# Patient Record
Sex: MALE | Race: WHITE | Employment: UNEMPLOYED | URBAN - METROPOLITAN AREA
[De-identification: names, ages, dates, MRNs, and addresses within clinical notes are randomized per-mention and may not be internally consistent; named-entity substitution may affect disease eponyms.]

---

## 2021-01-01 ENCOUNTER — APPOINTMENT (OUTPATIENT)
Dept: GENERAL RADIOLOGY | Age: 46
DRG: 870 | End: 2021-01-01
Attending: STUDENT IN AN ORGANIZED HEALTH CARE EDUCATION/TRAINING PROGRAM
Payer: COMMERCIAL

## 2021-01-01 ENCOUNTER — ANESTHESIA EVENT (OUTPATIENT)
Dept: ICU | Age: 46
DRG: 208 | End: 2021-01-01
Payer: COMMERCIAL

## 2021-01-01 ENCOUNTER — HOSPITAL ENCOUNTER (INPATIENT)
Age: 46
LOS: 48 days | DRG: 870 | End: 2022-01-18
Attending: THORACIC SURGERY (CARDIOTHORACIC VASCULAR SURGERY) | Admitting: EMERGENCY MEDICINE
Payer: COMMERCIAL

## 2021-01-01 ENCOUNTER — APPOINTMENT (OUTPATIENT)
Dept: CT IMAGING | Age: 46
DRG: 208 | End: 2021-01-01
Attending: EMERGENCY MEDICINE
Payer: COMMERCIAL

## 2021-01-01 ENCOUNTER — APPOINTMENT (OUTPATIENT)
Dept: VASCULAR SURGERY | Age: 46
DRG: 208 | End: 2021-01-01
Attending: INTERNAL MEDICINE
Payer: COMMERCIAL

## 2021-01-01 ENCOUNTER — HOSPITAL ENCOUNTER (INPATIENT)
Dept: CT IMAGING | Age: 46
Discharge: HOME | DRG: 870 | End: 2021-12-26
Attending: NURSE PRACTITIONER | Admitting: EMERGENCY MEDICINE
Payer: COMMERCIAL

## 2021-01-01 ENCOUNTER — APPOINTMENT (OUTPATIENT)
Dept: GENERAL RADIOLOGY | Age: 46
DRG: 208 | End: 2021-01-01
Attending: EMERGENCY MEDICINE
Payer: COMMERCIAL

## 2021-01-01 ENCOUNTER — APPOINTMENT (OUTPATIENT)
Dept: GENERAL RADIOLOGY | Age: 46
DRG: 208 | End: 2021-01-01
Attending: INTERNAL MEDICINE
Payer: COMMERCIAL

## 2021-01-01 ENCOUNTER — APPOINTMENT (OUTPATIENT)
Dept: GENERAL RADIOLOGY | Age: 46
DRG: 870 | End: 2021-01-01
Attending: EMERGENCY MEDICINE
Payer: COMMERCIAL

## 2021-01-01 ENCOUNTER — ANESTHESIA (OUTPATIENT)
Dept: ICU | Age: 46
DRG: 208 | End: 2021-01-01
Payer: COMMERCIAL

## 2021-01-01 ENCOUNTER — APPOINTMENT (OUTPATIENT)
Dept: VASCULAR SURGERY | Age: 46
DRG: 870 | End: 2021-01-01
Attending: THORACIC SURGERY (CARDIOTHORACIC VASCULAR SURGERY)
Payer: COMMERCIAL

## 2021-01-01 ENCOUNTER — APPOINTMENT (OUTPATIENT)
Dept: GENERAL RADIOLOGY | Age: 46
DRG: 870 | End: 2021-01-01
Attending: INTERNAL MEDICINE
Payer: COMMERCIAL

## 2021-01-01 ENCOUNTER — APPOINTMENT (OUTPATIENT)
Dept: CT IMAGING | Age: 46
DRG: 870 | End: 2021-01-01
Attending: NURSE PRACTITIONER
Payer: COMMERCIAL

## 2021-01-01 ENCOUNTER — APPOINTMENT (OUTPATIENT)
Dept: GENERAL RADIOLOGY | Age: 46
DRG: 208 | End: 2021-01-01
Attending: FAMILY MEDICINE
Payer: COMMERCIAL

## 2021-01-01 ENCOUNTER — APPOINTMENT (OUTPATIENT)
Dept: NON INVASIVE DIAGNOSTICS | Age: 46
DRG: 208 | End: 2021-01-01
Attending: INTERNAL MEDICINE
Payer: COMMERCIAL

## 2021-01-01 ENCOUNTER — APPOINTMENT (OUTPATIENT)
Dept: GENERAL RADIOLOGY | Age: 46
DRG: 870 | End: 2021-01-01
Attending: THORACIC SURGERY (CARDIOTHORACIC VASCULAR SURGERY)
Payer: COMMERCIAL

## 2021-01-01 ENCOUNTER — APPOINTMENT (OUTPATIENT)
Dept: GENERAL RADIOLOGY | Age: 46
DRG: 870 | End: 2021-01-01
Attending: NURSE PRACTITIONER
Payer: COMMERCIAL

## 2021-01-01 ENCOUNTER — HOSPITAL ENCOUNTER (INPATIENT)
Age: 46
LOS: 8 days | Discharge: ANOTHER HOSPITAL | DRG: 208 | End: 2021-12-01
Attending: EMERGENCY MEDICINE | Admitting: FAMILY MEDICINE
Payer: COMMERCIAL

## 2021-01-01 VITALS
RESPIRATION RATE: 20 BRPM | TEMPERATURE: 98.8 F | WEIGHT: 191.58 LBS | HEIGHT: 66 IN | BODY MASS INDEX: 30.79 KG/M2 | SYSTOLIC BLOOD PRESSURE: 112 MMHG | HEART RATE: 72 BPM | OXYGEN SATURATION: 97 % | DIASTOLIC BLOOD PRESSURE: 82 MMHG

## 2021-01-01 DIAGNOSIS — Z79.4 TYPE 2 DIABETES MELLITUS WITH HYPERGLYCEMIA, WITH LONG-TERM CURRENT USE OF INSULIN (HCC): ICD-10-CM

## 2021-01-01 DIAGNOSIS — E11.65 TYPE 2 DIABETES MELLITUS WITH HYPERGLYCEMIA, WITH LONG-TERM CURRENT USE OF INSULIN (HCC): ICD-10-CM

## 2021-01-01 DIAGNOSIS — A41.89 SEPSIS DUE TO OTHER ETIOLOGY (HCC): ICD-10-CM

## 2021-01-01 DIAGNOSIS — J12.82 PNEUMONIA DUE TO COVID-19 VIRUS: ICD-10-CM

## 2021-01-01 DIAGNOSIS — R73.9 HYPERGLYCEMIA: ICD-10-CM

## 2021-01-01 DIAGNOSIS — J12.82 PNEUMONIA DUE TO COVID-19 VIRUS: Primary | ICD-10-CM

## 2021-01-01 DIAGNOSIS — U07.1 ACUTE HYPOXEMIC RESPIRATORY FAILURE DUE TO COVID-19 (HCC): ICD-10-CM

## 2021-01-01 DIAGNOSIS — J96.01 ACUTE RESPIRATORY FAILURE WITH HYPOXIA (HCC): ICD-10-CM

## 2021-01-01 DIAGNOSIS — R79.89 ELEVATED D-DIMER: ICD-10-CM

## 2021-01-01 DIAGNOSIS — J96.01 ACUTE HYPOXEMIC RESPIRATORY FAILURE DUE TO COVID-19 (HCC): ICD-10-CM

## 2021-01-01 DIAGNOSIS — E66.9 OBESITY (BMI 30-39.9): ICD-10-CM

## 2021-01-01 DIAGNOSIS — E87.20 LACTIC ACID ACIDOSIS: ICD-10-CM

## 2021-01-01 DIAGNOSIS — Z51.5 PALLIATIVE CARE BY SPECIALIST: ICD-10-CM

## 2021-01-01 DIAGNOSIS — U07.1 PNEUMONIA DUE TO COVID-19 VIRUS: Primary | ICD-10-CM

## 2021-01-01 DIAGNOSIS — U07.1 PNEUMONIA DUE TO COVID-19 VIRUS: ICD-10-CM

## 2021-01-01 LAB
25(OH)D3 SERPL-MCNC: 22.4 NG/ML (ref 30–100)
25(OH)D3 SERPL-MCNC: 23.2 NG/ML (ref 30–100)
ADMINISTERED INITIALS, ADMINIT: NORMAL
ALBUMIN SERPL-MCNC: 1.8 G/DL (ref 3.5–5)
ALBUMIN SERPL-MCNC: 1.9 G/DL (ref 3.5–5)
ALBUMIN SERPL-MCNC: 2 G/DL (ref 3.5–5)
ALBUMIN SERPL-MCNC: 2 G/DL (ref 3.5–5)
ALBUMIN SERPL-MCNC: 2.1 G/DL (ref 3.5–5)
ALBUMIN SERPL-MCNC: 2.2 G/DL (ref 3.4–5)
ALBUMIN SERPL-MCNC: 2.2 G/DL (ref 3.4–5)
ALBUMIN SERPL-MCNC: 2.2 G/DL (ref 3.5–5)
ALBUMIN SERPL-MCNC: 2.3 G/DL (ref 3.4–5)
ALBUMIN SERPL-MCNC: 2.3 G/DL (ref 3.5–5)
ALBUMIN SERPL-MCNC: 2.4 G/DL (ref 3.4–5)
ALBUMIN SERPL-MCNC: 2.4 G/DL (ref 3.5–5)
ALBUMIN SERPL-MCNC: 2.5 G/DL (ref 3.5–5)
ALBUMIN SERPL-MCNC: 2.6 G/DL (ref 3.5–5)
ALBUMIN SERPL-MCNC: 2.6 G/DL (ref 3.5–5)
ALBUMIN SERPL-MCNC: 2.7 G/DL (ref 3.5–5)
ALBUMIN SERPL-MCNC: 2.8 G/DL (ref 3.4–5)
ALBUMIN SERPL-MCNC: 2.8 G/DL (ref 3.4–5)
ALBUMIN SERPL-MCNC: 2.8 G/DL (ref 3.5–5)
ALBUMIN SERPL-MCNC: 2.9 G/DL (ref 3.5–5)
ALBUMIN SERPL-MCNC: 3 G/DL (ref 3.5–5)
ALBUMIN SERPL-MCNC: 3.1 G/DL (ref 3.5–5)
ALBUMIN SERPL-MCNC: 3.3 G/DL (ref 3.5–5)
ALBUMIN SERPL-MCNC: 3.5 G/DL (ref 3.4–5)
ALBUMIN SERPL-MCNC: 3.5 G/DL (ref 3.4–5)
ALBUMIN SERPL-MCNC: 3.6 G/DL (ref 3.5–5)
ALBUMIN SERPL-MCNC: 3.7 G/DL (ref 3.5–5)
ALBUMIN SERPL-MCNC: 3.9 G/DL (ref 3.4–5)
ALBUMIN/GLOB SERPL: 0.4 {RATIO} (ref 1.1–2.2)
ALBUMIN/GLOB SERPL: 0.5 {RATIO} (ref 0.8–1.7)
ALBUMIN/GLOB SERPL: 0.5 {RATIO} (ref 1.1–2.2)
ALBUMIN/GLOB SERPL: 0.5 {RATIO} (ref 1.1–2.2)
ALBUMIN/GLOB SERPL: 0.6 {RATIO} (ref 0.8–1.7)
ALBUMIN/GLOB SERPL: 0.6 {RATIO} (ref 1.1–2.2)
ALBUMIN/GLOB SERPL: 0.7 {RATIO} (ref 0.8–1.7)
ALBUMIN/GLOB SERPL: 0.8 {RATIO} (ref 1.1–2.2)
ALBUMIN/GLOB SERPL: 0.9 {RATIO} (ref 1.1–2.2)
ALBUMIN/GLOB SERPL: 1 {RATIO} (ref 1.1–2.2)
ALBUMIN/GLOB SERPL: 1.1 {RATIO} (ref 0.8–1.7)
ALBUMIN/GLOB SERPL: 1.1 {RATIO} (ref 1.1–2.2)
ALBUMIN/GLOB SERPL: 1.1 {RATIO} (ref 1.1–2.2)
ALBUMIN/GLOB SERPL: 1.2 {RATIO} (ref 1.1–2.2)
ALBUMIN/GLOB SERPL: 1.3 {RATIO} (ref 0.8–1.7)
ALBUMIN/GLOB SERPL: 1.3 {RATIO} (ref 1.1–2.2)
ALBUMIN/GLOB SERPL: 1.4 {RATIO} (ref 0.8–1.7)
ALP SERPL-CCNC: 142 U/L (ref 45–117)
ALP SERPL-CCNC: 44 U/L (ref 45–117)
ALP SERPL-CCNC: 45 U/L (ref 45–117)
ALP SERPL-CCNC: 46 U/L (ref 45–117)
ALP SERPL-CCNC: 47 U/L (ref 45–117)
ALP SERPL-CCNC: 51 U/L (ref 45–117)
ALP SERPL-CCNC: 54 U/L (ref 45–117)
ALP SERPL-CCNC: 55 U/L (ref 45–117)
ALP SERPL-CCNC: 56 U/L (ref 45–117)
ALP SERPL-CCNC: 59 U/L (ref 45–117)
ALP SERPL-CCNC: 60 U/L (ref 45–117)
ALP SERPL-CCNC: 60 U/L (ref 45–117)
ALP SERPL-CCNC: 61 U/L (ref 45–117)
ALP SERPL-CCNC: 62 U/L (ref 45–117)
ALP SERPL-CCNC: 63 U/L (ref 45–117)
ALP SERPL-CCNC: 64 U/L (ref 45–117)
ALP SERPL-CCNC: 65 U/L (ref 45–117)
ALP SERPL-CCNC: 66 U/L (ref 45–117)
ALP SERPL-CCNC: 67 U/L (ref 45–117)
ALP SERPL-CCNC: 68 U/L (ref 45–117)
ALP SERPL-CCNC: 68 U/L (ref 45–117)
ALP SERPL-CCNC: 69 U/L (ref 45–117)
ALP SERPL-CCNC: 70 U/L (ref 45–117)
ALP SERPL-CCNC: 72 U/L (ref 45–117)
ALP SERPL-CCNC: 73 U/L (ref 45–117)
ALP SERPL-CCNC: 79 U/L (ref 45–117)
ALP SERPL-CCNC: 85 U/L (ref 45–117)
ALP SERPL-CCNC: 88 U/L (ref 45–117)
ALP SERPL-CCNC: 93 U/L (ref 45–117)
ALP SERPL-CCNC: 93 U/L (ref 45–117)
ALP SERPL-CCNC: 95 U/L (ref 45–117)
ALT SERPL-CCNC: 19 U/L (ref 12–78)
ALT SERPL-CCNC: 20 U/L (ref 12–78)
ALT SERPL-CCNC: 21 U/L (ref 12–78)
ALT SERPL-CCNC: 22 U/L (ref 12–78)
ALT SERPL-CCNC: 23 U/L (ref 12–78)
ALT SERPL-CCNC: 24 U/L (ref 12–78)
ALT SERPL-CCNC: 26 U/L (ref 12–78)
ALT SERPL-CCNC: 27 U/L (ref 12–78)
ALT SERPL-CCNC: 31 U/L (ref 12–78)
ALT SERPL-CCNC: 32 U/L (ref 12–78)
ALT SERPL-CCNC: 37 U/L (ref 12–78)
ALT SERPL-CCNC: 38 U/L (ref 16–61)
ALT SERPL-CCNC: 39 U/L (ref 12–78)
ALT SERPL-CCNC: 40 U/L (ref 12–78)
ALT SERPL-CCNC: 40 U/L (ref 12–78)
ALT SERPL-CCNC: 41 U/L (ref 16–61)
ALT SERPL-CCNC: 43 U/L (ref 12–78)
ALT SERPL-CCNC: 43 U/L (ref 16–61)
ALT SERPL-CCNC: 45 U/L (ref 12–78)
ALT SERPL-CCNC: 46 U/L (ref 12–78)
ALT SERPL-CCNC: 48 U/L (ref 12–78)
ALT SERPL-CCNC: 49 U/L (ref 12–78)
ALT SERPL-CCNC: 53 U/L (ref 12–78)
ALT SERPL-CCNC: 54 U/L (ref 12–78)
ALT SERPL-CCNC: 57 U/L (ref 16–61)
ALT SERPL-CCNC: 58 U/L (ref 12–78)
ALT SERPL-CCNC: 58 U/L (ref 16–61)
ALT SERPL-CCNC: 59 U/L (ref 12–78)
ALT SERPL-CCNC: 60 U/L (ref 12–78)
ALT SERPL-CCNC: 62 U/L (ref 12–78)
ALT SERPL-CCNC: 62 U/L (ref 16–61)
ALT SERPL-CCNC: 63 U/L (ref 16–61)
ALT SERPL-CCNC: 65 U/L (ref 12–78)
ALT SERPL-CCNC: 72 U/L (ref 16–61)
ALT SERPL-CCNC: 85 U/L (ref 16–61)
ANION GAP SERPL CALC-SCNC: 10 MMOL/L (ref 5–15)
ANION GAP SERPL CALC-SCNC: 12 MMOL/L (ref 3–18)
ANION GAP SERPL CALC-SCNC: 13 MMOL/L (ref 3–18)
ANION GAP SERPL CALC-SCNC: 14 MMOL/L (ref 3–18)
ANION GAP SERPL CALC-SCNC: 2 MMOL/L (ref 5–15)
ANION GAP SERPL CALC-SCNC: 3 MMOL/L (ref 5–15)
ANION GAP SERPL CALC-SCNC: 4 MMOL/L (ref 3–18)
ANION GAP SERPL CALC-SCNC: 4 MMOL/L (ref 3–18)
ANION GAP SERPL CALC-SCNC: 4 MMOL/L (ref 5–15)
ANION GAP SERPL CALC-SCNC: 5 MMOL/L (ref 3–18)
ANION GAP SERPL CALC-SCNC: 5 MMOL/L (ref 5–15)
ANION GAP SERPL CALC-SCNC: 6 MMOL/L (ref 5–15)
ANION GAP SERPL CALC-SCNC: 7 MMOL/L (ref 3–18)
ANION GAP SERPL CALC-SCNC: 7 MMOL/L (ref 3–18)
ANION GAP SERPL CALC-SCNC: 7 MMOL/L (ref 5–15)
ANION GAP SERPL CALC-SCNC: 8 MMOL/L (ref 3–18)
ANION GAP SERPL CALC-SCNC: 8 MMOL/L (ref 5–15)
ANION GAP SERPL CALC-SCNC: 9 MMOL/L (ref 5–15)
APPEARANCE UR: ABNORMAL
APPEARANCE UR: CLEAR
APTT PPP: 24.4 SEC (ref 22.1–31)
APTT PPP: 27.9 SEC (ref 23–36.4)
APTT PPP: 28.1 SEC (ref 23–36.4)
APTT PPP: 28.6 SEC (ref 23–36.4)
APTT PPP: 28.8 SEC (ref 23–36.4)
APTT PPP: 29.4 SEC (ref 23–36.4)
APTT PPP: 30.5 SEC (ref 23–36.4)
APTT PPP: 30.8 SEC (ref 23–36.4)
APTT PPP: 33.8 SEC (ref 23–36.4)
APTT PPP: 35.1 SEC (ref 23–36.4)
APTT PPP: 35.3 SEC (ref 22.1–31)
APTT PPP: 45.7 SEC (ref 22.1–31)
APTT PPP: 48.7 SEC (ref 22.1–31)
APTT PPP: 51.3 SEC (ref 22.1–31)
ARTERIAL PATENCY WRIST A: ABNORMAL
ARTERIAL PATENCY WRIST A: NEGATIVE
ARTERIAL PATENCY WRIST A: NORMAL
ARTERIAL PATENCY WRIST A: POSITIVE
ARTERIAL PATENCY WRIST A: YES
ARTERIAL PATENCY WRIST A: YES
AST SERPL-CCNC: 102 U/L (ref 10–38)
AST SERPL-CCNC: 13 U/L (ref 15–37)
AST SERPL-CCNC: 13 U/L (ref 15–37)
AST SERPL-CCNC: 14 U/L (ref 15–37)
AST SERPL-CCNC: 15 U/L (ref 15–37)
AST SERPL-CCNC: 15 U/L (ref 15–37)
AST SERPL-CCNC: 16 U/L (ref 15–37)
AST SERPL-CCNC: 16 U/L (ref 15–37)
AST SERPL-CCNC: 17 U/L (ref 15–37)
AST SERPL-CCNC: 18 U/L (ref 15–37)
AST SERPL-CCNC: 18 U/L (ref 15–37)
AST SERPL-CCNC: 19 U/L (ref 15–37)
AST SERPL-CCNC: 19 U/L (ref 15–37)
AST SERPL-CCNC: 20 U/L (ref 15–37)
AST SERPL-CCNC: 20 U/L (ref 15–37)
AST SERPL-CCNC: 21 U/L (ref 15–37)
AST SERPL-CCNC: 22 U/L (ref 15–37)
AST SERPL-CCNC: 22 U/L (ref 15–37)
AST SERPL-CCNC: 23 U/L (ref 15–37)
AST SERPL-CCNC: 24 U/L (ref 15–37)
AST SERPL-CCNC: 28 U/L (ref 15–37)
AST SERPL-CCNC: 28 U/L (ref 15–37)
AST SERPL-CCNC: 29 U/L (ref 15–37)
AST SERPL-CCNC: 32 U/L (ref 15–37)
AST SERPL-CCNC: 35 U/L (ref 15–37)
AST SERPL-CCNC: 40 U/L (ref 15–37)
AST SERPL-CCNC: 44 U/L (ref 15–37)
AST SERPL-CCNC: 47 U/L (ref 10–38)
AST SERPL-CCNC: 47 U/L (ref 10–38)
AST SERPL-CCNC: 53 U/L (ref 15–37)
AST SERPL-CCNC: 56 U/L (ref 10–38)
AST SERPL-CCNC: 59 U/L (ref 10–38)
AST SERPL-CCNC: 59 U/L (ref 10–38)
AST SERPL-CCNC: 66 U/L (ref 10–38)
AST SERPL-CCNC: 78 U/L (ref 10–38)
AST SERPL-CCNC: 79 U/L (ref 10–38)
ATRIAL RATE: 117 BPM
B PERT DNA SPEC QL NAA+PROBE: NOT DETECTED
BACTERIA SPEC CULT: ABNORMAL
BACTERIA SPEC CULT: NORMAL
BACTERIA URNS QL MICRO: ABNORMAL /HPF
BACTERIA URNS QL MICRO: NEGATIVE /HPF
BACTERIA URNS QL MICRO: NEGATIVE /HPF
BASE DEFICIT BLD-SCNC: 0.3 MMOL/L
BASE DEFICIT BLD-SCNC: 0.7 MMOL/L
BASE DEFICIT BLD-SCNC: 3.4 MMOL/L
BASE DEFICIT BLDA-SCNC: 0.7 MMOL/L
BASE DEFICIT BLDA-SCNC: 1.8 MMOL/L
BASE EXCESS BLD CALC-SCNC: 10.6 MMOL/L
BASE EXCESS BLD CALC-SCNC: 2.4 MMOL/L
BASE EXCESS BLD CALC-SCNC: 2.6 MMOL/L
BASE EXCESS BLD CALC-SCNC: 2.8 MMOL/L
BASE EXCESS BLD CALC-SCNC: 3 MMOL/L
BASE EXCESS BLD CALC-SCNC: 3 MMOL/L
BASE EXCESS BLD CALC-SCNC: 3.1 MMOL/L
BASE EXCESS BLD CALC-SCNC: 3.5 MMOL/L
BASE EXCESS BLD CALC-SCNC: 3.7 MMOL/L
BASE EXCESS BLD CALC-SCNC: 3.7 MMOL/L
BASE EXCESS BLD CALC-SCNC: 3.8 MMOL/L
BASE EXCESS BLD CALC-SCNC: 4 MMOL/L
BASE EXCESS BLD CALC-SCNC: 4.1 MMOL/L
BASE EXCESS BLD CALC-SCNC: 4.2 MMOL/L
BASE EXCESS BLD CALC-SCNC: 4.3 MMOL/L
BASE EXCESS BLD CALC-SCNC: 4.3 MMOL/L
BASE EXCESS BLD CALC-SCNC: 4.4 MMOL/L
BASE EXCESS BLD CALC-SCNC: 4.8 MMOL/L
BASE EXCESS BLD CALC-SCNC: 5.1 MMOL/L
BASE EXCESS BLD CALC-SCNC: 5.2 MMOL/L
BASE EXCESS BLD CALC-SCNC: 5.2 MMOL/L
BASE EXCESS BLD CALC-SCNC: 5.4 MMOL/L
BASE EXCESS BLD CALC-SCNC: 5.8 MMOL/L
BASE EXCESS BLD CALC-SCNC: 6.2 MMOL/L
BASE EXCESS BLD CALC-SCNC: 6.2 MMOL/L
BASE EXCESS BLD CALC-SCNC: 6.3 MMOL/L
BASE EXCESS BLD CALC-SCNC: 6.7 MMOL/L
BASE EXCESS BLD CALC-SCNC: 7.3 MMOL/L
BASE EXCESS BLD CALC-SCNC: 7.3 MMOL/L
BASE EXCESS BLD CALC-SCNC: 7.4 MMOL/L
BASE EXCESS BLD CALC-SCNC: 7.9 MMOL/L
BASE EXCESS BLD CALC-SCNC: 8.2 MMOL/L
BASE EXCESS BLDA CALC-SCNC: 0.2 MMOL/L
BASE EXCESS BLDA CALC-SCNC: 0.9 MMOL/L
BASE EXCESS BLDA CALC-SCNC: 14.6 MMOL/L
BASE EXCESS BLDA CALC-SCNC: 2.2 MMOL/L
BASE EXCESS BLDA CALC-SCNC: 2.2 MMOL/L
BASE EXCESS BLDA CALC-SCNC: 2.7 MMOL/L
BASE EXCESS BLDA CALC-SCNC: 2.8 MMOL/L
BASE EXCESS BLDA CALC-SCNC: 2.9 MMOL/L
BASE EXCESS BLDA CALC-SCNC: 3 MMOL/L
BASE EXCESS BLDA CALC-SCNC: 3.2 MMOL/L
BASE EXCESS BLDA CALC-SCNC: 3.5 MMOL/L
BASE EXCESS BLDA CALC-SCNC: 3.6 MMOL/L
BASE EXCESS BLDA CALC-SCNC: 3.7 MMOL/L
BASE EXCESS BLDA CALC-SCNC: 3.8 MMOL/L
BASE EXCESS BLDA CALC-SCNC: 5.5 MMOL/L
BASE EXCESS BLDA CALC-SCNC: 6.5 MMOL/L
BASE EXCESS BLDA CALC-SCNC: 7.1 MMOL/L
BASE EXCESS BLDA CALC-SCNC: 9.2 MMOL/L
BASE EXCESS BLDA CALC-SCNC: 9.6 MMOL/L
BASOPHILS # BLD: 0 K/UL (ref 0–0.1)
BASOPHILS # BLD: 0.1 K/UL (ref 0–0.1)
BASOPHILS # BLD: 0.1 K/UL (ref 0–0.1)
BASOPHILS NFR BLD: 0 % (ref 0–1)
BASOPHILS NFR BLD: 0 % (ref 0–2)
BASOPHILS NFR BLD: 1 % (ref 0–1)
BDY SITE: ABNORMAL
BDY SITE: NORMAL
BILIRUB SERPL-MCNC: 0.3 MG/DL (ref 0.2–1)
BILIRUB SERPL-MCNC: 0.3 MG/DL (ref 0.2–1)
BILIRUB SERPL-MCNC: 0.4 MG/DL (ref 0.2–1)
BILIRUB SERPL-MCNC: 0.5 MG/DL (ref 0.2–1)
BILIRUB SERPL-MCNC: 0.6 MG/DL (ref 0.2–1)
BILIRUB SERPL-MCNC: 0.7 MG/DL (ref 0.2–1)
BILIRUB UR QL: NEGATIVE
BNP SERPL-MCNC: 35 PG/ML (ref 0–450)
BNP SERPL-MCNC: 35 PG/ML (ref 0–450)
BNP SERPL-MCNC: 45 PG/ML (ref 0–450)
BNP SERPL-MCNC: 45 PG/ML (ref 0–450)
BODY TEMPERATURE: 37
BODY TEMPERATURE: 37
BODY TEMPERATURE: 98.3
BODY TEMPERATURE: 98.9
BODY TEMPERATURE: 99
BORDETELLA PARAPERTUSSIS PCR, BORPAR: NOT DETECTED
BUN SERPL-MCNC: 11 MG/DL (ref 6–20)
BUN SERPL-MCNC: 11 MG/DL (ref 6–20)
BUN SERPL-MCNC: 15 MG/DL (ref 6–20)
BUN SERPL-MCNC: 16 MG/DL (ref 6–20)
BUN SERPL-MCNC: 17 MG/DL (ref 6–20)
BUN SERPL-MCNC: 17 MG/DL (ref 6–20)
BUN SERPL-MCNC: 18 MG/DL (ref 6–20)
BUN SERPL-MCNC: 18 MG/DL (ref 7–18)
BUN SERPL-MCNC: 18 MG/DL (ref 7–18)
BUN SERPL-MCNC: 19 MG/DL (ref 6–20)
BUN SERPL-MCNC: 20 MG/DL (ref 6–20)
BUN SERPL-MCNC: 20 MG/DL (ref 6–20)
BUN SERPL-MCNC: 20 MG/DL (ref 7–18)
BUN SERPL-MCNC: 20 MG/DL (ref 7–18)
BUN SERPL-MCNC: 21 MG/DL (ref 6–20)
BUN SERPL-MCNC: 22 MG/DL (ref 6–20)
BUN SERPL-MCNC: 22 MG/DL (ref 7–18)
BUN SERPL-MCNC: 23 MG/DL (ref 6–20)
BUN SERPL-MCNC: 23 MG/DL (ref 6–20)
BUN SERPL-MCNC: 24 MG/DL (ref 6–20)
BUN SERPL-MCNC: 24 MG/DL (ref 7–18)
BUN SERPL-MCNC: 25 MG/DL (ref 6–20)
BUN SERPL-MCNC: 25 MG/DL (ref 7–18)
BUN SERPL-MCNC: 26 MG/DL (ref 6–20)
BUN SERPL-MCNC: 26 MG/DL (ref 7–18)
BUN SERPL-MCNC: 31 MG/DL (ref 6–20)
BUN SERPL-MCNC: 31 MG/DL (ref 7–18)
BUN SERPL-MCNC: 33 MG/DL (ref 6–20)
BUN SERPL-MCNC: 34 MG/DL (ref 6–20)
BUN SERPL-MCNC: 37 MG/DL (ref 6–20)
BUN SERPL-MCNC: 37 MG/DL (ref 6–20)
BUN SERPL-MCNC: 44 MG/DL (ref 6–20)
BUN SERPL-MCNC: 44 MG/DL (ref 6–20)
BUN SERPL-MCNC: 5 MG/DL (ref 6–20)
BUN SERPL-MCNC: 6 MG/DL (ref 6–20)
BUN SERPL-MCNC: 7 MG/DL (ref 6–20)
BUN/CREAT SERPL: 11 (ref 12–20)
BUN/CREAT SERPL: 12 (ref 12–20)
BUN/CREAT SERPL: 14 (ref 12–20)
BUN/CREAT SERPL: 15 (ref 12–20)
BUN/CREAT SERPL: 18 (ref 12–20)
BUN/CREAT SERPL: 25 (ref 12–20)
BUN/CREAT SERPL: 26 (ref 12–20)
BUN/CREAT SERPL: 26 (ref 12–20)
BUN/CREAT SERPL: 27 (ref 12–20)
BUN/CREAT SERPL: 28 (ref 12–20)
BUN/CREAT SERPL: 28 (ref 12–20)
BUN/CREAT SERPL: 29 (ref 12–20)
BUN/CREAT SERPL: 30 (ref 12–20)
BUN/CREAT SERPL: 30 (ref 12–20)
BUN/CREAT SERPL: 32 (ref 12–20)
BUN/CREAT SERPL: 32 (ref 12–20)
BUN/CREAT SERPL: 37 (ref 12–20)
BUN/CREAT SERPL: 37 (ref 12–20)
BUN/CREAT SERPL: 40 (ref 12–20)
BUN/CREAT SERPL: 41 (ref 12–20)
BUN/CREAT SERPL: 42 (ref 12–20)
BUN/CREAT SERPL: 43 (ref 12–20)
BUN/CREAT SERPL: 45 (ref 12–20)
BUN/CREAT SERPL: 46 (ref 12–20)
BUN/CREAT SERPL: 46 (ref 12–20)
BUN/CREAT SERPL: 47 (ref 12–20)
BUN/CREAT SERPL: 47 (ref 12–20)
BUN/CREAT SERPL: 48 (ref 12–20)
BUN/CREAT SERPL: 49 (ref 12–20)
BUN/CREAT SERPL: 51 (ref 12–20)
BUN/CREAT SERPL: 52 (ref 12–20)
BUN/CREAT SERPL: 53 (ref 12–20)
BUN/CREAT SERPL: 57 (ref 12–20)
BUN/CREAT SERPL: 57 (ref 12–20)
BUN/CREAT SERPL: 61 (ref 12–20)
BUN/CREAT SERPL: 61 (ref 12–20)
BUN/CREAT SERPL: 71 (ref 12–20)
C PNEUM DNA SPEC QL NAA+PROBE: NOT DETECTED
CA-I BLD-SCNC: 1.06 MMOL/L (ref 1.12–1.32)
CA-I BLD-SCNC: 1.07 MMOL/L (ref 1.13–1.32)
CA-I BLD-SCNC: 1.13 MMOL/L (ref 1.13–1.32)
CA-I SERPL-SCNC: 1.05 MMOL/L (ref 1.12–1.32)
CA-I SERPL-SCNC: 1.09 MMOL/L (ref 1.12–1.32)
CA-I SERPL-SCNC: 1.12 MMOL/L (ref 1.12–1.32)
CA-I SERPL-SCNC: 1.14 MMOL/L (ref 1.12–1.32)
CA-I SERPL-SCNC: 1.15 MMOL/L (ref 1.12–1.32)
CA-I SERPL-SCNC: 1.19 MMOL/L (ref 1.12–1.32)
CALCIUM SERPL-MCNC: 8.2 MG/DL (ref 8.5–10.1)
CALCIUM SERPL-MCNC: 8.3 MG/DL (ref 8.5–10.1)
CALCIUM SERPL-MCNC: 8.4 MG/DL (ref 8.5–10.1)
CALCIUM SERPL-MCNC: 8.5 MG/DL (ref 8.5–10.1)
CALCIUM SERPL-MCNC: 8.6 MG/DL (ref 8.5–10.1)
CALCIUM SERPL-MCNC: 8.6 MG/DL (ref 8.5–10.1)
CALCIUM SERPL-MCNC: 8.7 MG/DL (ref 8.5–10.1)
CALCIUM SERPL-MCNC: 8.8 MG/DL (ref 8.5–10.1)
CALCIUM SERPL-MCNC: 8.9 MG/DL (ref 8.5–10.1)
CALCIUM SERPL-MCNC: 9 MG/DL (ref 8.5–10.1)
CALCIUM SERPL-MCNC: 9.1 MG/DL (ref 8.5–10.1)
CALCIUM SERPL-MCNC: 9.2 MG/DL (ref 8.5–10.1)
CALCIUM SERPL-MCNC: 9.2 MG/DL (ref 8.5–10.1)
CALCIUM SERPL-MCNC: 9.3 MG/DL (ref 8.5–10.1)
CALCIUM SERPL-MCNC: 9.3 MG/DL (ref 8.5–10.1)
CALCULATED P AXIS, ECG09: 34 DEGREES
CALCULATED R AXIS, ECG10: -5 DEGREES
CALCULATED T AXIS, ECG11: 41 DEGREES
CHLORIDE SERPL-SCNC: 100 MMOL/L (ref 97–108)
CHLORIDE SERPL-SCNC: 101 MMOL/L (ref 100–111)
CHLORIDE SERPL-SCNC: 101 MMOL/L (ref 97–108)
CHLORIDE SERPL-SCNC: 102 MMOL/L (ref 100–111)
CHLORIDE SERPL-SCNC: 102 MMOL/L (ref 100–111)
CHLORIDE SERPL-SCNC: 102 MMOL/L (ref 97–108)
CHLORIDE SERPL-SCNC: 103 MMOL/L (ref 100–111)
CHLORIDE SERPL-SCNC: 103 MMOL/L (ref 97–108)
CHLORIDE SERPL-SCNC: 104 MMOL/L (ref 100–111)
CHLORIDE SERPL-SCNC: 104 MMOL/L (ref 97–108)
CHLORIDE SERPL-SCNC: 104 MMOL/L (ref 97–108)
CHLORIDE SERPL-SCNC: 105 MMOL/L (ref 100–111)
CHLORIDE SERPL-SCNC: 105 MMOL/L (ref 97–108)
CHLORIDE SERPL-SCNC: 105 MMOL/L (ref 97–108)
CHLORIDE SERPL-SCNC: 106 MMOL/L (ref 97–108)
CHLORIDE SERPL-SCNC: 107 MMOL/L (ref 97–108)
CHLORIDE SERPL-SCNC: 107 MMOL/L (ref 97–108)
CHLORIDE SERPL-SCNC: 110 MMOL/L (ref 97–108)
CHLORIDE SERPL-SCNC: 90 MMOL/L (ref 100–111)
CHLORIDE SERPL-SCNC: 90 MMOL/L (ref 97–108)
CHLORIDE SERPL-SCNC: 91 MMOL/L (ref 97–108)
CHLORIDE SERPL-SCNC: 92 MMOL/L (ref 97–108)
CHLORIDE SERPL-SCNC: 94 MMOL/L (ref 97–108)
CHLORIDE SERPL-SCNC: 95 MMOL/L (ref 97–108)
CHLORIDE SERPL-SCNC: 95 MMOL/L (ref 97–108)
CHLORIDE SERPL-SCNC: 97 MMOL/L (ref 97–108)
CHLORIDE SERPL-SCNC: 97 MMOL/L (ref 97–108)
CHLORIDE SERPL-SCNC: 99 MMOL/L (ref 100–111)
CHLORIDE SERPL-SCNC: 99 MMOL/L (ref 100–111)
CHOLEST SERPL-MCNC: 123 MG/DL
CK MB CFR SERPL CALC: ABNORMAL % (ref 0–4)
CK MB CFR SERPL CALC: NORMAL % (ref 0–4)
CK MB SERPL-MCNC: <1 NG/ML (ref 5–25)
CK MB SERPL-MCNC: <1 NG/ML (ref 5–25)
CK SERPL-CCNC: 795 U/L (ref 39–308)
CK SERPL-CCNC: 99 U/L (ref 39–308)
CO2 SERPL-SCNC: 24 MMOL/L (ref 21–32)
CO2 SERPL-SCNC: 24 MMOL/L (ref 21–32)
CO2 SERPL-SCNC: 25 MMOL/L (ref 21–32)
CO2 SERPL-SCNC: 26 MMOL/L (ref 21–32)
CO2 SERPL-SCNC: 26 MMOL/L (ref 21–32)
CO2 SERPL-SCNC: 27 MMOL/L (ref 21–32)
CO2 SERPL-SCNC: 28 MMOL/L (ref 21–32)
CO2 SERPL-SCNC: 29 MMOL/L (ref 21–32)
CO2 SERPL-SCNC: 30 MMOL/L (ref 21–32)
CO2 SERPL-SCNC: 31 MMOL/L (ref 21–32)
CO2 SERPL-SCNC: 32 MMOL/L (ref 21–32)
CO2 SERPL-SCNC: 33 MMOL/L (ref 21–32)
CO2 SERPL-SCNC: 34 MMOL/L (ref 21–32)
CO2 SERPL-SCNC: 35 MMOL/L (ref 21–32)
COLOR UR: ABNORMAL
COLOR UR: YELLOW
COVID-19 RAPID TEST, COVR: DETECTED
CREAT SERPL-MCNC: 0.24 MG/DL (ref 0.7–1.3)
CREAT SERPL-MCNC: 0.33 MG/DL (ref 0.7–1.3)
CREAT SERPL-MCNC: 0.33 MG/DL (ref 0.7–1.3)
CREAT SERPL-MCNC: 0.38 MG/DL (ref 0.7–1.3)
CREAT SERPL-MCNC: 0.38 MG/DL (ref 0.7–1.3)
CREAT SERPL-MCNC: 0.39 MG/DL (ref 0.7–1.3)
CREAT SERPL-MCNC: 0.42 MG/DL (ref 0.7–1.3)
CREAT SERPL-MCNC: 0.43 MG/DL (ref 0.7–1.3)
CREAT SERPL-MCNC: 0.45 MG/DL (ref 0.7–1.3)
CREAT SERPL-MCNC: 0.47 MG/DL (ref 0.7–1.3)
CREAT SERPL-MCNC: 0.48 MG/DL (ref 0.7–1.3)
CREAT SERPL-MCNC: 0.48 MG/DL (ref 0.7–1.3)
CREAT SERPL-MCNC: 0.5 MG/DL (ref 0.7–1.3)
CREAT SERPL-MCNC: 0.51 MG/DL (ref 0.7–1.3)
CREAT SERPL-MCNC: 0.52 MG/DL (ref 0.7–1.3)
CREAT SERPL-MCNC: 0.53 MG/DL (ref 0.7–1.3)
CREAT SERPL-MCNC: 0.54 MG/DL (ref 0.7–1.3)
CREAT SERPL-MCNC: 0.57 MG/DL (ref 0.6–1.3)
CREAT SERPL-MCNC: 0.58 MG/DL (ref 0.6–1.3)
CREAT SERPL-MCNC: 0.58 MG/DL (ref 0.7–1.3)
CREAT SERPL-MCNC: 0.59 MG/DL (ref 0.7–1.3)
CREAT SERPL-MCNC: 0.61 MG/DL (ref 0.7–1.3)
CREAT SERPL-MCNC: 0.62 MG/DL (ref 0.7–1.3)
CREAT SERPL-MCNC: 0.64 MG/DL (ref 0.7–1.3)
CREAT SERPL-MCNC: 0.65 MG/DL (ref 0.7–1.3)
CREAT SERPL-MCNC: 0.68 MG/DL (ref 0.6–1.3)
CREAT SERPL-MCNC: 0.7 MG/DL (ref 0.7–1.3)
CREAT SERPL-MCNC: 0.71 MG/DL (ref 0.6–1.3)
CREAT SERPL-MCNC: 0.72 MG/DL (ref 0.6–1.3)
CREAT SERPL-MCNC: 0.73 MG/DL (ref 0.6–1.3)
CREAT SERPL-MCNC: 0.73 MG/DL (ref 0.7–1.3)
CREAT SERPL-MCNC: 0.75 MG/DL (ref 0.6–1.3)
CREAT SERPL-MCNC: 0.76 MG/DL (ref 0.7–1.3)
CREAT SERPL-MCNC: 0.82 MG/DL (ref 0.6–1.3)
CREAT SERPL-MCNC: 0.94 MG/DL (ref 0.6–1.3)
CREAT SERPL-MCNC: 1.07 MG/DL (ref 0.7–1.3)
CRP SERPL HS-MCNC: 5.3 MG/L
CRP SERPL HS-MCNC: >9.5 MG/L
CRP SERPL-MCNC: 0.6 MG/DL (ref 0–0.3)
CRP SERPL-MCNC: 0.7 MG/DL (ref 0–0.3)
CRP SERPL-MCNC: 0.8 MG/DL (ref 0–0.3)
CRP SERPL-MCNC: 1.2 MG/DL (ref 0–0.3)
CRP SERPL-MCNC: 1.9 MG/DL (ref 0–0.3)
CRP SERPL-MCNC: 12.3 MG/DL (ref 0–0.3)
CRP SERPL-MCNC: 13.2 MG/DL (ref 0–0.3)
CRP SERPL-MCNC: 6.5 MG/DL (ref 0–0.3)
D DIMER PPP FEU-MCNC: 0.61 UG/ML(FEU)
D DIMER PPP FEU-MCNC: 0.72 UG/ML(FEU)
D DIMER PPP FEU-MCNC: 0.76 UG/ML(FEU)
D DIMER PPP FEU-MCNC: 0.93 UG/ML(FEU)
D DIMER PPP FEU-MCNC: 1.3 MG/L FEU (ref 0–0.65)
D DIMER PPP FEU-MCNC: 1.63 MG/L FEU (ref 0–0.65)
D DIMER PPP FEU-MCNC: 1.66 MG/L FEU (ref 0–0.65)
D DIMER PPP FEU-MCNC: 1.66 MG/L FEU (ref 0–0.65)
D DIMER PPP FEU-MCNC: 1.67 UG/ML(FEU)
D DIMER PPP FEU-MCNC: 1.68 MG/L FEU (ref 0–0.65)
D DIMER PPP FEU-MCNC: 1.74 MG/L FEU (ref 0–0.65)
D DIMER PPP FEU-MCNC: 1.76 MG/L FEU (ref 0–0.65)
D DIMER PPP FEU-MCNC: 1.9 MG/L FEU (ref 0–0.65)
D DIMER PPP FEU-MCNC: 1.95 MG/L FEU (ref 0–0.65)
D DIMER PPP FEU-MCNC: 2.12 MG/L FEU (ref 0–0.65)
D DIMER PPP FEU-MCNC: 2.18 MG/L FEU (ref 0–0.65)
D DIMER PPP FEU-MCNC: 2.18 MG/L FEU (ref 0–0.65)
D DIMER PPP FEU-MCNC: 2.33 MG/L FEU (ref 0–0.65)
D DIMER PPP FEU-MCNC: 2.41 MG/L FEU (ref 0–0.65)
D DIMER PPP FEU-MCNC: 2.56 UG/ML(FEU)
D DIMER PPP FEU-MCNC: 2.78 MG/L FEU (ref 0–0.65)
D DIMER PPP FEU-MCNC: 3.05 UG/ML(FEU)
D DIMER PPP FEU-MCNC: 3.31 UG/ML(FEU)
D DIMER PPP FEU-MCNC: 3.6 MG/L FEU (ref 0–0.65)
D DIMER PPP FEU-MCNC: 3.61 UG/ML(FEU)
D DIMER PPP FEU-MCNC: 3.85 MG/L FEU (ref 0–0.65)
D DIMER PPP FEU-MCNC: 3.87 MG/L FEU (ref 0–0.65)
D DIMER PPP FEU-MCNC: 3.92 MG/L FEU (ref 0–0.65)
D DIMER PPP FEU-MCNC: 3.98 MG/L FEU (ref 0–0.65)
D DIMER PPP FEU-MCNC: 4.18 MG/L FEU (ref 0–0.65)
D DIMER PPP FEU-MCNC: 4.22 MG/L FEU (ref 0–0.65)
D DIMER PPP FEU-MCNC: 4.27 MG/L FEU (ref 0–0.65)
D DIMER PPP FEU-MCNC: 4.72 MG/L FEU (ref 0–0.65)
D DIMER PPP FEU-MCNC: 4.99 MG/L FEU (ref 0–0.65)
D50 ADMINISTERED, D50ADM: 0 ML
D50 ORDER, D50ORD: 0 ML
DIAGNOSIS, 93000: NORMAL
DIFFERENTIAL METHOD BLD: ABNORMAL
ECHO AO ROOT DIAM: 3.91 CM
ECHO AV AREA PEAK VELOCITY: 3.86 CM2
ECHO AV AREA VTI: 3.52 CM2
ECHO AV AREA/BSA PEAK VELOCITY: 1.9 CM2/M2
ECHO AV AREA/BSA VTI: 1.7 CM2/M2
ECHO AV MEAN GRADIENT: 4.05 MMHG
ECHO AV PEAK GRADIENT: 7 MMHG
ECHO AV PEAK VELOCITY: 132.27 CM/S
ECHO AV VTI: 28.71 CM
ECHO IVC PROX: 1.71 CM
ECHO LA AREA 4C: 18.06 CM2
ECHO LA MAJOR AXIS: 2.04 CM
ECHO LA MINOR AXIS: 0.99 CM
ECHO LA VOL 2C: 55.47 ML (ref 18–58)
ECHO LA VOL 4C: 45.79 ML (ref 18–58)
ECHO LA VOL BP: 55.88 ML (ref 18–58)
ECHO LA VOL/BSA BIPLANE: 27.13 ML/M2 (ref 16–28)
ECHO LA VOLUME INDEX A2C: 26.93 ML/M2 (ref 16–28)
ECHO LA VOLUME INDEX A4C: 22.23 ML/M2 (ref 16–28)
ECHO LV E' LATERAL VELOCITY: 12.41 CM/S
ECHO LV E' SEPTAL VELOCITY: 10.28 CM/S
ECHO LV EDV A2C: 122.05 ML
ECHO LV EDV A4C: 122.59 ML
ECHO LV EDV BP: 120.66 ML (ref 67–155)
ECHO LV EDV INDEX A4C: 59.5 ML/M2
ECHO LV EDV INDEX BP: 58.6 ML/M2
ECHO LV EDV NDEX A2C: 59.2 ML/M2
ECHO LV EJECTION FRACTION A2C: 69 PERCENT
ECHO LV EJECTION FRACTION A4C: 64 PERCENT
ECHO LV EJECTION FRACTION BIPLANE: 65.9 PERCENT (ref 55–100)
ECHO LV ESV A2C: 37.4 ML
ECHO LV ESV A4C: 44.5 ML
ECHO LV ESV BP: 41.17 ML (ref 22–58)
ECHO LV ESV INDEX A2C: 18.2 ML/M2
ECHO LV ESV INDEX A4C: 21.6 ML/M2
ECHO LV ESV INDEX BP: 20 ML/M2
ECHO LV GLOBAL LONGITUDINAL STRAIN (GLS): -15.5 PERCENT
ECHO LV INTERNAL DIMENSION DIASTOLIC: 4.77 CM (ref 4.2–5.9)
ECHO LV INTERNAL DIMENSION SYSTOLIC: 3.67 CM
ECHO LV IVSD: 1.05 CM (ref 0.6–1)
ECHO LV MASS 2D: 187.2 G (ref 88–224)
ECHO LV MASS INDEX 2D: 90.9 G/M2 (ref 49–115)
ECHO LV POSTERIOR WALL DIASTOLIC: 1.11 CM (ref 0.6–1)
ECHO LVOT CARDIAC OUTPUT: 8.89 LITER/MINUTE
ECHO LVOT DIAM: 2.47 CM
ECHO LVOT PEAK GRADIENT: 4.51 MMHG
ECHO LVOT PEAK VELOCITY: 106.22 CM/S
ECHO LVOT SV: 101.2 ML
ECHO LVOT VTI: 21.07 CM
ECHO MV A VELOCITY: 68.56 CM/S
ECHO MV E DECELERATION TIME (DT): 169.29 MS
ECHO MV E VELOCITY: 80.01 CM/S
ECHO MV E/A RATIO: 1.17
ECHO MV E/E' LATERAL: 6.45
ECHO MV E/E' RATIO (AVERAGED): 7.12
ECHO MV E/E' SEPTAL: 7.78
ECHO RA AREA 4C: 12.71 CM2
ECHO RV INTERNAL DIMENSION: 2.69 CM
ECHO RV TAPSE: 0.94 CM (ref 1.5–2)
EOSINOPHIL # BLD: 0 K/UL (ref 0–0.4)
EOSINOPHIL # BLD: 0.1 K/UL (ref 0–0.4)
EOSINOPHIL # BLD: 0.1 K/UL (ref 0–0.4)
EOSINOPHIL # BLD: 0.2 K/UL (ref 0–0.4)
EOSINOPHIL # BLD: 0.3 K/UL (ref 0–0.4)
EOSINOPHIL # BLD: 0.3 K/UL (ref 0–0.4)
EOSINOPHIL # BLD: 0.4 K/UL (ref 0–0.4)
EOSINOPHIL NFR BLD: 0 % (ref 0–5)
EOSINOPHIL NFR BLD: 0 % (ref 0–7)
EOSINOPHIL NFR BLD: 1 % (ref 0–5)
EOSINOPHIL NFR BLD: 1 % (ref 0–5)
EOSINOPHIL NFR BLD: 1 % (ref 0–7)
EOSINOPHIL NFR BLD: 2 % (ref 0–7)
EOSINOPHIL NFR BLD: 4 % (ref 0–7)
EOSINOPHIL NFR BLD: 5 % (ref 0–7)
EOSINOPHIL NFR BLD: 6 % (ref 0–7)
EOSINOPHIL NFR BLD: 6 % (ref 0–7)
EOSINOPHIL NFR BLD: 7 % (ref 0–7)
EOSINOPHIL NFR BLD: 7 % (ref 0–7)
EOSINOPHIL NFR BLD: 8 % (ref 0–7)
EPITH CASTS URNS QL MICRO: ABNORMAL /LPF
ERYTHROCYTE [DISTWIDTH] IN BLOOD BY AUTOMATED COUNT: 11.9 % (ref 11.6–14.5)
ERYTHROCYTE [DISTWIDTH] IN BLOOD BY AUTOMATED COUNT: 12 % (ref 11.6–14.5)
ERYTHROCYTE [DISTWIDTH] IN BLOOD BY AUTOMATED COUNT: 12.2 % (ref 11.5–14.5)
ERYTHROCYTE [DISTWIDTH] IN BLOOD BY AUTOMATED COUNT: 12.2 % (ref 11.5–14.5)
ERYTHROCYTE [DISTWIDTH] IN BLOOD BY AUTOMATED COUNT: 12.2 % (ref 11.6–14.5)
ERYTHROCYTE [DISTWIDTH] IN BLOOD BY AUTOMATED COUNT: 12.3 % (ref 11.5–14.5)
ERYTHROCYTE [DISTWIDTH] IN BLOOD BY AUTOMATED COUNT: 12.3 % (ref 11.5–14.5)
ERYTHROCYTE [DISTWIDTH] IN BLOOD BY AUTOMATED COUNT: 12.4 % (ref 11.5–14.5)
ERYTHROCYTE [DISTWIDTH] IN BLOOD BY AUTOMATED COUNT: 12.4 % (ref 11.5–14.5)
ERYTHROCYTE [DISTWIDTH] IN BLOOD BY AUTOMATED COUNT: 12.4 % (ref 11.6–14.5)
ERYTHROCYTE [DISTWIDTH] IN BLOOD BY AUTOMATED COUNT: 12.5 % (ref 11.5–14.5)
ERYTHROCYTE [DISTWIDTH] IN BLOOD BY AUTOMATED COUNT: 12.5 % (ref 11.6–14.5)
ERYTHROCYTE [DISTWIDTH] IN BLOOD BY AUTOMATED COUNT: 12.6 % (ref 11.5–14.5)
ERYTHROCYTE [DISTWIDTH] IN BLOOD BY AUTOMATED COUNT: 12.7 % (ref 11.5–14.5)
ERYTHROCYTE [DISTWIDTH] IN BLOOD BY AUTOMATED COUNT: 13 % (ref 11.5–14.5)
ERYTHROCYTE [DISTWIDTH] IN BLOOD BY AUTOMATED COUNT: 13.2 % (ref 11.5–14.5)
ERYTHROCYTE [DISTWIDTH] IN BLOOD BY AUTOMATED COUNT: 13.7 % (ref 11.5–14.5)
ERYTHROCYTE [DISTWIDTH] IN BLOOD BY AUTOMATED COUNT: 13.9 % (ref 11.5–14.5)
ERYTHROCYTE [DISTWIDTH] IN BLOOD BY AUTOMATED COUNT: 14.4 % (ref 11.5–14.5)
ERYTHROCYTE [DISTWIDTH] IN BLOOD BY AUTOMATED COUNT: 14.5 % (ref 11.5–14.5)
ERYTHROCYTE [DISTWIDTH] IN BLOOD BY AUTOMATED COUNT: 14.6 % (ref 11.5–14.5)
ERYTHROCYTE [DISTWIDTH] IN BLOOD BY AUTOMATED COUNT: 14.7 % (ref 11.5–14.5)
ERYTHROCYTE [DISTWIDTH] IN BLOOD BY AUTOMATED COUNT: 14.9 % (ref 11.5–14.5)
ERYTHROCYTE [DISTWIDTH] IN BLOOD BY AUTOMATED COUNT: 15 % (ref 11.5–14.5)
ERYTHROCYTE [DISTWIDTH] IN BLOOD BY AUTOMATED COUNT: 15 % (ref 11.5–14.5)
ERYTHROCYTE [DISTWIDTH] IN BLOOD BY AUTOMATED COUNT: 15.2 % (ref 11.5–14.5)
ERYTHROCYTE [DISTWIDTH] IN BLOOD BY AUTOMATED COUNT: 15.4 % (ref 11.5–14.5)
EST. AVERAGE GLUCOSE BLD GHB EST-MCNC: 192 MG/DL
EST. AVERAGE GLUCOSE BLD GHB EST-MCNC: 206 MG/DL
FERRITIN SERPL-MCNC: 1831 NG/ML (ref 8–388)
FERRITIN SERPL-MCNC: 1908 NG/ML (ref 8–388)
FERRITIN SERPL-MCNC: 2134 NG/ML (ref 8–388)
FERRITIN SERPL-MCNC: 3086 NG/ML (ref 8–388)
FERRITIN SERPL-MCNC: 3089 NG/ML (ref 8–388)
FERRITIN SERPL-MCNC: 3981 NG/ML (ref 8–388)
FERRITIN SERPL-MCNC: 4159 NG/ML (ref 8–388)
FERRITIN SERPL-MCNC: 5557 NG/ML (ref 8–388)
FIBRINOGEN PPP-MCNC: 124 MG/DL (ref 200–475)
FIBRINOGEN PPP-MCNC: 133 MG/DL (ref 200–475)
FIBRINOGEN PPP-MCNC: 137 MG/DL (ref 200–475)
FIBRINOGEN PPP-MCNC: 137 MG/DL (ref 200–475)
FIBRINOGEN PPP-MCNC: 144 MG/DL (ref 200–475)
FIBRINOGEN PPP-MCNC: 150 MG/DL (ref 200–475)
FIBRINOGEN PPP-MCNC: 152 MG/DL (ref 200–475)
FIBRINOGEN PPP-MCNC: 184 MG/DL (ref 200–475)
FIBRINOGEN PPP-MCNC: 188 MG/DL (ref 200–475)
FIBRINOGEN PPP-MCNC: 209 MG/DL (ref 200–475)
FIBRINOGEN PPP-MCNC: 226 MG/DL (ref 200–475)
FIBRINOGEN PPP-MCNC: 236 MG/DL (ref 200–475)
FIBRINOGEN PPP-MCNC: 253 MG/DL (ref 200–475)
FIBRINOGEN PPP-MCNC: 266 MG/DL (ref 200–475)
FIBRINOGEN PPP-MCNC: 297 MG/DL (ref 210–451)
FIBRINOGEN PPP-MCNC: 304 MG/DL (ref 200–475)
FIBRINOGEN PPP-MCNC: 345 MG/DL (ref 200–475)
FIBRINOGEN PPP-MCNC: 387 MG/DL (ref 200–475)
FIBRINOGEN PPP-MCNC: 396 MG/DL (ref 200–475)
FIBRINOGEN PPP-MCNC: 397 MG/DL (ref 200–475)
FIBRINOGEN PPP-MCNC: 499 MG/DL (ref 200–475)
FIBRINOGEN PPP-MCNC: 500 MG/DL (ref 210–451)
FIBRINOGEN PPP-MCNC: 607 MG/DL (ref 200–475)
FIBRINOGEN PPP-MCNC: 640 MG/DL (ref 210–451)
FIBRINOGEN PPP-MCNC: 773 MG/DL (ref 200–475)
FIBRINOGEN PPP-MCNC: 773 MG/DL (ref 200–475)
FIBRINOGEN PPP-MCNC: >800 MG/DL (ref 200–475)
FIBRINOGEN PPP-MCNC: >800 MG/DL (ref 200–475)
FIO2 ON VENT: 100 %
FIO2 ON VENT: 60 %
FIO2 ON VENT: 60 %
FIO2 ON VENT: 65 %
FIO2 ON VENT: 70 %
FIO2 ON VENT: 70 %
FIO2 ON VENT: 75 %
FIO2 ON VENT: 75 %
FIO2 ON VENT: 80 %
FIO2 ON VENT: 80 %
FIO2 ON VENT: 90 %
FLUAV H1 2009 PAND RNA SPEC QL NAA+PROBE: NOT DETECTED
FLUAV H1 RNA SPEC QL NAA+PROBE: NOT DETECTED
FLUAV H3 RNA SPEC QL NAA+PROBE: NOT DETECTED
FLUAV SUBTYP SPEC NAA+PROBE: NOT DETECTED
FLUBV RNA SPEC QL NAA+PROBE: NOT DETECTED
GAS FLOW.O2 O2 DELIVERY SYS: ABNORMAL L/MIN
GAS FLOW.O2 O2 DELIVERY SYS: NORMAL L/MIN
GAS FLOW.O2 SETTING OXYMISER: 12 BPM
GAS FLOW.O2 SETTING OXYMISER: 14 BPM
GAS FLOW.O2 SETTING OXYMISER: 14 BPM
GAS FLOW.O2 SETTING OXYMISER: 14 L/MIN
GAS FLOW.O2 SETTING OXYMISER: 16 BPM
GAS FLOW.O2 SETTING OXYMISER: 16 L/MIN
GAS FLOW.O2 SETTING OXYMISER: 18 BPM
GAS FLOW.O2 SETTING OXYMISER: 18 BPM
GAS FLOW.O2 SETTING OXYMISER: 18 L/MIN
GAS FLOW.O2 SETTING OXYMISER: 18 L/MIN
GAS FLOW.O2 SETTING OXYMISER: 20 BPM
GAS FLOW.O2 SETTING OXYMISER: 20 L/MIN
GAS FLOW.O2 SETTING OXYMISER: 35 BPM
GLOBAL LONGITUDINAL STRAIN 2 CHAMBER: -18.8 PERCENT
GLOBAL LONGITUDINAL STRAIN 4 CHAMBER: -12.6 PERCENT
GLOBAL LONGITUDINAL STRAIN LONG AXIS: -15.2 PERCENT
GLOBULIN SER CALC-MCNC: 2.7 G/DL (ref 2–4)
GLOBULIN SER CALC-MCNC: 2.8 G/DL (ref 2–4)
GLOBULIN SER CALC-MCNC: 2.9 G/DL (ref 2–4)
GLOBULIN SER CALC-MCNC: 3 G/DL (ref 2–4)
GLOBULIN SER CALC-MCNC: 3.1 G/DL (ref 2–4)
GLOBULIN SER CALC-MCNC: 3.1 G/DL (ref 2–4)
GLOBULIN SER CALC-MCNC: 3.2 G/DL (ref 2–4)
GLOBULIN SER CALC-MCNC: 3.4 G/DL (ref 2–4)
GLOBULIN SER CALC-MCNC: 3.5 G/DL (ref 2–4)
GLOBULIN SER CALC-MCNC: 3.6 G/DL (ref 2–4)
GLOBULIN SER CALC-MCNC: 3.7 G/DL (ref 2–4)
GLOBULIN SER CALC-MCNC: 3.8 G/DL (ref 2–4)
GLOBULIN SER CALC-MCNC: 3.8 G/DL (ref 2–4)
GLOBULIN SER CALC-MCNC: 3.9 G/DL (ref 2–4)
GLOBULIN SER CALC-MCNC: 4 G/DL (ref 2–4)
GLOBULIN SER CALC-MCNC: 4 G/DL (ref 2–4)
GLOBULIN SER CALC-MCNC: 4.1 G/DL (ref 2–4)
GLOBULIN SER CALC-MCNC: 4.3 G/DL (ref 2–4)
GLOBULIN SER CALC-MCNC: 4.4 G/DL (ref 2–4)
GLOBULIN SER CALC-MCNC: 4.5 G/DL (ref 2–4)
GLSCOM COMMENTS: NORMAL
GLUCOSE BLD STRIP.AUTO-MCNC: 104 MG/DL (ref 65–117)
GLUCOSE BLD STRIP.AUTO-MCNC: 106 MG/DL (ref 70–110)
GLUCOSE BLD STRIP.AUTO-MCNC: 108 MG/DL (ref 65–117)
GLUCOSE BLD STRIP.AUTO-MCNC: 112 MG/DL (ref 65–117)
GLUCOSE BLD STRIP.AUTO-MCNC: 118 MG/DL (ref 65–117)
GLUCOSE BLD STRIP.AUTO-MCNC: 124 MG/DL (ref 65–117)
GLUCOSE BLD STRIP.AUTO-MCNC: 128 MG/DL (ref 65–117)
GLUCOSE BLD STRIP.AUTO-MCNC: 129 MG/DL (ref 65–117)
GLUCOSE BLD STRIP.AUTO-MCNC: 130 MG/DL (ref 65–117)
GLUCOSE BLD STRIP.AUTO-MCNC: 131 MG/DL (ref 65–117)
GLUCOSE BLD STRIP.AUTO-MCNC: 131 MG/DL (ref 65–117)
GLUCOSE BLD STRIP.AUTO-MCNC: 133 MG/DL (ref 65–117)
GLUCOSE BLD STRIP.AUTO-MCNC: 133 MG/DL (ref 70–110)
GLUCOSE BLD STRIP.AUTO-MCNC: 134 MG/DL (ref 65–117)
GLUCOSE BLD STRIP.AUTO-MCNC: 134 MG/DL (ref 70–110)
GLUCOSE BLD STRIP.AUTO-MCNC: 135 MG/DL (ref 65–117)
GLUCOSE BLD STRIP.AUTO-MCNC: 136 MG/DL (ref 65–117)
GLUCOSE BLD STRIP.AUTO-MCNC: 139 MG/DL (ref 65–117)
GLUCOSE BLD STRIP.AUTO-MCNC: 140 MG/DL (ref 70–110)
GLUCOSE BLD STRIP.AUTO-MCNC: 141 MG/DL (ref 65–117)
GLUCOSE BLD STRIP.AUTO-MCNC: 141 MG/DL (ref 65–117)
GLUCOSE BLD STRIP.AUTO-MCNC: 142 MG/DL (ref 65–117)
GLUCOSE BLD STRIP.AUTO-MCNC: 143 MG/DL (ref 65–117)
GLUCOSE BLD STRIP.AUTO-MCNC: 148 MG/DL (ref 65–117)
GLUCOSE BLD STRIP.AUTO-MCNC: 149 MG/DL (ref 65–117)
GLUCOSE BLD STRIP.AUTO-MCNC: 149 MG/DL (ref 70–110)
GLUCOSE BLD STRIP.AUTO-MCNC: 150 MG/DL (ref 65–117)
GLUCOSE BLD STRIP.AUTO-MCNC: 150 MG/DL (ref 70–110)
GLUCOSE BLD STRIP.AUTO-MCNC: 151 MG/DL (ref 65–117)
GLUCOSE BLD STRIP.AUTO-MCNC: 151 MG/DL (ref 65–117)
GLUCOSE BLD STRIP.AUTO-MCNC: 153 MG/DL (ref 65–117)
GLUCOSE BLD STRIP.AUTO-MCNC: 154 MG/DL (ref 65–117)
GLUCOSE BLD STRIP.AUTO-MCNC: 154 MG/DL (ref 65–117)
GLUCOSE BLD STRIP.AUTO-MCNC: 155 MG/DL (ref 65–117)
GLUCOSE BLD STRIP.AUTO-MCNC: 155 MG/DL (ref 65–117)
GLUCOSE BLD STRIP.AUTO-MCNC: 157 MG/DL (ref 65–117)
GLUCOSE BLD STRIP.AUTO-MCNC: 157 MG/DL (ref 65–117)
GLUCOSE BLD STRIP.AUTO-MCNC: 158 MG/DL (ref 65–117)
GLUCOSE BLD STRIP.AUTO-MCNC: 159 MG/DL (ref 65–117)
GLUCOSE BLD STRIP.AUTO-MCNC: 160 MG/DL (ref 65–117)
GLUCOSE BLD STRIP.AUTO-MCNC: 163 MG/DL (ref 65–117)
GLUCOSE BLD STRIP.AUTO-MCNC: 165 MG/DL (ref 65–117)
GLUCOSE BLD STRIP.AUTO-MCNC: 165 MG/DL (ref 70–110)
GLUCOSE BLD STRIP.AUTO-MCNC: 166 MG/DL (ref 65–117)
GLUCOSE BLD STRIP.AUTO-MCNC: 167 MG/DL (ref 65–117)
GLUCOSE BLD STRIP.AUTO-MCNC: 168 MG/DL (ref 65–117)
GLUCOSE BLD STRIP.AUTO-MCNC: 168 MG/DL (ref 65–117)
GLUCOSE BLD STRIP.AUTO-MCNC: 169 MG/DL (ref 65–117)
GLUCOSE BLD STRIP.AUTO-MCNC: 170 MG/DL (ref 65–117)
GLUCOSE BLD STRIP.AUTO-MCNC: 171 MG/DL (ref 70–110)
GLUCOSE BLD STRIP.AUTO-MCNC: 173 MG/DL (ref 65–117)
GLUCOSE BLD STRIP.AUTO-MCNC: 174 MG/DL (ref 65–117)
GLUCOSE BLD STRIP.AUTO-MCNC: 174 MG/DL (ref 65–117)
GLUCOSE BLD STRIP.AUTO-MCNC: 176 MG/DL (ref 65–117)
GLUCOSE BLD STRIP.AUTO-MCNC: 177 MG/DL (ref 65–117)
GLUCOSE BLD STRIP.AUTO-MCNC: 177 MG/DL (ref 65–117)
GLUCOSE BLD STRIP.AUTO-MCNC: 178 MG/DL (ref 65–117)
GLUCOSE BLD STRIP.AUTO-MCNC: 179 MG/DL (ref 65–117)
GLUCOSE BLD STRIP.AUTO-MCNC: 179 MG/DL (ref 65–117)
GLUCOSE BLD STRIP.AUTO-MCNC: 180 MG/DL (ref 65–117)
GLUCOSE BLD STRIP.AUTO-MCNC: 181 MG/DL (ref 65–117)
GLUCOSE BLD STRIP.AUTO-MCNC: 184 MG/DL (ref 65–117)
GLUCOSE BLD STRIP.AUTO-MCNC: 185 MG/DL (ref 70–110)
GLUCOSE BLD STRIP.AUTO-MCNC: 185 MG/DL (ref 70–110)
GLUCOSE BLD STRIP.AUTO-MCNC: 186 MG/DL (ref 65–117)
GLUCOSE BLD STRIP.AUTO-MCNC: 186 MG/DL (ref 65–117)
GLUCOSE BLD STRIP.AUTO-MCNC: 187 MG/DL (ref 65–117)
GLUCOSE BLD STRIP.AUTO-MCNC: 187 MG/DL (ref 65–117)
GLUCOSE BLD STRIP.AUTO-MCNC: 188 MG/DL (ref 65–117)
GLUCOSE BLD STRIP.AUTO-MCNC: 189 MG/DL (ref 65–117)
GLUCOSE BLD STRIP.AUTO-MCNC: 189 MG/DL (ref 70–110)
GLUCOSE BLD STRIP.AUTO-MCNC: 190 MG/DL (ref 65–117)
GLUCOSE BLD STRIP.AUTO-MCNC: 190 MG/DL (ref 65–117)
GLUCOSE BLD STRIP.AUTO-MCNC: 191 MG/DL (ref 65–117)
GLUCOSE BLD STRIP.AUTO-MCNC: 193 MG/DL (ref 65–117)
GLUCOSE BLD STRIP.AUTO-MCNC: 194 MG/DL (ref 65–117)
GLUCOSE BLD STRIP.AUTO-MCNC: 195 MG/DL (ref 65–117)
GLUCOSE BLD STRIP.AUTO-MCNC: 195 MG/DL (ref 70–110)
GLUCOSE BLD STRIP.AUTO-MCNC: 196 MG/DL (ref 65–117)
GLUCOSE BLD STRIP.AUTO-MCNC: 198 MG/DL (ref 65–117)
GLUCOSE BLD STRIP.AUTO-MCNC: 198 MG/DL (ref 70–110)
GLUCOSE BLD STRIP.AUTO-MCNC: 199 MG/DL (ref 65–117)
GLUCOSE BLD STRIP.AUTO-MCNC: 200 MG/DL (ref 65–117)
GLUCOSE BLD STRIP.AUTO-MCNC: 201 MG/DL (ref 70–110)
GLUCOSE BLD STRIP.AUTO-MCNC: 201 MG/DL (ref 70–110)
GLUCOSE BLD STRIP.AUTO-MCNC: 206 MG/DL (ref 70–110)
GLUCOSE BLD STRIP.AUTO-MCNC: 207 MG/DL (ref 65–117)
GLUCOSE BLD STRIP.AUTO-MCNC: 209 MG/DL (ref 65–117)
GLUCOSE BLD STRIP.AUTO-MCNC: 209 MG/DL (ref 65–117)
GLUCOSE BLD STRIP.AUTO-MCNC: 210 MG/DL (ref 65–117)
GLUCOSE BLD STRIP.AUTO-MCNC: 211 MG/DL (ref 65–117)
GLUCOSE BLD STRIP.AUTO-MCNC: 211 MG/DL (ref 65–117)
GLUCOSE BLD STRIP.AUTO-MCNC: 213 MG/DL (ref 65–117)
GLUCOSE BLD STRIP.AUTO-MCNC: 215 MG/DL (ref 65–117)
GLUCOSE BLD STRIP.AUTO-MCNC: 217 MG/DL (ref 65–117)
GLUCOSE BLD STRIP.AUTO-MCNC: 217 MG/DL (ref 70–110)
GLUCOSE BLD STRIP.AUTO-MCNC: 218 MG/DL (ref 65–117)
GLUCOSE BLD STRIP.AUTO-MCNC: 220 MG/DL (ref 70–110)
GLUCOSE BLD STRIP.AUTO-MCNC: 222 MG/DL (ref 65–117)
GLUCOSE BLD STRIP.AUTO-MCNC: 223 MG/DL (ref 65–117)
GLUCOSE BLD STRIP.AUTO-MCNC: 227 MG/DL (ref 70–110)
GLUCOSE BLD STRIP.AUTO-MCNC: 228 MG/DL (ref 70–110)
GLUCOSE BLD STRIP.AUTO-MCNC: 229 MG/DL (ref 65–117)
GLUCOSE BLD STRIP.AUTO-MCNC: 229 MG/DL (ref 65–117)
GLUCOSE BLD STRIP.AUTO-MCNC: 232 MG/DL (ref 65–117)
GLUCOSE BLD STRIP.AUTO-MCNC: 235 MG/DL (ref 65–117)
GLUCOSE BLD STRIP.AUTO-MCNC: 235 MG/DL (ref 70–110)
GLUCOSE BLD STRIP.AUTO-MCNC: 238 MG/DL (ref 65–117)
GLUCOSE BLD STRIP.AUTO-MCNC: 238 MG/DL (ref 70–110)
GLUCOSE BLD STRIP.AUTO-MCNC: 239 MG/DL (ref 65–117)
GLUCOSE BLD STRIP.AUTO-MCNC: 240 MG/DL (ref 65–117)
GLUCOSE BLD STRIP.AUTO-MCNC: 241 MG/DL (ref 70–110)
GLUCOSE BLD STRIP.AUTO-MCNC: 242 MG/DL (ref 65–117)
GLUCOSE BLD STRIP.AUTO-MCNC: 242 MG/DL (ref 65–117)
GLUCOSE BLD STRIP.AUTO-MCNC: 247 MG/DL (ref 70–110)
GLUCOSE BLD STRIP.AUTO-MCNC: 248 MG/DL (ref 65–117)
GLUCOSE BLD STRIP.AUTO-MCNC: 250 MG/DL (ref 65–117)
GLUCOSE BLD STRIP.AUTO-MCNC: 256 MG/DL (ref 65–117)
GLUCOSE BLD STRIP.AUTO-MCNC: 256 MG/DL (ref 65–117)
GLUCOSE BLD STRIP.AUTO-MCNC: 266 MG/DL (ref 65–117)
GLUCOSE BLD STRIP.AUTO-MCNC: 267 MG/DL (ref 65–117)
GLUCOSE BLD STRIP.AUTO-MCNC: 268 MG/DL (ref 65–117)
GLUCOSE BLD STRIP.AUTO-MCNC: 269 MG/DL (ref 65–117)
GLUCOSE BLD STRIP.AUTO-MCNC: 269 MG/DL (ref 65–117)
GLUCOSE BLD STRIP.AUTO-MCNC: 271 MG/DL (ref 65–117)
GLUCOSE BLD STRIP.AUTO-MCNC: 279 MG/DL (ref 65–117)
GLUCOSE BLD STRIP.AUTO-MCNC: 279 MG/DL (ref 65–117)
GLUCOSE BLD STRIP.AUTO-MCNC: 280 MG/DL (ref 65–117)
GLUCOSE BLD STRIP.AUTO-MCNC: 283 MG/DL (ref 65–117)
GLUCOSE BLD STRIP.AUTO-MCNC: 286 MG/DL (ref 70–110)
GLUCOSE BLD STRIP.AUTO-MCNC: 287 MG/DL (ref 65–117)
GLUCOSE BLD STRIP.AUTO-MCNC: 290 MG/DL (ref 70–110)
GLUCOSE BLD STRIP.AUTO-MCNC: 291 MG/DL (ref 65–117)
GLUCOSE BLD STRIP.AUTO-MCNC: 292 MG/DL (ref 65–117)
GLUCOSE BLD STRIP.AUTO-MCNC: 296 MG/DL (ref 65–117)
GLUCOSE BLD STRIP.AUTO-MCNC: 296 MG/DL (ref 65–117)
GLUCOSE BLD STRIP.AUTO-MCNC: 304 MG/DL (ref 70–110)
GLUCOSE BLD STRIP.AUTO-MCNC: 317 MG/DL (ref 65–117)
GLUCOSE BLD STRIP.AUTO-MCNC: 317 MG/DL (ref 70–110)
GLUCOSE BLD STRIP.AUTO-MCNC: 318 MG/DL (ref 65–117)
GLUCOSE BLD STRIP.AUTO-MCNC: 322 MG/DL (ref 65–117)
GLUCOSE BLD STRIP.AUTO-MCNC: 341 MG/DL (ref 70–110)
GLUCOSE BLD STRIP.AUTO-MCNC: 346 MG/DL (ref 65–117)
GLUCOSE BLD STRIP.AUTO-MCNC: 354 MG/DL (ref 65–117)
GLUCOSE BLD STRIP.AUTO-MCNC: 357 MG/DL (ref 65–117)
GLUCOSE BLD STRIP.AUTO-MCNC: 368 MG/DL (ref 65–117)
GLUCOSE BLD STRIP.AUTO-MCNC: 372 MG/DL (ref 65–117)
GLUCOSE BLD STRIP.AUTO-MCNC: 372 MG/DL (ref 65–117)
GLUCOSE BLD STRIP.AUTO-MCNC: 89 MG/DL (ref 65–117)
GLUCOSE BLD STRIP.AUTO-MCNC: 90 MG/DL (ref 65–117)
GLUCOSE BLD STRIP.AUTO-MCNC: 99 MG/DL (ref 65–117)
GLUCOSE SERPL-MCNC: 111 MG/DL (ref 65–100)
GLUCOSE SERPL-MCNC: 122 MG/DL (ref 65–100)
GLUCOSE SERPL-MCNC: 128 MG/DL (ref 65–100)
GLUCOSE SERPL-MCNC: 130 MG/DL (ref 65–100)
GLUCOSE SERPL-MCNC: 131 MG/DL (ref 65–100)
GLUCOSE SERPL-MCNC: 135 MG/DL (ref 65–100)
GLUCOSE SERPL-MCNC: 141 MG/DL (ref 65–100)
GLUCOSE SERPL-MCNC: 141 MG/DL (ref 65–100)
GLUCOSE SERPL-MCNC: 151 MG/DL (ref 65–100)
GLUCOSE SERPL-MCNC: 158 MG/DL (ref 65–100)
GLUCOSE SERPL-MCNC: 161 MG/DL (ref 65–100)
GLUCOSE SERPL-MCNC: 163 MG/DL (ref 65–100)
GLUCOSE SERPL-MCNC: 166 MG/DL (ref 65–100)
GLUCOSE SERPL-MCNC: 170 MG/DL (ref 65–100)
GLUCOSE SERPL-MCNC: 175 MG/DL (ref 65–100)
GLUCOSE SERPL-MCNC: 182 MG/DL (ref 65–100)
GLUCOSE SERPL-MCNC: 185 MG/DL (ref 65–100)
GLUCOSE SERPL-MCNC: 186 MG/DL (ref 65–100)
GLUCOSE SERPL-MCNC: 189 MG/DL (ref 74–99)
GLUCOSE SERPL-MCNC: 190 MG/DL (ref 65–100)
GLUCOSE SERPL-MCNC: 196 MG/DL (ref 65–100)
GLUCOSE SERPL-MCNC: 207 MG/DL (ref 65–100)
GLUCOSE SERPL-MCNC: 209 MG/DL (ref 65–100)
GLUCOSE SERPL-MCNC: 211 MG/DL (ref 74–99)
GLUCOSE SERPL-MCNC: 213 MG/DL (ref 65–100)
GLUCOSE SERPL-MCNC: 216 MG/DL (ref 65–100)
GLUCOSE SERPL-MCNC: 217 MG/DL (ref 74–99)
GLUCOSE SERPL-MCNC: 222 MG/DL (ref 65–100)
GLUCOSE SERPL-MCNC: 229 MG/DL (ref 74–99)
GLUCOSE SERPL-MCNC: 241 MG/DL (ref 65–100)
GLUCOSE SERPL-MCNC: 246 MG/DL (ref 74–99)
GLUCOSE SERPL-MCNC: 251 MG/DL (ref 74–99)
GLUCOSE SERPL-MCNC: 256 MG/DL (ref 74–99)
GLUCOSE SERPL-MCNC: 276 MG/DL (ref 65–100)
GLUCOSE SERPL-MCNC: 290 MG/DL (ref 65–100)
GLUCOSE SERPL-MCNC: 290 MG/DL (ref 65–100)
GLUCOSE SERPL-MCNC: 299 MG/DL (ref 65–100)
GLUCOSE SERPL-MCNC: 323 MG/DL (ref 65–100)
GLUCOSE SERPL-MCNC: 324 MG/DL (ref 74–99)
GLUCOSE SERPL-MCNC: 339 MG/DL (ref 65–100)
GLUCOSE SERPL-MCNC: 383 MG/DL (ref 65–100)
GLUCOSE SERPL-MCNC: 400 MG/DL (ref 74–99)
GLUCOSE SERPL-MCNC: 427 MG/DL (ref 65–100)
GLUCOSE UR STRIP.AUTO-MCNC: >1000 MG/DL
GLUCOSE UR STRIP.AUTO-MCNC: >1000 MG/DL
GLUCOSE UR STRIP.AUTO-MCNC: NEGATIVE MG/DL
GLUCOSE UR STRIP.AUTO-MCNC: NEGATIVE MG/DL
GLUCOSE, GLC: 209 MG/DL
GLUCOSE, GLC: 242 MG/DL
GLUCOSE, GLC: 256 MG/DL
GLUCOSE, GLC: 269 MG/DL
GLUCOSE, GLC: 287 MG/DL
GLUCOSE, GLC: 354 MG/DL
GLUCOSE, GLC: 372 MG/DL
GRAM STN SPEC: ABNORMAL
GRAM STN SPEC: NORMAL
HADV DNA SPEC QL NAA+PROBE: NOT DETECTED
HBA1C MFR BLD: 8.3 % (ref 4.2–5.6)
HBA1C MFR BLD: 8.4 % (ref 4.2–5.6)
HBA1C MFR BLD: 8.8 % (ref 4–5.6)
HBV CORE AB SERPL QL IA: NEGATIVE
HBV SURFACE AB SER QL IA: POSITIVE
HBV SURFACE AB SERPL IA-ACNC: 797.27 MIU/ML
HBV SURFACE AG SER QL: <0.1 INDEX
HBV SURFACE AG SER QL: NEGATIVE
HCO3 BLD-SCNC: 19 MMOL/L (ref 22–26)
HCO3 BLD-SCNC: 23.6 MMOL/L (ref 22–26)
HCO3 BLD-SCNC: 25.7 MMOL/L (ref 22–26)
HCO3 BLD-SCNC: 25.7 MMOL/L (ref 22–26)
HCO3 BLD-SCNC: 27 MMOL/L (ref 22–26)
HCO3 BLD-SCNC: 27.1 MMOL/L (ref 22–26)
HCO3 BLD-SCNC: 27.5 MMOL/L (ref 22–26)
HCO3 BLD-SCNC: 28 MMOL/L (ref 22–26)
HCO3 BLD-SCNC: 28.3 MMOL/L (ref 22–26)
HCO3 BLD-SCNC: 28.4 MMOL/L (ref 22–26)
HCO3 BLD-SCNC: 28.7 MMOL/L (ref 22–26)
HCO3 BLD-SCNC: 28.8 MMOL/L (ref 22–26)
HCO3 BLD-SCNC: 28.9 MMOL/L (ref 22–26)
HCO3 BLD-SCNC: 29.6 MMOL/L (ref 22–26)
HCO3 BLD-SCNC: 29.7 MMOL/L (ref 22–26)
HCO3 BLD-SCNC: 30 MMOL/L (ref 22–26)
HCO3 BLD-SCNC: 30.1 MMOL/L (ref 22–26)
HCO3 BLD-SCNC: 30.2 MMOL/L (ref 22–26)
HCO3 BLD-SCNC: 30.4 MMOL/L (ref 22–26)
HCO3 BLD-SCNC: 30.5 MMOL/L (ref 22–26)
HCO3 BLD-SCNC: 30.7 MMOL/L (ref 22–26)
HCO3 BLD-SCNC: 31.1 MMOL/L (ref 22–26)
HCO3 BLD-SCNC: 31.2 MMOL/L (ref 22–26)
HCO3 BLD-SCNC: 31.3 MMOL/L (ref 22–26)
HCO3 BLD-SCNC: 31.5 MMOL/L (ref 22–26)
HCO3 BLD-SCNC: 32 MMOL/L (ref 22–26)
HCO3 BLD-SCNC: 32.1 MMOL/L (ref 22–26)
HCO3 BLD-SCNC: 32.5 MMOL/L (ref 22–26)
HCO3 BLD-SCNC: 32.6 MMOL/L (ref 22–26)
HCO3 BLD-SCNC: 32.7 MMOL/L (ref 22–26)
HCO3 BLD-SCNC: 33.4 MMOL/L (ref 22–26)
HCO3 BLD-SCNC: 34.9 MMOL/L (ref 22–26)
HCO3 BLDA-SCNC: 23 MMOL/L (ref 22–26)
HCO3 BLDA-SCNC: 24 MMOL/L (ref 22–26)
HCO3 BLDA-SCNC: 25 MMOL/L (ref 22–26)
HCO3 BLDA-SCNC: 25 MMOL/L (ref 22–26)
HCO3 BLDA-SCNC: 26 MMOL/L (ref 22–26)
HCO3 BLDA-SCNC: 27 MMOL/L (ref 22–26)
HCO3 BLDA-SCNC: 27 MMOL/L (ref 22–26)
HCO3 BLDA-SCNC: 28 MMOL/L (ref 22–26)
HCO3 BLDA-SCNC: 29 MMOL/L (ref 22–26)
HCO3 BLDA-SCNC: 29 MMOL/L (ref 22–26)
HCO3 BLDA-SCNC: 30 MMOL/L (ref 22–26)
HCO3 BLDA-SCNC: 31 MMOL/L (ref 22–26)
HCO3 BLDA-SCNC: 32 MMOL/L (ref 22–26)
HCO3 BLDA-SCNC: 33 MMOL/L (ref 22–26)
HCO3 BLDA-SCNC: 41 MMOL/L (ref 22–26)
HCOV 229E RNA SPEC QL NAA+PROBE: NOT DETECTED
HCOV HKU1 RNA SPEC QL NAA+PROBE: NOT DETECTED
HCOV NL63 RNA SPEC QL NAA+PROBE: NOT DETECTED
HCOV OC43 RNA SPEC QL NAA+PROBE: NOT DETECTED
HCT VFR BLD AUTO: 28.8 % (ref 36.6–50.3)
HCT VFR BLD AUTO: 30.1 % (ref 36.6–50.3)
HCT VFR BLD AUTO: 30.8 % (ref 36.6–50.3)
HCT VFR BLD AUTO: 30.8 % (ref 36.6–50.3)
HCT VFR BLD AUTO: 31.3 % (ref 36.6–50.3)
HCT VFR BLD AUTO: 31.8 % (ref 36.6–50.3)
HCT VFR BLD AUTO: 32.2 % (ref 36.6–50.3)
HCT VFR BLD AUTO: 32.7 % (ref 36.6–50.3)
HCT VFR BLD AUTO: 32.7 % (ref 36.6–50.3)
HCT VFR BLD AUTO: 33.2 % (ref 36.6–50.3)
HCT VFR BLD AUTO: 33.4 % (ref 36.6–50.3)
HCT VFR BLD AUTO: 33.5 % (ref 36.6–50.3)
HCT VFR BLD AUTO: 33.5 % (ref 36.6–50.3)
HCT VFR BLD AUTO: 33.6 % (ref 36.6–50.3)
HCT VFR BLD AUTO: 34.6 % (ref 36.6–50.3)
HCT VFR BLD AUTO: 34.8 % (ref 36.6–50.3)
HCT VFR BLD AUTO: 34.8 % (ref 36.6–50.3)
HCT VFR BLD AUTO: 35.4 % (ref 36.6–50.3)
HCT VFR BLD AUTO: 35.4 % (ref 36.6–50.3)
HCT VFR BLD AUTO: 35.9 % (ref 36.6–50.3)
HCT VFR BLD AUTO: 36.1 % (ref 36–48)
HCT VFR BLD AUTO: 36.2 % (ref 36.6–50.3)
HCT VFR BLD AUTO: 37 % (ref 36.6–50.3)
HCT VFR BLD AUTO: 37 % (ref 36.6–50.3)
HCT VFR BLD AUTO: 37.1 % (ref 36.6–50.3)
HCT VFR BLD AUTO: 37.3 % (ref 36.6–50.3)
HCT VFR BLD AUTO: 37.7 % (ref 36–48)
HCT VFR BLD AUTO: 37.8 % (ref 36.6–50.3)
HCT VFR BLD AUTO: 37.8 % (ref 36–48)
HCT VFR BLD AUTO: 37.8 % (ref 36–48)
HCT VFR BLD AUTO: 38.2 % (ref 36.6–50.3)
HCT VFR BLD AUTO: 38.3 % (ref 36.6–50.3)
HCT VFR BLD AUTO: 38.6 % (ref 36.6–50.3)
HCT VFR BLD AUTO: 38.6 % (ref 36.6–50.3)
HCT VFR BLD AUTO: 38.9 % (ref 36.6–50.3)
HCT VFR BLD AUTO: 39.4 % (ref 36–48)
HCT VFR BLD AUTO: 40.3 % (ref 36–48)
HCT VFR BLD AUTO: 41.8 % (ref 36.6–50.3)
HCT VFR BLD AUTO: 43.5 % (ref 36–48)
HCT VFR BLD AUTO: 43.8 % (ref 36–48)
HCT VFR BLD AUTO: 47.7 % (ref 36–48)
HCV AB SER IA-ACNC: 0.13 INDEX
HCV AB SERPL QL IA: NEGATIVE
HCV COMMENT,HCGAC: NORMAL
HDLC SERPL-MCNC: 33 MG/DL (ref 40–60)
HDLC SERPL: 3.7 {RATIO} (ref 0–5)
HEP BS AB COMMENT,HBSAC: NORMAL
HGB BLD-MCNC: 10.1 G/DL (ref 12.1–17)
HGB BLD-MCNC: 10.4 G/DL (ref 12.1–17)
HGB BLD-MCNC: 10.5 G/DL (ref 12.1–17)
HGB BLD-MCNC: 10.8 G/DL (ref 12.1–17)
HGB BLD-MCNC: 11 G/DL (ref 12.1–17)
HGB BLD-MCNC: 11.2 G/DL (ref 12.1–17)
HGB BLD-MCNC: 11.2 G/DL (ref 12.1–17)
HGB BLD-MCNC: 11.3 G/DL (ref 12.1–17)
HGB BLD-MCNC: 11.3 G/DL (ref 12.1–17)
HGB BLD-MCNC: 11.4 G/DL (ref 12.1–17)
HGB BLD-MCNC: 11.6 G/DL (ref 12.1–17)
HGB BLD-MCNC: 11.8 G/DL (ref 12.1–17)
HGB BLD-MCNC: 11.9 G/DL (ref 12.1–17)
HGB BLD-MCNC: 12 G/DL (ref 12.1–17)
HGB BLD-MCNC: 12 G/DL (ref 13–16)
HGB BLD-MCNC: 12.1 G/DL (ref 12.1–17)
HGB BLD-MCNC: 12.2 G/DL (ref 12.1–17)
HGB BLD-MCNC: 12.3 G/DL (ref 12.1–17)
HGB BLD-MCNC: 12.4 G/DL (ref 12.1–17)
HGB BLD-MCNC: 12.4 G/DL (ref 12.1–17)
HGB BLD-MCNC: 12.6 G/DL (ref 13–16)
HGB BLD-MCNC: 12.7 G/DL (ref 13–16)
HGB BLD-MCNC: 12.8 G/DL (ref 12.1–17)
HGB BLD-MCNC: 13 G/DL (ref 12.1–17)
HGB BLD-MCNC: 13 G/DL (ref 13–16)
HGB BLD-MCNC: 13.2 G/DL (ref 12.1–17)
HGB BLD-MCNC: 13.6 G/DL (ref 12.1–17)
HGB BLD-MCNC: 13.6 G/DL (ref 13–16)
HGB BLD-MCNC: 13.7 G/DL (ref 13–16)
HGB BLD-MCNC: 15.1 G/DL (ref 13–16)
HGB BLD-MCNC: 15.4 G/DL (ref 13–16)
HGB BLD-MCNC: 16.3 G/DL (ref 13–16)
HGB BLD-MCNC: 9.7 G/DL (ref 12.1–17)
HGB BLD-MCNC: 9.8 G/DL (ref 12.1–17)
HGB BLD-MCNC: 9.8 G/DL (ref 12.1–17)
HGB UR QL STRIP: ABNORMAL
HGB UR QL STRIP: NEGATIVE
HIGH TARGET, HITG: 300 MG/DL
HMPV RNA SPEC QL NAA+PROBE: NOT DETECTED
HPIV1 RNA SPEC QL NAA+PROBE: NOT DETECTED
HPIV2 RNA SPEC QL NAA+PROBE: NOT DETECTED
HPIV3 RNA SPEC QL NAA+PROBE: NOT DETECTED
HPIV4 RNA SPEC QL NAA+PROBE: NOT DETECTED
IL6 SERPL-MCNC: 127.2 PG/ML (ref 0–13)
IL6 SERPL-MCNC: 1593 PG/ML (ref 0–13)
IL6 SERPL-MCNC: 34.2 PG/ML (ref 0–13)
IMM GRANULOCYTES # BLD AUTO: 0 K/UL
IMM GRANULOCYTES # BLD AUTO: 0 K/UL (ref 0–0.04)
IMM GRANULOCYTES # BLD AUTO: 0.1 K/UL (ref 0–0.04)
IMM GRANULOCYTES NFR BLD AUTO: 0 %
IMM GRANULOCYTES NFR BLD AUTO: 0 % (ref 0–0.5)
IMM GRANULOCYTES NFR BLD AUTO: 1 % (ref 0–0.5)
INR PPP: 1 (ref 0.8–1.2)
INR PPP: 1 (ref 0.8–1.2)
INR PPP: 1 (ref 0.9–1.1)
INR PPP: 1.1 (ref 0.8–1.2)
INR PPP: 1.1 (ref 0.9–1.1)
INR PPP: 1.2 (ref 0.8–1.2)
INSPIRATION.DURATION SETTING TIME VENT: 1.25 SEC
INSPIRATION.DURATION SETTING TIME VENT: 1.25 SEC
INSULIN ADMINSTERED, INSADM: 4.5 UNITS/HOUR
INSULIN ADMINSTERED, INSADM: 5.5 UNITS/HOUR
INSULIN ADMINSTERED, INSADM: 5.9 UNITS/HOUR
INSULIN ADMINSTERED, INSADM: 6.2 UNITS/HOUR
INSULIN ADMINSTERED, INSADM: 6.3 UNITS/HOUR
INSULIN ADMINSTERED, INSADM: 6.8 UNITS/HOUR
INSULIN ADMINSTERED, INSADM: 8.8 UNITS/HOUR
INSULIN ORDER, INSORD: 4.5 UNITS/HOUR
INSULIN ORDER, INSORD: 5.5 UNITS/HOUR
INSULIN ORDER, INSORD: 5.9 UNITS/HOUR
INSULIN ORDER, INSORD: 6.2 UNITS/HOUR
INSULIN ORDER, INSORD: 6.3 UNITS/HOUR
INSULIN ORDER, INSORD: 6.8 UNITS/HOUR
INSULIN ORDER, INSORD: 8.8 UNITS/HOUR
KETONES UR QL STRIP.AUTO: 80 MG/DL
KETONES UR QL STRIP.AUTO: ABNORMAL MG/DL
KETONES UR QL STRIP.AUTO: ABNORMAL MG/DL
KETONES UR QL STRIP.AUTO: NEGATIVE MG/DL
L PNEUMO1 AG UR QL IA: NEGATIVE
LA VOL DISK BP: 52.1 ML (ref 18–58)
LACTATE BLD-SCNC: 3.04 MMOL/L (ref 0.4–2)
LACTATE BLD-SCNC: 3.43 MMOL/L (ref 0.4–2)
LACTATE SERPL-SCNC: 1.1 MMOL/L (ref 0.4–2)
LACTATE SERPL-SCNC: 1.4 MMOL/L (ref 0.4–2)
LACTATE SERPL-SCNC: 2.2 MMOL/L (ref 0.4–2)
LACTATE SERPL-SCNC: 2.3 MMOL/L (ref 0.4–2)
LACTATE SERPL-SCNC: 2.4 MMOL/L (ref 0.4–2)
LACTATE SERPL-SCNC: 2.5 MMOL/L (ref 0.4–2)
LACTATE SERPL-SCNC: 2.5 MMOL/L (ref 0.4–2)
LACTATE SERPL-SCNC: 3 MMOL/L (ref 0.4–2)
LACTATE SERPL-SCNC: 3.1 MMOL/L (ref 0.4–2)
LDH SERPL L TO P-CCNC: 1090 U/L (ref 81–234)
LDH SERPL L TO P-CCNC: 1114 U/L (ref 81–234)
LDH SERPL L TO P-CCNC: 480 U/L (ref 81–234)
LDH SERPL L TO P-CCNC: 549 U/L (ref 81–234)
LDH SERPL L TO P-CCNC: 628 U/L (ref 81–234)
LDH SERPL L TO P-CCNC: 835 U/L (ref 81–234)
LDH SERPL L TO P-CCNC: 920 U/L (ref 81–234)
LDH SERPL L TO P-CCNC: 996 U/L (ref 81–234)
LDLC SERPL CALC-MCNC: 41.8 MG/DL (ref 0–100)
LEUKOCYTE ESTERASE UR QL STRIP.AUTO: ABNORMAL
LEUKOCYTE ESTERASE UR QL STRIP.AUTO: ABNORMAL
LEUKOCYTE ESTERASE UR QL STRIP.AUTO: NEGATIVE
LEUKOCYTE ESTERASE UR QL STRIP.AUTO: NEGATIVE
LIPID PROFILE,FLP: ABNORMAL
LOW TARGET, LOT: 200 MG/DL
LVOT MG: 2.67 MMHG
LYMPHOCYTES # BLD: 0.1 K/UL (ref 0.9–3.6)
LYMPHOCYTES # BLD: 0.3 K/UL (ref 0.9–3.6)
LYMPHOCYTES # BLD: 0.4 K/UL (ref 0.8–3.5)
LYMPHOCYTES # BLD: 0.5 K/UL (ref 0.8–3.5)
LYMPHOCYTES # BLD: 0.5 K/UL (ref 0.9–3.6)
LYMPHOCYTES # BLD: 0.6 K/UL (ref 0.8–3.5)
LYMPHOCYTES # BLD: 0.6 K/UL (ref 0.9–3.6)
LYMPHOCYTES # BLD: 0.6 K/UL (ref 0.9–3.6)
LYMPHOCYTES # BLD: 0.7 K/UL (ref 0.8–3.5)
LYMPHOCYTES # BLD: 0.8 K/UL (ref 0.8–3.5)
LYMPHOCYTES # BLD: 0.8 K/UL (ref 0.9–3.6)
LYMPHOCYTES # BLD: 0.9 K/UL (ref 0.8–3.5)
LYMPHOCYTES # BLD: 0.9 K/UL (ref 0.9–3.6)
LYMPHOCYTES # BLD: 1 K/UL (ref 0.8–3.5)
LYMPHOCYTES # BLD: 1.1 K/UL (ref 0.8–3.5)
LYMPHOCYTES # BLD: 1.2 K/UL (ref 0.8–3.5)
LYMPHOCYTES # BLD: 1.3 K/UL (ref 0.8–3.5)
LYMPHOCYTES # BLD: 1.4 K/UL (ref 0.8–3.5)
LYMPHOCYTES NFR BLD: 1 % (ref 21–52)
LYMPHOCYTES NFR BLD: 10 % (ref 12–49)
LYMPHOCYTES NFR BLD: 11 % (ref 12–49)
LYMPHOCYTES NFR BLD: 12 % (ref 12–49)
LYMPHOCYTES NFR BLD: 12 % (ref 21–52)
LYMPHOCYTES NFR BLD: 12 % (ref 21–52)
LYMPHOCYTES NFR BLD: 13 % (ref 12–49)
LYMPHOCYTES NFR BLD: 14 % (ref 12–49)
LYMPHOCYTES NFR BLD: 15 % (ref 12–49)
LYMPHOCYTES NFR BLD: 15 % (ref 21–52)
LYMPHOCYTES NFR BLD: 16 % (ref 12–49)
LYMPHOCYTES NFR BLD: 16 % (ref 12–49)
LYMPHOCYTES NFR BLD: 18 % (ref 12–49)
LYMPHOCYTES NFR BLD: 20 % (ref 12–49)
LYMPHOCYTES NFR BLD: 4 % (ref 12–49)
LYMPHOCYTES NFR BLD: 4 % (ref 12–49)
LYMPHOCYTES NFR BLD: 5 % (ref 12–49)
LYMPHOCYTES NFR BLD: 5 % (ref 21–52)
LYMPHOCYTES NFR BLD: 6 % (ref 12–49)
LYMPHOCYTES NFR BLD: 7 % (ref 12–49)
LYMPHOCYTES NFR BLD: 8 % (ref 12–49)
LYMPHOCYTES NFR BLD: 8 % (ref 21–52)
LYMPHOCYTES NFR BLD: 9 % (ref 12–49)
LYMPHOCYTES NFR BLD: 9 % (ref 21–52)
M PNEUMO DNA SPEC QL NAA+PROBE: NOT DETECTED
M PNEUMO IGM SER IA-ACNC: NONREACTIVE
MAGNESIUM SERPL-MCNC: 1.8 MG/DL (ref 1.6–2.4)
MAGNESIUM SERPL-MCNC: 2 MG/DL (ref 1.6–2.4)
MAGNESIUM SERPL-MCNC: 2.1 MG/DL (ref 1.6–2.4)
MAGNESIUM SERPL-MCNC: 2.2 MG/DL (ref 1.6–2.4)
MAGNESIUM SERPL-MCNC: 2.2 MG/DL (ref 1.6–2.6)
MAGNESIUM SERPL-MCNC: 2.3 MG/DL (ref 1.6–2.4)
MAGNESIUM SERPL-MCNC: 2.3 MG/DL (ref 1.6–2.6)
MAGNESIUM SERPL-MCNC: 2.4 MG/DL (ref 1.6–2.4)
MAGNESIUM SERPL-MCNC: 2.4 MG/DL (ref 1.6–2.6)
MAGNESIUM SERPL-MCNC: 2.5 MG/DL (ref 1.6–2.4)
MAGNESIUM SERPL-MCNC: 2.5 MG/DL (ref 1.6–2.6)
MAGNESIUM SERPL-MCNC: 2.5 MG/DL (ref 1.6–2.6)
MAGNESIUM SERPL-MCNC: 2.6 MG/DL (ref 1.6–2.4)
MAGNESIUM SERPL-MCNC: 2.6 MG/DL (ref 1.6–2.4)
MAGNESIUM SERPL-MCNC: 2.7 MG/DL (ref 1.6–2.4)
MAGNESIUM SERPL-MCNC: 2.7 MG/DL (ref 1.6–2.6)
MAGNESIUM SERPL-MCNC: 2.8 MG/DL (ref 1.6–2.6)
MAGNESIUM SERPL-MCNC: 2.8 MG/DL (ref 1.6–2.6)
MAGNESIUM SERPL-MCNC: 2.9 MG/DL (ref 1.6–2.4)
MCH RBC QN AUTO: 28.6 PG (ref 24–34)
MCH RBC QN AUTO: 28.8 PG (ref 26–34)
MCH RBC QN AUTO: 28.9 PG (ref 24–34)
MCH RBC QN AUTO: 29.1 PG (ref 26–34)
MCH RBC QN AUTO: 29.2 PG (ref 26–34)
MCH RBC QN AUTO: 29.3 PG (ref 24–34)
MCH RBC QN AUTO: 29.4 PG (ref 24–34)
MCH RBC QN AUTO: 29.4 PG (ref 24–34)
MCH RBC QN AUTO: 29.4 PG (ref 26–34)
MCH RBC QN AUTO: 29.5 PG (ref 24–34)
MCH RBC QN AUTO: 29.5 PG (ref 24–34)
MCH RBC QN AUTO: 29.6 PG (ref 24–34)
MCH RBC QN AUTO: 29.6 PG (ref 26–34)
MCH RBC QN AUTO: 29.7 PG (ref 26–34)
MCH RBC QN AUTO: 29.8 PG (ref 24–34)
MCH RBC QN AUTO: 29.8 PG (ref 26–34)
MCH RBC QN AUTO: 29.9 PG (ref 26–34)
MCH RBC QN AUTO: 29.9 PG (ref 26–34)
MCH RBC QN AUTO: 30 PG (ref 26–34)
MCH RBC QN AUTO: 30.1 PG (ref 26–34)
MCH RBC QN AUTO: 30.2 PG (ref 26–34)
MCH RBC QN AUTO: 30.2 PG (ref 26–34)
MCH RBC QN AUTO: 30.3 PG (ref 26–34)
MCH RBC QN AUTO: 30.4 PG (ref 26–34)
MCH RBC QN AUTO: 30.4 PG (ref 26–34)
MCHC RBC AUTO-ENTMCNC: 31.3 G/DL (ref 30–36.5)
MCHC RBC AUTO-ENTMCNC: 31.7 G/DL (ref 30–36.5)
MCHC RBC AUTO-ENTMCNC: 32.1 G/DL (ref 30–36.5)
MCHC RBC AUTO-ENTMCNC: 32.2 G/DL (ref 30–36.5)
MCHC RBC AUTO-ENTMCNC: 32.4 G/DL (ref 30–36.5)
MCHC RBC AUTO-ENTMCNC: 32.5 G/DL (ref 30–36.5)
MCHC RBC AUTO-ENTMCNC: 32.6 G/DL (ref 30–36.5)
MCHC RBC AUTO-ENTMCNC: 32.8 G/DL (ref 30–36.5)
MCHC RBC AUTO-ENTMCNC: 32.8 G/DL (ref 30–36.5)
MCHC RBC AUTO-ENTMCNC: 32.9 G/DL (ref 30–36.5)
MCHC RBC AUTO-ENTMCNC: 32.9 G/DL (ref 30–36.5)
MCHC RBC AUTO-ENTMCNC: 33 G/DL (ref 30–36.5)
MCHC RBC AUTO-ENTMCNC: 33.2 G/DL (ref 31–37)
MCHC RBC AUTO-ENTMCNC: 33.3 G/DL (ref 30–36.5)
MCHC RBC AUTO-ENTMCNC: 33.3 G/DL (ref 30–36.5)
MCHC RBC AUTO-ENTMCNC: 33.4 G/DL (ref 30–36.5)
MCHC RBC AUTO-ENTMCNC: 33.4 G/DL (ref 31–37)
MCHC RBC AUTO-ENTMCNC: 33.5 G/DL (ref 30–36.5)
MCHC RBC AUTO-ENTMCNC: 33.5 G/DL (ref 30–36.5)
MCHC RBC AUTO-ENTMCNC: 33.6 G/DL (ref 30–36.5)
MCHC RBC AUTO-ENTMCNC: 33.6 G/DL (ref 31–37)
MCHC RBC AUTO-ENTMCNC: 33.7 G/DL (ref 30–36.5)
MCHC RBC AUTO-ENTMCNC: 33.8 G/DL (ref 30–36.5)
MCHC RBC AUTO-ENTMCNC: 33.8 G/DL (ref 30–36.5)
MCHC RBC AUTO-ENTMCNC: 33.9 G/DL (ref 30–36.5)
MCHC RBC AUTO-ENTMCNC: 34 G/DL (ref 30–36.5)
MCHC RBC AUTO-ENTMCNC: 34 G/DL (ref 31–37)
MCHC RBC AUTO-ENTMCNC: 34.2 G/DL (ref 30–36.5)
MCHC RBC AUTO-ENTMCNC: 34.2 G/DL (ref 30–36.5)
MCHC RBC AUTO-ENTMCNC: 34.2 G/DL (ref 31–37)
MCHC RBC AUTO-ENTMCNC: 34.4 G/DL (ref 31–37)
MCHC RBC AUTO-ENTMCNC: 34.5 G/DL (ref 31–37)
MCHC RBC AUTO-ENTMCNC: 34.5 G/DL (ref 31–37)
MCHC RBC AUTO-ENTMCNC: 35.4 G/DL (ref 31–37)
MCV RBC AUTO: 83.8 FL (ref 78–100)
MCV RBC AUTO: 84.1 FL (ref 78–100)
MCV RBC AUTO: 84.1 FL (ref 78–100)
MCV RBC AUTO: 85.4 FL (ref 78–100)
MCV RBC AUTO: 85.7 FL (ref 78–100)
MCV RBC AUTO: 85.8 FL (ref 78–100)
MCV RBC AUTO: 87.9 FL (ref 78–100)
MCV RBC AUTO: 88 FL (ref 80–99)
MCV RBC AUTO: 88.1 FL (ref 78–100)
MCV RBC AUTO: 88.3 FL (ref 80–99)
MCV RBC AUTO: 88.4 FL (ref 80–99)
MCV RBC AUTO: 88.7 FL (ref 80–99)
MCV RBC AUTO: 88.7 FL (ref 80–99)
MCV RBC AUTO: 88.8 FL (ref 80–99)
MCV RBC AUTO: 88.9 FL (ref 78–100)
MCV RBC AUTO: 88.9 FL (ref 80–99)
MCV RBC AUTO: 89 FL (ref 80–99)
MCV RBC AUTO: 89.1 FL (ref 80–99)
MCV RBC AUTO: 89.3 FL (ref 80–99)
MCV RBC AUTO: 89.4 FL (ref 80–99)
MCV RBC AUTO: 89.4 FL (ref 80–99)
MCV RBC AUTO: 89.5 FL (ref 80–99)
MCV RBC AUTO: 89.7 FL (ref 80–99)
MCV RBC AUTO: 89.9 FL (ref 80–99)
MCV RBC AUTO: 89.9 FL (ref 80–99)
MCV RBC AUTO: 90 FL (ref 80–99)
MCV RBC AUTO: 90 FL (ref 80–99)
MCV RBC AUTO: 90.2 FL (ref 80–99)
MCV RBC AUTO: 90.4 FL (ref 80–99)
MCV RBC AUTO: 90.5 FL (ref 80–99)
MCV RBC AUTO: 91.1 FL (ref 80–99)
MCV RBC AUTO: 91.2 FL (ref 80–99)
MCV RBC AUTO: 91.2 FL (ref 80–99)
MCV RBC AUTO: 91.3 FL (ref 80–99)
MCV RBC AUTO: 91.4 FL (ref 80–99)
MCV RBC AUTO: 91.5 FL (ref 80–99)
MCV RBC AUTO: 92.1 FL (ref 80–99)
MCV RBC AUTO: 92.6 FL (ref 80–99)
MINUTES UNTIL NEXT BG, NBG: 120 MIN
MINUTES UNTIL NEXT BG, NBG: 60 MIN
MONOCYTES # BLD: 0.1 K/UL (ref 0.05–1.2)
MONOCYTES # BLD: 0.2 K/UL (ref 0.05–1.2)
MONOCYTES # BLD: 0.3 K/UL (ref 0.05–1.2)
MONOCYTES # BLD: 0.3 K/UL (ref 0–1)
MONOCYTES # BLD: 0.3 K/UL (ref 0–1)
MONOCYTES # BLD: 0.4 K/UL (ref 0.05–1.2)
MONOCYTES # BLD: 0.4 K/UL (ref 0.05–1.2)
MONOCYTES # BLD: 0.4 K/UL (ref 0–1)
MONOCYTES # BLD: 0.5 K/UL (ref 0.05–1.2)
MONOCYTES # BLD: 0.5 K/UL (ref 0–1)
MONOCYTES # BLD: 0.6 K/UL (ref 0–1)
MONOCYTES # BLD: 0.7 K/UL (ref 0–1)
MONOCYTES # BLD: 0.8 K/UL (ref 0–1)
MONOCYTES # BLD: 0.9 K/UL (ref 0–1)
MONOCYTES NFR BLD: 1 % (ref 3–10)
MONOCYTES NFR BLD: 10 % (ref 5–13)
MONOCYTES NFR BLD: 11 % (ref 5–13)
MONOCYTES NFR BLD: 3 % (ref 3–10)
MONOCYTES NFR BLD: 4 % (ref 3–10)
MONOCYTES NFR BLD: 4 % (ref 5–13)
MONOCYTES NFR BLD: 5 % (ref 3–10)
MONOCYTES NFR BLD: 5 % (ref 5–13)
MONOCYTES NFR BLD: 6 % (ref 3–10)
MONOCYTES NFR BLD: 6 % (ref 5–13)
MONOCYTES NFR BLD: 7 % (ref 3–10)
MONOCYTES NFR BLD: 7 % (ref 5–13)
MONOCYTES NFR BLD: 8 % (ref 5–13)
MONOCYTES NFR BLD: 9 % (ref 5–13)
MULTIPLIER, MUL: 0.02
MULTIPLIER, MUL: 0.03
MYELOCYTES NFR BLD MANUAL: 1 %
NEUTS BAND NFR BLD MANUAL: 10 % (ref 0–5)
NEUTS BAND NFR BLD MANUAL: 2 % (ref 0–5)
NEUTS BAND NFR BLD MANUAL: 4 % (ref 0–6)
NEUTS BAND NFR BLD MANUAL: 5 % (ref 0–6)
NEUTS SEG # BLD: 11.7 K/UL (ref 1.8–8)
NEUTS SEG # BLD: 13.2 K/UL (ref 1.8–8)
NEUTS SEG # BLD: 3.2 K/UL (ref 1.8–8)
NEUTS SEG # BLD: 3.5 K/UL (ref 1.8–8)
NEUTS SEG # BLD: 3.7 K/UL (ref 1.8–8)
NEUTS SEG # BLD: 3.8 K/UL (ref 1.8–8)
NEUTS SEG # BLD: 4.4 K/UL (ref 1.8–8)
NEUTS SEG # BLD: 4.6 K/UL (ref 1.8–8)
NEUTS SEG # BLD: 4.7 K/UL (ref 1.8–8)
NEUTS SEG # BLD: 5.1 K/UL (ref 1.8–8)
NEUTS SEG # BLD: 5.3 K/UL (ref 1.8–8)
NEUTS SEG # BLD: 5.4 K/UL (ref 1.8–8)
NEUTS SEG # BLD: 5.6 K/UL (ref 1.8–8)
NEUTS SEG # BLD: 5.9 K/UL (ref 1.8–8)
NEUTS SEG # BLD: 6.4 K/UL (ref 1.8–8)
NEUTS SEG # BLD: 6.6 K/UL (ref 1.8–8)
NEUTS SEG # BLD: 6.7 K/UL (ref 1.8–8)
NEUTS SEG # BLD: 6.8 K/UL (ref 1.8–8)
NEUTS SEG # BLD: 6.8 K/UL (ref 1.8–8)
NEUTS SEG # BLD: 7.2 K/UL (ref 1.8–8)
NEUTS SEG # BLD: 7.3 K/UL (ref 1.8–8)
NEUTS SEG # BLD: 7.4 K/UL (ref 1.8–8)
NEUTS SEG # BLD: 7.7 K/UL (ref 1.8–8)
NEUTS SEG # BLD: 7.8 K/UL (ref 1.8–8)
NEUTS SEG # BLD: 7.9 K/UL (ref 1.8–8)
NEUTS SEG # BLD: 8 K/UL (ref 1.8–8)
NEUTS SEG # BLD: 8 K/UL (ref 1.8–8)
NEUTS SEG # BLD: 8.2 K/UL (ref 1.8–8)
NEUTS SEG # BLD: 8.5 K/UL (ref 1.8–8)
NEUTS SEG # BLD: 8.6 K/UL (ref 1.8–8)
NEUTS SEG # BLD: 8.6 K/UL (ref 1.8–8)
NEUTS SEG # BLD: 8.7 K/UL (ref 1.8–8)
NEUTS SEG # BLD: 8.9 K/UL (ref 1.8–8)
NEUTS SEG # BLD: 9 K/UL (ref 1.8–8)
NEUTS SEG # BLD: 9.4 K/UL (ref 1.8–8)
NEUTS SEG # BLD: 9.5 K/UL (ref 1.8–8)
NEUTS SEG # BLD: 9.9 K/UL (ref 1.8–8)
NEUTS SEG NFR BLD: 61 % (ref 32–75)
NEUTS SEG NFR BLD: 63 % (ref 32–75)
NEUTS SEG NFR BLD: 66 % (ref 32–75)
NEUTS SEG NFR BLD: 71 % (ref 32–75)
NEUTS SEG NFR BLD: 71 % (ref 32–75)
NEUTS SEG NFR BLD: 72 % (ref 32–75)
NEUTS SEG NFR BLD: 77 % (ref 40–73)
NEUTS SEG NFR BLD: 78 % (ref 40–73)
NEUTS SEG NFR BLD: 79 % (ref 32–75)
NEUTS SEG NFR BLD: 80 % (ref 32–75)
NEUTS SEG NFR BLD: 80 % (ref 32–75)
NEUTS SEG NFR BLD: 82 % (ref 32–75)
NEUTS SEG NFR BLD: 83 % (ref 32–75)
NEUTS SEG NFR BLD: 83 % (ref 32–75)
NEUTS SEG NFR BLD: 83 % (ref 40–73)
NEUTS SEG NFR BLD: 84 % (ref 32–75)
NEUTS SEG NFR BLD: 84 % (ref 32–75)
NEUTS SEG NFR BLD: 84 % (ref 40–73)
NEUTS SEG NFR BLD: 84 % (ref 40–73)
NEUTS SEG NFR BLD: 85 % (ref 32–75)
NEUTS SEG NFR BLD: 86 % (ref 32–75)
NEUTS SEG NFR BLD: 86 % (ref 40–73)
NEUTS SEG NFR BLD: 88 % (ref 32–75)
NEUTS SEG NFR BLD: 88 % (ref 32–75)
NEUTS SEG NFR BLD: 88 % (ref 40–73)
NEUTS SEG NFR BLD: 89 % (ref 32–75)
NEUTS SEG NFR BLD: 90 % (ref 40–73)
NEUTS SEG NFR BLD: 91 % (ref 32–75)
NEUTS SEG NFR BLD: 96 % (ref 40–73)
NITRITE UR QL STRIP.AUTO: NEGATIVE
NRBC # BLD: 0 K/UL (ref 0–0.01)
NRBC # BLD: 0.02 K/UL (ref 0–0.01)
NRBC BLD-RTO: 0 PER 100 WBC
NRBC BLD-RTO: 0.2 PER 100 WBC
O2/TOTAL GAS SETTING VFR VENT: 100 %
O2/TOTAL GAS SETTING VFR VENT: 55 %
O2/TOTAL GAS SETTING VFR VENT: 55 %
O2/TOTAL GAS SETTING VFR VENT: 60 %
O2/TOTAL GAS SETTING VFR VENT: 70 %
O2/TOTAL GAS SETTING VFR VENT: 75 %
O2/TOTAL GAS SETTING VFR VENT: 75 %
O2/TOTAL GAS SETTING VFR VENT: 80 %
O2/TOTAL GAS SETTING VFR VENT: 80 %
O2/TOTAL GAS SETTING VFR VENT: 90 %
ORDER INITIALS, ORDINIT: NORMAL
OTHER,OTHU: ABNORMAL
P-R INTERVAL, ECG05: 144 MS
PAW @ MEAN EXP FLOW ON VENT: 10 CMH2O
PAW @ MEAN EXP FLOW ON VENT: 13 CMH2O
PAW @ MEAN EXP FLOW ON VENT: 18 CMH2O
PAW @ MEAN EXP FLOW ON VENT: 18 CMH2O
PAW @ MEAN EXP FLOW ON VENT: 19 CMH2O
PAW @ MEAN EXP FLOW ON VENT: 20 CMH2O
PCO2 BLD: 27 MMHG (ref 35–45)
PCO2 BLD: 35.1 MMHG (ref 35–45)
PCO2 BLD: 35.1 MMHG (ref 35–45)
PCO2 BLD: 38.8 MMHG (ref 35–45)
PCO2 BLD: 39.4 MMHG (ref 35–45)
PCO2 BLD: 40.2 MMHG (ref 35–45)
PCO2 BLD: 40.7 MMHG (ref 35–45)
PCO2 BLD: 41.3 MMHG (ref 35–45)
PCO2 BLD: 42.1 MMHG (ref 35–45)
PCO2 BLD: 43.9 MMHG (ref 35–45)
PCO2 BLD: 44.3 MMHG (ref 35–45)
PCO2 BLD: 44.5 MMHG (ref 35–45)
PCO2 BLD: 44.8 MMHG (ref 35–45)
PCO2 BLD: 45.4 MMHG (ref 35–45)
PCO2 BLD: 45.8 MMHG (ref 35–45)
PCO2 BLD: 45.9 MMHG (ref 35–45)
PCO2 BLD: 46 MMHG (ref 35–45)
PCO2 BLD: 46.1 MMHG (ref 35–45)
PCO2 BLD: 46.4 MMHG (ref 35–45)
PCO2 BLD: 46.4 MMHG (ref 35–45)
PCO2 BLD: 46.6 MMHG (ref 35–45)
PCO2 BLD: 46.7 MMHG (ref 35–45)
PCO2 BLD: 46.8 MMHG (ref 35–45)
PCO2 BLD: 46.9 MMHG (ref 35–45)
PCO2 BLD: 47.2 MMHG (ref 35–45)
PCO2 BLD: 47.3 MMHG (ref 35–45)
PCO2 BLD: 47.5 MMHG (ref 35–45)
PCO2 BLD: 47.9 MMHG (ref 35–45)
PCO2 BLD: 48.3 MMHG (ref 35–45)
PCO2 BLD: 48.6 MMHG (ref 35–45)
PCO2 BLD: 49.1 MMHG (ref 35–45)
PCO2 BLD: 49.3 MMHG (ref 35–45)
PCO2 BLD: 49.5 MMHG (ref 35–45)
PCO2 BLD: 52.8 MMHG (ref 35–45)
PCO2 BLDA: 36 MMHG (ref 35–45)
PCO2 BLDA: 36 MMHG (ref 35–45)
PCO2 BLDA: 38 MMHG (ref 35–45)
PCO2 BLDA: 39 MMHG (ref 35–45)
PCO2 BLDA: 41 MMHG (ref 35–45)
PCO2 BLDA: 41 MMHG (ref 35–45)
PCO2 BLDA: 42 MMHG (ref 35–45)
PCO2 BLDA: 42 MMHG (ref 35–45)
PCO2 BLDA: 43 MMHG (ref 35–45)
PCO2 BLDA: 44 MMHG (ref 35–45)
PCO2 BLDA: 45 MMHG (ref 35–45)
PCO2 BLDA: 46 MMHG (ref 35–45)
PCO2 BLDA: 46 MMHG (ref 35–45)
PCO2 BLDA: 51 MMHG (ref 35–45)
PCO2 BLDA: 54 MMHG (ref 35–45)
PCO2 BLDA: 59 MMHG (ref 35–45)
PEEP RESPIRATORY: 10 CMH2O
PEEP RESPIRATORY: 10 CM[H2O]
PEEP RESPIRATORY: 12 CMH2O
PEEP RESPIRATORY: 12 CM[H2O]
PEEP RESPIRATORY: 14 CMH2O
PEEP RESPIRATORY: 14 CM[H2O]
PEEP RESPIRATORY: 15 CMH2O
PEEP RESPIRATORY: 15 CM[H2O]
PH BLD: 7.34 [PH] (ref 7.35–7.45)
PH BLD: 7.37 [PH] (ref 7.35–7.45)
PH BLD: 7.39 [PH] (ref 7.35–7.45)
PH BLD: 7.41 [PH] (ref 7.35–7.45)
PH BLD: 7.42 [PH] (ref 7.35–7.45)
PH BLD: 7.43 [PH] (ref 7.35–7.45)
PH BLD: 7.44 [PH] (ref 7.35–7.45)
PH BLD: 7.45 [PH] (ref 7.35–7.45)
PH BLD: 7.46 [PH] (ref 7.35–7.45)
PH BLD: 7.47 [PH] (ref 7.35–7.45)
PH BLD: 7.51 [PH] (ref 7.35–7.45)
PH BLDA: 7.33 [PH] (ref 7.35–7.45)
PH BLDA: 7.34 [PH] (ref 7.35–7.45)
PH BLDA: 7.38 [PH] (ref 7.35–7.45)
PH BLDA: 7.39 [PH] (ref 7.35–7.45)
PH BLDA: 7.4 [PH] (ref 7.35–7.45)
PH BLDA: 7.41 [PH] (ref 7.35–7.45)
PH BLDA: 7.42 [PH] (ref 7.35–7.45)
PH BLDA: 7.43 [PH] (ref 7.35–7.45)
PH BLDA: 7.44 [PH] (ref 7.35–7.45)
PH BLDA: 7.45 [PH] (ref 7.35–7.45)
PH BLDA: 7.47 [PH] (ref 7.35–7.45)
PH BLDA: 7.49 [PH] (ref 7.35–7.45)
PH BLDA: 7.5 [PH] (ref 7.35–7.45)
PH BLDA: 7.53 [PH] (ref 7.35–7.45)
PH BLDA: 7.55 [PH] (ref 7.35–7.45)
PH UR STRIP: 5 [PH] (ref 5–8)
PH UR STRIP: 5.5 [PH] (ref 5–8)
PH UR STRIP: 5.5 [PH] (ref 5–8)
PH UR STRIP: 8.5 [PH] (ref 5–8)
PHOSPHATE SERPL-MCNC: 2 MG/DL (ref 2.6–4.7)
PHOSPHATE SERPL-MCNC: 2.6 MG/DL (ref 2.6–4.7)
PHOSPHATE SERPL-MCNC: 2.6 MG/DL (ref 2.6–4.7)
PHOSPHATE SERPL-MCNC: 3.1 MG/DL (ref 2.6–4.7)
PHOSPHATE SERPL-MCNC: 3.3 MG/DL (ref 2.6–4.7)
PHOSPHATE SERPL-MCNC: 3.4 MG/DL (ref 2.5–4.9)
PHOSPHATE SERPL-MCNC: 3.4 MG/DL (ref 2.6–4.7)
PHOSPHATE SERPL-MCNC: 3.5 MG/DL (ref 2.6–4.7)
PHOSPHATE SERPL-MCNC: 3.6 MG/DL (ref 2.6–4.7)
PHOSPHATE SERPL-MCNC: 3.7 MG/DL (ref 2.6–4.7)
PHOSPHATE SERPL-MCNC: 3.7 MG/DL (ref 2.6–4.7)
PHOSPHATE SERPL-MCNC: 3.8 MG/DL (ref 2.5–4.9)
PHOSPHATE SERPL-MCNC: 3.8 MG/DL (ref 2.5–4.9)
PHOSPHATE SERPL-MCNC: 3.8 MG/DL (ref 2.6–4.7)
PHOSPHATE SERPL-MCNC: 3.9 MG/DL (ref 2.6–4.7)
PHOSPHATE SERPL-MCNC: 4 MG/DL (ref 2.6–4.7)
PHOSPHATE SERPL-MCNC: 4 MG/DL (ref 2.6–4.7)
PHOSPHATE SERPL-MCNC: 4.1 MG/DL (ref 2.5–4.9)
PHOSPHATE SERPL-MCNC: 4.1 MG/DL (ref 2.5–4.9)
PHOSPHATE SERPL-MCNC: 4.1 MG/DL (ref 2.6–4.7)
PHOSPHATE SERPL-MCNC: 4.3 MG/DL (ref 2.5–4.9)
PHOSPHATE SERPL-MCNC: 4.4 MG/DL (ref 2.6–4.7)
PHOSPHATE SERPL-MCNC: 4.4 MG/DL (ref 2.6–4.7)
PHOSPHATE SERPL-MCNC: 7.6 MG/DL (ref 2.5–4.9)
PIP ISTAT,IPIP: 10
PIP ISTAT,IPIP: 12
PIP ISTAT,IPIP: 14
PIP ISTAT,IPIP: 18
PIP ISTAT,IPIP: 18
PIP ISTAT,IPIP: 29
PIP ISTAT,IPIP: 29
PLATELET # BLD AUTO: 157 K/UL (ref 150–400)
PLATELET # BLD AUTO: 161 K/UL (ref 150–400)
PLATELET # BLD AUTO: 163 K/UL (ref 150–400)
PLATELET # BLD AUTO: 186 K/UL (ref 150–400)
PLATELET # BLD AUTO: 188 K/UL (ref 150–400)
PLATELET # BLD AUTO: 197 K/UL (ref 150–400)
PLATELET # BLD AUTO: 198 K/UL (ref 150–400)
PLATELET # BLD AUTO: 199 K/UL (ref 150–400)
PLATELET # BLD AUTO: 200 K/UL (ref 150–400)
PLATELET # BLD AUTO: 202 K/UL (ref 150–400)
PLATELET # BLD AUTO: 202 K/UL (ref 150–400)
PLATELET # BLD AUTO: 203 K/UL (ref 150–400)
PLATELET # BLD AUTO: 207 K/UL (ref 150–400)
PLATELET # BLD AUTO: 209 K/UL (ref 150–400)
PLATELET # BLD AUTO: 210 K/UL (ref 150–400)
PLATELET # BLD AUTO: 212 K/UL (ref 150–400)
PLATELET # BLD AUTO: 219 K/UL (ref 135–420)
PLATELET # BLD AUTO: 221 K/UL (ref 150–400)
PLATELET # BLD AUTO: 223 K/UL (ref 150–400)
PLATELET # BLD AUTO: 225 K/UL (ref 135–420)
PLATELET # BLD AUTO: 225 K/UL (ref 150–400)
PLATELET # BLD AUTO: 230 K/UL (ref 150–400)
PLATELET # BLD AUTO: 232 K/UL (ref 150–400)
PLATELET # BLD AUTO: 233 K/UL (ref 150–400)
PLATELET # BLD AUTO: 240 K/UL (ref 150–400)
PLATELET # BLD AUTO: 241 K/UL (ref 150–400)
PLATELET # BLD AUTO: 260 K/UL (ref 150–400)
PLATELET # BLD AUTO: 265 K/UL (ref 150–400)
PLATELET # BLD AUTO: 288 K/UL (ref 150–400)
PLATELET # BLD AUTO: 291 K/UL (ref 150–400)
PLATELET # BLD AUTO: 304 K/UL (ref 135–420)
PLATELET # BLD AUTO: 324 K/UL (ref 150–400)
PLATELET # BLD AUTO: 368 K/UL (ref 150–400)
PLATELET # BLD AUTO: 369 K/UL (ref 135–420)
PLATELET # BLD AUTO: 369 K/UL (ref 150–400)
PLATELET # BLD AUTO: 371 K/UL (ref 135–420)
PLATELET # BLD AUTO: 375 K/UL (ref 150–400)
PLATELET # BLD AUTO: 392 K/UL (ref 135–420)
PLATELET # BLD AUTO: 406 K/UL (ref 135–420)
PLATELET # BLD AUTO: 430 K/UL (ref 135–420)
PLATELET # BLD AUTO: 477 K/UL (ref 135–420)
PLATELET COMMENTS,PCOM: ABNORMAL
PMV BLD AUTO: 10.1 FL (ref 8.9–12.9)
PMV BLD AUTO: 10.1 FL (ref 8.9–12.9)
PMV BLD AUTO: 10.2 FL (ref 8.9–12.9)
PMV BLD AUTO: 10.2 FL (ref 8.9–12.9)
PMV BLD AUTO: 10.4 FL (ref 8.9–12.9)
PMV BLD AUTO: 10.4 FL (ref 8.9–12.9)
PMV BLD AUTO: 10.5 FL (ref 8.9–12.9)
PMV BLD AUTO: 10.5 FL (ref 8.9–12.9)
PMV BLD AUTO: 10.6 FL (ref 8.9–12.9)
PMV BLD AUTO: 10.6 FL (ref 8.9–12.9)
PMV BLD AUTO: 10.7 FL (ref 8.9–12.9)
PMV BLD AUTO: 10.8 FL (ref 8.9–12.9)
PMV BLD AUTO: 8.3 FL (ref 8.9–12.9)
PMV BLD AUTO: 8.5 FL (ref 8.9–12.9)
PMV BLD AUTO: 8.5 FL (ref 9.2–11.8)
PMV BLD AUTO: 8.7 FL (ref 8.9–12.9)
PMV BLD AUTO: 8.7 FL (ref 9.2–11.8)
PMV BLD AUTO: 8.7 FL (ref 9.2–11.8)
PMV BLD AUTO: 8.8 FL (ref 8.9–12.9)
PMV BLD AUTO: 8.8 FL (ref 9.2–11.8)
PMV BLD AUTO: 8.9 FL (ref 8.9–12.9)
PMV BLD AUTO: 9 FL (ref 9.2–11.8)
PMV BLD AUTO: 9 FL (ref 9.2–11.8)
PMV BLD AUTO: 9.1 FL (ref 8.9–12.9)
PMV BLD AUTO: 9.2 FL (ref 9.2–11.8)
PMV BLD AUTO: 9.3 FL (ref 8.9–12.9)
PMV BLD AUTO: 9.4 FL (ref 8.9–12.9)
PMV BLD AUTO: 9.5 FL (ref 9.2–11.8)
PMV BLD AUTO: 9.7 FL (ref 8.9–12.9)
PMV BLD AUTO: 9.7 FL (ref 8.9–12.9)
PMV BLD AUTO: 9.8 FL (ref 8.9–12.9)
PMV BLD AUTO: 9.8 FL (ref 9.2–11.8)
PMV BLD AUTO: 9.9 FL (ref 8.9–12.9)
PO2 BLD: 100 MMHG (ref 80–100)
PO2 BLD: 104 MMHG (ref 80–100)
PO2 BLD: 105 MMHG (ref 80–100)
PO2 BLD: 110 MMHG (ref 80–100)
PO2 BLD: 120 MMHG (ref 80–100)
PO2 BLD: 147 MMHG (ref 80–100)
PO2 BLD: 170 MMHG (ref 80–100)
PO2 BLD: 244 MMHG (ref 80–100)
PO2 BLD: 46 MMHG (ref 80–100)
PO2 BLD: 47 MMHG (ref 80–100)
PO2 BLD: 52 MMHG (ref 80–100)
PO2 BLD: 53 MMHG (ref 80–100)
PO2 BLD: 54 MMHG (ref 80–100)
PO2 BLD: 54 MMHG (ref 80–100)
PO2 BLD: 61 MMHG (ref 80–100)
PO2 BLD: 61 MMHG (ref 80–100)
PO2 BLD: 62 MMHG (ref 80–100)
PO2 BLD: 63 MMHG (ref 80–100)
PO2 BLD: 64 MMHG (ref 80–100)
PO2 BLD: 64 MMHG (ref 80–100)
PO2 BLD: 65 MMHG (ref 80–100)
PO2 BLD: 66 MMHG (ref 80–100)
PO2 BLD: 68 MMHG (ref 80–100)
PO2 BLD: 70 MMHG (ref 80–100)
PO2 BLD: 72 MMHG (ref 80–100)
PO2 BLD: 74 MMHG (ref 80–100)
PO2 BLD: 77 MMHG (ref 80–100)
PO2 BLD: 79 MMHG (ref 80–100)
PO2 BLD: 80 MMHG (ref 80–100)
PO2 BLD: 82 MMHG (ref 80–100)
PO2 BLD: 86 MMHG (ref 80–100)
PO2 BLD: 88 MMHG (ref 80–100)
PO2 BLD: 91 MMHG (ref 80–100)
PO2 BLD: 93 MMHG (ref 80–100)
PO2 BLD: 94 MMHG (ref 80–100)
PO2 BLDA: 109 MMHG (ref 80–100)
PO2 BLDA: 119 MMHG (ref 80–100)
PO2 BLDA: 140 MMHG (ref 80–100)
PO2 BLDA: 145 MMHG (ref 80–100)
PO2 BLDA: 240 MMHG (ref 80–100)
PO2 BLDA: 49 MMHG (ref 80–100)
PO2 BLDA: 51 MMHG (ref 80–100)
PO2 BLDA: 59 MMHG (ref 80–100)
PO2 BLDA: 59 MMHG (ref 80–100)
PO2 BLDA: 60 MMHG (ref 80–100)
PO2 BLDA: 61 MMHG (ref 80–100)
PO2 BLDA: 63 MMHG (ref 80–100)
PO2 BLDA: 70 MMHG (ref 80–100)
PO2 BLDA: 75 MMHG (ref 80–100)
PO2 BLDA: 76 MMHG (ref 80–100)
PO2 BLDA: 83 MMHG (ref 80–100)
PO2 BLDA: 83 MMHG (ref 80–100)
PO2 BLDA: 93 MMHG (ref 80–100)
PO2 BLDA: 93 MMHG (ref 80–100)
PO2 BLDA: 94 MMHG (ref 80–100)
PO2 BLDA: 97 MMHG (ref 80–100)
POTASSIUM SERPL-SCNC: 3 MMOL/L (ref 3.5–5.1)
POTASSIUM SERPL-SCNC: 3.3 MMOL/L (ref 3.5–5.1)
POTASSIUM SERPL-SCNC: 3.5 MMOL/L (ref 3.5–5.1)
POTASSIUM SERPL-SCNC: 3.6 MMOL/L (ref 3.5–5.1)
POTASSIUM SERPL-SCNC: 3.8 MMOL/L (ref 3.5–5.1)
POTASSIUM SERPL-SCNC: 3.8 MMOL/L (ref 3.5–5.1)
POTASSIUM SERPL-SCNC: 3.9 MMOL/L (ref 3.5–5.1)
POTASSIUM SERPL-SCNC: 4 MMOL/L (ref 3.5–5.1)
POTASSIUM SERPL-SCNC: 4 MMOL/L (ref 3.5–5.1)
POTASSIUM SERPL-SCNC: 4.1 MMOL/L (ref 3.5–5.1)
POTASSIUM SERPL-SCNC: 4.1 MMOL/L (ref 3.5–5.1)
POTASSIUM SERPL-SCNC: 4.2 MMOL/L (ref 3.5–5.1)
POTASSIUM SERPL-SCNC: 4.2 MMOL/L (ref 3.5–5.1)
POTASSIUM SERPL-SCNC: 4.3 MMOL/L (ref 3.5–5.1)
POTASSIUM SERPL-SCNC: 4.3 MMOL/L (ref 3.5–5.1)
POTASSIUM SERPL-SCNC: 4.3 MMOL/L (ref 3.5–5.5)
POTASSIUM SERPL-SCNC: 4.3 MMOL/L (ref 3.5–5.5)
POTASSIUM SERPL-SCNC: 4.4 MMOL/L (ref 3.5–5.1)
POTASSIUM SERPL-SCNC: 4.4 MMOL/L (ref 3.5–5.5)
POTASSIUM SERPL-SCNC: 4.6 MMOL/L (ref 3.5–5.5)
POTASSIUM SERPL-SCNC: 4.7 MMOL/L (ref 3.5–5.1)
POTASSIUM SERPL-SCNC: 4.7 MMOL/L (ref 3.5–5.1)
POTASSIUM SERPL-SCNC: 4.7 MMOL/L (ref 3.5–5.5)
POTASSIUM SERPL-SCNC: 4.8 MMOL/L (ref 3.5–5.1)
POTASSIUM SERPL-SCNC: 4.8 MMOL/L (ref 3.5–5.5)
POTASSIUM SERPL-SCNC: 4.9 MMOL/L (ref 3.5–5.1)
POTASSIUM SERPL-SCNC: 4.9 MMOL/L (ref 3.5–5.5)
POTASSIUM SERPL-SCNC: 5.1 MMOL/L (ref 3.5–5.1)
POTASSIUM SERPL-SCNC: 5.1 MMOL/L (ref 3.5–5.1)
PRESSURE SUPPORT SETTING VENT: 8 CMH2O
PRESSURE SUPPORT SETTING VENT: 8 CM[H2O]
PROCALCITONIN SERPL-MCNC: 0.05 NG/ML
PROCALCITONIN SERPL-MCNC: 0.06 NG/ML
PROCALCITONIN SERPL-MCNC: 0.08 NG/ML
PROCALCITONIN SERPL-MCNC: 0.23 NG/ML
PROCALCITONIN SERPL-MCNC: 0.28 NG/ML
PROCALCITONIN SERPL-MCNC: 0.48 NG/ML
PROCALCITONIN SERPL-MCNC: <0.05 NG/ML
PROT SERPL-MCNC: 5.3 G/DL (ref 6.4–8.2)
PROT SERPL-MCNC: 5.4 G/DL (ref 6.4–8.2)
PROT SERPL-MCNC: 5.5 G/DL (ref 6.4–8.2)
PROT SERPL-MCNC: 5.6 G/DL (ref 6.4–8.2)
PROT SERPL-MCNC: 5.7 G/DL (ref 6.4–8.2)
PROT SERPL-MCNC: 5.8 G/DL (ref 6.4–8.2)
PROT SERPL-MCNC: 5.9 G/DL (ref 6.4–8.2)
PROT SERPL-MCNC: 5.9 G/DL (ref 6.4–8.2)
PROT SERPL-MCNC: 6 G/DL (ref 6.4–8.2)
PROT SERPL-MCNC: 6.1 G/DL (ref 6.4–8.2)
PROT SERPL-MCNC: 6.1 G/DL (ref 6.4–8.2)
PROT SERPL-MCNC: 6.2 G/DL (ref 6.4–8.2)
PROT SERPL-MCNC: 6.3 G/DL (ref 6.4–8.2)
PROT SERPL-MCNC: 6.4 G/DL (ref 6.4–8.2)
PROT SERPL-MCNC: 6.5 G/DL (ref 6.4–8.2)
PROT SERPL-MCNC: 6.6 G/DL (ref 6.4–8.2)
PROT SERPL-MCNC: 6.7 G/DL (ref 6.4–8.2)
PROT SERPL-MCNC: 6.8 G/DL (ref 6.4–8.2)
PROT UR STRIP-MCNC: ABNORMAL MG/DL
PROT UR STRIP-MCNC: NEGATIVE MG/DL
PROTHROMBIN TIME: 10 SEC (ref 9–11.1)
PROTHROMBIN TIME: 10.3 SEC (ref 9–11.1)
PROTHROMBIN TIME: 10.4 SEC (ref 9–11.1)
PROTHROMBIN TIME: 10.4 SEC (ref 9–11.1)
PROTHROMBIN TIME: 10.5 SEC (ref 9–11.1)
PROTHROMBIN TIME: 10.6 SEC (ref 9–11.1)
PROTHROMBIN TIME: 10.8 SEC (ref 9–11.1)
PROTHROMBIN TIME: 10.8 SEC (ref 9–11.1)
PROTHROMBIN TIME: 10.9 SEC (ref 9–11.1)
PROTHROMBIN TIME: 10.9 SEC (ref 9–11.1)
PROTHROMBIN TIME: 11 SEC (ref 9–11.1)
PROTHROMBIN TIME: 11 SEC (ref 9–11.1)
PROTHROMBIN TIME: 11.1 SEC (ref 9–11.1)
PROTHROMBIN TIME: 11.3 SEC (ref 9–11.1)
PROTHROMBIN TIME: 11.4 SEC (ref 9–11.1)
PROTHROMBIN TIME: 11.4 SEC (ref 9–11.1)
PROTHROMBIN TIME: 11.7 SEC (ref 9–11.1)
PROTHROMBIN TIME: 11.8 SEC (ref 9–11.1)
PROTHROMBIN TIME: 13.1 SEC (ref 11.5–15.2)
PROTHROMBIN TIME: 13.2 SEC (ref 11.5–15.2)
PROTHROMBIN TIME: 13.3 SEC (ref 11.5–15.2)
PROTHROMBIN TIME: 13.8 SEC (ref 11.5–15.2)
PROTHROMBIN TIME: 14.3 SEC (ref 11.5–15.2)
PROTHROMBIN TIME: 14.4 SEC (ref 11.5–15.2)
PROTHROMBIN TIME: 14.6 SEC (ref 11.5–15.2)
Q-T INTERVAL, ECG07: 322 MS
QRS DURATION, ECG06: 72 MS
QTC CALCULATION (BEZET), ECG08: 449 MS
QUANTIFERON MITOGEN VALUE: NORMAL IU/ML
QUANTIFERON NIL VALUE: NORMAL IU/ML
QUANTIFERON TB1 AG: NORMAL IU/ML
QUANTIFERON TB2 AG: NORMAL IU/ML
RBC # BLD AUTO: 3.2 M/UL (ref 4.1–5.7)
RBC # BLD AUTO: 3.33 M/UL (ref 4.1–5.7)
RBC # BLD AUTO: 3.4 M/UL (ref 4.1–5.7)
RBC # BLD AUTO: 3.44 M/UL (ref 4.1–5.7)
RBC # BLD AUTO: 3.49 M/UL (ref 4.1–5.7)
RBC # BLD AUTO: 3.57 M/UL (ref 4.1–5.7)
RBC # BLD AUTO: 3.58 M/UL (ref 4.1–5.7)
RBC # BLD AUTO: 3.62 M/UL (ref 4.1–5.7)
RBC # BLD AUTO: 3.67 M/UL (ref 4.1–5.7)
RBC # BLD AUTO: 3.68 M/UL (ref 4.1–5.7)
RBC # BLD AUTO: 3.7 M/UL (ref 4.1–5.7)
RBC # BLD AUTO: 3.74 M/UL (ref 4.1–5.7)
RBC # BLD AUTO: 3.77 M/UL (ref 4.1–5.7)
RBC # BLD AUTO: 3.8 M/UL (ref 4.1–5.7)
RBC # BLD AUTO: 3.85 M/UL (ref 4.1–5.7)
RBC # BLD AUTO: 3.88 M/UL (ref 4.1–5.7)
RBC # BLD AUTO: 3.92 M/UL (ref 4.1–5.7)
RBC # BLD AUTO: 3.96 M/UL (ref 4.1–5.7)
RBC # BLD AUTO: 3.99 M/UL (ref 4.1–5.7)
RBC # BLD AUTO: 4.02 M/UL (ref 4.1–5.7)
RBC # BLD AUTO: 4.03 M/UL (ref 4.1–5.7)
RBC # BLD AUTO: 4.06 M/UL (ref 4.1–5.7)
RBC # BLD AUTO: 4.06 M/UL (ref 4.35–5.65)
RBC # BLD AUTO: 4.07 M/UL (ref 4.1–5.7)
RBC # BLD AUTO: 4.09 M/UL (ref 4.1–5.7)
RBC # BLD AUTO: 4.13 M/UL (ref 4.1–5.7)
RBC # BLD AUTO: 4.14 M/UL (ref 4.1–5.7)
RBC # BLD AUTO: 4.17 M/UL (ref 4.1–5.7)
RBC # BLD AUTO: 4.18 M/UL (ref 4.1–5.7)
RBC # BLD AUTO: 4.28 M/UL (ref 4.35–5.65)
RBC # BLD AUTO: 4.3 M/UL (ref 4.35–5.65)
RBC # BLD AUTO: 4.32 M/UL (ref 4.1–5.7)
RBC # BLD AUTO: 4.33 M/UL (ref 4.1–5.7)
RBC # BLD AUTO: 4.41 M/UL (ref 4.35–5.65)
RBC # BLD AUTO: 4.42 M/UL (ref 4.1–5.7)
RBC # BLD AUTO: 4.65 M/UL (ref 4.1–5.7)
RBC # BLD AUTO: 4.7 M/UL (ref 4.35–5.65)
RBC # BLD AUTO: 4.79 M/UL (ref 4.35–5.65)
RBC # BLD AUTO: 5.13 M/UL (ref 4.35–5.65)
RBC # BLD AUTO: 5.17 M/UL (ref 4.35–5.65)
RBC # BLD AUTO: 5.56 M/UL (ref 4.35–5.65)
RBC #/AREA URNS HPF: ABNORMAL /HPF (ref 0–5)
RBC MORPH BLD: ABNORMAL
RSV RNA SPEC QL NAA+PROBE: NOT DETECTED
RV+EV RNA SPEC QL NAA+PROBE: NOT DETECTED
SAO2 % BLD: 100 % (ref 92–97)
SAO2 % BLD: 83.3 % (ref 92–97)
SAO2 % BLD: 84.2 % (ref 92–97)
SAO2 % BLD: 84.5 % (ref 92–97)
SAO2 % BLD: 86 % (ref 92–97)
SAO2 % BLD: 86.9 % (ref 92–97)
SAO2 % BLD: 87 % (ref 92–97)
SAO2 % BLD: 87.9 % (ref 92–97)
SAO2 % BLD: 89 % (ref 92–97)
SAO2 % BLD: 89 % (ref 92–97)
SAO2 % BLD: 91.6 % (ref 92–97)
SAO2 % BLD: 91.7 % (ref 92–97)
SAO2 % BLD: 92 % (ref 92–97)
SAO2 % BLD: 92 % (ref 92–97)
SAO2 % BLD: 92.1 % (ref 92–97)
SAO2 % BLD: 92.2 % (ref 92–97)
SAO2 % BLD: 92.7 % (ref 92–97)
SAO2 % BLD: 92.8 % (ref 92–97)
SAO2 % BLD: 92.8 % (ref 92–97)
SAO2 % BLD: 92.9 % (ref 92–97)
SAO2 % BLD: 93 % (ref 92–97)
SAO2 % BLD: 93 % (ref 92–97)
SAO2 % BLD: 93.7 % (ref 92–97)
SAO2 % BLD: 94 % (ref 92–97)
SAO2 % BLD: 94.4 % (ref 92–97)
SAO2 % BLD: 94.9 % (ref 92–97)
SAO2 % BLD: 95 % (ref 92–97)
SAO2 % BLD: 95.7 % (ref 92–97)
SAO2 % BLD: 96 % (ref 92–97)
SAO2 % BLD: 96.1 % (ref 92–97)
SAO2 % BLD: 96.1 % (ref 92–97)
SAO2 % BLD: 96.6 % (ref 92–97)
SAO2 % BLD: 96.9 % (ref 92–97)
SAO2 % BLD: 97 % (ref 92–97)
SAO2 % BLD: 97.1 % (ref 92–97)
SAO2 % BLD: 97.5 % (ref 92–97)
SAO2 % BLD: 97.6 % (ref 92–97)
SAO2 % BLD: 97.7 % (ref 92–97)
SAO2 % BLD: 97.9 % (ref 92–97)
SAO2 % BLD: 98 % (ref 92–97)
SAO2 % BLD: 98.1 % (ref 92–97)
SAO2 % BLD: 98.4 % (ref 92–97)
SAO2 % BLD: 98.7 % (ref 92–97)
SAO2 % BLD: 99 % (ref 92–97)
SAO2 % BLD: 99 % (ref 92–97)
SAO2 % BLD: 99.3 % (ref 92–97)
SAO2 % BLD: 99.5 % (ref 92–97)
SAO2 % BLD: 99.8 % (ref 92–97)
SAO2% DEVICE SAO2% SENSOR NAME: ABNORMAL
SARS-COV-2 PCR, COVPCR: DETECTED
SARS-COV-2, COV2: NORMAL
SARS-COV-2, COV2: NORMAL
SARS-COV-2, XPLCVT: DETECTED
SERVICE CMNT-IMP: ABNORMAL
SERVICE CMNT-IMP: NORMAL
SODIUM SERPL-SCNC: 128 MMOL/L (ref 136–145)
SODIUM SERPL-SCNC: 131 MMOL/L (ref 136–145)
SODIUM SERPL-SCNC: 131 MMOL/L (ref 136–145)
SODIUM SERPL-SCNC: 132 MMOL/L (ref 136–145)
SODIUM SERPL-SCNC: 133 MMOL/L (ref 136–145)
SODIUM SERPL-SCNC: 134 MMOL/L (ref 136–145)
SODIUM SERPL-SCNC: 135 MMOL/L (ref 136–145)
SODIUM SERPL-SCNC: 136 MMOL/L (ref 136–145)
SODIUM SERPL-SCNC: 137 MMOL/L (ref 136–145)
SODIUM SERPL-SCNC: 138 MMOL/L (ref 136–145)
SODIUM SERPL-SCNC: 139 MMOL/L (ref 136–145)
SODIUM SERPL-SCNC: 140 MMOL/L (ref 136–145)
SODIUM SERPL-SCNC: 141 MMOL/L (ref 136–145)
SODIUM SERPL-SCNC: 141 MMOL/L (ref 136–145)
SODIUM SERPL-SCNC: 142 MMOL/L (ref 136–145)
SODIUM SERPL-SCNC: 142 MMOL/L (ref 136–145)
SODIUM SERPL-SCNC: 143 MMOL/L (ref 136–145)
SODIUM SERPL-SCNC: 143 MMOL/L (ref 136–145)
SOURCE, COVRS: ABNORMAL
SP GR UR REFRACTOMETRY: 1.02
SP GR UR REFRACTOMETRY: 1.02 (ref 1–1.03)
SP GR UR REFRACTOMETRY: 1.02 (ref 1–1.03)
SP GR UR REFRACTOMETRY: >1.03 (ref 1–1.03)
SPECIMEN SITE: ABNORMAL
SPECIMEN SOURCE: NORMAL
SPECIMEN TYPE: ABNORMAL
SPECIMEN TYPE: NORMAL
THERAPEUTIC RANGE,PTTT: ABNORMAL SECS (ref 58–77)
THERAPEUTIC RANGE,PTTT: NORMAL SECS (ref 58–77)
TOTAL RESP. RATE, ITRR: 16
TRIGL SERPL-MCNC: 1458 MG/DL (ref ?–150)
TRIGL SERPL-MCNC: 241 MG/DL (ref ?–150)
TRIGL SERPL-MCNC: 86 MG/DL (ref ?–150)
TROPONIN I SERPL-MCNC: <0.02 NG/ML (ref 0–0.04)
UA: UC IF INDICATED,UAUC: ABNORMAL
UR CULT HOLD, URHOLD: NORMAL
UR CULT HOLD, URHOLD: NORMAL
UROBILINOGEN UR QL STRIP.AUTO: 1 EU/DL (ref 0.2–1)
UROBILINOGEN UR QL STRIP.AUTO: 4 EU/DL (ref 0.2–1)
VANCOMYCIN SERPL-MCNC: 15.4 UG/ML
VANCOMYCIN SERPL-MCNC: 15.8 UG/ML
VANCOMYCIN SERPL-MCNC: 17.3 UG/ML
VANCOMYCIN SERPL-MCNC: 7.7 UG/ML
VENTILATION MODE VENT: ABNORMAL
VENTILATION MODE VENT: NORMAL
VENTRICULAR RATE, ECG03: 117 BPM
VLDLC SERPL CALC-MCNC: 48.2 MG/DL
VT SETTING VENT: 450 ML
VT SETTING VENT: 500 ML
WBC # BLD AUTO: 10 K/UL (ref 4.1–11.1)
WBC # BLD AUTO: 10 K/UL (ref 4.1–11.1)
WBC # BLD AUTO: 10.1 K/UL (ref 4.1–11.1)
WBC # BLD AUTO: 10.1 K/UL (ref 4.1–11.1)
WBC # BLD AUTO: 10.2 K/UL (ref 4.1–11.1)
WBC # BLD AUTO: 10.4 K/UL (ref 4.1–11.1)
WBC # BLD AUTO: 10.8 K/UL (ref 4.1–11.1)
WBC # BLD AUTO: 11 K/UL (ref 4.1–11.1)
WBC # BLD AUTO: 11.5 K/UL (ref 4.1–11.1)
WBC # BLD AUTO: 13.5 K/UL (ref 4.1–11.1)
WBC # BLD AUTO: 14.5 K/UL (ref 4.1–11.1)
WBC # BLD AUTO: 4.6 K/UL (ref 4.6–13.2)
WBC # BLD AUTO: 5.1 K/UL (ref 4.6–13.2)
WBC # BLD AUTO: 5.3 K/UL (ref 4.1–11.1)
WBC # BLD AUTO: 5.6 K/UL (ref 4.1–11.1)
WBC # BLD AUTO: 5.7 K/UL (ref 4.1–11.1)
WBC # BLD AUTO: 5.8 K/UL (ref 4.6–13.2)
WBC # BLD AUTO: 6.2 K/UL (ref 4.6–13.2)
WBC # BLD AUTO: 6.6 K/UL (ref 4.1–11.1)
WBC # BLD AUTO: 6.7 K/UL (ref 4.6–13.2)
WBC # BLD AUTO: 6.7 K/UL (ref 4.6–13.2)
WBC # BLD AUTO: 7.2 K/UL (ref 4.1–11.1)
WBC # BLD AUTO: 7.4 K/UL (ref 4.1–11.1)
WBC # BLD AUTO: 7.4 K/UL (ref 4.1–11.1)
WBC # BLD AUTO: 7.5 K/UL (ref 4.1–11.1)
WBC # BLD AUTO: 7.8 K/UL (ref 4.1–11.1)
WBC # BLD AUTO: 7.9 K/UL (ref 4.1–11.1)
WBC # BLD AUTO: 8 K/UL (ref 4.1–11.1)
WBC # BLD AUTO: 8 K/UL (ref 4.6–13.2)
WBC # BLD AUTO: 8.1 K/UL (ref 4.1–11.1)
WBC # BLD AUTO: 8.1 K/UL (ref 4.1–11.1)
WBC # BLD AUTO: 8.2 K/UL (ref 4.1–11.1)
WBC # BLD AUTO: 8.3 K/UL (ref 4.1–11.1)
WBC # BLD AUTO: 8.5 K/UL (ref 4.1–11.1)
WBC # BLD AUTO: 8.6 K/UL (ref 4.1–11.1)
WBC # BLD AUTO: 9.1 K/UL (ref 4.6–13.2)
WBC # BLD AUTO: 9.2 K/UL (ref 4.1–11.1)
WBC # BLD AUTO: 9.2 K/UL (ref 4.1–11.1)
WBC # BLD AUTO: 9.4 K/UL (ref 4.1–11.1)
WBC # BLD AUTO: 9.6 K/UL (ref 4.6–13.2)
WBC # BLD AUTO: 9.9 K/UL (ref 4.1–11.1)
WBC MORPH BLD: ABNORMAL
WBC MORPH BLD: ABNORMAL
WBC URNS QL MICRO: ABNORMAL /HPF (ref 0–4)

## 2021-01-01 PROCEDURE — 74011000258 HC RX REV CODE- 258: Performed by: NURSE PRACTITIONER

## 2021-01-01 PROCEDURE — 85379 FIBRIN DEGRADATION QUANT: CPT

## 2021-01-01 PROCEDURE — 94003 VENT MGMT INPAT SUBQ DAY: CPT

## 2021-01-01 PROCEDURE — 74011636637 HC RX REV CODE- 636/637: Performed by: NURSE PRACTITIONER

## 2021-01-01 PROCEDURE — 5A1945Z RESPIRATORY VENTILATION, 24-96 CONSECUTIVE HOURS: ICD-10-PCS | Performed by: FAMILY MEDICINE

## 2021-01-01 PROCEDURE — 65610000006 HC RM INTENSIVE CARE

## 2021-01-01 PROCEDURE — 74011250636 HC RX REV CODE- 250/636: Performed by: INTERNAL MEDICINE

## 2021-01-01 PROCEDURE — 74011250636 HC RX REV CODE- 250/636: Performed by: NURSE PRACTITIONER

## 2021-01-01 PROCEDURE — 2709999900 HC NON-CHARGEABLE SUPPLY

## 2021-01-01 PROCEDURE — 85610 PROTHROMBIN TIME: CPT

## 2021-01-01 PROCEDURE — 74011250637 HC RX REV CODE- 250/637: Performed by: NURSE PRACTITIONER

## 2021-01-01 PROCEDURE — 74011000250 HC RX REV CODE- 250: Performed by: NURSE PRACTITIONER

## 2021-01-01 PROCEDURE — 82962 GLUCOSE BLOOD TEST: CPT

## 2021-01-01 PROCEDURE — 85025 COMPLETE CBC W/AUTO DIFF WBC: CPT

## 2021-01-01 PROCEDURE — 74011250636 HC RX REV CODE- 250/636: Performed by: STUDENT IN AN ORGANIZED HEALTH CARE EDUCATION/TRAINING PROGRAM

## 2021-01-01 PROCEDURE — 36600 WITHDRAWAL OF ARTERIAL BLOOD: CPT

## 2021-01-01 PROCEDURE — 82330 ASSAY OF CALCIUM: CPT

## 2021-01-01 PROCEDURE — 83605 ASSAY OF LACTIC ACID: CPT

## 2021-01-01 PROCEDURE — 36415 COLL VENOUS BLD VENIPUNCTURE: CPT

## 2021-01-01 PROCEDURE — 85384 FIBRINOGEN ACTIVITY: CPT

## 2021-01-01 PROCEDURE — 74011636637 HC RX REV CODE- 636/637: Performed by: HOSPITALIST

## 2021-01-01 PROCEDURE — C9113 INJ PANTOPRAZOLE SODIUM, VIA: HCPCS | Performed by: INTERNAL MEDICINE

## 2021-01-01 PROCEDURE — 82803 BLOOD GASES ANY COMBINATION: CPT

## 2021-01-01 PROCEDURE — 94002 VENT MGMT INPAT INIT DAY: CPT

## 2021-01-01 PROCEDURE — 80053 COMPREHEN METABOLIC PANEL: CPT

## 2021-01-01 PROCEDURE — 99231 SBSQ HOSP IP/OBS SF/LOW 25: CPT | Performed by: CLINICAL NURSE SPECIALIST

## 2021-01-01 PROCEDURE — 77030020365 HC SOL INJ SOD CL 0.9% 50ML

## 2021-01-01 PROCEDURE — 84145 PROCALCITONIN (PCT): CPT

## 2021-01-01 PROCEDURE — 74011636637 HC RX REV CODE- 636/637: Performed by: EMERGENCY MEDICINE

## 2021-01-01 PROCEDURE — 83036 HEMOGLOBIN GLYCOSYLATED A1C: CPT

## 2021-01-01 PROCEDURE — 96375 TX/PRO/DX INJ NEW DRUG ADDON: CPT

## 2021-01-01 PROCEDURE — 71045 X-RAY EXAM CHEST 1 VIEW: CPT

## 2021-01-01 PROCEDURE — 74011636637 HC RX REV CODE- 636/637: Performed by: INTERNAL MEDICINE

## 2021-01-01 PROCEDURE — 86140 C-REACTIVE PROTEIN: CPT

## 2021-01-01 PROCEDURE — 81001 URINALYSIS AUTO W/SCOPE: CPT

## 2021-01-01 PROCEDURE — 87086 URINE CULTURE/COLONY COUNT: CPT

## 2021-01-01 PROCEDURE — 85730 THROMBOPLASTIN TIME PARTIAL: CPT

## 2021-01-01 PROCEDURE — 36573 INSJ PICC RS&I 5 YR+: CPT | Performed by: NURSE PRACTITIONER

## 2021-01-01 PROCEDURE — 74011250636 HC RX REV CODE- 250/636: Performed by: EMERGENCY MEDICINE

## 2021-01-01 PROCEDURE — 74011250637 HC RX REV CODE- 250/637: Performed by: EMERGENCY MEDICINE

## 2021-01-01 PROCEDURE — 93970 EXTREMITY STUDY: CPT

## 2021-01-01 PROCEDURE — 74011000636 HC RX REV CODE- 636: Performed by: RADIOLOGY

## 2021-01-01 PROCEDURE — 80202 ASSAY OF VANCOMYCIN: CPT

## 2021-01-01 PROCEDURE — 74011250636 HC RX REV CODE- 250/636

## 2021-01-01 PROCEDURE — 83735 ASSAY OF MAGNESIUM: CPT

## 2021-01-01 PROCEDURE — 74011000258 HC RX REV CODE- 258: Performed by: EMERGENCY MEDICINE

## 2021-01-01 PROCEDURE — 77010033711 HC HIGH FLOW OXYGEN

## 2021-01-01 PROCEDURE — 74011250637 HC RX REV CODE- 250/637: Performed by: STUDENT IN AN ORGANIZED HEALTH CARE EDUCATION/TRAINING PROGRAM

## 2021-01-01 PROCEDURE — 83520 IMMUNOASSAY QUANT NOS NONAB: CPT

## 2021-01-01 PROCEDURE — 82728 ASSAY OF FERRITIN: CPT

## 2021-01-01 PROCEDURE — 83880 ASSAY OF NATRIURETIC PEPTIDE: CPT

## 2021-01-01 PROCEDURE — 84100 ASSAY OF PHOSPHORUS: CPT

## 2021-01-01 PROCEDURE — 74011250636 HC RX REV CODE- 250/636: Performed by: FAMILY MEDICINE

## 2021-01-01 PROCEDURE — 74011000250 HC RX REV CODE- 250: Performed by: EMERGENCY MEDICINE

## 2021-01-01 PROCEDURE — 82306 VITAMIN D 25 HYDROXY: CPT

## 2021-01-01 PROCEDURE — 77030005402 HC CATH RAD ART LN KT TELE -B

## 2021-01-01 PROCEDURE — 74011636637 HC RX REV CODE- 636/637: Performed by: FAMILY MEDICINE

## 2021-01-01 PROCEDURE — U0005 INFEC AGEN DETEC AMPLI PROBE: HCPCS

## 2021-01-01 PROCEDURE — 83615 LACTATE (LD) (LDH) ENZYME: CPT

## 2021-01-01 PROCEDURE — 86141 C-REACTIVE PROTEIN HS: CPT

## 2021-01-01 PROCEDURE — 74011000250 HC RX REV CODE- 250: Performed by: FAMILY MEDICINE

## 2021-01-01 PROCEDURE — P9045 ALBUMIN (HUMAN), 5%, 250 ML: HCPCS | Performed by: EMERGENCY MEDICINE

## 2021-01-01 PROCEDURE — 87186 SC STD MICRODIL/AGAR DIL: CPT

## 2021-01-01 PROCEDURE — 74011250637 HC RX REV CODE- 250/637: Performed by: INTERNAL MEDICINE

## 2021-01-01 PROCEDURE — 74011250637 HC RX REV CODE- 250/637: Performed by: FAMILY MEDICINE

## 2021-01-01 PROCEDURE — 87070 CULTURE OTHR SPECIMN AEROBIC: CPT

## 2021-01-01 PROCEDURE — 74011000250 HC RX REV CODE- 250

## 2021-01-01 PROCEDURE — 0BH17EZ INSERTION OF ENDOTRACHEAL AIRWAY INTO TRACHEA, VIA NATURAL OR ARTIFICIAL OPENING: ICD-10-PCS | Performed by: FAMILY MEDICINE

## 2021-01-01 PROCEDURE — 02HV33Z INSERTION OF INFUSION DEVICE INTO SUPERIOR VENA CAVA, PERCUTANEOUS APPROACH: ICD-10-PCS | Performed by: EMERGENCY MEDICINE

## 2021-01-01 PROCEDURE — 74011250636 HC RX REV CODE- 250/636: Performed by: ANESTHESIOLOGY

## 2021-01-01 PROCEDURE — 84478 ASSAY OF TRIGLYCERIDES: CPT

## 2021-01-01 PROCEDURE — 87077 CULTURE AEROBIC IDENTIFY: CPT

## 2021-01-01 PROCEDURE — 5A1955Z RESPIRATORY VENTILATION, GREATER THAN 96 CONSECUTIVE HOURS: ICD-10-PCS | Performed by: INTERNAL MEDICINE

## 2021-01-01 PROCEDURE — 94762 N-INVAS EAR/PLS OXIMTRY CONT: CPT

## 2021-01-01 PROCEDURE — 3E0333Z INTRODUCTION OF ANTI-INFLAMMATORY INTO PERIPHERAL VEIN, PERCUTANEOUS APPROACH: ICD-10-PCS | Performed by: EMERGENCY MEDICINE

## 2021-01-01 PROCEDURE — 77030020847 HC STATLOK BARD -A

## 2021-01-01 PROCEDURE — 77010033678 HC OXYGEN DAILY

## 2021-01-01 PROCEDURE — 70450 CT HEAD/BRAIN W/O DYE: CPT

## 2021-01-01 PROCEDURE — P9047 ALBUMIN (HUMAN), 25%, 50ML: HCPCS | Performed by: FAMILY MEDICINE

## 2021-01-01 PROCEDURE — 80048 BASIC METABOLIC PNL TOTAL CA: CPT

## 2021-01-01 PROCEDURE — 03HB33Z INSERTION OF INFUSION DEVICE INTO RIGHT RADIAL ARTERY, PERCUTANEOUS APPROACH: ICD-10-PCS | Performed by: EMERGENCY MEDICINE

## 2021-01-01 PROCEDURE — A4217 STERILE WATER/SALINE, 500 ML: HCPCS

## 2021-01-01 PROCEDURE — 74178 CT ABD&PLV WO CNTR FLWD CNTR: CPT

## 2021-01-01 PROCEDURE — 77030037878 HC DRSG MEPILEX >48IN BORD MOLN -B

## 2021-01-01 PROCEDURE — 86803 HEPATITIS C AB TEST: CPT

## 2021-01-01 PROCEDURE — 84132 ASSAY OF SERUM POTASSIUM: CPT

## 2021-01-01 PROCEDURE — 77030018798 HC PMP KT ENTRL FED COVD -A

## 2021-01-01 PROCEDURE — 74011000258 HC RX REV CODE- 258: Performed by: INTERNAL MEDICINE

## 2021-01-01 PROCEDURE — 74018 RADEX ABDOMEN 1 VIEW: CPT

## 2021-01-01 PROCEDURE — 74011000258 HC RX REV CODE- 258

## 2021-01-01 PROCEDURE — 74011000258 HC RX REV CODE- 258: Performed by: FAMILY MEDICINE

## 2021-01-01 PROCEDURE — C1751 CATH, INF, PER/CENT/MIDLINE: HCPCS

## 2021-01-01 PROCEDURE — 87040 BLOOD CULTURE FOR BACTERIA: CPT

## 2021-01-01 PROCEDURE — 31500 INSERT EMERGENCY AIRWAY: CPT

## 2021-01-01 PROCEDURE — 77030020291 HC FLEXSEAL FMS BMS -C

## 2021-01-01 PROCEDURE — XW033H5 INTRODUCTION OF TOCILIZUMAB INTO PERIPHERAL VEIN, PERCUTANEOUS APPROACH, NEW TECHNOLOGY GROUP 5: ICD-10-PCS | Performed by: INTERNAL MEDICINE

## 2021-01-01 PROCEDURE — 87449 NOS EACH ORGANISM AG IA: CPT

## 2021-01-01 PROCEDURE — 86704 HEP B CORE ANTIBODY TOTAL: CPT

## 2021-01-01 PROCEDURE — 93306 TTE W/DOPPLER COMPLETE: CPT

## 2021-01-01 PROCEDURE — 80061 LIPID PANEL: CPT

## 2021-01-01 PROCEDURE — 74011000250 HC RX REV CODE- 250: Performed by: INTERNAL MEDICINE

## 2021-01-01 PROCEDURE — 86480 TB TEST CELL IMMUN MEASURE: CPT

## 2021-01-01 PROCEDURE — 74011000250 HC RX REV CODE- 250: Performed by: NURSE ANESTHETIST, CERTIFIED REGISTERED

## 2021-01-01 PROCEDURE — 87340 HEPATITIS B SURFACE AG IA: CPT

## 2021-01-01 PROCEDURE — 82550 ASSAY OF CK (CPK): CPT

## 2021-01-01 PROCEDURE — 65620000000 HC RM CCU GENERAL

## 2021-01-01 PROCEDURE — 03HC33Z INSERTION OF INFUSION DEVICE INTO LEFT RADIAL ARTERY, PERCUTANEOUS APPROACH: ICD-10-PCS | Performed by: NURSE PRACTITIONER

## 2021-01-01 PROCEDURE — 86706 HEP B SURFACE ANTIBODY: CPT

## 2021-01-01 PROCEDURE — P9045 ALBUMIN (HUMAN), 5%, 250 ML: HCPCS | Performed by: NURSE PRACTITIONER

## 2021-01-01 PROCEDURE — 84484 ASSAY OF TROPONIN QUANT: CPT

## 2021-01-01 PROCEDURE — 86738 MYCOPLASMA ANTIBODY: CPT

## 2021-01-01 PROCEDURE — 77030037877 HC DRSG MEPILEX >48IN BORD MOLN -A

## 2021-01-01 PROCEDURE — 76937 US GUIDE VASCULAR ACCESS: CPT

## 2021-01-01 PROCEDURE — 96372 THER/PROPH/DIAG INJ SC/IM: CPT

## 2021-01-01 PROCEDURE — 77030008771 HC TU NG SALEM SUMP -A

## 2021-01-01 PROCEDURE — 99233 SBSQ HOSP IP/OBS HIGH 50: CPT | Performed by: CLINICAL NURSE SPECIALIST

## 2021-01-01 PROCEDURE — 74011250636 HC RX REV CODE- 250/636: Performed by: NURSE ANESTHETIST, CERTIFIED REGISTERED

## 2021-01-01 PROCEDURE — 82553 CREATINE MB FRACTION: CPT

## 2021-01-01 PROCEDURE — 99285 EMERGENCY DEPT VISIT HI MDM: CPT

## 2021-01-01 PROCEDURE — 93005 ELECTROCARDIOGRAM TRACING: CPT

## 2021-01-01 PROCEDURE — 0BH17EZ INSERTION OF ENDOTRACHEAL AIRWAY INTO TRACHEA, VIA NATURAL OR ARTIFICIAL OPENING: ICD-10-PCS | Performed by: INTERNAL MEDICINE

## 2021-01-01 PROCEDURE — 71275 CT ANGIOGRAPHY CHEST: CPT

## 2021-01-01 PROCEDURE — 81003 URINALYSIS AUTO W/O SCOPE: CPT

## 2021-01-01 PROCEDURE — 87635 SARS-COV-2 COVID-19 AMP PRB: CPT

## 2021-01-01 PROCEDURE — 96374 THER/PROPH/DIAG INJ IV PUSH: CPT

## 2021-01-01 PROCEDURE — 77030013797 HC KT TRNSDUC PRSSR EDWD -A

## 2021-01-01 PROCEDURE — 0202U NFCT DS 22 TRGT SARS-COV-2: CPT

## 2021-01-01 RX ORDER — FUROSEMIDE 10 MG/ML
60 INJECTION INTRAMUSCULAR; INTRAVENOUS ONCE
Status: COMPLETED | OUTPATIENT
Start: 2021-01-01 | End: 2021-01-01

## 2021-01-01 RX ORDER — POTASSIUM CHLORIDE 29.8 MG/ML
20 INJECTION INTRAVENOUS
Status: COMPLETED | OUTPATIENT
Start: 2021-01-01 | End: 2021-01-01

## 2021-01-01 RX ORDER — INSULIN GLARGINE 100 [IU]/ML
5 INJECTION, SOLUTION SUBCUTANEOUS ONCE
Status: COMPLETED | OUTPATIENT
Start: 2021-01-01 | End: 2021-01-01

## 2021-01-01 RX ORDER — ATORVASTATIN CALCIUM 20 MG/1
10 TABLET, FILM COATED ORAL
Status: DISCONTINUED | OUTPATIENT
Start: 2021-01-01 | End: 2021-01-01

## 2021-01-01 RX ORDER — CHLORHEXIDINE GLUCONATE 1.2 MG/ML
15 RINSE ORAL EVERY 12 HOURS
Status: DISCONTINUED | OUTPATIENT
Start: 2021-01-01 | End: 2022-01-01 | Stop reason: HOSPADM

## 2021-01-01 RX ORDER — LIDOCAINE 4 G/100G
1 PATCH TOPICAL EVERY 24 HOURS
Status: DISCONTINUED | OUTPATIENT
Start: 2021-01-01 | End: 2021-01-01 | Stop reason: HOSPADM

## 2021-01-01 RX ORDER — CHLORHEXIDINE GLUCONATE 1.2 MG/ML
15 RINSE ORAL EVERY 12 HOURS
Status: DISCONTINUED | OUTPATIENT
Start: 2021-01-01 | End: 2021-01-01 | Stop reason: HOSPADM

## 2021-01-01 RX ORDER — FLUCONAZOLE 2 MG/ML
400 INJECTION, SOLUTION INTRAVENOUS EVERY 24 HOURS
Status: DISCONTINUED | OUTPATIENT
Start: 2021-01-01 | End: 2021-01-01

## 2021-01-01 RX ORDER — FAMOTIDINE 40 MG/5ML
20 POWDER, FOR SUSPENSION ORAL EVERY 12 HOURS
Status: DISCONTINUED | OUTPATIENT
Start: 2021-01-01 | End: 2022-01-01 | Stop reason: HOSPADM

## 2021-01-01 RX ORDER — SUCCINYLCHOLINE CHLORIDE 100 MG/5ML
SYRINGE (ML) INTRAVENOUS AS NEEDED
Status: SHIPPED | OUTPATIENT
Start: 2021-01-01

## 2021-01-01 RX ORDER — METOPROLOL TARTRATE 5 MG/5ML
2.5 INJECTION INTRAVENOUS ONCE
Status: COMPLETED | OUTPATIENT
Start: 2021-01-01 | End: 2021-01-01

## 2021-01-01 RX ORDER — FENTANYL CITRATE 50 UG/ML
50-100 INJECTION, SOLUTION INTRAMUSCULAR; INTRAVENOUS
Status: DISCONTINUED | OUTPATIENT
Start: 2021-01-01 | End: 2022-01-01 | Stop reason: ALTCHOICE

## 2021-01-01 RX ORDER — GLIPIZIDE 10 MG/1
10 TABLET, FILM COATED, EXTENDED RELEASE ORAL DAILY
COMMUNITY
End: 2021-01-01

## 2021-01-01 RX ORDER — ENOXAPARIN SODIUM 100 MG/ML
1 INJECTION SUBCUTANEOUS EVERY 12 HOURS
Status: DISCONTINUED | OUTPATIENT
Start: 2021-01-01 | End: 2021-01-01 | Stop reason: HOSPADM

## 2021-01-01 RX ORDER — INSULIN GLARGINE 100 [IU]/ML
24 INJECTION, SOLUTION SUBCUTANEOUS
Qty: 1 ML | Refills: 0 | Status: SHIPPED | OUTPATIENT
Start: 2021-01-01

## 2021-01-01 RX ORDER — MIDAZOLAM IN 0.9 % SOD.CHLORID 1 MG/ML
0-10 PLASTIC BAG, INJECTION (ML) INTRAVENOUS
Status: DISCONTINUED | OUTPATIENT
Start: 2021-01-01 | End: 2021-01-01 | Stop reason: HOSPADM

## 2021-01-01 RX ORDER — SODIUM CHLORIDE 9 MG/ML
5 INJECTION, SOLUTION INTRAVENOUS CONTINUOUS
Status: DISCONTINUED | OUTPATIENT
Start: 2021-01-01 | End: 2021-01-01

## 2021-01-01 RX ORDER — INSULIN GLARGINE 100 [IU]/ML
50 INJECTION, SOLUTION SUBCUTANEOUS DAILY
Status: DISCONTINUED | OUTPATIENT
Start: 2021-01-01 | End: 2021-01-01

## 2021-01-01 RX ORDER — POLYETHYLENE GLYCOL 3350 17 G/17G
17 POWDER, FOR SOLUTION ORAL DAILY
Status: DISCONTINUED | OUTPATIENT
Start: 2021-01-01 | End: 2021-01-01

## 2021-01-01 RX ORDER — ROCURONIUM BROMIDE 10 MG/ML
INJECTION, SOLUTION INTRAVENOUS
Status: COMPLETED
Start: 2021-01-01 | End: 2021-01-01

## 2021-01-01 RX ORDER — MIDODRINE HYDROCHLORIDE 5 MG/1
20 TABLET ORAL EVERY 8 HOURS
Status: DISCONTINUED | OUTPATIENT
Start: 2021-01-01 | End: 2022-01-01

## 2021-01-01 RX ORDER — ASCORBIC ACID 500 MG
500 TABLET ORAL DAILY
Status: DISCONTINUED | OUTPATIENT
Start: 2021-01-01 | End: 2022-01-01 | Stop reason: HOSPADM

## 2021-01-01 RX ORDER — HEPARIN SODIUM 1000 [USP'U]/ML
40 INJECTION, SOLUTION INTRAVENOUS; SUBCUTANEOUS ONCE
Status: COMPLETED | OUTPATIENT
Start: 2021-01-01 | End: 2021-01-01

## 2021-01-01 RX ORDER — NOREPINEPHRINE BIT/0.9 % NACL 8 MG/250ML
.5-16 INFUSION BOTTLE (ML) INTRAVENOUS
Qty: 1 ML | Refills: 0 | Status: SHIPPED
Start: 2021-01-01

## 2021-01-01 RX ORDER — MIDAZOLAM HYDROCHLORIDE 1 MG/ML
5 INJECTION, SOLUTION INTRAMUSCULAR; INTRAVENOUS ONCE
Status: COMPLETED | OUTPATIENT
Start: 2021-01-01 | End: 2021-01-01

## 2021-01-01 RX ORDER — ROCURONIUM BROMIDE 10 MG/ML
INJECTION, SOLUTION INTRAVENOUS
Status: DISPENSED
Start: 2021-01-01 | End: 2021-01-01

## 2021-01-01 RX ORDER — CALCIUM GLUCONATE 20 MG/ML
2 INJECTION, SOLUTION INTRAVENOUS ONCE
Status: COMPLETED | OUTPATIENT
Start: 2021-01-01 | End: 2021-01-01

## 2021-01-01 RX ORDER — INSULIN GLARGINE 100 [IU]/ML
5 INJECTION, SOLUTION SUBCUTANEOUS
Status: DISCONTINUED | OUTPATIENT
Start: 2021-01-01 | End: 2021-01-01

## 2021-01-01 RX ORDER — INSULIN LISPRO 100 [IU]/ML
INJECTION, SOLUTION INTRAVENOUS; SUBCUTANEOUS EVERY 6 HOURS
Status: DISCONTINUED | OUTPATIENT
Start: 2021-01-01 | End: 2021-01-01

## 2021-01-01 RX ORDER — FUROSEMIDE 10 MG/ML
40 INJECTION INTRAMUSCULAR; INTRAVENOUS ONCE
Status: COMPLETED | OUTPATIENT
Start: 2021-01-01 | End: 2021-01-01

## 2021-01-01 RX ORDER — SODIUM CHLORIDE 9 MG/ML
3 INJECTION, SOLUTION INTRAVENOUS CONTINUOUS
Status: DISCONTINUED | OUTPATIENT
Start: 2021-01-01 | End: 2021-01-01

## 2021-01-01 RX ORDER — ATORVASTATIN CALCIUM 20 MG/1
20 TABLET, FILM COATED ORAL DAILY
COMMUNITY

## 2021-01-01 RX ORDER — MIDAZOLAM HYDROCHLORIDE 1 MG/ML
INJECTION, SOLUTION INTRAMUSCULAR; INTRAVENOUS
Status: COMPLETED
Start: 2021-01-01 | End: 2021-01-01

## 2021-01-01 RX ORDER — DEXTROSE 50 % IN WATER (D50W) INTRAVENOUS SYRINGE
12.5-25 AS NEEDED
Status: DISCONTINUED | OUTPATIENT
Start: 2021-01-01 | End: 2021-01-01 | Stop reason: SDUPTHER

## 2021-01-01 RX ORDER — MAGNESIUM SULFATE 1 G/100ML
1 INJECTION INTRAVENOUS ONCE
Status: COMPLETED | OUTPATIENT
Start: 2021-01-01 | End: 2021-01-01

## 2021-01-01 RX ORDER — PHENYLEPHRINE HCL IN 0.9% NACL 0.4MG/10ML
200 SYRINGE (ML) INTRAVENOUS ONCE
Status: COMPLETED | OUTPATIENT
Start: 2021-01-01 | End: 2021-01-01

## 2021-01-01 RX ORDER — NOREPINEPHRINE BITARTRATE/D5W 8 MG/250ML
.5-3 PLASTIC BAG, INJECTION (ML) INTRAVENOUS
Status: DISCONTINUED | OUTPATIENT
Start: 2021-01-01 | End: 2022-01-01

## 2021-01-01 RX ORDER — BALSAM PERU/CASTOR OIL
OINTMENT (GRAM) TOPICAL 2 TIMES DAILY
Status: DISCONTINUED | OUTPATIENT
Start: 2021-01-01 | End: 2022-01-01 | Stop reason: HOSPADM

## 2021-01-01 RX ORDER — INSULIN LISPRO 100 [IU]/ML
5 INJECTION, SOLUTION INTRAVENOUS; SUBCUTANEOUS
Status: DISCONTINUED | OUTPATIENT
Start: 2021-01-01 | End: 2021-01-01

## 2021-01-01 RX ORDER — ACETAMINOPHEN 325 MG/1
650 TABLET ORAL
Status: DISCONTINUED | OUTPATIENT
Start: 2021-01-01 | End: 2021-01-01 | Stop reason: HOSPADM

## 2021-01-01 RX ORDER — DOCUSATE SODIUM 50 MG/5ML
100 LIQUID ORAL DAILY
Status: DISCONTINUED | OUTPATIENT
Start: 2021-01-01 | End: 2021-01-01

## 2021-01-01 RX ORDER — ENOXAPARIN SODIUM 100 MG/ML
1 INJECTION SUBCUTANEOUS EVERY 12 HOURS
Qty: 1 EACH | Refills: 0 | Status: SHIPPED | OUTPATIENT
Start: 2021-01-01

## 2021-01-01 RX ORDER — HEPARIN SODIUM 1000 [USP'U]/ML
80 INJECTION, SOLUTION INTRAVENOUS; SUBCUTANEOUS ONCE
Status: COMPLETED | OUTPATIENT
Start: 2021-01-01 | End: 2021-01-01

## 2021-01-01 RX ORDER — NOREPINEPHRINE BITARTRATE/D5W 8 MG/250ML
.5-3 PLASTIC BAG, INJECTION (ML) INTRAVENOUS
Status: DISCONTINUED | OUTPATIENT
Start: 2021-01-01 | End: 2021-01-01

## 2021-01-01 RX ORDER — MELATONIN
6000 DAILY
Status: DISCONTINUED | OUTPATIENT
Start: 2021-01-01 | End: 2021-01-01

## 2021-01-01 RX ORDER — DEXAMETHASONE SODIUM PHOSPHATE 100 MG/10ML
6 INJECTION INTRAMUSCULAR; INTRAVENOUS EVERY 24 HOURS
Status: DISCONTINUED | OUTPATIENT
Start: 2021-01-01 | End: 2021-01-01

## 2021-01-01 RX ORDER — GUAIFENESIN 100 MG/5ML
400 SOLUTION ORAL EVERY 8 HOURS
Status: DISCONTINUED | OUTPATIENT
Start: 2021-01-01 | End: 2022-01-01

## 2021-01-01 RX ORDER — NOREPINEPHRINE BIT/0.9 % NACL 8 MG/250ML
.5-16 INFUSION BOTTLE (ML) INTRAVENOUS
Status: DISCONTINUED | OUTPATIENT
Start: 2021-01-01 | End: 2021-01-01

## 2021-01-01 RX ORDER — DOCUSATE SODIUM 50 MG/5ML
100 LIQUID ORAL 2 TIMES DAILY
Status: DISCONTINUED | OUTPATIENT
Start: 2021-01-01 | End: 2021-01-01

## 2021-01-01 RX ORDER — GUAIFENESIN/DEXTROMETHORPHAN 100-10MG/5
5 SYRUP ORAL
Status: DISCONTINUED | OUTPATIENT
Start: 2021-01-01 | End: 2021-01-01 | Stop reason: HOSPADM

## 2021-01-01 RX ORDER — MIDAZOLAM HYDROCHLORIDE 1 MG/ML
2 INJECTION, SOLUTION INTRAMUSCULAR; INTRAVENOUS ONCE
Status: COMPLETED | OUTPATIENT
Start: 2021-01-01 | End: 2021-01-01

## 2021-01-01 RX ORDER — POTASSIUM CHLORIDE 29.8 MG/ML
20 INJECTION INTRAVENOUS ONCE
Status: COMPLETED | OUTPATIENT
Start: 2021-01-01 | End: 2021-01-01

## 2021-01-01 RX ORDER — VANCOMYCIN 2 GRAM/500 ML IN 0.9 % SODIUM CHLORIDE INTRAVENOUS
2000 ONCE
Status: COMPLETED | OUTPATIENT
Start: 2021-01-01 | End: 2021-01-01

## 2021-01-01 RX ORDER — QUETIAPINE FUMARATE 25 MG/1
25 TABLET, FILM COATED ORAL 3 TIMES DAILY
Status: DISCONTINUED | OUTPATIENT
Start: 2021-01-01 | End: 2021-01-01

## 2021-01-01 RX ORDER — DEXMEDETOMIDINE HYDROCHLORIDE 4 UG/ML
.1-1.5 INJECTION, SOLUTION INTRAVENOUS
Status: DISCONTINUED | OUTPATIENT
Start: 2021-01-01 | End: 2022-01-01

## 2021-01-01 RX ORDER — INSULIN GLARGINE 100 [IU]/ML
10 INJECTION, SOLUTION SUBCUTANEOUS EVERY 12 HOURS
Status: DISCONTINUED | OUTPATIENT
Start: 2021-01-01 | End: 2021-01-01

## 2021-01-01 RX ORDER — MIDODRINE HYDROCHLORIDE 5 MG/1
15 TABLET ORAL EVERY 8 HOURS
Status: DISCONTINUED | OUTPATIENT
Start: 2021-01-01 | End: 2021-01-01

## 2021-01-01 RX ORDER — MIDAZOLAM IN 0.9 % SOD.CHLORID 1 MG/ML
0-10 PLASTIC BAG, INJECTION (ML) INTRAVENOUS
Status: DISCONTINUED | OUTPATIENT
Start: 2021-01-01 | End: 2021-01-01

## 2021-01-01 RX ORDER — PROPOFOL 10 MG/ML
INJECTION, EMULSION INTRAVENOUS AS NEEDED
Status: SHIPPED | OUTPATIENT
Start: 2021-01-01

## 2021-01-01 RX ORDER — ENOXAPARIN SODIUM 100 MG/ML
30 INJECTION SUBCUTANEOUS EVERY 12 HOURS
Status: DISCONTINUED | OUTPATIENT
Start: 2021-01-01 | End: 2021-01-01

## 2021-01-01 RX ORDER — THERA TABS 400 MCG
1 TAB ORAL DAILY
Status: DISCONTINUED | OUTPATIENT
Start: 2021-01-01 | End: 2022-01-01 | Stop reason: HOSPADM

## 2021-01-01 RX ORDER — MIDODRINE HYDROCHLORIDE 5 MG/1
10 TABLET ORAL EVERY 8 HOURS
Status: DISCONTINUED | OUTPATIENT
Start: 2021-01-01 | End: 2021-01-01

## 2021-01-01 RX ORDER — ALBUMIN HUMAN 50 G/1000ML
25 SOLUTION INTRAVENOUS ONCE
Status: COMPLETED | OUTPATIENT
Start: 2021-01-01 | End: 2021-01-01

## 2021-01-01 RX ORDER — DEXAMETHASONE SODIUM PHOSPHATE 4 MG/ML
10 INJECTION, SOLUTION INTRA-ARTICULAR; INTRALESIONAL; INTRAMUSCULAR; INTRAVENOUS; SOFT TISSUE EVERY 24 HOURS
Status: DISCONTINUED | OUTPATIENT
Start: 2021-01-01 | End: 2021-01-01

## 2021-01-01 RX ORDER — BUMETANIDE 0.25 MG/ML
2 INJECTION INTRAMUSCULAR; INTRAVENOUS ONCE
Status: COMPLETED | OUTPATIENT
Start: 2021-01-01 | End: 2021-01-01

## 2021-01-01 RX ORDER — MELATONIN
2000 DAILY
Status: DISCONTINUED | OUTPATIENT
Start: 2021-01-01 | End: 2021-01-01 | Stop reason: HOSPADM

## 2021-01-01 RX ORDER — MIDAZOLAM HYDROCHLORIDE 1 MG/ML
8 INJECTION, SOLUTION INTRAMUSCULAR; INTRAVENOUS ONCE
Status: COMPLETED | OUTPATIENT
Start: 2021-01-01 | End: 2021-01-01

## 2021-01-01 RX ORDER — DIAZEPAM 5 MG/1
5 TABLET ORAL ONCE
Status: COMPLETED | OUTPATIENT
Start: 2021-01-01 | End: 2021-01-01

## 2021-01-01 RX ORDER — ALBUMIN HUMAN 250 G/1000ML
25 SOLUTION INTRAVENOUS EVERY 6 HOURS
Status: DISCONTINUED | OUTPATIENT
Start: 2021-01-01 | End: 2021-01-01

## 2021-01-01 RX ORDER — DEXAMETHASONE SODIUM PHOSPHATE 4 MG/ML
20 INJECTION, SOLUTION INTRA-ARTICULAR; INTRALESIONAL; INTRAMUSCULAR; INTRAVENOUS; SOFT TISSUE EVERY 24 HOURS
Status: DISCONTINUED | OUTPATIENT
Start: 2021-01-01 | End: 2021-01-01

## 2021-01-01 RX ORDER — DEXAMETHASONE SODIUM PHOSPHATE 4 MG/ML
5 INJECTION, SOLUTION INTRA-ARTICULAR; INTRALESIONAL; INTRAMUSCULAR; INTRAVENOUS; SOFT TISSUE EVERY 24 HOURS
Status: COMPLETED | OUTPATIENT
Start: 2021-01-01 | End: 2021-01-01

## 2021-01-01 RX ORDER — INSULIN LISPRO 100 [IU]/ML
INJECTION, SOLUTION INTRAVENOUS; SUBCUTANEOUS
Status: DISCONTINUED | OUTPATIENT
Start: 2021-01-01 | End: 2021-01-01

## 2021-01-01 RX ORDER — MIDAZOLAM IN 0.9 % SOD.CHLORID 1 MG/ML
0-20 PLASTIC BAG, INJECTION (ML) INTRAVENOUS
Status: DISCONTINUED | OUTPATIENT
Start: 2021-01-01 | End: 2022-01-01 | Stop reason: HOSPADM

## 2021-01-01 RX ORDER — KETAMINE HYDROCHLORIDE 10 MG/ML
INJECTION, SOLUTION INTRAMUSCULAR; INTRAVENOUS
Status: DISPENSED
Start: 2021-01-01 | End: 2021-01-01

## 2021-01-01 RX ORDER — TRIPROLIDINE/PSEUDOEPHEDRINE 2.5MG-60MG
400 TABLET ORAL
Status: DISCONTINUED | OUTPATIENT
Start: 2021-01-01 | End: 2022-01-01

## 2021-01-01 RX ORDER — MIDAZOLAM HYDROCHLORIDE 1 MG/ML
4 INJECTION, SOLUTION INTRAMUSCULAR; INTRAVENOUS ONCE
Status: ACTIVE | OUTPATIENT
Start: 2021-01-01 | End: 2021-01-01

## 2021-01-01 RX ORDER — HYDROMORPHONE HCL IN 0.9% NACL 15 MG/30ML
0-4 PATIENT CONTROLLED ANALGESIA VIAL INTRAVENOUS
Status: DISCONTINUED | OUTPATIENT
Start: 2021-01-01 | End: 2022-01-01 | Stop reason: SDUPTHER

## 2021-01-01 RX ORDER — FENTANYL CITRATE 50 UG/ML
100 INJECTION, SOLUTION INTRAMUSCULAR; INTRAVENOUS ONCE
Status: COMPLETED | OUTPATIENT
Start: 2021-01-01 | End: 2021-01-01

## 2021-01-01 RX ORDER — INSULIN GLARGINE 100 [IU]/ML
15 INJECTION, SOLUTION SUBCUTANEOUS DAILY
Status: DISCONTINUED | OUTPATIENT
Start: 2021-01-01 | End: 2021-01-01

## 2021-01-01 RX ORDER — TELMISARTAN 80 MG/1
80 TABLET ORAL DAILY
COMMUNITY

## 2021-01-01 RX ORDER — INSULIN LISPRO 100 [IU]/ML
5 INJECTION, SOLUTION INTRAVENOUS; SUBCUTANEOUS EVERY 6 HOURS
Status: DISCONTINUED | OUTPATIENT
Start: 2021-01-01 | End: 2021-01-01

## 2021-01-01 RX ORDER — DEXAMETHASONE SODIUM PHOSPHATE 4 MG/ML
6 INJECTION, SOLUTION INTRA-ARTICULAR; INTRALESIONAL; INTRAMUSCULAR; INTRAVENOUS; SOFT TISSUE EVERY 24 HOURS
Status: DISCONTINUED | OUTPATIENT
Start: 2021-01-01 | End: 2021-01-01

## 2021-01-01 RX ORDER — MIDAZOLAM IN 0.9 % SOD.CHLORID 1 MG/ML
0-10 PLASTIC BAG, INJECTION (ML) INTRAVENOUS
Qty: 1 ML | Refills: 0 | Status: SHIPPED
Start: 2021-01-01

## 2021-01-01 RX ORDER — CALCIUM CARB/MAGNESIUM CARB 311-232MG
5 TABLET ORAL
Qty: 1 TABLET | Refills: 0 | Status: SHIPPED
Start: 2021-01-01

## 2021-01-01 RX ORDER — POLYETHYLENE GLYCOL 3350 17 G/17G
17 POWDER, FOR SOLUTION ORAL DAILY PRN
Status: DISCONTINUED | OUTPATIENT
Start: 2021-01-01 | End: 2022-01-01 | Stop reason: HOSPADM

## 2021-01-01 RX ORDER — INSULIN GLARGINE 100 [IU]/ML
30 INJECTION, SOLUTION SUBCUTANEOUS
Status: DISCONTINUED | OUTPATIENT
Start: 2021-01-01 | End: 2021-01-01

## 2021-01-01 RX ORDER — KETAMINE HYDROCHLORIDE 100 MG/ML
100 INJECTION, SOLUTION INTRAMUSCULAR; INTRAVENOUS ONCE
Status: DISCONTINUED | OUTPATIENT
Start: 2021-01-01 | End: 2021-01-01

## 2021-01-01 RX ORDER — MAGNESIUM SULFATE 100 %
4 CRYSTALS MISCELLANEOUS AS NEEDED
Status: DISCONTINUED | OUTPATIENT
Start: 2021-01-01 | End: 2021-01-01 | Stop reason: SDUPTHER

## 2021-01-01 RX ORDER — MIDODRINE HYDROCHLORIDE 5 MG/1
5 TABLET ORAL EVERY 8 HOURS
Status: DISCONTINUED | OUTPATIENT
Start: 2021-01-01 | End: 2021-01-01

## 2021-01-01 RX ORDER — DIAZEPAM 5 MG/1
5 TABLET ORAL EVERY 6 HOURS
Status: DISCONTINUED | OUTPATIENT
Start: 2021-01-01 | End: 2021-01-01

## 2021-01-01 RX ORDER — PROPOFOL 10 MG/ML
INJECTION, EMULSION INTRAVENOUS
Status: COMPLETED
Start: 2021-01-01 | End: 2021-01-01

## 2021-01-01 RX ORDER — LIDOCAINE HYDROCHLORIDE 20 MG/ML
INJECTION, SOLUTION EPIDURAL; INFILTRATION; INTRACAUDAL; PERINEURAL AS NEEDED
Status: SHIPPED | OUTPATIENT
Start: 2021-01-01

## 2021-01-01 RX ORDER — NOREPINEPHRINE BITARTRATE 1 MG/ML
INJECTION, SOLUTION INTRAVENOUS
Status: COMPLETED
Start: 2021-01-01 | End: 2021-01-01

## 2021-01-01 RX ORDER — FUROSEMIDE 10 MG/ML
40 INJECTION INTRAMUSCULAR; INTRAVENOUS 2 TIMES DAILY
Status: DISPENSED | OUTPATIENT
Start: 2021-01-01 | End: 2022-01-01

## 2021-01-01 RX ORDER — METOPROLOL TARTRATE 25 MG/1
12.5 TABLET, FILM COATED ORAL EVERY 12 HOURS
Status: DISCONTINUED | OUTPATIENT
Start: 2021-01-01 | End: 2022-01-01

## 2021-01-01 RX ORDER — DEXTROSE 50 % IN WATER (D50W) INTRAVENOUS SYRINGE
12.5-25 AS NEEDED
Status: DISCONTINUED | OUTPATIENT
Start: 2021-01-01 | End: 2021-01-01

## 2021-01-01 RX ORDER — MAGNESIUM SULFATE 100 %
4 CRYSTALS MISCELLANEOUS AS NEEDED
Status: DISCONTINUED | OUTPATIENT
Start: 2021-01-01 | End: 2022-01-01 | Stop reason: HOSPADM

## 2021-01-01 RX ORDER — AMOXICILLIN 250 MG
1 CAPSULE ORAL DAILY
Status: DISCONTINUED | OUTPATIENT
Start: 2021-01-01 | End: 2022-01-01

## 2021-01-01 RX ORDER — ENOXAPARIN SODIUM 150 MG/ML
1 INJECTION SUBCUTANEOUS EVERY 12 HOURS
Status: DISCONTINUED | OUTPATIENT
Start: 2021-01-01 | End: 2021-01-01

## 2021-01-01 RX ORDER — INSULIN LISPRO 100 [IU]/ML
INJECTION, SOLUTION INTRAVENOUS; SUBCUTANEOUS EVERY 4 HOURS
Status: DISCONTINUED | OUTPATIENT
Start: 2021-01-01 | End: 2021-01-01

## 2021-01-01 RX ORDER — KETAMINE HYDROCHLORIDE 10 MG/ML
50 INJECTION, SOLUTION INTRAMUSCULAR; INTRAVENOUS ONCE
Status: COMPLETED | OUTPATIENT
Start: 2021-01-01 | End: 2021-01-01

## 2021-01-01 RX ORDER — ROCURONIUM BROMIDE 10 MG/ML
50 INJECTION, SOLUTION INTRAVENOUS
Status: COMPLETED | OUTPATIENT
Start: 2021-01-01 | End: 2021-01-01

## 2021-01-01 RX ORDER — INSULIN GLARGINE 100 [IU]/ML
20 INJECTION, SOLUTION SUBCUTANEOUS DAILY
Status: DISCONTINUED | OUTPATIENT
Start: 2021-01-01 | End: 2021-01-01

## 2021-01-01 RX ORDER — METOPROLOL TARTRATE 5 MG/5ML
5 INJECTION INTRAVENOUS ONCE
Status: COMPLETED | OUTPATIENT
Start: 2021-01-01 | End: 2021-01-01

## 2021-01-01 RX ORDER — MIDAZOLAM HYDROCHLORIDE 1 MG/ML
4 INJECTION, SOLUTION INTRAMUSCULAR; INTRAVENOUS ONCE
Status: COMPLETED | OUTPATIENT
Start: 2021-01-01 | End: 2021-01-01

## 2021-01-01 RX ORDER — FUROSEMIDE 10 MG/ML
20 INJECTION INTRAMUSCULAR; INTRAVENOUS ONCE
Status: COMPLETED | OUTPATIENT
Start: 2021-01-01 | End: 2021-01-01

## 2021-01-01 RX ORDER — KETAMINE HYDROCHLORIDE 100 MG/ML
100 INJECTION, SOLUTION INTRAMUSCULAR; INTRAVENOUS ONCE
Status: COMPLETED | OUTPATIENT
Start: 2021-01-01 | End: 2021-01-01

## 2021-01-01 RX ORDER — FENTANYL CITRATE 50 UG/ML
INJECTION, SOLUTION INTRAMUSCULAR; INTRAVENOUS
Status: COMPLETED
Start: 2021-01-01 | End: 2021-01-01

## 2021-01-01 RX ORDER — DEXAMETHASONE SODIUM PHOSPHATE 4 MG/ML
20 INJECTION, SOLUTION INTRA-ARTICULAR; INTRALESIONAL; INTRAMUSCULAR; INTRAVENOUS; SOFT TISSUE EVERY 24 HOURS
Qty: 1 ML | Refills: 0 | Status: SHIPPED
Start: 2021-01-01 | End: 2021-01-01

## 2021-01-01 RX ORDER — DEXAMETHASONE SODIUM PHOSPHATE 4 MG/ML
20 INJECTION, SOLUTION INTRA-ARTICULAR; INTRALESIONAL; INTRAMUSCULAR; INTRAVENOUS; SOFT TISSUE EVERY 24 HOURS
Status: DISCONTINUED | OUTPATIENT
Start: 2021-01-01 | End: 2021-01-01 | Stop reason: HOSPADM

## 2021-01-01 RX ORDER — NOREPINEPHRINE BIT/0.9 % NACL 8 MG/250ML
.5-16 INFUSION BOTTLE (ML) INTRAVENOUS
Status: DISCONTINUED | OUTPATIENT
Start: 2021-01-01 | End: 2021-01-01 | Stop reason: HOSPADM

## 2021-01-01 RX ORDER — METFORMIN HYDROCHLORIDE 500 MG/1
500 TABLET, EXTENDED RELEASE ORAL
COMMUNITY
End: 2021-01-01

## 2021-01-01 RX ORDER — INSULIN LISPRO 100 [IU]/ML
7 INJECTION, SOLUTION INTRAVENOUS; SUBCUTANEOUS
Status: DISCONTINUED | OUTPATIENT
Start: 2021-01-01 | End: 2021-01-01

## 2021-01-01 RX ORDER — FENTANYL CITRATE 50 UG/ML
100 INJECTION, SOLUTION INTRAMUSCULAR; INTRAVENOUS
Status: DISCONTINUED | OUTPATIENT
Start: 2021-01-01 | End: 2022-01-01 | Stop reason: SDUPTHER

## 2021-01-01 RX ORDER — NOREPINEPHRINE BITARTRATE/D5W 8 MG/250ML
.5-16 PLASTIC BAG, INJECTION (ML) INTRAVENOUS
Status: DISCONTINUED | OUTPATIENT
Start: 2021-01-01 | End: 2021-01-01 | Stop reason: ALTCHOICE

## 2021-01-01 RX ORDER — CALCIUM CARB/MAGNESIUM CARB 311-232MG
5 TABLET ORAL
Status: DISCONTINUED | OUTPATIENT
Start: 2021-01-01 | End: 2021-01-01 | Stop reason: HOSPADM

## 2021-01-01 RX ORDER — INSULIN GLARGINE 100 [IU]/ML
60 INJECTION, SOLUTION SUBCUTANEOUS DAILY
Status: DISCONTINUED | OUTPATIENT
Start: 2021-01-01 | End: 2021-01-01

## 2021-01-01 RX ORDER — ASCORBIC ACID 500 MG
500 TABLET ORAL 2 TIMES DAILY
Qty: 1 TABLET | Refills: 0 | Status: SHIPPED
Start: 2021-01-01

## 2021-01-01 RX ORDER — PROPOFOL 10 MG/ML
0-50 VIAL (ML) INTRAVENOUS
Status: DISCONTINUED | OUTPATIENT
Start: 2021-01-01 | End: 2022-01-01

## 2021-01-01 RX ORDER — MAGNESIUM CITRATE
296 SOLUTION, ORAL ORAL
Status: COMPLETED | OUTPATIENT
Start: 2021-01-01 | End: 2021-01-01

## 2021-01-01 RX ORDER — SODIUM CHLORIDE 0.9 % (FLUSH) 0.9 %
5-10 SYRINGE (ML) INJECTION AS NEEDED
Status: DISCONTINUED | OUTPATIENT
Start: 2021-01-01 | End: 2021-01-01 | Stop reason: HOSPADM

## 2021-01-01 RX ORDER — DEXTROSE 50 % IN WATER (D50W) INTRAVENOUS SYRINGE
12.5-25 AS NEEDED
Status: DISCONTINUED | OUTPATIENT
Start: 2021-01-01 | End: 2022-01-01 | Stop reason: HOSPADM

## 2021-01-01 RX ORDER — OXYCODONE HYDROCHLORIDE 5 MG/1
10 TABLET ORAL EVERY 6 HOURS
Status: DISCONTINUED | OUTPATIENT
Start: 2021-01-01 | End: 2022-01-01 | Stop reason: HOSPADM

## 2021-01-01 RX ORDER — PHENYLEPHRINE HCL IN 0.9% NACL 0.4MG/10ML
SYRINGE (ML) INTRAVENOUS
Status: COMPLETED
Start: 2021-01-01 | End: 2021-01-01

## 2021-01-01 RX ORDER — MIDAZOLAM HYDROCHLORIDE 1 MG/ML
5 INJECTION, SOLUTION INTRAMUSCULAR; INTRAVENOUS
Status: DISCONTINUED | OUTPATIENT
Start: 2021-01-01 | End: 2022-01-01 | Stop reason: HOSPADM

## 2021-01-01 RX ORDER — KETAMINE HYDROCHLORIDE 10 MG/ML
100 INJECTION, SOLUTION INTRAMUSCULAR; INTRAVENOUS ONCE
Status: COMPLETED | OUTPATIENT
Start: 2021-01-01 | End: 2021-01-01

## 2021-01-01 RX ORDER — PROPOFOL 10 MG/ML
0-50 VIAL (ML) INTRAVENOUS
Status: DISCONTINUED | OUTPATIENT
Start: 2021-01-01 | End: 2021-01-01

## 2021-01-01 RX ORDER — INSULIN GLARGINE 100 [IU]/ML
20 INJECTION, SOLUTION SUBCUTANEOUS
Status: DISCONTINUED | OUTPATIENT
Start: 2021-01-01 | End: 2021-01-01

## 2021-01-01 RX ORDER — INSULIN LISPRO 100 [IU]/ML
3 INJECTION, SOLUTION INTRAVENOUS; SUBCUTANEOUS EVERY 6 HOURS
Status: DISCONTINUED | OUTPATIENT
Start: 2021-01-01 | End: 2021-01-01

## 2021-01-01 RX ORDER — ACETAMINOPHEN 650 MG/1
650 SUPPOSITORY RECTAL
Status: DISCONTINUED | OUTPATIENT
Start: 2021-01-01 | End: 2021-01-01 | Stop reason: HOSPADM

## 2021-01-01 RX ORDER — INSULIN GLARGINE 100 [IU]/ML
24 INJECTION, SOLUTION SUBCUTANEOUS DAILY
Status: DISCONTINUED | OUTPATIENT
Start: 2021-01-01 | End: 2021-01-01 | Stop reason: HOSPADM

## 2021-01-01 RX ORDER — DIAZEPAM 5 MG/1
10 TABLET ORAL EVERY 6 HOURS
Status: DISCONTINUED | OUTPATIENT
Start: 2021-01-01 | End: 2022-01-01 | Stop reason: HOSPADM

## 2021-01-01 RX ORDER — INSULIN LISPRO 100 [IU]/ML
15 INJECTION, SOLUTION INTRAVENOUS; SUBCUTANEOUS ONCE
Status: COMPLETED | OUTPATIENT
Start: 2021-01-01 | End: 2021-01-01

## 2021-01-01 RX ORDER — SODIUM CHLORIDE 0.9 % (FLUSH) 0.9 %
5-40 SYRINGE (ML) INJECTION EVERY 8 HOURS
Status: DISCONTINUED | OUTPATIENT
Start: 2021-01-01 | End: 2022-01-01 | Stop reason: HOSPADM

## 2021-01-01 RX ORDER — MIDAZOLAM HYDROCHLORIDE 1 MG/ML
10 INJECTION, SOLUTION INTRAMUSCULAR; INTRAVENOUS ONCE
Status: ACTIVE | OUTPATIENT
Start: 2021-01-01 | End: 2021-01-01

## 2021-01-01 RX ORDER — INSULIN LISPRO 100 [IU]/ML
INJECTION, SOLUTION INTRAVENOUS; SUBCUTANEOUS EVERY 6 HOURS
Status: DISCONTINUED | OUTPATIENT
Start: 2021-01-01 | End: 2021-01-01 | Stop reason: HOSPADM

## 2021-01-01 RX ORDER — MAGNESIUM SULFATE 100 %
16 CRYSTALS MISCELLANEOUS AS NEEDED
Status: DISCONTINUED | OUTPATIENT
Start: 2021-01-01 | End: 2021-01-01 | Stop reason: HOSPADM

## 2021-01-01 RX ORDER — ZINC SULFATE 50(220)MG
1 CAPSULE ORAL EVERY 12 HOURS
Status: DISCONTINUED | OUTPATIENT
Start: 2021-01-01 | End: 2021-01-01 | Stop reason: HOSPADM

## 2021-01-01 RX ORDER — DEXAMETHASONE SODIUM PHOSPHATE 4 MG/ML
6 INJECTION, SOLUTION INTRA-ARTICULAR; INTRALESIONAL; INTRAMUSCULAR; INTRAVENOUS; SOFT TISSUE ONCE
Status: COMPLETED | OUTPATIENT
Start: 2021-01-01 | End: 2021-01-01

## 2021-01-01 RX ORDER — MIDAZOLAM HYDROCHLORIDE 1 MG/ML
INJECTION, SOLUTION INTRAMUSCULAR; INTRAVENOUS
Status: DISPENSED
Start: 2021-01-01 | End: 2021-01-01

## 2021-01-01 RX ORDER — ENOXAPARIN SODIUM 100 MG/ML
1 INJECTION SUBCUTANEOUS EVERY 12 HOURS
Status: DISCONTINUED | OUTPATIENT
Start: 2021-01-01 | End: 2022-01-01

## 2021-01-01 RX ORDER — METOCLOPRAMIDE HYDROCHLORIDE 5 MG/ML
5 INJECTION INTRAMUSCULAR; INTRAVENOUS EVERY 6 HOURS
Status: DISPENSED | OUTPATIENT
Start: 2021-01-01 | End: 2021-01-01

## 2021-01-01 RX ORDER — PROPOFOL 10 MG/ML
0-50 VIAL (ML) INTRAVENOUS
Status: DISCONTINUED | OUTPATIENT
Start: 2021-01-01 | End: 2021-01-01 | Stop reason: HOSPADM

## 2021-01-01 RX ORDER — METOCLOPRAMIDE HYDROCHLORIDE 5 MG/ML
5 INJECTION INTRAMUSCULAR; INTRAVENOUS ONCE
Status: COMPLETED | OUTPATIENT
Start: 2021-01-01 | End: 2021-01-01

## 2021-01-01 RX ORDER — DEXTROSE 50 % IN WATER (D50W) INTRAVENOUS SYRINGE
25-50 AS NEEDED
Status: DISCONTINUED | OUTPATIENT
Start: 2021-01-01 | End: 2021-01-01 | Stop reason: HOSPADM

## 2021-01-01 RX ORDER — BACITRACIN 500 UNIT/G
1 PACKET (EA) TOPICAL AS NEEDED
Status: DISCONTINUED | OUTPATIENT
Start: 2021-01-01 | End: 2022-01-01 | Stop reason: HOSPADM

## 2021-01-01 RX ORDER — ATORVASTATIN CALCIUM 20 MG/1
20 TABLET, FILM COATED ORAL DAILY
Status: DISCONTINUED | OUTPATIENT
Start: 2021-01-01 | End: 2021-01-01 | Stop reason: HOSPADM

## 2021-01-01 RX ORDER — QUETIAPINE FUMARATE 25 MG/1
25 TABLET, FILM COATED ORAL 2 TIMES DAILY
Status: DISCONTINUED | OUTPATIENT
Start: 2021-01-01 | End: 2021-01-01

## 2021-01-01 RX ORDER — CODEINE SULFATE 15 MG/1
30 TABLET ORAL
Status: DISCONTINUED | OUTPATIENT
Start: 2021-01-01 | End: 2021-01-01 | Stop reason: HOSPADM

## 2021-01-01 RX ORDER — ALBUMIN HUMAN 50 G/1000ML
12.5 SOLUTION INTRAVENOUS ONCE
Status: COMPLETED | OUTPATIENT
Start: 2021-01-01 | End: 2021-01-01

## 2021-01-01 RX ORDER — ENOXAPARIN SODIUM 100 MG/ML
1 INJECTION SUBCUTANEOUS EVERY 24 HOURS
Status: DISCONTINUED | OUTPATIENT
Start: 2021-01-01 | End: 2021-01-01

## 2021-01-01 RX ORDER — ZINC SULFATE 50(220)MG
1 CAPSULE ORAL DAILY
Status: DISCONTINUED | OUTPATIENT
Start: 2021-01-01 | End: 2021-01-01

## 2021-01-01 RX ORDER — SODIUM CHLORIDE 0.9 % (FLUSH) 0.9 %
5-40 SYRINGE (ML) INJECTION AS NEEDED
Status: DISCONTINUED | OUTPATIENT
Start: 2021-01-01 | End: 2022-01-01 | Stop reason: HOSPADM

## 2021-01-01 RX ORDER — ENOXAPARIN SODIUM 100 MG/ML
1 INJECTION SUBCUTANEOUS
Status: COMPLETED | OUTPATIENT
Start: 2021-01-01 | End: 2021-01-01

## 2021-01-01 RX ORDER — DEXTROSE 50 % IN WATER (D50W) INTRAVENOUS SYRINGE
25-50 AS NEEDED
Status: DISCONTINUED | OUTPATIENT
Start: 2021-01-01 | End: 2021-01-01 | Stop reason: SDUPTHER

## 2021-01-01 RX ORDER — ENOXAPARIN SODIUM 100 MG/ML
1 INJECTION SUBCUTANEOUS EVERY 12 HOURS
Status: DISCONTINUED | OUTPATIENT
Start: 2021-01-01 | End: 2021-01-01 | Stop reason: SDUPTHER

## 2021-01-01 RX ORDER — INSULIN GLARGINE 100 [IU]/ML
10 INJECTION, SOLUTION SUBCUTANEOUS DAILY
Status: DISCONTINUED | OUTPATIENT
Start: 2021-01-01 | End: 2021-01-01

## 2021-01-01 RX ORDER — ENOXAPARIN SODIUM 100 MG/ML
40 INJECTION SUBCUTANEOUS EVERY 12 HOURS
Status: DISCONTINUED | OUTPATIENT
Start: 2021-01-01 | End: 2021-01-01

## 2021-01-01 RX ORDER — BUMETANIDE 0.25 MG/ML
1 INJECTION INTRAMUSCULAR; INTRAVENOUS ONCE
Status: COMPLETED | OUTPATIENT
Start: 2021-01-01 | End: 2021-01-01

## 2021-01-01 RX ORDER — MIDODRINE HYDROCHLORIDE 5 MG/1
20 TABLET ORAL EVERY 8 HOURS
Status: DISCONTINUED | OUTPATIENT
Start: 2021-01-01 | End: 2021-01-01

## 2021-01-01 RX ORDER — ROCURONIUM BROMIDE 10 MG/ML
INJECTION, SOLUTION INTRAVENOUS AS NEEDED
Status: SHIPPED | OUTPATIENT
Start: 2021-01-01

## 2021-01-01 RX ORDER — FUROSEMIDE 10 MG/ML
40 INJECTION INTRAMUSCULAR; INTRAVENOUS DAILY
Status: DISCONTINUED | OUTPATIENT
Start: 2021-01-01 | End: 2021-01-01

## 2021-01-01 RX ORDER — CHOLECALCIFEROL (VITAMIN D3) 10(400)/ML
10 DROPS ORAL DAILY
Status: DISCONTINUED | OUTPATIENT
Start: 2021-01-01 | End: 2021-01-01

## 2021-01-01 RX ORDER — MIDAZOLAM HYDROCHLORIDE 5 MG/ML
INJECTION INTRAMUSCULAR; INTRAVENOUS
Status: COMPLETED
Start: 2021-01-01 | End: 2021-01-01

## 2021-01-01 RX ORDER — ASCORBIC ACID 250 MG
500 TABLET ORAL 2 TIMES DAILY
Status: DISCONTINUED | OUTPATIENT
Start: 2021-01-01 | End: 2021-01-01 | Stop reason: HOSPADM

## 2021-01-01 RX ORDER — QUETIAPINE FUMARATE 25 MG/1
50 TABLET, FILM COATED ORAL 3 TIMES DAILY
Status: DISCONTINUED | OUTPATIENT
Start: 2021-01-01 | End: 2021-01-01

## 2021-01-01 RX ORDER — FENOFIBRATE 145 MG/1
145 TABLET, COATED ORAL DAILY
COMMUNITY
End: 2021-01-01

## 2021-01-01 RX ORDER — ATORVASTATIN CALCIUM 20 MG/1
20 TABLET, FILM COATED ORAL DAILY
Status: DISCONTINUED | OUTPATIENT
Start: 2021-01-01 | End: 2022-01-01 | Stop reason: HOSPADM

## 2021-01-01 RX ORDER — ENOXAPARIN SODIUM 100 MG/ML
40 INJECTION SUBCUTANEOUS EVERY 24 HOURS
Status: DISCONTINUED | OUTPATIENT
Start: 2021-01-01 | End: 2021-01-01

## 2021-01-01 RX ORDER — INSULIN LISPRO 100 [IU]/ML
3 INJECTION, SOLUTION INTRAVENOUS; SUBCUTANEOUS EVERY 6 HOURS
Status: DISCONTINUED | OUTPATIENT
Start: 2021-01-01 | End: 2021-01-01 | Stop reason: HOSPADM

## 2021-01-01 RX ORDER — MELATONIN
2000 DAILY
Qty: 1 TABLET | Refills: 0 | Status: SHIPPED
Start: 2021-01-01

## 2021-01-01 RX ORDER — INSULIN LISPRO 100 [IU]/ML
INJECTION, SOLUTION INTRAVENOUS; SUBCUTANEOUS EVERY 6 HOURS
Status: DISCONTINUED | OUTPATIENT
Start: 2021-01-01 | End: 2022-01-01

## 2021-01-01 RX ORDER — MELATONIN
2000 DAILY
Status: DISCONTINUED | OUTPATIENT
Start: 2021-01-01 | End: 2022-01-01

## 2021-01-01 RX ADMIN — OXYCODONE HYDROCHLORIDE 10 MG: 5 TABLET ORAL at 17:11

## 2021-01-01 RX ADMIN — Medication 2000 UNITS: at 08:54

## 2021-01-01 RX ADMIN — Medication 10 MG/HR: at 21:50

## 2021-01-01 RX ADMIN — Medication 10 UNITS: at 08:30

## 2021-01-01 RX ADMIN — METOCLOPRAMIDE 5 MG: 5 INJECTION, SOLUTION INTRAMUSCULAR; INTRAVENOUS at 18:29

## 2021-01-01 RX ADMIN — Medication 1 PACKET: at 21:07

## 2021-01-01 RX ADMIN — QUETIAPINE FUMARATE 25 MG: 25 TABLET ORAL at 15:00

## 2021-01-01 RX ADMIN — Medication: at 08:38

## 2021-01-01 RX ADMIN — Medication 10 MG/HR: at 06:45

## 2021-01-01 RX ADMIN — ACETAMINOPHEN 650 MG: 160 SOLUTION ORAL at 12:01

## 2021-01-01 RX ADMIN — Medication 300 MCG/HR: at 17:32

## 2021-01-01 RX ADMIN — FENTANYL CITRATE 200 MCG/HR: 50 INJECTION INTRAVENOUS at 21:22

## 2021-01-01 RX ADMIN — ENOXAPARIN SODIUM 30 MG: 100 INJECTION SUBCUTANEOUS at 17:10

## 2021-01-01 RX ADMIN — Medication 300 MCG/HR: at 10:54

## 2021-01-01 RX ADMIN — Medication 5 MG: at 21:53

## 2021-01-01 RX ADMIN — PROPOFOL 50 MCG/KG/MIN: 10 INJECTION, EMULSION INTRAVENOUS at 06:25

## 2021-01-01 RX ADMIN — Medication 2000 UNITS: at 08:53

## 2021-01-01 RX ADMIN — CHLORHEXIDINE GLUCONATE 15 ML: 1.2 RINSE ORAL at 09:47

## 2021-01-01 RX ADMIN — ZINC SULFATE 220 MG (50 MG) CAPSULE 1 CAPSULE: CAPSULE at 09:24

## 2021-01-01 RX ADMIN — DEXMEDETOMIDINE HYDROCHLORIDE 1.5 MCG/KG/HR: 4 INJECTION, SOLUTION INTRAVENOUS at 14:46

## 2021-01-01 RX ADMIN — SODIUM CHLORIDE 2 G: 9 INJECTION INTRAMUSCULAR; INTRAVENOUS; SUBCUTANEOUS at 21:03

## 2021-01-01 RX ADMIN — DOCUSATE SODIUM 50 MG AND SENNOSIDES 8.6 MG 1 TABLET: 8.6; 5 TABLET, FILM COATED ORAL at 09:53

## 2021-01-01 RX ADMIN — Medication 3 UNITS: at 17:18

## 2021-01-01 RX ADMIN — DIAPER RASH SKIN PROTECTENT: at 20:42

## 2021-01-01 RX ADMIN — THERA TABS 1 TABLET: TAB at 08:50

## 2021-01-01 RX ADMIN — Medication 10 MCG: at 13:45

## 2021-01-01 RX ADMIN — Medication 20 MG: at 08:35

## 2021-01-01 RX ADMIN — ATORVASTATIN CALCIUM 20 MG: 20 TABLET, FILM COATED ORAL at 08:15

## 2021-01-01 RX ADMIN — ACETAMINOPHEN 650 MG: 650 SUPPOSITORY RECTAL at 11:09

## 2021-01-01 RX ADMIN — ATORVASTATIN CALCIUM 20 MG: 20 TABLET, FILM COATED ORAL at 08:36

## 2021-01-01 RX ADMIN — Medication 3 UNITS: at 12:21

## 2021-01-01 RX ADMIN — DEXMEDETOMIDINE HYDROCHLORIDE 1.5 MCG/KG/HR: 4 INJECTION, SOLUTION INTRAVENOUS at 04:19

## 2021-01-01 RX ADMIN — ROCURONIUM BROMIDE 50 MG: 10 INJECTION INTRAVENOUS at 07:00

## 2021-01-01 RX ADMIN — Medication 10 ML: at 13:22

## 2021-01-01 RX ADMIN — OXYCODONE HYDROCHLORIDE 10 MG: 5 TABLET ORAL at 23:29

## 2021-01-01 RX ADMIN — DIAZEPAM 10 MG: 5 TABLET ORAL at 12:48

## 2021-01-01 RX ADMIN — OXYCODONE HYDROCHLORIDE 10 MG: 5 TABLET ORAL at 23:58

## 2021-01-01 RX ADMIN — ENOXAPARIN SODIUM 100 MG: 100 INJECTION SUBCUTANEOUS at 13:36

## 2021-01-01 RX ADMIN — DIAZEPAM 10 MG: 5 TABLET ORAL at 12:09

## 2021-01-01 RX ADMIN — INSULIN LISPRO 3 UNITS: 100 INJECTION, SOLUTION INTRAVENOUS; SUBCUTANEOUS at 21:18

## 2021-01-01 RX ADMIN — CHLORHEXIDINE GLUCONATE 15 ML: 1.2 RINSE ORAL at 09:24

## 2021-01-01 RX ADMIN — DEXMEDETOMIDINE HYDROCHLORIDE 1.5 MCG/KG/HR: 4 INJECTION, SOLUTION INTRAVENOUS at 13:51

## 2021-01-01 RX ADMIN — Medication 10 ML: at 14:58

## 2021-01-01 RX ADMIN — DEXMEDETOMIDINE HYDROCHLORIDE 1.5 MCG/KG/HR: 4 INJECTION, SOLUTION INTRAVENOUS at 14:02

## 2021-01-01 RX ADMIN — ALBUMIN (HUMAN) 25 G: 0.25 INJECTION, SOLUTION INTRAVENOUS at 05:36

## 2021-01-01 RX ADMIN — HUMAN INSULIN 20 UNITS: 100 INJECTION, SUSPENSION SUBCUTANEOUS at 11:53

## 2021-01-01 RX ADMIN — ATORVASTATIN CALCIUM 20 MG: 20 TABLET, FILM COATED ORAL at 08:50

## 2021-01-01 RX ADMIN — Medication 20 MEQ: at 11:23

## 2021-01-01 RX ADMIN — ENOXAPARIN SODIUM 90 MG: 100 INJECTION SUBCUTANEOUS at 03:51

## 2021-01-01 RX ADMIN — ENOXAPARIN SODIUM 100 MG: 100 INJECTION SUBCUTANEOUS at 00:49

## 2021-01-01 RX ADMIN — FENTANYL CITRATE 100 MCG: 0.05 INJECTION, SOLUTION INTRAMUSCULAR; INTRAVENOUS at 12:10

## 2021-01-01 RX ADMIN — FAMOTIDINE 20 MG: 10 INJECTION, SOLUTION INTRAVENOUS at 22:31

## 2021-01-01 RX ADMIN — Medication 10 ML: at 15:03

## 2021-01-01 RX ADMIN — IBUPROFEN 400 MG: 100 SUSPENSION ORAL at 23:14

## 2021-01-01 RX ADMIN — FENTANYL CITRATE 175 MCG/HR: 50 INJECTION INTRAVENOUS at 07:22

## 2021-01-01 RX ADMIN — Medication 10 ML: at 13:06

## 2021-01-01 RX ADMIN — DEXMEDETOMIDINE HYDROCHLORIDE 1 MCG/KG/HR: 4 INJECTION, SOLUTION INTRAVENOUS at 06:17

## 2021-01-01 RX ADMIN — MIDODRINE HYDROCHLORIDE 5 MG: 5 TABLET ORAL at 05:27

## 2021-01-01 RX ADMIN — ENOXAPARIN SODIUM 80 MG: 100 INJECTION SUBCUTANEOUS at 12:10

## 2021-01-01 RX ADMIN — PIPERACILLIN SODIUM AND TAZOBACTAM SODIUM 3.38 G: 3; .375 INJECTION, POWDER, LYOPHILIZED, FOR SOLUTION INTRAVENOUS at 14:23

## 2021-01-01 RX ADMIN — THERA TABS 1 TABLET: TAB at 10:00

## 2021-01-01 RX ADMIN — ENOXAPARIN SODIUM 30 MG: 100 INJECTION SUBCUTANEOUS at 05:30

## 2021-01-01 RX ADMIN — Medication 200 MCG/HR: at 20:35

## 2021-01-01 RX ADMIN — MIDAZOLAM HYDROCHLORIDE 5 MG: 1 INJECTION, SOLUTION INTRAMUSCULAR; INTRAVENOUS at 17:38

## 2021-01-01 RX ADMIN — OXYCODONE HYDROCHLORIDE 10 MG: 5 TABLET ORAL at 17:01

## 2021-01-01 RX ADMIN — INSULIN LISPRO 3 UNITS: 100 INJECTION, SOLUTION INTRAVENOUS; SUBCUTANEOUS at 17:16

## 2021-01-01 RX ADMIN — DEXAMETHASONE SODIUM PHOSPHATE 20 MG: 4 INJECTION, SOLUTION INTRAMUSCULAR; INTRAVENOUS at 14:14

## 2021-01-01 RX ADMIN — Medication 10 ML: at 21:28

## 2021-01-01 RX ADMIN — Medication 16 MG/HR: at 17:11

## 2021-01-01 RX ADMIN — OXYCODONE HYDROCHLORIDE AND ACETAMINOPHEN 500 MG: 500 TABLET ORAL at 08:25

## 2021-01-01 RX ADMIN — KETAMINE HYDROCHLORIDE 100 MG: 10 INJECTION, SOLUTION INTRAMUSCULAR; INTRAVENOUS at 14:04

## 2021-01-01 RX ADMIN — Medication 3 UNITS: at 06:57

## 2021-01-01 RX ADMIN — CEFEPIME 2 G: 2 INJECTION, POWDER, FOR SOLUTION INTRAVENOUS at 21:51

## 2021-01-01 RX ADMIN — Medication 20 MG: at 21:00

## 2021-01-01 RX ADMIN — Medication 200 MCG/HR: at 17:30

## 2021-01-01 RX ADMIN — PROPOFOL 30 MCG/KG/MIN: 10 INJECTION, EMULSION INTRAVENOUS at 00:51

## 2021-01-01 RX ADMIN — Medication 2000 UNITS: at 09:05

## 2021-01-01 RX ADMIN — Medication 200 MCG/HR: at 02:38

## 2021-01-01 RX ADMIN — ENOXAPARIN SODIUM 30 MG: 100 INJECTION SUBCUTANEOUS at 17:06

## 2021-01-01 RX ADMIN — MIDAZOLAM HYDROCHLORIDE 5 MG: 1 INJECTION, SOLUTION INTRAMUSCULAR; INTRAVENOUS at 18:47

## 2021-01-01 RX ADMIN — KETAMINE HYDROCHLORIDE 0.05 MG/KG/HR: 100 INJECTION, SOLUTION, CONCENTRATE INTRAMUSCULAR; INTRAVENOUS at 06:15

## 2021-01-01 RX ADMIN — NOREPINEPHRINE BITARTRATE 4 MCG/MIN: 1 INJECTION, SOLUTION, CONCENTRATE INTRAVENOUS at 10:18

## 2021-01-01 RX ADMIN — OXYCODONE HYDROCHLORIDE 10 MG: 5 TABLET ORAL at 12:10

## 2021-01-01 RX ADMIN — Medication: at 17:50

## 2021-01-01 RX ADMIN — Medication 1 MG/HR: at 02:20

## 2021-01-01 RX ADMIN — Medication 14 UNITS: at 17:59

## 2021-01-01 RX ADMIN — SODIUM CHLORIDE 6.2 UNITS/HR: 9 INJECTION, SOLUTION INTRAVENOUS at 13:59

## 2021-01-01 RX ADMIN — OXYCODONE HYDROCHLORIDE AND ACETAMINOPHEN 500 MG: 500 TABLET ORAL at 08:08

## 2021-01-01 RX ADMIN — FENTANYL CITRATE 100 MCG: 0.05 INJECTION, SOLUTION INTRAMUSCULAR; INTRAVENOUS at 01:56

## 2021-01-01 RX ADMIN — ENOXAPARIN SODIUM 40 MG: 100 INJECTION SUBCUTANEOUS at 14:07

## 2021-01-01 RX ADMIN — VANCOMYCIN HYDROCHLORIDE 1250 MG: 1.25 INJECTION, POWDER, LYOPHILIZED, FOR SOLUTION INTRAVENOUS at 15:25

## 2021-01-01 RX ADMIN — FUROSEMIDE 40 MG: 10 INJECTION, SOLUTION INTRAMUSCULAR; INTRAVENOUS at 08:54

## 2021-01-01 RX ADMIN — SODIUM CHLORIDE 3 ML/HR: 900 INJECTION, SOLUTION INTRAVENOUS at 21:00

## 2021-01-01 RX ADMIN — Medication 300 MCG/HR: at 14:57

## 2021-01-01 RX ADMIN — MIDODRINE HYDROCHLORIDE 20 MG: 5 TABLET ORAL at 15:26

## 2021-01-01 RX ADMIN — FENTANYL CITRATE 200 MCG/HR: 50 INJECTION INTRAVENOUS at 16:45

## 2021-01-01 RX ADMIN — Medication 3 UNITS: at 11:53

## 2021-01-01 RX ADMIN — MIDAZOLAM HYDROCHLORIDE 4 MG: 1 INJECTION, SOLUTION INTRAMUSCULAR; INTRAVENOUS at 02:08

## 2021-01-01 RX ADMIN — DIAZEPAM 10 MG: 5 TABLET ORAL at 11:56

## 2021-01-01 RX ADMIN — FAMOTIDINE 20 MG: 10 INJECTION, SOLUTION INTRAVENOUS at 21:37

## 2021-01-01 RX ADMIN — INSULIN GLARGINE 10 UNITS: 100 INJECTION, SOLUTION SUBCUTANEOUS at 11:51

## 2021-01-01 RX ADMIN — Medication 5 MG: at 21:24

## 2021-01-01 RX ADMIN — Medication 1 CAPSULE: at 21:48

## 2021-01-01 RX ADMIN — Medication 20 MG: at 08:25

## 2021-01-01 RX ADMIN — DIAZEPAM 5 MG: 5 TABLET ORAL at 11:11

## 2021-01-01 RX ADMIN — ALBUMIN (HUMAN) 25 G: 12.5 INJECTION, SOLUTION INTRAVENOUS at 14:19

## 2021-01-01 RX ADMIN — Medication 3 UNITS: at 17:19

## 2021-01-01 RX ADMIN — DEXMEDETOMIDINE HYDROCHLORIDE 0.6 MCG/KG/HR: 100 INJECTION, SOLUTION INTRAVENOUS at 11:56

## 2021-01-01 RX ADMIN — BUMETANIDE 2 MG: 0.25 INJECTION INTRAMUSCULAR; INTRAVENOUS at 11:40

## 2021-01-01 RX ADMIN — CISATRACURIUM BESYLATE 4 MCG/KG/MIN: 20 INJECTION, SOLUTION INTRAVENOUS at 20:13

## 2021-01-01 RX ADMIN — DIAPER RASH SKIN PROTECTENT: at 08:37

## 2021-01-01 RX ADMIN — Medication 7 MG/HR: at 17:19

## 2021-01-01 RX ADMIN — Medication: at 18:07

## 2021-01-01 RX ADMIN — NOREPINEPHRINE BITARTRATE 3 MCG/MIN: 1 INJECTION, SOLUTION, CONCENTRATE INTRAVENOUS at 01:56

## 2021-01-01 RX ADMIN — Medication 20 MG/HR: at 14:49

## 2021-01-01 RX ADMIN — CHLORHEXIDINE GLUCONATE 15 ML: 1.2 RINSE ORAL at 21:10

## 2021-01-01 RX ADMIN — QUETIAPINE FUMARATE 50 MG: 25 TABLET ORAL at 22:14

## 2021-01-01 RX ADMIN — FUROSEMIDE 40 MG: 10 INJECTION, SOLUTION INTRAMUSCULAR; INTRAVENOUS at 09:11

## 2021-01-01 RX ADMIN — Medication 4 UNITS: at 06:00

## 2021-01-01 RX ADMIN — MIDODRINE HYDROCHLORIDE 10 MG: 5 TABLET ORAL at 23:14

## 2021-01-01 RX ADMIN — WATER 1 MG: 1 INJECTION INTRAMUSCULAR; INTRAVENOUS; SUBCUTANEOUS at 02:37

## 2021-01-01 RX ADMIN — QUETIAPINE FUMARATE 50 MG: 25 TABLET ORAL at 15:40

## 2021-01-01 RX ADMIN — Medication 200 MCG/HR: at 17:25

## 2021-01-01 RX ADMIN — DIAZEPAM 10 MG: 5 TABLET ORAL at 18:29

## 2021-01-01 RX ADMIN — Medication 300 MCG/HR: at 11:25

## 2021-01-01 RX ADMIN — Medication 20 MG/HR: at 15:15

## 2021-01-01 RX ADMIN — GUAIFENESIN 400 MG: 200 SOLUTION ORAL at 21:10

## 2021-01-01 RX ADMIN — MIDODRINE HYDROCHLORIDE 20 MG: 5 TABLET ORAL at 22:14

## 2021-01-01 RX ADMIN — PIPERACILLIN SODIUM AND TAZOBACTAM SODIUM 3.38 G: 3; .375 INJECTION, POWDER, LYOPHILIZED, FOR SOLUTION INTRAVENOUS at 13:30

## 2021-01-01 RX ADMIN — QUETIAPINE FUMARATE 50 MG: 25 TABLET ORAL at 16:52

## 2021-01-01 RX ADMIN — MIDODRINE HYDROCHLORIDE 5 MG: 5 TABLET ORAL at 22:05

## 2021-01-01 RX ADMIN — DEXAMETHASONE SODIUM PHOSPHATE 6 MG: 4 INJECTION, SOLUTION INTRAMUSCULAR; INTRAVENOUS at 17:46

## 2021-01-01 RX ADMIN — MIDODRINE HYDROCHLORIDE 10 MG: 5 TABLET ORAL at 13:44

## 2021-01-01 RX ADMIN — DIAZEPAM 10 MG: 5 TABLET ORAL at 17:01

## 2021-01-01 RX ADMIN — OXYCODONE HYDROCHLORIDE 10 MG: 5 TABLET ORAL at 11:33

## 2021-01-01 RX ADMIN — Medication 2000 UNITS: at 08:50

## 2021-01-01 RX ADMIN — CHLORHEXIDINE GLUCONATE 15 ML: 1.2 RINSE ORAL at 09:06

## 2021-01-01 RX ADMIN — DIAPER RASH SKIN PROTECTENT: at 09:00

## 2021-01-01 RX ADMIN — CHLORHEXIDINE GLUCONATE 15 ML: 1.2 RINSE ORAL at 08:49

## 2021-01-01 RX ADMIN — Medication 20 MG/HR: at 22:15

## 2021-01-01 RX ADMIN — OXYCODONE HYDROCHLORIDE AND ACETAMINOPHEN 500 MG: 500 TABLET ORAL at 08:50

## 2021-01-01 RX ADMIN — ENOXAPARIN SODIUM 30 MG: 100 INJECTION SUBCUTANEOUS at 17:03

## 2021-01-01 RX ADMIN — BUMETANIDE 2 MG: 0.25 INJECTION INTRAMUSCULAR; INTRAVENOUS at 08:59

## 2021-01-01 RX ADMIN — ROCURONIUM BROMIDE 50 MG: 10 INJECTION, SOLUTION INTRAVENOUS at 01:01

## 2021-01-01 RX ADMIN — ZINC SULFATE 220 MG (50 MG) CAPSULE 1 CAPSULE: CAPSULE at 09:46

## 2021-01-01 RX ADMIN — MIDODRINE HYDROCHLORIDE 20 MG: 5 TABLET ORAL at 16:02

## 2021-01-01 RX ADMIN — Medication 500 MG: at 08:04

## 2021-01-01 RX ADMIN — DEXAMETHASONE SODIUM PHOSPHATE 5 MG: 4 INJECTION, SOLUTION INTRA-ARTICULAR; INTRALESIONAL; INTRAMUSCULAR; INTRAVENOUS; SOFT TISSUE at 14:00

## 2021-01-01 RX ADMIN — Medication 20 MG/HR: at 06:22

## 2021-01-01 RX ADMIN — MIDAZOLAM 5 MG/HR: 5 INJECTION, SOLUTION INTRAMUSCULAR; INTRAVENOUS at 03:45

## 2021-01-01 RX ADMIN — DIAZEPAM 5 MG: 5 TABLET ORAL at 05:34

## 2021-01-01 RX ADMIN — ENOXAPARIN SODIUM 30 MG: 100 INJECTION SUBCUTANEOUS at 17:02

## 2021-01-01 RX ADMIN — POLYETHYLENE GLYCOL 3350 17 G: 17 POWDER, FOR SOLUTION ORAL at 08:43

## 2021-01-01 RX ADMIN — FENTANYL CITRATE 200 MCG/HR: 50 INJECTION INTRAVENOUS at 03:45

## 2021-01-01 RX ADMIN — HEPARIN SODIUM 3360 UNITS: 1000 INJECTION INTRAVENOUS; SUBCUTANEOUS at 08:49

## 2021-01-01 RX ADMIN — Medication 10 ML: at 22:00

## 2021-01-01 RX ADMIN — ENOXAPARIN SODIUM 30 MG: 100 INJECTION SUBCUTANEOUS at 17:29

## 2021-01-01 RX ADMIN — ATORVASTATIN CALCIUM 20 MG: 20 TABLET, FILM COATED ORAL at 09:46

## 2021-01-01 RX ADMIN — FENTANYL CITRATE 175 MCG/HR: 50 INJECTION INTRAVENOUS at 17:24

## 2021-01-01 RX ADMIN — MIDAZOLAM 8 MG/HR: 5 INJECTION, SOLUTION INTRAMUSCULAR; INTRAVENOUS at 22:12

## 2021-01-01 RX ADMIN — FENTANYL CITRATE 100 MCG: 0.05 INJECTION, SOLUTION INTRAMUSCULAR; INTRAVENOUS at 12:00

## 2021-01-01 RX ADMIN — Medication 14 UNITS: at 23:54

## 2021-01-01 RX ADMIN — ALBUMIN (HUMAN) 12.5 G: 12.5 INJECTION, SOLUTION INTRAVENOUS at 04:00

## 2021-01-01 RX ADMIN — MIDAZOLAM HYDROCHLORIDE 8 MG: 1 INJECTION, SOLUTION INTRAMUSCULAR; INTRAVENOUS at 17:00

## 2021-01-01 RX ADMIN — SODIUM CHLORIDE 1000 ML: 9 INJECTION, SOLUTION INTRAVENOUS at 16:32

## 2021-01-01 RX ADMIN — CHLORHEXIDINE GLUCONATE 15 ML: 1.2 RINSE ORAL at 21:12

## 2021-01-01 RX ADMIN — INSULIN GLARGINE 20 UNITS: 100 INJECTION, SOLUTION SUBCUTANEOUS at 08:56

## 2021-01-01 RX ADMIN — CISATRACURIUM BESYLATE 4 MCG/KG/MIN: 200 INJECTION, SOLUTION INTRAVENOUS at 09:45

## 2021-01-01 RX ADMIN — ATORVASTATIN CALCIUM 20 MG: 20 TABLET, FILM COATED ORAL at 08:21

## 2021-01-01 RX ADMIN — Medication 1 CAPSULE: at 20:41

## 2021-01-01 RX ADMIN — SODIUM CHLORIDE 100 MG: 9 INJECTION, SOLUTION INTRAVENOUS at 07:37

## 2021-01-01 RX ADMIN — GUAIFENESIN SYRUP AND DEXTROMETHORPHAN 5 ML: 100; 10 SYRUP ORAL at 18:03

## 2021-01-01 RX ADMIN — Medication 300 MCG/HR: at 07:18

## 2021-01-01 RX ADMIN — DEXMEDETOMIDINE HYDROCHLORIDE 1.5 MCG/KG/HR: 4 INJECTION, SOLUTION INTRAVENOUS at 06:33

## 2021-01-01 RX ADMIN — Medication 20 MG: at 20:35

## 2021-01-01 RX ADMIN — Medication 2000 UNITS: at 08:44

## 2021-01-01 RX ADMIN — CHLORHEXIDINE GLUCONATE 15 ML: 1.2 RINSE ORAL at 20:48

## 2021-01-01 RX ADMIN — Medication 4 MG/HR: at 18:40

## 2021-01-01 RX ADMIN — CODEINE SULFATE 30 MG: 15 TABLET ORAL at 18:03

## 2021-01-01 RX ADMIN — FAMOTIDINE 20 MG: 10 INJECTION, SOLUTION INTRAVENOUS at 21:08

## 2021-01-01 RX ADMIN — Medication: at 17:28

## 2021-01-01 RX ADMIN — POTASSIUM CHLORIDE 20 MEQ: 29.8 INJECTION, SOLUTION INTRAVENOUS at 10:33

## 2021-01-01 RX ADMIN — DEXMEDETOMIDINE HYDROCHLORIDE 1.4 MCG/KG/HR: 4 INJECTION, SOLUTION INTRAVENOUS at 13:42

## 2021-01-01 RX ADMIN — ALBUMIN (HUMAN) 25 G: 12.5 INJECTION, SOLUTION INTRAVENOUS at 02:20

## 2021-01-01 RX ADMIN — MIDODRINE HYDROCHLORIDE 20 MG: 5 TABLET ORAL at 15:11

## 2021-01-01 RX ADMIN — Medication 3 UNITS: at 17:56

## 2021-01-01 RX ADMIN — ENOXAPARIN SODIUM 100 MG: 100 INJECTION SUBCUTANEOUS at 13:50

## 2021-01-01 RX ADMIN — Medication 3 UNITS: at 12:14

## 2021-01-01 RX ADMIN — ALBUMIN (HUMAN) 25 G: 0.25 INJECTION, SOLUTION INTRAVENOUS at 18:00

## 2021-01-01 RX ADMIN — SODIUM CHLORIDE 5 ML/HR: 900 INJECTION, SOLUTION INTRAVENOUS at 20:06

## 2021-01-01 RX ADMIN — FLUCONAZOLE 400 MG: 400 INJECTION, SOLUTION INTRAVENOUS at 13:59

## 2021-01-01 RX ADMIN — MIDAZOLAM HYDROCHLORIDE 5 MG: 1 INJECTION, SOLUTION INTRAMUSCULAR; INTRAVENOUS at 06:12

## 2021-01-01 RX ADMIN — FENTANYL CITRATE 100 MCG: 0.05 INJECTION, SOLUTION INTRAMUSCULAR; INTRAVENOUS at 05:30

## 2021-01-01 RX ADMIN — CHLORHEXIDINE GLUCONATE 15 ML: 1.2 RINSE ORAL at 20:17

## 2021-01-01 RX ADMIN — Medication 7 UNITS: at 05:38

## 2021-01-01 RX ADMIN — Medication 20 MEQ: at 08:30

## 2021-01-01 RX ADMIN — DIAZEPAM 10 MG: 5 TABLET ORAL at 18:19

## 2021-01-01 RX ADMIN — MIDODRINE HYDROCHLORIDE 10 MG: 5 TABLET ORAL at 16:04

## 2021-01-01 RX ADMIN — THERA TABS 1 TABLET: TAB at 08:44

## 2021-01-01 RX ADMIN — Medication 15 MG/HR: at 10:06

## 2021-01-01 RX ADMIN — Medication 10 MCG: at 08:14

## 2021-01-01 RX ADMIN — THERA TABS 1 TABLET: TAB at 08:54

## 2021-01-01 RX ADMIN — OXYCODONE HYDROCHLORIDE 10 MG: 5 TABLET ORAL at 17:18

## 2021-01-01 RX ADMIN — MIDAZOLAM HYDROCHLORIDE 5 MG: 1 INJECTION, SOLUTION INTRAMUSCULAR; INTRAVENOUS at 21:47

## 2021-01-01 RX ADMIN — Medication 10 ML: at 14:20

## 2021-01-01 RX ADMIN — MIDODRINE HYDROCHLORIDE 10 MG: 5 TABLET ORAL at 06:29

## 2021-01-01 RX ADMIN — HUMAN INSULIN 20 UNITS: 100 INJECTION, SUSPENSION SUBCUTANEOUS at 17:32

## 2021-01-01 RX ADMIN — ATORVASTATIN CALCIUM 20 MG: 20 TABLET, FILM COATED ORAL at 08:19

## 2021-01-01 RX ADMIN — DIAPER RASH SKIN PROTECTENT: at 21:00

## 2021-01-01 RX ADMIN — PROPOFOL 50 MCG/KG/MIN: 10 INJECTION, EMULSION INTRAVENOUS at 15:58

## 2021-01-01 RX ADMIN — FAMOTIDINE 20 MG: 10 INJECTION, SOLUTION INTRAVENOUS at 09:24

## 2021-01-01 RX ADMIN — Medication 3 UNITS: at 05:45

## 2021-01-01 RX ADMIN — FUROSEMIDE 40 MG: 10 INJECTION, SOLUTION INTRAMUSCULAR; INTRAVENOUS at 18:19

## 2021-01-01 RX ADMIN — DIAZEPAM 10 MG: 5 TABLET ORAL at 18:09

## 2021-01-01 RX ADMIN — ACETAMINOPHEN 650 MG: 325 TABLET ORAL at 21:48

## 2021-01-01 RX ADMIN — DEXMEDETOMIDINE HYDROCHLORIDE 1.5 MCG/KG/HR: 4 INJECTION, SOLUTION INTRAVENOUS at 00:45

## 2021-01-01 RX ADMIN — Medication 7 UNITS: at 23:28

## 2021-01-01 RX ADMIN — Medication 20 MG: at 20:31

## 2021-01-01 RX ADMIN — ENOXAPARIN SODIUM 90 MG: 100 INJECTION SUBCUTANEOUS at 14:15

## 2021-01-01 RX ADMIN — MIDODRINE HYDROCHLORIDE 20 MG: 5 TABLET ORAL at 23:29

## 2021-01-01 RX ADMIN — INSULIN LISPRO 3 UNITS: 100 INJECTION, SOLUTION INTRAVENOUS; SUBCUTANEOUS at 07:01

## 2021-01-01 RX ADMIN — ENOXAPARIN SODIUM 100 MG: 120 INJECTION SUBCUTANEOUS at 16:07

## 2021-01-01 RX ADMIN — Medication 200 MCG/HR: at 19:52

## 2021-01-01 RX ADMIN — FENTANYL CITRATE 100 MCG: 50 INJECTION, SOLUTION INTRAMUSCULAR; INTRAVENOUS at 03:58

## 2021-01-01 RX ADMIN — Medication 500 MG: at 08:44

## 2021-01-01 RX ADMIN — Medication 300 MCG/HR: at 02:12

## 2021-01-01 RX ADMIN — Medication: at 18:03

## 2021-01-01 RX ADMIN — ACETAMINOPHEN 650 MG: 160 SOLUTION ORAL at 23:42

## 2021-01-01 RX ADMIN — Medication 10 MG/HR: at 12:34

## 2021-01-01 RX ADMIN — VANCOMYCIN HYDROCHLORIDE 1250 MG: 1.25 INJECTION, POWDER, LYOPHILIZED, FOR SOLUTION INTRAVENOUS at 15:45

## 2021-01-01 RX ADMIN — Medication: at 17:35

## 2021-01-01 RX ADMIN — DEXAMETHASONE SODIUM PHOSPHATE 6 MG: 4 INJECTION, SOLUTION INTRAMUSCULAR; INTRAVENOUS at 16:32

## 2021-01-01 RX ADMIN — Medication 10 UNITS: at 09:48

## 2021-01-01 RX ADMIN — Medication 10 ML: at 05:31

## 2021-01-01 RX ADMIN — Medication 18 MG/HR: at 18:51

## 2021-01-01 RX ADMIN — OXYCODONE HYDROCHLORIDE AND ACETAMINOPHEN 500 MG: 500 TABLET ORAL at 08:35

## 2021-01-01 RX ADMIN — THERA TABS 1 TABLET: TAB at 08:23

## 2021-01-01 RX ADMIN — QUETIAPINE FUMARATE 25 MG: 25 TABLET ORAL at 11:11

## 2021-01-01 RX ADMIN — POLYETHYLENE GLYCOL 3350 17 G: 17 POWDER, FOR SOLUTION ORAL at 09:47

## 2021-01-01 RX ADMIN — ALBUMIN (HUMAN) 25 G: 0.25 INJECTION, SOLUTION INTRAVENOUS at 04:37

## 2021-01-01 RX ADMIN — MIDODRINE HYDROCHLORIDE 20 MG: 5 TABLET ORAL at 00:52

## 2021-01-01 RX ADMIN — DIAZEPAM 10 MG: 5 TABLET ORAL at 05:40

## 2021-01-01 RX ADMIN — CHLORHEXIDINE GLUCONATE 15 ML: 1.2 RINSE ORAL at 20:35

## 2021-01-01 RX ADMIN — Medication 5 MG: at 21:17

## 2021-01-01 RX ADMIN — MIDAZOLAM HYDROCHLORIDE 5 MG: 1 INJECTION, SOLUTION INTRAMUSCULAR; INTRAVENOUS at 03:25

## 2021-01-01 RX ADMIN — NOREPINEPHRINE BITARTRATE 13 MCG/MIN: 1 INJECTION, SOLUTION, CONCENTRATE INTRAVENOUS at 11:53

## 2021-01-01 RX ADMIN — PROPOFOL 50 MCG/KG/MIN: 10 INJECTION, EMULSION INTRAVENOUS at 11:53

## 2021-01-01 RX ADMIN — Medication 10 MCG: at 09:14

## 2021-01-01 RX ADMIN — ACETAMINOPHEN 650 MG: 160 SOLUTION ORAL at 16:53

## 2021-01-01 RX ADMIN — IBUPROFEN 400 MG: 100 SUSPENSION ORAL at 07:05

## 2021-01-01 RX ADMIN — CHLORHEXIDINE GLUCONATE 15 ML: 1.2 RINSE ORAL at 22:21

## 2021-01-01 RX ADMIN — OXYCODONE HYDROCHLORIDE 10 MG: 5 TABLET ORAL at 18:43

## 2021-01-01 RX ADMIN — DEXAMETHASONE SODIUM PHOSPHATE 20 MG: 4 INJECTION, SOLUTION INTRAMUSCULAR; INTRAVENOUS at 14:05

## 2021-01-01 RX ADMIN — CHLORHEXIDINE GLUCONATE 15 ML: 1.2 RINSE ORAL at 20:12

## 2021-01-01 RX ADMIN — Medication 300 MCG/HR: at 16:35

## 2021-01-01 RX ADMIN — PROPOFOL 20 MCG/KG/MIN: 10 INJECTION, EMULSION INTRAVENOUS at 09:41

## 2021-01-01 RX ADMIN — ENOXAPARIN SODIUM 30 MG: 100 INJECTION SUBCUTANEOUS at 05:40

## 2021-01-01 RX ADMIN — Medication 20 MG: at 08:21

## 2021-01-01 RX ADMIN — OXYCODONE HYDROCHLORIDE 10 MG: 5 TABLET ORAL at 12:48

## 2021-01-01 RX ADMIN — ATORVASTATIN CALCIUM 20 MG: 20 TABLET, FILM COATED ORAL at 08:35

## 2021-01-01 RX ADMIN — Medication 20 MG/HR: at 12:21

## 2021-01-01 RX ADMIN — Medication 300 MCG/HR: at 13:57

## 2021-01-01 RX ADMIN — MIDAZOLAM HYDROCHLORIDE 5 MG: 1 INJECTION, SOLUTION INTRAMUSCULAR; INTRAVENOUS at 04:29

## 2021-01-01 RX ADMIN — Medication 10 UNITS: at 21:01

## 2021-01-01 RX ADMIN — Medication 20 ML: at 21:52

## 2021-01-01 RX ADMIN — Medication 200 MCG/HR: at 04:33

## 2021-01-01 RX ADMIN — DEXAMETHASONE SODIUM PHOSPHATE 20 MG: 4 INJECTION, SOLUTION INTRAMUSCULAR; INTRAVENOUS at 17:08

## 2021-01-01 RX ADMIN — Medication 6000 UNITS: at 08:53

## 2021-01-01 RX ADMIN — Medication: at 17:58

## 2021-01-01 RX ADMIN — Medication 20 MG: at 20:26

## 2021-01-01 RX ADMIN — MIDODRINE HYDROCHLORIDE 20 MG: 5 TABLET ORAL at 21:59

## 2021-01-01 RX ADMIN — Medication 20 MG: at 20:34

## 2021-01-01 RX ADMIN — ACETAMINOPHEN 650 MG: 160 SOLUTION ORAL at 12:41

## 2021-01-01 RX ADMIN — Medication 300 MCG/HR: at 07:32

## 2021-01-01 RX ADMIN — Medication 10 ML: at 22:14

## 2021-01-01 RX ADMIN — CHLORHEXIDINE GLUCONATE 15 ML: 1.2 RINSE ORAL at 08:30

## 2021-01-01 RX ADMIN — Medication 200 MCG/HR: at 07:56

## 2021-01-01 RX ADMIN — Medication 500 MG: at 20:02

## 2021-01-01 RX ADMIN — Medication 14 UNITS: at 11:11

## 2021-01-01 RX ADMIN — Medication 1 TABLET: at 09:47

## 2021-01-01 RX ADMIN — ZINC SULFATE 220 MG (50 MG) CAPSULE 1 CAPSULE: CAPSULE at 08:50

## 2021-01-01 RX ADMIN — OXYCODONE HYDROCHLORIDE AND ACETAMINOPHEN 500 MG: 500 TABLET ORAL at 08:58

## 2021-01-01 RX ADMIN — Medication: at 17:04

## 2021-01-01 RX ADMIN — Medication 300 MCG/HR: at 19:38

## 2021-01-01 RX ADMIN — Medication 300 MCG/HR: at 05:35

## 2021-01-01 RX ADMIN — Medication: at 17:18

## 2021-01-01 RX ADMIN — CEFAZOLIN SODIUM 2 G: 1 INJECTION, POWDER, FOR SOLUTION INTRAMUSCULAR; INTRAVENOUS at 02:46

## 2021-01-01 RX ADMIN — QUETIAPINE FUMARATE 50 MG: 25 TABLET ORAL at 21:36

## 2021-01-01 RX ADMIN — Medication 300 MCG/HR: at 06:51

## 2021-01-01 RX ADMIN — DIAZEPAM 10 MG: 5 TABLET ORAL at 05:33

## 2021-01-01 RX ADMIN — Medication 300 MCG/HR: at 16:02

## 2021-01-01 RX ADMIN — Medication 10 ML: at 21:04

## 2021-01-01 RX ADMIN — Medication 1 CAPSULE: at 08:15

## 2021-01-01 RX ADMIN — Medication 4 UNITS: at 00:42

## 2021-01-01 RX ADMIN — SODIUM CHLORIDE 2 G: 9 INJECTION INTRAMUSCULAR; INTRAVENOUS; SUBCUTANEOUS at 22:33

## 2021-01-01 RX ADMIN — Medication 3 UNITS: at 05:38

## 2021-01-01 RX ADMIN — Medication 3 UNITS: at 12:48

## 2021-01-01 RX ADMIN — Medication 4 UNITS: at 18:18

## 2021-01-01 RX ADMIN — INSULIN GLARGINE 50 UNITS: 100 INJECTION, SOLUTION SUBCUTANEOUS at 11:53

## 2021-01-01 RX ADMIN — Medication 300 MCG/HR: at 18:51

## 2021-01-01 RX ADMIN — POLYETHYLENE GLYCOL 3350 17 G: 17 POWDER, FOR SOLUTION ORAL at 08:35

## 2021-01-01 RX ADMIN — OXYCODONE HYDROCHLORIDE 10 MG: 5 TABLET ORAL at 12:19

## 2021-01-01 RX ADMIN — Medication 10 MG/HR: at 09:40

## 2021-01-01 RX ADMIN — METOCLOPRAMIDE 5 MG: 5 INJECTION, SOLUTION INTRAMUSCULAR; INTRAVENOUS at 21:36

## 2021-01-01 RX ADMIN — Medication 3 UNITS: at 19:36

## 2021-01-01 RX ADMIN — DOCUSATE SODIUM 100 MG: 50 LIQUID ORAL at 19:20

## 2021-01-01 RX ADMIN — Medication 3 UNITS: at 23:50

## 2021-01-01 RX ADMIN — Medication 300 MCG/HR: at 05:38

## 2021-01-01 RX ADMIN — CEFAZOLIN SODIUM 2 G: 1 INJECTION, POWDER, FOR SOLUTION INTRAMUSCULAR; INTRAVENOUS at 09:43

## 2021-01-01 RX ADMIN — CHLORHEXIDINE GLUCONATE 15 ML: 1.2 RINSE ORAL at 21:54

## 2021-01-01 RX ADMIN — Medication 10 UNITS: at 21:34

## 2021-01-01 RX ADMIN — MIDODRINE HYDROCHLORIDE 20 MG: 5 TABLET ORAL at 13:01

## 2021-01-01 RX ADMIN — ENOXAPARIN SODIUM 100 MG: 100 INJECTION SUBCUTANEOUS at 02:37

## 2021-01-01 RX ADMIN — Medication 300 MCG/HR: at 05:50

## 2021-01-01 RX ADMIN — INSULIN GLARGINE 20 UNITS: 100 INJECTION, SOLUTION SUBCUTANEOUS at 10:20

## 2021-01-01 RX ADMIN — Medication 14 UNITS: at 17:46

## 2021-01-01 RX ADMIN — Medication 4 UNITS: at 11:45

## 2021-01-01 RX ADMIN — FENTANYL CITRATE 100 MCG: 0.05 INJECTION, SOLUTION INTRAMUSCULAR; INTRAVENOUS at 16:55

## 2021-01-01 RX ADMIN — Medication 3 UNITS: at 01:26

## 2021-01-01 RX ADMIN — OXYCODONE HYDROCHLORIDE AND ACETAMINOPHEN 500 MG: 500 TABLET ORAL at 08:36

## 2021-01-01 RX ADMIN — MIDODRINE HYDROCHLORIDE 10 MG: 5 TABLET ORAL at 22:20

## 2021-01-01 RX ADMIN — FUROSEMIDE 40 MG: 10 INJECTION, SOLUTION INTRAMUSCULAR; INTRAVENOUS at 08:50

## 2021-01-01 RX ADMIN — DEXMEDETOMIDINE HYDROCHLORIDE 1 MCG/KG/HR: 4 INJECTION, SOLUTION INTRAVENOUS at 02:35

## 2021-01-01 RX ADMIN — THERA TABS 1 TABLET: TAB at 08:36

## 2021-01-01 RX ADMIN — 0.12% CHLORHEXIDINE GLUCONATE 15 ML: 1.2 RINSE ORAL at 22:09

## 2021-01-01 RX ADMIN — INSULIN GLARGINE 20 UNITS: 100 INJECTION, SOLUTION SUBCUTANEOUS at 08:04

## 2021-01-01 RX ADMIN — Medication 10 ML: at 21:06

## 2021-01-01 RX ADMIN — Medication: at 08:49

## 2021-01-01 RX ADMIN — Medication 18 MG/HR: at 15:18

## 2021-01-01 RX ADMIN — Medication 3 UNITS: at 23:31

## 2021-01-01 RX ADMIN — Medication 3 UNITS: at 05:36

## 2021-01-01 RX ADMIN — ATORVASTATIN CALCIUM 20 MG: 20 TABLET, FILM COATED ORAL at 10:00

## 2021-01-01 RX ADMIN — NOREPINEPHRINE BITARTRATE 4 MCG/MIN: 1 SOLUTION INTRAVENOUS at 17:06

## 2021-01-01 RX ADMIN — PIPERACILLIN SODIUM AND TAZOBACTAM SODIUM 3.38 G: 3; .375 INJECTION, POWDER, LYOPHILIZED, FOR SOLUTION INTRAVENOUS at 05:40

## 2021-01-01 RX ADMIN — Medication 300 MCG/HR: at 11:09

## 2021-01-01 RX ADMIN — ENOXAPARIN SODIUM 30 MG: 100 INJECTION SUBCUTANEOUS at 06:00

## 2021-01-01 RX ADMIN — Medication 175 MCG/HR: at 05:30

## 2021-01-01 RX ADMIN — MIDODRINE HYDROCHLORIDE 10 MG: 5 TABLET ORAL at 05:36

## 2021-01-01 RX ADMIN — ENOXAPARIN SODIUM 30 MG: 100 INJECTION SUBCUTANEOUS at 05:49

## 2021-01-01 RX ADMIN — DEXMEDETOMIDINE HYDROCHLORIDE 1.5 MCG/KG/HR: 4 INJECTION, SOLUTION INTRAVENOUS at 08:29

## 2021-01-01 RX ADMIN — FAMOTIDINE 20 MG: 10 INJECTION, SOLUTION INTRAVENOUS at 20:03

## 2021-01-01 RX ADMIN — INSULIN GLARGINE 24 UNITS: 100 INJECTION, SOLUTION SUBCUTANEOUS at 08:44

## 2021-01-01 RX ADMIN — Medication 1 PACKET: at 17:43

## 2021-01-01 RX ADMIN — Medication 20 MG: at 20:43

## 2021-01-01 RX ADMIN — Medication 3 UNITS: at 00:04

## 2021-01-01 RX ADMIN — Medication 500 MG: at 10:17

## 2021-01-01 RX ADMIN — Medication 10 ML: at 06:01

## 2021-01-01 RX ADMIN — Medication 3 UNITS: at 00:27

## 2021-01-01 RX ADMIN — BUMETANIDE 2 MG: 0.25 INJECTION INTRAMUSCULAR; INTRAVENOUS at 08:29

## 2021-01-01 RX ADMIN — MIDAZOLAM 8 MG: 1 INJECTION INTRAMUSCULAR; INTRAVENOUS at 17:00

## 2021-01-01 RX ADMIN — Medication 19 UNITS/KG/HR: at 07:57

## 2021-01-01 RX ADMIN — Medication 7 UNITS: at 00:22

## 2021-01-01 RX ADMIN — Medication 19 MG/HR: at 19:10

## 2021-01-01 RX ADMIN — Medication: at 08:29

## 2021-01-01 RX ADMIN — Medication 30 ML: at 22:00

## 2021-01-01 RX ADMIN — MIDODRINE HYDROCHLORIDE 10 MG: 5 TABLET ORAL at 21:07

## 2021-01-01 RX ADMIN — MIDODRINE HYDROCHLORIDE 20 MG: 5 TABLET ORAL at 13:52

## 2021-01-01 RX ADMIN — GUAIFENESIN 400 MG: 200 SOLUTION ORAL at 23:15

## 2021-01-01 RX ADMIN — INSULIN LISPRO 5 UNITS: 100 INJECTION, SOLUTION INTRAVENOUS; SUBCUTANEOUS at 23:20

## 2021-01-01 RX ADMIN — DIAPER RASH SKIN PROTECTENT: at 08:26

## 2021-01-01 RX ADMIN — FAMOTIDINE 20 MG: 10 INJECTION, SOLUTION INTRAVENOUS at 08:55

## 2021-01-01 RX ADMIN — Medication 3 UNITS: at 11:04

## 2021-01-01 RX ADMIN — OXYCODONE HYDROCHLORIDE AND ACETAMINOPHEN 500 MG: 500 TABLET ORAL at 09:06

## 2021-01-01 RX ADMIN — VANCOMYCIN HYDROCHLORIDE 1250 MG: 1.25 INJECTION, POWDER, LYOPHILIZED, FOR SOLUTION INTRAVENOUS at 02:23

## 2021-01-01 RX ADMIN — VANCOMYCIN HYDROCHLORIDE 1250 MG: 1.25 INJECTION, POWDER, LYOPHILIZED, FOR SOLUTION INTRAVENOUS at 10:06

## 2021-01-01 RX ADMIN — HUMAN INSULIN 30 UNITS: 100 INJECTION, SUSPENSION SUBCUTANEOUS at 11:00

## 2021-01-01 RX ADMIN — Medication 10 UNITS: at 20:43

## 2021-01-01 RX ADMIN — Medication 20 MG/HR: at 12:00

## 2021-01-01 RX ADMIN — Medication 10 ML: at 21:37

## 2021-01-01 RX ADMIN — VANCOMYCIN HYDROCHLORIDE 1250 MG: 1.25 INJECTION, POWDER, LYOPHILIZED, FOR SOLUTION INTRAVENOUS at 15:02

## 2021-01-01 RX ADMIN — Medication 20 MG/HR: at 11:30

## 2021-01-01 RX ADMIN — MIDODRINE HYDROCHLORIDE 10 MG: 5 TABLET ORAL at 15:22

## 2021-01-01 RX ADMIN — Medication 500 MG: at 22:09

## 2021-01-01 RX ADMIN — Medication 20 ML: at 06:39

## 2021-01-01 RX ADMIN — Medication 10 UNITS: at 19:53

## 2021-01-01 RX ADMIN — Medication 2000 UNITS: at 08:37

## 2021-01-01 RX ADMIN — Medication 14 UNITS: at 17:22

## 2021-01-01 RX ADMIN — Medication 4 UNITS: at 06:52

## 2021-01-01 RX ADMIN — Medication 20 ML: at 05:14

## 2021-01-01 RX ADMIN — ACETAMINOPHEN 650 MG: 160 SOLUTION ORAL at 18:25

## 2021-01-01 RX ADMIN — Medication 125 MCG/HR: at 16:09

## 2021-01-01 RX ADMIN — Medication 300 MCG/HR: at 00:04

## 2021-01-01 RX ADMIN — DIAPER RASH SKIN PROTECTENT: at 20:38

## 2021-01-01 RX ADMIN — INSULIN LISPRO 15 UNITS: 100 INJECTION, SOLUTION INTRAVENOUS; SUBCUTANEOUS at 11:11

## 2021-01-01 RX ADMIN — CHLORHEXIDINE GLUCONATE 15 ML: 1.2 RINSE ORAL at 21:09

## 2021-01-01 RX ADMIN — DIAZEPAM 10 MG: 5 TABLET ORAL at 12:19

## 2021-01-01 RX ADMIN — OXYCODONE HYDROCHLORIDE 10 MG: 5 TABLET ORAL at 12:49

## 2021-01-01 RX ADMIN — QUETIAPINE FUMARATE 50 MG: 25 TABLET ORAL at 08:44

## 2021-01-01 RX ADMIN — Medication 10 ML: at 05:14

## 2021-01-01 RX ADMIN — Medication 10 ML: at 21:08

## 2021-01-01 RX ADMIN — CEFAZOLIN SODIUM 2 G: 1 INJECTION, POWDER, FOR SOLUTION INTRAMUSCULAR; INTRAVENOUS at 02:03

## 2021-01-01 RX ADMIN — Medication 10 UNITS: at 20:58

## 2021-01-01 RX ADMIN — Medication 20 MG/HR: at 07:00

## 2021-01-01 RX ADMIN — ACETAMINOPHEN 650 MG: 160 SOLUTION ORAL at 06:42

## 2021-01-01 RX ADMIN — QUETIAPINE FUMARATE 50 MG: 25 TABLET ORAL at 09:06

## 2021-01-01 RX ADMIN — Medication 225 MCG/HR: at 09:06

## 2021-01-01 RX ADMIN — Medication: at 08:26

## 2021-01-01 RX ADMIN — Medication 3 UNITS: at 11:52

## 2021-01-01 RX ADMIN — Medication 10 ML: at 20:43

## 2021-01-01 RX ADMIN — Medication 7 UNITS: at 18:40

## 2021-01-01 RX ADMIN — METOPROLOL TARTRATE 5 MG: 5 INJECTION INTRAVENOUS at 08:21

## 2021-01-01 RX ADMIN — FAMOTIDINE 20 MG: 10 INJECTION, SOLUTION INTRAVENOUS at 20:12

## 2021-01-01 RX ADMIN — FENTANYL CITRATE 150 MCG/HR: 50 INJECTION INTRAVENOUS at 03:50

## 2021-01-01 RX ADMIN — FAMOTIDINE 20 MG: 10 INJECTION, SOLUTION INTRAVENOUS at 10:07

## 2021-01-01 RX ADMIN — DEXMEDETOMIDINE HYDROCHLORIDE 1.5 MCG/KG/HR: 4 INJECTION, SOLUTION INTRAVENOUS at 23:52

## 2021-01-01 RX ADMIN — Medication 2 MG/HR: at 03:38

## 2021-01-01 RX ADMIN — Medication 14 UNITS: at 05:36

## 2021-01-01 RX ADMIN — CISATRACURIUM BESYLATE 4 MCG/KG/MIN: 200 INJECTION, SOLUTION INTRAVENOUS at 05:28

## 2021-01-01 RX ADMIN — Medication 125 MCG/HR: at 05:00

## 2021-01-01 RX ADMIN — ACETAMINOPHEN 650 MG: 325 TABLET ORAL at 22:09

## 2021-01-01 RX ADMIN — Medication 18 UNITS/KG/HR: at 15:31

## 2021-01-01 RX ADMIN — CISATRACURIUM BESYLATE 4 MCG/KG/MIN: 200 INJECTION, SOLUTION INTRAVENOUS at 14:15

## 2021-01-01 RX ADMIN — MIDODRINE HYDROCHLORIDE 20 MG: 5 TABLET ORAL at 14:47

## 2021-01-01 RX ADMIN — Medication 4 MG/HR: at 12:30

## 2021-01-01 RX ADMIN — GUAIFENESIN SYRUP AND DEXTROMETHORPHAN 5 ML: 100; 10 SYRUP ORAL at 06:39

## 2021-01-01 RX ADMIN — FAMOTIDINE 20 MG: 10 INJECTION, SOLUTION INTRAVENOUS at 20:38

## 2021-01-01 RX ADMIN — Medication 3 UNITS: at 11:32

## 2021-01-01 RX ADMIN — Medication 10 ML: at 05:46

## 2021-01-01 RX ADMIN — Medication 150 MCG/HR: at 14:28

## 2021-01-01 RX ADMIN — ACETAMINOPHEN 650 MG: 160 SOLUTION ORAL at 23:15

## 2021-01-01 RX ADMIN — MIDODRINE HYDROCHLORIDE 10 MG: 5 TABLET ORAL at 06:20

## 2021-01-01 RX ADMIN — INSULIN GLARGINE 5 UNITS: 100 INJECTION, SOLUTION SUBCUTANEOUS at 22:05

## 2021-01-01 RX ADMIN — ENOXAPARIN SODIUM 30 MG: 100 INJECTION SUBCUTANEOUS at 17:01

## 2021-01-01 RX ADMIN — Medication 300 MCG/HR: at 03:00

## 2021-01-01 RX ADMIN — MIDODRINE HYDROCHLORIDE 20 MG: 5 TABLET ORAL at 23:16

## 2021-01-01 RX ADMIN — DOCUSATE SODIUM 100 MG: 50 LIQUID ORAL at 08:46

## 2021-01-01 RX ADMIN — DEXAMETHASONE SODIUM PHOSPHATE 6 MG: 4 INJECTION, SOLUTION INTRAMUSCULAR; INTRAVENOUS at 18:03

## 2021-01-01 RX ADMIN — DEXMEDETOMIDINE HYDROCHLORIDE 1.5 MCG/KG/HR: 4 INJECTION, SOLUTION INTRAVENOUS at 00:43

## 2021-01-01 RX ADMIN — ENOXAPARIN SODIUM 30 MG: 100 INJECTION SUBCUTANEOUS at 18:09

## 2021-01-01 RX ADMIN — Medication 3 UNITS: at 08:34

## 2021-01-01 RX ADMIN — Medication 300 MCG/HR: at 21:32

## 2021-01-01 RX ADMIN — DIAZEPAM 10 MG: 5 TABLET ORAL at 17:18

## 2021-01-01 RX ADMIN — Medication 20 MG/HR: at 07:10

## 2021-01-01 RX ADMIN — ATORVASTATIN CALCIUM 20 MG: 20 TABLET, FILM COATED ORAL at 08:16

## 2021-01-01 RX ADMIN — Medication 300 MCG/HR: at 16:45

## 2021-01-01 RX ADMIN — Medication: at 17:34

## 2021-01-01 RX ADMIN — THERA TABS 1 TABLET: TAB at 08:37

## 2021-01-01 RX ADMIN — QUETIAPINE FUMARATE 50 MG: 25 TABLET ORAL at 21:59

## 2021-01-01 RX ADMIN — PROPOFOL 20 MCG/KG/MIN: 10 INJECTION, EMULSION INTRAVENOUS at 17:23

## 2021-01-01 RX ADMIN — INSULIN LISPRO 12 UNITS: 100 INJECTION, SOLUTION INTRAVENOUS; SUBCUTANEOUS at 00:56

## 2021-01-01 RX ADMIN — FAMOTIDINE 20 MG: 10 INJECTION, SOLUTION INTRAVENOUS at 08:43

## 2021-01-01 RX ADMIN — DEXMEDETOMIDINE HYDROCHLORIDE 0.4 MCG/KG/HR: 4 INJECTION, SOLUTION INTRAVENOUS at 21:34

## 2021-01-01 RX ADMIN — Medication 10 ML: at 06:08

## 2021-01-01 RX ADMIN — MIDAZOLAM 10 MG/HR: 5 INJECTION, SOLUTION INTRAMUSCULAR; INTRAVENOUS at 18:03

## 2021-01-01 RX ADMIN — HUMAN INSULIN 30 UNITS: 100 INJECTION, SUSPENSION SUBCUTANEOUS at 21:08

## 2021-01-01 RX ADMIN — PROPOFOL 20 MCG/KG/MIN: 10 INJECTION, EMULSION INTRAVENOUS at 12:45

## 2021-01-01 RX ADMIN — Medication 20 MG/HR: at 04:20

## 2021-01-01 RX ADMIN — QUETIAPINE FUMARATE 50 MG: 25 TABLET ORAL at 15:21

## 2021-01-01 RX ADMIN — HUMAN INSULIN 40 UNITS: 100 INJECTION, SUSPENSION SUBCUTANEOUS at 08:44

## 2021-01-01 RX ADMIN — ENOXAPARIN SODIUM 80 MG: 100 INJECTION SUBCUTANEOUS at 23:29

## 2021-01-01 RX ADMIN — WATER 10 MG: 1 INJECTION INTRAMUSCULAR; INTRAVENOUS; SUBCUTANEOUS at 04:28

## 2021-01-01 RX ADMIN — Medication 300 MCG/HR: at 21:00

## 2021-01-01 RX ADMIN — Medication 20 MG: at 23:14

## 2021-01-01 RX ADMIN — Medication: at 19:19

## 2021-01-01 RX ADMIN — DEXMEDETOMIDINE HYDROCHLORIDE 1.5 MCG/KG/HR: 4 INJECTION, SOLUTION INTRAVENOUS at 05:22

## 2021-01-01 RX ADMIN — PROPOFOL 50 MCG/KG/MIN: 10 INJECTION, EMULSION INTRAVENOUS at 09:09

## 2021-01-01 RX ADMIN — FENTANYL CITRATE 100 MCG: 0.05 INJECTION, SOLUTION INTRAMUSCULAR; INTRAVENOUS at 06:20

## 2021-01-01 RX ADMIN — CHLORHEXIDINE GLUCONATE 15 ML: 1.2 RINSE ORAL at 23:17

## 2021-01-01 RX ADMIN — CHLORHEXIDINE GLUCONATE 15 ML: 1.2 RINSE ORAL at 21:05

## 2021-01-01 RX ADMIN — THERA TABS 1 TABLET: TAB at 08:35

## 2021-01-01 RX ADMIN — Medication: at 10:45

## 2021-01-01 RX ADMIN — HUMAN INSULIN 20 UNITS: 100 INJECTION, SUSPENSION SUBCUTANEOUS at 23:52

## 2021-01-01 RX ADMIN — HUMAN INSULIN 55 UNITS: 100 INJECTION, SUSPENSION SUBCUTANEOUS at 21:03

## 2021-01-01 RX ADMIN — Medication 500 MG: at 22:37

## 2021-01-01 RX ADMIN — PIPERACILLIN SODIUM AND TAZOBACTAM SODIUM 3.38 G: 3; .375 INJECTION, POWDER, LYOPHILIZED, FOR SOLUTION INTRAVENOUS at 20:26

## 2021-01-01 RX ADMIN — DEXAMETHASONE SODIUM PHOSPHATE 20 MG: 4 INJECTION, SOLUTION INTRAMUSCULAR; INTRAVENOUS at 14:07

## 2021-01-01 RX ADMIN — QUETIAPINE FUMARATE 50 MG: 25 TABLET ORAL at 21:07

## 2021-01-01 RX ADMIN — Medication 3 UNITS: at 11:15

## 2021-01-01 RX ADMIN — SODIUM CHLORIDE 40 MG: 9 INJECTION, SOLUTION INTRAMUSCULAR; INTRAVENOUS; SUBCUTANEOUS at 20:03

## 2021-01-01 RX ADMIN — OXYCODONE HYDROCHLORIDE AND ACETAMINOPHEN 500 MG: 500 TABLET ORAL at 08:54

## 2021-01-01 RX ADMIN — Medication 10 ML: at 05:55

## 2021-01-01 RX ADMIN — FENTANYL CITRATE 100 MCG: 50 INJECTION INTRAMUSCULAR; INTRAVENOUS at 05:08

## 2021-01-01 RX ADMIN — Medication: at 17:07

## 2021-01-01 RX ADMIN — INSULIN GLARGINE 24 UNITS: 100 INJECTION, SOLUTION SUBCUTANEOUS at 08:53

## 2021-01-01 RX ADMIN — ATORVASTATIN CALCIUM 20 MG: 20 TABLET, FILM COATED ORAL at 08:08

## 2021-01-01 RX ADMIN — QUETIAPINE FUMARATE 50 MG: 25 TABLET ORAL at 16:02

## 2021-01-01 RX ADMIN — Medication 200 MCG/HR: at 19:30

## 2021-01-01 RX ADMIN — SODIUM CHLORIDE 3 ML/HR: 900 INJECTION, SOLUTION INTRAVENOUS at 20:07

## 2021-01-01 RX ADMIN — CHLOROTHIAZIDE SODIUM 500 MG: 500 INJECTION, POWDER, LYOPHILIZED, FOR SOLUTION INTRAVENOUS at 11:50

## 2021-01-01 RX ADMIN — INSULIN LISPRO 10 UNITS: 100 INJECTION, SOLUTION INTRAVENOUS; SUBCUTANEOUS at 11:55

## 2021-01-01 RX ADMIN — OXYCODONE HYDROCHLORIDE 10 MG: 5 TABLET ORAL at 23:54

## 2021-01-01 RX ADMIN — DEXMEDETOMIDINE HYDROCHLORIDE 1 MCG/KG/HR: 4 INJECTION, SOLUTION INTRAVENOUS at 21:55

## 2021-01-01 RX ADMIN — OXYCODONE HYDROCHLORIDE AND ACETAMINOPHEN 500 MG: 500 TABLET ORAL at 08:13

## 2021-01-01 RX ADMIN — SODIUM CHLORIDE 2 G: 9 INJECTION INTRAMUSCULAR; INTRAVENOUS; SUBCUTANEOUS at 21:09

## 2021-01-01 RX ADMIN — INSULIN GLARGINE 60 UNITS: 100 INJECTION, SOLUTION SUBCUTANEOUS at 09:22

## 2021-01-01 RX ADMIN — MIDODRINE HYDROCHLORIDE 20 MG: 5 TABLET ORAL at 23:57

## 2021-01-01 RX ADMIN — Medication 20 MG/HR: at 22:16

## 2021-01-01 RX ADMIN — Medication 3 UNITS: at 17:01

## 2021-01-01 RX ADMIN — CHLORHEXIDINE GLUCONATE 15 ML: 1.2 RINSE ORAL at 20:26

## 2021-01-01 RX ADMIN — CEFEPIME 2 G: 2 INJECTION, POWDER, FOR SOLUTION INTRAVENOUS at 21:03

## 2021-01-01 RX ADMIN — VANCOMYCIN HYDROCHLORIDE 1250 MG: 1.25 INJECTION, POWDER, LYOPHILIZED, FOR SOLUTION INTRAVENOUS at 00:21

## 2021-01-01 RX ADMIN — OXYCODONE HYDROCHLORIDE 10 MG: 5 TABLET ORAL at 17:49

## 2021-01-01 RX ADMIN — QUETIAPINE FUMARATE 50 MG: 25 TABLET ORAL at 09:44

## 2021-01-01 RX ADMIN — FENTANYL CITRATE 100 MCG: 0.05 INJECTION, SOLUTION INTRAMUSCULAR; INTRAVENOUS at 18:25

## 2021-01-01 RX ADMIN — POTASSIUM CHLORIDE 20 MEQ: 400 INJECTION, SOLUTION INTRAVENOUS at 06:04

## 2021-01-01 RX ADMIN — MIDODRINE HYDROCHLORIDE 10 MG: 5 TABLET ORAL at 05:38

## 2021-01-01 RX ADMIN — Medication 10 UNITS: at 10:46

## 2021-01-01 RX ADMIN — OXYCODONE HYDROCHLORIDE 10 MG: 5 TABLET ORAL at 23:42

## 2021-01-01 RX ADMIN — Medication 5 MG/HR: at 20:48

## 2021-01-01 RX ADMIN — THERA TABS 1 TABLET: TAB at 09:24

## 2021-01-01 RX ADMIN — CHLORHEXIDINE GLUCONATE 15 ML: 1.2 RINSE ORAL at 20:28

## 2021-01-01 RX ADMIN — CHLORHEXIDINE GLUCONATE 15 ML: 1.2 RINSE ORAL at 08:23

## 2021-01-01 RX ADMIN — Medication 10 ML: at 14:12

## 2021-01-01 RX ADMIN — DEXMEDETOMIDINE HYDROCHLORIDE 1.5 MCG/KG/HR: 4 INJECTION, SOLUTION INTRAVENOUS at 20:55

## 2021-01-01 RX ADMIN — CEFEPIME 2 G: 2 INJECTION, POWDER, FOR SOLUTION INTRAVENOUS at 14:01

## 2021-01-01 RX ADMIN — HUMAN INSULIN 20 UNITS: 100 INJECTION, SUSPENSION SUBCUTANEOUS at 06:29

## 2021-01-01 RX ADMIN — MIDODRINE HYDROCHLORIDE 20 MG: 5 TABLET ORAL at 23:39

## 2021-01-01 RX ADMIN — OXYCODONE HYDROCHLORIDE 10 MG: 5 TABLET ORAL at 11:23

## 2021-01-01 RX ADMIN — ATORVASTATIN CALCIUM 20 MG: 20 TABLET, FILM COATED ORAL at 09:06

## 2021-01-01 RX ADMIN — Medication 20 ML: at 05:57

## 2021-01-01 RX ADMIN — ENOXAPARIN SODIUM 30 MG: 100 INJECTION SUBCUTANEOUS at 05:39

## 2021-01-01 RX ADMIN — ATORVASTATIN CALCIUM 20 MG: 20 TABLET, FILM COATED ORAL at 08:54

## 2021-01-01 RX ADMIN — INSULIN LISPRO 3 UNITS: 100 INJECTION, SOLUTION INTRAVENOUS; SUBCUTANEOUS at 00:57

## 2021-01-01 RX ADMIN — PIPERACILLIN SODIUM AND TAZOBACTAM SODIUM 3.38 G: 3; .375 INJECTION, POWDER, LYOPHILIZED, FOR SOLUTION INTRAVENOUS at 05:17

## 2021-01-01 RX ADMIN — Medication 19 MG/HR: at 12:00

## 2021-01-01 RX ADMIN — IPRATROPIUM BROMIDE AND ALBUTEROL 1 PUFF: 20; 100 SPRAY, METERED RESPIRATORY (INHALATION) at 08:56

## 2021-01-01 RX ADMIN — Medication: at 08:51

## 2021-01-01 RX ADMIN — Medication 10 ML: at 13:52

## 2021-01-01 RX ADMIN — Medication 20 MG: at 10:00

## 2021-01-01 RX ADMIN — Medication: at 18:36

## 2021-01-01 RX ADMIN — QUETIAPINE FUMARATE 50 MG: 25 TABLET ORAL at 22:16

## 2021-01-01 RX ADMIN — Medication 20 MG: at 08:19

## 2021-01-01 RX ADMIN — ZINC SULFATE 220 MG (50 MG) CAPSULE 1 CAPSULE: CAPSULE at 08:46

## 2021-01-01 RX ADMIN — Medication: at 04:33

## 2021-01-01 RX ADMIN — DIAZEPAM 5 MG: 5 TABLET ORAL at 23:48

## 2021-01-01 RX ADMIN — Medication 6000 UNITS: at 09:24

## 2021-01-01 RX ADMIN — Medication 17 MG/HR: at 23:02

## 2021-01-01 RX ADMIN — DIAZEPAM 10 MG: 5 TABLET ORAL at 18:03

## 2021-01-01 RX ADMIN — Medication 10 ML: at 14:01

## 2021-01-01 RX ADMIN — MIDODRINE HYDROCHLORIDE 10 MG: 5 TABLET ORAL at 21:02

## 2021-01-01 RX ADMIN — DIAZEPAM 10 MG: 5 TABLET ORAL at 17:29

## 2021-01-01 RX ADMIN — DIAPER RASH SKIN PROTECTENT: at 08:24

## 2021-01-01 RX ADMIN — Medication 3 UNITS: at 00:10

## 2021-01-01 RX ADMIN — DOCUSATE SODIUM 100 MG: 50 LIQUID ORAL at 09:46

## 2021-01-01 RX ADMIN — Medication 300 MCG/HR: at 10:39

## 2021-01-01 RX ADMIN — Medication 300 MCG/HR: at 21:30

## 2021-01-01 RX ADMIN — DEXMEDETOMIDINE HYDROCHLORIDE 1.5 MCG/KG/HR: 4 INJECTION, SOLUTION INTRAVENOUS at 04:42

## 2021-01-01 RX ADMIN — Medication 200 MCG/HR: at 09:06

## 2021-01-01 RX ADMIN — INSULIN HUMAN 8 UNITS: 100 INJECTION, SOLUTION PARENTERAL at 20:39

## 2021-01-01 RX ADMIN — SODIUM CHLORIDE 5 ML/HR: 900 INJECTION, SOLUTION INTRAVENOUS at 20:00

## 2021-01-01 RX ADMIN — CEFAZOLIN SODIUM 2 G: 1 INJECTION, POWDER, FOR SOLUTION INTRAMUSCULAR; INTRAVENOUS at 02:45

## 2021-01-01 RX ADMIN — DIAPER RASH SKIN PROTECTENT: at 09:46

## 2021-01-01 RX ADMIN — MIDODRINE HYDROCHLORIDE 10 MG: 5 TABLET ORAL at 14:01

## 2021-01-01 RX ADMIN — CISATRACURIUM BESYLATE 2 MCG/KG/MIN: 200 INJECTION, SOLUTION INTRAVENOUS at 06:14

## 2021-01-01 RX ADMIN — Medication 30 ML: at 23:21

## 2021-01-01 RX ADMIN — Medication: at 10:20

## 2021-01-01 RX ADMIN — Medication 14 UNITS: at 05:38

## 2021-01-01 RX ADMIN — FAMOTIDINE 20 MG: 10 INJECTION, SOLUTION INTRAVENOUS at 08:57

## 2021-01-01 RX ADMIN — QUETIAPINE FUMARATE 50 MG: 25 TABLET ORAL at 16:09

## 2021-01-01 RX ADMIN — OXYCODONE HYDROCHLORIDE 10 MG: 5 TABLET ORAL at 18:29

## 2021-01-01 RX ADMIN — Medication 20 MG: at 20:17

## 2021-01-01 RX ADMIN — OXYCODONE HYDROCHLORIDE 10 MG: 5 TABLET ORAL at 18:07

## 2021-01-01 RX ADMIN — Medication: at 08:19

## 2021-01-01 RX ADMIN — LIDOCAINE HYDROCHLORIDE 60 MG: 20 INJECTION, SOLUTION EPIDURAL; INFILTRATION; INTRACAUDAL; PERINEURAL at 00:55

## 2021-01-01 RX ADMIN — INSULIN LISPRO 6 UNITS: 100 INJECTION, SOLUTION INTRAVENOUS; SUBCUTANEOUS at 05:58

## 2021-01-01 RX ADMIN — Medication 2000 UNITS: at 08:21

## 2021-01-01 RX ADMIN — OXYCODONE HYDROCHLORIDE AND ACETAMINOPHEN 500 MG: 500 TABLET ORAL at 08:21

## 2021-01-01 RX ADMIN — ENOXAPARIN SODIUM 30 MG: 100 INJECTION SUBCUTANEOUS at 05:26

## 2021-01-01 RX ADMIN — ENOXAPARIN SODIUM 30 MG: 100 INJECTION SUBCUTANEOUS at 06:39

## 2021-01-01 RX ADMIN — MIDODRINE HYDROCHLORIDE 20 MG: 5 TABLET ORAL at 06:31

## 2021-01-01 RX ADMIN — Medication 2000 UNITS: at 08:23

## 2021-01-01 RX ADMIN — Medication 3 UNITS: at 23:08

## 2021-01-01 RX ADMIN — Medication 1 CAPSULE: at 21:53

## 2021-01-01 RX ADMIN — Medication 2 UNITS: at 11:28

## 2021-01-01 RX ADMIN — METOCLOPRAMIDE 5 MG: 5 INJECTION, SOLUTION INTRAMUSCULAR; INTRAVENOUS at 05:26

## 2021-01-01 RX ADMIN — MIDAZOLAM HYDROCHLORIDE 5 MG: 1 INJECTION, SOLUTION INTRAMUSCULAR; INTRAVENOUS at 15:39

## 2021-01-01 RX ADMIN — DIAPER RASH SKIN PROTECTENT: at 21:08

## 2021-01-01 RX ADMIN — QUETIAPINE FUMARATE 25 MG: 25 TABLET ORAL at 08:35

## 2021-01-01 RX ADMIN — THERA TABS 1 TABLET: TAB at 08:58

## 2021-01-01 RX ADMIN — THERA TABS 1 TABLET: TAB at 08:57

## 2021-01-01 RX ADMIN — Medication 10 ML: at 05:40

## 2021-01-01 RX ADMIN — ENOXAPARIN SODIUM 30 MG: 100 INJECTION SUBCUTANEOUS at 18:02

## 2021-01-01 RX ADMIN — ZINC SULFATE 220 MG (50 MG) CAPSULE 1 CAPSULE: CAPSULE at 13:45

## 2021-01-01 RX ADMIN — DIAZEPAM 10 MG: 5 TABLET ORAL at 06:31

## 2021-01-01 RX ADMIN — Medication 20 MG/HR: at 07:21

## 2021-01-01 RX ADMIN — Medication 20 MG/HR: at 19:27

## 2021-01-01 RX ADMIN — ENOXAPARIN SODIUM 30 MG: 100 INJECTION SUBCUTANEOUS at 05:33

## 2021-01-01 RX ADMIN — MIDODRINE HYDROCHLORIDE 10 MG: 5 TABLET ORAL at 06:36

## 2021-01-01 RX ADMIN — IPRATROPIUM BROMIDE AND ALBUTEROL 1 PUFF: 20; 100 SPRAY, METERED RESPIRATORY (INHALATION) at 18:59

## 2021-01-01 RX ADMIN — Medication 10 ML: at 21:41

## 2021-01-01 RX ADMIN — FENTANYL CITRATE 175 MCG/HR: 50 INJECTION INTRAVENOUS at 03:11

## 2021-01-01 RX ADMIN — Medication 20 MG: at 20:57

## 2021-01-01 RX ADMIN — Medication 18 MG/HR: at 21:18

## 2021-01-01 RX ADMIN — DIAPER RASH SKIN PROTECTENT: at 08:50

## 2021-01-01 RX ADMIN — MIDODRINE HYDROCHLORIDE 10 MG: 5 TABLET ORAL at 06:11

## 2021-01-01 RX ADMIN — MIDODRINE HYDROCHLORIDE 10 MG: 5 TABLET ORAL at 06:51

## 2021-01-01 RX ADMIN — Medication 10 UNITS: at 08:24

## 2021-01-01 RX ADMIN — MIDODRINE HYDROCHLORIDE 20 MG: 5 TABLET ORAL at 16:52

## 2021-01-01 RX ADMIN — CEFAZOLIN SODIUM 2 G: 1 INJECTION, POWDER, FOR SOLUTION INTRAMUSCULAR; INTRAVENOUS at 17:43

## 2021-01-01 RX ADMIN — Medication 10 MCG: at 09:46

## 2021-01-01 RX ADMIN — DEXAMETHASONE SODIUM PHOSPHATE 20 MG: 4 INJECTION, SOLUTION INTRAMUSCULAR; INTRAVENOUS at 18:00

## 2021-01-01 RX ADMIN — Medication 300 MCG/HR: at 06:30

## 2021-01-01 RX ADMIN — PROPOFOL 20 MCG/KG/MIN: 10 INJECTION, EMULSION INTRAVENOUS at 17:30

## 2021-01-01 RX ADMIN — Medication 300 MCG/HR: at 09:07

## 2021-01-01 RX ADMIN — Medication 2000 UNITS: at 08:35

## 2021-01-01 RX ADMIN — SODIUM CHLORIDE 40 MG: 9 INJECTION, SOLUTION INTRAMUSCULAR; INTRAVENOUS; SUBCUTANEOUS at 22:09

## 2021-01-01 RX ADMIN — INSULIN LISPRO 5 UNITS: 100 INJECTION, SOLUTION INTRAVENOUS; SUBCUTANEOUS at 16:30

## 2021-01-01 RX ADMIN — Medication 300 MCG/HR: at 15:00

## 2021-01-01 RX ADMIN — PROPOFOL 200 MG: 10 INJECTION, EMULSION INTRAVENOUS at 00:55

## 2021-01-01 RX ADMIN — ENOXAPARIN SODIUM 30 MG: 100 INJECTION SUBCUTANEOUS at 05:38

## 2021-01-01 RX ADMIN — Medication 18 MG/HR: at 07:04

## 2021-01-01 RX ADMIN — DIAPER RASH SKIN PROTECTENT: at 20:54

## 2021-01-01 RX ADMIN — DIAZEPAM 10 MG: 5 TABLET ORAL at 23:39

## 2021-01-01 RX ADMIN — DIAZEPAM 10 MG: 5 TABLET ORAL at 12:04

## 2021-01-01 RX ADMIN — AZITHROMYCIN MONOHYDRATE 500 MG: 500 INJECTION, POWDER, LYOPHILIZED, FOR SOLUTION INTRAVENOUS at 22:12

## 2021-01-01 RX ADMIN — QUETIAPINE FUMARATE 50 MG: 25 TABLET ORAL at 21:03

## 2021-01-01 RX ADMIN — MIDODRINE HYDROCHLORIDE 5 MG: 5 TABLET ORAL at 13:21

## 2021-01-01 RX ADMIN — MIDODRINE HYDROCHLORIDE 10 MG: 5 TABLET ORAL at 21:52

## 2021-01-01 RX ADMIN — CEFEPIME 2 G: 2 INJECTION, POWDER, FOR SOLUTION INTRAVENOUS at 22:10

## 2021-01-01 RX ADMIN — ROCURONIUM BROMIDE 50 MG: 10 INJECTION INTRAVENOUS at 06:53

## 2021-01-01 RX ADMIN — DIAZEPAM 10 MG: 5 TABLET ORAL at 23:42

## 2021-01-01 RX ADMIN — 0.12% CHLORHEXIDINE GLUCONATE 15 ML: 1.2 RINSE ORAL at 08:44

## 2021-01-01 RX ADMIN — Medication 10 ML: at 13:42

## 2021-01-01 RX ADMIN — Medication 20 MG/HR: at 03:00

## 2021-01-01 RX ADMIN — Medication 300 MCG/HR: at 12:19

## 2021-01-01 RX ADMIN — DEXMEDETOMIDINE HYDROCHLORIDE 1.5 MCG/KG/HR: 4 INJECTION, SOLUTION INTRAVENOUS at 09:45

## 2021-01-01 RX ADMIN — Medication 200 MCG/HR: at 08:43

## 2021-01-01 RX ADMIN — OXYCODONE HYDROCHLORIDE 10 MG: 5 TABLET ORAL at 18:03

## 2021-01-01 RX ADMIN — Medication 500 MG: at 08:35

## 2021-01-01 RX ADMIN — OXYCODONE HYDROCHLORIDE 10 MG: 5 TABLET ORAL at 12:17

## 2021-01-01 RX ADMIN — DIAZEPAM 10 MG: 5 TABLET ORAL at 05:15

## 2021-01-01 RX ADMIN — Medication 20 MG: at 21:33

## 2021-01-01 RX ADMIN — Medication 3 UNITS: at 06:28

## 2021-01-01 RX ADMIN — Medication 2000 UNITS: at 08:36

## 2021-01-01 RX ADMIN — QUETIAPINE FUMARATE 50 MG: 25 TABLET ORAL at 08:37

## 2021-01-01 RX ADMIN — Medication 10 ML: at 21:09

## 2021-01-01 RX ADMIN — Medication 10 MG/HR: at 01:55

## 2021-01-01 RX ADMIN — VANCOMYCIN HYDROCHLORIDE 2000 MG: 10 INJECTION, POWDER, LYOPHILIZED, FOR SOLUTION INTRAVENOUS at 15:39

## 2021-01-01 RX ADMIN — DIAZEPAM 10 MG: 5 TABLET ORAL at 11:23

## 2021-01-01 RX ADMIN — FUROSEMIDE 40 MG: 10 INJECTION, SOLUTION INTRAMUSCULAR; INTRAVENOUS at 08:36

## 2021-01-01 RX ADMIN — DOCUSATE SODIUM 50 MG AND SENNOSIDES 8.6 MG 1 TABLET: 8.6; 5 TABLET, FILM COATED ORAL at 09:06

## 2021-01-01 RX ADMIN — HUMAN INSULIN 10 UNITS: 100 INJECTION, SUSPENSION SUBCUTANEOUS at 12:34

## 2021-01-01 RX ADMIN — DEXMEDETOMIDINE HYDROCHLORIDE 1.5 MCG/KG/HR: 4 INJECTION, SOLUTION INTRAVENOUS at 15:03

## 2021-01-01 RX ADMIN — MIDAZOLAM HYDROCHLORIDE 2 MG: 1 INJECTION, SOLUTION INTRAMUSCULAR; INTRAVENOUS at 03:57

## 2021-01-01 RX ADMIN — Medication 2000 UNITS: at 08:04

## 2021-01-01 RX ADMIN — OXYCODONE HYDROCHLORIDE 10 MG: 5 TABLET ORAL at 05:40

## 2021-01-01 RX ADMIN — ATORVASTATIN CALCIUM 20 MG: 20 TABLET, FILM COATED ORAL at 09:44

## 2021-01-01 RX ADMIN — VANCOMYCIN HYDROCHLORIDE 1250 MG: 1.25 INJECTION, POWDER, LYOPHILIZED, FOR SOLUTION INTRAVENOUS at 03:12

## 2021-01-01 RX ADMIN — GUAIFENESIN 400 MG: 200 SOLUTION ORAL at 21:09

## 2021-01-01 RX ADMIN — OXYCODONE HYDROCHLORIDE 10 MG: 5 TABLET ORAL at 05:33

## 2021-01-01 RX ADMIN — CHLORHEXIDINE GLUCONATE 15 ML: 1.2 RINSE ORAL at 09:00

## 2021-01-01 RX ADMIN — Medication 20 MG: at 09:44

## 2021-01-01 RX ADMIN — Medication 300 MCG/HR: at 11:00

## 2021-01-01 RX ADMIN — DEXAMETHASONE SODIUM PHOSPHATE 5 MG: 4 INJECTION, SOLUTION INTRA-ARTICULAR; INTRALESIONAL; INTRAMUSCULAR; INTRAVENOUS; SOFT TISSUE at 13:42

## 2021-01-01 RX ADMIN — Medication 20 MG/HR: at 00:49

## 2021-01-01 RX ADMIN — CISATRACURIUM BESYLATE 4 MCG/KG/MIN: 200 INJECTION, SOLUTION INTRAVENOUS at 18:31

## 2021-01-01 RX ADMIN — QUETIAPINE FUMARATE 50 MG: 25 TABLET ORAL at 16:16

## 2021-01-01 RX ADMIN — DEXAMETHASONE SODIUM PHOSPHATE 20 MG: 4 INJECTION, SOLUTION INTRAMUSCULAR; INTRAVENOUS at 15:15

## 2021-01-01 RX ADMIN — ACETAMINOPHEN 650 MG: 160 SOLUTION ORAL at 09:23

## 2021-01-01 RX ADMIN — MIDODRINE HYDROCHLORIDE 10 MG: 5 TABLET ORAL at 14:27

## 2021-01-01 RX ADMIN — CHLORHEXIDINE GLUCONATE 15 ML: 1.2 RINSE ORAL at 20:57

## 2021-01-01 RX ADMIN — OXYCODONE HYDROCHLORIDE 10 MG: 5 TABLET ORAL at 12:40

## 2021-01-01 RX ADMIN — Medication 2000 UNITS: at 08:16

## 2021-01-01 RX ADMIN — PROPOFOL 50 MCG/KG/MIN: 10 INJECTION, EMULSION INTRAVENOUS at 15:22

## 2021-01-01 RX ADMIN — MIDAZOLAM HYDROCHLORIDE 5 MG: 1 INJECTION, SOLUTION INTRAMUSCULAR; INTRAVENOUS at 04:20

## 2021-01-01 RX ADMIN — VANCOMYCIN HYDROCHLORIDE 1250 MG: 1.25 INJECTION, POWDER, LYOPHILIZED, FOR SOLUTION INTRAVENOUS at 18:03

## 2021-01-01 RX ADMIN — Medication 1 CAPSULE: at 21:24

## 2021-01-01 RX ADMIN — Medication 500 MG: at 08:55

## 2021-01-01 RX ADMIN — OXYCODONE HYDROCHLORIDE AND ACETAMINOPHEN 500 MG: 500 TABLET ORAL at 08:23

## 2021-01-01 RX ADMIN — Medication 200 MCG/HR: at 16:01

## 2021-01-01 RX ADMIN — ACETAMINOPHEN 650 MG: 160 SOLUTION ORAL at 05:47

## 2021-01-01 RX ADMIN — Medication 4 UNITS: at 23:20

## 2021-01-01 RX ADMIN — VANCOMYCIN HYDROCHLORIDE 1250 MG: 1.25 INJECTION, POWDER, LYOPHILIZED, FOR SOLUTION INTRAVENOUS at 11:04

## 2021-01-01 RX ADMIN — HUMAN INSULIN 20 UNITS: 100 INJECTION, SUSPENSION SUBCUTANEOUS at 17:01

## 2021-01-01 RX ADMIN — VANCOMYCIN HYDROCHLORIDE 1250 MG: 1.25 INJECTION, POWDER, LYOPHILIZED, FOR SOLUTION INTRAVENOUS at 08:33

## 2021-01-01 RX ADMIN — QUETIAPINE FUMARATE 50 MG: 25 TABLET ORAL at 17:29

## 2021-01-01 RX ADMIN — NOREPINEPHRINE BITARTRATE 1 MCG/MIN: 1 SOLUTION INTRAVENOUS at 21:47

## 2021-01-01 RX ADMIN — FUROSEMIDE 40 MG: 10 INJECTION, SOLUTION INTRAVENOUS at 12:34

## 2021-01-01 RX ADMIN — Medication 3 UNITS: at 18:01

## 2021-01-01 RX ADMIN — FENTANYL CITRATE 100 MCG: 0.05 INJECTION, SOLUTION INTRAMUSCULAR; INTRAVENOUS at 13:33

## 2021-01-01 RX ADMIN — FENTANYL CITRATE 100 MCG: 0.05 INJECTION, SOLUTION INTRAMUSCULAR; INTRAVENOUS at 17:40

## 2021-01-01 RX ADMIN — Medication 300 MCG/HR: at 13:50

## 2021-01-01 RX ADMIN — ACETAMINOPHEN 650 MG: 160 SOLUTION ORAL at 22:38

## 2021-01-01 RX ADMIN — ATORVASTATIN CALCIUM 20 MG: 20 TABLET, FILM COATED ORAL at 08:30

## 2021-01-01 RX ADMIN — ENOXAPARIN SODIUM 30 MG: 100 INJECTION SUBCUTANEOUS at 17:49

## 2021-01-01 RX ADMIN — Medication 20 MG/HR: at 19:00

## 2021-01-01 RX ADMIN — FAMOTIDINE 20 MG: 10 INJECTION, SOLUTION INTRAVENOUS at 21:03

## 2021-01-01 RX ADMIN — OXYCODONE HYDROCHLORIDE 10 MG: 5 TABLET ORAL at 17:06

## 2021-01-01 RX ADMIN — DIAPER RASH SKIN PROTECTENT: at 08:59

## 2021-01-01 RX ADMIN — DIAZEPAM 10 MG: 5 TABLET ORAL at 00:06

## 2021-01-01 RX ADMIN — DIAZEPAM 10 MG: 5 TABLET ORAL at 11:40

## 2021-01-01 RX ADMIN — LACTULOSE 15 ML: 20 SOLUTION ORAL at 08:19

## 2021-01-01 RX ADMIN — DIAZEPAM 10 MG: 5 TABLET ORAL at 23:29

## 2021-01-01 RX ADMIN — PROPOFOL 50 MCG/KG/MIN: 10 INJECTION, EMULSION INTRAVENOUS at 19:47

## 2021-01-01 RX ADMIN — Medication 10 UNITS: at 08:19

## 2021-01-01 RX ADMIN — Medication 20 ML: at 21:24

## 2021-01-01 RX ADMIN — GUAIFENESIN 400 MG: 200 SOLUTION ORAL at 05:24

## 2021-01-01 RX ADMIN — QUETIAPINE FUMARATE 50 MG: 25 TABLET ORAL at 21:33

## 2021-01-01 RX ADMIN — ENOXAPARIN SODIUM 30 MG: 100 INJECTION SUBCUTANEOUS at 06:36

## 2021-01-01 RX ADMIN — Medication 20 MG/HR: at 09:48

## 2021-01-01 RX ADMIN — THERA TABS 1 TABLET: TAB at 08:25

## 2021-01-01 RX ADMIN — PROPOFOL 50 MCG/KG/MIN: 10 INJECTION, EMULSION INTRAVENOUS at 23:27

## 2021-01-01 RX ADMIN — POTASSIUM BICARBONATE 40 MEQ: 391 TABLET, EFFERVESCENT ORAL at 05:16

## 2021-01-01 RX ADMIN — OXYCODONE HYDROCHLORIDE 10 MG: 5 TABLET ORAL at 17:29

## 2021-01-01 RX ADMIN — Medication: at 08:10

## 2021-01-01 RX ADMIN — SODIUM CHLORIDE 40 MG: 9 INJECTION, SOLUTION INTRAMUSCULAR; INTRAVENOUS; SUBCUTANEOUS at 21:48

## 2021-01-01 RX ADMIN — Medication: at 08:54

## 2021-01-01 RX ADMIN — POLYETHYLENE GLYCOL 3350 17 G: 17 POWDER, FOR SOLUTION ORAL at 09:17

## 2021-01-01 RX ADMIN — ATORVASTATIN CALCIUM 20 MG: 20 TABLET, FILM COATED ORAL at 08:53

## 2021-01-01 RX ADMIN — VANCOMYCIN HYDROCHLORIDE 1250 MG: 1.25 INJECTION, POWDER, LYOPHILIZED, FOR SOLUTION INTRAVENOUS at 02:24

## 2021-01-01 RX ADMIN — ACETAMINOPHEN 650 MG: 325 TABLET ORAL at 21:25

## 2021-01-01 RX ADMIN — Medication 300 MCG/HR: at 23:20

## 2021-01-01 RX ADMIN — FENTANYL CITRATE 200 MCG/HR: 50 INJECTION INTRAVENOUS at 12:35

## 2021-01-01 RX ADMIN — INSULIN LISPRO 6 UNITS: 100 INJECTION, SOLUTION INTRAVENOUS; SUBCUTANEOUS at 08:55

## 2021-01-01 RX ADMIN — FENTANYL CITRATE 100 MCG: 0.05 INJECTION, SOLUTION INTRAMUSCULAR; INTRAVENOUS at 21:51

## 2021-01-01 RX ADMIN — Medication: at 09:06

## 2021-01-01 RX ADMIN — HUMAN INSULIN 15 UNITS: 100 INJECTION, SUSPENSION SUBCUTANEOUS at 09:13

## 2021-01-01 RX ADMIN — HUMAN INSULIN 55 UNITS: 100 INJECTION, SUSPENSION SUBCUTANEOUS at 10:05

## 2021-01-01 RX ADMIN — POLYETHYLENE GLYCOL 3350 17 G: 17 POWDER, FOR SOLUTION ORAL at 08:13

## 2021-01-01 RX ADMIN — Medication: at 17:02

## 2021-01-01 RX ADMIN — Medication 14 UNITS: at 00:29

## 2021-01-01 RX ADMIN — DIAPER RASH SKIN PROTECTENT: at 21:14

## 2021-01-01 RX ADMIN — HUMAN INSULIN 20 UNITS: 100 INJECTION, SUSPENSION SUBCUTANEOUS at 12:12

## 2021-01-01 RX ADMIN — INSULIN LISPRO 6 UNITS: 100 INJECTION, SOLUTION INTRAVENOUS; SUBCUTANEOUS at 00:20

## 2021-01-01 RX ADMIN — FENTANYL CITRATE 200 MCG/HR: 50 INJECTION INTRAVENOUS at 06:53

## 2021-01-01 RX ADMIN — Medication 10 ML: at 21:11

## 2021-01-01 RX ADMIN — INSULIN LISPRO 3 UNITS: 100 INJECTION, SOLUTION INTRAVENOUS; SUBCUTANEOUS at 06:58

## 2021-01-01 RX ADMIN — ACETAMINOPHEN 650 MG: 160 SOLUTION ORAL at 00:23

## 2021-01-01 RX ADMIN — MIDAZOLAM 5 MG: 1 INJECTION INTRAMUSCULAR; INTRAVENOUS at 03:25

## 2021-01-01 RX ADMIN — ENOXAPARIN SODIUM 100 MG: 100 INJECTION SUBCUTANEOUS at 00:06

## 2021-01-01 RX ADMIN — Medication 5 MG: at 21:48

## 2021-01-01 RX ADMIN — CEFAZOLIN SODIUM 2 G: 1 INJECTION, POWDER, FOR SOLUTION INTRAMUSCULAR; INTRAVENOUS at 09:45

## 2021-01-01 RX ADMIN — FAMOTIDINE 20 MG: 10 INJECTION, SOLUTION INTRAVENOUS at 09:11

## 2021-01-01 RX ADMIN — THERA TABS 1 TABLET: TAB at 08:33

## 2021-01-01 RX ADMIN — Medication: at 08:36

## 2021-01-01 RX ADMIN — Medication 5 MG: at 21:04

## 2021-01-01 RX ADMIN — PIPERACILLIN SODIUM AND TAZOBACTAM SODIUM 3.38 G: 3; .375 INJECTION, POWDER, LYOPHILIZED, FOR SOLUTION INTRAVENOUS at 21:00

## 2021-01-01 RX ADMIN — IBUPROFEN 400 MG: 100 SUSPENSION ORAL at 10:16

## 2021-01-01 RX ADMIN — PROPOFOL 50 MCG/KG/MIN: 10 INJECTION, EMULSION INTRAVENOUS at 19:48

## 2021-01-01 RX ADMIN — ENOXAPARIN SODIUM 80 MG: 100 INJECTION SUBCUTANEOUS at 23:39

## 2021-01-01 RX ADMIN — Medication 3 UNITS: at 00:03

## 2021-01-01 RX ADMIN — VANCOMYCIN HYDROCHLORIDE 1250 MG: 1.25 INJECTION, POWDER, LYOPHILIZED, FOR SOLUTION INTRAVENOUS at 18:23

## 2021-01-01 RX ADMIN — ENOXAPARIN SODIUM 80 MG: 100 INJECTION SUBCUTANEOUS at 00:52

## 2021-01-01 RX ADMIN — Medication 20 MG: at 08:33

## 2021-01-01 RX ADMIN — Medication 15 MG/HR: at 03:10

## 2021-01-01 RX ADMIN — Medication 4 UNITS: at 17:56

## 2021-01-01 RX ADMIN — Medication 10 ML: at 05:37

## 2021-01-01 RX ADMIN — Medication 100 MG: at 00:55

## 2021-01-01 RX ADMIN — FUROSEMIDE 40 MG: 10 INJECTION, SOLUTION INTRAMUSCULAR; INTRAVENOUS at 08:25

## 2021-01-01 RX ADMIN — ENOXAPARIN SODIUM 80 MG: 100 INJECTION SUBCUTANEOUS at 12:40

## 2021-01-01 RX ADMIN — ACETAMINOPHEN 650 MG: 160 SOLUTION ORAL at 09:07

## 2021-01-01 RX ADMIN — FENTANYL CITRATE 100 MCG: 0.05 INJECTION, SOLUTION INTRAMUSCULAR; INTRAVENOUS at 22:52

## 2021-01-01 RX ADMIN — FENTANYL CITRATE 100 MCG: 0.05 INJECTION, SOLUTION INTRAMUSCULAR; INTRAVENOUS at 03:25

## 2021-01-01 RX ADMIN — ATORVASTATIN CALCIUM 20 MG: 20 TABLET, FILM COATED ORAL at 08:46

## 2021-01-01 RX ADMIN — FENTANYL CITRATE 100 MCG: 0.05 INJECTION, SOLUTION INTRAMUSCULAR; INTRAVENOUS at 10:21

## 2021-01-01 RX ADMIN — ACETAMINOPHEN 650 MG: 650 SUPPOSITORY RECTAL at 14:58

## 2021-01-01 RX ADMIN — FENTANYL CITRATE 100 MCG: 0.05 INJECTION, SOLUTION INTRAMUSCULAR; INTRAVENOUS at 14:44

## 2021-01-01 RX ADMIN — MIDODRINE HYDROCHLORIDE 20 MG: 5 TABLET ORAL at 21:05

## 2021-01-01 RX ADMIN — DIAZEPAM 5 MG: 5 TABLET ORAL at 00:02

## 2021-01-01 RX ADMIN — Medication 14 UNITS: at 17:18

## 2021-01-01 RX ADMIN — POTASSIUM BICARBONATE 40 MEQ: 391 TABLET, EFFERVESCENT ORAL at 08:35

## 2021-01-01 RX ADMIN — ENOXAPARIN SODIUM 30 MG: 100 INJECTION SUBCUTANEOUS at 18:23

## 2021-01-01 RX ADMIN — VANCOMYCIN HYDROCHLORIDE 1250 MG: 1.25 INJECTION, POWDER, LYOPHILIZED, FOR SOLUTION INTRAVENOUS at 03:11

## 2021-01-01 RX ADMIN — OXYCODONE HYDROCHLORIDE 10 MG: 5 TABLET ORAL at 05:39

## 2021-01-01 RX ADMIN — ACETAMINOPHEN 650 MG: 325 TABLET ORAL at 18:03

## 2021-01-01 RX ADMIN — MIDODRINE HYDROCHLORIDE 10 MG: 5 TABLET ORAL at 13:06

## 2021-01-01 RX ADMIN — Medication 1 PACKET: at 20:41

## 2021-01-01 RX ADMIN — Medication 300 MCG/HR: at 02:30

## 2021-01-01 RX ADMIN — CHLORHEXIDINE GLUCONATE 15 ML: 1.2 RINSE ORAL at 08:59

## 2021-01-01 RX ADMIN — Medication 2000 UNITS: at 10:00

## 2021-01-01 RX ADMIN — MIDAZOLAM HYDROCHLORIDE 2 MG: 1 INJECTION, SOLUTION INTRAMUSCULAR; INTRAVENOUS at 00:56

## 2021-01-01 RX ADMIN — CEFEPIME 2 G: 2 INJECTION, POWDER, FOR SOLUTION INTRAVENOUS at 14:57

## 2021-01-01 RX ADMIN — Medication 4 UNITS: at 05:35

## 2021-01-01 RX ADMIN — THERA TABS 1 TABLET: TAB at 08:46

## 2021-01-01 RX ADMIN — NOREPINEPHRINE BITARTRATE 3 MCG/MIN: 1 INJECTION, SOLUTION, CONCENTRATE INTRAVENOUS at 20:21

## 2021-01-01 RX ADMIN — INSULIN LISPRO 6 UNITS: 100 INJECTION, SOLUTION INTRAVENOUS; SUBCUTANEOUS at 23:10

## 2021-01-01 RX ADMIN — Medication 300 MCG/HR: at 13:12

## 2021-01-01 RX ADMIN — MIDAZOLAM HYDROCHLORIDE 2 MG: 1 INJECTION, SOLUTION INTRAMUSCULAR; INTRAVENOUS at 18:13

## 2021-01-01 RX ADMIN — SODIUM CHLORIDE 5 ML/HR: 900 INJECTION, SOLUTION INTRAVENOUS at 21:00

## 2021-01-01 RX ADMIN — Medication 3 UNITS: at 18:32

## 2021-01-01 RX ADMIN — PIPERACILLIN SODIUM AND TAZOBACTAM SODIUM 3.38 G: 3; .375 INJECTION, POWDER, LYOPHILIZED, FOR SOLUTION INTRAVENOUS at 04:58

## 2021-01-01 RX ADMIN — FENTANYL CITRATE 100 MCG/HR: 50 INJECTION INTRAVENOUS at 03:45

## 2021-01-01 RX ADMIN — DEXMEDETOMIDINE HYDROCHLORIDE 1.5 MCG/KG/HR: 4 INJECTION, SOLUTION INTRAVENOUS at 05:37

## 2021-01-01 RX ADMIN — MIDAZOLAM 10 MG/HR: 5 INJECTION, SOLUTION INTRAMUSCULAR; INTRAVENOUS at 00:13

## 2021-01-01 RX ADMIN — METOCLOPRAMIDE 5 MG: 5 INJECTION, SOLUTION INTRAMUSCULAR; INTRAVENOUS at 12:11

## 2021-01-01 RX ADMIN — VANCOMYCIN HYDROCHLORIDE 1250 MG: 1.25 INJECTION, POWDER, LYOPHILIZED, FOR SOLUTION INTRAVENOUS at 21:06

## 2021-01-01 RX ADMIN — MIDODRINE HYDROCHLORIDE 20 MG: 5 TABLET ORAL at 08:30

## 2021-01-01 RX ADMIN — DIAZEPAM 10 MG: 5 TABLET ORAL at 06:01

## 2021-01-01 RX ADMIN — Medication 200 MCG/HR: at 11:53

## 2021-01-01 RX ADMIN — Medication 10 UNITS: at 09:44

## 2021-01-01 RX ADMIN — CHLORHEXIDINE GLUCONATE 15 ML: 1.2 RINSE ORAL at 09:18

## 2021-01-01 RX ADMIN — DIAZEPAM 10 MG: 5 TABLET ORAL at 23:20

## 2021-01-01 RX ADMIN — FAMOTIDINE 20 MG: 10 INJECTION, SOLUTION INTRAVENOUS at 09:47

## 2021-01-01 RX ADMIN — Medication 296 ML: at 16:52

## 2021-01-01 RX ADMIN — MIDODRINE HYDROCHLORIDE 20 MG: 5 TABLET ORAL at 10:00

## 2021-01-01 RX ADMIN — QUETIAPINE FUMARATE 25 MG: 25 TABLET ORAL at 17:06

## 2021-01-01 RX ADMIN — GUAIFENESIN SYRUP AND DEXTROMETHORPHAN 5 ML: 100; 10 SYRUP ORAL at 08:25

## 2021-01-01 RX ADMIN — Medication 10 MG/HR: at 15:31

## 2021-01-01 RX ADMIN — INSULIN GLARGINE 20 UNITS: 100 INJECTION, SOLUTION SUBCUTANEOUS at 08:15

## 2021-01-01 RX ADMIN — Medication 10 UNITS: at 08:36

## 2021-01-01 RX ADMIN — ENOXAPARIN SODIUM 80 MG: 100 INJECTION SUBCUTANEOUS at 23:17

## 2021-01-01 RX ADMIN — Medication: at 18:13

## 2021-01-01 RX ADMIN — LACTULOSE 15 ML: 20 SOLUTION ORAL at 21:36

## 2021-01-01 RX ADMIN — Medication 18 MG/HR: at 00:04

## 2021-01-01 RX ADMIN — Medication 300 MCG/HR: at 15:16

## 2021-01-01 RX ADMIN — OXYCODONE HYDROCHLORIDE AND ACETAMINOPHEN 500 MG: 500 TABLET ORAL at 09:44

## 2021-01-01 RX ADMIN — Medication 7 MG/HR: at 03:23

## 2021-01-01 RX ADMIN — Medication 10 ML: at 21:10

## 2021-01-01 RX ADMIN — ENOXAPARIN SODIUM 30 MG: 100 INJECTION SUBCUTANEOUS at 06:28

## 2021-01-01 RX ADMIN — Medication 10 UNITS: at 20:34

## 2021-01-01 RX ADMIN — FENTANYL CITRATE 100 MCG: 50 INJECTION INTRAMUSCULAR; INTRAVENOUS at 06:43

## 2021-01-01 RX ADMIN — INSULIN LISPRO 10 UNITS: 100 INJECTION, SOLUTION INTRAVENOUS; SUBCUTANEOUS at 17:35

## 2021-01-01 RX ADMIN — OXYCODONE HYDROCHLORIDE AND ACETAMINOPHEN 500 MG: 500 TABLET ORAL at 08:19

## 2021-01-01 RX ADMIN — QUETIAPINE FUMARATE 50 MG: 25 TABLET ORAL at 08:23

## 2021-01-01 RX ADMIN — FENTANYL CITRATE 100 MCG: 50 INJECTION INTRAMUSCULAR; INTRAVENOUS at 06:38

## 2021-01-01 RX ADMIN — DEXAMETHASONE SODIUM PHOSPHATE 10 MG: 4 INJECTION, SOLUTION INTRA-ARTICULAR; INTRALESIONAL; INTRAMUSCULAR; INTRAVENOUS; SOFT TISSUE at 14:58

## 2021-01-01 RX ADMIN — FAMOTIDINE 20 MG: 10 INJECTION, SOLUTION INTRAVENOUS at 09:25

## 2021-01-01 RX ADMIN — Medication 10 ML: at 13:39

## 2021-01-01 RX ADMIN — Medication: at 08:35

## 2021-01-01 RX ADMIN — DEXAMETHASONE SODIUM PHOSPHATE 10 MG: 4 INJECTION, SOLUTION INTRA-ARTICULAR; INTRALESIONAL; INTRAMUSCULAR; INTRAVENOUS; SOFT TISSUE at 14:11

## 2021-01-01 RX ADMIN — PIPERACILLIN SODIUM AND TAZOBACTAM SODIUM 3.38 G: 3; .375 INJECTION, POWDER, LYOPHILIZED, FOR SOLUTION INTRAVENOUS at 20:43

## 2021-01-01 RX ADMIN — IBUPROFEN 400 MG: 100 SUSPENSION ORAL at 15:03

## 2021-01-01 RX ADMIN — VANCOMYCIN HYDROCHLORIDE 1250 MG: 1.25 INJECTION, POWDER, LYOPHILIZED, FOR SOLUTION INTRAVENOUS at 09:26

## 2021-01-01 RX ADMIN — DEXMEDETOMIDINE HYDROCHLORIDE 1.3 MCG/KG/HR: 4 INJECTION, SOLUTION INTRAVENOUS at 17:17

## 2021-01-01 RX ADMIN — MIDODRINE HYDROCHLORIDE 10 MG: 5 TABLET ORAL at 08:56

## 2021-01-01 RX ADMIN — Medication 10 MG/HR: at 04:24

## 2021-01-01 RX ADMIN — MIDAZOLAM 4 MG: 1 INJECTION INTRAMUSCULAR; INTRAVENOUS at 02:08

## 2021-01-01 RX ADMIN — Medication: at 18:00

## 2021-01-01 RX ADMIN — CODEINE SULFATE 30 MG: 15 TABLET ORAL at 08:25

## 2021-01-01 RX ADMIN — INSULIN GLARGINE 20 UNITS: 100 INJECTION, SOLUTION SUBCUTANEOUS at 20:00

## 2021-01-01 RX ADMIN — ALBUMIN (HUMAN) 25 G: 0.25 INJECTION, SOLUTION INTRAVENOUS at 23:00

## 2021-01-01 RX ADMIN — MIDAZOLAM 10 MG/HR: 5 INJECTION, SOLUTION INTRAMUSCULAR; INTRAVENOUS at 03:39

## 2021-01-01 RX ADMIN — Medication 7 MG/HR: at 15:23

## 2021-01-01 RX ADMIN — FENTANYL CITRATE 100 MCG: 0.05 INJECTION, SOLUTION INTRAMUSCULAR; INTRAVENOUS at 05:50

## 2021-01-01 RX ADMIN — DEXMEDETOMIDINE HYDROCHLORIDE 1.5 MCG/KG/HR: 4 INJECTION, SOLUTION INTRAVENOUS at 21:00

## 2021-01-01 RX ADMIN — ZINC SULFATE 220 MG (50 MG) CAPSULE 1 CAPSULE: CAPSULE at 08:36

## 2021-01-01 RX ADMIN — CHLORHEXIDINE GLUCONATE 15 ML: 1.2 RINSE ORAL at 08:26

## 2021-01-01 RX ADMIN — Medication 20 MG: at 08:23

## 2021-01-01 RX ADMIN — OXYCODONE HYDROCHLORIDE 10 MG: 5 TABLET ORAL at 18:09

## 2021-01-01 RX ADMIN — DIAZEPAM 10 MG: 5 TABLET ORAL at 17:11

## 2021-01-01 RX ADMIN — DEXMEDETOMIDINE HYDROCHLORIDE 1.5 MCG/KG/HR: 4 INJECTION, SOLUTION INTRAVENOUS at 07:41

## 2021-01-01 RX ADMIN — Medication 6000 UNITS: at 08:25

## 2021-01-01 RX ADMIN — Medication 3 UNITS: at 11:38

## 2021-01-01 RX ADMIN — Medication 300 MCG/HR: at 10:12

## 2021-01-01 RX ADMIN — MIDODRINE HYDROCHLORIDE 20 MG: 5 TABLET ORAL at 13:41

## 2021-01-01 RX ADMIN — PROPOFOL 50 MCG/KG/MIN: 10 INJECTION, EMULSION INTRAVENOUS at 03:28

## 2021-01-01 RX ADMIN — INSULIN LISPRO 7 UNITS: 100 INJECTION, SOLUTION INTRAVENOUS; SUBCUTANEOUS at 05:22

## 2021-01-01 RX ADMIN — Medication 4 UNITS: at 17:07

## 2021-01-01 RX ADMIN — Medication: at 08:37

## 2021-01-01 RX ADMIN — Medication 100 MCG: at 13:47

## 2021-01-01 RX ADMIN — Medication 10 ML: at 13:50

## 2021-01-01 RX ADMIN — ACETAMINOPHEN 650 MG: 160 SOLUTION ORAL at 15:00

## 2021-01-01 RX ADMIN — DEXAMETHASONE SODIUM PHOSPHATE 5 MG: 4 INJECTION, SOLUTION INTRA-ARTICULAR; INTRALESIONAL; INTRAMUSCULAR; INTRAVENOUS; SOFT TISSUE at 13:41

## 2021-01-01 RX ADMIN — ACETAMINOPHEN 650 MG: 325 TABLET ORAL at 04:20

## 2021-01-01 RX ADMIN — DEXMEDETOMIDINE HYDROCHLORIDE 1.3 MCG/KG/HR: 4 INJECTION, SOLUTION INTRAVENOUS at 10:04

## 2021-01-01 RX ADMIN — Medication: at 08:58

## 2021-01-01 RX ADMIN — DEXAMETHASONE SODIUM PHOSPHATE 10 MG: 4 INJECTION, SOLUTION INTRA-ARTICULAR; INTRALESIONAL; INTRAMUSCULAR; INTRAVENOUS; SOFT TISSUE at 14:02

## 2021-01-01 RX ADMIN — Medication 200 MCG/HR: at 01:32

## 2021-01-01 RX ADMIN — Medication 7 UNITS: at 05:24

## 2021-01-01 RX ADMIN — OXYCODONE HYDROCHLORIDE 10 MG: 5 TABLET ORAL at 11:02

## 2021-01-01 RX ADMIN — INSULIN LISPRO 4 UNITS: 100 INJECTION, SOLUTION INTRAVENOUS; SUBCUTANEOUS at 06:41

## 2021-01-01 RX ADMIN — IPRATROPIUM BROMIDE AND ALBUTEROL 1 PUFF: 20; 100 SPRAY, METERED RESPIRATORY (INHALATION) at 22:00

## 2021-01-01 RX ADMIN — Medication 1 CAPSULE: at 09:00

## 2021-01-01 RX ADMIN — VANCOMYCIN HYDROCHLORIDE 2000 MG: 10 INJECTION, POWDER, LYOPHILIZED, FOR SOLUTION INTRAVENOUS at 09:44

## 2021-01-01 RX ADMIN — CHLORHEXIDINE GLUCONATE 15 ML: 1.2 RINSE ORAL at 09:50

## 2021-01-01 RX ADMIN — POLYETHYLENE GLYCOL 3350 17 G: 17 POWDER, FOR SOLUTION ORAL at 11:56

## 2021-01-01 RX ADMIN — ACETAMINOPHEN 650 MG: 325 TABLET ORAL at 02:00

## 2021-01-01 RX ADMIN — MIDODRINE HYDROCHLORIDE 10 MG: 5 TABLET ORAL at 21:29

## 2021-01-01 RX ADMIN — DEXMEDETOMIDINE HYDROCHLORIDE 1.5 MCG/KG/HR: 4 INJECTION, SOLUTION INTRAVENOUS at 02:28

## 2021-01-01 RX ADMIN — ENOXAPARIN SODIUM 100 MG: 120 INJECTION SUBCUTANEOUS at 14:14

## 2021-01-01 RX ADMIN — CEFEPIME 2 G: 2 INJECTION, POWDER, FOR SOLUTION INTRAVENOUS at 21:12

## 2021-01-01 RX ADMIN — DEXMEDETOMIDINE HYDROCHLORIDE 1.5 MCG/KG/HR: 4 INJECTION, SOLUTION INTRAVENOUS at 18:02

## 2021-01-01 RX ADMIN — OXYCODONE HYDROCHLORIDE 10 MG: 5 TABLET ORAL at 05:25

## 2021-01-01 RX ADMIN — MIDAZOLAM HYDROCHLORIDE 5 MG: 1 INJECTION, SOLUTION INTRAMUSCULAR; INTRAVENOUS at 14:43

## 2021-01-01 RX ADMIN — GUAIFENESIN SYRUP AND DEXTROMETHORPHAN 5 ML: 100; 10 SYRUP ORAL at 06:30

## 2021-01-01 RX ADMIN — MIDODRINE HYDROCHLORIDE 10 MG: 5 TABLET ORAL at 05:37

## 2021-01-01 RX ADMIN — FENTANYL CITRATE 50 MCG/HR: 50 INJECTION INTRAVENOUS at 01:15

## 2021-01-01 RX ADMIN — Medication 10 ML: at 21:17

## 2021-01-01 RX ADMIN — DIAZEPAM 10 MG: 5 TABLET ORAL at 05:24

## 2021-01-01 RX ADMIN — Medication 2 MG/HR: at 15:34

## 2021-01-01 RX ADMIN — Medication 200 MCG/HR: at 12:08

## 2021-01-01 RX ADMIN — MIDAZOLAM HYDROCHLORIDE 5 MG: 1 INJECTION, SOLUTION INTRAMUSCULAR; INTRAVENOUS at 18:25

## 2021-01-01 RX ADMIN — ACETAMINOPHEN 650 MG: 160 SOLUTION ORAL at 03:34

## 2021-01-01 RX ADMIN — CHLORHEXIDINE GLUCONATE 15 ML: 1.2 RINSE ORAL at 08:43

## 2021-01-01 RX ADMIN — THERA TABS 1 TABLET: TAB at 09:44

## 2021-01-01 RX ADMIN — Medication 20 MG/HR: at 17:36

## 2021-01-01 RX ADMIN — Medication 20 MG/HR: at 04:31

## 2021-01-01 RX ADMIN — PROPOFOL 20 MCG/KG/MIN: 10 INJECTION, EMULSION INTRAVENOUS at 04:01

## 2021-01-01 RX ADMIN — NOREPINEPHRINE BITARTRATE 4 MCG/MIN: 1 INJECTION, SOLUTION, CONCENTRATE INTRAVENOUS at 23:12

## 2021-01-01 RX ADMIN — Medication 10 ML: at 13:13

## 2021-01-01 RX ADMIN — DIAPER RASH SKIN PROTECTENT: at 20:35

## 2021-01-01 RX ADMIN — ALBUMIN (HUMAN) 25 G: 0.25 INJECTION, SOLUTION INTRAVENOUS at 12:02

## 2021-01-01 RX ADMIN — MIDAZOLAM 10 MG/HR: 5 INJECTION, SOLUTION INTRAMUSCULAR; INTRAVENOUS at 14:37

## 2021-01-01 RX ADMIN — Medication 3 UNITS: at 19:08

## 2021-01-01 RX ADMIN — Medication: at 18:10

## 2021-01-01 RX ADMIN — GUAIFENESIN 400 MG: 200 SOLUTION ORAL at 13:22

## 2021-01-01 RX ADMIN — PIPERACILLIN SODIUM AND TAZOBACTAM SODIUM 3.38 G: 3; .375 INJECTION, POWDER, LYOPHILIZED, FOR SOLUTION INTRAVENOUS at 12:21

## 2021-01-01 RX ADMIN — MIDODRINE HYDROCHLORIDE 10 MG: 5 TABLET ORAL at 21:10

## 2021-01-01 RX ADMIN — Medication 10 UNITS: at 20:48

## 2021-01-01 RX ADMIN — CEFAZOLIN SODIUM 2 G: 1 INJECTION, POWDER, FOR SOLUTION INTRAMUSCULAR; INTRAVENOUS at 10:05

## 2021-01-01 RX ADMIN — ALBUMIN (HUMAN) 25 G: 12.5 INJECTION, SOLUTION INTRAVENOUS at 18:10

## 2021-01-01 RX ADMIN — Medication 30 ML: at 21:11

## 2021-01-01 RX ADMIN — INSULIN LISPRO 6 UNITS: 100 INJECTION, SOLUTION INTRAVENOUS; SUBCUTANEOUS at 05:50

## 2021-01-01 RX ADMIN — Medication 2000 UNITS: at 08:15

## 2021-01-01 RX ADMIN — Medication 10 ML: at 14:23

## 2021-01-01 RX ADMIN — FUROSEMIDE 40 MG: 10 INJECTION, SOLUTION INTRAMUSCULAR; INTRAVENOUS at 09:25

## 2021-01-01 RX ADMIN — FENTANYL CITRATE 200 MCG/HR: 50 INJECTION INTRAVENOUS at 11:09

## 2021-01-01 RX ADMIN — QUETIAPINE FUMARATE 50 MG: 25 TABLET ORAL at 21:11

## 2021-01-01 RX ADMIN — OXYCODONE HYDROCHLORIDE 10 MG: 5 TABLET ORAL at 00:02

## 2021-01-01 RX ADMIN — FENTANYL CITRATE 100 MCG: 50 INJECTION, SOLUTION INTRAMUSCULAR; INTRAVENOUS at 02:15

## 2021-01-01 RX ADMIN — Medication 1 PACKET: at 15:07

## 2021-01-01 RX ADMIN — Medication: at 17:37

## 2021-01-01 RX ADMIN — CHLORHEXIDINE GLUCONATE 15 ML: 1.2 RINSE ORAL at 21:03

## 2021-01-01 RX ADMIN — QUETIAPINE FUMARATE 50 MG: 25 TABLET ORAL at 16:43

## 2021-01-01 RX ADMIN — Medication 10 ML: at 05:50

## 2021-01-01 RX ADMIN — FENTANYL CITRATE 100 MCG: 0.05 INJECTION, SOLUTION INTRAMUSCULAR; INTRAVENOUS at 06:49

## 2021-01-01 RX ADMIN — Medication 300 MCG/HR: at 00:38

## 2021-01-01 RX ADMIN — THERA TABS 1 TABLET: TAB at 08:19

## 2021-01-01 RX ADMIN — DIAPER RASH SKIN PROTECTENT: at 21:54

## 2021-01-01 RX ADMIN — FUROSEMIDE 40 MG: 10 INJECTION, SOLUTION INTRAMUSCULAR; INTRAVENOUS at 08:57

## 2021-01-01 RX ADMIN — HUMAN INSULIN 20 UNITS: 100 INJECTION, SUSPENSION SUBCUTANEOUS at 18:23

## 2021-01-01 RX ADMIN — Medication 10 ML: at 22:16

## 2021-01-01 RX ADMIN — Medication 200 MCG/HR: at 09:50

## 2021-01-01 RX ADMIN — Medication 200 MCG/HR: at 00:49

## 2021-01-01 RX ADMIN — DIAPER RASH SKIN PROTECTENT: at 08:44

## 2021-01-01 RX ADMIN — DEXMEDETOMIDINE HYDROCHLORIDE 1.5 MCG/KG/HR: 4 INJECTION, SOLUTION INTRAVENOUS at 11:46

## 2021-01-01 RX ADMIN — KETAMINE HYDROCHLORIDE 100 MG: 100 INJECTION INTRAMUSCULAR; INTRAVENOUS at 06:00

## 2021-01-01 RX ADMIN — ACETAMINOPHEN 650 MG: 160 SOLUTION ORAL at 21:05

## 2021-01-01 RX ADMIN — Medication 4 UNITS: at 13:03

## 2021-01-01 RX ADMIN — OXYCODONE HYDROCHLORIDE 10 MG: 5 TABLET ORAL at 06:00

## 2021-01-01 RX ADMIN — DIAPER RASH SKIN PROTECTENT: at 23:30

## 2021-01-01 RX ADMIN — ENOXAPARIN SODIUM 40 MG: 100 INJECTION SUBCUTANEOUS at 14:14

## 2021-01-01 RX ADMIN — OXYCODONE HYDROCHLORIDE 10 MG: 5 TABLET ORAL at 23:39

## 2021-01-01 RX ADMIN — Medication 1 CAPSULE: at 08:44

## 2021-01-01 RX ADMIN — KETAMINE HYDROCHLORIDE 50 MG: 10 INJECTION, SOLUTION INTRAMUSCULAR; INTRAVENOUS at 22:05

## 2021-01-01 RX ADMIN — Medication 5 MG: at 21:18

## 2021-01-01 RX ADMIN — IBUPROFEN 400 MG: 100 SUSPENSION ORAL at 21:01

## 2021-01-01 RX ADMIN — DEXAMETHASONE SODIUM PHOSPHATE 10 MG: 4 INJECTION, SOLUTION INTRA-ARTICULAR; INTRALESIONAL; INTRAMUSCULAR; INTRAVENOUS; SOFT TISSUE at 14:27

## 2021-01-01 RX ADMIN — Medication 2 MG/HR: at 16:16

## 2021-01-01 RX ADMIN — FAMOTIDINE 20 MG: 10 INJECTION, SOLUTION INTRAVENOUS at 14:14

## 2021-01-01 RX ADMIN — Medication 18 MG/HR: at 20:54

## 2021-01-01 RX ADMIN — ENOXAPARIN SODIUM 30 MG: 100 INJECTION SUBCUTANEOUS at 18:44

## 2021-01-01 RX ADMIN — VANCOMYCIN HYDROCHLORIDE 1250 MG: 1.25 INJECTION, POWDER, LYOPHILIZED, FOR SOLUTION INTRAVENOUS at 17:34

## 2021-01-01 RX ADMIN — FAMOTIDINE 20 MG: 10 INJECTION, SOLUTION INTRAVENOUS at 20:55

## 2021-01-01 RX ADMIN — DIAZEPAM 10 MG: 5 TABLET ORAL at 12:40

## 2021-01-01 RX ADMIN — MIDODRINE HYDROCHLORIDE 10 MG: 5 TABLET ORAL at 05:30

## 2021-01-01 RX ADMIN — Medication: at 17:29

## 2021-01-01 RX ADMIN — CHLORHEXIDINE GLUCONATE 15 ML: 1.2 RINSE ORAL at 20:36

## 2021-01-01 RX ADMIN — HUMAN INSULIN 40 UNITS: 100 INJECTION, SUSPENSION SUBCUTANEOUS at 09:10

## 2021-01-01 RX ADMIN — CISATRACURIUM BESYLATE 3.5 MCG/KG/MIN: 200 INJECTION, SOLUTION INTRAVENOUS at 03:19

## 2021-01-01 RX ADMIN — Medication 19 MG/HR: at 05:10

## 2021-01-01 RX ADMIN — Medication 150 MCG/HR: at 19:25

## 2021-01-01 RX ADMIN — NOREPINEPHRINE BITARTRATE 4 MCG/MIN: 1 INJECTION, SOLUTION, CONCENTRATE INTRAVENOUS at 04:32

## 2021-01-01 RX ADMIN — MIDODRINE HYDROCHLORIDE 15 MG: 5 TABLET ORAL at 23:06

## 2021-01-01 RX ADMIN — VANCOMYCIN HYDROCHLORIDE 1250 MG: 1.25 INJECTION, POWDER, LYOPHILIZED, FOR SOLUTION INTRAVENOUS at 03:18

## 2021-01-01 RX ADMIN — Medication 300 MCG/HR: at 10:50

## 2021-01-01 RX ADMIN — Medication 10 ML: at 05:59

## 2021-01-01 RX ADMIN — ENOXAPARIN SODIUM 100 MG: 120 INJECTION SUBCUTANEOUS at 02:44

## 2021-01-01 RX ADMIN — MIDODRINE HYDROCHLORIDE 10 MG: 5 TABLET ORAL at 14:51

## 2021-01-01 RX ADMIN — HUMAN INSULIN 20 UNITS: 100 INJECTION, SUSPENSION SUBCUTANEOUS at 11:01

## 2021-01-01 RX ADMIN — Medication: at 09:46

## 2021-01-01 RX ADMIN — Medication 30 ML: at 13:52

## 2021-01-01 RX ADMIN — DIAPER RASH SKIN PROTECTENT: at 21:11

## 2021-01-01 RX ADMIN — CEFAZOLIN SODIUM 2 G: 1 INJECTION, POWDER, FOR SOLUTION INTRAMUSCULAR; INTRAVENOUS at 17:18

## 2021-01-01 RX ADMIN — INSULIN LISPRO 12 UNITS: 100 INJECTION, SOLUTION INTRAVENOUS; SUBCUTANEOUS at 06:38

## 2021-01-01 RX ADMIN — SODIUM CHLORIDE 40 MG: 9 INJECTION, SOLUTION INTRAMUSCULAR; INTRAVENOUS; SUBCUTANEOUS at 21:52

## 2021-01-01 RX ADMIN — Medication: at 08:59

## 2021-01-01 RX ADMIN — Medication 7 UNITS: at 00:26

## 2021-01-01 RX ADMIN — ACETAMINOPHEN 650 MG: 160 SOLUTION ORAL at 18:30

## 2021-01-01 RX ADMIN — DIAPER RASH SKIN PROTECTENT: at 08:10

## 2021-01-01 RX ADMIN — Medication 4 UNITS: at 00:00

## 2021-01-01 RX ADMIN — Medication 4 UNITS: at 06:23

## 2021-01-01 RX ADMIN — MIDODRINE HYDROCHLORIDE 10 MG: 5 TABLET ORAL at 21:00

## 2021-01-01 RX ADMIN — CISATRACURIUM BESYLATE 4 MCG/KG/MIN: 200 INJECTION, SOLUTION INTRAVENOUS at 15:06

## 2021-01-01 RX ADMIN — Medication 10 ML: at 14:14

## 2021-01-01 RX ADMIN — Medication 200 MCG/HR: at 15:57

## 2021-01-01 RX ADMIN — Medication 14 UNITS: at 17:56

## 2021-01-01 RX ADMIN — OXYCODONE HYDROCHLORIDE 10 MG: 5 TABLET ORAL at 05:30

## 2021-01-01 RX ADMIN — ENOXAPARIN SODIUM 30 MG: 100 INJECTION SUBCUTANEOUS at 19:20

## 2021-01-01 RX ADMIN — OXYCODONE HYDROCHLORIDE 10 MG: 5 TABLET ORAL at 05:29

## 2021-01-01 RX ADMIN — ENOXAPARIN SODIUM 30 MG: 100 INJECTION SUBCUTANEOUS at 05:23

## 2021-01-01 RX ADMIN — Medication 300 MCG/HR: at 00:02

## 2021-01-01 RX ADMIN — MIDAZOLAM HYDROCHLORIDE 5 MG: 1 INJECTION, SOLUTION INTRAMUSCULAR; INTRAVENOUS at 08:39

## 2021-01-01 RX ADMIN — FENTANYL CITRATE 100 MCG: 0.05 INJECTION, SOLUTION INTRAMUSCULAR; INTRAVENOUS at 16:15

## 2021-01-01 RX ADMIN — GUAIFENESIN SYRUP AND DEXTROMETHORPHAN 5 ML: 100; 10 SYRUP ORAL at 21:53

## 2021-01-01 RX ADMIN — Medication 300 MCG/HR: at 18:30

## 2021-01-01 RX ADMIN — INSULIN LISPRO 6 UNITS: 100 INJECTION, SOLUTION INTRAVENOUS; SUBCUTANEOUS at 05:24

## 2021-01-01 RX ADMIN — CEFAZOLIN SODIUM 2 G: 1 INJECTION, POWDER, FOR SOLUTION INTRAMUSCULAR; INTRAVENOUS at 17:49

## 2021-01-01 RX ADMIN — FENTANYL CITRATE 100 MCG: 0.05 INJECTION, SOLUTION INTRAMUSCULAR; INTRAVENOUS at 05:32

## 2021-01-01 RX ADMIN — GUAIFENESIN SYRUP AND DEXTROMETHORPHAN 5 ML: 100; 10 SYRUP ORAL at 21:48

## 2021-01-01 RX ADMIN — OXYCODONE HYDROCHLORIDE 10 MG: 5 TABLET ORAL at 18:19

## 2021-01-01 RX ADMIN — NOREPINEPHRINE BITARTRATE 4 MCG/MIN: 1 SOLUTION INTRAVENOUS at 20:52

## 2021-01-01 RX ADMIN — Medication: at 09:00

## 2021-01-01 RX ADMIN — IBUPROFEN 400 MG: 100 SUSPENSION ORAL at 22:38

## 2021-01-01 RX ADMIN — CALCIUM GLUCONATE 2 G: 20 INJECTION, SOLUTION INTRAVENOUS at 20:03

## 2021-01-01 RX ADMIN — MIDODRINE HYDROCHLORIDE 20 MG: 5 TABLET ORAL at 23:01

## 2021-01-01 RX ADMIN — QUETIAPINE FUMARATE 25 MG: 25 TABLET ORAL at 16:23

## 2021-01-01 RX ADMIN — CISATRACURIUM BESYLATE 2 MCG/KG/MIN: 2 INJECTION INTRAVENOUS at 23:28

## 2021-01-01 RX ADMIN — CEFAZOLIN SODIUM 2 G: 1 INJECTION, POWDER, FOR SOLUTION INTRAMUSCULAR; INTRAVENOUS at 17:07

## 2021-01-01 RX ADMIN — CHLOROTHIAZIDE SODIUM 500 MG: 500 INJECTION, POWDER, LYOPHILIZED, FOR SOLUTION INTRAVENOUS at 14:47

## 2021-01-01 RX ADMIN — CHLORHEXIDINE GLUCONATE 15 ML: 1.2 RINSE ORAL at 20:38

## 2021-01-01 RX ADMIN — Medication 200 MCG/HR: at 00:18

## 2021-01-01 RX ADMIN — SODIUM CHLORIDE 2 G: 9 INJECTION INTRAMUSCULAR; INTRAVENOUS; SUBCUTANEOUS at 21:02

## 2021-01-01 RX ADMIN — POLYETHYLENE GLYCOL 3350 17 G: 17 POWDER, FOR SOLUTION ORAL at 08:44

## 2021-01-01 RX ADMIN — MIDODRINE HYDROCHLORIDE 10 MG: 5 TABLET ORAL at 21:03

## 2021-01-01 RX ADMIN — Medication 300 MCG/HR: at 22:56

## 2021-01-01 RX ADMIN — THERA TABS 1 TABLET: TAB at 08:08

## 2021-01-01 RX ADMIN — ATORVASTATIN CALCIUM 20 MG: 20 TABLET, FILM COATED ORAL at 08:04

## 2021-01-01 RX ADMIN — ACETAMINOPHEN 650 MG: 325 TABLET ORAL at 08:25

## 2021-01-01 RX ADMIN — GUAIFENESIN 400 MG: 200 SOLUTION ORAL at 05:40

## 2021-01-01 RX ADMIN — HUMAN INSULIN 20 UNITS: 100 INJECTION, SUSPENSION SUBCUTANEOUS at 11:41

## 2021-01-01 RX ADMIN — DEXMEDETOMIDINE HYDROCHLORIDE 1.5 MCG/KG/HR: 4 INJECTION, SOLUTION INTRAVENOUS at 10:52

## 2021-01-01 RX ADMIN — NOREPINEPHRINE BITARTRATE 1 MCG/MIN: 1 SOLUTION INTRAVENOUS at 14:59

## 2021-01-01 RX ADMIN — Medication 10 UNITS: at 08:08

## 2021-01-01 RX ADMIN — ENOXAPARIN SODIUM 30 MG: 100 INJECTION SUBCUTANEOUS at 06:11

## 2021-01-01 RX ADMIN — OXYCODONE HYDROCHLORIDE AND ACETAMINOPHEN 500 MG: 500 TABLET ORAL at 09:24

## 2021-01-01 RX ADMIN — Medication 30 ML: at 21:03

## 2021-01-01 RX ADMIN — NOREPINEPHRINE BITARTRATE 4 MCG/MIN: 1 SOLUTION INTRAVENOUS at 13:50

## 2021-01-01 RX ADMIN — MIDAZOLAM HYDROCHLORIDE 5 MG: 1 INJECTION, SOLUTION INTRAMUSCULAR; INTRAVENOUS at 15:51

## 2021-01-01 RX ADMIN — FENTANYL CITRATE 100 MCG: 0.05 INJECTION, SOLUTION INTRAMUSCULAR; INTRAVENOUS at 18:36

## 2021-01-01 RX ADMIN — GUAIFENESIN SYRUP AND DEXTROMETHORPHAN 5 ML: 100; 10 SYRUP ORAL at 02:00

## 2021-01-01 RX ADMIN — CHLORHEXIDINE GLUCONATE 15 ML: 1.2 RINSE ORAL at 08:19

## 2021-01-01 RX ADMIN — QUETIAPINE FUMARATE 50 MG: 25 TABLET ORAL at 21:28

## 2021-01-01 RX ADMIN — Medication 200 MCG/HR: at 10:46

## 2021-01-01 RX ADMIN — SODIUM CHLORIDE 40 MG: 9 INJECTION, SOLUTION INTRAMUSCULAR; INTRAVENOUS; SUBCUTANEOUS at 20:41

## 2021-01-01 RX ADMIN — Medication 14 UNITS: at 00:00

## 2021-01-01 RX ADMIN — Medication 14 UNITS: at 06:51

## 2021-01-01 RX ADMIN — Medication 300 MCG/HR: at 16:22

## 2021-01-01 RX ADMIN — Medication 500 MG: at 20:26

## 2021-01-01 RX ADMIN — DIAZEPAM 10 MG: 5 TABLET ORAL at 05:30

## 2021-01-01 RX ADMIN — Medication 1 CAPSULE: at 10:17

## 2021-01-01 RX ADMIN — Medication 300 MCG/HR: at 15:58

## 2021-01-01 RX ADMIN — DEXMEDETOMIDINE HYDROCHLORIDE 1.5 MCG/KG/HR: 4 INJECTION, SOLUTION INTRAVENOUS at 21:28

## 2021-01-01 RX ADMIN — CHLORHEXIDINE GLUCONATE 15 ML: 1.2 RINSE ORAL at 08:35

## 2021-01-01 RX ADMIN — GUAIFENESIN 400 MG: 200 SOLUTION ORAL at 15:02

## 2021-01-01 RX ADMIN — Medication 20 MG: at 20:50

## 2021-01-01 RX ADMIN — ENOXAPARIN SODIUM 80 MG: 100 INJECTION SUBCUTANEOUS at 12:19

## 2021-01-01 RX ADMIN — Medication 3 UNITS: at 05:41

## 2021-01-01 RX ADMIN — THERA TABS 1 TABLET: TAB at 08:21

## 2021-01-01 RX ADMIN — MIDODRINE HYDROCHLORIDE 20 MG: 5 TABLET ORAL at 21:07

## 2021-01-01 RX ADMIN — MIDODRINE HYDROCHLORIDE 20 MG: 5 TABLET ORAL at 08:24

## 2021-01-01 RX ADMIN — QUETIAPINE FUMARATE 50 MG: 25 TABLET ORAL at 08:58

## 2021-01-01 RX ADMIN — Medication 125 MCG/HR: at 03:55

## 2021-01-01 RX ADMIN — MIDAZOLAM HYDROCHLORIDE 5 MG: 1 INJECTION, SOLUTION INTRAMUSCULAR; INTRAVENOUS at 12:10

## 2021-01-01 RX ADMIN — OXYCODONE HYDROCHLORIDE 10 MG: 5 TABLET ORAL at 11:10

## 2021-01-01 RX ADMIN — Medication 14 UNITS: at 00:08

## 2021-01-01 RX ADMIN — Medication 300 MCG/HR: at 19:14

## 2021-01-01 RX ADMIN — FENTANYL CITRATE 200 MCG/HR: 50 INJECTION INTRAVENOUS at 14:18

## 2021-01-01 RX ADMIN — Medication 300 MCG/HR: at 00:35

## 2021-01-01 RX ADMIN — CHLORHEXIDINE GLUCONATE 15 ML: 1.2 RINSE ORAL at 20:49

## 2021-01-01 RX ADMIN — IBUPROFEN 400 MG: 100 SUSPENSION ORAL at 23:32

## 2021-01-01 RX ADMIN — ZINC SULFATE 220 MG (50 MG) CAPSULE 1 CAPSULE: CAPSULE at 08:13

## 2021-01-01 RX ADMIN — DIAPER RASH SKIN PROTECTENT: at 08:31

## 2021-01-01 RX ADMIN — Medication 1 PACKET: at 08:23

## 2021-01-01 RX ADMIN — ENOXAPARIN SODIUM 30 MG: 100 INJECTION SUBCUTANEOUS at 06:38

## 2021-01-01 RX ADMIN — Medication 1 CAPSULE: at 20:02

## 2021-01-01 RX ADMIN — BUMETANIDE 1 MG: 0.25 INJECTION INTRAMUSCULAR; INTRAVENOUS at 12:03

## 2021-01-01 RX ADMIN — MIDAZOLAM HYDROCHLORIDE 5 MG: 1 INJECTION, SOLUTION INTRAMUSCULAR; INTRAVENOUS at 01:34

## 2021-01-01 RX ADMIN — Medication 16 MG/HR: at 00:37

## 2021-01-01 RX ADMIN — MIDODRINE HYDROCHLORIDE 20 MG: 5 TABLET ORAL at 23:42

## 2021-01-01 RX ADMIN — OXYCODONE HYDROCHLORIDE 10 MG: 5 TABLET ORAL at 00:10

## 2021-01-01 RX ADMIN — INSULIN LISPRO 3 UNITS: 100 INJECTION, SOLUTION INTRAVENOUS; SUBCUTANEOUS at 12:00

## 2021-01-01 RX ADMIN — Medication 11 MG/HR: at 09:05

## 2021-01-01 RX ADMIN — DIAZEPAM 10 MG: 5 TABLET ORAL at 17:49

## 2021-01-01 RX ADMIN — PROPOFOL 40 MCG/KG/MIN: 10 INJECTION, EMULSION INTRAVENOUS at 10:45

## 2021-01-01 RX ADMIN — DEXAMETHASONE SODIUM PHOSPHATE 10 MG: 4 INJECTION, SOLUTION INTRA-ARTICULAR; INTRALESIONAL; INTRAMUSCULAR; INTRAVENOUS; SOFT TISSUE at 14:00

## 2021-01-01 RX ADMIN — POTASSIUM CHLORIDE 20 MEQ: 400 INJECTION, SOLUTION INTRAVENOUS at 10:08

## 2021-01-01 RX ADMIN — Medication 4 UNITS: at 23:48

## 2021-01-01 RX ADMIN — Medication 150 MCG/HR: at 00:06

## 2021-01-01 RX ADMIN — CHLORHEXIDINE GLUCONATE 15 ML: 1.2 RINSE ORAL at 08:54

## 2021-01-01 RX ADMIN — Medication 1 CAPSULE: at 20:26

## 2021-01-01 RX ADMIN — FAMOTIDINE 20 MG: 10 INJECTION, SOLUTION INTRAVENOUS at 20:07

## 2021-01-01 RX ADMIN — CISATRACURIUM BESYLATE 3 MCG/KG/MIN: 200 INJECTION, SOLUTION INTRAVENOUS at 01:14

## 2021-01-01 RX ADMIN — Medication 175 MCG/HR: at 08:51

## 2021-01-01 RX ADMIN — OXYCODONE HYDROCHLORIDE 10 MG: 5 TABLET ORAL at 06:31

## 2021-01-01 RX ADMIN — ENOXAPARIN SODIUM 30 MG: 100 INJECTION SUBCUTANEOUS at 06:51

## 2021-01-01 RX ADMIN — IBUPROFEN 400 MG: 100 SUSPENSION ORAL at 02:27

## 2021-01-01 RX ADMIN — POTASSIUM CHLORIDE 20 MEQ: 29.8 INJECTION, SOLUTION INTRAVENOUS at 08:29

## 2021-01-01 RX ADMIN — Medication 300 MCG/HR: at 01:51

## 2021-01-01 RX ADMIN — DEXAMETHASONE SODIUM PHOSPHATE 20 MG: 4 INJECTION, SOLUTION INTRAMUSCULAR; INTRAVENOUS at 14:12

## 2021-01-01 RX ADMIN — GUAIFENESIN SYRUP AND DEXTROMETHORPHAN 5 ML: 100; 10 SYRUP ORAL at 11:51

## 2021-01-01 RX ADMIN — FUROSEMIDE 40 MG: 10 INJECTION, SOLUTION INTRAMUSCULAR; INTRAVENOUS at 17:33

## 2021-01-01 RX ADMIN — ENOXAPARIN SODIUM 30 MG: 100 INJECTION SUBCUTANEOUS at 18:25

## 2021-01-01 RX ADMIN — Medication 300 MCG/HR: at 07:55

## 2021-01-01 RX ADMIN — MIDODRINE HYDROCHLORIDE 10 MG: 5 TABLET ORAL at 21:37

## 2021-01-01 RX ADMIN — DEXMEDETOMIDINE HYDROCHLORIDE 1.5 MCG/KG/HR: 4 INJECTION, SOLUTION INTRAVENOUS at 18:19

## 2021-01-01 RX ADMIN — Medication 4 MCG/MIN: at 01:20

## 2021-01-01 RX ADMIN — INSULIN LISPRO 6 UNITS: 100 INJECTION, SOLUTION INTRAVENOUS; SUBCUTANEOUS at 06:58

## 2021-01-01 RX ADMIN — Medication 300 MCG/HR: at 12:48

## 2021-01-01 RX ADMIN — DIAPER RASH SKIN PROTECTENT: at 08:58

## 2021-01-01 RX ADMIN — Medication 7 MG/HR: at 17:30

## 2021-01-01 RX ADMIN — Medication 20 MG/HR: at 15:03

## 2021-01-01 RX ADMIN — MIDODRINE HYDROCHLORIDE 10 MG: 5 TABLET ORAL at 13:42

## 2021-01-01 RX ADMIN — DEXMEDETOMIDINE HYDROCHLORIDE 1.5 MCG/KG/HR: 4 INJECTION, SOLUTION INTRAVENOUS at 08:19

## 2021-01-01 RX ADMIN — Medication 1 TABLET: at 08:14

## 2021-01-01 RX ADMIN — POLYETHYLENE GLYCOL 3350 17 G: 17 POWDER, FOR SOLUTION ORAL at 09:24

## 2021-01-01 RX ADMIN — Medication 20 MG/HR: at 09:07

## 2021-01-01 RX ADMIN — DOCUSATE SODIUM 50 MG AND SENNOSIDES 8.6 MG 1 TABLET: 8.6; 5 TABLET, FILM COATED ORAL at 08:30

## 2021-01-01 RX ADMIN — ENOXAPARIN SODIUM 30 MG: 100 INJECTION SUBCUTANEOUS at 05:35

## 2021-01-01 RX ADMIN — ATORVASTATIN CALCIUM 20 MG: 20 TABLET, FILM COATED ORAL at 08:23

## 2021-01-01 RX ADMIN — Medication 6 MG/HR: at 00:21

## 2021-01-01 RX ADMIN — Medication 18 MG/HR: at 13:57

## 2021-01-01 RX ADMIN — Medication 7 MG/HR: at 23:15

## 2021-01-01 RX ADMIN — OXYCODONE HYDROCHLORIDE 10 MG: 5 TABLET ORAL at 23:20

## 2021-01-01 RX ADMIN — ENOXAPARIN SODIUM 100 MG: 100 INJECTION SUBCUTANEOUS at 14:01

## 2021-01-01 RX ADMIN — Medication 14 UNITS: at 17:49

## 2021-01-01 RX ADMIN — CEFAZOLIN SODIUM 2 G: 1 INJECTION, POWDER, FOR SOLUTION INTRAMUSCULAR; INTRAVENOUS at 09:02

## 2021-01-01 RX ADMIN — MIDODRINE HYDROCHLORIDE 20 MG: 5 TABLET ORAL at 15:40

## 2021-01-01 RX ADMIN — Medication 300 MCG/HR: at 04:14

## 2021-01-01 RX ADMIN — MIDODRINE HYDROCHLORIDE 10 MG: 5 TABLET ORAL at 14:11

## 2021-01-01 RX ADMIN — Medication 3 UNITS: at 12:01

## 2021-01-01 RX ADMIN — OXYCODONE HYDROCHLORIDE 10 MG: 5 TABLET ORAL at 05:46

## 2021-01-01 RX ADMIN — HEPARIN SODIUM 6600 UNITS: 1000 INJECTION INTRAVENOUS; SUBCUTANEOUS at 14:19

## 2021-01-01 RX ADMIN — Medication 20 MG/HR: at 12:23

## 2021-01-01 RX ADMIN — QUETIAPINE FUMARATE 50 MG: 25 TABLET ORAL at 21:09

## 2021-01-01 RX ADMIN — CHLORHEXIDINE GLUCONATE 15 ML: 1.2 RINSE ORAL at 08:14

## 2021-01-01 RX ADMIN — Medication 2000 UNITS: at 08:58

## 2021-01-01 RX ADMIN — ENOXAPARIN SODIUM 100 MG: 100 INJECTION SUBCUTANEOUS at 20:39

## 2021-01-01 RX ADMIN — Medication 14 UNITS: at 00:03

## 2021-01-01 RX ADMIN — Medication 20 MG/HR: at 18:35

## 2021-01-01 RX ADMIN — ACETAMINOPHEN 650 MG: 160 SOLUTION ORAL at 14:00

## 2021-01-01 RX ADMIN — PROPOFOL 20 MCG/KG/MIN: 10 INJECTION, EMULSION INTRAVENOUS at 09:47

## 2021-01-01 RX ADMIN — Medication: at 17:09

## 2021-01-01 RX ADMIN — Medication 10 MCG: at 10:58

## 2021-01-01 RX ADMIN — CHLORHEXIDINE GLUCONATE 15 ML: 1.2 RINSE ORAL at 08:57

## 2021-01-01 RX ADMIN — METOPROLOL TARTRATE 12.5 MG: 25 TABLET, FILM COATED ORAL at 19:56

## 2021-01-01 RX ADMIN — DIAPER RASH SKIN PROTECTENT: at 08:39

## 2021-01-01 RX ADMIN — HUMAN INSULIN 30 UNITS: 100 INJECTION, SUSPENSION SUBCUTANEOUS at 10:20

## 2021-01-01 RX ADMIN — Medication 20 MG/HR: at 09:45

## 2021-01-01 RX ADMIN — ENOXAPARIN SODIUM 30 MG: 100 INJECTION SUBCUTANEOUS at 17:44

## 2021-01-01 RX ADMIN — CISATRACURIUM BESYLATE 4 MCG/KG/MIN: 20 INJECTION, SOLUTION INTRAVENOUS at 13:12

## 2021-01-01 RX ADMIN — Medication 4 UNITS: at 11:13

## 2021-01-01 RX ADMIN — FAMOTIDINE 20 MG: 10 INJECTION, SOLUTION INTRAVENOUS at 08:35

## 2021-01-01 RX ADMIN — ENOXAPARIN SODIUM 30 MG: 100 INJECTION SUBCUTANEOUS at 18:01

## 2021-01-01 RX ADMIN — ATORVASTATIN CALCIUM 20 MG: 20 TABLET, FILM COATED ORAL at 08:33

## 2021-01-01 RX ADMIN — Medication 20 MG/HR: at 21:38

## 2021-01-01 RX ADMIN — HUMAN INSULIN 10 UNITS: 100 INJECTION, SUSPENSION SUBCUTANEOUS at 12:11

## 2021-01-01 RX ADMIN — Medication 14 UNITS: at 05:29

## 2021-01-01 RX ADMIN — VANCOMYCIN HYDROCHLORIDE 1250 MG: 1.25 INJECTION, POWDER, LYOPHILIZED, FOR SOLUTION INTRAVENOUS at 15:09

## 2021-01-01 RX ADMIN — METOCLOPRAMIDE 5 MG: 5 INJECTION, SOLUTION INTRAMUSCULAR; INTRAVENOUS at 00:04

## 2021-01-01 RX ADMIN — ACETAMINOPHEN 650 MG: 160 SOLUTION ORAL at 14:08

## 2021-01-01 RX ADMIN — DIAZEPAM 5 MG: 5 TABLET ORAL at 18:07

## 2021-01-01 RX ADMIN — ENOXAPARIN SODIUM 30 MG: 100 INJECTION SUBCUTANEOUS at 17:31

## 2021-01-01 RX ADMIN — SODIUM CHLORIDE 5 ML/HR: 900 INJECTION, SOLUTION INTRAVENOUS at 23:57

## 2021-01-01 RX ADMIN — Medication 20 MG: at 08:08

## 2021-01-01 RX ADMIN — Medication 500 MG: at 08:16

## 2021-01-01 RX ADMIN — Medication 10 MG/HR: at 10:42

## 2021-01-01 RX ADMIN — CEFEPIME 2 G: 2 INJECTION, POWDER, FOR SOLUTION INTRAVENOUS at 13:42

## 2021-01-01 RX ADMIN — Medication 40 ML: at 06:00

## 2021-01-01 RX ADMIN — ZINC SULFATE 220 MG (50 MG) CAPSULE 1 CAPSULE: CAPSULE at 10:07

## 2021-01-01 RX ADMIN — MIDAZOLAM 7 MG/HR: 5 INJECTION, SOLUTION INTRAMUSCULAR; INTRAVENOUS at 10:46

## 2021-01-01 RX ADMIN — INSULIN LISPRO 15 UNITS: 100 INJECTION, SOLUTION INTRAVENOUS; SUBCUTANEOUS at 23:07

## 2021-01-01 RX ADMIN — DIAZEPAM 5 MG: 5 TABLET ORAL at 05:39

## 2021-01-01 RX ADMIN — Medication 500 MG: at 21:48

## 2021-01-01 RX ADMIN — CISATRACURIUM BESYLATE 4 MCG/KG/MIN: 200 INJECTION, SOLUTION INTRAVENOUS at 19:21

## 2021-01-01 RX ADMIN — CEFAZOLIN SODIUM 2 G: 1 INJECTION, POWDER, FOR SOLUTION INTRAMUSCULAR; INTRAVENOUS at 03:28

## 2021-01-01 RX ADMIN — Medication 5 MG/HR: at 11:53

## 2021-01-01 RX ADMIN — VANCOMYCIN HYDROCHLORIDE 1250 MG: 1.25 INJECTION, POWDER, LYOPHILIZED, FOR SOLUTION INTRAVENOUS at 14:23

## 2021-01-01 RX ADMIN — MIDAZOLAM HYDROCHLORIDE 5 MG: 1 INJECTION, SOLUTION INTRAMUSCULAR; INTRAVENOUS at 09:58

## 2021-01-01 RX ADMIN — Medication 300 MCG/HR: at 21:37

## 2021-01-01 RX ADMIN — VANCOMYCIN HYDROCHLORIDE 1250 MG: 1.25 INJECTION, POWDER, LYOPHILIZED, FOR SOLUTION INTRAVENOUS at 08:50

## 2021-01-01 RX ADMIN — MIDAZOLAM HYDROCHLORIDE 5 MG: 1 INJECTION, SOLUTION INTRAMUSCULAR; INTRAVENOUS at 21:09

## 2021-01-01 RX ADMIN — CEFEPIME 2 G: 2 INJECTION, POWDER, FOR SOLUTION INTRAVENOUS at 13:05

## 2021-01-01 RX ADMIN — Medication: at 18:27

## 2021-01-01 RX ADMIN — INSULIN LISPRO 6 UNITS: 100 INJECTION, SOLUTION INTRAVENOUS; SUBCUTANEOUS at 00:39

## 2021-01-01 RX ADMIN — Medication 3 UNITS: at 12:10

## 2021-01-01 RX ADMIN — MIDAZOLAM HYDROCHLORIDE 5 MG: 1 INJECTION, SOLUTION INTRAMUSCULAR; INTRAVENOUS at 09:47

## 2021-01-01 RX ADMIN — Medication 14 UNITS: at 11:49

## 2021-01-01 RX ADMIN — Medication 125 MCG/HR: at 15:23

## 2021-01-01 RX ADMIN — DIAZEPAM 5 MG: 5 TABLET ORAL at 11:33

## 2021-01-01 RX ADMIN — Medication 20 MG: at 20:37

## 2021-01-01 RX ADMIN — HUMAN INSULIN 20 UNITS: 100 INJECTION, SUSPENSION SUBCUTANEOUS at 00:30

## 2021-01-01 RX ADMIN — Medication: at 08:43

## 2021-01-01 RX ADMIN — VANCOMYCIN HYDROCHLORIDE 1250 MG: 1.25 INJECTION, POWDER, LYOPHILIZED, FOR SOLUTION INTRAVENOUS at 02:05

## 2021-01-01 RX ADMIN — DEXMEDETOMIDINE HYDROCHLORIDE 1.5 MCG/KG/HR: 4 INJECTION, SOLUTION INTRAVENOUS at 10:54

## 2021-01-01 RX ADMIN — HUMAN INSULIN 30 UNITS: 100 INJECTION, SUSPENSION SUBCUTANEOUS at 18:01

## 2021-01-01 RX ADMIN — QUETIAPINE FUMARATE 25 MG: 25 TABLET ORAL at 20:42

## 2021-01-01 RX ADMIN — MIDODRINE HYDROCHLORIDE 10 MG: 5 TABLET ORAL at 13:22

## 2021-01-01 RX ADMIN — Medication 3 UNITS: at 05:46

## 2021-01-01 RX ADMIN — Medication 6000 UNITS: at 11:41

## 2021-01-01 RX ADMIN — Medication 2 MG/HR: at 03:10

## 2021-01-01 RX ADMIN — Medication 14 UNITS: at 23:56

## 2021-01-01 RX ADMIN — Medication 19 MG/HR: at 17:00

## 2021-01-01 RX ADMIN — QUETIAPINE FUMARATE 50 MG: 25 TABLET ORAL at 08:08

## 2021-01-01 RX ADMIN — Medication 20 MG/HR: at 17:30

## 2021-01-01 RX ADMIN — Medication 5 MG: at 22:37

## 2021-01-01 RX ADMIN — FENTANYL CITRATE 100 MCG: 0.05 INJECTION, SOLUTION INTRAMUSCULAR; INTRAVENOUS at 12:41

## 2021-01-01 RX ADMIN — PROPOFOL 50 MCG/KG/MIN: 10 INJECTION, EMULSION INTRAVENOUS at 08:46

## 2021-01-01 RX ADMIN — Medication 3 UNITS: at 06:51

## 2021-01-01 RX ADMIN — OXYCODONE HYDROCHLORIDE 10 MG: 5 TABLET ORAL at 12:09

## 2021-01-01 RX ADMIN — Medication 10 ML: at 06:52

## 2021-01-01 RX ADMIN — DEXMEDETOMIDINE HYDROCHLORIDE 1.5 MCG/KG/HR: 4 INJECTION, SOLUTION INTRAVENOUS at 21:35

## 2021-01-01 RX ADMIN — Medication 16 MG/HR: at 06:28

## 2021-01-01 RX ADMIN — Medication 10 MG/HR: at 18:21

## 2021-01-01 RX ADMIN — Medication 20 MG: at 08:30

## 2021-01-01 RX ADMIN — Medication 1 CAPSULE: at 08:35

## 2021-01-01 RX ADMIN — DEXMEDETOMIDINE HYDROCHLORIDE 1.5 MCG/KG/HR: 4 INJECTION, SOLUTION INTRAVENOUS at 20:56

## 2021-01-01 RX ADMIN — CEFAZOLIN SODIUM 2 G: 1 INJECTION, POWDER, FOR SOLUTION INTRAMUSCULAR; INTRAVENOUS at 09:08

## 2021-01-01 RX ADMIN — INSULIN LISPRO 3 UNITS: 100 INJECTION, SOLUTION INTRAVENOUS; SUBCUTANEOUS at 22:36

## 2021-01-01 RX ADMIN — ENOXAPARIN SODIUM 90 MG: 100 INJECTION SUBCUTANEOUS at 03:42

## 2021-01-01 RX ADMIN — Medication 20 MG: at 08:50

## 2021-01-01 RX ADMIN — MIDAZOLAM HYDROCHLORIDE 10 MG: 5 INJECTION, SOLUTION INTRAMUSCULAR; INTRAVENOUS at 06:13

## 2021-01-01 RX ADMIN — ACETAMINOPHEN 650 MG: 160 SOLUTION ORAL at 12:09

## 2021-01-01 RX ADMIN — DEXAMETHASONE SODIUM PHOSPHATE 5 MG: 4 INJECTION, SOLUTION INTRA-ARTICULAR; INTRALESIONAL; INTRAMUSCULAR; INTRAVENOUS; SOFT TISSUE at 15:22

## 2021-01-01 RX ADMIN — QUETIAPINE FUMARATE 50 MG: 25 TABLET ORAL at 08:30

## 2021-01-01 RX ADMIN — DOCUSATE SODIUM 100 MG: 50 LIQUID ORAL at 08:43

## 2021-01-01 RX ADMIN — Medication 7 MG/HR: at 17:09

## 2021-01-01 RX ADMIN — HUMAN INSULIN 30 UNITS: 100 INJECTION, SUSPENSION SUBCUTANEOUS at 08:34

## 2021-01-01 RX ADMIN — Medication 3 UNITS: at 23:46

## 2021-01-01 RX ADMIN — BUMETANIDE 2 MG: 0.25 INJECTION INTRAMUSCULAR; INTRAVENOUS at 12:17

## 2021-01-01 RX ADMIN — GUAIFENESIN SYRUP AND DEXTROMETHORPHAN 5 ML: 100; 10 SYRUP ORAL at 21:04

## 2021-01-01 RX ADMIN — Medication 20 MEQ: at 07:00

## 2021-01-01 RX ADMIN — Medication 1 PACKET: at 21:00

## 2021-01-01 RX ADMIN — Medication 10 ML: at 14:36

## 2021-01-01 RX ADMIN — MIDODRINE HYDROCHLORIDE 5 MG: 5 TABLET ORAL at 14:14

## 2021-01-01 RX ADMIN — PROPOFOL 20 MCG/KG/MIN: 10 INJECTION, EMULSION INTRAVENOUS at 02:38

## 2021-01-01 RX ADMIN — CHLORHEXIDINE GLUCONATE 15 ML: 1.2 RINSE ORAL at 20:41

## 2021-01-01 RX ADMIN — QUETIAPINE FUMARATE 25 MG: 25 TABLET ORAL at 21:05

## 2021-01-01 RX ADMIN — QUETIAPINE FUMARATE 25 MG: 25 TABLET ORAL at 08:28

## 2021-01-01 RX ADMIN — DEXMEDETOMIDINE HYDROCHLORIDE 1.5 MCG/KG/HR: 4 INJECTION, SOLUTION INTRAVENOUS at 12:40

## 2021-01-01 RX ADMIN — Medication 2000 UNITS: at 08:30

## 2021-01-01 RX ADMIN — SODIUM CHLORIDE 3 ML/HR: 900 INJECTION, SOLUTION INTRAVENOUS at 23:56

## 2021-01-01 RX ADMIN — PROPOFOL 50 MCG/KG/MIN: 10 INJECTION, EMULSION INTRAVENOUS at 11:55

## 2021-01-01 RX ADMIN — IOPAMIDOL 100 ML: 755 INJECTION, SOLUTION INTRAVENOUS at 11:39

## 2021-01-01 RX ADMIN — Medication 7 UNITS: at 18:00

## 2021-01-01 RX ADMIN — ZINC SULFATE 220 MG (50 MG) CAPSULE 1 CAPSULE: CAPSULE at 08:35

## 2021-01-01 RX ADMIN — DIAZEPAM 10 MG: 5 TABLET ORAL at 23:52

## 2021-01-01 RX ADMIN — ACETAMINOPHEN 650 MG: 160 SOLUTION ORAL at 06:12

## 2021-01-01 RX ADMIN — Medication 500 MG: at 21:53

## 2021-01-01 RX ADMIN — OXYCODONE HYDROCHLORIDE 10 MG: 5 TABLET ORAL at 05:22

## 2021-01-01 RX ADMIN — LACTULOSE 15 ML: 20 SOLUTION ORAL at 15:26

## 2021-01-01 RX ADMIN — FENTANYL CITRATE 200 MCG/HR: 50 INJECTION INTRAVENOUS at 08:12

## 2021-01-01 RX ADMIN — Medication 10 ML: at 14:53

## 2021-01-01 RX ADMIN — MIDODRINE HYDROCHLORIDE 20 MG: 5 TABLET ORAL at 05:22

## 2021-01-01 RX ADMIN — ZINC SULFATE 220 MG (50 MG) CAPSULE 1 CAPSULE: CAPSULE at 09:18

## 2021-01-01 RX ADMIN — ATORVASTATIN CALCIUM 20 MG: 20 TABLET, FILM COATED ORAL at 10:17

## 2021-01-01 RX ADMIN — ATORVASTATIN CALCIUM 20 MG: 20 TABLET, FILM COATED ORAL at 08:58

## 2021-01-01 RX ADMIN — SODIUM CHLORIDE 40 MG: 9 INJECTION, SOLUTION INTRAMUSCULAR; INTRAVENOUS; SUBCUTANEOUS at 21:24

## 2021-01-01 RX ADMIN — FAMOTIDINE 20 MG: 10 INJECTION, SOLUTION INTRAVENOUS at 22:20

## 2021-01-01 RX ADMIN — CODEINE SULFATE 30 MG: 15 TABLET ORAL at 06:31

## 2021-01-01 RX ADMIN — CHLORHEXIDINE GLUCONATE 15 ML: 1.2 RINSE ORAL at 20:03

## 2021-01-01 RX ADMIN — Medication 2 MG/HR: at 17:09

## 2021-01-01 RX ADMIN — Medication 10 ML: at 10:30

## 2021-01-01 RX ADMIN — CISATRACURIUM BESYLATE 3.5 MCG/KG/MIN: 200 INJECTION, SOLUTION INTRAVENOUS at 21:15

## 2021-01-01 RX ADMIN — Medication 10 ML: at 06:05

## 2021-01-01 RX ADMIN — INSULIN LISPRO 5 UNITS: 100 INJECTION, SOLUTION INTRAVENOUS; SUBCUTANEOUS at 23:51

## 2021-01-01 RX ADMIN — Medication 150 MCG/HR: at 09:57

## 2021-01-01 RX ADMIN — GUAIFENESIN 400 MG: 200 SOLUTION ORAL at 21:36

## 2021-01-01 RX ADMIN — Medication 10 UNITS: at 17:07

## 2021-01-01 RX ADMIN — FAMOTIDINE 20 MG: 10 INJECTION, SOLUTION INTRAVENOUS at 08:37

## 2021-01-01 RX ADMIN — DOCUSATE SODIUM 100 MG: 50 LIQUID ORAL at 08:13

## 2021-01-01 RX ADMIN — DOCUSATE SODIUM 50 MG AND SENNOSIDES 8.6 MG 1 TABLET: 8.6; 5 TABLET, FILM COATED ORAL at 08:23

## 2021-01-01 RX ADMIN — CEFEPIME 2 G: 2 INJECTION, POWDER, FOR SOLUTION INTRAVENOUS at 13:46

## 2021-01-01 RX ADMIN — Medication: at 10:08

## 2021-01-01 RX ADMIN — Medication 20 MG: at 08:58

## 2021-01-01 RX ADMIN — Medication 20 MG: at 09:06

## 2021-01-01 RX ADMIN — VANCOMYCIN HYDROCHLORIDE 1250 MG: 1.25 INJECTION, POWDER, LYOPHILIZED, FOR SOLUTION INTRAVENOUS at 09:56

## 2021-01-01 RX ADMIN — CEFEPIME 2 G: 2 INJECTION, POWDER, FOR SOLUTION INTRAVENOUS at 06:28

## 2021-01-01 RX ADMIN — GUAIFENESIN 400 MG: 200 SOLUTION ORAL at 13:42

## 2021-01-01 RX ADMIN — ATORVASTATIN CALCIUM 20 MG: 20 TABLET, FILM COATED ORAL at 08:13

## 2021-01-01 RX ADMIN — ALBUMIN (HUMAN) 25 G: 0.25 INJECTION, SOLUTION INTRAVENOUS at 17:02

## 2021-01-01 RX ADMIN — Medication 20 MG: at 08:44

## 2021-01-01 RX ADMIN — DIAPER RASH SKIN PROTECTENT: at 20:17

## 2021-01-01 RX ADMIN — INSULIN LISPRO 7 UNITS: 100 INJECTION, SOLUTION INTRAVENOUS; SUBCUTANEOUS at 13:48

## 2021-01-01 RX ADMIN — DIAZEPAM 10 MG: 5 TABLET ORAL at 23:57

## 2021-01-01 RX ADMIN — Medication 30 ML: at 05:29

## 2021-01-01 RX ADMIN — Medication 20 MG/HR: at 02:08

## 2021-01-01 RX ADMIN — ENOXAPARIN SODIUM 80 MG: 100 INJECTION SUBCUTANEOUS at 23:58

## 2021-01-01 RX ADMIN — ATORVASTATIN CALCIUM 20 MG: 20 TABLET, FILM COATED ORAL at 08:57

## 2021-01-01 RX ADMIN — OXYCODONE HYDROCHLORIDE AND ACETAMINOPHEN 500 MG: 500 TABLET ORAL at 10:07

## 2021-01-01 RX ADMIN — Medication: at 09:47

## 2021-01-01 RX ADMIN — Medication 7 UNITS: at 05:22

## 2021-01-01 RX ADMIN — OXYCODONE HYDROCHLORIDE 10 MG: 5 TABLET ORAL at 23:01

## 2021-01-01 RX ADMIN — Medication 10 ML: at 13:14

## 2021-01-01 RX ADMIN — VANCOMYCIN HYDROCHLORIDE 1250 MG: 1.25 INJECTION, POWDER, LYOPHILIZED, FOR SOLUTION INTRAVENOUS at 23:36

## 2021-01-01 RX ADMIN — DIAPER RASH SKIN PROTECTENT: at 10:02

## 2021-01-01 RX ADMIN — VANCOMYCIN HYDROCHLORIDE 1250 MG: 1.25 INJECTION, POWDER, LYOPHILIZED, FOR SOLUTION INTRAVENOUS at 15:36

## 2021-01-01 RX ADMIN — ENOXAPARIN SODIUM 80 MG: 100 INJECTION SUBCUTANEOUS at 11:15

## 2021-01-01 RX ADMIN — PROPOFOL 50 MCG/KG/MIN: 10 INJECTION, EMULSION INTRAVENOUS at 23:52

## 2021-01-01 RX ADMIN — Medication 300 MCG/HR: at 05:08

## 2021-01-01 RX ADMIN — ATORVASTATIN CALCIUM 20 MG: 20 TABLET, FILM COATED ORAL at 10:07

## 2021-01-01 RX ADMIN — Medication 3 UNITS: at 11:41

## 2021-01-01 RX ADMIN — POTASSIUM CHLORIDE 20 MEQ: 29.8 INJECTION, SOLUTION INTRAVENOUS at 08:35

## 2021-01-01 RX ADMIN — OXYCODONE HYDROCHLORIDE 10 MG: 5 TABLET ORAL at 11:55

## 2021-01-01 RX ADMIN — Medication 4 UNITS: at 00:15

## 2021-01-01 RX ADMIN — QUETIAPINE FUMARATE 50 MG: 25 TABLET ORAL at 15:09

## 2021-01-01 RX ADMIN — Medication 3 UNITS: at 17:22

## 2021-01-01 RX ADMIN — DEXMEDETOMIDINE HYDROCHLORIDE 1.5 MCG/KG/HR: 4 INJECTION, SOLUTION INTRAVENOUS at 17:19

## 2021-01-01 RX ADMIN — Medication 1 CAPSULE: at 22:38

## 2021-01-01 RX ADMIN — Medication: at 08:22

## 2021-01-01 RX ADMIN — WATER 1 MG: 1 INJECTION INTRAMUSCULAR; INTRAVENOUS; SUBCUTANEOUS at 00:10

## 2021-01-01 RX ADMIN — Medication 500 MG: at 08:53

## 2021-01-01 RX ADMIN — Medication: at 09:51

## 2021-01-01 RX ADMIN — DIAZEPAM 10 MG: 5 TABLET ORAL at 00:10

## 2021-01-01 RX ADMIN — CHLORHEXIDINE GLUCONATE 15 ML: 1.2 RINSE ORAL at 08:37

## 2021-01-01 RX ADMIN — Medication 3 UNITS: at 11:01

## 2021-01-01 RX ADMIN — CALCIUM GLUCONATE 2 G: 20 INJECTION, SOLUTION INTRAVENOUS at 06:49

## 2021-01-01 RX ADMIN — MIDODRINE HYDROCHLORIDE 20 MG: 5 TABLET ORAL at 05:39

## 2021-01-01 RX ADMIN — Medication: at 18:25

## 2021-01-01 RX ADMIN — FUROSEMIDE 20 MG: 10 INJECTION, SOLUTION INTRAMUSCULAR; INTRAVENOUS at 16:19

## 2021-01-01 RX ADMIN — FENTANYL CITRATE 175 MCG/HR: 50 INJECTION INTRAVENOUS at 22:13

## 2021-01-01 RX ADMIN — Medication 10 UNITS: at 09:07

## 2021-01-01 RX ADMIN — Medication 20 MG/HR: at 11:08

## 2021-01-01 RX ADMIN — HUMAN INSULIN 20 UNITS: 100 INJECTION, SUSPENSION SUBCUTANEOUS at 06:38

## 2021-01-01 RX ADMIN — Medication 300 MCG/HR: at 18:20

## 2021-01-01 RX ADMIN — HUMAN INSULIN 20 UNITS: 100 INJECTION, SUSPENSION SUBCUTANEOUS at 00:42

## 2021-01-01 RX ADMIN — Medication 14 UNITS: at 11:38

## 2021-01-01 RX ADMIN — Medication 20 MG/HR: at 17:11

## 2021-01-01 RX ADMIN — DEXMEDETOMIDINE HYDROCHLORIDE 1.5 MCG/KG/HR: 4 INJECTION, SOLUTION INTRAVENOUS at 11:14

## 2021-01-01 RX ADMIN — POLYETHYLENE GLYCOL 3350 17 G: 17 POWDER, FOR SOLUTION ORAL at 08:47

## 2021-01-01 RX ADMIN — Medication 3 UNITS: at 17:05

## 2021-01-01 RX ADMIN — CHLORHEXIDINE GLUCONATE 15 ML: 1.2 RINSE ORAL at 09:16

## 2021-01-01 RX ADMIN — DOCUSATE SODIUM 50 MG AND SENNOSIDES 8.6 MG 1 TABLET: 8.6; 5 TABLET, FILM COATED ORAL at 08:44

## 2021-01-01 RX ADMIN — Medication 300 MCG/HR: at 03:12

## 2021-01-01 RX ADMIN — Medication 30 MCG/KG/MIN: at 00:51

## 2021-01-01 RX ADMIN — HUMAN INSULIN 20 UNITS: 100 INJECTION, SUSPENSION SUBCUTANEOUS at 05:36

## 2021-01-01 RX ADMIN — Medication 200 MCG/HR: at 23:30

## 2021-01-01 RX ADMIN — HUMAN INSULIN 30 UNITS: 100 INJECTION, SUSPENSION SUBCUTANEOUS at 01:26

## 2021-01-01 RX ADMIN — HUMAN INSULIN 20 UNITS: 100 INJECTION, SUSPENSION SUBCUTANEOUS at 17:29

## 2021-01-01 RX ADMIN — Medication 300 MCG/HR: at 05:30

## 2021-01-01 RX ADMIN — MIDODRINE HYDROCHLORIDE 20 MG: 5 TABLET ORAL at 13:13

## 2021-01-01 RX ADMIN — DIAZEPAM 10 MG: 5 TABLET ORAL at 05:26

## 2021-01-01 RX ADMIN — Medication 7 MG/HR: at 09:04

## 2021-01-01 RX ADMIN — ENOXAPARIN SODIUM 30 MG: 100 INJECTION SUBCUTANEOUS at 05:45

## 2021-01-01 RX ADMIN — ENOXAPARIN SODIUM 30 MG: 100 INJECTION SUBCUTANEOUS at 05:36

## 2021-01-01 RX ADMIN — Medication 3 UNITS: at 12:12

## 2021-01-01 RX ADMIN — IPRATROPIUM BROMIDE AND ALBUTEROL 1 PUFF: 20; 100 SPRAY, METERED RESPIRATORY (INHALATION) at 09:00

## 2021-01-01 RX ADMIN — HUMAN INSULIN 20 UNITS: 100 INJECTION, SUSPENSION SUBCUTANEOUS at 00:26

## 2021-01-01 RX ADMIN — Medication 10 UNITS: at 20:15

## 2021-01-01 RX ADMIN — BUMETANIDE 2 MG: 0.25 INJECTION INTRAMUSCULAR; INTRAVENOUS at 14:47

## 2021-01-01 RX ADMIN — DIAZEPAM 10 MG: 5 TABLET ORAL at 23:54

## 2021-01-01 RX ADMIN — METOPROLOL TARTRATE 2.5 MG: 5 INJECTION INTRAVENOUS at 05:21

## 2021-01-01 RX ADMIN — VANCOMYCIN HYDROCHLORIDE 1250 MG: 1.25 INJECTION, POWDER, LYOPHILIZED, FOR SOLUTION INTRAVENOUS at 09:06

## 2021-01-01 RX ADMIN — Medication 7 UNITS: at 23:46

## 2021-01-01 RX ADMIN — DEXMEDETOMIDINE HYDROCHLORIDE 1.5 MCG/KG/HR: 4 INJECTION, SOLUTION INTRAVENOUS at 03:19

## 2021-01-01 RX ADMIN — Medication 3 UNITS: at 05:35

## 2021-01-01 RX ADMIN — OXYCODONE HYDROCHLORIDE AND ACETAMINOPHEN 500 MG: 500 TABLET ORAL at 08:30

## 2021-01-01 RX ADMIN — Medication 18 MG/HR: at 02:52

## 2021-01-01 RX ADMIN — MIDODRINE HYDROCHLORIDE 20 MG: 5 TABLET ORAL at 16:16

## 2021-01-01 RX ADMIN — PIPERACILLIN SODIUM AND TAZOBACTAM SODIUM 3.38 G: 3; .375 INJECTION, POWDER, LYOPHILIZED, FOR SOLUTION INTRAVENOUS at 05:27

## 2021-01-01 RX ADMIN — Medication 500 MG: at 20:41

## 2021-01-01 RX ADMIN — ENOXAPARIN SODIUM 100 MG: 100 INJECTION SUBCUTANEOUS at 21:48

## 2021-01-01 RX ADMIN — ACETAMINOPHEN 650 MG: 160 SOLUTION ORAL at 00:29

## 2021-01-01 RX ADMIN — Medication 3 UNITS: at 05:02

## 2021-01-01 RX ADMIN — Medication 10 ML: at 13:43

## 2021-01-01 RX ADMIN — Medication 250 MCG/HR: at 23:37

## 2021-01-01 RX ADMIN — Medication 175 MCG/HR: at 16:46

## 2021-01-01 RX ADMIN — FENTANYL CITRATE 175 MCG/HR: 50 INJECTION INTRAVENOUS at 08:05

## 2021-01-01 RX ADMIN — PIPERACILLIN SODIUM AND TAZOBACTAM SODIUM 3.38 G: 3; .375 INJECTION, POWDER, LYOPHILIZED, FOR SOLUTION INTRAVENOUS at 05:38

## 2021-01-01 RX ADMIN — LACTULOSE 15 ML: 20 SOLUTION ORAL at 21:09

## 2021-01-01 RX ADMIN — QUETIAPINE FUMARATE 50 MG: 25 TABLET ORAL at 15:26

## 2021-01-01 RX ADMIN — Medication 6000 UNITS: at 08:36

## 2021-01-01 RX ADMIN — THERA TABS 1 TABLET: TAB at 10:07

## 2021-01-01 RX ADMIN — Medication 1 PACKET: at 08:37

## 2021-01-01 RX ADMIN — OXYCODONE HYDROCHLORIDE 10 MG: 5 TABLET ORAL at 23:48

## 2021-01-01 RX ADMIN — NOREPINEPHRINE BITARTRATE 4 MCG/MIN: 1 INJECTION, SOLUTION, CONCENTRATE INTRAVENOUS at 19:49

## 2021-01-01 RX ADMIN — OXYCODONE HYDROCHLORIDE 10 MG: 5 TABLET ORAL at 23:52

## 2021-01-01 RX ADMIN — CISATRACURIUM BESYLATE 4 MCG/KG/MIN: 200 INJECTION, SOLUTION INTRAVENOUS at 00:14

## 2021-01-01 RX ADMIN — FUROSEMIDE 40 MG: 10 INJECTION, SOLUTION INTRAVENOUS at 09:26

## 2021-01-01 RX ADMIN — OXYCODONE HYDROCHLORIDE 10 MG: 5 TABLET ORAL at 05:24

## 2021-01-01 RX ADMIN — OXYCODONE HYDROCHLORIDE AND ACETAMINOPHEN 500 MG: 500 TABLET ORAL at 08:46

## 2021-01-01 RX ADMIN — Medication 200 MCG/HR: at 03:50

## 2021-01-01 RX ADMIN — CISATRACURIUM BESYLATE 4 MCG/KG/MIN: 200 INJECTION, SOLUTION INTRAVENOUS at 00:12

## 2021-01-01 RX ADMIN — QUETIAPINE FUMARATE 50 MG: 25 TABLET ORAL at 20:57

## 2021-01-01 RX ADMIN — Medication 20 MG/HR: at 10:07

## 2021-01-01 RX ADMIN — DIAPER RASH SKIN PROTECTENT: at 09:06

## 2021-01-01 RX ADMIN — Medication 10 ML: at 06:00

## 2021-01-01 RX ADMIN — ATORVASTATIN CALCIUM 20 MG: 20 TABLET, FILM COATED ORAL at 08:37

## 2021-01-01 RX ADMIN — DEXMEDETOMIDINE HYDROCHLORIDE 1.5 MCG/KG/HR: 4 INJECTION, SOLUTION INTRAVENOUS at 23:43

## 2021-01-01 RX ADMIN — Medication 10 ML: at 21:47

## 2021-01-01 RX ADMIN — INSULIN GLARGINE 30 UNITS: 100 INJECTION, SOLUTION SUBCUTANEOUS at 20:12

## 2021-01-01 RX ADMIN — FAMOTIDINE 20 MG: 10 INJECTION, SOLUTION INTRAVENOUS at 21:09

## 2021-01-01 RX ADMIN — Medication 1 PACKET: at 21:05

## 2021-01-01 RX ADMIN — Medication 20 MG/HR: at 16:22

## 2021-01-01 RX ADMIN — Medication 10 ML: at 14:48

## 2021-01-01 RX ADMIN — DIAZEPAM 5 MG: 5 TABLET ORAL at 05:23

## 2021-01-01 RX ADMIN — INSULIN LISPRO 9 UNITS: 100 INJECTION, SOLUTION INTRAVENOUS; SUBCUTANEOUS at 12:39

## 2021-01-01 RX ADMIN — DIAPER RASH SKIN PROTECTENT: at 21:07

## 2021-01-01 RX ADMIN — INSULIN LISPRO 10 UNITS: 100 INJECTION, SOLUTION INTRAVENOUS; SUBCUTANEOUS at 17:49

## 2021-01-01 RX ADMIN — ACETAMINOPHEN 650 MG: 160 SOLUTION ORAL at 13:21

## 2021-01-01 RX ADMIN — Medication 30 ML: at 05:31

## 2021-01-01 RX ADMIN — Medication 10 ML: at 06:37

## 2021-01-01 RX ADMIN — Medication 20 ML: at 20:48

## 2021-01-01 RX ADMIN — Medication 10 MCG: at 09:18

## 2021-01-01 RX ADMIN — ENOXAPARIN SODIUM 30 MG: 100 INJECTION SUBCUTANEOUS at 17:33

## 2021-01-01 RX ADMIN — Medication 2000 UNITS: at 09:44

## 2021-01-01 RX ADMIN — Medication 15 MG/HR: at 20:52

## 2021-01-01 RX ADMIN — FUROSEMIDE 20 MG: 10 INJECTION, SOLUTION INTRAMUSCULAR; INTRAVENOUS at 09:03

## 2021-01-01 RX ADMIN — DIAZEPAM 5 MG: 5 TABLET ORAL at 17:49

## 2021-01-01 RX ADMIN — Medication 3 UNITS: at 11:49

## 2021-01-01 RX ADMIN — ATORVASTATIN CALCIUM 20 MG: 20 TABLET, FILM COATED ORAL at 08:26

## 2021-01-01 RX ADMIN — Medication: at 08:24

## 2021-01-01 RX ADMIN — DIAZEPAM 5 MG: 5 TABLET ORAL at 11:02

## 2021-01-01 RX ADMIN — CHLORHEXIDINE GLUCONATE 15 ML: 1.2 RINSE ORAL at 08:50

## 2021-01-01 RX ADMIN — Medication 9 MG/HR: at 16:46

## 2021-01-01 RX ADMIN — INSULIN LISPRO 3 UNITS: 100 INJECTION, SOLUTION INTRAVENOUS; SUBCUTANEOUS at 21:44

## 2021-01-01 RX ADMIN — ACETAMINOPHEN 650 MG: 160 SOLUTION ORAL at 06:46

## 2021-01-01 RX ADMIN — Medication 20 MG/HR: at 17:18

## 2021-01-01 RX ADMIN — ACETAMINOPHEN 650 MG: 160 SOLUTION ORAL at 04:03

## 2021-01-01 RX ADMIN — VANCOMYCIN HYDROCHLORIDE 1250 MG: 1.25 INJECTION, POWDER, LYOPHILIZED, FOR SOLUTION INTRAVENOUS at 01:11

## 2021-01-01 RX ADMIN — WATER 1 MG: 1 INJECTION INTRAMUSCULAR; INTRAVENOUS; SUBCUTANEOUS at 21:04

## 2021-01-01 RX ADMIN — MIDODRINE HYDROCHLORIDE 20 MG: 5 TABLET ORAL at 08:33

## 2021-01-01 RX ADMIN — DIAZEPAM 10 MG: 5 TABLET ORAL at 18:44

## 2021-01-01 RX ADMIN — MIDAZOLAM 10 MG/HR: 5 INJECTION, SOLUTION INTRAMUSCULAR; INTRAVENOUS at 03:50

## 2021-01-01 RX ADMIN — ACETAMINOPHEN 650 MG: 160 SOLUTION ORAL at 17:35

## 2021-01-01 RX ADMIN — ACETAMINOPHEN 650 MG: 325 TABLET ORAL at 03:42

## 2021-01-01 RX ADMIN — Medication: at 08:14

## 2021-01-01 RX ADMIN — PROPOFOL 50 MCG/KG/MIN: 10 INJECTION, EMULSION INTRAVENOUS at 19:57

## 2021-01-01 RX ADMIN — DIAZEPAM 10 MG: 5 TABLET ORAL at 12:10

## 2021-01-01 RX ADMIN — Medication 10 MCG: at 09:24

## 2021-01-01 RX ADMIN — CEFEPIME 2 G: 2 INJECTION, POWDER, FOR SOLUTION INTRAVENOUS at 05:36

## 2021-01-01 RX ADMIN — Medication 125 MCG/HR: at 12:45

## 2021-01-01 RX ADMIN — Medication 2 MG/HR: at 14:46

## 2021-01-01 RX ADMIN — ATORVASTATIN CALCIUM 20 MG: 20 TABLET, FILM COATED ORAL at 09:24

## 2021-01-01 RX ADMIN — OXYCODONE HYDROCHLORIDE 10 MG: 5 TABLET ORAL at 00:52

## 2021-01-01 RX ADMIN — OXYCODONE HYDROCHLORIDE AND ACETAMINOPHEN 500 MG: 500 TABLET ORAL at 08:44

## 2021-01-01 RX ADMIN — CHLOROTHIAZIDE SODIUM 500 MG: 500 INJECTION, POWDER, LYOPHILIZED, FOR SOLUTION INTRAVENOUS at 08:58

## 2021-01-01 RX ADMIN — Medication 20 MG: at 21:05

## 2021-01-01 RX ADMIN — Medication 2000 UNITS: at 08:33

## 2021-01-01 RX ADMIN — Medication 2000 UNITS: at 08:19

## 2021-01-01 RX ADMIN — SODIUM CHLORIDE 3 ML/HR: 900 INJECTION, SOLUTION INTRAVENOUS at 00:00

## 2021-01-01 RX ADMIN — QUETIAPINE FUMARATE 50 MG: 25 TABLET ORAL at 08:19

## 2021-01-01 RX ADMIN — INSULIN HUMAN 10 UNITS: 100 INJECTION, SOLUTION PARENTERAL at 23:45

## 2021-01-01 RX ADMIN — Medication: at 19:40

## 2021-01-01 RX ADMIN — CHLORHEXIDINE GLUCONATE 15 ML: 1.2 RINSE ORAL at 21:30

## 2021-01-01 RX ADMIN — DIAZEPAM 10 MG: 5 TABLET ORAL at 23:01

## 2021-01-01 RX ADMIN — PROPOFOL 50 MCG/KG/MIN: 10 INJECTION, EMULSION INTRAVENOUS at 22:57

## 2021-01-01 RX ADMIN — Medication 1 TABLET: at 09:00

## 2021-01-01 RX ADMIN — MIDODRINE HYDROCHLORIDE 10 MG: 5 TABLET ORAL at 14:58

## 2021-01-01 RX ADMIN — INSULIN LISPRO 6 UNITS: 100 INJECTION, SOLUTION INTRAVENOUS; SUBCUTANEOUS at 11:30

## 2021-01-01 RX ADMIN — CEFAZOLIN SODIUM 2 G: 1 INJECTION, POWDER, FOR SOLUTION INTRAMUSCULAR; INTRAVENOUS at 09:00

## 2021-01-01 RX ADMIN — DEXMEDETOMIDINE HYDROCHLORIDE 0.4 MCG/KG/HR: 100 INJECTION, SOLUTION INTRAVENOUS at 05:29

## 2021-01-01 RX ADMIN — ENOXAPARIN SODIUM 30 MG: 100 INJECTION SUBCUTANEOUS at 06:20

## 2021-01-01 RX ADMIN — Medication 10 ML: at 21:30

## 2021-01-01 RX ADMIN — MIDODRINE HYDROCHLORIDE 10 MG: 5 TABLET ORAL at 13:59

## 2021-01-01 RX ADMIN — Medication 10 MG/HR: at 02:29

## 2021-01-01 RX ADMIN — ACETAMINOPHEN 650 MG: 160 SOLUTION ORAL at 11:45

## 2021-01-01 RX ADMIN — MIDODRINE HYDROCHLORIDE 20 MG: 5 TABLET ORAL at 14:53

## 2021-01-01 RX ADMIN — PIPERACILLIN SODIUM AND TAZOBACTAM SODIUM 3.38 G: 3; .375 INJECTION, POWDER, LYOPHILIZED, FOR SOLUTION INTRAVENOUS at 20:49

## 2021-01-01 RX ADMIN — OXYCODONE HYDROCHLORIDE AND ACETAMINOPHEN 500 MG: 500 TABLET ORAL at 08:56

## 2021-01-01 RX ADMIN — Medication 300 MCG/HR: at 08:40

## 2021-01-01 RX ADMIN — Medication: at 09:45

## 2021-01-01 RX ADMIN — ATORVASTATIN CALCIUM 20 MG: 20 TABLET, FILM COATED ORAL at 08:44

## 2021-01-01 RX ADMIN — MIDAZOLAM HYDROCHLORIDE 5 MG: 1 INJECTION, SOLUTION INTRAMUSCULAR; INTRAVENOUS at 01:45

## 2021-01-01 RX ADMIN — MAGNESIUM SULFATE HEPTAHYDRATE 1 G: 1 INJECTION, SOLUTION INTRAVENOUS at 08:35

## 2021-01-01 RX ADMIN — ENOXAPARIN SODIUM 30 MG: 100 INJECTION SUBCUTANEOUS at 05:50

## 2021-01-01 RX ADMIN — VANCOMYCIN HYDROCHLORIDE 1250 MG: 1.25 INJECTION, POWDER, LYOPHILIZED, FOR SOLUTION INTRAVENOUS at 09:45

## 2021-01-01 RX ADMIN — VANCOMYCIN HYDROCHLORIDE 1250 MG: 1.25 INJECTION, POWDER, LYOPHILIZED, FOR SOLUTION INTRAVENOUS at 17:59

## 2021-01-01 RX ADMIN — CHLORHEXIDINE GLUCONATE 15 ML: 1.2 RINSE ORAL at 08:45

## 2021-01-01 RX ADMIN — DEXAMETHASONE SODIUM PHOSPHATE 20 MG: 4 INJECTION, SOLUTION INTRAMUSCULAR; INTRAVENOUS at 17:07

## 2021-01-01 RX ADMIN — MIDODRINE HYDROCHLORIDE 10 MG: 5 TABLET ORAL at 21:47

## 2021-01-01 RX ADMIN — DEXAMETHASONE SODIUM PHOSPHATE 10 MG: 4 INJECTION, SOLUTION INTRA-ARTICULAR; INTRALESIONAL; INTRAMUSCULAR; INTRAVENOUS; SOFT TISSUE at 14:51

## 2021-01-01 RX ADMIN — MIDAZOLAM HYDROCHLORIDE 5 MG: 1 INJECTION, SOLUTION INTRAMUSCULAR; INTRAVENOUS at 23:50

## 2021-01-01 RX ADMIN — ACETAMINOPHEN 650 MG: 160 SOLUTION ORAL at 12:04

## 2021-01-01 RX ADMIN — QUETIAPINE FUMARATE 50 MG: 25 TABLET ORAL at 10:00

## 2021-01-01 RX ADMIN — Medication 10 UNITS: at 08:43

## 2021-01-01 RX ADMIN — HUMAN INSULIN 20 UNITS: 100 INJECTION, SUSPENSION SUBCUTANEOUS at 06:57

## 2021-01-01 RX ADMIN — Medication 10 ML: at 05:35

## 2021-01-01 RX ADMIN — OXYCODONE HYDROCHLORIDE AND ACETAMINOPHEN 500 MG: 500 TABLET ORAL at 09:18

## 2021-01-01 RX ADMIN — CHLOROTHIAZIDE SODIUM 500 MG: 500 INJECTION, POWDER, LYOPHILIZED, FOR SOLUTION INTRAVENOUS at 08:44

## 2021-01-01 RX ADMIN — PIPERACILLIN SODIUM AND TAZOBACTAM SODIUM 3.38 G: 3; .375 INJECTION, POWDER, LYOPHILIZED, FOR SOLUTION INTRAVENOUS at 12:11

## 2021-01-01 RX ADMIN — Medication 6000 UNITS: at 08:50

## 2021-01-01 RX ADMIN — CHLORHEXIDINE GLUCONATE 15 ML: 1.2 RINSE ORAL at 20:32

## 2021-01-01 RX ADMIN — Medication 20 MG: at 08:36

## 2021-01-01 RX ADMIN — MIDODRINE HYDROCHLORIDE 10 MG: 5 TABLET ORAL at 06:32

## 2021-01-01 RX ADMIN — Medication 3 UNITS: at 18:16

## 2021-01-01 RX ADMIN — Medication 3 UNITS: at 17:46

## 2021-01-01 RX ADMIN — CODEINE SULFATE 30 MG: 15 TABLET ORAL at 21:53

## 2021-01-01 RX ADMIN — INSULIN GLARGINE 10 UNITS: 100 INJECTION, SOLUTION SUBCUTANEOUS at 08:16

## 2021-01-01 RX ADMIN — INSULIN LISPRO 9 UNITS: 100 INJECTION, SOLUTION INTRAVENOUS; SUBCUTANEOUS at 17:16

## 2021-01-01 RX ADMIN — ENOXAPARIN SODIUM 30 MG: 100 INJECTION SUBCUTANEOUS at 06:31

## 2021-01-01 RX ADMIN — FENTANYL CITRATE 200 MCG/HR: 50 INJECTION INTRAVENOUS at 01:54

## 2021-01-01 RX ADMIN — Medication 14 MG/HR: at 21:42

## 2021-01-01 RX ADMIN — Medication 10 ML: at 21:54

## 2021-01-01 RX ADMIN — FAMOTIDINE 20 MG: 10 INJECTION, SOLUTION INTRAVENOUS at 20:41

## 2021-01-01 RX ADMIN — Medication 4 UNITS: at 00:08

## 2021-01-01 RX ADMIN — OXYCODONE HYDROCHLORIDE AND ACETAMINOPHEN 500 MG: 500 TABLET ORAL at 08:37

## 2021-01-01 RX ADMIN — Medication 3 UNITS: at 18:03

## 2021-01-01 RX ADMIN — SODIUM CHLORIDE 40 MG: 9 INJECTION, SOLUTION INTRAMUSCULAR; INTRAVENOUS; SUBCUTANEOUS at 22:36

## 2021-01-01 RX ADMIN — CISATRACURIUM BESYLATE 1 MCG/KG/MIN: 2 INJECTION INTRAVENOUS at 07:26

## 2021-01-01 RX ADMIN — FENTANYL CITRATE 100 MCG: 0.05 INJECTION, SOLUTION INTRAMUSCULAR; INTRAVENOUS at 01:45

## 2021-01-01 RX ADMIN — INSULIN LISPRO 6 UNITS: 100 INJECTION, SOLUTION INTRAVENOUS; SUBCUTANEOUS at 16:30

## 2021-01-01 RX ADMIN — ENOXAPARIN SODIUM 40 MG: 100 INJECTION SUBCUTANEOUS at 15:15

## 2021-01-01 RX ADMIN — DEXAMETHASONE SODIUM PHOSPHATE 6 MG: 4 INJECTION, SOLUTION INTRAMUSCULAR; INTRAVENOUS at 17:06

## 2021-01-01 RX ADMIN — Medication 300 MCG/HR: at 09:40

## 2021-01-01 RX ADMIN — CHLORHEXIDINE GLUCONATE 15 ML: 1.2 RINSE ORAL at 21:39

## 2021-01-01 RX ADMIN — Medication 500 MG: at 08:15

## 2021-01-01 RX ADMIN — MIDAZOLAM HYDROCHLORIDE 5 MG: 1 INJECTION, SOLUTION INTRAMUSCULAR; INTRAVENOUS at 16:30

## 2021-01-01 RX ADMIN — DIAPER RASH SKIN PROTECTENT: at 08:20

## 2021-01-01 RX ADMIN — DIAPER RASH SKIN PROTECTENT: at 21:38

## 2021-01-01 RX ADMIN — Medication: at 17:16

## 2021-01-01 RX ADMIN — ACETAMINOPHEN 650 MG: 650 SUPPOSITORY RECTAL at 09:30

## 2021-01-01 RX ADMIN — CEFAZOLIN SODIUM 2 G: 1 INJECTION, POWDER, FOR SOLUTION INTRAMUSCULAR; INTRAVENOUS at 19:13

## 2021-01-01 RX ADMIN — CHLORHEXIDINE GLUCONATE 15 ML: 1.2 RINSE ORAL at 10:30

## 2021-01-01 RX ADMIN — Medication 10 ML: at 06:28

## 2021-01-01 RX ADMIN — CEFEPIME 2 G: 2 INJECTION, POWDER, FOR SOLUTION INTRAVENOUS at 05:12

## 2021-01-01 RX ADMIN — Medication 20 MG/HR: at 03:40

## 2021-01-01 RX ADMIN — OXYCODONE HYDROCHLORIDE 10 MG: 5 TABLET ORAL at 05:15

## 2021-01-01 RX ADMIN — DOCUSATE SODIUM 50 MG AND SENNOSIDES 8.6 MG 1 TABLET: 8.6; 5 TABLET, FILM COATED ORAL at 08:19

## 2021-01-01 RX ADMIN — Medication 3 UNITS: at 05:29

## 2021-01-01 RX ADMIN — POTASSIUM CHLORIDE 20 MEQ: 400 INJECTION, SOLUTION INTRAVENOUS at 07:07

## 2021-01-01 RX ADMIN — Medication: at 18:44

## 2021-01-01 RX ADMIN — IBUPROFEN 400 MG: 100 SUSPENSION ORAL at 05:40

## 2021-01-01 RX ADMIN — Medication 17 MG/HR: at 08:52

## 2021-01-01 RX ADMIN — MIDODRINE HYDROCHLORIDE 10 MG: 5 TABLET ORAL at 05:49

## 2021-01-01 RX ADMIN — MIDODRINE HYDROCHLORIDE 20 MG: 5 TABLET ORAL at 09:05

## 2021-01-01 RX ADMIN — 0.12% CHLORHEXIDINE GLUCONATE 15 ML: 1.2 RINSE ORAL at 08:53

## 2021-01-01 RX ADMIN — Medication 20 MG: at 20:58

## 2021-01-01 RX ADMIN — THERA TABS 1 TABLET: TAB at 09:05

## 2021-01-01 RX ADMIN — Medication: at 18:23

## 2021-01-01 RX ADMIN — DIAPER RASH SKIN PROTECTENT: at 21:05

## 2021-01-01 RX ADMIN — POTASSIUM BICARBONATE 20 MEQ: 391 TABLET, EFFERVESCENT ORAL at 05:40

## 2021-01-01 RX ADMIN — Medication 14 MG/HR: at 15:04

## 2021-01-01 RX ADMIN — Medication 300 MCG/HR: at 16:12

## 2021-01-01 RX ADMIN — DIAPER RASH SKIN PROTECTENT: at 09:36

## 2021-01-01 RX ADMIN — Medication 3 UNITS: at 17:29

## 2021-01-01 RX ADMIN — Medication 18 MG/HR: at 02:55

## 2021-01-01 RX ADMIN — CISATRACURIUM BESYLATE 4 MCG/KG/MIN: 200 INJECTION, SOLUTION INTRAVENOUS at 04:51

## 2021-01-01 RX ADMIN — Medication 20 MG: at 20:48

## 2021-01-01 RX ADMIN — Medication 200 MCG/HR: at 16:37

## 2021-01-01 RX ADMIN — Medication 300 MCG/HR: at 03:23

## 2021-01-01 RX ADMIN — DIAPER RASH SKIN PROTECTENT: at 20:26

## 2021-01-01 RX ADMIN — Medication 2000 UNITS: at 10:17

## 2021-01-01 RX ADMIN — INSULIN LISPRO 5 UNITS: 100 INJECTION, SOLUTION INTRAVENOUS; SUBCUTANEOUS at 11:30

## 2021-01-01 RX ADMIN — FENTANYL CITRATE 200 MCG/HR: 50 INJECTION INTRAVENOUS at 18:41

## 2021-01-01 RX ADMIN — VANCOMYCIN HYDROCHLORIDE 1250 MG: 1.25 INJECTION, POWDER, LYOPHILIZED, FOR SOLUTION INTRAVENOUS at 21:52

## 2021-01-01 RX ADMIN — DIAZEPAM 5 MG: 5 TABLET ORAL at 17:07

## 2021-01-01 RX ADMIN — MIDODRINE HYDROCHLORIDE 20 MG: 5 TABLET ORAL at 20:42

## 2021-01-01 RX ADMIN — MIDAZOLAM HYDROCHLORIDE 5 MG: 1 INJECTION, SOLUTION INTRAMUSCULAR; INTRAVENOUS at 00:06

## 2021-01-01 RX ADMIN — Medication 275 MCG/HR: at 21:05

## 2021-01-01 RX ADMIN — Medication 16 MG/HR: at 23:23

## 2021-01-01 RX ADMIN — MIDODRINE HYDROCHLORIDE 20 MG: 5 TABLET ORAL at 08:44

## 2021-01-01 RX ADMIN — Medication 1 PACKET: at 15:00

## 2021-01-01 RX ADMIN — MIDODRINE HYDROCHLORIDE 10 MG: 5 TABLET ORAL at 21:41

## 2021-01-01 RX ADMIN — FUROSEMIDE 20 MG: 10 INJECTION, SOLUTION INTRAMUSCULAR; INTRAVENOUS at 17:54

## 2021-01-01 RX ADMIN — DEXMEDETOMIDINE HYDROCHLORIDE 1.5 MCG/KG/HR: 4 INJECTION, SOLUTION INTRAVENOUS at 01:00

## 2021-01-01 RX ADMIN — Medication 20 MG: at 21:29

## 2021-01-01 RX ADMIN — Medication 300 MCG/HR: at 02:54

## 2021-01-01 RX ADMIN — Medication 4 UNITS: at 23:56

## 2021-01-01 RX ADMIN — FLUCONAZOLE 400 MG: 400 INJECTION, SOLUTION INTRAVENOUS at 14:12

## 2021-01-01 RX ADMIN — Medication 11 MG/HR: at 23:59

## 2021-01-01 RX ADMIN — FENTANYL CITRATE 175 MCG/HR: 50 INJECTION INTRAVENOUS at 12:16

## 2021-01-01 RX ADMIN — ATORVASTATIN CALCIUM 20 MG: 20 TABLET, FILM COATED ORAL at 09:18

## 2021-01-01 RX ADMIN — ACETAMINOPHEN 650 MG: 160 SOLUTION ORAL at 19:20

## 2021-01-01 RX ADMIN — Medication 10 MG/HR: at 12:15

## 2021-01-01 RX ADMIN — OXYCODONE HYDROCHLORIDE 10 MG: 5 TABLET ORAL at 12:04

## 2021-01-01 RX ADMIN — MIDAZOLAM 4 MG/HR: 5 INJECTION, SOLUTION INTRAMUSCULAR; INTRAVENOUS at 01:28

## 2021-01-01 RX ADMIN — CHLORHEXIDINE GLUCONATE 15 ML: 1.2 RINSE ORAL at 08:51

## 2021-01-01 RX ADMIN — Medication 10 MG/HR: at 02:13

## 2021-01-01 RX ADMIN — DOCUSATE SODIUM 100 MG: 50 LIQUID ORAL at 08:34

## 2021-01-01 RX ADMIN — POTASSIUM PHOSPHATE, MONOBASIC POTASSIUM PHOSPHATE, DIBASIC: 224; 236 INJECTION, SOLUTION, CONCENTRATE INTRAVENOUS at 06:55

## 2021-01-01 RX ADMIN — OXYCODONE HYDROCHLORIDE AND ACETAMINOPHEN 500 MG: 500 TABLET ORAL at 10:01

## 2021-01-01 RX ADMIN — DEXAMETHASONE SODIUM PHOSPHATE 6 MG: 4 INJECTION, SOLUTION INTRAMUSCULAR; INTRAVENOUS at 17:16

## 2021-01-01 RX ADMIN — DIAZEPAM 10 MG: 5 TABLET ORAL at 05:45

## 2021-01-01 RX ADMIN — OXYCODONE HYDROCHLORIDE AND ACETAMINOPHEN 500 MG: 500 TABLET ORAL at 09:46

## 2021-01-01 RX ADMIN — DIAZEPAM 10 MG: 5 TABLET ORAL at 05:39

## 2021-01-01 RX ADMIN — CISATRACURIUM BESYLATE 4 MCG/KG/MIN: 200 INJECTION, SOLUTION INTRAVENOUS at 11:10

## 2021-01-01 RX ADMIN — Medication 6000 UNITS: at 08:57

## 2021-01-01 RX ADMIN — VANCOMYCIN HYDROCHLORIDE 1250 MG: 1.25 INJECTION, POWDER, LYOPHILIZED, FOR SOLUTION INTRAVENOUS at 17:01

## 2021-01-01 RX ADMIN — OXYCODONE HYDROCHLORIDE 10 MG: 5 TABLET ORAL at 11:40

## 2021-01-01 RX ADMIN — FAMOTIDINE 20 MG: 10 INJECTION, SOLUTION INTRAVENOUS at 08:11

## 2021-01-01 RX ADMIN — Medication 10 UNITS: at 20:31

## 2021-01-01 RX ADMIN — MIDODRINE HYDROCHLORIDE 10 MG: 5 TABLET ORAL at 05:52

## 2021-01-01 RX ADMIN — DEXMEDETOMIDINE HYDROCHLORIDE 1.5 MCG/KG/HR: 4 INJECTION, SOLUTION INTRAVENOUS at 09:09

## 2021-01-01 RX ADMIN — PROPOFOL 50 MCG/KG/MIN: 10 INJECTION, EMULSION INTRAVENOUS at 06:28

## 2021-01-01 RX ADMIN — Medication: at 21:06

## 2021-01-01 RX ADMIN — FENTANYL CITRATE 100 MCG: 0.05 INJECTION, SOLUTION INTRAMUSCULAR; INTRAVENOUS at 09:47

## 2021-01-01 RX ADMIN — PROPOFOL 50 MCG/KG/MIN: 10 INJECTION, EMULSION INTRAVENOUS at 02:49

## 2021-01-01 RX ADMIN — Medication 18 MG/HR: at 05:43

## 2021-01-01 RX ADMIN — Medication 10 MG/HR: at 16:51

## 2021-01-01 RX ADMIN — FAMOTIDINE 20 MG: 10 INJECTION, SOLUTION INTRAVENOUS at 08:54

## 2021-01-01 RX ADMIN — INSULIN LISPRO 3 UNITS: 100 INJECTION, SOLUTION INTRAVENOUS; SUBCUTANEOUS at 16:22

## 2021-01-01 RX ADMIN — OXYCODONE HYDROCHLORIDE AND ACETAMINOPHEN 500 MG: 500 TABLET ORAL at 13:45

## 2021-01-01 RX ADMIN — DEXMEDETOMIDINE HYDROCHLORIDE 1 MCG/KG/HR: 4 INJECTION, SOLUTION INTRAVENOUS at 07:08

## 2021-01-01 RX ADMIN — CEFAZOLIN SODIUM 2 G: 1 INJECTION, POWDER, FOR SOLUTION INTRAMUSCULAR; INTRAVENOUS at 18:00

## 2021-01-01 RX ADMIN — WATER 500 MG: 1 INJECTION INTRAMUSCULAR; INTRAVENOUS; SUBCUTANEOUS at 10:55

## 2021-01-01 RX ADMIN — NOREPINEPHRINE BITARTRATE 4 MCG/MIN: 1 INJECTION, SOLUTION, CONCENTRATE INTRAVENOUS at 05:14

## 2021-01-01 RX ADMIN — ENOXAPARIN SODIUM 30 MG: 100 INJECTION SUBCUTANEOUS at 17:59

## 2021-01-01 RX ADMIN — DIAPER RASH SKIN PROTECTENT: at 20:32

## 2021-01-01 RX ADMIN — Medication 1 PACKET: at 16:23

## 2021-01-01 RX ADMIN — Medication 125 MCG/HR: at 03:16

## 2021-01-01 RX ADMIN — PIPERACILLIN SODIUM AND TAZOBACTAM SODIUM 3.38 G: 3; .375 INJECTION, POWDER, LYOPHILIZED, FOR SOLUTION INTRAVENOUS at 12:49

## 2021-01-01 RX ADMIN — NOREPINEPHRINE BITARTRATE: 1 INJECTION, SOLUTION, CONCENTRATE INTRAVENOUS at 02:00

## 2021-01-01 RX ADMIN — INSULIN GLARGINE 5 UNITS: 100 INJECTION, SOLUTION SUBCUTANEOUS at 14:01

## 2021-01-01 RX ADMIN — CHLORHEXIDINE GLUCONATE 15 ML: 1.2 RINSE ORAL at 08:10

## 2021-01-01 RX ADMIN — MIDODRINE HYDROCHLORIDE 20 MG: 5 TABLET ORAL at 05:46

## 2021-01-01 RX ADMIN — Medication 4 UNITS: at 06:20

## 2021-01-01 RX ADMIN — CEFTRIAXONE 1 G: 1 INJECTION, POWDER, FOR SOLUTION INTRAMUSCULAR; INTRAVENOUS at 16:46

## 2021-01-01 RX ADMIN — Medication 1 CAPSULE: at 08:53

## 2021-01-01 RX ADMIN — DEXMEDETOMIDINE HYDROCHLORIDE 1.5 MCG/KG/HR: 4 INJECTION, SOLUTION INTRAVENOUS at 17:18

## 2021-01-01 RX ADMIN — OXYCODONE HYDROCHLORIDE 10 MG: 5 TABLET ORAL at 00:06

## 2021-01-01 RX ADMIN — INSULIN LISPRO 9 UNITS: 100 INJECTION, SOLUTION INTRAVENOUS; SUBCUTANEOUS at 10:18

## 2021-01-01 RX ADMIN — Medication 11 MG/HR: at 18:20

## 2021-01-01 RX ADMIN — ROCURONIUM BROMIDE 50 MG: 10 SOLUTION INTRAVENOUS at 07:00

## 2021-01-01 RX ADMIN — Medication 200 MCG/HR: at 23:00

## 2021-01-01 RX ADMIN — Medication 19 MG/HR: at 23:53

## 2021-01-01 RX ADMIN — MIDODRINE HYDROCHLORIDE 20 MG: 5 TABLET ORAL at 08:19

## 2021-01-01 RX ADMIN — ENOXAPARIN SODIUM 30 MG: 100 INJECTION SUBCUTANEOUS at 05:12

## 2021-01-01 RX ADMIN — MIDAZOLAM HYDROCHLORIDE 5 MG: 1 INJECTION, SOLUTION INTRAMUSCULAR; INTRAVENOUS at 13:31

## 2021-01-01 RX ADMIN — Medication: at 17:11

## 2021-01-01 RX ADMIN — FAMOTIDINE 20 MG: 10 INJECTION, SOLUTION INTRAVENOUS at 08:50

## 2021-01-01 RX ADMIN — Medication 6000 UNITS: at 10:07

## 2021-01-01 RX ADMIN — Medication 300 MCG/HR: at 20:36

## 2021-01-01 RX ADMIN — CEFEPIME 2 G: 2 INJECTION, POWDER, FOR SOLUTION INTRAVENOUS at 05:50

## 2021-01-01 RX ADMIN — DIAZEPAM 10 MG: 5 TABLET ORAL at 12:17

## 2021-01-01 RX ADMIN — Medication 14 UNITS: at 11:32

## 2021-01-01 RX ADMIN — Medication: at 08:44

## 2021-01-01 RX ADMIN — DIAZEPAM 10 MG: 5 TABLET ORAL at 00:52

## 2021-01-01 RX ADMIN — ENOXAPARIN SODIUM 100 MG: 100 INJECTION SUBCUTANEOUS at 11:51

## 2021-01-01 RX ADMIN — CHLORHEXIDINE GLUCONATE 15 ML: 1.2 RINSE ORAL at 20:43

## 2021-01-01 RX ADMIN — DEXMEDETOMIDINE HYDROCHLORIDE 1.5 MCG/KG/HR: 4 INJECTION, SOLUTION INTRAVENOUS at 15:09

## 2021-01-01 RX ADMIN — TOCILIZUMAB 810 MG: 180 INJECTION, SOLUTION SUBCUTANEOUS at 13:07

## 2021-01-01 RX ADMIN — Medication 1 PACKET: at 08:35

## 2021-01-01 RX ADMIN — MIDAZOLAM 5 MG: 1 INJECTION INTRAMUSCULAR; INTRAVENOUS at 06:37

## 2021-01-01 RX ADMIN — Medication 1 TABLET: at 09:18

## 2021-01-01 RX ADMIN — DIAPER RASH SKIN PROTECTENT: at 20:57

## 2021-01-01 RX ADMIN — MIDODRINE HYDROCHLORIDE 20 MG: 5 TABLET ORAL at 17:29

## 2021-01-01 RX ADMIN — Medication 19 MG/HR: at 14:00

## 2021-01-01 RX ADMIN — FUROSEMIDE 40 MG: 10 INJECTION, SOLUTION INTRAMUSCULAR; INTRAVENOUS at 09:49

## 2021-01-01 RX ADMIN — Medication 10 UNITS: at 08:58

## 2021-01-01 RX ADMIN — MIDAZOLAM 10 MG/HR: 5 INJECTION, SOLUTION INTRAMUSCULAR; INTRAVENOUS at 08:35

## 2021-01-01 RX ADMIN — Medication 175 MCG/HR: at 22:30

## 2021-01-01 RX ADMIN — Medication 1 CAPSULE: at 08:54

## 2021-01-01 RX ADMIN — Medication 500 MG: at 21:24

## 2021-01-01 RX ADMIN — QUETIAPINE FUMARATE 50 MG: 25 TABLET ORAL at 15:00

## 2021-01-01 RX ADMIN — POTASSIUM BICARBONATE 40 MEQ: 391 TABLET, EFFERVESCENT ORAL at 11:11

## 2021-01-01 RX ADMIN — Medication 300 MCG/HR: at 21:42

## 2021-01-01 RX ADMIN — SODIUM CHLORIDE 200 MG: 9 INJECTION, SOLUTION INTRAVENOUS at 06:38

## 2021-01-01 RX ADMIN — Medication 1 PACKET: at 08:21

## 2021-01-01 RX ADMIN — Medication 10 ML: at 16:13

## 2021-01-01 RX ADMIN — VANCOMYCIN HYDROCHLORIDE 1250 MG: 1.25 INJECTION, POWDER, LYOPHILIZED, FOR SOLUTION INTRAVENOUS at 02:32

## 2021-01-01 RX ADMIN — Medication 7 UNITS: at 11:06

## 2021-01-01 RX ADMIN — Medication 20 MG/HR: at 23:04

## 2021-01-01 RX ADMIN — SODIUM CHLORIDE 2 G: 9 INJECTION INTRAMUSCULAR; INTRAVENOUS; SUBCUTANEOUS at 21:37

## 2021-01-01 RX ADMIN — Medication: at 17:05

## 2021-01-01 RX ADMIN — Medication 7 MG/HR: at 08:32

## 2021-01-01 RX ADMIN — MIDODRINE HYDROCHLORIDE 10 MG: 5 TABLET ORAL at 05:21

## 2021-01-01 RX ADMIN — VANCOMYCIN HYDROCHLORIDE 1250 MG: 1.25 INJECTION, POWDER, LYOPHILIZED, FOR SOLUTION INTRAVENOUS at 16:32

## 2021-01-01 RX ADMIN — CHLORHEXIDINE GLUCONATE 15 ML: 1.2 RINSE ORAL at 08:22

## 2021-01-01 RX ADMIN — Medication 3 UNITS: at 17:06

## 2021-01-01 RX ADMIN — Medication: at 17:45

## 2021-01-01 RX ADMIN — DIAZEPAM 10 MG: 5 TABLET ORAL at 12:49

## 2021-01-01 RX ADMIN — DOCUSATE SODIUM 100 MG: 50 LIQUID ORAL at 09:23

## 2021-01-01 RX ADMIN — FENTANYL CITRATE 100 MCG: 0.05 INJECTION, SOLUTION INTRAMUSCULAR; INTRAVENOUS at 04:03

## 2021-01-01 RX ADMIN — MIDAZOLAM HYDROCHLORIDE 5 MG: 1 INJECTION, SOLUTION INTRAMUSCULAR; INTRAVENOUS at 12:27

## 2021-01-01 RX ADMIN — DIAPER RASH SKIN PROTECTENT: at 08:38

## 2021-01-01 RX ADMIN — CODEINE SULFATE 30 MG: 15 TABLET ORAL at 02:00

## 2021-01-01 RX ADMIN — Medication 5 MG: at 22:34

## 2021-01-01 RX ADMIN — ACETAMINOPHEN 650 MG: 160 SOLUTION ORAL at 21:47

## 2021-01-01 RX ADMIN — PROPOFOL 50 MCG/KG/MIN: 10 INJECTION, EMULSION INTRAVENOUS at 15:56

## 2021-01-01 RX ADMIN — Medication 2000 UNITS: at 08:08

## 2021-01-01 RX ADMIN — Medication 3 UNITS: at 23:59

## 2021-01-01 RX ADMIN — MIDODRINE HYDROCHLORIDE 10 MG: 5 TABLET ORAL at 22:10

## 2021-01-01 RX ADMIN — Medication 10 UNITS: at 20:57

## 2021-01-01 RX ADMIN — Medication 1 CAPSULE: at 22:10

## 2021-01-01 RX ADMIN — DIAPER RASH SKIN PROTECTENT: at 04:34

## 2021-01-01 RX ADMIN — Medication 20 MG: at 08:37

## 2021-01-01 RX ADMIN — DOCUSATE SODIUM 100 MG: 50 LIQUID ORAL at 09:17

## 2021-01-01 RX ADMIN — DEXAMETHASONE SODIUM PHOSPHATE 10 MG: 4 INJECTION, SOLUTION INTRA-ARTICULAR; INTRALESIONAL; INTRAMUSCULAR; INTRAVENOUS; SOFT TISSUE at 16:04

## 2021-01-01 RX ADMIN — Medication 1 CAPSULE: at 08:04

## 2021-01-01 RX ADMIN — SODIUM CHLORIDE 2 G: 9 INJECTION INTRAMUSCULAR; INTRAVENOUS; SUBCUTANEOUS at 21:41

## 2021-01-01 RX ADMIN — DIAZEPAM 5 MG: 5 TABLET ORAL at 00:10

## 2021-01-01 RX ADMIN — Medication 10 ML: at 07:38

## 2021-01-01 RX ADMIN — CHLORHEXIDINE GLUCONATE 15 ML: 1.2 RINSE ORAL at 21:29

## 2021-01-01 RX ADMIN — FENTANYL CITRATE 100 MCG: 0.05 INJECTION, SOLUTION INTRAMUSCULAR; INTRAVENOUS at 02:15

## 2021-01-01 RX ADMIN — Medication 4 UNITS: at 12:55

## 2021-01-01 RX ADMIN — Medication 20 MEQ: at 08:00

## 2021-01-01 RX ADMIN — Medication 14 MG/HR: at 00:01

## 2021-01-01 RX ADMIN — FENTANYL CITRATE 200 MCG/HR: 50 INJECTION INTRAVENOUS at 23:14

## 2021-01-01 RX ADMIN — DEXAMETHASONE SODIUM PHOSPHATE 5 MG: 4 INJECTION, SOLUTION INTRA-ARTICULAR; INTRALESIONAL; INTRAMUSCULAR; INTRAVENOUS; SOFT TISSUE at 14:48

## 2021-01-01 RX ADMIN — DEXMEDETOMIDINE HYDROCHLORIDE 1.5 MCG/KG/HR: 4 INJECTION, SOLUTION INTRAVENOUS at 18:04

## 2021-01-01 RX ADMIN — Medication 18 MG/HR: at 10:12

## 2021-01-01 RX ADMIN — VANCOMYCIN HYDROCHLORIDE 1250 MG: 1.25 INJECTION, POWDER, LYOPHILIZED, FOR SOLUTION INTRAVENOUS at 02:15

## 2021-01-01 RX ADMIN — Medication 9 MG/HR: at 05:30

## 2021-01-01 RX ADMIN — FUROSEMIDE 60 MG: 10 INJECTION, SOLUTION INTRAMUSCULAR; INTRAVENOUS at 12:06

## 2021-01-01 RX ADMIN — MIDODRINE HYDROCHLORIDE 20 MG: 5 TABLET ORAL at 21:33

## 2021-01-01 RX ADMIN — SODIUM CHLORIDE 40 MG: 9 INJECTION, SOLUTION INTRAMUSCULAR; INTRAVENOUS; SUBCUTANEOUS at 20:26

## 2021-01-01 RX ADMIN — Medication 10 ML: at 12:12

## 2021-01-01 RX ADMIN — CHLOROTHIAZIDE SODIUM 500 MG: 500 INJECTION, POWDER, LYOPHILIZED, FOR SOLUTION INTRAVENOUS at 11:32

## 2021-01-01 RX ADMIN — OXYCODONE HYDROCHLORIDE AND ACETAMINOPHEN 500 MG: 500 TABLET ORAL at 08:33

## 2021-01-01 RX ADMIN — Medication 20 MG: at 21:09

## 2021-01-01 RX ADMIN — ENOXAPARIN SODIUM 80 MG: 100 INJECTION SUBCUTANEOUS at 11:23

## 2021-01-01 RX ADMIN — MIDODRINE HYDROCHLORIDE 10 MG: 5 TABLET ORAL at 21:46

## 2021-01-01 RX ADMIN — Medication 3 UNITS: at 00:30

## 2021-01-01 RX ADMIN — Medication 10 UNITS: at 21:10

## 2021-01-01 RX ADMIN — DIAPER RASH SKIN PROTECTENT: at 22:23

## 2021-01-01 RX ADMIN — Medication 2 MG/HR: at 05:21

## 2021-01-01 RX ADMIN — THERA TABS 1 TABLET: TAB at 08:30

## 2021-01-01 RX ADMIN — INSULIN LISPRO 5 UNITS: 100 INJECTION, SOLUTION INTRAVENOUS; SUBCUTANEOUS at 06:00

## 2021-01-01 RX ADMIN — ENOXAPARIN SODIUM 30 MG: 100 INJECTION SUBCUTANEOUS at 05:29

## 2021-01-01 RX ADMIN — CISATRACURIUM BESYLATE 3.5 MCG/KG/MIN: 200 INJECTION, SOLUTION INTRAVENOUS at 08:12

## 2021-01-01 RX ADMIN — HUMAN INSULIN 40 UNITS: 100 INJECTION, SUSPENSION SUBCUTANEOUS at 21:09

## 2021-01-01 RX ADMIN — Medication 10 ML: at 21:59

## 2021-01-01 RX ADMIN — QUETIAPINE FUMARATE 50 MG: 25 TABLET ORAL at 08:36

## 2021-01-01 RX ADMIN — Medication 3 UNITS: at 17:30

## 2021-01-01 RX ADMIN — ENOXAPARIN SODIUM 30 MG: 100 INJECTION SUBCUTANEOUS at 17:18

## 2021-01-01 RX ADMIN — Medication 10 ML: at 05:38

## 2021-01-01 RX ADMIN — Medication 14 UNITS: at 11:02

## 2021-01-01 RX ADMIN — MIDAZOLAM HYDROCHLORIDE 5 MG: 1 INJECTION, SOLUTION INTRAMUSCULAR; INTRAVENOUS at 07:27

## 2021-01-01 RX ADMIN — Medication 300 MCG/HR: at 18:34

## 2021-01-01 RX ADMIN — ALBUMIN (HUMAN) 25 G: 0.25 INJECTION, SOLUTION INTRAVENOUS at 11:49

## 2021-01-01 RX ADMIN — ZINC SULFATE 220 MG (50 MG) CAPSULE 1 CAPSULE: CAPSULE at 08:56

## 2021-01-01 RX ADMIN — VANCOMYCIN HYDROCHLORIDE 1250 MG: 1.25 INJECTION, POWDER, LYOPHILIZED, FOR SOLUTION INTRAVENOUS at 17:30

## 2021-01-01 RX ADMIN — LACTULOSE 15 ML: 20 SOLUTION ORAL at 21:11

## 2021-01-01 RX ADMIN — INSULIN LISPRO 7 UNITS: 100 INJECTION, SOLUTION INTRAVENOUS; SUBCUTANEOUS at 05:37

## 2021-01-01 RX ADMIN — Medication 300 MCG/HR: at 04:20

## 2021-01-01 RX ADMIN — IBUPROFEN 400 MG: 100 SUSPENSION ORAL at 11:05

## 2021-01-01 RX ADMIN — CODEINE SULFATE 30 MG: 15 TABLET ORAL at 22:21

## 2021-01-01 RX ADMIN — PIPERACILLIN SODIUM AND TAZOBACTAM SODIUM 3.38 G: 3; .375 INJECTION, POWDER, LYOPHILIZED, FOR SOLUTION INTRAVENOUS at 21:02

## 2021-01-01 RX ADMIN — IBUPROFEN 400 MG: 100 SUSPENSION ORAL at 13:46

## 2021-11-23 PROBLEM — Z28.310 COVID-19 VACCINE SERIES DECLINED: Status: ACTIVE | Noted: 2021-01-01

## 2021-11-23 PROBLEM — Z28.9 COVID-19 VACCINATION NOT DONE: Status: ACTIVE | Noted: 2021-01-01

## 2021-11-23 PROBLEM — Z28.21 COVID-19 VACCINE SERIES DECLINED: Status: ACTIVE | Noted: 2021-01-01

## 2021-11-23 PROBLEM — E87.20 LACTIC ACIDOSIS: Status: ACTIVE | Noted: 2021-01-01

## 2021-11-23 PROBLEM — J96.01 ACUTE RESPIRATORY FAILURE WITH HYPOXIA (HCC): Status: ACTIVE | Noted: 2021-01-01

## 2021-11-23 PROBLEM — U07.1 PNEUMONIA DUE TO COVID-19 VIRUS: Status: ACTIVE | Noted: 2021-01-01

## 2021-11-23 PROBLEM — I10 PRIMARY HYPERTENSION: Status: ACTIVE | Noted: 2021-01-01

## 2021-11-23 PROBLEM — E66.9 OBESITY (BMI 30-39.9): Status: ACTIVE | Noted: 2021-01-01

## 2021-11-23 PROBLEM — R73.9 HYPERGLYCEMIA: Status: ACTIVE | Noted: 2021-01-01

## 2021-11-23 PROBLEM — E87.1 HYPONATREMIA: Status: ACTIVE | Noted: 2021-01-01

## 2021-11-23 PROBLEM — E11.65 HYPERGLYCEMIA DUE TO TYPE 2 DIABETES MELLITUS (HCC): Status: ACTIVE | Noted: 2021-01-01

## 2021-11-23 PROBLEM — J12.82 PNEUMONIA DUE TO COVID-19 VIRUS: Status: ACTIVE | Noted: 2021-01-01

## 2021-11-23 PROBLEM — U07.1 ACUTE HYPOXEMIC RESPIRATORY FAILURE DUE TO COVID-19 (HCC): Status: ACTIVE | Noted: 2021-01-01

## 2021-11-23 PROBLEM — J96.01 ACUTE RESPIRATORY FAILURE WITH HYPOXEMIA (HCC): Status: ACTIVE | Noted: 2021-01-01

## 2021-11-23 NOTE — ED PROVIDER NOTES
EMERGENCY DEPARTMENT HISTORY AND PHYSICAL EXAM    4:15 PM      Date: 11/23/2021  Patient Name: Fran Negro    History of Presenting Illness     Chief Complaint   Patient presents with    Positive For Covid-19    Shortness of Breath         History Provided By: Patient    Additional History (Context): Fran Negro is a 39 y.o. male with diabetes and hypertension who presents with chief complaint of shortness of breath worsening past couple days. Admits that he was diagnosed with COVID-19 infection this past Thursday. He states that he has not been vaccinated for COVID-19. He admits to having cough for the past several days. Nothing is making his symptoms better or worse. He did report having some body aches but those have resolved. He also has loss of taste and smell. He denies any fever, and no chest pain, leg pain or swelling, flank pain, back pain, dizziness, abdominal pain, numbness, weakness no other complaints. PCP: Collette Squires, DO        Past History     Past Medical History:  Past Medical History:   Diagnosis Date    COVID-19     Diabetes ([facility] [address]     Hypertension        Past Surgical History:  No past surgical history on file. Family History:  No family history on file. Social History:  Social History     Tobacco Use    Smoking status: Never Smoker    Smokeless tobacco: Not on file   Substance Use Topics    Alcohol use: Yes     Comment: drinksonce a week    Drug use: Never       Allergies: Allergies   Allergen Reactions    Alupent [Metaproterenol] Swelling         Review of Systems       Review of Systems   Constitutional: Negative for chills and fever. Loss of taste and smell   HENT: Positive for congestion. Negative for rhinorrhea, sore throat and trouble swallowing. Eyes: Negative for visual disturbance. Respiratory: Positive for cough and shortness of breath. Negative for wheezing and stridor.     Cardiovascular: Negative for chest pain, palpitations and leg swelling. Gastrointestinal: Positive for diarrhea. Negative for abdominal pain, nausea and vomiting. Endocrine: Negative for polyuria. Genitourinary: Negative for difficulty urinating, dysuria and flank pain. Musculoskeletal: Negative for arthralgias, back pain and neck stiffness. Skin: Negative for rash. Allergic/Immunologic: Negative for immunocompromised state. Neurological: Negative for dizziness, syncope, weakness, numbness and headaches. Hematological: Does not bruise/bleed easily. Psychiatric/Behavioral: Negative for confusion and dysphoric mood. All other systems reviewed and are negative. Physical Exam     Visit Vitals  /66   Pulse (!) 104   Temp 97 °F (36.1 °C)   Resp (!) 45   Ht 5' 6\" (1.676 m)   Wt 97.5 kg (215 lb)   SpO2 (!) 87%   BMI 34.70 kg/m²         Physical Exam  Vitals and nursing note reviewed. Constitutional:       General: He is not in acute distress. Appearance: He is well-developed. He is ill-appearing. He is not diaphoretic. HENT:      Head: Normocephalic and atraumatic. Nose: Nose normal.      Mouth/Throat:      Mouth: Mucous membranes are moist.   Eyes:      General: No scleral icterus. Conjunctiva/sclera: Conjunctivae normal.      Pupils: Pupils are equal, round, and reactive to light. Cardiovascular:      Rate and Rhythm: Tachycardia present. Heart sounds: No murmur heard. No gallop. Pulmonary:      Effort: Tachypnea and respiratory distress present. No accessory muscle usage. Breath sounds: No stridor. Examination of the right-middle field reveals decreased breath sounds and rhonchi. Examination of the left-middle field reveals decreased breath sounds and rhonchi. Examination of the right-lower field reveals decreased breath sounds and rhonchi. Examination of the left-lower field reveals decreased breath sounds and rhonchi. Decreased breath sounds and rhonchi present. No wheezing or rales.       Comments: Hypoxia  Abdominal:      General: Bowel sounds are normal. There is no distension. Palpations: Abdomen is soft. Tenderness: There is no abdominal tenderness. There is no guarding. Musculoskeletal:         General: No tenderness. Normal range of motion. Cervical back: Normal range of motion and neck supple. Right lower leg: No tenderness. No edema. Left lower leg: No tenderness. No edema. Lymphadenopathy:      Cervical: No cervical adenopathy. Skin:     General: Skin is warm and dry. Capillary Refill: Capillary refill takes less than 2 seconds. Coloration: Skin is not cyanotic, jaundiced or pale. Findings: No erythema. Neurological:      General: No focal deficit present. Mental Status: He is alert and oriented to person, place, and time. Cranial Nerves: No cranial nerve deficit. Motor: No weakness.    Psychiatric:         Mood and Affect: Mood normal.           Diagnostic Study Results     Labs -  Recent Results (from the past 12 hour(s))   EKG, 12 LEAD, INITIAL    Collection Time: 11/23/21  4:21 PM   Result Value Ref Range    Ventricular Rate 117 BPM    Atrial Rate 117 BPM    P-R Interval 144 ms    QRS Duration 72 ms    Q-T Interval 322 ms    QTC Calculation (Bezet) 449 ms    Calculated P Axis 34 degrees    Calculated R Axis -5 degrees    Calculated T Axis 41 degrees    Diagnosis       Sinus tachycardia  Otherwise normal ECG  No previous ECGs available     PROTHROMBIN TIME + INR    Collection Time: 11/23/21  4:23 PM   Result Value Ref Range    Prothrombin time 13.3 11.5 - 15.2 sec    INR 1.1 0.8 - 1.2     PTT    Collection Time: 11/23/21  4:23 PM   Result Value Ref Range    aPTT 30.8 23.0 - 36.4 SEC   D DIMER    Collection Time: 11/23/21  4:23 PM   Result Value Ref Range    D DIMER 0.93 (H) <0.46 ug/ml(FEU)   CBC WITH AUTOMATED DIFF    Collection Time: 11/23/21  4:24 PM   Result Value Ref Range    WBC 9.6 4.6 - 13.2 K/uL    RBC 5.17 4.35 - 5.65 M/uL HGB 15.4 13.0 - 16.0 g/dL    HCT 43.5 36.0 - 48.0 %    MCV 84.1 78.0 - 100.0 FL    MCH 29.8 24.0 - 34.0 PG    MCHC 35.4 31.0 - 37.0 g/dL    RDW 12.0 11.6 - 14.5 %    PLATELET 204 934 - 414 K/uL    MPV 9.8 9.2 - 11.8 FL    NRBC 0.0 0  WBC    ABSOLUTE NRBC 0.00 0.00 - 0.01 K/uL    NEUTROPHILS 86 (H) 40 - 73 %    LYMPHOCYTES 8 (L) 21 - 52 %    MONOCYTES 5 3 - 10 %    EOSINOPHILS 0 0 - 5 %    BASOPHILS 0 0 - 2 %    IMMATURE GRANULOCYTES 1 (H) 0.0 - 0.5 %    ABS. NEUTROPHILS 8.2 (H) 1.8 - 8.0 K/UL    ABS. LYMPHOCYTES 0.8 (L) 0.9 - 3.6 K/UL    ABS. MONOCYTES 0.5 0.05 - 1.2 K/UL    ABS. EOSINOPHILS 0.0 0.0 - 0.4 K/UL    ABS. BASOPHILS 0.0 0.0 - 0.1 K/UL    ABS. IMM. GRANS. 0.1 (H) 0.00 - 0.04 K/UL    DF AUTOMATED     CARDIAC PANEL,(CK, CKMB & TROPONIN)    Collection Time: 11/23/21  4:24 PM   Result Value Ref Range    CK - MB <1.0 <3.6 ng/ml    CK-MB Index  0.0 - 4.0 %     CALCULATION NOT PERFORMED WHEN RESULT IS BELOW LINEAR LIMIT     (H) 39 - 308 U/L    Troponin-I, QT <0.02 0.0 - 1.963 NG/ML   METABOLIC PANEL, COMPREHENSIVE    Collection Time: 11/23/21  4:24 PM   Result Value Ref Range    Sodium 128 (L) 136 - 145 mmol/L    Potassium 4.3 3.5 - 5.5 mmol/L    Chloride 90 (L) 100 - 111 mmol/L    CO2 24 21 - 32 mmol/L    Anion gap 14 3.0 - 18 mmol/L    Glucose 400 (H) 74 - 99 mg/dL    BUN 26 (H) 7.0 - 18 MG/DL    Creatinine 0.94 0.6 - 1.3 MG/DL    BUN/Creatinine ratio 28 (H) 12 - 20      GFR est AA >60 >60 ml/min/1.73m2    GFR est non-AA >60 >60 ml/min/1.73m2    Calcium 8.9 8.5 - 10.1 MG/DL    Bilirubin, total 0.7 0.2 - 1.0 MG/DL    ALT (SGPT) 57 16 - 61 U/L    AST (SGOT) 79 (H) 10 - 38 U/L    Alk.  phosphatase 54 45 - 117 U/L    Protein, total 6.8 6.4 - 8.2 g/dL    Albumin 2.8 (L) 3.4 - 5.0 g/dL    Globulin 4.0 2.0 - 4.0 g/dL    A-G Ratio 0.7 (L) 0.8 - 1.7     LACTIC ACID    Collection Time: 11/23/21  4:24 PM   Result Value Ref Range    Lactic acid 2.3 (HH) 0.4 - 2.0 MMOL/L   POC LACTIC ACID    Collection Time: 11/23/21  4:25 PM   Result Value Ref Range    Lactic Acid (POC) 3.04 (HH) 0.40 - 2.00 mmol/L   POC LACTIC ACID    Collection Time: 11/23/21  4:49 PM   Result Value Ref Range    Lactic Acid (POC) 3.43 (HH) 0.40 - 2.00 mmol/L   BLOOD GAS, ARTERIAL POC    Collection Time: 11/23/21  4:51 PM   Result Value Ref Range    Device: High Flow Nasal Cannula      FIO2 (POC) 100 %    pH (POC) 7.46 (H) 7.35 - 7.45      pCO2 (POC) 27.0 (L) 35.0 - 45.0 MMHG    pO2 (POC) 61 (L) 80 - 100 MMHG    HCO3 (POC) 19.0 (L) 22 - 26 MMOL/L    sO2 (POC) 92.8 92 - 97 %    Base deficit (POC) 3.4 mmol/L    Set Rate 35 bpm    Allens test (POC) Positive      Site LEFT RADIAL      Specimen type (POC) ARTERIAL      Performed by Aracel[facility])        Radiologic Studies -   XR CHEST PORT   Final Result   Bilateral interstitial and alveolar opacities suggesting pneumonia   and/or pulmonary edema. No pleural effusions or pneumothorax seen. CTA CHEST W OR W WO CONT    (Results Pending)         Medical Decision Making   I am the first provider for this patient. I reviewed the vital signs, available nursing notes, past medical history, past surgical history, family history and social history. Vital Signs-Reviewed the patient's vital signs. Pulse Oximetry Analysis -  53% on room air (Interpretation) abnormal    Emergently placed on nonrebreather and respiratory tech was already in the ED and is emergently getting high flow oxygen patient    Cardiac Monitor:  Rate: 107  Rhythm:  Sinus Tachycardia     EKG: Interpreted by the EP Dr. Ady Knapp.    Time Interpreted: 4:25 PM   Rate: 117   Rhythm: Sinus Tachycardia    Interpretation: Normal QRS duration, normal QTC, no ST elevation, no ST depression, no STEMI       Records Reviewed: Nursing Notes and Old Medical Records (Time of Review: 5:00 PM)    Provider Notes (Medical Decision Making): DDx: Worsening Covid 19 infection, bacterial pneumonia, PE, metabolic    Patient severely hypoxic on room air on arrival.  Emergently placed him on nonrebreather plus high flow. RT will place him on high flow    We will check labs, give Decadron, get CTA chest, give 1 L IV fluid bolus only as not to fluid overload him. We will give him a dose of broad-spectrum antibiotic case of also having a bacterial pneumonia. Will get cultures      MDM    Medications   sodium chloride (NS) flush 5-10 mL (has no administration in time range)   iopamidoL (ISOVUE-370) 76 % injection 100 mL (has no administration in time range)   enoxaparin (LOVENOX) injection 100 mg (has no administration in time range)   insulin regular (NOVOLIN R, HUMULIN R) injection 8 Units (has no administration in time range)   dexamethasone (DECADRON) 4 mg/mL injection 6 mg (has no administration in time range)   dexamethasone (DECADRON) 4 mg/mL injection 6 mg (6 mg IntraVENous Given 11/23/21 1632)   cefTRIAXone (ROCEPHIN) 1 g in sterile water (preservative free) 10 mL IV syringe (1 g IntraVENous Given 11/23/21 1646)   sodium chloride 0.9 % bolus infusion 1,000 mL (0 mL IntraVENous IV Completed 11/23/21 1905)         ED Course: Progress Notes, Reevaluation, and Consults:  Patient is O2 sats are up to 87 to 90% but never more than that. Respiratory therapist states that we would not be able to get him the CTA scanner being on high flow and nonrebreather. I will discuss with ICU intensivist.  I will plan to give him likely Lovenox but discussed with intensivist first.    WBC within normal limits  Lactic acid 2.3  Blood sugar 400  CO2 within normal limits  AG within normal limits    Dose of IV insulin    Consult:  Discussed care with Dr. Elizalde Left, Specialty: ICU intensivist, standard discussion; including history of patients chief complaint, available diagnostic results, and treatment course. He will evaluate patient and determine disposition. We will give him Lovenox for now.   7:23 PM, 11/23/2021     I have done the sepsis reassessment  Dr. Luanne Moody  7:54 PM    8:10 PM  Dr. Ajay Garcia has evaluated patient and states that we will keep him here and will try proning him. He accepts to ICU. Consult:  Discussed care with Dr. Kellie Pendleton, Specialty: Hospitalist standard discussion; including history of patients chief complaint, available diagnostic results, and treatment course. She accepts admission  8:36 PM, 11/23/2021      Critical care time:   I have spent 65 minutes of critical care time involved in lab review, consultations with specialist, family decision making, and documentation. During this entire length of time I was immediately available to the patient. Critical care: The reason for providing this level of medical care for this critically ill patient was due to a critical illness that impaired one or more vital organ systems such that there was a high probability of imminent or life threatening deterioration in the patients condition. This care involved high complexity decision making to assess, manipulate and support vital system functions, to treat this degree vital organ system failure and to prevent further life threatening deterioration of the patient's condition. I have discussed diagnosis results and plan with patient who agrees with plan and admission      Diagnosis     Clinical Impression:   1. Pneumonia due to COVID-19 virus    2. Acute respiratory failure with hypoxia (HCC)    3. Hyperglycemia    4. Elevated d-dimer    5. Sepsis due to other etiology ([facility] [address]    6. Lactic acid acidosis        Disposition: Admitted    Follow-up Information    None          Patient's Medications    No medications on file         Dairl [place], DO    Dragon medical dictation software was used for portions of this report. Unintended transcription errors may occur. My signature above authenticates this document and my orders, the final    diagnosis (es), discharge prescription (s), and instructions in the Epic    record.

## 2021-11-23 NOTE — ED NOTES
Per RRT, patient unable to complete CTA at this time d/t necessary supplemental oxygen needed.  RRT made aware to Provider MD.   This RN made aware to CT Transport

## 2021-11-23 NOTE — ED NOTES
Patient reports tested +COVID last week d/t noted cough, since then, with increase shortness of breath  Denies COVID vaccination

## 2021-11-24 NOTE — CONSULTS
[facility]  (A Division of [address] [place] [address])                                                                                                                      Ophelia Brunner MD  Office #: - Option # [phone]  Fax #: [phone]     Date of Admission: 11/23/2021Date of Note: 11/24/2021  Reason for Consult: Evaluation and antibiotic management of  covid 19 infection. Thank you for involving me in the care of this critically ill patient. Please do not hesitate to contact me on the above number if question or concern. Dr Monique Arreola will be available for questions/ consults by phone from Nov 25 to 28. I will be back on Monday, Nov 29. Thanks. Current Antimicrobials:    Prior Antimicrobials:  CTX/Zithromax 11/23 to date  Dexamethasone 11/23 to date   NA       Assessment- ID related:  --------------------------------------------------------------------------  Critically ill patient with :   Severe COVID 19 infection in the unvaccinated   Viral PNA- bilateral- on imaging   Acute hypoxemic respiratory failure 2/2 above  · Uncontrolled DM  COVID 19 PCR + RVP 11/23 and CVS 11/17  Procal NL  CRP pending  DD 0.93  Fib 640  LD -na  ferretin >5000  Lactic acid 2.3  11/23- BCX NGSF  11/23 UCX : in progress. UA with glucosuria only    Other Medical Issues- Mx per respective team:    · DM X10 years, poorly controlled  · hyperlipidemia   Recommendation for ID issues I am following:  ------------------------------------------------------------------------------  Unfortunate situation in the unvaccinated with severe hypoxia/ARDS  --DC abx- CTX/zithromax- not needed at this time  --DW pt about Tociluxumab- beneift/risk of the investigation med. Agrees to get it.  --steroid. resp support, arcelia coctails and AC per ICU    Monitor biomarkers, cbc.  Cmp, daily  Check baseline Hepatitis B/C/quantiferon TB    High ri  sk pt for intubation/complicated course              -> sugar control per ICU       HPI:  Katharina Leon is a 39 y.o. WHITE/NON- with PMH of DM. He is not unvaccinated and was in contact with his young stepson, who had covid. Started to develop symptom around 11/14- feeling sick/achy/ nauseea and weakness and dry cough. . Tested + PCR at [facility] on 11/17. He got progressivley worse with SOB that he came to ED on 11/23 with hypoxia to 50/s. PaO2 on 100% FIO2 was 61 on ABG, B/L infiltrate-diffuse on cXR/    Admitted and started on steroid/ABX and proned all the time on high flow oxyten support. Feel ssob, but denies other symptoms. No prior history of significant infection. TB test was negative few years ago. 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         Active Hospital Problems    Diagnosis Date Noted    Acute hypoxemic respiratory failure due to COVID-19 [facility]) 11/23/2021    Pneumonia due to COVID-19 virus 11/23/2021    Hyperglycemia due to type 2 diabetes mellitus ([facility] [address] 11/23/2021    Primary hypertension 11/23/2021    Hyponatremia 11/23/2021    Lactic acidosis 11/23/2021    Obesity (BMI 30-39.9) 11/23/2021    COVID-19 vaccination not done 11/23/2021    Hyperlipidemia      Past Medical History:   Diagnosis Date    COVID-19     Diabetes ([facility] [address]     Hyperlipidemia     Hypertension      Past Surgical History:   Procedure Laterality Date    HX ORTHOPAEDIC Right     wrist surgery     Family History   Problem Relation Age of Onset    Diabetes Mother     Hypertension Mother     Stroke Mother     Hodgkin's lymphoma Mother     Diabetes Father     Hypertension Father     Cystic Fibrosis Father     Diabetes Maternal Aunt     Diabetes Maternal Uncle      Social History     Socioeconomic History    Marital status: SINGLE     Spouse name: Not on file    Number of children: Not on file    Years of education: Not on file    Highest education level: Not on file   Occupational History    Not on file   Tobacco Use    Smoking status: Never Smoker  Smokeless tobacco: Not on file   Substance and Sexual Activity    Alcohol use: Yes     Comment: drinksonce a week    Drug use: Never    Sexual activity: Not on file   Other Topics Concern     Service Not Asked    Blood Transfusions Not Asked    Caffeine Concern Not Asked    Occupational Exposure Not Asked    Hobby Hazards Not Asked    Sleep Concern Not Asked    Stress Concern Not Asked    Weight Concern Not Asked    Special Diet Not Asked    Back Care Not Asked    Exercise Not Asked    Bike Helmet Not Asked   [address] Not Asked    Self-Exams Not Asked   Social History Narrative    Not on file     Social Determinants of Health     Financial Resource Strain:     Difficulty of Paying Living Expenses: Not on file   Food Insecurity:     Worried About Running Out of Food in the Last Year: Not on file    Elvis of Food in the Last Year: Not on file   Transportation Needs:     Lack of Transportation (Medical): Not on file    Lack of Transportation (Non-Medical):  Not on file   Physical Activity:     Days of Exercise per Week: Not on file    Minutes of Exercise per Session: Not on file   Stress:     Feeling of Stress : Not on file   Social Connections:     Frequency of Communication with Friends and Family: Not on file    Frequency of Social Gatherings with Friends and Family: Not on file    Attends Religion Services: Not on file    Active Member of [address] [place] [address] [employer] or Organizations: Not on file    Attends Club or Organization Meetings: Not on file    Marital Status: Not on file   Intimate Partner Violence:     Fear of Current or Ex-Partner: Not on file    Emotionally Abused: Not on file    Physically Abused: Not on file    Sexually Abused: Not on file   Housing Stability:     Unable to Pay for Housing in the Last Year: Not on file    Number of [place] in the Last Year: Not on file    Unstable Housing in the Last Year: Not on file       Allergies:  Alupent [metaproterenol] Medications:  Current Facility-Administered Medications   Medication Dose Route Frequency    atorvastatin (LIPITOR) tablet 20 mg  20 mg Oral DAILY    tocilizumab (ACTEMRA ACTPEN) 810 mg in 0.9% sodium chloride 100 mL infusion  810 mg IntraVENous ONCE    enoxaparin (LOVENOX) injection 100 mg  1 mg/kg SubCUTAneous Q24H    insulin glargine (LANTUS) injection 10 Units  10 Units SubCUTAneous DAILY    sodium chloride (NS) flush 5-10 mL  5-10 mL IntraVENous PRN    dexamethasone (DECADRON) 4 mg/mL injection 6 mg  6 mg IntraVENous Q24H    pantoprazole (PROTONIX) 40 mg in 0.9% sodium chloride 10 mL injection  40 mg IntraVENous Q24H    acetaminophen (TYLENOL) tablet 650 mg  650 mg Oral Q6H PRN    Or    acetaminophen (TYLENOL) suppository 650 mg  650 mg Rectal Q6H PRN    guaiFENesin-dextromethorphan (ROBITUSSIN DM) 100-10 mg/5 mL syrup 5 mL  5 mL Oral Q4H PRN    azithromycin (ZITHROMAX) 500 mg in 0.9% sodium chloride 250 mL (VIAL-MATE)  500 mg IntraVENous Q24H    cholecalciferol (VITAMIN D3) (1000 Units /25 mcg) tablet 2,000 Units  2,000 Units Oral DAILY    ascorbic acid (vitamin C) (VITAMIN C) tablet 500 mg  500 mg Oral BID    insulin lispro (HUMALOG) injection   SubCUTAneous Q6H    glucose chewable tablet 16 g  16 g Oral PRN    glucagon (GLUCAGEN) injection 1 mg  1 mg IntraMUSCular PRN    dextrose (D50W) injection syrg 12.5-25 g  25-50 mL IntraVENous PRN    zinc sulfate (ZINCATE) 50 mg zinc (220 mg) capsule 1 Capsule  1 Capsule Oral Q12H    melatonin (rapid dissolve) tablet 5 mg  5 mg Oral QHS    cefTRIAXone (ROCEPHIN) 1 g in sterile water (preservative free) 10 mL IV syringe  1 g IntraVENous Q24H        ROS:  A comprehensive review of systems was negative.            Physical Exam:    Temp (24hrs), Av.8 °F (36.6 °C), Min:97 °F (36.1 °C), Max:98.2 °F (36.8 °C)    Visit Vitals  BP (!) 156/79   Pulse 88   Temp 98.1 °F (36.7 °C)   Resp 19   Ht 5' 6\" (1.676 m)   Wt 97.5 kg (215 lb)   SpO2 97%   BMI 34.70 kg/m²          GEN: WDWN, on resp distress with high flow oxyen support  Interviewed in prone position. HEENT: Unicteric. EOMI intact. CHEST: Non laboured breathing. CTA posteriorl  CVS:Proned- not examined  ABD: Proned- not examined  JESSA: Deferred  EXT: No apparent swelling or redness on UE/LE joints. Skin: Dry and intact. No rash, no redness. CNS: A, OX3. Moves all extremity. CN grossly ok. Microbiology  All Micro Results     Procedure Component Value Units Date/Time    COVID-19 RAPID TEST [[id number]]  (Abnormal) Collected: 11/24/21 0600    Order Status: Completed Specimen: Nasopharyngeal Updated: 11/24/21 0703     Specimen source Nasopharyngeal        COVID-19 rapid test Detected        Comment:      The specimen is POSITIVE for SARS-CoV-2, the novel coronavirus associated with COVID-19. This test has been authorized by the FDA under an Emergency Use Authorization (EUA) for use by authorized laboratories.         Fact sheet for Healthcare Providers: ConventionUpdate.co.nz  Fact sheet for Patients: ConventionUpdate.co.nz       Methodology: Isothermal Nucleic Acid Amplification  CALLED TO AND CORRECTLY REPEATED BY:  SLOANE KIRKPATRICK RN AT 4281 ON 11/24/21 TO LAD         CULTURE, BLOOD [[id number]] Collected: 11/23/21 1624    Order Status: Completed Specimen: Blood Updated: 11/24/21 0651     Special Requests: NO SPECIAL REQUESTS        Culture result: NO GROWTH AFTER 14 HOURS       CULTURE, BLOOD [[id number]] Collected: 11/23/21 1624    Order Status: Completed Specimen: Blood Updated: 11/24/21 0651     Special Requests: NO SPECIAL REQUESTS        Culture result: NO GROWTH AFTER 13 HOURS       CULTURE, URINE [[id number]] Collected: 11/24/21 0600    Order Status: Completed Specimen: Urine from Clean catch Updated: 11/24/21 0639    RESPIRATORY VIRUS PANEL W/COVID-19, PCR [[id number]]  (Abnormal) Collected: 11/23/21 1624    Order Status: Completed Specimen: Nasopharyngeal Updated: 11/23/21 2308     Adenovirus Not detected        Coronavirus 229E Not detected        Coronavirus HKU1 Not detected        Coronavirus CVNL63 Not detected        Coronavirus OC43 Not detected        SARS-CoV-2, PCR Detected        Comment: CALLED TO AND CORRECTLY REPEATED BY:  [id number] [facility] AT 2230 BY KDA 11/23/21  CALLED TO AND CORRECTLY REPEATED BY:  Mercedes Simeon RN ICU AT 2252 ON 11/23/21 TO TSH.           Metapneumovirus Not detected        Rhinovirus and Enterovirus Not detected        Influenza A Not detected        Influenza A, subtype H1 Not detected        Influenza A, subtype H3 Not detected        INFLUENZA A H1N1 PCR Not detected        Influenza B Not detected        Parainfluenza 1 Not detected        Parainfluenza 2 Not detected        Parainfluenza 3 Not detected        Parainfluenza virus 4 Not detected        RSV by PCR Not detected        B. parapertussis, PCR Not detected        Bordetella pertussis - PCR Not detected        Chlamydophila pneumoniae DNA, QL, PCR Not detected        Mycoplasma pneumoniae DNA, QL, PCR Not detected              Lines / Catheters:  Lab results:    Chemistry  Recent Labs     11/24/21  0414 11/23/21  1624   * 400*    128*   K 4.4 4.3   CL 99* 90*   CO2 24 24   BUN 25* 26*   CREA 0.82 0.94   CA 8.2* 8.9   AGAP 13 14   BUCR 30* 28*   AP 45 54   TP 5.7* 6.8   ALB 2.3* 2.8*   GLOB 3.4 4.0   AGRAT 0.7* 0.7*       CBC w/ Diff  Recent Labs     11/24/21  0414 11/23/21  1624   WBC 6.7 9.6   RBC 4.70 5.17   HGB 13.6 15.4   HCT 39.4 43.5    225   GRANS 84* 86*   LYMPH 9* 8*   EOS 0 0       Imaging: report posted as per radiologist - reviewed imaging as well as report

## 2021-11-24 NOTE — H&P
History & Physical    Patient: Noe Gray MRN: [id number]  CSN: [id number]    YOB: 1975  Age: 39 y.o. Sex: male      DOA: 11/23/2021  Primary Care Provider:  Sofía Delaney DO      Assessment/Plan     Patient Active Problem List   Diagnosis Code    Acute hypoxemic respiratory failure due to COVID-19 ([facility] U07.1, J96.01    Pneumonia due to COVID-19 virus U07.1, J12.82    Hyperglycemia due to type 2 diabetes mellitus ([facility] E11.65    Primary hypertension I10    Hyponatremia E87.1    Lactic acidosis E87.2    Hyperlipidemia E78.5    Obesity (BMI 30-39. 9) E66.9    COVID-19 vaccination not done Z28.9     46 y/o male with poorly controlled type 2 diabetes mellitus, hypertension, and obesity is admitted for acute hypoxemic respiratory failure due to COVID-19 pneumonia with lactic acidosis, hyperglycemia, and hyponatremia.  He has not been vaccinated. His medical problems increase the risk of morbidity and mortality from COVID. He is likely to require intubation and mechanical ventilation very soon. Neuromonitor mental status for signs of hypoxia. Pulmonaryacute hypoxemic respiratory failure due to COVID-19 pneumonia. He is unable to get a CTA of the chest as he cannot lie flat for the scan. He will be empirically treated with therapeutic Lovenox as PE cannot be ruled out, and he is critically ill. He is currently on high flow nasal cannula plus nonrebreather with oxygen saturations in the low 90s. He has been advised to be in the prone position as much as possible, but he will likely require intubation and ventilation soon. CVhistory of hypertension. I will hold his blood pressure medication at this time as he is normotensive. Echocardiogram is ordered to check cardiac function. GIno issues  Renalhyponatremia likely due to hypovolemia and diarrhea secondary to Covid infection.   He got 1 L of normal saline and I will recheck his metabolic panel in the morning. Hemeno issues  IDCOVID-19 pneumonia. He is unfortunately unvaccinated. Remdesivir is not indicated as he is already 10 days into his infection. He is being treated with Decadron. Covid labs are ordered as he could potentially be a candidate for Actemra. Duplex Dopplers of the lower extremities ordered to rule out DVT. Infectious disease will be consulted. He is being treated with empiric antibiotics for bacterial pneumonia due to lactic acidosis, but this may be in part due to hyperglycemia. CRP is pending. Endouncontrolled type 2 diabetes mellitus with hyperglycemia on admission. I will treat with a sliding scale for now but he may end up needing an insulin drip as he cannot be given large amounts of fluid with his Covid infection. Hemoglobin A1c is ordered. F/E/Nalbumin as needed for hypotension/replete as needed and monitor sodium/n.p.o. due to respiratory status    Family discussion-his wife, Pawel Roberts, was advised of the above plan of care and his guarded prognosis.     Full code     Prophylaxis: Protonix IV., therapeutic lovenox SQ     Estimated length of stay : 10 nights     Goyo Armstrong MD  [facility]    Critical Care Time [id number]    50 minutes of critical care time spent in the direct evaluation and treatment of this high risk patient. The reason for providing this level of medical care for this critically ill patient was due a critical illness that impaired one or more vital organ systems such that there was a high probability of imminent or life threatening deterioration in the patients condition.  This care involved high complexity decision making to assess, manipulate, and support vital system functions, to treat this degreee vital organ system failure and to prevent further life threatening deterioration of the patients condition.  ----------------------------------------------------------------------------------------------------------------------------------------------------------------------------------------------------------------  CC: COVID+, shortness of breath       HPI:     Zane Ray is a 39 y.o. male with poorly controlled type 2 diabetes mellitus, hypertension, and obesity presents to the ED for worsening shortness of breath over the past day in the setting of being Covid 19+. He has not been vaccinated for Covid due to [facility]. \"  His wife is also sick but is not sure where he became infected. He initially began feeling ill on 11/14 with poor appetite, malaise, and fever. He has also had ongoing diarrhea. He developed shortness of breath beginning yesterday. He first checked his oxygen level with a pulse oximeter today and it was 55%. He had his stepson drive him to the ER. According to his wife, she he has not been taking his diabetes meds for the past several days as he has not had much of an appetite. She has been feeding him fruit smoothies. He denies any chest pain, abdominal pain, or leg swelling.     Past Medical History:   Diagnosis Date    COVID-19     Diabetes ([address]     Hyperlipidemia     Hypertension        Past Surgical History:   Procedure Laterality Date    HX ORTHOPAEDIC Right     wrist surgery       Family History   Problem Relation Age of Onset    Diabetes Mother     Hypertension Mother     Stroke Mother     Hodgkin's lymphoma Mother     Diabetes Father     Hypertension Father     Cystic Fibrosis Father     Diabetes Maternal Aunt     Diabetes Maternal Uncle        Social History     Socioeconomic History    Marital status: SINGLE   Tobacco Use    Smoking status: Never Smoker   Substance and Sexual Activity    Alcohol use: Yes     Comment: drinksonce a week    Drug use: Never       Prior to Admission medications    Not on File       Allergies   Allergen Reactions  Alupent [Metaproterenol] Swelling       Review of Systems  Gen: +fever, chills, malaise, weight loss. Heent: No headache, rhinorrhea, sore throat. Heart: No chest pain, palpitations, +MANCINI, no pnd, or orthopnea. Resp: No cough, hemoptysis, wheezing and +shortness of breath. GI: No nausea, vomiting, diarrhea, constipation, melena or hematochezia. : No urinary obstruction, dysuria or hematuria. Derm: No rash, new skin lesion or pruritis. Musc/skeletal: no bone or joint complaints. Vasc: No edema, cyanosis or claudication. Endo: No heat/cold intolerance, no polyuria,polydipsia or polyphagia. Neuro: No unilateral weakness, numbness, tingling. No seizures. Heme: No easy bruising or bleeding. Physical Exam:     Physical Exam:  Visit Vitals  /78   Pulse 100   Temp 98.2 °F (36.8 °C)   Resp 30   Ht 5' 6\" (1.676 m)   Wt 97.5 kg (215 lb)   SpO2 92%   BMI 34.70 kg/m²    O2 Flow Rate (L/min): 60 l/min O2 Device: Heated, Hi flow nasal cannula    Temp (24hrs), Av.6 °F (36.4 °C), Min:97 °F (36.1 °C), Max:98.2 °F (36.8 °C)     1901 -  0700  In: 1000 [I.V.:1000]  Out: -    No intake/output data recorded. General:  Significantly tachypneic with both HFNC and NRB in place, lying in a fetal position on his side. Head:  Normocephalic, without obvious abnormality, atraumatic. Eyes:  Conjunctivae/corneas clear, sclera anicteric. Neck: Supple, symmetrical, trachea midline. Lungs:   Decreased air movement bilaterally with coarse breath sounds. No wheezing. Heart:  Tachycardic, regular rhythm, S1, S2 normal, no murmur, click, rub or gallop. Abdomen: Soft, non-tender. Bowel sounds normal. No masses,  No organomegaly. Extremities: Extremities normal, atraumatic, no cyanosis or edema. Capillary refill normal.   Pulses: 2+ and symmetric all extremities. Skin: Skin color pink, turgor normal. No rashes or lesions   Neurologic: No focal motor or sensory deficit.        Labs Reviewed: All lab results for the last 24 hours reviewed. Recent Results (from the past 24 hour(s))   EKG, 12 LEAD, INITIAL    Collection Time: 11/23/21  4:21 PM   Result Value Ref Range    Ventricular Rate 117 BPM    Atrial Rate 117 BPM    P-R Interval 144 ms    QRS Duration 72 ms    Q-T Interval 322 ms    QTC Calculation (Bezet) 449 ms    Calculated P Axis 34 degrees    Calculated R Axis -5 degrees    Calculated T Axis 41 degrees    Diagnosis       Sinus tachycardia  Otherwise normal ECG  No previous ECGs available     PROTHROMBIN TIME + INR    Collection Time: 11/23/21  4:23 PM   Result Value Ref Range    Prothrombin time 13.3 11.5 - 15.2 sec    INR 1.1 0.8 - 1.2     PTT    Collection Time: 11/23/21  4:23 PM   Result Value Ref Range    aPTT 30.8 23.0 - 36.4 SEC   D DIMER    Collection Time: 11/23/21  4:23 PM   Result Value Ref Range    D DIMER 0.93 (H) <0.46 ug/ml(FEU)   CBC WITH AUTOMATED DIFF    Collection Time: 11/23/21  4:24 PM   Result Value Ref Range    WBC 9.6 4.6 - 13.2 K/uL    RBC 5.17 4.35 - 5.65 M/uL    HGB 15.4 13.0 - 16.0 g/dL    HCT 43.5 36.0 - 48.0 %    MCV 84.1 78.0 - 100.0 FL    MCH 29.8 24.0 - 34.0 PG    MCHC 35.4 31.0 - 37.0 g/dL    RDW 12.0 11.6 - 14.5 %    PLATELET 234 334 - 928 K/uL    MPV 9.8 9.2 - 11.8 FL    NRBC 0.0 0  WBC    ABSOLUTE NRBC 0.00 0.00 - 0.01 K/uL    NEUTROPHILS 86 (H) 40 - 73 %    LYMPHOCYTES 8 (L) 21 - 52 %    MONOCYTES 5 3 - 10 %    EOSINOPHILS 0 0 - 5 %    BASOPHILS 0 0 - 2 %    IMMATURE GRANULOCYTES 1 (H) 0.0 - 0.5 %    ABS. NEUTROPHILS 8.2 (H) 1.8 - 8.0 K/UL    ABS. LYMPHOCYTES 0.8 (L) 0.9 - 3.6 K/UL    ABS. MONOCYTES 0.5 0.05 - 1.2 K/UL    ABS. EOSINOPHILS 0.0 0.0 - 0.4 K/UL    ABS. BASOPHILS 0.0 0.0 - 0.1 K/UL    ABS. IMM.  GRANS. 0.1 (H) 0.00 - 0.04 K/UL    DF AUTOMATED     CARDIAC PANEL,(CK, CKMB & TROPONIN)    Collection Time: 11/23/21  4:24 PM   Result Value Ref Range    CK - MB <1.0 <3.6 ng/ml    CK-MB Index  0.0 - 4.0 %     CALCULATION NOT PERFORMED WHEN RESULT IS BELOW LINEAR LIMIT     (H) 39 - 308 U/L    Troponin-I, QT <0.02 0.0 - 5.835 NG/ML   METABOLIC PANEL, COMPREHENSIVE    Collection Time: 11/23/21  4:24 PM   Result Value Ref Range    Sodium 128 (L) 136 - 145 mmol/L    Potassium 4.3 3.5 - 5.5 mmol/L    Chloride 90 (L) 100 - 111 mmol/L    CO2 24 21 - 32 mmol/L    Anion gap 14 3.0 - 18 mmol/L    Glucose 400 (H) 74 - 99 mg/dL    BUN 26 (H) 7.0 - 18 MG/DL    Creatinine 0.94 0.6 - 1.3 MG/DL    BUN/Creatinine ratio 28 (H) 12 - 20      GFR est AA >60 >60 ml/min/1.73m2    GFR est non-AA >60 >60 ml/min/1.73m2    Calcium 8.9 8.5 - 10.1 MG/DL    Bilirubin, total 0.7 0.2 - 1.0 MG/DL    ALT (SGPT) 57 16 - 61 U/L    AST (SGOT) 79 (H) 10 - 38 U/L    Alk.  phosphatase 54 45 - 117 U/L    Protein, total 6.8 6.4 - 8.2 g/dL    Albumin 2.8 (L) 3.4 - 5.0 g/dL    Globulin 4.0 2.0 - 4.0 g/dL    A-G Ratio 0.7 (L) 0.8 - 1.7     RESPIRATORY VIRUS PANEL W/COVID-19, PCR    Collection Time: 11/23/21  4:24 PM    Specimen: Nasopharyngeal   Result Value Ref Range    Adenovirus Not detected NOTD      Coronavirus 229E Not detected NOTD      Coronavirus HKU1 Not detected NOTD      Coronavirus CVNL63 Not detected NOTD      Coronavirus OC43 Not detected NOTD      SARS-CoV-2, PCR Detected (AA) NOTD      Metapneumovirus Not detected NOTD      Rhinovirus and Enterovirus Not detected NOTD      Influenza A Not detected NOTD      Influenza A, subtype H1 Not detected NOTD      Influenza A, subtype H3 Not detected NOTD      INFLUENZA A H1N1 PCR Not detected NOTD      Influenza B Not detected NOTD      Parainfluenza 1 Not detected NOTD      Parainfluenza 2 Not detected NOTD      Parainfluenza 3 Not detected NOTD      Parainfluenza virus 4 Not detected NOTD      RSV by PCR Not detected NOTD      B. parapertussis, PCR Not detected NOTD      Bordetella pertussis - PCR Not detected NOTD      Chlamydophila pneumoniae DNA, QL, PCR Not detected NOTD      Mycoplasma pneumoniae DNA, QL, PCR Not detected NOTD     LACTIC ACID    Collection Time: 11/23/21  4:24 PM   Result Value Ref Range    Lactic acid 2.3 (HH) 0.4 - 2.0 MMOL/L   MAGNESIUM    Collection Time: 11/23/21  4:24 PM   Result Value Ref Range    Magnesium 2.5 1.6 - 2.6 mg/dL   POC LACTIC ACID    Collection Time: 11/23/21  4:25 PM   Result Value Ref Range    Lactic Acid (POC) 3.04 (HH) 0.40 - 2.00 mmol/L   POC LACTIC ACID    Collection Time: 11/23/21  4:49 PM   Result Value Ref Range    Lactic Acid (POC) 3.43 (HH) 0.40 - 2.00 mmol/L   BLOOD GAS, ARTERIAL POC    Collection Time: 11/23/21  4:51 PM   Result Value Ref Range    Device: High Flow Nasal Cannula      FIO2 (POC) 100 %    pH (POC) 7.46 (H) 7.35 - 7.45      pCO2 (POC) 27.0 (L) 35.0 - 45.0 MMHG    pO2 (POC) 61 (L) 80 - 100 MMHG    HCO3 (POC) 19.0 (L) 22 - 26 MMOL/L    sO2 (POC) 92.8 92 - 97 %    Base deficit (POC) 3.4 mmol/L    Set Rate 35 bpm    Allens test (POC) Positive      Site LEFT RADIAL      Specimen type (POC) ARTERIAL      Performed by Debby Worthington [facility])    GLUCOSE, POC    Collection Time: 11/23/21 10:37 PM   Result Value Ref Range    Glucose (POC) 341 (H) 70 - 110 mg/dL   LACTIC ACID    Collection Time: 11/23/21 11:05 PM   Result Value Ref Range    Lactic acid 2.2 (HH) 0.4 - 2.0 MMOL/L     Results  XR CHEST PORT (Accession [id number]) (Order [id number])    Allergies       Not Specified: Alupent [Metaproterenol]     Exam Information    Status Exam Begun  Exam Ended    Final [99] 11/23/2021 18:26 11/23/2021  6:39 PM [id number]  6:39 PM     Result Information    Status: Final result (Exam End: 11/23/2021 18:39) Provider Status: Open       XR CHEST PORT: Patient Communication     Released  Not seen     Study Result    Narrative & Impression   EXAM:  AP Portable Chest X-ray 1 view      INDICATION: Shortness of breath     COMPARISON: None     _______________     FINDINGS:  Heart and mediastinal contours are within normal limits for portable  radiograph.  There are bilateral interstitial and alveolar opacities most  prominent at the lung bases. Findings suggestive of pneumonia and/or pulmonary  edema. There are no pleural effusions. No acute osseous findings.     ________________        IMPRESSION  Bilateral interstitial and alveolar opacities suggesting pneumonia  and/or pulmonary edema. No pleural effusions or pneumothorax seen.

## 2021-11-24 NOTE — PROGRESS NOTES
0730 Bedside and Verbal shift change report given to Te Sprague (oncoming nurse) by Ambar Borges RN (offgoing nurse). Report included the following information SBAR, Kardex, Intake/Output, MAR, Recent Results and Cardiac Rhythm SR.     0800 shift assessment completed and documented per flow sheet, pt is A&0x4, afebrile, VSS on hi flow nasal cannula and non re breather mask, pt c/o 3/10 pain in back tylenol given see mar, pt lying on left side saturating at mid to upper 80s, pt assisted into prone position at this time, oxygen saturation now in mid to upper 90s, education provided on importance to prone positioning, pt cooperative and understanding, no additional requests at this time, critical care continues    1000 rounds completed, new orders received for daily anticoagulation therapy; discussed pt c/o pain in back while proning with both hospitalist and intensivist, no new pain medication orders at this time    1200 pt reassessed VSS, remains afebrile, denies pain at this time, remains in the prone position, tolerating well, no signs of distress, will continue to monitor    [phone] spoke with wife of pt regarding pt condition, provided her with updates and answered all questions to her liking, no additional questions or concerns at this time    1500 pt in right side lying position for comfort; saturating at 95%    1600 pt assisted back into prone position, oxygenating in mid to upper 90s, no signs of distress, VSS on HFNC and NRB, remains afebrile, c/o pain 3/10; pain intervention: pillows and repositioning to promote pt comfort, no additional requests at this time, pt is resting quietly in bed, eyes closed, critical care continues    1900 Bedside and Verbal shift change report given to Ambar Borges RN (oncoming nurse) by KATHRINE Sprague (offgoing nurse). Report included the following information SBAR, Kardex, Intake/Output, MAR, Recent Results and Cardiac Rhythm NSR.

## 2021-11-24 NOTE — PROGRESS NOTES
Comprehensive Nutrition Assessment    Type and Reason for Visit: Initial, Positive nutrition screen    Nutrition Recommendations/Plan: Advance diet past NPO and clear liquids to meet nutritional needs. If pt happens to be intubated, recommend starting TF of Glucerna 1.5 with goal rate of 55ml/hr. Nutrition Assessment:  PMHx: T2DM, HTN, obesity. Admitted for acute hypoxiemix respiratory failure due to COVID 19. Per H&P, poor appetite since 11/14; fruit smoothies PTA. Malnutrition Assessment:  Malnutrition Status: At risk for malnutrition (Poor PO intake PTA)      Estimated Daily Nutrient Needs:  Energy (kcal): 1978; Weight Used for Energy Requirements: Current (PSU 2003 b)  Protein (g): 98; Weight Used for Protein Requirements: Current (1g)  Fluid (ml/day): 1978; Method Used for Fluid Requirements: 1 ml/kcal    Nutrition Related Findings:  Lab: glucose 324. Med: zinc, protonix, melatonin, humalog, vit D, vit c. No BM yet. NPO with sips of water and clear liquids (day 1). most recent weight in chart review was 3/2019 of 200lb. Wounds:    None       Current Nutrition Therapies:  DIET NPO Sips of Water with Meds, Sips of Clear Liquids    Anthropometric Measures:  · Height:  5' 6\" (167.6 cm)  · Current Body Wt:  97.5 kg (215 lb)   · Admission Body Wt:  215 lb    · Usual Body Wt:  90.7 kg (200 lb) (3/2019)     · Ideal Body Wt:  142 lbs:  151.4 %   · BMI Category:  Obese class 1 (BMI 30.0-34. 9)       Nutrition Diagnosis:   · Inadequate oral intake related to impaired respiratory function as evidenced by NPO or clear liquid status due to medical condition    · Predicted inadequate energy intake related to acute injury/trauma as evidenced by poor intake prior to admission    Nutrition Interventions:   Food and/or Nutrient Delivery: Start oral diet  Nutrition Education and Counseling: No recommendations at this time  Coordination of Nutrition Care: Continue to monitor while inpatient, Interdisciplinary rounds    Goals:  Start PO diet when possible to meet nutritional needs within the next 2-3 days. Nutrition Monitoring and Evaluation:   Behavioral-Environmental Outcomes: None identified  Food/Nutrient Intake Outcomes: Diet advancement/tolerance  Physical Signs/Symptoms Outcomes: Biochemical data, Skin, Weight, GI status    Discharge Planning:     Too soon to determine     Electronically signed by Yenni Santiago RD on 11/24/2021 at 10:34 AM

## 2021-11-24 NOTE — PROGRESS NOTES
Hospitalist Progress Note-critical care note     Patient: Khushbu Diaz MRN: [id number]  [facility]: [id number]    YOB: 1975  Age: 39 y.o. Sex: male    DOA: 11/23/2021 LOS:  LOS: 1 day            Chief complaint: acute respiratory failure, covid 19 pna, dm , htn ,     Assessment/Plan         Hospital Problems  Date Reviewed: 11/23/2021          Codes Class Noted POA    * (Principal) Acute hypoxemic respiratory failure due to COVID-19 [facility]) ICD-10-CM: U07.1, J96.01  ICD-9-CM: 518.81, 079.89, 799.02  11/23/2021 Yes        Pneumonia due to COVID-19 virus ICD-10-CM: U07.1, J12.82  ICD-9-CM: 480.8, 079.89  11/23/2021 Yes        Hyperglycemia due to type 2 diabetes mellitus ([facility] ICD-10-CM: E11.65  ICD-9-CM: 250.00  11/23/2021 Yes        Primary hypertension ICD-10-CM: I10  ICD-9-CM: 401.9  11/23/2021 Yes        Hyponatremia ICD-10-CM: E87.1  ICD-9-CM: 276.1  11/23/2021 Yes        Lactic acidosis ICD-10-CM: E87.2  ICD-9-CM: 276.2  11/23/2021 Yes        Hyperlipidemia ICD-10-CM: E78.5  ICD-9-CM: 272.4  Unknown Yes        Obesity (BMI 30-39. 9) ICD-10-CM: E66.9  ICD-9-CM: 278.00  11/23/2021 Yes        COVID-19 vaccination not done ICD-10-CM: Z28.9  ICD-9-CM: V64.00  11/23/2021 Yes              44 y/o male with poorly controlled type 2 diabetes mellitus, hypertension, and obesity is admitted for acute hypoxemic respiratory failure due to COVID-19 pneumonia with lactic acidosis, hyperglycemia, and hyponatremia.  He has not been vaccinated. His medical problems increase the risk of morbidity and mortality from [place]. He is likely to require intubation and mechanical ventilation very soon.     acute hypoxemic respiratory failure due to COVID-19 pneumonia.    On high flow oxygen     unable to have cta chest due to respiratory  Issue   On lovenox for empiric treatment   pvl ordered   Low thresh hold for intubation   Discussed with Dr. Maria Luz Nicholson     covid 19 pna   Not vaccinated   On iv steroid   Antioxidants Breathing tx       htn   bp meds hold due to low normal range of bp and most likely will be intubated soon     DM type II   On lantus ,add 3 units insulin and ssi     Pt was seen through the glass door due to high risk of spread of infection     Case discussed with Dr. Almita Szymanski     Disposition :tbd,   Review of systems:  Limited due to pt's condition     Vital signs/Intake and Output:  Visit Vitals  /76   Pulse 90   Temp 97.9 °F (36.6 °C)   Resp 30   Ht 5' 6\" (1.676 m)   Wt 97.5 kg (215 lb)   SpO2 95%   BMI 34.70 kg/m²     Current Shift:  No intake/output data recorded. Last three shifts:  11/22 1901 - 11/24 0700  In: 1000 [I.V.:1000]  Out: 750 [Urine:750]    Physical Exam:  General: WD, WN. Alert, cooperative, no acute distress    HEENT: NC, Atraumatic. PERRLA, anicteric sclerae. Lungs: Deferred   Heart:  Deferred   Abdomen: Deferred   Extremities: No c/c/e  Psych:   Not anxious or agitated. Neurologic:  No acute neurological deficit. Labs: Results:       Chemistry Recent Labs     11/24/21  0414 11/23/21  1624   * 400*    128*   K 4.4 4.3   CL 99* 90*   CO2 24 24   BUN 25* 26*   CREA 0.82 0.94   CA 8.2* 8.9   AGAP 13 14   BUCR 30* 28*   AP 45 54   TP 5.7* 6.8   ALB 2.3* 2.8*   GLOB 3.4 4.0   AGRAT 0.7* 0.7*      CBC w/Diff Recent Labs     11/24/21  0414 11/23/21  1624   WBC 6.7 9.6   RBC 4.70 5.17   HGB 13.6 15.4   HCT 39.4 43.5    225   GRANS 84* 86*   LYMPH 9* 8*   EOS 0 0      Cardiac Enzymes Recent Labs     11/23/21  1624   *   CKND1 CALCULATION NOT PERFORMED WHEN RESULT IS BELOW LINEAR LIMIT      Coagulation Recent Labs     11/24/21  0414 11/23/21  1623   PTP 13.8 13.3   INR 1.1 1.1   APTT 35.1 30.8       Lipid Panel No results found for: CHOL, CHOLPOCT, CHOLX, CHLST, CHOLV, 290077, HDL, HDLP, LDL, LDLC, DLDLP, 679771, VLDLC, VLDL, TGLX, TRIGL, TRIGP, TGLPOCT, CHHD, CHHDX   BNP No results for input(s): BNPP in the last 72 hours.    Liver Enzymes Recent Labs 11/24/21  0414   TP 5.7*   ALB 2.3*   AP 45      Thyroid Studies No results found for: T4, T3U, TSH, TSHEXT     Procedures/imaging: see electronic medical records for all procedures/Xrays and details which were not copied into this note but were reviewed prior to creation of Plan    XR CHEST PORT    Result Date: 11/23/2021  EXAM:  AP Portable Chest X-ray 1 view INDICATION: Shortness of breath COMPARISON: None _______________ FINDINGS:  Heart and mediastinal contours are within normal limits for portable radiograph. There are bilateral interstitial and alveolar opacities most prominent at the lung bases. Findings suggestive of pneumonia and/or pulmonary edema. There are no pleural effusions. No acute osseous findings. ________________      Bilateral interstitial and alveolar opacities suggesting pneumonia and/or pulmonary edema. No pleural effusions or pneumothorax seen.       Lizbet Bell MD

## 2021-11-24 NOTE — ED NOTES
RRT and Provider MD made aware of current pulse ox via CCM noted 87-90% on Hi-flow NC and NRB    Patient denies any increase SOB at this time

## 2021-11-24 NOTE — PROGRESS NOTES
2140 - Telephone report received by TAMI Kerns, [facility].     2230 - Patient arrived to unit. POC BS obtained: 341. Hospitalist made aware, orders placed by physician for IVP insulin and Q6H sliding scale coverage. 2300 - Admission assessment completed. Patient alert and oriented, lung sounds diminished on hi flow nasal cannula and non-rebreather. 0000 - Patient oxygen saturation decreased to 70's while voiding in urinal. Patient was able to recover slowly on his own. Patient assisted into prone position and non-rebreather removed. 0400 - Reassessment completed. Upper lung sounds coarse, bases remain diminished.     0600 - Rapid covid test performed and urine specimen obtained and sent to lab. POC . Patient sliding scale adjusted from normal sensitivity to resistant scale coverage. [id number] - Patient oxygen saturation decreased into low 80's with frequent coughing spells. PRN Robitussin administered. Patient remains in side-lying/prone position. Non-rebreather placed back on patient. Patient did recover after about 30 min. 0745 - Bedside and Verbal shift change report given to Javon Rivas RN (oncoming nurse) by Lala Cho RN (offgoing nurse). Report included the following information SBAR, Kardex, ED Summary, Procedure Summary, Intake/Output, MAR, Recent Results, Med Rec Status and Cardiac Rhythm NSR.

## 2021-11-24 NOTE — PROGRESS NOTES
Care Management    Reason for Admission:  Acute respiratory failure due to Covid-19. Chart reviewed. Per H&P: Bobbi Wood is a 39 y.o. male with poorly controlled type 2 diabetes mellitus, hypertension, and obesity presents to the ED for worsening shortness of breath over the past day in the setting of being Covid 19+. He has not been vaccinated for Covid due to [place] drama. \"  His wife is also sick but is not sure where he became infected. He initially began feeling ill on 11/14 with poor appetite, malaise, and fever. He has also had ongoing diarrhea. He developed shortness of breath beginning yesterday. He first checked his oxygen level with a pulse oximeter today and it was 55%. He had his stepson drive him to the ER. According to his wife, she he has not been taking his diabetes meds for the past several days as he has not had much of an appetite. She has been feeding him fruit smoothies. He denies any chest pain, abdominal pain, or leg swelling.       Prior to admission patient was living: with spouse    Prior to admission patient was using the following DME:  none                   RUR Score:    9%                Plan for utilizing home health:    TBD      COVID Vaccine Status: unvaccinated    PCP: First and Last name: Ivone Garcia DO    Name of Practice:    Are you a current patient: Yes/No:    Approximate date of last visit:    Can you participate in a virtual visit with your PCP:                     Current Advanced Directive/Advance Care Plan: No Order    Healthcare Decision Maker: spouse Rayna Bosworth [phone]  Click here to complete [employer] including selection of the Healthcare Decision Maker Relationship (ie \"Primary\")                         Transition of Care Plan: In progress     CM notes patient on HFNC and NRB, primary RN reports she needs to be reminded to prone. Care Management/Patient Conversation: CM spoke with patient's spouse.  Patient spouse confirmed contact info, that the patient is independent, does not use home O2 or DME, drives himself, and will have a ride available at discharge. The spouse reported that the patient last saw his PCP Dr. Arlyn Tapia within the past 3 months. Care Management Interventions  PCP Verified by CM:  Yes  Mode of Transport at Discharge:  (family)  Transition of Care Consult (CM Consult): Discharge Planning  Support Systems: Spouse/Significant Other  Confirm Follow Up Transport: Family  The Plan for Transition of Care is Related to the Following Treatment Goals : home with physician follow up and family assitance versus [facility] and/or home O2 and physician follow up  Discharge Location  Discharge Placement:  (home with physician follow up and family assistance or HH and/or how O2)

## 2021-11-24 NOTE — PROGRESS NOTES
P[facility] Specialists  Pulmonary, Critical Care, and Sleep Medicine    Name: Noe Gray MRN: [id number]   : 1975 Hospital: [facility]    Date: 2021  Room: [address] [place] [address] Note                                              Consult requesting physician: Dr. Conner Ramos  Reason for Consult: Severe Covid pneumonia      Subjective/History of Present Illness:     Patient is a 39 y.o. male with PMHx significant for diabetes and hypertension. He has come to ER with worsening shortness of breath with positive Covid infection. He reports that he has been having cold symptoms for more than a week. He tested positive about 1 week ago. He has come with worsening shortness of breath, and was found to be severely hypoxemic. He has been placed on combination of high flow oxygen and nonrebreather. His oxygen saturation is in the high 80s on this, and in the semiupright position. Vaccination statusnot vaccinated for COVID-19  I have reviewed history with ER consultant Dr. Loan Azul and chart review. Currently on the maximum dose of high flow prone position started patient is not stable to perform CT for pulmonary embolus in the setting of elevated D-dimer full anticoagulation started last night to continue    Patient seen in the ER with full PPE protection. Patient appears comfortable. Improved breathing. Nonproductive cough. No chest pain or hemoptysis. Telemetrysinus rhythm. Blood pressurestable. Blood sugarselevated.     Review of Systems: Limited due to patient condition  HEENT: No epistaxis, no redness in eyes  Respiratory: as above  Cardiovascular: no chest pain, no palpitations  Gastrointestinal: no abd pain, no vomiting, no bleeding symptoms  Genitourinary: No urinary symptoms or hematuria  Constitutional: No fever, no chills    Allergies   Allergen Reactions    Alupent [Metaproterenol] Swelling      Past Medical History:   Diagnosis Date    COVID-19     Diabetes ([facility] [address]     Hyperlipidemia     Hypertension       Past Surgical History:   Procedure Laterality Date    HX ORTHOPAEDIC Right     wrist surgery      Social History     Tobacco Use    Smoking status: Never Smoker    Smokeless tobacco: Not on file   Substance Use Topics    Alcohol use: Yes     Comment: drinksonce a week      Family History   Problem Relation Age of Onset    Diabetes Mother     Hypertension Mother     Stroke Mother     Hodgkin's lymphoma Mother     Diabetes Father     Hypertension Father     Cystic Fibrosis Father     Diabetes Maternal Aunt     Diabetes Maternal Uncle       Prior to Admission medications    Medication Sig Start Date End Date Taking? Authorizing Provider   atorvastatin (LIPITOR) 20 mg tablet Take 20 mg by mouth daily. Yes Provider, Historical   telmisartan (MICARDIS) 80 mg tablet Take 80 mg by mouth daily. Indications: high blood pressure   Yes Provider, Historical   fenofibrate nanocrystallized (TRICOR) 145 mg tablet Take 145 mg by mouth daily. Yes Provider, Historical   glipiZIDE SR (GLUCOTROL XL) 10 mg CR tablet Take 10 mg by mouth daily. Yes Provider, Historical   metFORMIN ER (GLUCOPHAGE XR) 500 mg tablet Take 500 mg by mouth daily (with dinner).    Yes Provider, Historical     Current Facility-Administered Medications   Medication Dose Route Frequency    atorvastatin (LIPITOR) tablet 20 mg  20 mg Oral DAILY    enoxaparin (LOVENOX) injection 100 mg  1 mg/kg SubCUTAneous Q24H    insulin glargine (LANTUS) injection 10 Units  10 Units SubCUTAneous DAILY    dexamethasone (DECADRON) 4 mg/mL injection 6 mg  6 mg IntraVENous Q24H    pantoprazole (PROTONIX) 40 mg in 0.9% sodium chloride 10 mL injection  40 mg IntraVENous Q24H    cholecalciferol (VITAMIN D3) (1000 Units /25 mcg) tablet 2,000 Units  2,000 Units Oral DAILY    ascorbic acid (vitamin C) (VITAMIN C) tablet 500 mg  500 mg Oral BID    insulin lispro (HUMALOG) injection   SubCUTAneous Q6H    zinc sulfate (ZINCATE) 50 mg zinc (220 mg) capsule 1 Capsule  1 Capsule Oral Q12H    melatonin (rapid dissolve) tablet 5 mg  5 mg Oral QHS         Objective:   Vital Signs:    Visit Vitals  /75   Pulse 91   Temp 98.9 °F (37.2 °C)   Resp 30   Ht 5' 6\" (1.676 m)   Wt 97.5 kg (215 lb)   SpO2 91%   BMI 34.70 kg/m²       O2 Device: Heated, Hi flow nasal cannula, Non-rebreather mask   O2 Flow Rate (L/min): 60 l/min   Temp (24hrs), Av °F (36.7 °C), Min:97 °F (36.1 °C), Max:98.9 °F (37.2 °C)       Intake/Output:   Last shift:       0701 -  1900  In: 100 [P.O.:100]  Out: 700 [Urine:700]    Last 3 shifts:  190 -  0700  In: 1250 [I.V.:1250]  Out: 9617 [Urine:1375]      Intake/Output Summary (Last 24 hours) at 2021 1529  Last data filed at 2021 1200  Gross per 24 hour   Intake 1350 ml   Output 2075 ml   Net -725 ml       Last 3 Recorded Weights in this Encounter    21 1607 21 1210   Weight: 97.5 kg (215 lb) 97.5 kg (215 lb)        Physical Exam: Limitations in exam due to full PPE requirements.   Patient on combination of nonrebreather mask and high flow nasal cannula oxygen; acyanotic; breathing appears to be comfortable currently   HEENT: pupils not dilated, no scleral jaundice, moist oral mucosa, no nasal drainage; telemetrysinus rhythm  Neck: No adenopathy or thyroid swelling  CVS: S1S2 no murmurs; JVD not elevated  RS: Mod air entry bilaterally, decreased BS at bases, no wheezes, bilateral lower chest crackles  Abd: soft, non tender, no hepatosplenomegaly, no abd distension  Neuro: non focal, awake, alert  Extrm: no leg edema or swelling or clubbing  Skin: no rash  Lymphatic: no cervical or supraclavicular adenopathy  Psych: Calm and cooperative   Vasc: DPs palpable         Data:       Recent Results (from the past 24 hour(s))   EKG, 12 LEAD, INITIAL    Collection Time: 21  4:21 PM   Result Value Ref Range    Ventricular Rate 117 BPM    Atrial Rate 117 BPM    P-R Interval 144 ms    QRS Duration 72 ms    Q-T Interval 322 ms    QTC Calculation (Bezet) 449 ms    Calculated P Axis 34 degrees    Calculated R Axis -5 degrees    Calculated T Axis 41 degrees    Diagnosis       Sinus tachycardia  Otherwise normal ECG  No previous ECGs available  Confirmed by Ramin Abdalla MD, -- ([id number]) on 11/24/2021 12:24:32 PM     PROTHROMBIN TIME + INR    Collection Time: 11/23/21  4:23 PM   Result Value Ref Range    Prothrombin time 13.3 11.5 - 15.2 sec    INR 1.1 0.8 - 1.2     PTT    Collection Time: 11/23/21  4:23 PM   Result Value Ref Range    aPTT 30.8 23.0 - 36.4 SEC   D DIMER    Collection Time: 11/23/21  4:23 PM   Result Value Ref Range    D DIMER 0.93 (H) <0.46 ug/ml(FEU)   CBC WITH AUTOMATED DIFF    Collection Time: 11/23/21  4:24 PM   Result Value Ref Range    WBC 9.6 4.6 - 13.2 K/uL    RBC 5.17 4.35 - 5.65 M/uL    HGB 15.4 13.0 - 16.0 g/dL    HCT 43.5 36.0 - 48.0 %    MCV 84.1 78.0 - 100.0 FL    MCH 29.8 24.0 - 34.0 PG    MCHC 35.4 31.0 - 37.0 g/dL    RDW 12.0 11.6 - 14.5 %    PLATELET 697 980 - 283 K/uL    MPV 9.8 9.2 - 11.8 FL    NRBC 0.0 0  WBC    ABSOLUTE NRBC 0.00 0.00 - 0.01 K/uL    NEUTROPHILS 86 (H) 40 - 73 %    LYMPHOCYTES 8 (L) 21 - 52 %    MONOCYTES 5 3 - 10 %    EOSINOPHILS 0 0 - 5 %    BASOPHILS 0 0 - 2 %    IMMATURE GRANULOCYTES 1 (H) 0.0 - 0.5 %    ABS. NEUTROPHILS 8.2 (H) 1.8 - 8.0 K/UL    ABS. LYMPHOCYTES 0.8 (L) 0.9 - 3.6 K/UL    ABS. MONOCYTES 0.5 0.05 - 1.2 K/UL    ABS. EOSINOPHILS 0.0 0.0 - 0.4 K/UL    ABS. BASOPHILS 0.0 0.0 - 0.1 K/UL    ABS. IMM.  GRANS. 0.1 (H) 0.00 - 0.04 K/UL    DF AUTOMATED     CARDIAC PANEL,(CK, CKMB & TROPONIN)    Collection Time: 11/23/21  4:24 PM   Result Value Ref Range    CK - MB <1.0 <3.6 ng/ml    CK-MB Index  0.0 - 4.0 %     CALCULATION NOT PERFORMED WHEN RESULT IS BELOW LINEAR LIMIT     (H) 39 - 308 U/L    Troponin-I, QT <0.02 0.0 - 8.591 NG/ML   METABOLIC PANEL, COMPREHENSIVE    Collection Time: 11/23/21  4:24 PM   Result Value Ref Range    Sodium 128 (L) 136 - 145 mmol/L    Potassium 4.3 3.5 - 5.5 mmol/L    Chloride 90 (L) 100 - 111 mmol/L    CO2 24 21 - 32 mmol/L    Anion gap 14 3.0 - 18 mmol/L    Glucose 400 (H) 74 - 99 mg/dL    BUN 26 (H) 7.0 - 18 MG/DL    Creatinine 0.94 0.6 - 1.3 MG/DL    BUN/Creatinine ratio 28 (H) 12 - 20      GFR est AA >60 >60 ml/min/1.73m2    GFR est non-AA >60 >60 ml/min/1.73m2    Calcium 8.9 8.5 - 10.1 MG/DL    Bilirubin, total 0.7 0.2 - 1.0 MG/DL    ALT (SGPT) 57 16 - 61 U/L    AST (SGOT) 79 (H) 10 - 38 U/L    Alk.  phosphatase 54 45 - 117 U/L    Protein, total 6.8 6.4 - 8.2 g/dL    Albumin 2.8 (L) 3.4 - 5.0 g/dL    Globulin 4.0 2.0 - 4.0 g/dL    A-G Ratio 0.7 (L) 0.8 - 1.7     RESPIRATORY VIRUS PANEL W/COVID-19, PCR    Collection Time: 11/23/21  4:24 PM    Specimen: Nasopharyngeal   Result Value Ref Range    Adenovirus Not detected NOTD      Coronavirus 229E Not detected NOTD      Coronavirus HKU1 Not detected NOTD      Coronavirus CVNL63 Not detected NOTD      Coronavirus OC43 Not detected NOTD      SARS-CoV-2, PCR Detected (AA) NOTD      Metapneumovirus Not detected NOTD      Rhinovirus and Enterovirus Not detected NOTD      Influenza A Not detected NOTD      Influenza A, subtype H1 Not detected NOTD      Influenza A, subtype H3 Not detected NOTD      INFLUENZA A H1N1 PCR Not detected NOTD      Influenza B Not detected NOTD      Parainfluenza 1 Not detected NOTD      Parainfluenza 2 Not detected NOTD      Parainfluenza 3 Not detected NOTD      Parainfluenza virus 4 Not detected NOTD      RSV by PCR Not detected NOTD      B. parapertussis, PCR Not detected NOTD      Bordetella pertussis - PCR Not detected NOTD      Chlamydophila pneumoniae DNA, QL, PCR Not detected NOTD      Mycoplasma pneumoniae DNA, QL, PCR Not detected NOTD     CULTURE, BLOOD    Collection Time: 11/23/21  4:24 PM    Specimen: Blood   Result Value Ref Range    Special Requests: NO SPECIAL REQUESTS      Culture result: NO GROWTH AFTER 9 [facility], BLOOD    Collection Time: 11/23/21  4:24 PM    Specimen: Blood   Result Value Ref Range    Special Requests: NO SPECIAL REQUESTS      Culture result: NO GROWTH AFTER 13 HOURS     LACTIC ACID    Collection Time: 11/23/21  4:24 PM   Result Value Ref Range    Lactic acid 2.3 (HH) 0.4 - 2.0 MMOL/L   FERRITIN    Collection Time: 11/23/21  4:24 PM   Result Value Ref Range    Ferritin 5,557 (H) 8 - 388 NG/ML   PROCALCITONIN    Collection Time: 11/23/21  4:24 PM   Result Value Ref Range    Procalcitonin 0.48 ng/mL   MAGNESIUM    Collection Time: 11/23/21  4:24 PM   Result Value Ref Range    Magnesium 2.5 1.6 - 2.6 mg/dL   HEMOGLOBIN A1C W/O EAG    Collection Time: 11/23/21  4:24 PM   Result Value Ref Range    Hemoglobin A1c 8.4 (H) 4.2 - 5.6 %   LIPID PANEL    Collection Time: 11/23/21  4:24 PM   Result Value Ref Range    LIPID PROFILE          Cholesterol, total 123 <200 MG/DL    Triglyceride 241 (H) <150 MG/DL    HDL Cholesterol 33 (L) 40 - 60 MG/DL    LDL, calculated 41.8 0 - 100 MG/DL    VLDL, calculated 48.2 MG/DL    CHOL/HDL Ratio 3.7 0 - 5.0     POC LACTIC ACID    Collection Time: 11/23/21  4:25 PM   Result Value Ref Range    Lactic Acid (POC) 3.04 (HH) 0.40 - 2.00 mmol/L   POC LACTIC ACID    Collection Time: 11/23/21  4:49 PM   Result Value Ref Range    Lactic Acid (POC) 3.43 (HH) 0.40 - 2.00 mmol/L   BLOOD GAS, ARTERIAL POC    Collection Time: 11/23/21  4:51 PM   Result Value Ref Range    Device: High Flow Nasal Cannula      FIO2 (POC) 100 %    pH (POC) 7.46 (H) 7.35 - 7.45      pCO2 (POC) 27.0 (L) 35.0 - 45.0 MMHG    pO2 (POC) 61 (L) 80 - 100 MMHG    HCO3 (POC) 19.0 (L) 22 - 26 MMOL/L    sO2 (POC) 92.8 92 - 97 %    Base deficit (POC) 3.4 mmol/L    Set Rate 35 bpm    Allens test (POC) Positive      Site LEFT RADIAL      Specimen type (POC) ARTERIAL      Performed by Ari Hdez [facility])    GLUCOSE, POC    Collection Time: 11/23/21 10:37 PM   Result Value Ref Range    Glucose (POC) 341 (H) 70 - 110 mg/dL   LACTIC ACID    Collection Time: 11/23/21 11:05 PM   Result Value Ref Range    Lactic acid 2.2 (HH) 0.4 - 2.0 MMOL/L   D DIMER    Collection Time: 11/24/21  4:14 AM   Result Value Ref Range    D DIMER 0.72 (H) <0.46 ug/ml(FEU)   PROTHROMBIN TIME + INR    Collection Time: 11/24/21  4:14 AM   Result Value Ref Range    Prothrombin time 13.8 11.5 - 15.2 sec    INR 1.1 0.8 - 1.2     PTT    Collection Time: 11/24/21  4:14 AM   Result Value Ref Range    aPTT 35.1 23.0 - 36.4 SEC   FERRITIN    Collection Time: 11/24/21  4:14 AM   Result Value Ref Range    Ferritin 4,159 (H) 8 - 388 NG/ML   CBC WITH AUTOMATED DIFF    Collection Time: 11/24/21  4:14 AM   Result Value Ref Range    WBC 6.7 4.6 - 13.2 K/uL    RBC 4.70 4.35 - 5.65 M/uL    HGB 13.6 13.0 - 16.0 g/dL    HCT 39.4 36.0 - 48.0 %    MCV 83.8 78.0 - 100.0 FL    MCH 28.9 24.0 - 34.0 PG    MCHC 34.5 31.0 - 37.0 g/dL    RDW 12.0 11.6 - 14.5 %    PLATELET 160 999 - 335 K/uL    MPV 9.5 9.2 - 11.8 FL    NRBC 0.0 0  WBC    ABSOLUTE NRBC 0.00 0.00 - 0.01 K/uL    NEUTROPHILS 84 (H) 40 - 73 %    LYMPHOCYTES 9 (L) 21 - 52 %    MONOCYTES 6 3 - 10 %    EOSINOPHILS 0 0 - 5 %    BASOPHILS 0 0 - 2 %    IMMATURE GRANULOCYTES 1 (H) 0.0 - 0.5 %    ABS. NEUTROPHILS 5.6 1.8 - 8.0 K/UL    ABS. LYMPHOCYTES 0.6 (L) 0.9 - 3.6 K/UL    ABS. MONOCYTES 0.4 0.05 - 1.2 K/UL    ABS. EOSINOPHILS 0.0 0.0 - 0.4 K/UL    ABS. BASOPHILS 0.0 0.0 - 0.1 K/UL    ABS. IMM.  GRANS. 0.1 (H) 0.00 - 0.04 K/UL    DF AUTOMATED      PLATELET COMMENTS ADEQUATE PLATELETS      RBC COMMENTS NORMOCYTIC, NORMOCHROMIC      WBC COMMENTS REACTIVE LYMPHS     METABOLIC PANEL, COMPREHENSIVE    Collection Time: 11/24/21  4:14 AM   Result Value Ref Range    Sodium 136 136 - 145 mmol/L    Potassium 4.4 3.5 - 5.5 mmol/L    Chloride 99 (L) 100 - 111 mmol/L    CO2 24 21 - 32 mmol/L    Anion gap 13 3.0 - 18 mmol/L    Glucose 324 (H) 74 - 99 mg/dL    BUN 25 (H) 7.0 - 18 MG/DL    Creatinine 0.82 0.6 - 1.3 MG/DL BUN/Creatinine ratio 30 (H) 12 - 20      GFR est AA >60 >60 ml/min/1.73m2    GFR est non-AA >60 >60 ml/min/1.73m2    Calcium 8.2 (L) 8.5 - 10.1 MG/DL    Bilirubin, total 0.5 0.2 - 1.0 MG/DL    ALT (SGPT) 43 16 - 61 U/L    AST (SGOT) 56 (H) 10 - 38 U/L    Alk.  phosphatase 45 45 - 117 U/L    Protein, total 5.7 (L) 6.4 - 8.2 g/dL    Albumin 2.3 (L) 3.4 - 5.0 g/dL    Globulin 3.4 2.0 - 4.0 g/dL    A-G Ratio 0.7 (L) 0.8 - 1.7     FIBRINOGEN    Collection Time: 11/24/21  4:14 AM   Result Value Ref Range    Fibrinogen 640 (H) 210 - 451 mg/dL   TROPONIN I    Collection Time: 11/24/21  4:14 AM   Result Value Ref Range    Troponin-I, QT <0.02 0.0 - 0.045 NG/ML   LACTIC ACID    Collection Time: 11/24/21  4:14 AM   Result Value Ref Range    Lactic acid 1.4 0.4 - 2.0 MMOL/L   VITAMIN D, 25 HYDROXY    Collection Time: 11/24/21  4:14 AM   Result Value Ref Range    Vitamin D 25-Hydroxy 23.2 (L) 30 - 100 ng/mL   NT-PRO BNP    Collection Time: 11/24/21  4:14 AM   Result Value Ref Range    NT pro-BNP 45 0 - 450 PG/ML   URINALYSIS W/ RFLX MICROSCOPIC    Collection Time: 11/24/21  6:00 AM   Result Value Ref Range    Color YELLOW      Appearance CLEAR      Specific gravity >1.030 (H) 1.005 - 1.030    pH (UA) 5.5 5.0 - 8.0      Protein Negative NEG mg/dL    Glucose >1,000 (A) NEG mg/dL    Ketone 80 (A) NEG mg/dL    Bilirubin Negative NEG      Blood Negative NEG      Urobilinogen 1.0 0.2 - 1.0 EU/dL    Nitrites Negative NEG      Leukocyte Esterase Negative NEG     COVID-19 RAPID TEST    Collection Time: 11/24/21  6:00 AM   Result Value Ref Range    Specimen source Nasopharyngeal      COVID-19 rapid test Detected (AA) NOTD     GLUCOSE, POC    Collection Time: 11/24/21  6:15 AM   Result Value Ref Range    Glucose (POC) 317 (H) 70 - 110 mg/dL   ECHO ADULT COMPLETE    Collection Time: 11/24/21 12:10 PM   Result Value Ref Range    IVSd 1.05 (A) 0.6 - 1.0 cm    LVIDd 4.77 4.2 - 5.9 cm    LVIDs 3.67 cm    LVOT d 2.47 cm    LVPWd 1.11 (A) 0.6 - 1.0 cm    Global Longitudinal Strain 2 Chamber -18.8 percent    Global Longitudinal Strain 4 Chamber -12.6 percent    Global Longitudinal Strain Long Axis -15.2 percent    Global Longitudinal Strain -15.5 percent    BP EF 65.9 55 - 100 percent    LV Ejection Fraction MOD 2C 69 percent    LV Ejection Fraction MOD 4C 64 percent    LV ED Vol A2C 122.05 mL    LV ED Vol A4C 122.59 mL    LV ED Vol .66 67 - 155 mL    LV ES Vol A2C 37.40 mL    LV ES Vol A4C 44.50 mL    LV ES Vol BP 41.17 22 - 58 mL    LVOT .2 mL    LVOT Cardiac Output 8.89 liter/minute    LVOT Peak Gradient 4.51 mmHg    Left Ventricular Outflow Tract Mean Gradient 2.67 mmHg    LVOT Peak Velocity 106.22 cm/s    LVOT VTI 21.07 cm    RVIDd 2.69 cm    Tapse 0.94 (A) 1.5 - 2.0 cm    Left Atrium Major Axis 2.04 cm    LA Volume 55.88 18 - 58 mL    LA Area 4C 18.06 cm2    LA Vol 2C 55.47 18 - 58 mL    LA Vol 4C 45.79 18 - 58 mL    LA Volume DISK BP 52.10 18 - 58 mL    Right Atrial Area 4C 12.71 cm2    Aortic Valve Area by Continuity of Peak Velocity 3.86 cm2    Aortic Valve Area by Continuity of VTI 3.52 cm2    AoV PG 7.00 mmHg    Aortic Valve Systolic Mean Gradient 0.98 mmHg    Aortic Valve Systolic Peak Velocity 143.72 cm/s    AoV VTI 28.71 cm    MV A Irvin 68.56 cm/s    Mitral Valve E Wave Deceleration Time 169.29 ms    MV E Irvin 80.01 cm/s    E/E' ratio (averaged) 7.12     E/E' lateral 6.45     E/E' septal 7.78     LV E' Lateral Velocity 12.41 cm/s    LV E' Septal Velocity 10.28 cm/s    Ao Root D 3.91 cm    IVC proximal 1.71 cm    MV E/A 1.17     LV Mass .2 88 - 224 g    LV Mass AL Index 90.9 49 - 115 g/m2    LVES Vol Index BP 20.0 mL/m2    LVED Vol Index BP 58.6 mL/m2    Left Atrium Minor Axis 0.99 cm    LA Vol Index 27.13 16 - 28 ml/m2    LA Vol Index 26.93 16 - 28 ml/m2    LA Vol Index 22.23 16 - 28 ml/m2    LVED Vol Index A4C 59.5 mL/m2    LVED Vol Index A2C 59.2 mL/m2    LVES Vol Index A4C 21.6 mL/m2    LVES Vol Index A2C 18.2 mL/m2 JEANINE/BSA Pk Irvin 1.9 cm2/m2    JEANINE/BSA VTI 1.7 cm2/m2   GLUCOSE, POC    Collection Time: 11/24/21 12:12 PM   Result Value Ref Range    Glucose (POC) 286 (H) 70 - 110 mg/dL         Chemistry Recent Labs     11/24/21  0414 11/23/21  1624   * 400*    128*   K 4.4 4.3   CL 99* 90*   CO2 24 24   BUN 25* 26*   CREA 0.82 0.94   CA 8.2* 8.9   MG  --  2.5   AGAP 13 14   BUCR 30* 28*   AP 45 54   TP 5.7* 6.8   ALB 2.3* 2.8*   GLOB 3.4 4.0   AGRAT 0.7* 0.7*        Lactic Acid Lactic acid   Date Value Ref Range Status   11/24/2021 1.4 0.4 - 2.0 MMOL/L Final     Recent Labs     11/24/21  0414 11/23/21  2305 11/23/21  1624   LAC 1.4 2.2* 2.3*        Liver Enzymes Protein, total   Date Value Ref Range Status   11/24/2021 5.7 (L) 6.4 - 8.2 g/dL Final     Albumin   Date Value Ref Range Status   11/24/2021 2.3 (L) 3.4 - 5.0 g/dL Final     Globulin   Date Value Ref Range Status   11/24/2021 3.4 2.0 - 4.0 g/dL Final     A-G Ratio   Date Value Ref Range Status   11/24/2021 0.7 (L) 0.8 - 1.7   Final     Alk.  phosphatase   Date Value Ref Range Status   11/24/2021 45 45 - 117 U/L Final     Recent Labs     11/24/21  0414 11/23/21  1624   TP 5.7* 6.8   ALB 2.3* 2.8*   GLOB 3.4 4.0   AGRAT 0.7* 0.7*   AP 45 54        CBC w/Diff Recent Labs     11/24/21  0414 11/23/21  1624   WBC 6.7 9.6   RBC 4.70 5.17   HGB 13.6 15.4   HCT 39.4 43.5    225   GRANS 84* 86*   LYMPH 9* 8*   EOS 0 0        Cardiac Enzymes Lab Results   Component Value Date/Time     (H) 11/23/2021 04:24 PM    CKMB <1.0 11/23/2021 04:24 PM    CKND1  11/23/2021 04:24 PM     CALCULATION NOT PERFORMED WHEN RESULT IS BELOW LINEAR LIMIT    TROIQ <0.02 11/24/2021 04:14 AM    TROIQ <0.02 11/23/2021 04:24 PM        BNP No results found for: BNP, BNPP, XBNPT     Coagulation Recent Labs     11/24/21  0414 11/23/21  1623   PTP 13.8 13.3   INR 1.1 1.1   APTT 35.1 30.8         Thyroid  No results found for: T4, T3U, TSH, TSHEXT, TSHEXT    No results found for: T4 Urinalysis Lab Results   Component Value Date/Time    Color YELLOW 11/24/2021 06:00 AM    Appearance CLEAR 11/24/2021 06:00 AM    Specific gravity >1.030 (H) 11/24/2021 06:00 AM    pH (UA) 5.5 11/24/2021 06:00 AM    Protein Negative 11/24/2021 06:00 AM    Glucose >1,000 (A) 11/24/2021 06:00 AM    Ketone 80 (A) 11/24/2021 06:00 AM    Bilirubin Negative 11/24/2021 06:00 AM    Urobilinogen 1.0 11/24/2021 06:00 AM    Nitrites Negative 11/24/2021 06:00 AM    Leukocyte Esterase Negative 11/24/2021 06:00 AM        Micro  Recent Labs     11/23/21 1624   CULT NO GROWTH AFTER 13 HOURS  NO GROWTH AFTER 14 HOURS     Recent Labs     11/23/21 1624   CULT NO GROWTH AFTER 13 HOURS  NO GROWTH AFTER 14 HOURS          Culture data during this hospitalization. All Micro Results     Procedure Component Value Units Date/Time    COVID-19 RAPID TEST [[id number]]  (Abnormal) Collected: 11/24/21 0600    Order Status: Completed Specimen: Nasopharyngeal Updated: 11/24/21 0703     Specimen source Nasopharyngeal        COVID-19 rapid test Detected        Comment:      The specimen is POSITIVE for SARS-CoV-2, the novel coronavirus associated with COVID-19. This test has been authorized by the FDA under an Emergency Use Authorization (EUA) for use by authorized laboratories.         Fact sheet for Healthcare Providers: ConventionUpdate.co.nz  Fact sheet for Patients: ConventionUpdate.co.nz       Methodology: Isothermal Nucleic Acid Amplification  CALLED TO AND CORRECTLY REPEATED BY:  SLOANE KIRKPATRICK RN AT 3365 ON 11/24/21 TO LAD         CULTURE, BLOOD [[id number]] Collected: 11/23/21 1624    Order Status: Completed Specimen: Blood Updated: 11/24/21 0651     Special Requests: NO SPECIAL REQUESTS        Culture result: NO GROWTH AFTER 14 HOURS       CULTURE, BLOOD [[id number]] Collected: 11/23/21 1624    Order Status: Completed Specimen: Blood Updated: 11/24/21 0651     Special Requests: NO SPECIAL REQUESTS        Culture result: NO GROWTH AFTER 13 HOURS       CULTURE, URINE [[id number]] Collected: 11/24/21 0600    Order Status: Completed Specimen: Urine from Clean catch Updated: 11/24/21 0639    RESPIRATORY VIRUS PANEL W/COVID-19, PCR [[id number]]  (Abnormal) Collected: 11/23/21 1624    Order Status: Completed Specimen: Nasopharyngeal Updated: 11/23/21 2308     Adenovirus Not detected        Coronavirus 229E Not detected        Coronavirus HKU1 Not detected        Coronavirus CVNL63 Not detected        Coronavirus OC43 Not detected        SARS-CoV-2, PCR Detected        Comment: CALLED TO AND CORRECTLY REPEATED BY:  [id number] [facility] AT 2230 BY KDA 11/23/21  CALLED TO AND CORRECTLY REPEATED BY:  Jassi Hodges RN ICU AT 2252 ON 11/23/21 TO TSH. Metapneumovirus Not detected        Rhinovirus and Enterovirus Not detected        Influenza A Not detected        Influenza A, subtype H1 Not detected        Influenza A, subtype H3 Not detected        INFLUENZA A H1N1 PCR Not detected        Influenza B Not detected        Parainfluenza 1 Not detected        Parainfluenza 2 Not detected        Parainfluenza 3 Not detected        Parainfluenza virus 4 Not detected        RSV by PCR Not detected        B. parapertussis, PCR Not detected        Bordetella pertussis - PCR Not detected        Chlamydophila pneumoniae DNA, QL, PCR Not detected        Mycoplasma pneumoniae DNA, QL, PCR Not detected                LE Doppler       ECHO       Images report reviewed by me:  CT (Most Recent) (CT chest reviewed by me) No results found for this or any previous visit. CXR reviewed by me:  XR (Most Recent).  CXR  reviewed by me and compared with previous CXR Results from Hospital Encounter encounter on 11/23/21    XR CHEST PORT    Narrative  EXAM:  AP Portable Chest X-ray 1 view    INDICATION: Shortness of breath    COMPARISON: None    _______________    FINDINGS:  Heart and mediastinal contours are within normal limits for portable  radiograph. There are bilateral interstitial and alveolar opacities most  prominent at the lung bases. Findings suggestive of pneumonia and/or pulmonary  edema. There are no pleural effusions. No acute osseous findings. ________________    Impression  Bilateral interstitial and alveolar opacities suggesting pneumonia  and/or pulmonary edema. No pleural effusions or pneumothorax seen. IMPRESSION:   · Acute respiratory failure with hypoxemia  · Severe Covid pneumonia  · Hyperglycemia due to Type 2 diabetes mellitus  · Hypertension  · Hyponatremia  · Lactic acidosis  ·   Patient Active Problem List   Diagnosis Code    Acute hypoxemic respiratory failure due to COVID-19 ([facility] U07.1, J96.01    Pneumonia due to COVID-19 virus U07.1, J12.82    Hyperglycemia due to type 2 diabetes mellitus ([facility] E11.65    Primary hypertension I10    Hyponatremia E87.1    Lactic acidosis E87.2    Hyperlipidemia E78.5    Obesity (BMI 30-39. 9) E66.9    COVID-19 vaccination not done Z28.9         · Code status: Full code      RECOMMENDATIONS:   Respiratory: Patient with severe Covid pneumonia. Clear on maximum high flow severe ARDS due to COVID-19 pneumonia patient is not stable to perform CTA of the chest with positive D-dimer full anticoagulation. Started prone position. Started steroids and given bronchodilators additionally added Actemra today. Very severe ARDS high risk intubation spoke to wife and patient  Delayed presentation with severe hypoxemic respiratory failure. Chest x-ray consistent with bilateral airspace disease, opacities and groundglass densities consistent with severe Covid pneumonia. Continue combination of high flow oxygen and nonrebreather mask. Recommended self proning to improve oxygenation. Monitor closely; if patient's respiratory condition worsens, will need to be intubated. ER consultant tried calling tertiary facility for possible ECMOcurrently no beds available.   Keep SPO2 >=88%. Aggressive pulmonary toileting. Aspiration precautions. Incentive spirometry. CVS: Stable hemodynamics. Monitor blood pressure. Patient high likely to have pulmonary embolism. D-dimer elevated. Patient with severe Covid infection. Recommend therapeutic anticoagulation with Lovenox twice daily. Check echocardiogram.  Check ultrasound of the legs. ID: proCalcitonin low stop antibiotics   Steroids started on 11/23/2021  1 dose Tocilizumab given on 11/24/2021  Consulted ID  Antibioticsempiric ceftriaxone and azithromycin while waiting for procalcitonin. Late presentation with severe hypoxemic respiratory failurenot a candidate for remdesivir. no history of hepatitis or TB  Dexamethasone6 mg daily for 10 days. Blood sugar seems to be elevated with history of type 2 diabetes mellitus. Consult ID in morning. Vitamin supplements ordered. Monitor lactic acid. Hematology/Oncology: Monitor hemoglobin and platelets; watch for any bleeding while on therapeutic anticoagulation. Renal: Monitor renal function and urine output. Monitor sodium. GI: Monitor LFT. Oral diet as tolerated. Endocrine: Monitor BS. SSI. If blood sugars remain elevated, would need to add Lantus insulin. Neurology: Normal mentation. Skin/Wound: ICU nursing care. Electrolytes: Replace electrolytes per ICU electrolyte replacement protocol. Nutrition: Oral diet; oral hydration. Prophylaxis: DVT Prophylaxis: Enoxaparin. GI Prophylaxis: Protonix. Lines/Tubes: PIV      Prognosis seems to be guarded; patient being admitted to ICU with severe Covid pneumonia and hypoxemic respiratory failure; patient risk of mortality and going on ventilator support is very high. Quality Care: PPI, DVT prophylaxis, HOB elevated, Infection control all reviewed and addressed. Discussed with ER consultant.     High complexity decision making was performed during the evaluation of this patient at high risk for decompensation with multiple organ involvement. Total critical care time spent rendering care exclusive of procedures/family discussion/coordination of care: 60 minutes. Addendum - patient was not stable for CTA chest last night in the ER due to degree of hypoxemia and inability to lay flat for testing. Still unable to do today         Please note: Voice-recognition software may have been used to generate this report, which may have resulted in some phonetic-based errors in grammar and contents. Even though attempts were made to correct all the mistakes, some may have been missed, and remained in the body of the document. Ezio Orona MD  11/24/2021       Note  Patient with SARS-CoV-2 pneumonia with severe pneumonia and hypoxemic respiratory failure; patient on high oxygen support via high flow and nonrebreather mask oxygen; patient is not vaccinated, and is in the age group that is being infected with delta virus strain; the strain causes severe respiratory failure and complications reported in the [place] and worldwide; unfortunately, the prognosis is poor in unvaccinated patients, with a high risk of complications, multiorgan failure, chronic hypoxemia and respiratory failure, intubation, mechanical ventilation, thromboembolic disease risk in multiple organs, high risk for long Covid syndrome, and high risk for mortality. The CDC federal and state guidelines have emphasized repeatedly the importance of vaccination to prevent severe infection, mortality, disabilities, long Covid syndrome from Covid virus with several strains currently in the [place]. Pfizer vaccination has been approved by [place] and [place]. Vaccination has been extended to children as well. The treatment protocols are followed based on local hospital protocols, with guidance from centers such as [facility] protocols. [facility] is not part of any research protocol. The local hospital protocols have been guided by [facility] pharmacy department.   Covid plasma is no longer considered standard of care in the [facility] due to lack of benefit in trials. Nebulization and CPAP therapies are not considered as part of the protocol in [facility] due to high risk of aerosolization, and high risks of spreading Covid infection to the healthcare staff. REMDESIVIR-delayed presentation with severe hypoxemic respiratory failure are poor candidate for remdesivir therapy per [facility] cough. Anti-inflammatory medication such as baricitinib or Tocilizumab based on CRP may have some benefit. Dexamethasone is considered standard of care.

## 2021-11-24 NOTE — ED NOTES
Verbal shift change report given to Malathi Dwyer RN (oncoming nurse) by Paul Watson RN (offgoing nurse). Report included the following information SBAR, Kardex, ED Summary, [facility] and Recent Results.

## 2021-11-24 NOTE — DIABETES MGMT
Diabetes/ Glycemic Control Plan of Care  Recommendations:   Recommend initiating long-acting insulin, suggest 10 units Lantus q 24 hrs. Assessment: Plan of care discussed during interdisciplinary rounds. Patient with known history of diabetes. BGL outside of target range (140-180 mg/dL)  DX:   1. Pneumonia due to COVID-19 virus     2. Acute respiratory failure with hypoxia (HCC)     3. Hyperglycemia     4. Elevated d-dimer     5. Sepsis due to other etiology ([address]     6. Lactic acid acidosis        Recent Glucose Results:   Lab Results   Component Value Date/Time     (H) 11/24/2021 04:14 AM     (H) 11/23/2021 04:24 PM    GLUCPOC 317 (H) 11/24/2021 06:15 AM    GLUCPOC 341 (H) 11/23/2021 10:37 PM         Steroids:   Rx Glucocorticoids (24h ago, onward)             Start     Dose Route Frequency Ordered Stop    11/24/21 1800  dexamethasone (DECADRON) 4 mg/mL injection 6 mg         6 mg IV EVERY 24 HOURS 11/23/21 2029 12/03/21 1759                Within target range (70-180mg/dL): No  Current insulin orders: Correctional lispro   Total Daily Dose previous 24 hours = 30 units (18 units regular insulin + 12 units correctional lispro)    Nutrition/Diet:   Active Orders   Diet    DIET NPO Sips of Water with Meds, Sips of Clear Liquids      Home diabetes medications:   Key Antihyperglycemic Medications             glipiZIDE SR (GLUCOTROL XL) 10 mg CR tablet (Taking) Take 10 mg by mouth daily. metFORMIN ER (GLUCOPHAGE XR) 500 mg tablet (Taking) Take 500 mg by mouth daily (with dinner). Plan/Goals:   Blood glucose will be within target of 70 - 180 mg/dl within 72 hours  Reinforce dietary and medication compliance at home.            Education:  [] Refer to Diabetes Education Record                       [x] Education not indicated at this time     Jeffrey Vines, [address] [place] [address]  Glycemic Control Team  [phone]

## 2021-11-24 NOTE — ED NOTES
TRANSFER - OUT REPORT:    Verbal report given to KATHRINE Al(name) on Derrick Du  being transferred to ICU(unit) for routine progression of care       Report consisted of patients Situation, Background, Assessment and   Recommendations(SBAR). Information from the following report(s) SBAR, Kardex, ED Summary, [facility] and Recent Results was reviewed with the receiving nurse. Lines:   Peripheral IV 11/23/21 Right Antecubital (Active)        Opportunity for questions and clarification was provided.       Patient transported with:   Registered Nurse

## 2021-11-24 NOTE — CONSULTS
[facility]  Pulmonary, Critical Care, and Sleep Medicine    Name: Hanna Bashir MRN: [id number]   : 1975 Hospital: [facility] [place]    Date: 2021  Room: [address] Note                                              Consult requesting physician: Dr. Mukul Donaldson  Reason for Consult: Severe Covid pneumonia      Subjective/History of Present Illness:     Patient is a 39 y.o. male with PMHx significant for diabetes and hypertension. He has come to ER with worsening shortness of breath with positive Covid infection. He reports that he has been having cold symptoms for more than a week. He tested positive about 1 week ago. He has come with worsening shortness of breath, and was found to be severely hypoxemic. He has been placed on combination of high flow oxygen and nonrebreather. His oxygen saturation is in the high 80s on this, and in the semiupright position. Vaccination statusnot vaccinated for COVID-19  I have reviewed history with ER consultant Dr. Yun Shin and chart review. Patient seen in the ER with full PPE protection. Patient appears comfortable. Improved breathing. Nonproductive cough. No chest pain or hemoptysis. Telemetrysinus rhythm. Blood pressurestable. Blood sugarselevated. Review of Systems: Limited due to patient condition  HEENT: No epistaxis, no redness in eyes  Respiratory: as above  Cardiovascular: no chest pain, no palpitations  Gastrointestinal: no abd pain, no vomiting, no bleeding symptoms  Genitourinary: No urinary symptoms or hematuria  Constitutional: No fever, no chills    Allergies   Allergen Reactions    Alupent [Metaproterenol] Swelling      Past Medical History:   Diagnosis Date    COVID-19     Diabetes ([facility] [address]     Hypertension       No past surgical history on file. Social History     Tobacco Use    Smoking status: Never Smoker    Smokeless tobacco: Not on file   Substance Use Topics    Alcohol use:  Yes Comment: drinksonce a week      No family history on file. Prior to Admission medications    Not on File     Current Facility-Administered Medications   Medication Dose Route Frequency    iopamidoL (ISOVUE-370) 76 % injection 100 mL  100 mL IntraVENous RAD ONCE    enoxaparin (LOVENOX) injection 100 mg  1 mg/kg SubCUTAneous NOW    insulin regular (NOVOLIN R, HUMULIN R) injection 8 Units  8 Units SubCUTAneous NOW         Objective:   Vital Signs:    Visit Vitals  /66   Pulse (!) 104   Temp 97 °F (36.1 °C)   Resp (!) 45   Ht 5' 6\" (1.676 m)   Wt 97.5 kg (215 lb)   SpO2 (!) 87%   BMI 34.70 kg/m²       O2 Device: Hi flow nasal cannula, Non-rebreather mask   O2 Flow Rate (L/min): 15 l/min   Temp (24hrs), Av °F (36.1 °C), Min:97 °F (36.1 °C), Max:97 °F (36.1 °C)       Intake/Output:   Last shift:      1901 -  0700  In: 1000 [I.V.:1000]  Out: -     Last 3 shifts: No intake/output data recorded. Intake/Output Summary (Last 24 hours) at 2021  Last data filed at 2021 190  Gross per 24 hour   Intake 1000 ml   Output    Net 1000 ml       Last 3 Recorded Weights in this Encounter    21 1607   Weight: 97.5 kg (215 lb)        Physical Exam: Limitations in exam due to full PPE requirements.   Patient on combination of nonrebreather mask and high flow nasal cannula oxygen; acyanotic; breathing appears to be comfortable currently   HEENT: pupils not dilated, no scleral jaundice, moist oral mucosa, no nasal drainage; telemetrysinus rhythm  Neck: No adenopathy or thyroid swelling  CVS: S1S2 no murmurs; JVD not elevated  RS: Mod air entry bilaterally, decreased BS at bases, no wheezes, bilateral lower chest crackles  Abd: soft, non tender, no hepatosplenomegaly, no abd distension  Neuro: non focal, awake, alert  Extrm: no leg edema or swelling or clubbing  Skin: no rash  Lymphatic: no cervical or supraclavicular adenopathy  Psych: Calm and cooperative   Vasc: DPs palpable Data:       Recent Results (from the past 24 hour(s))   EKG, 12 LEAD, INITIAL    Collection Time: 11/23/21  4:21 PM   Result Value Ref Range    Ventricular Rate 117 BPM    Atrial Rate 117 BPM    P-R Interval 144 ms    QRS Duration 72 ms    Q-T Interval 322 ms    QTC Calculation (Bezet) 449 ms    Calculated P Axis 34 degrees    Calculated R Axis -5 degrees    Calculated T Axis 41 degrees    Diagnosis       Sinus tachycardia  Otherwise normal ECG  No previous ECGs available     PROTHROMBIN TIME + INR    Collection Time: 11/23/21  4:23 PM   Result Value Ref Range    Prothrombin time 13.3 11.5 - 15.2 sec    INR 1.1 0.8 - 1.2     PTT    Collection Time: 11/23/21  4:23 PM   Result Value Ref Range    aPTT 30.8 23.0 - 36.4 SEC   D DIMER    Collection Time: 11/23/21  4:23 PM   Result Value Ref Range    D DIMER 0.93 (H) <0.46 ug/ml(FEU)   CBC WITH AUTOMATED DIFF    Collection Time: 11/23/21  4:24 PM   Result Value Ref Range    WBC 9.6 4.6 - 13.2 K/uL    RBC 5.17 4.35 - 5.65 M/uL    HGB 15.4 13.0 - 16.0 g/dL    HCT 43.5 36.0 - 48.0 %    MCV 84.1 78.0 - 100.0 FL    MCH 29.8 24.0 - 34.0 PG    MCHC 35.4 31.0 - 37.0 g/dL    RDW 12.0 11.6 - 14.5 %    PLATELET 915 026 - 713 K/uL    MPV 9.8 9.2 - 11.8 FL    NRBC 0.0 0  WBC    ABSOLUTE NRBC 0.00 0.00 - 0.01 K/uL    NEUTROPHILS 86 (H) 40 - 73 %    LYMPHOCYTES 8 (L) 21 - 52 %    MONOCYTES 5 3 - 10 %    EOSINOPHILS 0 0 - 5 %    BASOPHILS 0 0 - 2 %    IMMATURE GRANULOCYTES 1 (H) 0.0 - 0.5 %    ABS. NEUTROPHILS 8.2 (H) 1.8 - 8.0 K/UL    ABS. LYMPHOCYTES 0.8 (L) 0.9 - 3.6 K/UL    ABS. MONOCYTES 0.5 0.05 - 1.2 K/UL    ABS. EOSINOPHILS 0.0 0.0 - 0.4 K/UL    ABS. BASOPHILS 0.0 0.0 - 0.1 K/UL    ABS. IMM.  GRANS. 0.1 (H) 0.00 - 0.04 K/UL    DF AUTOMATED     CARDIAC PANEL,(CK, CKMB & TROPONIN)    Collection Time: 11/23/21  4:24 PM   Result Value Ref Range    CK - MB <1.0 <3.6 ng/ml    CK-MB Index  0.0 - 4.0 %     CALCULATION NOT PERFORMED WHEN RESULT IS BELOW LINEAR LIMIT     (H) 39 - 308 U/L    Troponin-I, QT <0.02 0.0 - 3.364 NG/ML   METABOLIC PANEL, COMPREHENSIVE    Collection Time: 11/23/21  4:24 PM   Result Value Ref Range    Sodium 128 (L) 136 - 145 mmol/L    Potassium 4.3 3.5 - 5.5 mmol/L    Chloride 90 (L) 100 - 111 mmol/L    CO2 24 21 - 32 mmol/L    Anion gap 14 3.0 - 18 mmol/L    Glucose 400 (H) 74 - 99 mg/dL    BUN 26 (H) 7.0 - 18 MG/DL    Creatinine 0.94 0.6 - 1.3 MG/DL    BUN/Creatinine ratio 28 (H) 12 - 20      GFR est AA >60 >60 ml/min/1.73m2    GFR est non-AA >60 >60 ml/min/1.73m2    Calcium 8.9 8.5 - 10.1 MG/DL    Bilirubin, total 0.7 0.2 - 1.0 MG/DL    ALT (SGPT) 57 16 - 61 U/L    AST (SGOT) 79 (H) 10 - 38 U/L    Alk.  phosphatase 54 45 - 117 U/L    Protein, total 6.8 6.4 - 8.2 g/dL    Albumin 2.8 (L) 3.4 - 5.0 g/dL    Globulin 4.0 2.0 - 4.0 g/dL    A-G Ratio 0.7 (L) 0.8 - 1.7     LACTIC ACID    Collection Time: 11/23/21  4:24 PM   Result Value Ref Range    Lactic acid 2.3 (HH) 0.4 - 2.0 MMOL/L   POC LACTIC ACID    Collection Time: 11/23/21  4:25 PM   Result Value Ref Range    Lactic Acid (POC) 3.04 (HH) 0.40 - 2.00 mmol/L   POC LACTIC ACID    Collection Time: 11/23/21  4:49 PM   Result Value Ref Range    Lactic Acid (POC) 3.43 (HH) 0.40 - 2.00 mmol/L   BLOOD GAS, ARTERIAL POC    Collection Time: 11/23/21  4:51 PM   Result Value Ref Range    Device: High Flow Nasal Cannula      FIO2 (POC) 100 %    pH (POC) 7.46 (H) 7.35 - 7.45      pCO2 (POC) 27.0 (L) 35.0 - 45.0 MMHG    pO2 (POC) 61 (L) 80 - 100 MMHG    HCO3 (POC) 19.0 (L) 22 - 26 MMOL/L    sO2 (POC) 92.8 92 - 97 %    Base deficit (POC) 3.4 mmol/L    Set Rate 35 bpm    Allens test (POC) Positive      Site LEFT RADIAL      Specimen type (POC) ARTERIAL      Performed by Aracelis Huber [facility])          Chemistry Recent Labs     11/23/21  1624   *   *   K 4.3   CL 90*   CO2 24   BUN 26*   CREA 0.94   CA 8.9   AGAP 14   BUCR 28*   AP 54   TP 6.8   ALB 2.8*   GLOB 4.0   AGRAT 0.7*        Lactic Acid Lactic acid Date Value Ref Range Status   11/23/2021 2.3 (HH) 0.4 - 2.0 MMOL/L Final     Comment:     CALLED TO AND CORRECTLY REPEATED BY:  Nikolay Dewey RN ER TO CAF [id number] AT 1735       Recent Labs     11/23/21  1624   LAC 2.3*        Liver Enzymes Protein, total   Date Value Ref Range Status   11/23/2021 6.8 6.4 - 8.2 g/dL Final     Albumin   Date Value Ref Range Status   11/23/2021 2.8 (L) 3.4 - 5.0 g/dL Final     Globulin   Date Value Ref Range Status   11/23/2021 4.0 2.0 - 4.0 g/dL Final     A-G Ratio   Date Value Ref Range Status   11/23/2021 0.7 (L) 0.8 - 1.7   Final     Alk. phosphatase   Date Value Ref Range Status   11/23/2021 54 45 - 117 U/L Final     Recent Labs     11/23/21  1624   TP 6.8   ALB 2.8*   GLOB 4.0   AGRAT 0.7*   AP 54        CBC w/Diff Recent Labs     11/23/21  1624   WBC 9.6   RBC 5.17   HGB 15.4   HCT 43.5      GRANS 86*   LYMPH 8*   EOS 0        Cardiac Enzymes Lab Results   Component Value Date/Time     (H) 11/23/2021 04:24 PM    CKMB <1.0 11/23/2021 04:24 PM    CKND1  11/23/2021 04:24 PM     CALCULATION NOT PERFORMED WHEN RESULT IS BELOW LINEAR LIMIT    TROIQ <0.02 11/23/2021 04:24 PM        BNP No results found for: BNP, BNPP, XBNPT     Coagulation Recent Labs     11/23/21  1623   PTP 13.3   INR 1.1   APTT 30.8         Thyroid  No results found for: T4, T3U, TSH, TSHEXT    No results found for: T4     Urinalysis No results found for: COLOR, APPRN, SPGRU, REFSG, HUBERT, PROTU, GLUCU, KETU, BILU, UROU, KATI, LEUKU, GLUKE, EPSU, BACTU, WBCU, RBCU, CASTS, UCRY     Micro  No results for input(s): SDES, CULT in the last 72 hours. No results for input(s): CULT in the last 72 hours. Culture data during this hospitalization.    All Micro Results     Procedure Component Value Units Date/Time    CULTURE, BLOOD [[id number]] Collected: 11/23/21 1624    Order Status: Completed Specimen: Blood Updated: 11/23/21 1736    RESPIRATORY VIRUS PANEL W/COVID-19, PCR [[id number]] Collected: 11/23/21 1624    Order Status: Completed Specimen: NASOPHARYNGEAL SWAB Updated: 11/23/21 1710    CULTURE, BLOOD [[id number]] Collected: 11/23/21 1624    Order Status: Completed Specimen: Blood Updated: 11/23/21 1643    CULTURE, URINE [[id number]]     Order Status: Sent Specimen: Urine from Clean catch              LE Doppler       ECHO       Images report reviewed by me:  CT (Most Recent) (CT chest reviewed by me) No results found for this or any previous visit. CXR reviewed by me:  XR (Most Recent). CXR  reviewed by me and compared with previous CXR Results from Hospital Encounter encounter on 11/23/21    XR CHEST PORT    Narrative  EXAM:  AP Portable Chest X-ray 1 view    INDICATION: Shortness of breath    COMPARISON: None    _______________    FINDINGS:  Heart and mediastinal contours are within normal limits for portable  radiograph. There are bilateral interstitial and alveolar opacities most  prominent at the lung bases. Findings suggestive of pneumonia and/or pulmonary  edema. There are no pleural effusions. No acute osseous findings. ________________    Impression  Bilateral interstitial and alveolar opacities suggesting pneumonia  and/or pulmonary edema. No pleural effusions or pneumothorax seen. IMPRESSION:   · Acute respiratory failure with hypoxemia  · Severe Covid pneumonia  · Hyperglycemia due to Type 2 diabetes mellitus  · Hypertension  · Hyponatremia  · Lactic acidosis  ·   Patient Active Problem List   Diagnosis Code    Acute respiratory failure with hypoxemia ([facility] J96.01    Pneumonia due to COVID-19 virus U07.1, J12.82    Hyperglycemia due to type 2 diabetes mellitus ([facility] [address] E11.65    Primary hypertension I10    Hyponatremia E87.1    Lactic acidosis E87.2         · Code status: Full code      RECOMMENDATIONS:   Respiratory: Patient with severe Covid pneumonia. Delayed presentation with severe hypoxemic respiratory failure.   Chest x-ray consistent with bilateral airspace disease, opacities and groundglass densities consistent with severe Covid pneumonia. Continue combination of high flow oxygen and nonrebreather mask. Recommended self proning to improve oxygenation. Monitor closely; if patient's respiratory condition worsens, will need to be intubated. ER consultant tried calling tertiary facility for possible ECMOcurrently no beds available. Keep SPO2 >=88%. Aggressive pulmonary toileting. Aspiration precautions. Incentive spirometry. CVS: Stable hemodynamics. Monitor blood pressure. Patient high likely to have pulmonary embolism. D-dimer elevated. Patient with severe Covid infection. Recommend therapeutic anticoagulation with Lovenox twice daily. Check echocardiogram.  Check ultrasound of the legs. ID: Check procalcitonin. Antibioticsempiric ceftriaxone and azithromycin while waiting for procalcitonin. Late presentation with severe hypoxemic respiratory failurenot a candidate for remdesivir. Check inflammatory markers to see if patient would be a candidate for anti-inflammatory medication such as Tocilizumab or baricitinib. Dexamethasone6 mg daily for 10 days. Blood sugar seems to be elevated with history of type 2 diabetes mellitus. Consult ID in morning. Vitamin supplements ordered. Monitor lactic acid. Hematology/Oncology: Monitor hemoglobin and platelets; watch for any bleeding while on therapeutic anticoagulation. Renal: Monitor renal function and urine output. Monitor sodium. GI: Monitor LFT. Oral diet as tolerated. Endocrine: Monitor BS. SSI. If blood sugars remain elevated, would need to add Lantus insulin. Neurology: Normal mentation. Skin/Wound: ICU nursing care. Electrolytes: Replace electrolytes per ICU electrolyte replacement protocol. Nutrition: Oral diet; oral hydration. Prophylaxis: DVT Prophylaxis: Enoxaparin. GI Prophylaxis: Protonix.   Lines/Tubes: PIV      Prognosis seems to be guarded; patient being admitted to ICU with severe Covid pneumonia and hypoxemic respiratory failure; patient risk of mortality and going on ventilator support is very high. Quality Care: PPI, DVT prophylaxis, HOB elevated, Infection control all reviewed and addressed. Discussed with ER consultant. High complexity decision making was performed during the evaluation of this patient at high risk for decompensation with multiple organ involvement. Total critical care time spent rendering care exclusive of procedures/family discussion/coordination of care: 40 minutes. Addendum - patient was not stable for CTA chest last night in the ER due to degree of hypoxemia and inability to lay flat for testing. Please note: Voice-recognition software may have been used to generate this report, which may have resulted in some phonetic-based errors in grammar and contents. Even though attempts were made to correct all the mistakes, some may have been missed, and remained in the body of the document. Meg Mao MD  11/23/2021       Note  Patient with SARS-CoV-2 pneumonia with severe pneumonia and hypoxemic respiratory failure; patient on high oxygen support via high flow and nonrebreather mask oxygen; patient is not vaccinated, and is in the age group that is being infected with delta virus strain; the strain causes severe respiratory failure and complications reported in the [place] and worldwide; unfortunately, the prognosis is poor in unvaccinated patients, with a high risk of complications, multiorgan failure, chronic hypoxemia and respiratory failure, intubation, mechanical ventilation, thromboembolic disease risk in multiple organs, high risk for long Covid syndrome, and high risk for mortality. The CDC federal and state guidelines have emphasized repeatedly the importance of vaccination to prevent severe infection, mortality, disabilities, long Covid syndrome from Covid virus with several strains currently in the [place]. Pfizer vaccination has been approved by [employer]. Vaccination has been extended to children as well. The treatment protocols are followed based on local hospital protocols, with guidance from centers such as [facility] protocols. [facility] is not part of any research protocol. The local hospital protocols have been guided by [facility] pharmacy department. Covid plasma is no longer considered standard of care in the [facility] due to lack of benefit in trials. Nebulization and CPAP therapies are not considered as part of the protocol in [facility] due to high risk of aerosolization, and high risks of spreading Covid infection to the healthcare staff. REMDESIVIR-delayed presentation with severe hypoxemic respiratory failure are poor candidate for remdesivir therapy per [facility] cough. Anti-inflammatory medication such as baricitinib or Tocilizumab based on CRP may have some benefit. Dexamethasone is considered standard of care.

## 2021-11-25 NOTE — PROGRESS NOTES
1900 - Bedside and Verbal shift change report given to Ludin Roper, RN (oncoming nurse) by Mana Cristobal RN (offgoing nurse). Report included the following information SBAR, Kardex, Intake/Output, MAR and Recent Results. 2000 - Patient resting in prone position, tolerating well. VSS w/ O2 94% on HFNC and NRB. Demonstrates dyspnea on exertion with prolonged recovery period. Lung sounds clear diminished bilaterally. Patient educated on proper breathing technique with supplemental oxygen in place. Call bell within reach and instructed to call for any additional needs. 2148 - PRN PO Robitussin given for bout of coughing. 2220 - Patient reported moderate back pain due to cough. MD ordered PRN PO Codeine. Med given and patient assisted back to comfortable position. 0000 - Reassessment. VSS, patient resting in side lying position in NAD. Remains on HFNC and NRB mask. 0400 - O2 70-80's due to patient temporarily removing equipment. Assisted patient with proper breathing techniques and repositioning in bed.     0430 - O2 improved to 90-93% on HFNC and NRB. Patient resting quietly in right side lying position. 0730 - Bedside and Verbal shift change report given to ARGENIS Carnes (oncoming nurse) by Giovani Hatch RN (offgoing nurse). Report included the following information SBAR, Kardex, Intake/Output, MAR and Recent Results.

## 2021-11-25 NOTE — PROGRESS NOTES
[facility]ists  Pulmonary, Critical Care, and Sleep Medicine    Name: Fran Negro MRN: [id number]   : 1975 Hospital: [facility] [place]    Date: 2021  Room: [address] [place] [address] Note                                              Consult requesting physician: Dr. Albert  Reason for Consult: Severe Covid pneumonia      Subjective/History of Present Illness:     Patient is a 39 y.o. male with PMHx significant for diabetes and hypertension. He has come to ER with worsening shortness of breath with positive Covid infection. He reports that he has been having cold symptoms for more than a week. He tested positive about 1 week ago. He has come with worsening shortness of breath, and was found to be severely hypoxemic. He has been placed on combination of high flow oxygen and nonrebreather. His oxygen saturation is in the high 80s on this, and in the semiupright position. Vaccination statusnot vaccinated for COVID-19  I have reviewed history with ER consultant Dr. Roshan Payne and chart review. Currently on the maximum dose of high flow prone position started patient is not stable to perform CT for pulmonary embolus in the setting of elevated D-dimer full anticoagulation started last night PVL pending. Patient seen in the ICU with full PPE protection. Patient appears comfortable. Improved breathing. Nonproductive cough. Mild  chest pain   No hemoptysis  Telemetrysinus rhythm. Blood pressurestable. .    Review of Systems: Limited due to patient condition  HEENT: No epistaxis, no redness in eyes  Respiratory: as above  Cardiovascular: no chest pain, no palpitations  Gastrointestinal: no abd pain, no vomiting, no bleeding symptoms  Genitourinary: No urinary symptoms or hematuria  Constitutional: No fever, no chills    Allergies   Allergen Reactions    Alupent [Metaproterenol] Swelling      Past Medical History:   Diagnosis Date    COVID-19     Diabetes ([facility] [address]     Hyperlipidemia     Hypertension       Past Surgical History:   Procedure Laterality Date    HX ORTHOPAEDIC Right     wrist surgery      Social History     Tobacco Use    Smoking status: Never Smoker    Smokeless tobacco: Not on file   Substance Use Topics    Alcohol use: Yes     Comment: drinksonce a week      Family History   Problem Relation Age of Onset    Diabetes Mother     Hypertension Mother     Stroke Mother     Hodgkin's lymphoma Mother     Diabetes Father     Hypertension Father     Cystic Fibrosis Father     Diabetes Maternal Aunt     Diabetes Maternal Uncle       Prior to Admission medications    Medication Sig Start Date End Date Taking? Authorizing Provider   atorvastatin (LIPITOR) 20 mg tablet Take 20 mg by mouth daily. Yes Provider, Historical   telmisartan (MICARDIS) 80 mg tablet Take 80 mg by mouth daily. Indications: high blood pressure   Yes Provider, Historical   fenofibrate nanocrystallized (TRICOR) 145 mg tablet Take 145 mg by mouth daily. Yes Provider, Historical   glipiZIDE SR (GLUCOTROL XL) 10 mg CR tablet Take 10 mg by mouth daily. Yes Provider, Historical   metFORMIN ER (GLUCOPHAGE XR) 500 mg tablet Take 500 mg by mouth daily (with dinner).    Yes Provider, Historical     Current Facility-Administered Medications   Medication Dose Route Frequency    [START ON 11/26/2021] insulin glargine (LANTUS) injection 15 Units  15 Units SubCUTAneous DAILY    insulin glargine (LANTUS) injection 5 Units  5 Units SubCUTAneous ONCE    insulin lispro (HUMALOG) injection 5 Units  5 Units SubCUTAneous TIDAC    insulin lispro (HUMALOG) injection   SubCUTAneous AC&HS    atorvastatin (LIPITOR) tablet 20 mg  20 mg Oral DAILY    enoxaparin (LOVENOX) injection 100 mg  1 mg/kg SubCUTAneous Q24H    dexamethasone (DECADRON) 4 mg/mL injection 6 mg  6 mg IntraVENous Q24H    pantoprazole (PROTONIX) 40 mg in 0.9% sodium chloride 10 mL injection  40 mg IntraVENous Q24H    cholecalciferol (VITAMIN D3) (1000 Units /25 mcg) tablet 2,000 Units  2,000 Units Oral DAILY    ascorbic acid (vitamin C) (VITAMIN C) tablet 500 mg  500 mg Oral BID    zinc sulfate (ZINCATE) 50 mg zinc (220 mg) capsule 1 Capsule  1 Capsule Oral Q12H    melatonin (rapid dissolve) tablet 5 mg  5 mg Oral QHS         Objective:   Vital Signs:    Visit Vitals  /69   Pulse 78   Temp 97.5 °F (36.4 °C)   Resp 25   Ht 5' 6\" (1.676 m)   Wt 97.5 kg (215 lb)   SpO2 94%   BMI 34.70 kg/m²       O2 Device: Heated, Hi flow nasal cannula   O2 Flow Rate (L/min): 50 l/min   Temp (24hrs), Av.4 °F (36.9 °C), Min:97.5 °F (36.4 °C), Max:99 °F (37.2 °C)       Intake/Output:   Last shift:       0701 -  190  In: -   Out: 250 [Urine:250]    Last 3 shifts: 1901 -  0700  In: 1504.5 [P.O.:150; I.V.:1354.5]  Out: 7192 [Urine:3375]      Intake/Output Summary (Last 24 hours) at 2021 1248  Last data filed at 2021 1230  Gross per 24 hour   Intake 154.5 ml   Output 1550 ml   Net -1395.5 ml       Last 3 Recorded Weights in this Encounter    21 1607 21 1210   Weight: 97.5 kg (215 lb) 97.5 kg (215 lb)        Physical Exam: Limitations in exam due to full PPE requirements.   In prone position  Patient on combination of nonrebreather mask and high flow nasal cannula oxygen; acyanotic; breathing appears to be comfortable currently   HEENT: pupils not dilated, no scleral jaundice, moist oral mucosa, no nasal drainage; telemetrysinus rhythm  Neck: No adenopathy or thyroid swelling  CVS: S1S2 no murmurs; JVD not elevated  RS: Mod air entry bilaterally, decreased BS at bases bilaterally, no wheezes, bilateral lower chest crackles  Abd: soft, non tender, no hepatosplenomegaly, no abd distension  Neuro: non focal, awake, alert  Extrm: no leg edema or swelling or clubbing  Skin: no rash  Lymphatic: no cervical or supraclavicular adenopathy  Psych: Calm and cooperative   Vasc: DPs palpable         Data:       Recent Results (from the past 24 hour(s))   GLUCOSE, POC    Collection Time: 11/24/21  4:28 PM   Result Value Ref Range    Glucose (POC) 290 (H) 70 - 110 mg/dL   GLUCOSE, POC    Collection Time: 11/25/21 12:47 AM   Result Value Ref Range    Glucose (POC) 304 (H) 70 - 110 mg/dL   D DIMER    Collection Time: 11/25/21  4:35 AM   Result Value Ref Range    D DIMER 0.61 (H) <0.46 ug/ml(FEU)   PROTHROMBIN TIME + INR    Collection Time: 11/25/21  4:35 AM   Result Value Ref Range    Prothrombin time 13.1 11.5 - 15.2 sec    INR 1.0 0.8 - 1.2     PTT    Collection Time: 11/25/21  4:35 AM   Result Value Ref Range    aPTT 33.8 23.0 - 36.4 SEC   CBC WITH AUTOMATED DIFF    Collection Time: 11/25/21  4:37 AM   Result Value Ref Range    WBC 4.6 4.6 - 13.2 K/uL    RBC 4.41 4.35 - 5.65 M/uL    HGB 13.0 13.0 - 16.0 g/dL    HCT 37.8 36.0 - 48.0 %    MCV 85.7 78.0 - 100.0 FL    MCH 29.5 24.0 - 34.0 PG    MCHC 34.4 31.0 - 37.0 g/dL    RDW 11.9 11.6 - 14.5 %    PLATELET 295 509 - 518 K/uL    MPV 9.2 9.2 - 11.8 FL    NRBC 0.0 0  WBC    ABSOLUTE NRBC 0.00 0.00 - 0.01 K/uL    NEUTROPHILS 83 (H) 40 - 73 %    LYMPHOCYTES 12 (L) 21 - 52 %    MONOCYTES 4 3 - 10 %    EOSINOPHILS 0 0 - 5 %    BASOPHILS 0 0 - 2 %    IMMATURE GRANULOCYTES 1 (H) 0.0 - 0.5 %    ABS. NEUTROPHILS 3.8 1.8 - 8.0 K/UL    ABS. LYMPHOCYTES 0.6 (L) 0.9 - 3.6 K/UL    ABS. MONOCYTES 0.2 0.05 - 1.2 K/UL    ABS. EOSINOPHILS 0.0 0.0 - 0.4 K/UL    ABS. BASOPHILS 0.0 0.0 - 0.1 K/UL    ABS. IMM.  GRANS. 0.0 0.00 - 0.04 K/UL    DF AUTOMATED     METABOLIC PANEL, COMPREHENSIVE    Collection Time: 11/25/21  4:37 AM   Result Value Ref Range    Sodium 138 136 - 145 mmol/L    Potassium 4.3 3.5 - 5.5 mmol/L    Chloride 101 100 - 111 mmol/L    CO2 30 21 - 32 mmol/L    Anion gap 7 3.0 - 18 mmol/L    Glucose 256 (H) 74 - 99 mg/dL    BUN 22 (H) 7.0 - 18 MG/DL    Creatinine 0.73 0.6 - 1.3 MG/DL    BUN/Creatinine ratio 30 (H) 12 - 20      GFR est AA >60 >60 ml/min/1.73m2    GFR est non-AA >60 >60 ml/min/1.73m2    Calcium 8.2 (L) 8.5 - 10.1 MG/DL    Bilirubin, total 0.5 0.2 - 1.0 MG/DL    ALT (SGPT) 41 16 - 61 U/L    AST (SGOT) 47 (H) 10 - 38 U/L    Alk. phosphatase 47 45 - 117 U/L    Protein, total 5.9 (L) 6.4 - 8.2 g/dL    Albumin 2.2 (L) 3.4 - 5.0 g/dL    Globulin 3.7 2.0 - 4.0 g/dL    A-G Ratio 0.6 (L) 0.8 - 1.7     MAGNESIUM    Collection Time: 11/25/21  4:37 AM   Result Value Ref Range    Magnesium 2.8 (H) 1.6 - 2.6 mg/dL   PHOSPHORUS    Collection Time: 11/25/21  4:37 AM   Result Value Ref Range    Phosphorus 4.1 2.5 - 4.9 MG/DL   CALCIUM, IONIZED    Collection Time: 11/25/21  4:37 AM   Result Value Ref Range    Ionized Calcium 1.12 1.12 - 1.32 MMOL/L   GLUCOSE, POC    Collection Time: 11/25/21  6:57 AM   Result Value Ref Range    Glucose (POC) 238 (H) 70 - 110 mg/dL   GLUCOSE, POC    Collection Time: 11/25/21 12:22 PM   Result Value Ref Range    Glucose (POC) 247 (H) 70 - 110 mg/dL         Chemistry Recent Labs     11/25/21  0437 11/24/21  0414 11/23/21  1624   * 324* 400*    136 128*   K 4.3 4.4 4.3    99* 90*   CO2 30 24 24   BUN 22* 25* 26*   CREA 0.73 0.82 0.94   CA 8.2* 8.2* 8.9   MG 2.8*  --  2.5   PHOS 4.1  --   --    AGAP 7 13 14   BUCR 30* 30* 28*   AP 47 45 54   TP 5.9* 5.7* 6.8   ALB 2.2* 2.3* 2.8*   GLOB 3.7 3.4 4.0   AGRAT 0.6* 0.7* 0.7*        Lactic Acid Lactic acid   Date Value Ref Range Status   11/24/2021 1.4 0.4 - 2.0 MMOL/L Final     Recent Labs     11/24/21  0414 11/23/21  2305 11/23/21  1624   LAC 1.4 2.2* 2.3*        Liver Enzymes Protein, total   Date Value Ref Range Status   11/25/2021 5.9 (L) 6.4 - 8.2 g/dL Final     Albumin   Date Value Ref Range Status   11/25/2021 2.2 (L) 3.4 - 5.0 g/dL Final     Globulin   Date Value Ref Range Status   11/25/2021 3.7 2.0 - 4.0 g/dL Final     A-G Ratio   Date Value Ref Range Status   11/25/2021 0.6 (L) 0.8 - 1.7   Final     Alk.  phosphatase   Date Value Ref Range Status   11/25/2021 47 45 - 117 U/L Final     Recent Labs 11/25/21 0437 11/24/21 0414 11/23/21  1624   TP 5.9* 5.7* 6.8   ALB 2.2* 2.3* 2.8*   GLOB 3.7 3.4 4.0   AGRAT 0.6* 0.7* 0.7*   AP 47 45 54        CBC w/Diff Recent Labs     11/25/21 0437 11/24/21 0414 11/23/21  1624   WBC 4.6 6.7 9.6   RBC 4.41 4.70 5.17   HGB 13.0 13.6 15.4   HCT 37.8 39.4 43.5    219 225   GRANS 83* 84* 86*   LYMPH 12* 9* 8*   EOS 0 0 0        Cardiac Enzymes No results found for: CPK, CK, CKMMB, CKMB, RCK3, CKMBT, CKNDX, CKND1, SHANIKA, TROPT, TROIQ, NIKKO, TROPT, TNIPOC, BNP, BNPP     BNP No results found for: BNP, BNPP, XBNPT     Coagulation Recent Labs     11/25/21 0435 11/24/21 0414 11/23/21  1623   PTP 13.1 13.8 13.3   INR 1.0 1.1 1.1   APTT 33.8 35.1 30.8         Thyroid  No results found for: T4, T3U, TSH, TSHEXT, TSHEXT    No results found for: T4     Urinalysis Lab Results   Component Value Date/Time    Color YELLOW 11/24/2021 06:00 AM    Appearance CLEAR 11/24/2021 06:00 AM    Specific gravity >1.030 (H) 11/24/2021 06:00 AM    pH (UA) 5.5 11/24/2021 06:00 AM    Protein Negative 11/24/2021 06:00 AM    Glucose >1,000 (A) 11/24/2021 06:00 AM    Ketone 80 (A) 11/24/2021 06:00 AM    Bilirubin Negative 11/24/2021 06:00 AM    Urobilinogen 1.0 11/24/2021 06:00 AM    Nitrites Negative 11/24/2021 06:00 AM    Leukocyte Esterase Negative 11/24/2021 06:00 AM        Micro  Recent Labs     11/23/21  1624   CULT NO GROWTH 2 DAYS  NO GROWTH 2 DAYS     Recent Labs     11/23/21 1624   CULT NO GROWTH 2 DAYS  NO GROWTH 2 DAYS          Culture data during this hospitalization.    All Micro Results     Procedure Component Value Units Date/Time    CULTURE, BLOOD [[id number]] Collected: 11/23/21 1624    Order Status: Completed Specimen: Blood Updated: 11/25/21 1224     Special Requests: NO SPECIAL REQUESTS        Culture result: NO GROWTH 2 DAYS       CULTURE, BLOOD [[id number]] Collected: 11/23/21 1624    Order Status: Completed Specimen: Blood Updated: 11/25/21 1224     Special Requests: NO SPECIAL REQUESTS        Culture result: NO GROWTH 2 DAYS       CULTURE, URINE [[id number]] Collected: 11/24/21 0600    Order Status: Completed Specimen: Urine from Clean catch Updated: 11/24/21 2332    COVID-19 RAPID TEST [[id number]]  (Abnormal) Collected: 11/24/21 0600    Order Status: Completed Specimen: Nasopharyngeal Updated: 11/24/21 0703     Specimen source Nasopharyngeal        COVID-19 rapid test Detected        Comment:      The specimen is POSITIVE for SARS-CoV-2, the novel coronavirus associated with COVID-19. This test has been authorized by the FDA under an Emergency Use Authorization (EUA) for use by authorized laboratories. Fact sheet for Healthcare Providers: [employer]  Fact sheet for Patients: [employer]       Methodology: Isothermal Nucleic Acid Amplification  CALLED TO AND CORRECTLY REPEATED BY:  SLOANE KIRKPATRICK RN AT 4166 ON 11/24/21 TO LAD         RESPIRATORY VIRUS PANEL W/COVID-19, PCR [[id number]]  (Abnormal) Collected: 11/23/21 1624    Order Status: Completed Specimen: Nasopharyngeal Updated: 11/23/21 2308     Adenovirus Not detected        Coronavirus 229E Not detected        Coronavirus HKU1 Not detected        Coronavirus CVNL63 Not detected        Coronavirus OC43 Not detected        SARS-CoV-2, PCR Detected        Comment: CALLED TO AND CORRECTLY REPEATED BY:  [id number] [facility] AT 2230 BY KDA 11/23/21  CALLED TO AND CORRECTLY REPEATED BY:  Merari Martinez RN ICU AT 2252 ON 11/23/21 TO TSH.           Metapneumovirus Not detected        Rhinovirus and Enterovirus Not detected        Influenza A Not detected        Influenza A, subtype H1 Not detected        Influenza A, subtype H3 Not detected        INFLUENZA A H1N1 PCR Not detected        Influenza B Not detected        Parainfluenza 1 Not detected        Parainfluenza 2 Not detected        Parainfluenza 3 Not detected        Parainfluenza virus 4 Not detected        RSV by PCR Not detected        B. parapertussis, PCR Not detected        Bordetella pertussis - PCR Not detected        Chlamydophila pneumoniae DNA, QL, PCR Not detected        Mycoplasma pneumoniae DNA, QL, PCR Not detected                LE Doppler       ECHO       Images report reviewed by me:  CT (Most Recent) (CT chest reviewed by me) No results found for this or any previous visit. CXR reviewed by me:  XR (Most Recent). CXR  reviewed by me and compared with previous CXR Results from Hospital Encounter encounter on 11/23/21    XR CHEST PORT    Narrative  EXAM:  AP Portable Chest X-ray 1 view    INDICATION: Shortness of breath    COMPARISON: None    _______________    FINDINGS:  Heart and mediastinal contours are within normal limits for portable  radiograph. There are bilateral interstitial and alveolar opacities most  prominent at the lung bases. Findings suggestive of pneumonia and/or pulmonary  edema. There are no pleural effusions. No acute osseous findings. ________________    Impression  Bilateral interstitial and alveolar opacities suggesting pneumonia  and/or pulmonary edema. No pleural effusions or pneumothorax seen. IMPRESSION:   · Acute respiratory failure with hypoxemia  · Severe Covid pneumonia  · Hyperglycemia due to Type 2 diabetes mellitus  · Hypertension  · Hyponatremia  · Lactic acidosis  ·   Patient Active Problem List   Diagnosis Code    Acute hypoxemic respiratory failure due to COVID-19 ([facility] U07.1, J96.01    Pneumonia due to COVID-19 virus U07.1, J12.82    Hyperglycemia due to type 2 diabetes mellitus ([facility] E11.65    Primary hypertension I10    Hyponatremia E87.1    Lactic acidosis E87.2    Hyperlipidemia E78.5    Obesity (BMI 30-39. 9) E66.9    COVID-19 vaccination not done Z28.9         · Code status: Full code      RECOMMENDATIONS:   Respiratory: Patient with severe Covid pneumonia.   On max high flow 60 L and 95 % fio2  Clear on maximum high flow severe ARDS due to COVID-19 pneumonia patient is not stable to perform CTA of the chest with positive D-dimer full anticoagulation. PVL negative but unstable for CTA   prone position improved oxygenation  Started steroids and given bronchodilators additionally added Actemra today. Very severe ARDS high risk intubation spoke to wife and patient  Delayed presentation with severe hypoxemic respiratory failure. Chest x-ray consistent with bilateral airspace disease, opacities and groundglass densities consistent with severe Covid pneumonia. Continue combination of high flow oxygen and nonrebreather mask. Recommended self proning to improve oxygenation. Monitor closely; if patient's respiratory condition worsens, will need to be intubated. ER consultant tried calling tertiary facility for possible ECMOcurrently no beds available. Keep SPO2 >=88%. Aggressive pulmonary toileting. Aspiration precautions. Incentive spirometry. CVS: Stable hemodynamics. Monitor blood pressure. Patient high likely to have pulmonary embolism. D-dimer elevated. Patient with severe Covid infection. Recommend therapeutic anticoagulation with Lovenox twice daily. Check echocardiogram.  Check ultrasound of the legs. ID: Severe COVID-19 pneumonia. High inflammatory markers on full dose of Lovenox  proCalcitonin low stop antibiotics   Steroids started on 11/23/2021  1 dose Tocilizumab given on 11/24/2021  Consulted ID  Antibioticsempiric ceftriaxone and azithromycin stopped after procalcitonin came normal  Late presentation with severe hypoxemic respiratory failurenot a candidate for remdesivir. no history of hepatitis or TB  Dexamethasone6 mg daily for 10 days. Blood sugar seems to be elevated with history of type 2 diabetes mellitus. Consult ID in morning. Vitamin supplements ordered. Monitor lactic acid. Hematology/Oncology: Monitor hemoglobin and platelets; watch for any bleeding while on therapeutic anticoagulation.   Renal: Monitor renal function and urine output. Monitor sodium. GI: Monitor LFT. Oral diet as tolerated. Endocrine: Monitor BS. SSI. If blood sugars remain elevated, would need to add Lantus insulin. Neurology: Normal mentation. Skin/Wound: ICU nursing care. Electrolytes: Replace electrolytes per ICU electrolyte replacement protocol. Nutrition: Oral diet; oral hydration. Prophylaxis: DVT Prophylaxis: Enoxaparin. GI Prophylaxis: Protonix. Lines/Tubes: PIV      Prognosis seems to be guarded; patient being admitted to ICU with severe Covid pneumonia and hypoxemic respiratory failure; patient risk of mortality and going on ventilator support is very high. Quality Care: PPI, DVT prophylaxis, HOB elevated, Infection control all reviewed and addressed. Discussed with ER consultant. High complexity decision making was performed during the evaluation of this patient at high risk for decompensation with multiple organ involvement. Total critical care time spent rendering care exclusive of procedures/family discussion/coordination of care: 38 minutes. Wife updated    Addendum - patient was not stable for CTA chest last night in the ER due to degree of hypoxemia and inability to lay flat for testing. Still unable to do today         Please note: Voice-recognition software may have been used to generate this report, which may have resulted in some phonetic-based errors in grammar and contents. Even though attempts were made to correct all the mistakes, some may have been missed, and remained in the body of the document.       Jessica Patinor, MD  11/25/2021       Note  Patient with SARS-CoV-2 pneumonia with severe pneumonia and hypoxemic respiratory failure; patient on high oxygen support via high flow and nonrebreather mask oxygen; patient is not vaccinated, and is in the age group that is being infected with delta virus strain; the strain causes severe respiratory failure and complications reported in the [place] and worldwide; unfortunately, the prognosis is poor in unvaccinated patients, with a high risk of complications, multiorgan failure, chronic hypoxemia and respiratory failure, intubation, mechanical ventilation, thromboembolic disease risk in multiple organs, high risk for long Covid syndrome, and high risk for mortality. The CDC federal and state guidelines have emphasized repeatedly the importance of vaccination to prevent severe infection, mortality, disabilities, long Covid syndrome from Covid virus with several strains currently in the [place]. Pfizer vaccination has been approved by [place] and [place]. Vaccination has been extended to children as well. The treatment protocols are followed based on local hospital protocols, with guidance from centers such as [facility] protocols. [facility] is not part of any research protocol. The local hospital protocols have been guided by [facility] pharmacy department. Covid plasma is no longer considered standard of care in the [facility] due to lack of benefit in trials. Nebulization and CPAP therapies are not considered as part of the protocol in [facility] due to high risk of aerosolization, and high risks of spreading Covid infection to the healthcare staff. REMDESIVIR-delayed presentation with severe hypoxemic respiratory failure are poor candidate for remdesivir therapy per [facility] cough. Anti-inflammatory medication such as baricitinib or Tocilizumab based on CRP may have some benefit. Dexamethasone is considered standard of care.

## 2021-11-25 NOTE — PROGRESS NOTES
Problem: Pressure Injury - Risk of  Goal: *Prevention of pressure injury  Description: Document Jeffrey Scale and appropriate interventions in the flowsheet. Outcome: Progressing Towards Goal  Note: Pressure Injury Interventions: Activity Interventions: Pressure redistribution bed/mattress(bed type)    Mobility Interventions: Pressure redistribution bed/mattress (bed type)    Nutrition Interventions: Document food/fluid/supplement intake                     Problem: Patient Education: Go to Patient Education Activity  Goal: Patient/Family Education  Outcome: Progressing Towards Goal     Problem: Falls - Risk of  Goal: *Absence of Falls  Description: Document Ena Chris Fall Risk and appropriate interventions in the flowsheet. Outcome: Progressing Towards Goal  Note: Fall Risk Interventions:  Mobility Interventions: Bed/chair exit alarm, Communicate number of staff needed for ambulation/transfer, Patient to call before getting OOB         Medication Interventions: Bed/chair exit alarm, Evaluate medications/consider consulting pharmacy, Patient to call before getting OOB    Elimination Interventions: Call light in reach, Bed/chair exit alarm, Patient to call for help with toileting needs              Problem: Patient Education: Go to Patient Education Activity  Goal: Patient/Family Education  Outcome: Progressing Towards Goal     Problem: Airway Clearance - Ineffective  Goal: Achieve or maintain patent airway  Outcome: Progressing Towards Goal     Problem: Gas Exchange - Impaired  Goal: Absence of hypoxia  Outcome: Progressing Towards Goal  Goal: Promote optimal lung function  Outcome: Progressing Towards Goal     Problem: Breathing Pattern - Ineffective  Goal: Ability to achieve and maintain a regular respiratory rate  Outcome: Progressing Towards Goal     Problem:  Body Temperature -  Risk of, Imbalanced  Goal: Ability to maintain a body temperature within defined limits  Outcome: Progressing Towards Goal  Goal: Will regain or maintain usual level of consciousness  Outcome: Progressing Towards Goal  Goal: Complications related to the disease process, condition or treatment will be avoided or minimized  Outcome: Progressing Towards Goal     Problem: Isolation Precautions - Risk of Spread of Infection  Goal: Prevent transmission of infectious organism to others  Outcome: Progressing Towards Goal     Problem: Nutrition Deficits  Goal: Optimize nutrtional status  Outcome: Progressing Towards Goal     Problem: Risk for Fluid Volume Deficit  Goal: Maintain normal heart rhythm  Outcome: Progressing Towards Goal  Goal: Maintain absence of muscle cramping  Outcome: Progressing Towards Goal  Goal: Maintain normal serum potassium, sodium, calcium, phosphorus, and pH  Outcome: Progressing Towards Goal     Problem: Loneliness or Risk for Loneliness  Goal: Demonstrate positive use of time alone when socialization is not possible  Outcome: Progressing Towards Goal     Problem: Fatigue  Goal: Verbalize increase energy and improved vitality  Outcome: Progressing Towards Goal     Problem: Diabetes Self-Management  Goal: *Disease process and treatment process  Description: Define diabetes and identify own type of diabetes; list 3 options for treating diabetes. Outcome: Progressing Towards Goal  Goal: *Incorporating nutritional management into lifestyle  Description: Describe effect of type, amount and timing of food on blood glucose; list 3 methods for planning meals. Outcome: Progressing Towards Goal  Goal: *Incorporating physical activity into lifestyle  Description: State effect of exercise on blood glucose levels. Outcome: Progressing Towards Goal  Goal: *Developing strategies to promote health/change behavior  Description: Define the ABC's of diabetes; identify appropriate screenings, schedule and personal plan for screenings.   Outcome: Progressing Towards Goal  Goal: *Using medications safely  Description: State effect of diabetes medications on diabetes; name diabetes medication taking, action and side effects. Outcome: Progressing Towards Goal  Goal: *Monitoring blood glucose, interpreting and using results  Description: Identify recommended blood glucose targets  and personal targets. Outcome: Progressing Towards Goal  Goal: *Prevention, detection, treatment of acute complications  Description: List symptoms of hyper- and hypoglycemia; describe how to treat low blood sugar and actions for lowering  high blood glucose level. Outcome: Progressing Towards Goal  Goal: *Prevention, detection and treatment of chronic complications  Description: Define the natural course of diabetes and describe the relationship of blood glucose levels to long term complications of diabetes.   Outcome: Progressing Towards Goal  Goal: *Developing strategies to address psychosocial issues  Description: Describe feelings about living with diabetes; identify support needed and support network  Outcome: Progressing Towards Goal  Goal: *Insulin pump training  Outcome: Progressing Towards Goal  Goal: *Sick day guidelines  Outcome: Progressing Towards Goal  Goal: *Patient Specific Goal (EDIT GOAL, INSERT TEXT)  Outcome: Progressing Towards Goal     Problem: Patient Education: Go to Patient Education Activity  Goal: Patient/Family Education  Outcome: Progressing Towards Goal

## 2021-11-25 NOTE — PROGRESS NOTES
Hospitalist Progress Note-critical care note     Patient: Sandy Angeles MRN: [id number]  [facility]: [id number]    YOB: 1975  Age: 39 y.o. Sex: male    DOA: 11/23/2021 LOS:  LOS: 2 days            Chief complaint: acute respiratory failure, covid 19 pna, dm , htn ,     Assessment/Plan         Hospital Problems  Date Reviewed: 11/23/2021          Codes Class Noted POA    * (Principal) Acute hypoxemic respiratory failure due to COVID-19 [facility]) ICD-10-CM: U07.1, J96.01  ICD-9-CM: 518.81, 079.89, 799.02  11/23/2021 Yes        Pneumonia due to COVID-19 virus ICD-10-CM: U07.1, J12.82  ICD-9-CM: 480.8, 079.89  11/23/2021 Yes        Hyperglycemia due to type 2 diabetes mellitus ([facility] ICD-10-CM: E11.65  ICD-9-CM: 250.00  11/23/2021 Yes        Primary hypertension ICD-10-CM: I10  ICD-9-CM: 401.9  11/23/2021 Yes        Hyponatremia ICD-10-CM: E87.1  ICD-9-CM: 276.1  11/23/2021 Yes        Lactic acidosis ICD-10-CM: E87.2  ICD-9-CM: 276.2  11/23/2021 Yes        Hyperlipidemia ICD-10-CM: E78.5  ICD-9-CM: 272.4  Unknown Yes        Obesity (BMI 30-39. 9) ICD-10-CM: E66.9  ICD-9-CM: 278.00  11/23/2021 Yes        COVID-19 vaccination not done ICD-10-CM: Z28.9  ICD-9-CM: V64.00  11/23/2021 Yes              44 y/o male with poorly controlled type 2 diabetes mellitus, hypertension, and obesity is admitted for acute hypoxemic respiratory failure due to COVID-19 pneumonia with lactic acidosis, hyperglycemia, and hyponatremia.  He has not been vaccinated. His medical problems increase the risk of morbidity and mortality from [place]. He is likely to require intubation and mechanical ventilation very soon.     acute hypoxemic respiratory failure due to COVID-19 pneumonia.    On high flow oxygen with non rebreathing mask     unable to have cta chest due to respiratory  Issue   On lovenox for empiric treatment   pvl negative for dvt       covid 19 pna   Not vaccinated   On iv steroid , Tociluxumab per ID   Antioxidants Breathing tx       htn   bp meds hold due to low normal range of bp and most likely will be intubated soon       DM type II   Increase  lantus and premeal insulin  and continue ssi     Pt was seen in full PPE       Disposition :tbd,   Review of systems:  General: No fever no chills  Lung: Shortness of breath, no wheezing  Heart: No chest pain, no palpitation  GI: No nausea no vomiting no abdominal pain    Vital signs/Intake and Output:  Visit Vitals  /69   Pulse 78   Temp 98.8 °F (37.1 °C)   Resp 25   Ht 5' 6\" (1.676 m)   Wt 97.5 kg (215 lb)   SpO2 93%   BMI 34.70 kg/m²     Current Shift:  No intake/output data recorded. Last three shifts:  11/23 1901 - 11/25 0700  In: 1504.5 [P.O.:150; I.V.:1354.5]  Out: 1298 [Urine:3375]    Physical Exam:  General: WD, WN. Alert, cooperative, no acute distress    HEENT: NC, Atraumatic. PERRLA, anicteric sclerae. High flow oxygen with nonrebreathing mask  Lungs: CTA bilaterally no wheezing  Heart:  Regular regular no murmur  Abdomen: Soft no tenderness no mass  Extremities: No c/c/e  Psych:   Not anxious or agitated. Neurologic:  No acute neurological deficit.              Labs: Results:       Chemistry Recent Labs     11/25/21 0437 11/24/21 0414 11/23/21  1624   * 324* 400*    136 128*   K 4.3 4.4 4.3    99* 90*   CO2 30 24 24   BUN 22* 25* 26*   CREA 0.73 0.82 0.94   CA 8.2* 8.2* 8.9   AGAP 7 13 14   BUCR 30* 30* 28*   AP 47 45 54   TP 5.9* 5.7* 6.8   ALB 2.2* 2.3* 2.8*   GLOB 3.7 3.4 4.0   AGRAT 0.6* 0.7* 0.7*      CBC w/Diff Recent Labs     11/25/21 0437 11/24/21 0414 11/23/21  1624   WBC 4.6 6.7 9.6   RBC 4.41 4.70 5.17   HGB 13.0 13.6 15.4   HCT 37.8 39.4 43.5    219 225   GRANS 83* 84* 86*   LYMPH 12* 9* 8*   EOS 0 0 0      Cardiac Enzymes Recent Labs     11/23/21  1624   *   CKND1 CALCULATION NOT PERFORMED WHEN RESULT IS BELOW LINEAR LIMIT      Coagulation Recent Labs     11/25/21  0435 11/24/21  0414   PTP 13.1 13.8   INR 1.0 1.1   APTT 33.8 35.1       Lipid Panel Lab Results   Component Value Date/Time    Cholesterol, total 123 11/23/2021 04:24 PM    HDL Cholesterol 33 (L) 11/23/2021 04:24 PM    LDL, calculated 41.8 11/23/2021 04:24 PM    VLDL, calculated 48.2 11/23/2021 04:24 PM    Triglyceride 241 (H) 11/23/2021 04:24 PM    CHOL/HDL Ratio 3.7 11/23/2021 04:24 PM      BNP No results for input(s): BNPP in the last 72 hours. Liver Enzymes Recent Labs     11/25/21  0437   TP 5.9*   ALB 2.2*   AP 47      Thyroid Studies No results found for: T4, T3U, TSH, TSHEXT, TSHEXT     Procedures/imaging: see electronic medical records for all procedures/Xrays and details which were not copied into this note but were reviewed prior to creation of Plan    XR CHEST PORT    Result Date: 11/23/2021  EXAM:  AP Portable Chest X-ray 1 view INDICATION: Shortness of breath COMPARISON: None _______________ FINDINGS:  Heart and mediastinal contours are within normal limits for portable radiograph. There are bilateral interstitial and alveolar opacities most prominent at the lung bases. Findings suggestive of pneumonia and/or pulmonary edema. There are no pleural effusions. No acute osseous findings. ________________      Bilateral interstitial and alveolar opacities suggesting pneumonia and/or pulmonary edema. No pleural effusions or pneumothorax seen.       Noemi Bernardo MD

## 2021-11-26 NOTE — PROGRESS NOTES
Chart reviewed pt remains in ICU on HFNC with non-rebreather mask, at this not medically cleared for weekend discharge, weekend cm will be available thru [facility]  for immediate needs.

## 2021-11-26 NOTE — PROGRESS NOTES
Nutrition Assessment     Type and Reason for Visit: Reassess    Nutrition Recommendations/Plan: Will add Glucerna BID due to decreased appetite prior to admission    Nutrition Assessment:  PMHx: T2DM, HTN, obesity. Admitted for acute hypoxemic respiratory failure due to COVID 19- on high flow with non rebreathing mask. Per H&P, poor appetite since 11/14. Malnutrition Assessment:  Malnutrition Status: At risk for malnutrition (specify) (Poor PO intake PTA)     Estimated Daily Nutrient Needs:  Energy (kcal):  2158  Protein (g):  98       Fluid (ml/day):  2158    Nutrition Related Findings:  Lab: glucose 251, glucose -304, zinc, protonix, melatonin, humalog, lantus, vit d, vit c, lipitor. No BM since admission (x2 days). Diet advanced to full liquids on 11/25. No PO documentation at this moment. Attempted to call pt room- no answer. Will try again at a better time.      Current Nutrition Therapies:  ADULT DIET Full Liquid; 4 carb choices (60 gm/meal)    Anthropometric Measures:  · Height:  5' 6\" (167.6 cm)  · Current Body Wt:  97.5 kg (214 lb 15.2 oz)  · BMI: 34.7    Nutrition Diagnosis:   · Predicted inadequate energy intake related to acute injury/trauma as evidenced by poor intake prior to admission      Nutrition Intervention:  Food and/or Nutrient Delivery: Continue current diet  Nutrition Education and Counseling: No recommendations at this time  Coordination of Nutrition Care: Continue to monitor while inpatient, Interdisciplinary rounds    Goals:  PO intake > 50% of estimated nutritional needs throughout the next 3-5 days       Nutrition Monitoring and Evaluation:   Behavioral-Environmental Outcomes: None identified  Food/Nutrient Intake Outcomes: Diet advancement/tolerance, Food and nutrient intake  Physical Signs/Symptoms Outcomes: Biochemical data, Meal time behavior, Skin, Weight, GI status, Nausea/vomiting    Discharge Planning:    Continue current diet     Electronically signed by Noel Silvestre RD on 11/26/2021 at 8:38 AM

## 2021-11-26 NOTE — PROGRESS NOTES
1900 - Bedside and Verbal shift change report given to Chrissie Jo RN (oncoming nurse) by Diane Mixon RN (offgoing nurse). Report included the following information SBAR, Kardex, ED Summary, Procedure Summary, Intake/Output, MAR, Recent Results, Med Rec Status and Cardiac Rhythm NSR.     2000 - Shift assessment completed. Patient alert and oriented. Lung sounds diminished upper and lower lobes on HFNC and non-rebreather. Patient remains in prone position. PRN medications administered to maintain comfort in prone position. 2230 - Patient experiencing continuous lower back pain. Order placed by physician for Lidocaine patch. 0000 - Reassessment completed, no changes to previous assessment. 0400 - Reassessment completed, no changes to previous assessment. 0830 - Bedside and Verbal shift change report given to Brittany Pro RN (oncoming nurse) by Chrissie Jo RN (offgoing nurse). Report included the following information SBAR, Kardex, ED Summary, Procedure Summary, Intake/Output, MAR, Recent Results, Med Rec Status and Cardiac Rhythm NSR.     SHIFT SUMMARY - Patient continues to drop oxygen saturation with minimal exertion (repositioning, oral medications, talking). Patients oxygen saturation recovers to mid 90's with time. Patient remains too unstable to transport to CT.

## 2021-11-26 NOTE — PROGRESS NOTES
Hospitalist Progress Note-critical care note     Patient: Dempsey Boeck MRN: [id number]  [facility]: [id number]    YOB: 1975  Age: 55 y.o. Sex: male    DOA: 11/23/2021 LOS:  LOS: 3 days            Chief complaint: acute respiratory failure, covid 19 pna, dm , htn ,     Assessment/Plan         Hospital Problems  Date Reviewed: 11/23/2021          Codes Class Noted POA    * (Principal) Acute hypoxemic respiratory failure due to COVID-19 [facility]) ICD-10-CM: U07.1, J96.01  ICD-9-CM: 518.81, 079.89, 799.02  11/23/2021 Yes        Pneumonia due to COVID-19 virus ICD-10-CM: U07.1, J12.82  ICD-9-CM: 480.8, 079.89  11/23/2021 Yes        Hyperglycemia due to type 2 diabetes mellitus ([facility] ICD-10-CM: E11.65  ICD-9-CM: 250.00  11/23/2021 Yes        Primary hypertension ICD-10-CM: I10  ICD-9-CM: 401.9  11/23/2021 Yes        Hyponatremia ICD-10-CM: E87.1  ICD-9-CM: 276.1  11/23/2021 Yes        Lactic acidosis ICD-10-CM: E87.2  ICD-9-CM: 276.2  11/23/2021 Yes        Hyperlipidemia ICD-10-CM: E78.5  ICD-9-CM: 272.4  Unknown Yes        Obesity (BMI 30-39. 9) ICD-10-CM: E66.9  ICD-9-CM: 278.00  11/23/2021 Yes        COVID-19 vaccination not done ICD-10-CM: Z28.9  ICD-9-CM: V64.00  11/23/2021 Yes              44 y/o male with poorly controlled type 2 diabetes mellitus, hypertension, and obesity is admitted for acute hypoxemic respiratory failure due to COVID-19 pneumonia with lactic acidosis, hyperglycemia, and hyponatremia.  He has not been vaccinated. His medical problems increase the risk of morbidity and mortality from [place]. He is likely to require intubation and mechanical ventilation very soon.     acute hypoxemic respiratory failure due to COVID-19 pneumonia.    On high flow oxygen with non rebreathing mask overnight, desat with minimal physical activity     unable to have cta chest due to respiratory issue   On lovenox for empiric treatment   pvl negative for dvt       covid 19 pna   Not vaccinated   On iv steroid , received Tociluxumab per ID   Antioxidants   Breathing tx       htn   bp meds hold due to low normal range of bp and most likely will be intubated soon       DM type II   Increase  lantus and premeal insulin  and continue ssi     Pt was seen in full PPE     Subjective :still sob while moving    rn : desat while turning around, prone last night     Disposition :tbd,   Review of systems:  General: No fever no chills  Lung: Shortness of breath, no wheezing  Heart: No chest pain, no palpitation  GI: No nausea no vomiting no abdominal pain    Vital signs/Intake and Output:  Visit Vitals  /80   Pulse 78   Temp 97.8 °F (36.6 °C)   Resp 23   Ht 5' 6\" (1.676 m)   Wt 97.5 kg (215 lb)   SpO2 93%   BMI 34.70 kg/m²     Current Shift:  No intake/output data recorded. Last three shifts:  11/24 1901 - 11/26 0700  In: 720 [P.O.:720]  Out: 2650 [Urine:2650]    Physical Exam:  General: WD, WN. Alert, cooperative, in distress  in prone position   HEENT: NC, Atraumatic. PERRLA, anicteric sclerae. High flow oxygen with nonrebreathing mask  Lungs: CTA bilaterally no wheezing  Heart:  Regular regular no murmur  Abdomen: Soft no tenderness no mass  Extremities: No c/c/e  Psych:   Not anxious or agitated. Neurologic:  No acute neurological deficit.             Labs: Results:       Chemistry Recent Labs     11/26/21 0630 11/25/21 0437 11/24/21 0414   * 256* 324*    138 136   K 4.4 4.3 4.4    101 99*   CO2 31 30 24   BUN 20* 22* 25*   CREA 0.75 0.73 0.82   CA 8.6 8.2* 8.2*   AGAP 4 7 13   BUCR 27* 30* 30*   AP 51 47 45   TP 6.3* 5.9* 5.7*   ALB 2.2* 2.2* 2.3*   GLOB 4.1* 3.7 3.4   AGRAT 0.5* 0.6* 0.7*      CBC w/Diff Recent Labs     11/26/21 0630 11/25/21 0437 11/24/21 0414   WBC 5.8 4.6 6.7   RBC 4.79 4.41 4.70   HGB 13.7 13.0 13.6   HCT 40.3 37.8 39.4    304 219   GRANS 77* 83* 84*   LYMPH 15* 12* 9*   EOS 0 0 0      Cardiac Enzymes Recent Labs     11/23/21  1624   *   CKND1 CALCULATION NOT PERFORMED WHEN RESULT IS BELOW LINEAR LIMIT      Coagulation Recent Labs     11/26/21 0630 11/25/21  0435   PTP 13.1 13.1   INR 1.0 1.0   APTT 30.5 33.8       Lipid Panel Lab Results   Component Value Date/Time    Cholesterol, total 123 11/23/2021 04:24 PM    HDL Cholesterol 33 (L) 11/23/2021 04:24 PM    LDL, calculated 41.8 11/23/2021 04:24 PM    VLDL, calculated 48.2 11/23/2021 04:24 PM    Triglyceride 241 (H) 11/23/2021 04:24 PM    CHOL/HDL Ratio 3.7 11/23/2021 04:24 PM      BNP No results for input(s): BNPP in the last 72 hours. Liver Enzymes Recent Labs     11/26/21 0630   TP 6.3*   ALB 2.2*   AP 51      Thyroid Studies No results found for: T4, T3U, TSH, TSHEXT, TSHEXT     Procedures/imaging: see electronic medical records for all procedures/Xrays and details which were not copied into this note but were reviewed prior to creation of Plan    XR CHEST PORT    Result Date: 11/23/2021  EXAM:  AP Portable Chest X-ray 1 view INDICATION: Shortness of breath COMPARISON: None _______________ FINDINGS:  Heart and mediastinal contours are within normal limits for portable radiograph. There are bilateral interstitial and alveolar opacities most prominent at the lung bases. Findings suggestive of pneumonia and/or pulmonary edema. There are no pleural effusions. No acute osseous findings. ________________      Bilateral interstitial and alveolar opacities suggesting pneumonia and/or pulmonary edema. No pleural effusions or pneumothorax seen.       Laci Freeman MD

## 2021-11-26 NOTE — PROGRESS NOTES
0725-received report from Michael Baptiste RN included SBAR MAR and Kardex. Shift Summary-maintained on HFNC. Maintained prone position. Gold in place. Unable to eat due to desatting. Bedside and Verbal shift change report given to [address] [place] [address] (oncoming nurse) by Ernestine Trimble RN (offgoing nurse). Report included the following information SBAR, Kardex and MAR.

## 2021-11-26 NOTE — PROGRESS NOTES
[facility]ists  Pulmonary, Critical Care, and Sleep Medicine    Name: Alex Presley MRN: [id number]   : 1975 Hospital: [facility] [place]    Date: 2021  Room: [address] [place] [address] Note                                              Consult requesting physician: Dr. Danielle Veliz  Reason for Consult: Severe Covid pneumonia      Subjective/History of Present Illness:     Patient is a 55 y.o. male with PMHx significant for diabetes and hypertension. He has come to ER with worsening shortness of breath with positive Covid infection. He reports that he has been having cold symptoms for more than a week. He tested positive about 1 week ago. He has come with worsening shortness of breath, and was found to be severely hypoxemic. He has been placed on combination of high flow oxygen and nonrebreather. His oxygen saturation is in the high 80s on this, and in the semiupright position. Vaccination statusnot vaccinated for COVID-19  I have reviewed history with ER consultant Dr. Tip Cuellar and chart review.   Currently on the maximum dose of high flow prone position started patient is not stable to perform CT for pulmonary embolus in the setting of elevated D-dimer full anticoagulation started last night PVL negative       Review of Systems: Limited due to patient condition  HEENT: No epistaxis, no redness in eyes  Respiratory: as above  Cardiovascular: no chest pain, no palpitations  Gastrointestinal: no abd pain, no vomiting, no bleeding symptoms  Genitourinary: No urinary symptoms or hematuria  Constitutional: No fever, no chills    Allergies   Allergen Reactions    Alupent [Metaproterenol] Swelling      Past Medical History:   Diagnosis Date    COVID-19     Diabetes ([facility] [address]     Hyperlipidemia     Hypertension       Past Surgical History:   Procedure Laterality Date    HX ORTHOPAEDIC Right     wrist surgery      Social History     Tobacco Use    Smoking status: Never Smoker    Smokeless tobacco: Not on file   Substance Use Topics    Alcohol use: Yes     Comment: drinksonce a week      Family History   Problem Relation Age of Onset    Diabetes Mother     Hypertension Mother     Stroke Mother     Hodgkin's lymphoma Mother     Diabetes Father     Hypertension Father     Cystic Fibrosis Father     Diabetes Maternal Aunt     Diabetes Maternal Uncle       Prior to Admission medications    Medication Sig Start Date End Date Taking? Authorizing Provider   atorvastatin (LIPITOR) 20 mg tablet Take 20 mg by mouth daily. Yes Provider, Historical   telmisartan (MICARDIS) 80 mg tablet Take 80 mg by mouth daily. Indications: high blood pressure   Yes Provider, Historical   fenofibrate nanocrystallized (TRICOR) 145 mg tablet Take 145 mg by mouth daily. Yes Provider, Historical   glipiZIDE SR (GLUCOTROL XL) 10 mg CR tablet Take 10 mg by mouth daily. Yes Provider, Historical   metFORMIN ER (GLUCOPHAGE XR) 500 mg tablet Take 500 mg by mouth daily (with dinner).    Yes Provider, Historical     Current Facility-Administered Medications   Medication Dose Route Frequency    insulin lispro (HUMALOG) injection 5 Units  5 Units SubCUTAneous TIDAC    insulin lispro (HUMALOG) injection   SubCUTAneous AC&HS    insulin glargine (LANTUS) injection 20 Units  20 Units SubCUTAneous DAILY    enoxaparin (LOVENOX) injection 100 mg  1 mg/kg SubCUTAneous Q12H    ipratropium-albuterol (COMBIVENT RESPIMAT) 20 mcg-100 mcg inhalation spray  1 Puff Inhalation TID    lidocaine 4 % patch 1 Patch  1 Patch TransDERmal Q24H    atorvastatin (LIPITOR) tablet 20 mg  20 mg Oral DAILY    dexamethasone (DECADRON) 4 mg/mL injection 6 mg  6 mg IntraVENous Q24H    pantoprazole (PROTONIX) 40 mg in 0.9% sodium chloride 10 mL injection  40 mg IntraVENous Q24H    cholecalciferol (VITAMIN D3) (1000 Units /25 mcg) tablet 2,000 Units  2,000 Units Oral DAILY    ascorbic acid (vitamin C) (VITAMIN C) tablet 500 mg  500 mg Oral BID    zinc sulfate (ZINCATE) 50 mg zinc (220 mg) capsule 1 Capsule  1 Capsule Oral Q12H    melatonin (rapid dissolve) tablet 5 mg  5 mg Oral QHS         Objective:   Vital Signs:    Visit Vitals  /80   Pulse 78   Temp 97.8 °F (36.6 °C)   Resp 23   Ht 5' 6\" (1.676 m)   Wt 97.5 kg (215 lb)   SpO2 93%   BMI 34.70 kg/m²       O2 Device: Nasal cannula, Non-rebreather mask   O2 Flow Rate (L/min): 12 l/min   Temp (24hrs), Av.6 °F (36.4 °C), Min:97.3 °F (36.3 °C), Max:97.8 °F (36.6 °C)       Intake/Output:   Last shift:      No intake/output data recorded. Last 3 shifts:  1901 -  0700  In: 720 [P.O.:720]  Out: 2650 [Urine:2650]      Intake/Output Summary (Last 24 hours) at 2021 1026  Last data filed at 2021 0400  Gross per 24 hour   Intake 480 ml   Output 1250 ml   Net -770 ml       Last 3 Recorded Weights in this Encounter    21 1607 21 1210   Weight: 97.5 kg (215 lb) 97.5 kg (215 lb)        Physical Exam: Limitations in exam due to full PPE requirements.   In prone position  Patient on combination of nonrebreather mask and high flow nasal cannula oxygen; acyanotic; breathing appears to be comfortable currently   HEENT: pupils not dilated, no scleral jaundice, moist oral mucosa, no nasal drainage; telemetrysinus rhythm  Neck: No adenopathy or thyroid swelling  CVS: S1S2 no murmurs; JVD not elevated  RS: Mod air entry bilaterally, no crckles , no wheezes, bilateral lower chest crackles  Abd: soft, non tender, no hepatosplenomegaly, no abd distension  Neuro: non focal, awake, alert  Extrm: no leg edema or swelling or clubbing  Skin: no rash  Lymphatic: no cervical or supraclavicular adenopathy  Psych: Calm and cooperative   Vasc: DPs palpable         Data:       Recent Results (from the past 24 hour(s))   GLUCOSE, POC    Collection Time: 21 12:22 PM   Result Value Ref Range    Glucose (POC) 247 (H) 70 - 110 mg/dL   GLUCOSE, POC    Collection Time: 21  4:35 PM Result Value Ref Range    Glucose (POC) 220 (H) 70 - 110 mg/dL   GLUCOSE, POC    Collection Time: 11/25/21 10:12 PM   Result Value Ref Range    Glucose (POC) 195 (H) 70 - 110 mg/dL   D DIMER    Collection Time: 11/26/21  6:30 AM   Result Value Ref Range    D DIMER 0.76 (H) <0.46 ug/ml(FEU)   PROTHROMBIN TIME + INR    Collection Time: 11/26/21  6:30 AM   Result Value Ref Range    Prothrombin time 13.1 11.5 - 15.2 sec    INR 1.0 0.8 - 1.2     PTT    Collection Time: 11/26/21  6:30 AM   Result Value Ref Range    aPTT 30.5 23.0 - 36.4 SEC   CBC WITH AUTOMATED DIFF    Collection Time: 11/26/21  6:30 AM   Result Value Ref Range    WBC 5.8 4.6 - 13.2 K/uL    RBC 4.79 4.35 - 5.65 M/uL    HGB 13.7 13.0 - 16.0 g/dL    HCT 40.3 36.0 - 48.0 %    MCV 84.1 78.0 - 100.0 FL    MCH 28.6 24.0 - 34.0 PG    MCHC 34.0 31.0 - 37.0 g/dL    RDW 11.9 11.6 - 14.5 %    PLATELET 475 022 - 635 K/uL    MPV 8.7 (L) 9.2 - 11.8 FL    NRBC 0.0 0  WBC    ABSOLUTE NRBC 0.00 0.00 - 0.01 K/uL    NEUTROPHILS 77 (H) 40 - 73 %    LYMPHOCYTES 15 (L) 21 - 52 %    MONOCYTES 7 3 - 10 %    EOSINOPHILS 0 0 - 5 %    BASOPHILS 0 0 - 2 %    IMMATURE GRANULOCYTES 1 (H) 0.0 - 0.5 %    ABS. NEUTROPHILS 4.4 1.8 - 8.0 K/UL    ABS. LYMPHOCYTES 0.9 0.9 - 3.6 K/UL    ABS. MONOCYTES 0.4 0.05 - 1.2 K/UL    ABS. EOSINOPHILS 0.0 0.0 - 0.4 K/UL    ABS. BASOPHILS 0.0 0.0 - 0.1 K/UL    ABS. IMM.  GRANS. 0.1 (H) 0.00 - 0.04 K/UL    DF AUTOMATED      PLATELET COMMENTS ADEQUATE PLATELETS      RBC COMMENTS NORMOCYTIC, NORMOCHROMIC      WBC COMMENTS ATYPICAL LYMPHOCYTES PRESENT     METABOLIC PANEL, COMPREHENSIVE    Collection Time: 11/26/21  6:30 AM   Result Value Ref Range    Sodium 137 136 - 145 mmol/L    Potassium 4.4 3.5 - 5.5 mmol/L    Chloride 102 100 - 111 mmol/L    CO2 31 21 - 32 mmol/L    Anion gap 4 3.0 - 18 mmol/L    Glucose 251 (H) 74 - 99 mg/dL    BUN 20 (H) 7.0 - 18 MG/DL    Creatinine 0.75 0.6 - 1.3 MG/DL    BUN/Creatinine ratio 27 (H) 12 - 20      GFR est AA >60 >60 ml/min/1.73m2    GFR est non-AA >60 >60 ml/min/1.73m2    Calcium 8.6 8.5 - 10.1 MG/DL    Bilirubin, total 0.5 0.2 - 1.0 MG/DL    ALT (SGPT) 38 16 - 61 U/L    AST (SGOT) 47 (H) 10 - 38 U/L    Alk. phosphatase 51 45 - 117 U/L    Protein, total 6.3 (L) 6.4 - 8.2 g/dL    Albumin 2.2 (L) 3.4 - 5.0 g/dL    Globulin 4.1 (H) 2.0 - 4.0 g/dL    A-G Ratio 0.5 (L) 0.8 - 1.7     CALCIUM, IONIZED    Collection Time: 11/26/21  6:30 AM   Result Value Ref Range    Ionized Calcium 1.12 1.12 - 1.32 MMOL/L   MAGNESIUM    Collection Time: 11/26/21  6:30 AM   Result Value Ref Range    Magnesium 2.4 1.6 - 2.6 mg/dL   PHOSPHORUS    Collection Time: 11/26/21  6:30 AM   Result Value Ref Range    Phosphorus 4.1 2.5 - 4.9 MG/DL   FIBRINOGEN    Collection Time: 11/26/21  6:30 AM   Result Value Ref Range    Fibrinogen 500 (H) 210 - 451 mg/dL         Chemistry Recent Labs     11/26/21  0630 11/25/21  0437 11/24/21  0414 11/23/21  1624 11/23/21  1624   * 256* 324*   < > 400*    138 136   < > 128*   K 4.4 4.3 4.4   < > 4.3    101 99*   < > 90*   CO2 31 30 24   < > 24   BUN 20* 22* 25*   < > 26*   CREA 0.75 0.73 0.82   < > 0.94   CA 8.6 8.2* 8.2*   < > 8.9   MG 2.4 2.8*  --   --  2.5   PHOS 4.1 4.1  --   --   --    AGAP 4 7 13   < > 14   BUCR 27* 30* 30*   < > 28*   AP 51 47 45   < > 54   TP 6.3* 5.9* 5.7*   < > 6.8   ALB 2.2* 2.2* 2.3*   < > 2.8*   GLOB 4.1* 3.7 3.4   < > 4.0   AGRAT 0.5* 0.6* 0.7*   < > 0.7*    < > = values in this interval not displayed.         Lactic Acid Lactic acid   Date Value Ref Range Status   11/24/2021 1.4 0.4 - 2.0 MMOL/L Final     Recent Labs     11/24/21  0414 11/23/21  2305 11/23/21  1624   LAC 1.4 2.2* 2.3*        Liver Enzymes Protein, total   Date Value Ref Range Status   11/26/2021 6.3 (L) 6.4 - 8.2 g/dL Final     Albumin   Date Value Ref Range Status   11/26/2021 2.2 (L) 3.4 - 5.0 g/dL Final     Globulin   Date Value Ref Range Status   11/26/2021 4.1 (H) 2.0 - 4.0 g/dL Final     A-G Ratio   Date Value Ref Range Status   11/26/2021 0.5 (L) 0.8 - 1.7   Final     Alk. phosphatase   Date Value Ref Range Status   11/26/2021 51 45 - 117 U/L Final     Recent Labs     11/26/21 0630 11/25/21 0437 11/24/21 0414   TP 6.3* 5.9* 5.7*   ALB 2.2* 2.2* 2.3*   GLOB 4.1* 3.7 3.4   AGRAT 0.5* 0.6* 0.7*   AP 51 47 45        CBC w/Diff Recent Labs     11/26/21 0630 11/25/21 0437 11/24/21 0414   WBC 5.8 4.6 6.7   RBC 4.79 4.41 4.70   HGB 13.7 13.0 13.6   HCT 40.3 37.8 39.4    304 219   GRANS 77* 83* 84*   LYMPH 15* 12* 9*   EOS 0 0 0        Cardiac Enzymes No results found for: CPK, CK, CKMMB, CKMB, RCK3, CKMBT, CKNDX, CKND1, SHANIKA, TROPT, TROIQ, NIKKO, TROPT, TNIPOC, BNP, BNPP     BNP No results found for: BNP, BNPP, XBNPT     Coagulation Recent Labs     11/26/21 0630 11/25/21 0435 11/24/21 0414   PTP 13.1 13.1 13.8   INR 1.0 1.0 1.1   APTT 30.5 33.8 35.1         Thyroid  No results found for: T4, T3U, TSH, TSHEXT, TSHEXT    No results found for: T4     Urinalysis Lab Results   Component Value Date/Time    Color YELLOW 11/24/2021 06:00 AM    Appearance CLEAR 11/24/2021 06:00 AM    Specific gravity >1.030 (H) 11/24/2021 06:00 AM    pH (UA) 5.5 11/24/2021 06:00 AM    Protein Negative 11/24/2021 06:00 AM    Glucose >1,000 (A) 11/24/2021 06:00 AM    Ketone 80 (A) 11/24/2021 06:00 AM    Bilirubin Negative 11/24/2021 06:00 AM    Urobilinogen 1.0 11/24/2021 06:00 AM    Nitrites Negative 11/24/2021 06:00 AM    Leukocyte Esterase Negative 11/24/2021 06:00 AM        Micro  Recent Labs     11/24/21  0600 11/23/21  1624   CULT No growth (<1,000 CFU/ML) NO GROWTH 3 DAYS  NO GROWTH 3 DAYS     Recent Labs     11/24/21  0600 11/23/21  1624   CULT No growth (<1,000 CFU/ML) NO GROWTH 3 DAYS  NO GROWTH 3 DAYS          Culture data during this hospitalization.    All Micro Results     Procedure Component Value Units Date/Time    CULTURE, BLOOD [[id number]] Collected: 11/23/21 1624    Order Status: Completed Specimen: Blood Updated: 11/26/21 0719     Special Requests: NO SPECIAL REQUESTS        Culture result: NO GROWTH 3 DAYS       CULTURE, BLOOD [[id number]] Collected: 11/23/21 1624    Order Status: Completed Specimen: Blood Updated: 11/26/21 0719     Special Requests: NO SPECIAL REQUESTS        Culture result: NO GROWTH 3 DAYS       CULTURE, URINE [[id number]] Collected: 11/24/21 0600    Order Status: Completed Specimen: Urine from Clean catch Updated: 11/25/21 2047     Special Requests: NO SPECIAL REQUESTS        Culture result: No growth (<1,000 CFU/ML)       COVID-19 RAPID TEST [[id number]]  (Abnormal) Collected: 11/24/21 0600    Order Status: Completed Specimen: Nasopharyngeal Updated: 11/24/21 0703     Specimen source Nasopharyngeal        COVID-19 rapid test Detected        Comment:      The specimen is POSITIVE for SARS-CoV-2, the novel coronavirus associated with COVID-19. This test has been authorized by the FDA under an Emergency Use Authorization (EUA) for use by authorized laboratories. Fact sheet for Healthcare Providers: ConventionUpdate.co.nz  Fact sheet for Patients: ConventionUpdate.co.nz       Methodology: Isothermal Nucleic Acid Amplification  CALLED TO AND CORRECTLY REPEATED BY:  SLOANE KIRKPATRICK RN AT 7625 ON 11/24/21 TO LAD         RESPIRATORY VIRUS PANEL W/COVID-19, PCR [[id number]]  (Abnormal) Collected: 11/23/21 1624    Order Status: Completed Specimen: Nasopharyngeal Updated: 11/23/21 2308     Adenovirus Not detected        Coronavirus 229E Not detected        Coronavirus HKU1 Not detected        Coronavirus CVNL63 Not detected        Coronavirus OC43 Not detected        SARS-CoV-2, PCR Detected        Comment: CALLED TO AND CORRECTLY REPEATED BY:  [id number] [facility] AT 2230 BY KDA 11/23/21  CALLED TO AND CORRECTLY REPEATED BY:  Racquel Duarte RN ICU AT 2252 ON 11/23/21 TO TSH.           Metapneumovirus Not detected        Rhinovirus and Enterovirus Not detected Influenza A Not detected        Influenza A, subtype H1 Not detected        Influenza A, subtype H3 Not detected        INFLUENZA A H1N1 PCR Not detected        Influenza B Not detected        Parainfluenza 1 Not detected        Parainfluenza 2 Not detected        Parainfluenza 3 Not detected        Parainfluenza virus 4 Not detected        RSV by PCR Not detected        B. parapertussis, PCR Not detected        Bordetella pertussis - PCR Not detected        Chlamydophila pneumoniae DNA, QL, PCR Not detected        Mycoplasma pneumoniae DNA, QL, PCR Not detected                LE Doppler       ECHO       Images report reviewed by me:  CT (Most Recent) (CT chest reviewed by me) No results found for this or any previous visit. CXR reviewed by me:  XR (Most Recent). CXR  reviewed by me and compared with previous CXR Results from Hospital Encounter encounter on 11/23/21    XR CHEST PORT    Narrative  EXAM: CHEST RADIOGRAPH, SINGLE VIEW    CLINICAL INDICATION/HISTORY: hypoxia  <Additional:  Covid-19 positive    COMPARISON: None. TECHNIQUE: Portable frontal view of the chest was obtained.    _______________    FINDINGS:    SUPPORT DEVICES: None. HEART AND MEDIASTINUM: Cardiomediastinal silhouette appears within normal  limits. LUNGS AND PLEURAL SPACES:  Nearly diffuse bilateral interstitial infiltrates are redemonstrated with  relative sparing of the left apex, overall with slight progression. No  pneumothorax or pleural effusion. BONY THORAX AND SOFT TISSUES: No acute osseous abnormality. _______________    Impression  Overall slight progression of nearly diffuse bilateral interstitial infiltrates  in keeping with Covid-19 pneumonia.          IMPRESSION:   · Acute respiratory failure with hypoxemia  · Severe Covid pneumonia  · Hyperglycemia due to Type 2 diabetes mellitus  · Hypertension  · Hyponatremia  · Lactic acidosis  ·   Patient Active Problem List   Diagnosis Code    Acute hypoxemic respiratory failure due to COVID-19 ([facility] U07.1, J96.01    Pneumonia due to COVID-19 virus U07.1, J12.82    Hyperglycemia due to type 2 diabetes mellitus ([facility] E11.65    Primary hypertension I10    Hyponatremia E87.1    Lactic acidosis E87.2    Hyperlipidemia E78.5    Obesity (BMI 30-39. 9) E66.9    COVID-19 vaccination not done Z28.9         · Code status: Full code      RECOMMENDATIONS:   Respiratory: Patient with severe Covid pneumonia. On max high flow improved overnight now on 12 salter monitor  Clear on maximum high flow severe ARDS due to COVID-19 pneumonia patient is not stable to perform CTA of the chest with positive D-dimer full anticoagulation. PVL negative but unstable for CTA   prone position improved oxygenation  Started steroids and given bronchodilators additionally added Actemra today. Very severe ARDS high risk intubation spoke to wife and patient  Delayed presentation with severe hypoxemic respiratory failure. Chest x-ray consistent with bilateral airspace disease, opacities and groundglass densities consistent with severe Covid pneumonia. Continue combination of high flow oxygen and nonrebreather mask. Recommended self proning to improve oxygenation. Monitor closely; if patient's respiratory condition worsens, will need to be intubated. ER consultant tried calling tertiary facility for possible ECMOcurrently no beds available. Keep SPO2 >=88%. Aggressive pulmonary toileting. Aspiration precautions. Incentive spirometry. CVS: Stable hemodynamics. Monitor blood pressure. Patient high likely to have pulmonary embolism. D-dimer elevated. Patient with severe Covid infection. Recommend therapeutic anticoagulation with Lovenox twice daily. Check echocardiogram.  PVL negative but unable to do CTA  ID: Severe COVID-19 pneumonia.   High inflammatory markers on full dose of Lovenox  proCalcitonin low stop antibiotics   Steroids started on 11/23/2021  1 dose Tocilizumab given on 11/24/2021  Consulted ID  Antibioticsempiric ceftriaxone and azithromycin stopped after procalcitonin came normal  Late presentation with severe hypoxemic respiratory failurenot a candidate for remdesivir. no history of hepatitis or TB  Dexamethasone6 mg daily for 10 days. Blood sugar seems to be elevated with history of type 2 diabetes mellitus. Consult ID in morning. Vitamin supplements ordered. Monitor lactic acid. Hematology/Oncology: Monitor hemoglobin and platelets; watch for any bleeding while on therapeutic anticoagulation. Renal: Monitor renal function and urine output. Monitor sodium. GI: Monitor LFT. Oral diet as tolerated. Endocrine: Monitor BS. SSI. If blood sugars remain elevated, would need to add Lantus insulin. Neurology: Normal mentation. Skin/Wound: ICU nursing care. Electrolytes: Replace electrolytes per ICU electrolyte replacement protocol. Nutrition: Oral diet; oral hydration. Prophylaxis: DVT Prophylaxis: Enoxaparin. GI Prophylaxis: Protonix. Lines/Tubes: PIV      Prognosis seems to be guarded; patient being admitted to ICU with severe Covid pneumonia and hypoxemic respiratory failure; patient risk of mortality and going on ventilator support is very high. Quality Care: PPI, DVT prophylaxis, HOB elevated, Infection control all reviewed and addressed. Discussed with ER consultant. High complexity decision making was performed during the evaluation of this patient at high risk for decompensation with multiple organ involvement. Total critical care time spent rendering care exclusive of procedures/family discussion/coordination of care: 36 minutes. Wife updated    Addendum - patient was not stable for CTA chest last night in the ER due to degree of hypoxemia and inability to lay flat for testing.    Still unable to do today         Please note: Voice-recognition software may have been used to generate this report, which may have resulted in some phonetic-based errors in grammar and contents. Even though attempts were made to correct all the mistakes, some may have been missed, and remained in the body of the document. Nicole Wesley MD  11/26/2021       Note  Patient with SARS-CoV-2 pneumonia with severe pneumonia and hypoxemic respiratory failure; patient on high oxygen support via high flow and nonrebreather mask oxygen; patient is not vaccinated, and is in the age group that is being infected with delta virus strain; the strain causes severe respiratory failure and complications reported in the [place] and worldwide; unfortunately, the prognosis is poor in unvaccinated patients, with a high risk of complications, multiorgan failure, chronic hypoxemia and respiratory failure, intubation, mechanical ventilation, thromboembolic disease risk in multiple organs, high risk for long Covid syndrome, and high risk for mortality. The CDC federal and state guidelines have emphasized repeatedly the importance of vaccination to prevent severe infection, mortality, disabilities, long Covid syndrome from Covid virus with several strains currently in the [place]. Pfizer vaccination has been approved by [place] and [place]. Vaccination has been extended to children as well. The treatment protocols are followed based on local hospital protocols, with guidance from centers such as [facility] protocols. [facility] is not part of any research protocol. The local hospital protocols have been guided by [facility] pharmacy department. Covid plasma is no longer considered standard of care in the [facility] due to lack of benefit in trials. Nebulization and CPAP therapies are not considered as part of the protocol in [facility] due to high risk of aerosolization, and high risks of spreading Covid infection to the healthcare staff. REMDESIVIR-delayed presentation with severe hypoxemic respiratory failure are poor candidate for remdesivir therapy per [facility] cough.     Anti-inflammatory medication such as baricitinib or Tocilizumab based on CRP may have some benefit. Dexamethasone is considered standard of care.

## 2021-11-27 NOTE — PROGRESS NOTES
Hospitalist Progress Note-critical care note     Patient: Zafar Love MRN: [id number]  [facility]: [id number]    YOB: 1975  Age: 55 y.o. Sex: male    DOA: 11/23/2021 LOS:  LOS: 4 days            Chief complaint: acute respiratory failure, covid 19 pna, dm , htn ,     Assessment/Plan         Hospital Problems  Date Reviewed: 11/23/2021          Codes Class Noted POA    * (Principal) Acute hypoxemic respiratory failure due to COVID-19 [facility]) ICD-10-CM: U07.1, J96.01  ICD-9-CM: 518.81, 079.89, 799.02  11/23/2021 Yes        Pneumonia due to COVID-19 virus ICD-10-CM: U07.1, J12.82  ICD-9-CM: 480.8, 079.89  11/23/2021 Yes        Hyperglycemia due to type 2 diabetes mellitus ([facility] ICD-10-CM: E11.65  ICD-9-CM: 250.00  11/23/2021 Yes        Primary hypertension ICD-10-CM: I10  ICD-9-CM: 401.9  11/23/2021 Yes        Hyponatremia ICD-10-CM: E87.1  ICD-9-CM: 276.1  11/23/2021 Yes        Lactic acidosis ICD-10-CM: E87.2  ICD-9-CM: 276.2  11/23/2021 Yes        Hyperlipidemia ICD-10-CM: E78.5  ICD-9-CM: 272.4  Unknown Yes        Obesity (BMI 30-39. 9) ICD-10-CM: E66.9  ICD-9-CM: 278.00  11/23/2021 Yes        COVID-19 vaccination not done ICD-10-CM: Z28.9  ICD-9-CM: V64.00  11/23/2021 Yes              44 y/o male with poorly controlled type 2 diabetes mellitus, hypertension, and obesity is admitted for acute hypoxemic respiratory failure due to COVID-19 pneumonia with lactic acidosis, hyperglycemia, and hyponatremia.  He has not been vaccinated. His medical problems increase the risk of morbidity and mortality from [place]. He is likely to require intubation and mechanical ventilation very soon.     acute hypoxemic respiratory failure due to COVID-19 pneumonia.    On high flow oxygen AM continue desat on exertion     unable to have cta chest due to respiratory issue   On lovenox for empiric treatment   pvl negative for dvt   Will do cta later when he can tolerate       covid 19 pna   Not vaccinated   On iv steroid , received Tociluxumab per ID   Antioxidants   Breathing tx       htn   bp meds hold due to low normal range of bp and most likely will be intubated soon       DM type II   Increase  lantus and premeal insulin  and continue ssi     Pt was seen in full PPE     Subjective :+sob    rn : put back high flow, on 50 L     Disposition :tbd,   Review of systems:  General: No fever no chills  Lung: Shortness of breath, no wheezing  Heart: No chest pain, no palpitation  GI: No nausea no vomiting no abdominal pain    Vital signs/Intake and Output:  Visit Vitals  BP (!) 150/77   Pulse 78   Temp 97.8 °F (36.6 °C)   Resp 30   Ht 5' 6\" (1.676 m)   Wt 97.5 kg (215 lb)   SpO2 93%   BMI 34.70 kg/m²     Current Shift:  No intake/output data recorded. Last three shifts:  11/25 1901 - 11/27 0700  In: 850 [P.O.:450]  Out: 1900 [Urine:1900]    Physical Exam:  General: WD, WN. Alert, cooperative, no distress   HEENT: NC, Atraumatic. PERRLA, anicteric sclerae. High flow oxygen noted   Lungs: CTA bilaterally no wheezing  Heart:  Regular regular no murmur  Abdomen: Soft no tenderness no mass  Extremities: No c/c/e  Psych:   Not anxious or agitated. Neurologic:  No acute neurological deficit. Labs: Results:       Chemistry Recent Labs     11/26/21 0630 11/25/21  0437   * 256*    138   K 4.4 4.3    101   CO2 31 30   BUN 20* 22*   CREA 0.75 0.73   CA 8.6 8.2*   AGAP 4 7   BUCR 27* 30*   AP 51 47   TP 6.3* 5.9*   ALB 2.2* 2.2*   GLOB 4.1* 3.7   AGRAT 0.5* 0.6*      CBC w/Diff Recent Labs     11/27/21  0700 11/26/21  0630 11/25/21  0437   WBC 6.2 5.8 4.6   RBC 5.13 4.79 4.41   HGB 15.1 13.7 13.0   HCT 43.8 40.3 37.8   * 406 304   GRANS 84* 77* 83*   LYMPH 12* 15* 12*   EOS 0 0 0      Cardiac Enzymes No results for input(s): CPK, CKND1, SHANIKA in the last 72 hours.     No lab exists for component: CKRMB, TROIP   Coagulation Recent Labs     11/26/21  0630 11/25/21  0435   PTP 13.1 13.1   INR 1.0 1.0   APTT 30.5 33.8       Lipid Panel Lab Results   Component Value Date/Time    Cholesterol, total 123 11/23/2021 04:24 PM    HDL Cholesterol 33 (L) 11/23/2021 04:24 PM    LDL, calculated 41.8 11/23/2021 04:24 PM    VLDL, calculated 48.2 11/23/2021 04:24 PM    Triglyceride 241 (H) 11/23/2021 04:24 PM    CHOL/HDL Ratio 3.7 11/23/2021 04:24 PM      BNP No results for input(s): BNPP in the last 72 hours. Liver Enzymes Recent Labs     11/26/21  0630   TP 6.3*   ALB 2.2*   AP 51      Thyroid Studies No results found for: T4, T3U, TSH, TSHEXT, TSHEXT     Procedures/imaging: see electronic medical records for all procedures/Xrays and details which were not copied into this note but were reviewed prior to creation of Plan    XR CHEST PORT    Result Date: 11/23/2021  EXAM:  AP Portable Chest X-ray 1 view INDICATION: Shortness of breath COMPARISON: None _______________ FINDINGS:  Heart and mediastinal contours are within normal limits for portable radiograph. There are bilateral interstitial and alveolar opacities most prominent at the lung bases. Findings suggestive of pneumonia and/or pulmonary edema. There are no pleural effusions. No acute osseous findings. ________________      Bilateral interstitial and alveolar opacities suggesting pneumonia and/or pulmonary edema. No pleural effusions or pneumothorax seen.       Harley Denise MD

## 2021-11-27 NOTE — PROGRESS NOTES
11/27/21 0039   Oxygen Therapy   O2 Sat (%) (!) 89 %   O2 Device Hi flow nasal cannula; Non-rebreather mask   Skin Assessment Clean, dry, & intact   O2 Flow Rate (L/min) 50 l/min   O2 Temperature 93.2 °F (34 °C)   FIO2 (%) 93 %       Called to bedside for desaturation into 70's. Taken off of [place] nasal cannula, placed back on HFNC at 50LPM and 93% fio2. With NRB over top. Had a coughing episode after inhaler use which precipitated this episode. Was able to self prone at this time. Slow recovery to 88-89%.

## 2021-11-27 NOTE — PROGRESS NOTES
0710-received report from [address] [place] [address] included SBAR MAR and Kardex. Shift Summary-maintained on HFNC with nonrebreather mask. Pt told he should breathe through his nose not his mouth. Sats decreased through the day bounced from 80s to low 90's. Gold draining clear yellow urine. No c/o pain. Bedside and Verbal shift change report given to [address] [place] [address] (oncoming nurse) by Karina Villafuerte RN (offgoing nurse). Report included the following information SBAR, Kardex and MAR.

## 2021-11-27 NOTE — PROGRESS NOTES
1900- shift change report given to JUAN Kurtz (oncoming nurse) by HORACE Ferris (offgoing nurse). Report included the following information SBAR, Kardex, ED Summary, Intake/Output, MAR, Recent Results, Med Rec Status and Cardiac Rhythm NSR.    2000- Shift assessment completed. Pt AOx4 w/ lower aching back pain 4/10. Pt repositioned and will administer lidocaine patch when available. Pt resting prone in bed on NC 12L w/ non-rebreather. Will continue to monitor. 2217- Lidocaine patch applied for the 4/10 aching lower back pain. Will continue to monitor. 0000- Reassessment completed. No changes from previous assessment. 0025- Pt O2 sats sustaining in the low to mid 80s, despite reposition, and lying prone. RT paged, and arrives. Changes NC w/ non-rebreather to high flow NC w/ non-rebreather. Pt tolerates well. Will continue to monitor. 0400- Reassessment completed. No changes from previous assessment. 0630- Pt has a coughing fit after having chest xray done. After severe coughing fit pt lungs sounds show expiratory wheezing. Pt denies wanting to use the inhaler again d/t not wanting to have another coughing fit. Despite education still refuses. Will continue to monitor. 0700- shift change report given to HORACE Ferris (oncoming nurse) by JUAN Kurtz (offgoing nurse). Report included the following information SBAR, Kardex, ED Summary, Intake/Output, MAR, Recent Results, Med Rec Status and Cardiac Rhythm NSR.

## 2021-11-27 NOTE — PROGRESS NOTES
[facility]  Pulmonary, Critical Care, and Sleep Medicine    Name: Lorie Perkins MRN: [id number]   : 1975 Hospital: [facility] [place]    Date: 2021  Room: [address] [place] [address] Note                                              Consult requesting physician: Dr. Osman Tineo  Reason for Consult: Severe Covid pneumonia      Subjective/History of Present Illness:     Patient is a 55 y.o. male with PMHx significant for diabetes and hypertension. He has come to ER with worsening shortness of breath with positive Covid infection. He reports that he has been having cold symptoms for more than a week. He tested positive about 1 week ago. He has come with worsening shortness of breath, and was found to be severely hypoxemic. He has been placed on combination of high flow oxygen and nonrebreather. His oxygen saturation is in the high 80s on this, and in the semiupright position. Vaccination statusnot vaccinated for COVID-19  I have reviewed history with ER consultant Dr. Artur Emerson and chart review.   Currently on the maximum dose of high flow prone position started patient is not stable to perform CT for pulmonary embolus in the setting of elevated D-dimer full anticoagulation started last night PVL negative       Review of Systems: Limited due to patient condition  HEENT: No epistaxis, no redness in eyes  Respiratory: as above  Cardiovascular: no chest pain, no palpitations  Gastrointestinal: no abd pain, no vomiting, no bleeding symptoms  Genitourinary: No urinary symptoms or hematuria  Constitutional: No fever, no chills    Allergies   Allergen Reactions    Alupent [Metaproterenol] Swelling      Past Medical History:   Diagnosis Date    COVID-19     Diabetes ([facility] [address]     Hyperlipidemia     Hypertension       Past Surgical History:   Procedure Laterality Date    HX ORTHOPAEDIC Right     wrist surgery      Social History     Tobacco Use    Smoking status: Never Smoker    Smokeless tobacco: Not on file   Substance Use Topics    Alcohol use: Yes     Comment: drinksonce a week      Family History   Problem Relation Age of Onset    Diabetes Mother     Hypertension Mother     Stroke Mother     Hodgkin's lymphoma Mother     Diabetes Father     Hypertension Father     Cystic Fibrosis Father     Diabetes Maternal Aunt     Diabetes Maternal Uncle       Prior to Admission medications    Medication Sig Start Date End Date Taking? Authorizing Provider   atorvastatin (LIPITOR) 20 mg tablet Take 20 mg by mouth daily. Yes Provider, Historical   telmisartan (MICARDIS) 80 mg tablet Take 80 mg by mouth daily. Indications: high blood pressure   Yes Provider, Historical   fenofibrate nanocrystallized (TRICOR) 145 mg tablet Take 145 mg by mouth daily. Yes Provider, Historical   glipiZIDE SR (GLUCOTROL XL) 10 mg CR tablet Take 10 mg by mouth daily. Yes Provider, Historical   metFORMIN ER (GLUCOPHAGE XR) 500 mg tablet Take 500 mg by mouth daily (with dinner).    Yes Provider, Historical     Current Facility-Administered Medications   Medication Dose Route Frequency    insulin lispro (HUMALOG) injection 7 Units  7 Units SubCUTAneous TIDAC    furosemide (LASIX) injection 20 mg  20 mg IntraVENous ONCE    insulin lispro (HUMALOG) injection   SubCUTAneous AC&HS    insulin glargine (LANTUS) injection 20 Units  20 Units SubCUTAneous DAILY    enoxaparin (LOVENOX) injection 100 mg  1 mg/kg SubCUTAneous Q12H    [Held by provider] ipratropium-albuterol (COMBIVENT RESPIMAT) 20 mcg-100 mcg inhalation spray  1 Puff Inhalation TID    lidocaine 4 % patch 1 Patch  1 Patch TransDERmal Q24H    atorvastatin (LIPITOR) tablet 20 mg  20 mg Oral DAILY    dexamethasone (DECADRON) 4 mg/mL injection 6 mg  6 mg IntraVENous Q24H    pantoprazole (PROTONIX) 40 mg in 0.9% sodium chloride 10 mL injection  40 mg IntraVENous Q24H    cholecalciferol (VITAMIN D3) (1000 Units /25 mcg) tablet 2,000 Units  2,000 Units Oral DAILY    ascorbic acid (vitamin C) (VITAMIN C) tablet 500 mg  500 mg Oral BID    zinc sulfate (ZINCATE) 50 mg zinc (220 mg) capsule 1 Capsule  1 Capsule Oral Q12H    melatonin (rapid dissolve) tablet 5 mg  5 mg Oral QHS         Objective:   Vital Signs:    Visit Vitals  BP (!) 141/75   Pulse 90   Temp 98.5 °F (36.9 °C)   Resp (!) 31   Ht 5' 6\" (1.676 m)   Wt 97.5 kg (215 lb)   SpO2 (!) 86%   BMI 34.70 kg/m²       O2 Device: Heated, Hi flow nasal cannula, Non-rebreather mask   O2 Flow Rate (L/min): 50 l/min   Temp (24hrs), Av.5 °F (36.9 °C), Min:97.8 °F (36.6 °C), Max:99.3 °F (37.4 °C)       Intake/Output:   Last shift:       0701 -  1900  In: 400   Out: -     Last 3 shifts: 1901 -  0700  In: 850 [P.O.:450]  Out: 1900 [Urine:1900]      Intake/Output Summary (Last 24 hours) at 2021 1441  Last data filed at 2021 1200  Gross per 24 hour   Intake 850 ml   Output 1150 ml   Net -300 ml       Last 3 Recorded Weights in this Encounter    21 1607 21 1210   Weight: 97.5 kg (215 lb) 97.5 kg (215 lb)        Physical Exam: Limitations in exam due to full PPE requirements.   In prone position  Patient on combination of nonrebreather mask and high flow nasal cannula oxygen; acyanotic; breathing appears to be comfortable currently   HEENT: pupils not dilated, no scleral jaundice, moist oral mucosa, no nasal drainage; telemetrysinus rhythm  Neck: No adenopathy or thyroid swelling  CVS: S1S2 no murmurs; JVD not elevated  RS: Mod air entry bilaterally, at bases decresed brath sounds no crckles , no wheezes, bilateral lower chest crackles  Abd: soft, non tender, no hepatosplenomegaly, no abd distension  Neuro: non focal, awake, alert  Extrm: no leg edema or swelling or clubbing  Skin: no rash  Lymphatic: no cervical or supraclavicular adenopathy  Psych: Calm and cooperative   Vasc: DPs palpable         Data:       Recent Results (from the past 24 hour(s))   GLUCOSE, POC Collection Time: 11/26/21  4:26 PM   Result Value Ref Range    Glucose (POC) 133 (H) 70 - 110 mg/dL   GLUCOSE, POC    Collection Time: 11/26/21  8:40 PM   Result Value Ref Range    Glucose (POC) 198 (H) 70 - 110 mg/dL   D DIMER    Collection Time: 11/27/21  7:00 AM   Result Value Ref Range    D DIMER 1.67 (H) <0.46 ug/ml(FEU)   PROTHROMBIN TIME + INR    Collection Time: 11/27/21  7:00 AM   Result Value Ref Range    Prothrombin time 13.1 11.5 - 15.2 sec    INR 1.1 0.8 - 1.2     PTT    Collection Time: 11/27/21  7:00 AM   Result Value Ref Range    aPTT 28.1 23.0 - 36.4 SEC   FERRITIN    Collection Time: 11/27/21  7:00 AM   Result Value Ref Range    Ferritin 3,086 (H) 8 - 388 NG/ML   CBC WITH AUTOMATED DIFF    Collection Time: 11/27/21  7:00 AM   Result Value Ref Range    WBC 6.2 4.6 - 13.2 K/uL    RBC 5.13 4.35 - 5.65 M/uL    HGB 15.1 13.0 - 16.0 g/dL    HCT 43.8 36.0 - 48.0 %    MCV 85.4 78.0 - 100.0 FL    MCH 29.4 24.0 - 34.0 PG    MCHC 34.5 31.0 - 37.0 g/dL    RDW 11.9 11.6 - 14.5 %    PLATELET 651 (H) 729 - 420 K/uL    MPV 9.0 (L) 9.2 - 11.8 FL    NRBC 0.0 0  WBC    ABSOLUTE NRBC 0.00 0.00 - 0.01 K/uL    NEUTROPHILS 84 (H) 40 - 73 %    LYMPHOCYTES 12 (L) 21 - 52 %    MONOCYTES 3 3 - 10 %    EOSINOPHILS 0 0 - 5 %    BASOPHILS 0 0 - 2 %    IMMATURE GRANULOCYTES 1 (H) 0.0 - 0.5 %    ABS. NEUTROPHILS 5.1 1.8 - 8.0 K/UL    ABS. LYMPHOCYTES 0.8 (L) 0.9 - 3.6 K/UL    ABS. MONOCYTES 0.2 0.05 - 1.2 K/UL    ABS. EOSINOPHILS 0.0 0.0 - 0.4 K/UL    ABS. BASOPHILS 0.0 0.0 - 0.1 K/UL    ABS. IMM.  GRANS. 0.0 0.00 - 0.04 K/UL    DF AUTOMATED     METABOLIC PANEL, COMPREHENSIVE    Collection Time: 11/27/21  7:00 AM   Result Value Ref Range    Sodium 137 136 - 145 mmol/L    Potassium 4.4 3.5 - 5.5 mmol/L    Chloride 102 100 - 111 mmol/L    CO2 27 21 - 32 mmol/L    Anion gap 8 3.0 - 18 mmol/L    Glucose 189 (H) 74 - 99 mg/dL    BUN 18 7.0 - 18 MG/DL    Creatinine 0.68 0.6 - 1.3 MG/DL    BUN/Creatinine ratio 26 (H) 12 - 20 GFR est AA >60 >60 ml/min/1.73m2    GFR est non-AA >60 >60 ml/min/1.73m2    Calcium 8.4 (L) 8.5 - 10.1 MG/DL    Bilirubin, total 0.7 0.2 - 1.0 MG/DL    ALT (SGPT) 58 16 - 61 U/L    AST (SGOT) 78 (H) 10 - 38 U/L    Alk. phosphatase 60 45 - 117 U/L    Protein, total 6.0 (L) 6.4 - 8.2 g/dL    Albumin 2.4 (L) 3.4 - 5.0 g/dL    Globulin 3.6 2.0 - 4.0 g/dL    A-G Ratio 0.7 (L) 0.8 - 1.7     CALCIUM, IONIZED    Collection Time: 11/27/21  7:00 AM   Result Value Ref Range    Ionized Calcium 1.05 (L) 1.12 - 1.32 MMOL/L   MAGNESIUM    Collection Time: 11/27/21  7:00 AM   Result Value Ref Range    Magnesium 2.3 1.6 - 2.6 mg/dL   PHOSPHORUS    Collection Time: 11/27/21  7:00 AM   Result Value Ref Range    Phosphorus 3.8 2.5 - 4.9 MG/DL   GLUCOSE, POC    Collection Time: 11/27/21  7:43 AM   Result Value Ref Range    Glucose (POC) 201 (H) 70 - 110 mg/dL   GLUCOSE, POC    Collection Time: 11/27/21 11:28 AM   Result Value Ref Range    Glucose (POC) 149 (H) 70 - 110 mg/dL         Chemistry Recent Labs     11/27/21  0700 11/26/21  0630 11/25/21  0437   * 251* 256*    137 138   K 4.4 4.4 4.3    102 101   CO2 27 31 30   BUN 18 20* 22*   CREA 0.68 0.75 0.73   CA 8.4* 8.6 8.2*   MG 2.3 2.4 2.8*   PHOS 3.8 4.1 4.1   AGAP 8 4 7   BUCR 26* 27* 30*   AP 60 51 47   TP 6.0* 6.3* 5.9*   ALB 2.4* 2.2* 2.2*   GLOB 3.6 4.1* 3.7   AGRAT 0.7* 0.5* 0.6*        Lactic Acid Lactic acid   Date Value Ref Range Status   11/24/2021 1.4 0.4 - 2.0 MMOL/L Final     No results for input(s): LAC in the last 72 hours. Liver Enzymes Protein, total   Date Value Ref Range Status   11/27/2021 6.0 (L) 6.4 - 8.2 g/dL Final     Albumin   Date Value Ref Range Status   11/27/2021 2.4 (L) 3.4 - 5.0 g/dL Final     Globulin   Date Value Ref Range Status   11/27/2021 3.6 2.0 - 4.0 g/dL Final     A-G Ratio   Date Value Ref Range Status   11/27/2021 0.7 (L) 0.8 - 1.7   Final     Alk.  phosphatase   Date Value Ref Range Status   11/27/2021 60 45 - 117 U/L Final     Recent Labs     11/27/21  0700 11/26/21  0630 11/25/21  0437   TP 6.0* 6.3* 5.9*   ALB 2.4* 2.2* 2.2*   GLOB 3.6 4.1* 3.7   AGRAT 0.7* 0.5* 0.6*   AP 60 51 47        CBC w/Diff Recent Labs     11/27/21  0700 11/26/21  0630 11/25/21  0437   WBC 6.2 5.8 4.6   RBC 5.13 4.79 4.41   HGB 15.1 13.7 13.0   HCT 43.8 40.3 37.8   * 406 304   GRANS 84* 77* 83*   LYMPH 12* 15* 12*   EOS 0 0 0        Cardiac Enzymes No results found for: CPK, CK, CKMMB, CKMB, RCK3, CKMBT, CKNDX, CKND1, SHANIKA, TROPT, TROIQ, NIKKO, TROPT, TNIPOC, BNP, BNPP     BNP No results found for: BNP, BNPP, XBNPT     Coagulation Recent Labs     11/27/21  0700 11/26/21  0630 11/25/21  0435   PTP 13.1 13.1 13.1   INR 1.1 1.0 1.0   APTT 28.1 30.5 33.8         Thyroid  No results found for: T4, T3U, TSH, TSHEXT, TSHEXT    No results found for: T4     Urinalysis Lab Results   Component Value Date/Time    Color YELLOW 11/24/2021 06:00 AM    Appearance CLEAR 11/24/2021 06:00 AM    Specific gravity >1.030 (H) 11/24/2021 06:00 AM    pH (UA) 5.5 11/24/2021 06:00 AM    Protein Negative 11/24/2021 06:00 AM    Glucose >1,000 (A) 11/24/2021 06:00 AM    Ketone 80 (A) 11/24/2021 06:00 AM    Bilirubin Negative 11/24/2021 06:00 AM    Urobilinogen 1.0 11/24/2021 06:00 AM    Nitrites Negative 11/24/2021 06:00 AM    Leukocyte Esterase Negative 11/24/2021 06:00 AM        Micro  No results for input(s): SDES, CULT in the last 72 hours. No results for input(s): CULT in the last 72 hours. Culture data during this hospitalization.    All Micro Results     Procedure Component Value Units Date/Time    CULTURE, BLOOD [[id number]] Collected: 11/23/21 1624    Order Status: Completed Specimen: Blood Updated: 11/27/21 0746     Special Requests: NO SPECIAL REQUESTS        Culture result: NO GROWTH 4 DAYS       CULTURE, BLOOD [[id number]] Collected: 11/23/21 1624    Order Status: Completed Specimen: Blood Updated: 11/27/21 0746     Special Requests: NO SPECIAL REQUESTS Culture result: NO GROWTH 4 DAYS       CULTURE, URINE [[id number]] Collected: 11/24/21 0600    Order Status: Completed Specimen: Urine from Clean catch Updated: 11/25/21 2047     Special Requests: NO SPECIAL REQUESTS        Culture result: No growth (<1,000 CFU/ML)       COVID-19 RAPID TEST [[id number]]  (Abnormal) Collected: 11/24/21 0600    Order Status: Completed Specimen: Nasopharyngeal Updated: 11/24/21 0703     Specimen source Nasopharyngeal        COVID-19 rapid test Detected        Comment:      The specimen is POSITIVE for SARS-CoV-2, the novel coronavirus associated with COVID-19. This test has been authorized by the FDA under an Emergency Use Authorization (EUA) for use by authorized laboratories. Fact sheet for Healthcare Providers: ConventionUpdate.co.nz  Fact sheet for Patients: ConventionUpdate.co.nz       Methodology: Isothermal Nucleic Acid Amplification  CALLED TO AND CORRECTLY REPEATED BY:  SLOANE KIRKPATRICK RN AT 7254 ON 11/24/21 TO LAD         RESPIRATORY VIRUS PANEL W/COVID-19, PCR [[id number]]  (Abnormal) Collected: 11/23/21 1624    Order Status: Completed Specimen: Nasopharyngeal Updated: 11/23/21 2308     Adenovirus Not detected        Coronavirus 229E Not detected        Coronavirus HKU1 Not detected        Coronavirus CVNL63 Not detected        Coronavirus OC43 Not detected        SARS-CoV-2, PCR Detected        Comment: CALLED TO AND CORRECTLY REPEATED BY:  [id number] [facility] LAB AT 2230 BY KDA 11/23/21  CALLED TO AND CORRECTLY REPEATED BY:  Jassi Hodges RN ICU AT 2252 ON 11/23/21 TO TSH.           Roxie Fell Not detected        Rhinovirus and Enterovirus Not detected        Influenza A Not detected        Influenza A, subtype H1 Not detected        Influenza A, subtype H3 Not detected        INFLUENZA A H1N1 PCR Not detected        Influenza B Not detected        Parainfluenza 1 Not detected        Parainfluenza 2 Not detected        Parainfluenza 3 Not detected        Parainfluenza virus 4 Not detected        RSV by PCR Not detected        B. parapertussis, PCR Not detected        Bordetella pertussis - PCR Not detected        Chlamydophila pneumoniae DNA, QL, PCR Not detected        Mycoplasma pneumoniae DNA, QL, PCR Not detected                LE Doppler       ECHO       Images report reviewed by me:  CT (Most Recent) (CT chest reviewed by me) No results found for this or any previous visit. CXR reviewed by me:  XR (Most Recent). CXR  reviewed by me and compared with previous CXR Results from Hospital Encounter encounter on 11/23/21    XR CHEST PORT    Narrative  EXAM: CHEST RADIOGRAPH, SINGLE VIEW    CLINICAL INDICATION/HISTORY: hypoxia  <Additional:  Covid-19 positive    COMPARISON: 11/25/2021    TECHNIQUE: Portable frontal view of the chest was obtained.    _______________    FINDINGS:    SUPPORT DEVICES: None. HEART AND MEDIASTINUM: Cardiomediastinal silhouette appears within normal  limits. LUNGS AND PLEURAL SPACES: Bilateral interstitial markings are present along with  predominantly central alveolar opacities, right greater than left, with  progression bilaterally. No pneumothorax or pleural effusion. BONY THORAX AND SOFT TISSUES: No acute osseous abnormality. _______________    Impression  Interval progression of bilateral interstitial and predominantly central  alveolar opacities. Differential considerations include pneumonia and  cardiogenic pulmonary edema.          IMPRESSION:   · Acute respiratory failure with hypoxemia  · Severe Covid pneumonia  · Hyperglycemia due to Type 2 diabetes mellitus  · Hypertension  · Hyponatremia  · Lactic acidosis  ·   Patient Active Problem List   Diagnosis Code    Acute hypoxemic respiratory failure due to COVID-19 ([facility] U07.1, J96.01    Pneumonia due to COVID-19 virus U07.1, J12.82    Hyperglycemia due to type 2 diabetes mellitus ([facility] E11.65    Primary hypertension I10    Hyponatremia E87.1  Lactic acidosis E87.2    Hyperlipidemia E78.5    Obesity (BMI 30-39. 9) E66.9    COVID-19 vaccination not done Z28.9         · Code status: Full code      RECOMMENDATIONS:   Respiratory: Patient with severe Covid pneumonia. On max high flow not significantly better  ABG today  1 dose lasix    Clear on maximum high flow severe ARDS due to COVID-19 pneumonia patient is not stable to perform CTA of the chest with positive D-dimer full anticoagulation. PVL negative but unstable for CTA   prone position improved oxygenation  Started steroids and given bronchodilators additionally added Actemra today. Very severe ARDS high risk intubation spoke to wife and patient  Delayed presentation with severe hypoxemic respiratory failure. Chest x-ray consistent with bilateral airspace disease, opacities and groundglass densities consistent with severe Covid pneumonia. Continue combination of high flow oxygen and nonrebreather mask. Recommended self proning to improve oxygenation. Monitor closely; if patient's respiratory condition worsens, will need to be intubated. ER consultant tried calling tertiary facility for possible ECMOcurrently no beds available. Keep SPO2 >=88%. Aggressive pulmonary toileting. Aspiration precautions. Incentive spirometry. CVS: Stable hemodynamics. Monitor blood pressure. Mild pulmonary congestion  Check BNP add prn low dose lasix  Repeat  pvl but already on full dose lovenox   Patient high likely to have pulmonary embolism. D-dimer elevated. Patient with severe Covid infection. Recommend therapeutic anticoagulation with Lovenox twice daily. Check echocardiogram.  PVL negative but unable to do CTA  ID: Severe COVID-19 pneumonia.   High inflammatory markers on full dose of Lovenox, steroids   proCalcitonin low stop antibiotics   Steroids started on 11/23/2021  1 dose Tocilizumab given on 11/24/2021  Consulted ID  Antibioticsempiric ceftriaxone and azithromycin stopped after procalcitonin came normal  Late presentation with severe hypoxemic respiratory failurenot a candidate for remdesivir. no history of hepatitis or TB  Dexamethasone6 mg daily for 10 days. Blood sugar seems to be elevated with history of type 2 diabetes mellitus. Consult ID appreciated   Vitamin supplements ordered. Monitor lactic acid. Hematology/Oncology: Monitor hemoglobin and platelets; watch for any bleeding while on therapeutic anticoagulation. Renal: Monitor renal function and urine output. Monitor sodium. GI: Monitor LFT. Oral diet as tolerated. Endocrine: Monitor BS. SSI. If blood sugars remain elevated, would need to add Lantus insulin. Neurology: Normal mentation. Skin/Wound: ICU nursing care. Electrolytes: Replace electrolytes per ICU electrolyte replacement protocol. Nutrition: Oral diet; oral hydration. Prophylaxis: DVT Prophylaxis: Enoxaparin. GI Prophylaxis: Protonix. Lines/Tubes: PIV      Prognosis seems to be guarded; patient being admitted to ICU with severe Covid pneumonia and hypoxemic respiratory failure; patient risk of mortality and going on ventilator support is very high. Quality Care: PPI, DVT prophylaxis, HOB elevated, Infection control all reviewed and addressed. Discussed with ER consultant. High complexity decision making was performed during the evaluation of this patient at high risk for decompensation with multiple organ involvement. Total critical care time spent rendering care exclusive of procedures/family discussion/coordination of care: 36 minutes. Wife updated    Addendum - patient was not stable for CTA chest last night in the ER due to degree of hypoxemia and inability to lay flat for testing. Still unable to do today  Wife  updated          Please note: Voice-recognition software may have been used to generate this report, which may have resulted in some phonetic-based errors in grammar and contents.  Even though attempts were made to correct all the mistakes, some may have been missed, and remained in the body of the document. Wagner Guerrero MD  11/27/2021       Note  Patient with SARS-CoV-2 pneumonia with severe pneumonia and hypoxemic respiratory failure; patient on high oxygen support via high flow and nonrebreather mask oxygen; patient is not vaccinated, and is in the age group that is being infected with delta virus strain; the strain causes severe respiratory failure and complications reported in the [place] and worldwide; unfortunately, the prognosis is poor in unvaccinated patients, with a high risk of complications, multiorgan failure, chronic hypoxemia and respiratory failure, intubation, mechanical ventilation, thromboembolic disease risk in multiple organs, high risk for long Covid syndrome, and high risk for mortality. The CDC federal and state guidelines have emphasized repeatedly the importance of vaccination to prevent severe infection, mortality, disabilities, long Covid syndrome from Covid virus with several strains currently in the [place]. Pfizer vaccination has been approved by [place] and [place]. Vaccination has been extended to children as well. The treatment protocols are followed based on local hospital protocols, with guidance from centers such as [facility] protocols. [facility] is not part of any research protocol. The local hospital protocols have been guided by [facility] pharmacy department. Covid plasma is no longer considered standard of care in the [facility] due to lack of benefit in trials. Nebulization and CPAP therapies are not considered as part of the protocol in [facility] due to high risk of aerosolization, and high risks of spreading Covid infection to the healthcare staff. REMDESIVIR-delayed presentation with severe hypoxemic respiratory failure are poor candidate for remdesivir therapy per [facility] cough.     Anti-inflammatory medication such as baricitinib or Tocilizumab based on CRP may have some benefit. Dexamethasone is considered standard of care.

## 2021-11-27 NOTE — PROGRESS NOTES
Problem: Pressure Injury - Risk of  Goal: *Prevention of pressure injury  Description: Document Jeffrey Scale and appropriate interventions in the flowsheet. Outcome: Progressing Towards Goal  Note: Pressure Injury Interventions: Activity Interventions: Pressure redistribution bed/mattress(bed type)    Mobility Interventions: HOB 30 degrees or less, Pressure redistribution bed/mattress (bed type), Turn and reposition approx. every two hours(pillow and wedges)    Nutrition Interventions: Document food/fluid/supplement intake    Friction and Shear Interventions: HOB 30 degrees or less, Lift sheet, Transferring/repositioning devices     Problem: Patient Education: Go to Patient Education Activity  Goal: Patient/Family Education  Outcome: Progressing Towards Goal     Problem: Falls - Risk of  Goal: *Absence of Falls  Description: Document Stephanie Fall Risk and appropriate interventions in the flowsheet. Outcome: Progressing Towards Goal  Note: Fall Risk Interventions:  Mobility Interventions: Bed/chair exit alarm, Patient to call before getting OOB, Assess mobility with egress test         Medication Interventions: Bed/chair exit alarm, Evaluate medications/consider consulting pharmacy, Patient to call before getting OOB    Elimination Interventions: Call light in reach, Bed/chair exit alarm, Toileting schedule/hourly rounds      Problem: Patient Education: Go to Patient Education Activity  Goal: Patient/Family Education  Outcome: Progressing Towards Goal     Problem: Airway Clearance - Ineffective  Goal: Achieve or maintain patent airway  Outcome: Progressing Towards Goal     Problem: Gas Exchange - Impaired  Goal: Absence of hypoxia  Outcome: Progressing Towards Goal  Goal: Promote optimal lung function  Outcome: Progressing Towards Goal     Problem: Breathing Pattern - Ineffective  Goal: Ability to achieve and maintain a regular respiratory rate  Outcome: Progressing Towards Goal     Problem:  Body Temperature - Risk of, Imbalanced  Goal: Ability to maintain a body temperature within defined limits  Outcome: Progressing Towards Goal  Goal: Will regain or maintain usual level of consciousness  Outcome: Progressing Towards Goal  Goal: Complications related to the disease process, condition or treatment will be avoided or minimized  Outcome: Progressing Towards Goal     Problem: Isolation Precautions - Risk of Spread of Infection  Goal: Prevent transmission of infectious organism to others  Outcome: Progressing Towards Goal     Problem: Nutrition Deficits  Goal: Optimize nutrtional status  Outcome: Progressing Towards Goal     Problem: Risk for Fluid Volume Deficit  Goal: Maintain normal heart rhythm  Outcome: Progressing Towards Goal  Goal: Maintain absence of muscle cramping  Outcome: Progressing Towards Goal  Goal: Maintain normal serum potassium, sodium, calcium, phosphorus, and pH  Outcome: Progressing Towards Goal     Problem: Loneliness or Risk for Loneliness  Goal: Demonstrate positive use of time alone when socialization is not possible  Outcome: Progressing Towards Goal     Problem: Fatigue  Goal: Verbalize increase energy and improved vitality  Outcome: Progressing Towards Goal     Problem: Diabetes Self-Management  Goal: *Disease process and treatment process  Description: Define diabetes and identify own type of diabetes; list 3 options for treating diabetes. Outcome: Progressing Towards Goal  Goal: *Incorporating nutritional management into lifestyle  Description: Describe effect of type, amount and timing of food on blood glucose; list 3 methods for planning meals. Outcome: Progressing Towards Goal  Goal: *Incorporating physical activity into lifestyle  Description: State effect of exercise on blood glucose levels.   Outcome: Progressing Towards Goal  Goal: *Developing strategies to promote health/change behavior  Description: Define the ABC's of diabetes; identify appropriate screenings, schedule and personal plan for screenings. Outcome: Progressing Towards Goal  Goal: *Using medications safely  Description: State effect of diabetes medications on diabetes; name diabetes medication taking, action and side effects. Outcome: Progressing Towards Goal  Goal: *Monitoring blood glucose, interpreting and using results  Description: Identify recommended blood glucose targets  and personal targets. Outcome: Progressing Towards Goal  Goal: *Prevention, detection, treatment of acute complications  Description: List symptoms of hyper- and hypoglycemia; describe how to treat low blood sugar and actions for lowering  high blood glucose level. Outcome: Progressing Towards Goal  Goal: *Prevention, detection and treatment of chronic complications  Description: Define the natural course of diabetes and describe the relationship of blood glucose levels to long term complications of diabetes.   Outcome: Progressing Towards Goal  Goal: *Developing strategies to address psychosocial issues  Description: Describe feelings about living with diabetes; identify support needed and support network  Outcome: Progressing Towards Goal  Goal: *Insulin pump training  Outcome: Progressing Towards Goal  Goal: *Sick day guidelines  Outcome: Progressing Towards Goal  Goal: *Patient Specific Goal (EDIT GOAL, INSERT TEXT)  Outcome: Progressing Towards Goal     Problem: Patient Education: Go to Patient Education Activity  Goal: Patient/Family Education  Outcome: Progressing Towards Goal     Cecil Joshua RN

## 2021-11-28 NOTE — PROGRESS NOTES
1900- shift change report given to JUAN Cabrera (oncoming nurse) by HORACE Ferris (offgoing nurse). Report included the following information SBAR, Kardex, ED Summary, Intake/Output, MAR, Recent Results, Med Rec Status and Cardiac Rhythm NSR.    2000-  Shift assessment completed. Pt AOx4 w/ lower aching back pain 4/10. Pt repositioned and will administer lidocaine patch when available. Pt resting prone in bed on high flow NC @ 60L and 95% FiO2. Will continue to monitor. 0000- Reassessment completed. Pt O2 dropping down into the low 80s and high 70s and sustaining. RT and anesthesia paged about possible intubation. Will continue to monitor. 65- Dr. Shayy Cortez at bedside talking to pt about possible intubation. 0045- Pt intubated while I was assisting at bedside. OG tube placed. All ordered drips started. New gown, incontinence pad, and sheets placed. Pt resting w/ HOB > 30 degrees. Heart monitor leads replaced. Will continue to monitor. 0115- Nimbex started. Leads placed. Baseline 4 twitches @ 7.     0230- Esophageal probe placed. Dressings for all IV's changed. Will continue to monitor. 0348- HR sustaining in the 140-150s w/ a BP of 205/91. Fentanyl and versed titrated up to compensate. Levophed stopped earlier per time in [facility] Dr. Shayy Cortez paged about the situation. IV push versed and fentanyl one time doses ordered and administered. 18- Dr. Shayy Cortez paged about resolved BP, but sustained HR in the 130s. Albumin ordered. Will administer once available. 12- Dr. Shayy Cortez paged about sustained HR in the 140s despite albumin. Precedex drip ordered and administered. 6761- Nimbex titrated down to 2mcg based on 1 twitch @ 10.    0655- Levophed restarted for a BP of 88/60 and a MAP of 65.    0700- shift change report given to ARASH De La Cruz (oncoming nurse) by JUAN Cabrera (offgoing nurse).  Report included the following information SBAR, Kardex, ED Summary, Intake/Output, MAR, Recent Results, Med Rec Status and Cardiac Rhythm ST.

## 2021-11-28 NOTE — ANESTHESIA PROCEDURE NOTES
Emergent Intubation  Performed by: Trevor Pimentel CRNA  Authorized by: Jon Mcgee DO     Emergent Intubation:   Location:  ICU  Date/Time:  11/28/2021 12:56 AM  Indications:  Impending respiratory failure  Spontaneous Ventilation: present    Level of Consciousness: awake  Preoxygenated:  Yes      Airway Documentation:   Airway:  ETT - Cuffed  Technique:  Cricoid pressure  Advanced Technique:  Young  Insertion Site:  Oral  Blade Size:  3  ETT size (mm):  7.5  ETT Line [employer]:  Lips  ETT Insertion depth (cm):  22  Placement verified by: auscultation, EtCO2 and BBS    Attempts:  1  Difficult airway: No

## 2021-11-28 NOTE — PROGRESS NOTES
Pt with oxygen saturations in the 70s and sustaining. Anesthesia paged and at bedside to intubate. I contacted his wife to update her. I advised he may require transfer out later today for possible ECMO depending on how he does on the ventilator.

## 2021-11-28 NOTE — PROGRESS NOTES
[facility]  Pulmonary, Critical Care, and Sleep Medicine    Name: Tam Hall MRN: [id number]   : 1975 Hospital: [facility] [place]    Date: 2021  Room: [address] [place] [address] Note                                              Consult requesting physician: Dr. Roderick Eckert  Reason for Consult: Severe Covid pneumonia      Subjective/History of Present Illness:     Patient is a 55 y.o. male with PMHx significant for diabetes and hypertension. He has come to ER with worsening shortness of breath with positive Covid infection. He reports that he has been having cold symptoms for more than a week. He tested positive about 1 week ago. He has come with worsening shortness of breath, and was found to be severely hypoxemic. He has been placed on combination of high flow oxygen and nonrebreather. His oxygen saturation is in the high 80s on this, and in the semiupright position. Vaccination statusnot vaccinated for COVID-19  I have reviewed history with ER consultant Dr. Justino Paiz and chart review. Currently on the maximum dose of high flow prone position started patient is not stable to perform CT for pulmonary embolus in the setting of elevated D-dimer full anticoagulation started last night PVL negative   Patient failed high  flow and was intubated on 2021. ARDS protocol. If worsen will call transfer center to evaluate for ECMO    2021  Patient remains in intensive care unit bed 9. Overnight intubated for worsening hypoxemic respiratory failure ARDS failed maximum high flow. Currently paralyzed deeply sedated for prone protocol. Tolerating well. Improve oxygenation from FiO2 100% to 80% and PEEP from 15-10. If worsen will have to transfer for ECMO. Currently improving.   Family updated      Review of Systems: Unable to obtain intubated sedated      Allergies   Allergen Reactions    Alupent [Metaproterenol] Swelling      Past Medical History:   Diagnosis Date    COVID-19     Diabetes ([facility] [address]     Hyperlipidemia     Hypertension       Past Surgical History:   Procedure Laterality Date    HX ORTHOPAEDIC Right     wrist surgery      Social History     Tobacco Use    Smoking status: Never Smoker    Smokeless tobacco: Not on file   Substance Use Topics    Alcohol use: Yes     Comment: drinksonce a week      Family History   Problem Relation Age of Onset    Diabetes Mother     Hypertension Mother     Stroke Mother     Hodgkin's lymphoma Mother     Diabetes Father     Hypertension Father     Cystic Fibrosis Father     Diabetes Maternal Aunt     Diabetes Maternal Uncle       Prior to Admission medications    Medication Sig Start Date End Date Taking? Authorizing Provider   atorvastatin (LIPITOR) 20 mg tablet Take 20 mg by mouth daily. Yes Provider, Historical   telmisartan (MICARDIS) 80 mg tablet Take 80 mg by mouth daily. Indications: high blood pressure   Yes Provider, Historical   fenofibrate nanocrystallized (TRICOR) 145 mg tablet Take 145 mg by mouth daily. Yes Provider, Historical   glipiZIDE SR (GLUCOTROL XL) 10 mg CR tablet Take 10 mg by mouth daily. Yes Provider, Historical   metFORMIN ER (GLUCOPHAGE XR) 500 mg tablet Take 500 mg by mouth daily (with dinner).    Yes Provider, Historical     Current Facility-Administered Medications   Medication Dose Route Frequency    cisatracurium (NIMBEX) 100 mg in 0.9% sodium chloride 100 mL (1 mg/mL) infusion  0-10 mcg/kg/min IntraVENous TITRATE    fentaNYL (PF) 900 mcg/30 ml infusion soln  0-200 mcg/hr IntraVENous TITRATE    NOREPINephrine (LEVOPHED) 8 mg in 0.9% NS 250ml infusion  0.5-16 mcg/min IntraVENous TITRATE    midazolam in normal saline (VERSED) 1 mg/mL infusion  0-10 mg/hr IntraVENous TITRATE    insulin lispro (HUMALOG) injection   SubCUTAneous Q6H    albumin human 25% (BUMINATE) solution 25 g  25 g IntraVENous Q6H    dexamethasone (DECADRON) 4 mg/mL injection 20 mg  20 mg IntraVENous Q24H  enoxaparin (LOVENOX) injection 100 mg  1 mg/kg SubCUTAneous Q12H    [Held by provider] insulin lispro (HUMALOG) injection 7 Units  7 Units SubCUTAneous TIDAC    insulin glargine (LANTUS) injection 20 Units  20 Units SubCUTAneous DAILY    [Held by provider] ipratropium-albuterol (COMBIVENT RESPIMAT) 20 mcg-100 mcg inhalation spray  1 Puff Inhalation TID    lidocaine 4 % patch 1 Patch  1 Patch TransDERmal Q24H    atorvastatin (LIPITOR) tablet 20 mg  20 mg Oral DAILY    pantoprazole (PROTONIX) 40 mg in 0.9% sodium chloride 10 mL injection  40 mg IntraVENous Q24H    cholecalciferol (VITAMIN D3) (1000 Units /25 mcg) tablet 2,000 Units  2,000 Units Oral DAILY    ascorbic acid (vitamin C) (VITAMIN C) tablet 500 mg  500 mg Oral BID    zinc sulfate (ZINCATE) 50 mg zinc (220 mg) capsule 1 Capsule  1 Capsule Oral Q12H    melatonin (rapid dissolve) tablet 5 mg  5 mg Oral QHS         Objective:   Vital Signs:    Visit Vitals  /72   Pulse 72   Temp 99.3 °F (37.4 °C)   Resp 16   Ht 5' 6\" (1.676 m)   Wt 97.5 kg (215 lb)   SpO2 97%   BMI 34.70 kg/m²       O2 Device: Endotracheal tube, Ventilator   O2 Flow Rate (L/min): 60 l/min   Temp (24hrs), Av.4 °F (37.4 °C), Min:98.4 °F (36.9 °C), Max:100.6 °F (38.1 °C)       Intake/Output:   Last shift:      701 - 1900  In: -   Out: 525 [Urine:525]    Last 3 shifts: 1901 -  07  In: 850 [P.O.:450]  Out: 2800 [Urine:2800]      Intake/Output Summary (Last 24 hours) at 2021 1426  Last data filed at 2021 1300  Gross per 24 hour   Intake    Output 2375 ml   Net -2375 ml       Last 3 Recorded Weights in this Encounter    21 1607 21 1210   Weight: 97.5 kg (215 lb) 97.5 kg (215 lb)        Physical Exam: Limitations in exam due to full PPE requirements.   Intubated  sedated    HEENT: pupils not dilated, no scleral jaundice, moist oral mucosa, no nasal drainage; telemetrysinus rhythm  Neck: No adenopathy or thyroid swelling  CVS: S1S2 no murmurs; JVD not elevated  RS: Mod air entry bilaterally, at bases decresed brath sounds no crckles , no wheezes, bilateral lower chest crackles  Abd: soft, non tender, no hepatosplenomegaly, no abd distension  Neuro: non focal, awake, alert  Extrm: no leg edema or swelling or clubbing  Skin: no rash  Lymphatic: no cervical or supraclavicular adenopathy  Psych: Calm and cooperative   Vasc: DPs palpable         Data:       Recent Results (from the past 24 hour(s))   GLUCOSE, POC    Collection Time: 11/27/21  4:20 PM   Result Value Ref Range    Glucose (POC) 171 (H) 70 - 110 mg/dL   BLOOD GAS, ARTERIAL POC    Collection Time: 11/27/21  4:52 PM   Result Value Ref Range    Device: High Flow Nasal Cannula      FIO2 (POC) 90 %    pH (POC) 7.47 (H) 7.35 - 7.45      pCO2 (POC) 35.1 35.0 - 45.0 MMHG    pO2 (POC) 46 (LL) 80 - 100 MMHG    HCO3 (POC) 25.7 22 - 26 MMOL/L    sO2 (POC) 84.5 (L) 92 - 97 %    Base excess (POC) 2.4 mmol/L    Allens test (POC) Positive      Site RIGHT RADIAL      Specimen type (POC) ARTERIAL      Performed by Benny García    GLUCOSE, POC    Collection Time: 11/27/21  9:20 PM   Result Value Ref Range    Glucose (POC) 185 (H) 70 - 110 mg/dL   BLOOD GAS, ARTERIAL POC    Collection Time: 11/28/21  3:35 AM   Result Value Ref Range    Device: ADULT VENT      FIO2 (POC) 100 %    pH (POC) 7.34 (L) 7.35 - 7.45      pCO2 (POC) 47.9 (H) 35.0 - 45.0 MMHG    pO2 (POC) 105 (H) 80 - 100 MMHG    HCO3 (POC) 25.7 22 - 26 MMOL/L    sO2 (POC) 97.5 (H) 92 - 97 %    Base deficit (POC) 0.7 mmol/L    Mode ASSIST CONTROL      Tidal volume 450 ml    Set Rate 18 bpm    PEEP/CPAP (POC) 15 cmH2O    Allens test (POC) NOT APPLICABLE      Site LEFT RADIAL      Patient temp.  99      Specimen type (POC) ARTERIAL      Performed by Clarissa MURILLO DIMER    Collection Time: 11/28/21  5:15 AM   Result Value Ref Range    D DIMER 2.56 (H) <0.46 ug/ml(FEU)   PROTHROMBIN TIME + INR    Collection Time: 11/28/21  5:15 AM Result Value Ref Range    Prothrombin time 13.2 11.5 - 15.2 sec    INR 1.1 0.8 - 1.2     PTT    Collection Time: 11/28/21  5:15 AM   Result Value Ref Range    aPTT 28.6 23.0 - 36.4 SEC   C REACTIVE PROTEIN, QT    Collection Time: 11/28/21  5:15 AM   Result Value Ref Range    C-Reactive protein 1.2 (H) 0 - 0.3 mg/dL   LD    Collection Time: 11/28/21  5:15 AM   Result Value Ref Range    LD 1,114 (H) 81 - 234 U/L   FERRITIN    Collection Time: 11/28/21  5:15 AM   Result Value Ref Range    Ferritin 3,089 (H) 8 - 388 NG/ML   CBC WITH AUTOMATED DIFF    Collection Time: 11/28/21  5:15 AM   Result Value Ref Range    WBC 9.1 4.6 - 13.2 K/uL    RBC 5.56 4.35 - 5.65 M/uL    HGB 16.3 (H) 13.0 - 16.0 g/dL    HCT 47.7 36.0 - 48.0 %    MCV 85.8 78.0 - 100.0 FL    MCH 29.3 24.0 - 34.0 PG    MCHC 34.2 31.0 - 37.0 g/dL    RDW 12.0 11.6 - 14.5 %    PLATELET 991 (H) 199 - 420 K/uL    MPV 8.5 (L) 9.2 - 11.8 FL    NRBC 0.0 0  WBC    ABSOLUTE NRBC 0.00 0.00 - 0.01 K/uL    NEUTROPHILS 88 (H) 40 - 73 %    LYMPHOCYTES 8 (L) 21 - 52 %    MONOCYTES 3 3 - 10 %    EOSINOPHILS 0 0 - 5 %    BASOPHILS 0 0 - 2 %    IMMATURE GRANULOCYTES 1 (H) 0.0 - 0.5 %    ABS. NEUTROPHILS 8.0 1.8 - 8.0 K/UL    ABS. LYMPHOCYTES 0.8 (L) 0.9 - 3.6 K/UL    ABS. MONOCYTES 0.3 0.05 - 1.2 K/UL    ABS. EOSINOPHILS 0.0 0.0 - 0.4 K/UL    ABS. BASOPHILS 0.0 0.0 - 0.1 K/UL    ABS. IMM.  GRANS. 0.1 (H) 0.00 - 0.04 K/UL    DF AUTOMATED     METABOLIC PANEL, COMPREHENSIVE    Collection Time: 11/28/21  5:15 AM   Result Value Ref Range    Sodium 136 136 - 145 mmol/L    Potassium 4.9 3.5 - 5.5 mmol/L    Chloride 99 (L) 100 - 111 mmol/L    CO2 25 21 - 32 mmol/L    Anion gap 12 3.0 - 18 mmol/L    Glucose 229 (H) 74 - 99 mg/dL    BUN 20 (H) 7.0 - 18 MG/DL    Creatinine 0.71 0.6 - 1.3 MG/DL    BUN/Creatinine ratio 28 (H) 12 - 20      GFR est AA >60 >60 ml/min/1.73m2    GFR est non-AA >60 >60 ml/min/1.73m2    Calcium 8.8 8.5 - 10.1 MG/DL    Bilirubin, total 0.6 0.2 - 1.0 MG/DL ALT (SGPT) 85 (H) 16 - 61 U/L    AST (SGOT) 102 (H) 10 - 38 U/L    Alk. phosphatase 69 45 - 117 U/L    Protein, total 6.6 6.4 - 8.2 g/dL    Albumin 2.8 (L) 3.4 - 5.0 g/dL    Globulin 3.8 2.0 - 4.0 g/dL    A-G Ratio 0.7 (L) 0.8 - 1.7     CALCIUM, IONIZED    Collection Time: 11/28/21  5:15 AM   Result Value Ref Range    Ionized Calcium 1.14 1.12 - 1.32 MMOL/L   MAGNESIUM    Collection Time: 11/28/21  5:15 AM   Result Value Ref Range    Magnesium 2.2 1.6 - 2.6 mg/dL   PHOSPHORUS    Collection Time: 11/28/21  5:15 AM   Result Value Ref Range    Phosphorus 7.6 (H) 2.5 - 4.9 MG/DL   NT-PRO BNP    Collection Time: 11/28/21  5:15 AM   Result Value Ref Range    NT pro-BNP 45 0 - 450 PG/ML   GLUCOSE, POC    Collection Time: 11/28/21  5:16 AM   Result Value Ref Range    Glucose (POC) 206 (H) 70 - 110 mg/dL   GLUCOSE, POC    Collection Time: 11/28/21 10:49 AM   Result Value Ref Range    Glucose (POC) 217 (H) 70 - 110 mg/dL         Chemistry Recent Labs     11/28/21  0515 11/27/21  0700 11/26/21  0630   * 189* 251*    137 137   K 4.9 4.4 4.4   CL 99* 102 102   CO2 25 27 31   BUN 20* 18 20*   CREA 0.71 0.68 0.75   CA 8.8 8.4* 8.6   MG 2.2 2.3 2.4   PHOS 7.6* 3.8 4.1   AGAP 12 8 4   BUCR 28* 26* 27*   AP 69 60 51   TP 6.6 6.0* 6.3*   ALB 2.8* 2.4* 2.2*   GLOB 3.8 3.6 4.1*   AGRAT 0.7* 0.7* 0.5*        Lactic Acid Lactic acid   Date Value Ref Range Status   11/24/2021 1.4 0.4 - 2.0 MMOL/L Final     No results for input(s): LAC in the last 72 hours. Liver Enzymes Protein, total   Date Value Ref Range Status   11/28/2021 6.6 6.4 - 8.2 g/dL Final     Albumin   Date Value Ref Range Status   11/28/2021 2.8 (L) 3.4 - 5.0 g/dL Final     Globulin   Date Value Ref Range Status   11/28/2021 3.8 2.0 - 4.0 g/dL Final     A-G Ratio   Date Value Ref Range Status   11/28/2021 0.7 (L) 0.8 - 1.7   Final     Alk.  phosphatase   Date Value Ref Range Status   11/28/2021 69 45 - 117 U/L Final     Recent Labs     11/28/21  0515 11/27/21  0700 11/26/21  0630   TP 6.6 6.0* 6.3*   ALB 2.8* 2.4* 2.2*   GLOB 3.8 3.6 4.1*   AGRAT 0.7* 0.7* 0.5*   AP 69 60 51        CBC w/Diff Recent Labs     11/28/21  0515 11/27/21  0700 11/26/21  0630   WBC 9.1 6.2 5.8   RBC 5.56 5.13 4.79   HGB 16.3* 15.1 13.7   HCT 47.7 43.8 40.3   * 430* 406   GRANS 88* 84* 77*   LYMPH 8* 12* 15*   EOS 0 0 0        Cardiac Enzymes No results found for: CPK, CK, CKMMB, CKMB, RCK3, CKMBT, CKNDX, CKND1, SHANIKA, TROPT, TROIQ, NIKKO, TROPT, TNIPOC, BNP, BNPP     BNP No results found for: BNP, BNPP, XBNPT     Coagulation Recent Labs     11/28/21  0515 11/27/21  0700 11/26/21  0630   PTP 13.2 13.1 13.1   INR 1.1 1.1 1.0   APTT 28.6 28.1 30.5         Thyroid  No results found for: T4, T3U, TSH, TSHEXT, TSHEXT    No results found for: T4     Urinalysis Lab Results   Component Value Date/Time    Color YELLOW 11/24/2021 06:00 AM    Appearance CLEAR 11/24/2021 06:00 AM    Specific gravity >1.030 (H) 11/24/2021 06:00 AM    pH (UA) 5.5 11/24/2021 06:00 AM    Protein Negative 11/24/2021 06:00 AM    Glucose >1,000 (A) 11/24/2021 06:00 AM    Ketone 80 (A) 11/24/2021 06:00 AM    Bilirubin Negative 11/24/2021 06:00 AM    Urobilinogen 1.0 11/24/2021 06:00 AM    Nitrites Negative 11/24/2021 06:00 AM    Leukocyte Esterase Negative 11/24/2021 06:00 AM        Micro  No results for input(s): SDES, CULT in the last 72 hours. No results for input(s): CULT in the last 72 hours. Culture data during this hospitalization.    All Micro Results     Procedure Component Value Units Date/Time    CULTURE, RESPIRATORY/SPUTUM/BRONCH Saray Nohemy [[id number]]     Order Status: Sent Specimen: Sputum from Transtracheal Aspirate     CULTURE, BLOOD [[id number]] Collected: 11/23/21 1624    Order Status: Completed Specimen: Blood Updated: 11/28/21 0956     Special Requests: NO SPECIAL REQUESTS        Culture result: NO GROWTH 5 DAYS       CULTURE, BLOOD [[id number]] Collected: 11/23/21 1624    Order Status: Completed Specimen: Blood Updated: 11/28/21 0956     Special Requests: NO SPECIAL REQUESTS        Culture result: NO GROWTH 5 DAYS       CULTURE, URINE [[id number]] Collected: 11/24/21 0600    Order Status: Completed Specimen: Urine from Clean catch Updated: 11/25/21 2047     Special Requests: NO SPECIAL REQUESTS        Culture result: No growth (<1,000 CFU/ML)       COVID-19 RAPID TEST [[id number]]  (Abnormal) Collected: 11/24/21 0600    Order Status: Completed Specimen: Nasopharyngeal Updated: 11/24/21 0703     Specimen source Nasopharyngeal        COVID-19 rapid test Detected        Comment:      The specimen is POSITIVE for SARS-CoV-2, the novel coronavirus associated with COVID-19. This test has been authorized by the FDA under an Emergency Use Authorization (EUA) for use by authorized laboratories. Fact sheet for Healthcare Providers: ConventionUpdate.co.nz  Fact sheet for Patients: ConventionUpdate.co.nz       Methodology: Isothermal Nucleic Acid Amplification  CALLED TO AND CORRECTLY REPEATED BY:  SLOANE KIRKPATRICK RN AT 9698 ON 11/24/21 TO LAD         RESPIRATORY VIRUS PANEL W/COVID-19, PCR [[id number]]  (Abnormal) Collected: 11/23/21 1624    Order Status: Completed Specimen: Nasopharyngeal Updated: 11/23/21 2308     Adenovirus Not detected        Coronavirus 229E Not detected        Coronavirus HKU1 Not detected        Coronavirus CVNL63 Not detected        Coronavirus OC43 Not detected        SARS-CoV-2, PCR Detected        Comment: CALLED TO AND CORRECTLY REPEATED BY:  [id number] [facility] AT 2230 BY KDA 11/23/21  CALLED TO AND CORRECTLY REPEATED BY:  Racquel Duarte RN ICU AT 2252 ON 11/23/21 TO TSH.           Metapneumovirus Not detected        Rhinovirus and Enterovirus Not detected        Influenza A Not detected        Influenza A, subtype H1 Not detected        Influenza A, subtype H3 Not detected        INFLUENZA A H1N1 PCR Not detected        Influenza B Not detected Parainfluenza 1 Not detected        Parainfluenza 2 Not detected        Parainfluenza 3 Not detected        Parainfluenza virus 4 Not detected        RSV by PCR Not detected        B. parapertussis, PCR Not detected        Bordetella pertussis - PCR Not detected        Chlamydophila pneumoniae DNA, QL, PCR Not detected        Mycoplasma pneumoniae DNA, QL, PCR Not detected                LE Doppler       ECHO       Images report reviewed by me:  CT (Most Recent) (CT chest reviewed by me) No results found for this or any previous visit. CXR reviewed by me:  XR (Most Recent). CXR  reviewed by me and compared with previous CXR Results from Hospital Encounter encounter on 11/23/21    XR CHEST PORT    Narrative  EXAM: PORTABLE  FRONTAL CHEST RADIOGRAPH    CLINICAL INDICATION/HISTORY: Patient is intubated    COMPARISON: 11/27/2021 at 6:18 AM    TECHNIQUE: Portable frontal view of the chest    _______________    FINDINGS:    SUPPORT DEVICES:  > Endotracheal tube tip terminates approximately 4.5 cm above the linda.  > Enteric tube courses in the expected location of the esophagus, side port  projects over the proximal stomach near the gastroesophageal junction, tip  terminates below the inferior margin of the image. Consider advancing with  repeat imaging to confirm placement.  >  EKG leads overlie the patient. HEART AND MEDIASTINUM: Normal heart size and mediastinal contours. LUNGS: Bilateral lung opacities appear similar to recent prior studies. PLEURAL SPACES:No large pneumothorax. No large pleural effusion. BONY THORAX AND SOFT TISSUES: No acute abnormality. _______________    Impression  1. Interval intubation and enteric tube placement, as detailed. 2.  Bilateral lung opacities consistent with multifocal pneumonia, similar to  prior studies.          IMPRESSION:   · Acute respiratory failure with hypoxemia  · Mechanical ventilation  · Severe Covid pneumonia  · ARDS  · Hyperglycemia due to Type 2 diabetes mellitus  · Hypertension  · Hyponatremia  · Lactic acidosis  ·   Patient Active Problem List   Diagnosis Code    Acute hypoxemic respiratory failure due to COVID-19 ([facility] [address] U07.1, J96.01    Pneumonia due to COVID-19 virus U07.1, J12.82    Hyperglycemia due to type 2 diabetes mellitus ([facility] E11.65    Primary hypertension I10    Hyponatremia E87.1    Lactic acidosis E87.2    Hyperlipidemia E78.5    Obesity (BMI 30-39. 9) E66.9    COVID-19 vaccination not done Z28.9         · Code status: Full code      RECOMMENDATIONS:   Respiratory: Patient with severe Covid pneumonia. Failed high flow intubated on 11/28/2021 at night  Severe ARDS on nimbex, versed , fentanyl  weanign down oxygen improving now in prone position  fio2 from 100% down to 80 and PEEP from 15 down to 10  ABG 7.34/47/105/97 on /18/100/15 in AM   severe ARDS due to COVID-19 pneumonia patient is not stable to perform CTA of the chest with positive D-dimer full anticoagulation. PVL negative but unstable for CTA   prone position improved oxygenation  Started steroids and given bronchodilators additionally added Actemra   Very severe ARDS high risk intubation spoke to wife and patient  Delayed presentation with severe hypoxemic respiratory failure. Chest x-ray consistent with bilateral airspace disease, opacities and groundglass densities consistent with severe Covid pneumonia. Continue combination of high flow oxygen and nonrebreather mask. Recommended self proning to improve oxygenation. Monitor closely; if patient's respiratory condition worsens, will need to be intubated. ER consultant tried calling tertiary facility for possible ECMOcurrently no beds available. Keep SPO2 >=88%. Aggressive pulmonary toileting. Aspiration precautions. Incentive spirometry. CVS: Stable hemodynamics. Monitor blood pressure.   Mild pulmonary congestion but BNP stable  Repeat  pvl but already on full dose lovenox   Patient high  Risk PE/ pulmonary embolism. D-dimer elevated. Patient with severe Covid infection. Recommend therapeutic anticoagulation with Lovenox twice daily  Check echocardiogram.  PVL negative but unable to do CTA  ID: Severe COVID-19 pneumonia. High inflammatory markers on full dose of Lovenox, steroids   proCalcitonin low stop antibiotics   Steroids started on 11/23/2021  1 dose Tocilizumab given on 11/24/2021  Consulted ID  Antibioticsempiric ceftriaxone and azithromycin stopped after procalcitonin came normal  Late presentation with severe hypoxemic respiratory failurenot a candidate for remdesivir. no history of hepatitis or TB  Dexamethasone6 mg daily  Change to 20 mg as intubated and high risk of pulmonary fibroris. Blood sugar seems to be elevated with history of type 2 diabetes mellitus on insulin   Consult ID appreciated   Vitamin supplements ordered. Monitor lactic acid, pro svetlana reapt respiratory culture  Not on abx as no bacterial infection   Hematology/Oncology: Monitor hemoglobin and platelets; watch for any bleeding while on therapeutic anticoagulation. Renal: Monitor renal function and urine output. Monitor sodium. GI: Monitor LFT. Oral diet as tolerated. Endocrine: Monitor BS. SSI. If blood sugars remain elevated, would need to add Lantus insulin. Neurology: Normal mentation. Skin/Wound: ICU nursing care. Electrolytes: Replace electrolytes per ICU electrolyte replacement protocol. Nutrition: Oral diet; oral hydration. Prophylaxis: DVT Prophylaxis: Enoxaparin. GI Prophylaxis: Protonix. Lines/Tubes: PIV      Prognosis seems to be guarded; patient being admitted to ICU with severe Covid pneumonia and hypoxemic respiratory failure; patient risk of mortality and going on ventilator support is very high. Quality Care: PPI, DVT prophylaxis, HOB elevated, Infection control all reviewed and addressed. Discussed with ER consultant.     High complexity decision making was performed during the evaluation of this patient at high risk for decompensation with multiple organ involvement. Total critical care time spent rendering care exclusive of procedures/family discussion/coordination of care: 45 minutes. Wife updated on 11/28/2021  Prognosis is guarded   Addendum - patient was not stable for CTA chest last night in the ER due to degree of hypoxemia and inability to lay flat for testing. Still unable to do            Please note: Voice-recognition software may have been used to generate this report, which may have resulted in some phonetic-based errors in grammar and contents. Even though attempts were made to correct all the mistakes, some may have been missed, and remained in the body of the document. Sanchez Rizvi MD  11/28/2021       Note  Patient with SARS-CoV-2 pneumonia with severe pneumonia and hypoxemic respiratory failure; patient on high oxygen support via high flow and nonrebreather mask oxygen; patient is not vaccinated, and is in the age group that is being infected with delta virus strain; the strain causes severe respiratory failure and complications reported in the [place] and worldwide; unfortunately, the prognosis is poor in unvaccinated patients, with a high risk of complications, multiorgan failure, chronic hypoxemia and respiratory failure, intubation, mechanical ventilation, thromboembolic disease risk in multiple organs, high risk for long Covid syndrome, and high risk for mortality. The CDC federal and state guidelines have emphasized repeatedly the importance of vaccination to prevent severe infection, mortality, disabilities, long Covid syndrome from Covid virus with several strains currently in the [place]. Pfizer vaccination has been approved by [place] and [place]. Vaccination has been extended to children as well. The treatment protocols are followed based on local hospital protocols, with guidance from centers such as [facility] protocols.  [facility] is not part of any research protocol. The local hospital protocols have been guided by [facility] pharmacy department. Covid plasma is no longer considered standard of care in the [facility] due to lack of benefit in trials. Nebulization and CPAP therapies are not considered as part of the protocol in [facility] due to high risk of aerosolization, and high risks of spreading Covid infection to the healthcare staff. REMDESIVIR-delayed presentation with severe hypoxemic respiratory failure are poor candidate for remdesivir therapy per [facility] cough. Anti-inflammatory medication such as baricitinib or Tocilizumab based on CRP may have some benefit. Dexamethasone is considered standard of care.

## 2021-11-28 NOTE — PROGRESS NOTES
Problem: Pressure Injury - Risk of  Goal: *Prevention of pressure injury  Description: Document Jeffrey Scale and appropriate interventions in the flowsheet. Outcome: Progressing Towards Goal  Note: Pressure Injury Interventions: Activity Interventions: Pressure redistribution bed/mattress(bed type)    Mobility Interventions: HOB 30 degrees or less, Pressure redistribution bed/mattress (bed type), Turn and reposition approx. every two hours(pillow and wedges)    Nutrition Interventions: Document food/fluid/supplement intake    Friction and Shear Interventions: HOB 30 degrees or less, Minimize layers      Problem: Patient Education: Go to Patient Education Activity  Goal: Patient/Family Education  Outcome: Progressing Towards Goal     Problem: Falls - Risk of  Goal: *Absence of Falls  Description: Document Stephanie Fall Risk and appropriate interventions in the flowsheet. Outcome: Progressing Towards Goal  Note: Fall Risk Interventions:  Mobility Interventions: Bed/chair exit alarm, Patient to call before getting OOB, Assess mobility with egress test      Medication Interventions: Bed/chair exit alarm, Evaluate medications/consider consulting pharmacy, Patient to call before getting OOB    Elimination Interventions: Bed/chair exit alarm, Call light in reach, Toileting schedule/hourly rounds       Problem: Patient Education: Go to Patient Education Activity  Goal: Patient/Family Education  Outcome: Progressing Towards Goal     Problem: Airway Clearance - Ineffective  Goal: Achieve or maintain patent airway  Outcome: Progressing Towards Goal     Problem: Gas Exchange - Impaired  Goal: Absence of hypoxia  Outcome: Progressing Towards Goal  Goal: Promote optimal lung function  Outcome: Progressing Towards Goal     Problem: Breathing Pattern - Ineffective  Goal: Ability to achieve and maintain a regular respiratory rate  Outcome: Progressing Towards Goal     Problem:  Body Temperature -  Risk of, Imbalanced  Goal: Ability to maintain a body temperature within defined limits  Outcome: Progressing Towards Goal  Goal: Will regain or maintain usual level of consciousness  Outcome: Progressing Towards Goal  Goal: Complications related to the disease process, condition or treatment will be avoided or minimized  Outcome: Progressing Towards Goal     Problem: Isolation Precautions - Risk of Spread of Infection  Goal: Prevent transmission of infectious organism to others  Outcome: Progressing Towards Goal     Problem: Nutrition Deficits  Goal: Optimize nutrtional status  Outcome: Progressing Towards Goal     Problem: Risk for Fluid Volume Deficit  Goal: Maintain normal heart rhythm  Outcome: Progressing Towards Goal  Goal: Maintain absence of muscle cramping  Outcome: Progressing Towards Goal  Goal: Maintain normal serum potassium, sodium, calcium, phosphorus, and pH  Outcome: Progressing Towards Goal     Problem: Loneliness or Risk for Loneliness  Goal: Demonstrate positive use of time alone when socialization is not possible  Outcome: Progressing Towards Goal     Problem: Fatigue  Goal: Verbalize increase energy and improved vitality  Outcome: Progressing Towards Goal     Problem: Diabetes Self-Management  Goal: *Disease process and treatment process  Description: Define diabetes and identify own type of diabetes; list 3 options for treating diabetes. Outcome: Progressing Towards Goal  Goal: *Incorporating nutritional management into lifestyle  Description: Describe effect of type, amount and timing of food on blood glucose; list 3 methods for planning meals. Outcome: Progressing Towards Goal  Goal: *Incorporating physical activity into lifestyle  Description: State effect of exercise on blood glucose levels.   Outcome: Progressing Towards Goal  Goal: *Developing strategies to promote health/change behavior  Description: Define the ABC's of diabetes; identify appropriate screenings, schedule and personal plan for screenings. Outcome: Progressing Towards Goal  Goal: *Using medications safely  Description: State effect of diabetes medications on diabetes; name diabetes medication taking, action and side effects. Outcome: Progressing Towards Goal  Goal: *Monitoring blood glucose, interpreting and using results  Description: Identify recommended blood glucose targets  and personal targets. Outcome: Progressing Towards Goal  Goal: *Prevention, detection, treatment of acute complications  Description: List symptoms of hyper- and hypoglycemia; describe how to treat low blood sugar and actions for lowering  high blood glucose level. Outcome: Progressing Towards Goal  Goal: *Prevention, detection and treatment of chronic complications  Description: Define the natural course of diabetes and describe the relationship of blood glucose levels to long term complications of diabetes.   Outcome: Progressing Towards Goal  Goal: *Developing strategies to address psychosocial issues  Description: Describe feelings about living with diabetes; identify support needed and support network  Outcome: Progressing Towards Goal  Goal: *Insulin pump training  Outcome: Progressing Towards Goal  Goal: *Sick day guidelines  Outcome: Progressing Towards Goal  Goal: *Patient Specific Goal (EDIT GOAL, INSERT TEXT)  Outcome: Progressing Towards Goal     Problem: Patient Education: Go to Patient Education Activity  Goal: Patient/Family Education  Outcome: Progressing Towards Goal     Ne Ruby RN

## 2021-11-28 NOTE — PROGRESS NOTES
Assumed pt care approx 0730. Assessments performed, see flowsheet. Pt remains intubated/sedated/paralyzed. Pt assessed w/oral and kurtis care performed. No distress noted, gtts titrated for bp. Vss. Pt reassessed prior to proning. No changes or distress noted. Gtts titrated as necessary prior to and after proning. Pt vss. Pt reassessed, tolerating prone position. Oral care performed. Fans turned off d/t temp. No distress noted. No distress noted.  Vss.

## 2021-11-28 NOTE — PROGRESS NOTES
Hospitalist Progress Note-critical care note     Patient: Roshan Zhu MRN: [id number]  CSN: [id number]    YOB: 1975  Age: 55 y.o. Sex: male    DOA: 11/23/2021 LOS:  LOS: 5 days            Chief complaint: acute respiratory failure, covid 19 pna, dm , htn ,     Assessment/Plan         Hospital Problems  Date Reviewed: 11/28/2021          Codes Class Noted POA    * (Principal) Acute hypoxemic respiratory failure due to COVID-19 [facility]) ICD-10-CM: U07.1, J96.01  ICD-9-CM: 518.81, 079.89, 799.02  11/23/2021 Yes        Pneumonia due to COVID-19 virus ICD-10-CM: U07.1, J12.82  ICD-9-CM: 480.8, 079.89  11/23/2021 Yes        Hypergly[facility] type 2 diabetes mellitus [facility] ICD-10-CM: E11.65  ICD-9-CM: 250.00  11/23/2021 Yes        Primary hypertension ICD-10-CM: I10  ICD-9-CM: 401.9  11/23/2021 Yes        Hyponatremia ICD-10-CM: E87.1  ICD-9-CM: 276.1  11/23/2021 Yes        Lactic acidosis ICD-10-CM: E87.2  ICD-9-CM: 276.2  11/23/2021 Yes        Hyperlipidemia ICD-10-CM: E78.5  ICD-9-CM: 272.4  Unknown Yes        Obesity (BMI 30-39. 9) ICD-10-CM: E66.9  ICD-9-CM: 278.00  11/23/2021 Yes        COVID-19 vaccination not done ICD-10-CM: Z28.9  ICD-9-CM: V64.00  11/23/2021 Yes              44 y/o male with poorly controlled type 2 diabetes mellitus, hypertension, and obesity is admitted for acute hypoxemic respiratory failure due to COVID-19 pneumonia with lactic acidosis, hyperglycemia, and hyponatremia.  He has not been vaccinated. His medical problems increase the risk of morbidity and mortality from [place]. He is likely to require intubation and mechanical ventilation very soon.     Respiratory  acute hypoxemic respiratory failure due to COVID-19 pneumonia.    Patient was intubated overnight  Vented manager per intensivist  On PEEP 15, oxygen 90% this morning  CTA unable to be done since admission due to unstable    covid 19 pna   Not vaccinated   On iv steroid , received Tociluxumab per ID Antioxidants   Breathing tx       Cardiac  Hypotension after intubation  Continue Levophed to keep MAP greater than 65    Endo    DM type II    lantus and continue ssi -changed to every 6 hours    Heme :  Continue monitor WBC H&H    ID  So far not on antibiotics    Renal FEN  ICU electrolytes replacement protocol monitor urine output, Gold management    Neuro  On nimbex for paralyzed on Versed and fentanyl    GI PPI NPO for now    rn : Intubated overnight  30 minutes of critical care time spent in the direct evaluation and treatment of this high risk patient. The reason for providing this level of medical care for this critically ill patient was due a critical illness that impaired one or more vital organ systems such that there was a high probability of imminent or life threatening deterioration in the patients condition. This care involved high complexity decision making to assess, manipulate, and support vital system functions, to treat this degreee vital organ system failure and to prevent further life threatening deterioration of the patients condition. Disposition :tbd,   Review of systems:  Unable to obtain due to intubated  Vital signs/Intake and Output:  Visit Vitals  BP (!) 87/56   Pulse (!) 115   Temp 99.3 °F (37.4 °C)   Resp 16   Ht 5' 6\" (1.676 m)   Wt 97.5 kg (215 lb)   SpO2 95%   BMI 34.70 kg/m²     Current Shift:  No intake/output data recorded. Last three shifts:  11/26 1901 - 11/28 0700  In: 850 [P.O.:450]  Out: 2800 [Urine:2800]    Physical Exam:  General: Intubated  HEENT: NC, Atraumatic.   ET tube /OG tube noted  Lungs: CTA bilaterally no wheezing  Heart:  Regular regular no murmur  Abdomen: Soft no tenderness no mass  Extremities: No c/c/e  Psych:   Calm   Neurologic:  On sedation          Labs: Results:       Chemistry Recent Labs     11/28/21  0515 11/27/21  0700 11/26/21  0630   * 189* 251*    137 137   K 4.9 4.4 4.4   CL 99* 102 102   CO2 25 27 31   BUN 20* 18 20* CREA 0.71 0.68 0.75   CA 8.8 8.4* 8.6   AGAP 12 8 4   BUCR 28* 26* 27*   AP 69 60 51   TP 6.6 6.0* 6.3*   ALB 2.8* 2.4* 2.2*   GLOB 3.8 3.6 4.1*   AGRAT 0.7* 0.7* 0.5*      CBC w/Diff Recent Labs     11/28/21  0515 11/27/21  0700 11/26/21  0630   WBC 9.1 6.2 5.8   RBC 5.56 5.13 4.79   HGB 16.3* 15.1 13.7   HCT 47.7 43.8 40.3   * 430* 406   GRANS 88* 84* 77*   LYMPH 8* 12* 15*   EOS 0 0 0      Cardiac Enzymes No results for input(s): CPK, CKND1, SHANIKA in the last 72 hours. No lab exists for component: CKRMB, TROIP   Coagulation Recent Labs     11/28/21  0515 11/27/21  0700   PTP 13.2 13.1   INR 1.1 1.1   APTT 28.6 28.1       Lipid Panel Lab Results   Component Value Date/Time    Cholesterol, total 123 11/23/2021 04:24 PM    HDL Cholesterol 33 (L) 11/23/2021 04:24 PM    LDL, calculated 41.8 11/23/2021 04:24 PM    VLDL, calculated 48.2 11/23/2021 04:24 PM    Triglyceride 241 (H) 11/23/2021 04:24 PM    CHOL/HDL Ratio 3.7 11/23/2021 04:24 PM      BNP No results for input(s): BNPP in the last 72 hours. Liver Enzymes Recent Labs     11/28/21  0515   TP 6.6   ALB 2.8*   AP 69      Thyroid Studies No results found for: T4, T3U, TSH, TSHEXT, TSHEXT     Procedures/imaging: see electronic medical records for all procedures/Xrays and details which were not copied into this note but were reviewed prior to creation of Plan    XR CHEST PORT    Result Date: 11/23/2021  EXAM:  AP Portable Chest X-ray 1 view INDICATION: Shortness of breath COMPARISON: None _______________ FINDINGS:  Heart and mediastinal contours are within normal limits for portable radiograph. There are bilateral interstitial and alveolar opacities most prominent at the lung bases. Findings suggestive of pneumonia and/or pulmonary edema. There are no pleural effusions. No acute osseous findings. ________________      Bilateral interstitial and alveolar opacities suggesting pneumonia and/or pulmonary edema.  No pleural effusions or pneumothorax seen.      Arden Penaloza MD

## 2021-11-29 NOTE — DIABETES MGMT
GLYCEMIC CONTROL PLAN OF CARE        Diabetes Management:      Assessment: known h/o T2DM, HbA1C not within recommended range for age + comorbids oral home regimen  - admitted for Covid 19, intubated    Recommend: D5 in IVFs, steroids increased, increase basal insulin (orders entered with permission from Dr. Mukesh Caldwell   *Lantus 24 units daily    BG in target range (non-ICU\" < 180 ; -180):  [] Yes  [x] No      Steroids: decadron 20 mg daily, steroid associated hyperglycemia    Current Insulin regimen:  Lantus 20 + - Humalog Very Insulin Resistant Corrective Coverage    TDD previous day = 32 (20 Lantus + 12 Humalog Very Insulin Resistant Corrective Coverage)    Most recent blood glucose results:   Lab Results   Component Value Date/Time     (H) 11/29/2021 04:45 AM    GLUCPOC 150 (H) 11/29/2021 11:23 AM    GLUCPOC 201 (H) 11/29/2021 05:41 AM    GLUCPOC 227 (H) 11/28/2021 11:55 PM         Hypo: no    HbA1C: equivalent  to ave BGlucose of 189 mg/dl for 2-3 months prior to admission  Lab Results   Component Value Date/Time    Hemoglobin A1c 8.3 (H) 11/24/2021 04:14 AM       Adequate glycemic control PTA:  [] Yes  [x] No      Home diabetes medications:  Key Antihyperglycemic Medications             glipiZIDE SR (GLUCOTROL XL) 10 mg CR tablet (Taking) Take 10 mg by mouth daily. metFORMIN ER (GLUCOPHAGE XR) 500 mg tablet (Taking) Take 500 mg by mouth daily (with dinner).         Goals:  Blood glucose will be within target range of 70 - 180 mg/dL by: 12/15    Diet:   Active Orders   Diet    DIET NPO       Patient Vitals for the past 100 hrs:   % Diet Eaten   11/27/21 0800 0%        Education:  _____ Refer to Diabetes Education Record                       __X___ Education not indicated at this time    Carmen Gaston MS, RN, CDE  Glycemic Control Team  [phone]

## 2021-11-29 NOTE — PROGRESS NOTES
Assumed pt care approx 0730. Assessments performed, see flowsheet. Pt remains intubated/sedated/paralyzed and in prone position. Pt assessed as much as possible at this time, oral care performed. No distress noted. Vss. Pt placed in supine position. Pt wiped down w/chg wipes, blanc care performed, and new gown placed. Gtts titrated as needed. Vss. Pt reassessed, changes as noted. No distress noted, vss.    Pt's sats noted to be decreasing. Pt suctioned, no secretions noted. RT and  at bedside. Temporary vent changes made. No improvement in sats. Instructed to re-prone pt and supine early in am. After prone, pt sats imrpove to upper 90s. Pt reassessed, pt recently proned. No further changes noted. Oral care performed. Notified Pedro Fonseca of pt's total uop up to this point, md Toni aware. States he will evaluate and order as appropriate.      No distress noted, vss.

## 2021-11-29 NOTE — PROGRESS NOTES
conducted a follow up consultation and spiritual assessment for Moraima Rivers, who is a 55 y.o.,male. At RN request I followed up with patient's wife about how to get on MyChart. I did not know but connected her to someone who does. Wife is a RN and is grateful for his care. Continued the relationship of care and support. Offered prayer on patients behalf. Chart reviewed. Wife expressed gratitude for Spiritual Care call. Chaplains will continue to follow and will provide pastoral care as needed or requested.  recommends bedside caregivers page the  on duty if patient shows signs of acute spiritual or emotional distress. [address] [facility], [place] [address].    Board Certified   [phone] - Office

## 2021-11-29 NOTE — PROGRESS NOTES
CM note patient remains in [address] [facility] intubated over the weekend. CM not stable enough to transfer out of acute care setting at this time. CM to continue to monitor and remain available.

## 2021-11-29 NOTE — PROGRESS NOTES
1900- shift change report given to JUAN Solorzano (oncoming nurse) by ARASH De La Cruz (offgoing nurse). Report included the following information SBAR, Kardex, ED Summary, Intake/Output, MAR, Recent Results, Med Rec Status and Cardiac Rhythm NSR.     2000- Shift assessment completed. Pt intubated, paralyzed, and sedated. Currently resting prone in reverse trendelenburg. Back, oral, kurtis, and external catheter care provided. Will continue to monitor. 0000- Reassessment completed. No changes from previous assessment. 0400- Reassessment completed. No changes from previous assessment. 0500- Temp slowly rising over the course of the shift. Loan Hines turned on to low setting to help lower temperature. 0600- Pt temp went down by 0.1F and then went back to original temp and up by 0.1F. Loan Hines turned on to the high setting to help lower temp.    0700- shift change report given to ARASH De La Cruz (oncoming nurse) by JUAN Solorzano (offgoing nurse). Report included the following information SBAR, Kardex, ED Summary, Intake/Output, MAR, Recent Results, Med Rec Status and Cardiac Rhythm NSR.

## 2021-11-29 NOTE — PROGRESS NOTES
[facility]  (A Division of [address] [place] [address])                                                                                                                      Jaden Treviño MD  Office #: - Option # [phone]  Fax #: [phone]     Date of Admission: 11/23/2021Date of Note: 11/29/2021  Reason for Follow up: Evaluation and antibiotic management of  covid 19 infection. Current Antimicrobials:    Prior Antimicrobials:    Dexamethasone 11/23 to date  S/P Tociluzumab 11/24   CTX/Zithromax 11/23 X1 day         Assessment- ID related:  --------------------------------------------------------------------------  Critically ill patient with :   Severe COVID 19 infection in the unvaccinated   Viral PNA- bilateral- on imaging   Acute hypoxemic respiratory failure 2/2 above        Intubated 11/27    COVID 19 PCR + RVP 11/23 and CVS 11/17  Procal NL X2  CRP 13.2-> 1.2  DD 0.93-> 3.31  Fib 640-> 297  LD -1090-> 628  ferretin >5000-> 3009  Lactic acid 2.3  11/23- BCX NGSF  11/24 UCX : NG. UA with glucosuria only    Other Medical Issues- Mx per respective team:    · DM X10 years, poorly controlled A1C 8.3%  · hyperlipidemia   Recommendation for ID issues I am following:  ------------------------------------------------------------------------------  Unfortunate situation in the unvaccinated with severe hypoxia/ARDS intubated for failed conservative mx:  --Vent mx/ AC/Vit cocktail- per ICU MD/team  --monitor biomarkers, cbc, cmp. .Check/ FU  hepatitis and TB serology for baseline    DW ICU MD/RN/RT                          -> sugar control per ICU     Subjective:  Patient is intubated/sedated/paralyzed. On levophed-- unable to verbalize  Events over weekend reviewed  Failed conservative /supportive treatment and was intubated on Nov 27  Since his intubation, his FIO2 is improving.  Currently at 50% and PEEP of 10  Remains on high dose dexamethasone  T max 99.9, WBC 5.1K  Labs/ Notes reviewed for past few days  CXR from this am:  Extensive bilateral parenchymal opacities, worsened from prior study. HPI:  Jay Marerro is a 55 y.o. WHITE/NON- with PMH of DM. He is not unvaccinated and was in contact with his young stepson, who had covid. Started to develop symptom around 11/14- feeling sick/achy/ nauseea and weakness and dry cough. . Tested + [facility] on 11/17. He got progressivley worse with SOB that he came to ED on 11/23 with hypoxia to 50/s. PaO2 on 100% FIO2 was 61 on ABG, B/L infiltrate-diffuse on cXR/    Admitted and started on steroid/ABX and proned all the time on high flow oxyten support. Feel ssob, but denies other symptoms. No prior history of significant infection. TB test was negative few years ago. 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         Active Hospital Problems    Diagnosis Date Noted    Acute hypoxemic respiratory failure due to COVID-19 [facility]) 11/23/2021    Pneumonia due to COVID-19 virus 11/23/2021    Hyperglycemia due to type 2 diabetes mellitus ([address] 11/23/2021    Primary hypertension 11/23/2021    Hyponatremia 11/23/2021    Lactic acidosis 11/23/2021    Obesity (BMI 30-39.9) 11/23/2021    COVID-19 vaccination not done 11/23/2021    Hyperlipidemia      Past Medical History:   Diagnosis Date    COVID-19     Diabetes ([address]     Hyperlipidemia     Hypertension      Past Surgical History:   Procedure Laterality Date    HX ORTHOPAEDIC Right     wrist surgery     Family History   Problem Relation Age of Onset    Diabetes Mother     Hypertension Mother     Stroke Mother     Hodgkin's lymphoma Mother     Diabetes Father     Hypertension Father     Cystic Fibrosis Father     Diabetes Maternal Aunt     Diabetes Maternal Uncle      Social History     Socioeconomic History    Marital status: SINGLE     Spouse name: Not on file    Number of children: Not on file    Years of education: Not on file   Cynthia Highest education level: Not on file   Occupational History    Not on file   Tobacco Use    Smoking status: Never Smoker    Smokeless tobacco: Not on file   Substance and Sexual Activity    Alcohol use: Yes     Comment: drinksonce a week    Drug use: Never    Sexual activity: Not on file   Other Topics Concern     Service Not Asked    Blood Transfusions Not Asked    Caffeine Concern Not Asked    Occupational Exposure Not Asked    Hobby Hazards Not Asked    Sleep Concern Not Asked    Stress Concern Not Asked    Weight Concern Not Asked    Special Diet Not Asked    Back Care Not Asked    Exercise Not Asked    Bike Helmet Not Asked   [address] Not Asked    Self-Exams Not Asked   Social History Narrative    Not on file     Social Determinants of Health     Financial Resource Strain:     Difficulty of Paying Living Expenses: Not on file   Food Insecurity:     Worried About Running Out of Food in the Last Year: Not on file    Elvis of Food in the Last Year: Not on file   Transportation Needs:     Lack of Transportation (Medical): Not on file    Lack of Transportation (Non-Medical):  Not on file   Physical Activity:     Days of Exercise per Week: Not on file    Minutes of Exercise per Session: Not on file   Stress:     Feeling of Stress : Not on file   Social Connections:     Frequency of Communication with Friends and Family: Not on file    Frequency of Social Gatherings with Friends and Family: Not on file    Attends Mormonism Services: Not on file    Active Member of Clubs or Organizations: Not on file    Attends Club or Organization Meetings: Not on file    Marital Status: Not on file   Intimate Partner Violence:     Fear of Current or Ex-Partner: Not on file    Emotionally Abused: Not on file    Physically Abused: Not on file    Sexually Abused: Not on file   Housing Stability:     Unable to Pay for Housing in the Last Year: Not on file    Number of [place] in the Last Year: Not on file    Unstable Housing in the Last Year: Not on file       Allergies:  Alupent [metaproterenol]     Medications:  Current Facility-Administered Medications   Medication Dose Route Frequency    cisatracurium (NIMBEX) 100 mg in 0.9% sodium chloride 100 mL (1 mg/mL) infusion  0-10 mcg/kg/min IntraVENous TITRATE    fentaNYL (PF) 900 mcg/30 ml infusion soln  0-200 mcg/hr IntraVENous TITRATE    NOREPINephrine (LEVOPHED) 8 mg in 0.9% NS 250ml infusion  0.5-16 mcg/min IntraVENous TITRATE    midazolam in normal saline (VERSED) 1 mg/mL infusion  0-10 mg/hr IntraVENous TITRATE    insulin lispro (HUMALOG) injection   SubCUTAneous Q6H    albumin human 25% (BUMINATE) solution 25 g  25 g IntraVENous Q6H    dexamethasone (DECADRON) 4 mg/mL injection 20 mg  20 mg IntraVENous Q24H    enoxaparin (LOVENOX) injection 100 mg  1 mg/kg SubCUTAneous Q12H    [Held by provider] insulin lispro (HUMALOG) injection 7 Units  7 Units SubCUTAneous TIDAC    insulin glargine (LANTUS) injection 20 Units  20 Units SubCUTAneous DAILY    [Held by provider] ipratropium-albuterol (COMBIVENT RESPIMAT) 20 mcg-100 mcg inhalation spray  1 Puff Inhalation TID    lidocaine 4 % patch 1 Patch  1 Patch TransDERmal Q24H    atorvastatin (LIPITOR) tablet 20 mg  20 mg Oral DAILY    codeine sulfate tablet 30 mg  30 mg Oral Q4H PRN    sodium chloride (NS) flush 5-10 mL  5-10 mL IntraVENous PRN    pantoprazole (PROTONIX) 40 mg in 0.9% sodium chloride 10 mL injection  40 mg IntraVENous Q24H    acetaminophen (TYLENOL) tablet 650 mg  650 mg Oral Q6H PRN    Or    acetaminophen (TYLENOL) suppository 650 mg  650 mg Rectal Q6H PRN    guaiFENesin-dextromethorphan (ROBITUSSIN DM) 100-10 mg/5 mL syrup 5 mL  5 mL Oral Q4H PRN    cholecalciferol (VITAMIN D3) (1000 Units /25 mcg) tablet 2,000 Units  2,000 Units Oral DAILY    ascorbic acid (vitamin C) (VITAMIN C) tablet 500 mg  500 mg Oral BID    glucose chewable tablet 16 g  16 g Oral PRN  glucagon (GLUCAGEN) injection 1 mg  1 mg IntraMUSCular PRN    dextrose (D50W) injection syrg 12.5-25 g  25-50 mL IntraVENous PRN    zinc sulfate (ZINCATE) 50 mg zinc (220 mg) capsule 1 Capsule  1 Capsule Oral Q12H    melatonin (rapid dissolve) tablet 5 mg  5 mg Oral QHS     Facility-Administered Medications Ordered in Other Encounters   Medication Dose Route Frequency    lidocaine (PF) (XYLOCAINE) 20 mg/mL (2 %) injection   IntraVENous PRN    propofoL (DIPRIVAN) 10 mg/mL injection   IntraVENous PRN    succinylcholine (ANECTINE) 20 mg/mL syringe   IntraVENous PRN    rocuronium injection   IntraVENous PRN        ROS:  A comprehensive review of systems was negative. Physical Exam:    Temp (24hrs), Av °F (37.2 °C), Min:96.4 °F (35.8 °C), Max:100.6 °F (38.1 °C)    Visit Vitals  BP (!) 153/79   Pulse 75   Temp 99.7 °F (37.6 °C)   Resp 16   Ht 5' 6\" (1.676 m)   Wt 87.6 kg (193 lb 2 oz)   SpO2 94%   BMI 31.17 kg/m²      B/L PIV sites ok  GEN: WDWN, Proned, intubated, unresponsive  HEENT: Unicteric. EOMI intact. CHEST: Non laboured breathing. CTA posteriorly  CVS:Proned- not examined  ABD: Proned- not examined  JESSA:Gold in place  EXT: No apparent swelling or redness on UE/LE joints. No distal/ext cyanosis/ischemic changes  Skin: Dry and intact. No rash, no redness.   CNS: intubated/paralysed and sedated     Microbiology  All Micro Results     Procedure Component Value Units Date/Time    CULTURE, BLOOD [[id number]] Collected: 21    Order Status: Completed Specimen: Blood Updated: 21     Special Requests: NO SPECIAL REQUESTS        Culture result: NO GROWTH 6 DAYS       CULTURE, BLOOD [[id number]] Collected: 21    Order Status: Completed Specimen: Blood Updated: 21     Special Requests: NO SPECIAL REQUESTS        Culture result: NO GROWTH 6 DAYS       CULTURE, RESPIRATORY/SPUTUM/BRONCH Micheline Blind [[id number]]     Order Status: Sent Specimen: Sputum from Transtracheal Aspirate     CULTURE, URINE [[id number]] Collected: 11/24/21 0600    Order Status: Completed Specimen: Urine from Clean catch Updated: 11/25/21 2047     Special Requests: NO SPECIAL REQUESTS        Culture result: No growth (<1,000 CFU/ML)       COVID-19 RAPID TEST [[id number]]  (Abnormal) Collected: 11/24/21 0600    Order Status: Completed Specimen: Nasopharyngeal Updated: 11/24/21 0703     Specimen source Nasopharyngeal        COVID-19 rapid test Detected        Comment:      The specimen is POSITIVE for SARS-CoV-2, the novel coronavirus associated with COVID-19. This test has been authorized by the FDA under an Emergency Use Authorization (EUA) for use by authorized laboratories. Fact sheet for Healthcare Providers: ConventionUpdate.co.nz  Fact sheet for Patients: ConventionUpdate.co.nz       Methodology: Isothermal Nucleic Acid Amplification  CALLED TO AND CORRECTLY REPEATED BY:  SLOANE KIRKPATRICK RN AT 3181 ON 11/24/21 TO [facility]         RESPIRATORY VIRUS PANEL W/COVID-19, PCR [[id number]]  (Abnormal) Collected: 11/23/21 1624    Order Status: Completed Specimen: Nasopharyngeal Updated: 11/23/21 2308     Adenovirus Not detected        Coronavirus 229E Not detected        Coronavirus HKU1 Not detected        Coronavirus CVNL63 Not detected        Coronavirus OC43 Not detected        SARS-CoV-2, PCR Detected        Comment: CALLED TO AND CORRECTLY REPEATED BY:  [address]man[id number] THE [facility] AT 2230 BY KDA 11/23/21  CALLED TO AND CORRECTLY REPEATED BY:  Ryann Sheldon RN ICU AT 2252 ON 11/23/21 TO [facility].           Mauro Dudley Not detected        Rhinovirus and Enterovirus Not detected        Influenza A Not detected        Influenza A, subtype H1 Not detected        Influenza A, subtype H3 Not detected        INFLUENZA A H1N1 PCR Not detected        Influenza B Not detected        Parainfluenza 1 Not detected        Parainfluenza 2 Not detected        Parainfluenza 3 Not detected Parainfluenza virus 4 Not detected        RSV by PCR Not detected        B. parapertussis, PCR Not detected        Bordetella pertussis - PCR Not detected        Chlamydophila pneumoniae DNA, QL, PCR Not detected        Mycoplasma pneumoniae DNA, QL, PCR Not detected              Lines / Catheters:  Lab results:    Chemistry  Recent Labs     11/29/21 0445 11/28/21  0515 11/27/21  0700   * 229* 189*    136 137   K 4.8 4.9 4.4    99* 102   CO2 31 25 27   BUN 18 20* 18   CREA 0.57* 0.71 0.68   CA 8.5 8.8 8.4*   AGAP 4 12 8   BUCR 32* 28* 26*   AP 44* 69 60   TP 6.2* 6.6 6.0*   ALB 3.5 2.8* 2.4*   GLOB 2.7 3.8 3.6   AGRAT 1.3 0.7* 0.7*       CBC w/ Diff  Recent Labs     11/29/21 0445 11/28/21  0515 11/27/21  0700   WBC 5.1 9.1 6.2   RBC 4.30* 5.56 5.13   HGB 12.7* 16.3* 15.1   HCT 37.8 47.7 43.8    477* 430*   GRANS 90* 88* 84*   LYMPH 5* 8* 12*   EOS 1 0 0       Imaging: report posted as per radiologist - reviewed imaging as well as report above    Total duration of time spent with patient interview and exam and discussion of plan of care, review of chart in care everywhere, discussion with medical staff- nursing/attending and additional specialities as indicated: 36 minutes

## 2021-11-29 NOTE — PROGRESS NOTES
[facility]ists  Pulmonary, Critical Care, and Sleep Medicine    Name: Tai Bermudez MRN: [id number]   : 1975 Hospital: [facility]    Date: 2021  Room: [address] [place] [address] Note                                              Consult requesting physician: Dr. Sue Johns  Reason for Consult: Severe Covid pneumonia      Subjective/History of Present Illness:   Patient is a 55 y.o. male with PMHx significant for diabetes and hypertension. He has come to ER with worsening shortness of breath with positive Covid infection. He reports that he has been having cold symptoms for more than a week. He tested positive about 1 week ago. He has come with worsening shortness of breath, and was found to be severely hypoxemic. He has been placed on combination of high flow oxygen and nonrebreather. His oxygen saturation is in the high 80s on this, and in the semiupright position. Vaccination statusnot vaccinated for COVID-19  I have reviewed history with ER consultant Dr. Leonora Junior and chart review. Currently on the maximum dose of high flow prone position started patient is not stable to perform CT for pulmonary embolus in the setting of elevated D-dimer full anticoagulation started last night PVL negative   Patient failed high flow and was intubated on 2021. ARDS protocol. If worsen will call transfer center to evaluate for ECMO    21   Patient seen at bedside in ICU room #109  Patient was placed supine from proning overnight this a.m. Close to noon patient's SPO2 decreased into 80s  Multiple ventilator maneuvers including APRV, bilevel, recruiting did not assist  Airway pressure maintained less than 30 CWP throughout this process  Patient remains sedated, paralyzed with medication  Patient's SPO2 improved after proning again after noon  Difficult to initiate any ECMO transfer process given events earlier  Hemodynamically stable  Fair urine output.   Low-grade fever  [facility] was not contacted by staff on anything about patient overnight. Review of Systems: Unable to obtain intubated sedated      Allergies   Allergen Reactions    Alupent [Metaproterenol] Swelling      Past Medical History:   Diagnosis Date    COVID-19     Diabetes ([address]     Hyperlipidemia     Hypertension       Past Surgical History:   Procedure Laterality Date    HX ORTHOPAEDIC Right     wrist surgery      Social History     Tobacco Use    Smoking status: Never Smoker    Smokeless tobacco: Not on file   Substance Use Topics    Alcohol use: Yes     Comment: drinksonce a week      Family History   Problem Relation Age of Onset    Diabetes Mother     Hypertension Mother     Stroke Mother     Hodgkin's lymphoma Mother     Diabetes Father     Hypertension Father     Cystic Fibrosis Father     Diabetes Maternal Aunt     Diabetes Maternal Uncle       Prior to Admission medications    Medication Sig Start Date End Date Taking? Authorizing Provider   atorvastatin (LIPITOR) 20 mg tablet Take 20 mg by mouth daily. Yes Provider, Historical   telmisartan (MICARDIS) 80 mg tablet Take 80 mg by mouth daily. Indications: high blood pressure   Yes Provider, Historical   fenofibrate nanocrystallized (TRICOR) 145 mg tablet Take 145 mg by mouth daily. Yes Provider, Historical   glipiZIDE SR (GLUCOTROL XL) 10 mg CR tablet Take 10 mg by mouth daily. Yes Provider, Historical   metFORMIN ER (GLUCOPHAGE XR) 500 mg tablet Take 500 mg by mouth daily (with dinner).    Yes Provider, Historical     Current Facility-Administered Medications   Medication Dose Route Frequency    [START ON 11/30/2021] insulin glargine (LANTUS) injection 24 Units  24 Units SubCUTAneous DAILY    [START ON 11/30/2021] enoxaparin (LOVENOX) injection 90 mg  1 mg/kg SubCUTAneous Q12H    cisatracurium (NIMBEX) 100 mg in 0.9% sodium chloride 100 mL (1 mg/mL) infusion  0-10 mcg/kg/min IntraVENous TITRATE    fentaNYL (PF) 900 mcg/30 ml infusion soln  0-200 mcg/hr IntraVENous TITRATE    NOREPINephrine (LEVOPHED) 8 mg in 0.9% NS 250ml infusion  0.5-16 mcg/min IntraVENous TITRATE    midazolam in normal saline (VERSED) 1 mg/mL infusion  0-10 mg/hr IntraVENous TITRATE    insulin lispro (HUMALOG) injection   SubCUTAneous Q6H    albumin human 25% (BUMINATE) solution 25 g  25 g IntraVENous Q6H    dexamethasone (DECADRON) 4 mg/mL injection 20 mg  20 mg IntraVENous Q24H    [Held by provider] insulin lispro (HUMALOG) injection 7 Units  7 Units SubCUTAneous TIDAC    [Held by provider] ipratropium-albuterol (COMBIVENT RESPIMAT) 20 mcg-100 mcg inhalation spray  1 Puff Inhalation TID    lidocaine 4 % patch 1 Patch  1 Patch TransDERmal Q24H    atorvastatin (LIPITOR) tablet 20 mg  20 mg Oral DAILY    pantoprazole (PROTONIX) 40 mg in 0.9% sodium chloride 10 mL injection  40 mg IntraVENous Q24H    cholecalciferol (VITAMIN D3) (1000 Units /25 mcg) tablet 2,000 Units  2,000 Units Oral DAILY    ascorbic acid (vitamin C) (VITAMIN C) tablet 500 mg  500 mg Oral BID    zinc sulfate (ZINCATE) 50 mg zinc (220 mg) capsule 1 Capsule  1 Capsule Oral Q12H    melatonin (rapid dissolve) tablet 5 mg  5 mg Oral QHS         Objective:   Vital Signs:    Visit Vitals  /69   Pulse 82   Temp 100 °F (37.8 °C)   Resp 16   Ht 5' 6\" (1.676 m)   Wt 87.6 kg (193 lb 2 oz)   SpO2 96%   BMI 31.17 kg/m²       O2 Device: Endotracheal tube, Ventilator   O2 Flow Rate (L/min): 60 l/min   Temp (24hrs), Av.2 °F (37.3 °C), Min:96.4 °F (35.8 °C), Max:100.4 °F (38 °C)       Intake/Output:   Last shift:      701 - 1900  In: -   Out: 425 [Urine:425]    Last 3 shifts: 1901 - 700  In: 90   Out:  [Urine:1975]      Intake/Output Summary (Last 24 hours) at 2021 1710  Last data filed at 2021 1513  Gross per 24 hour   Intake 90 ml   Output 1375 ml   Net -1285 ml       Last 3 Recorded Weights in this Encounter    21 1607 21 1210 21 0015   Weight: 97.5 kg (215 lb) 97.5 kg (215 lb) 87.6 kg (193 lb 2 oz)        Physical Exam: Limitations in exam due to full PPE requirements. General: sedated, proning, in no respiratory distress and ventilator, appears stated age  HEENT: PERRL, fundi ET benign, ET and OG tube in place  Neck: No abnormally enlarged lymph nodes or thyroid, supple  Chest: normal; exam limitations secondary to position  Lungs:  Moderate air entry, rhonchi bilateral and no wheezes bilaterally,normal percussion posterior chest wall bilaterally, no tenderness/ rash  Heart: Regular rate and rhythm, S1S2 present or without murmur or extra heart sounds  Abdomen: non distended, bowel sounds normoactive, tympanic, abdomen is soft without significant tenderness, masses, organomegaly or guarding, rigidity, rebound  Extremity: No edema, cyanosis, clubbing; normal capillary refill bilateral; bilateral pulses palpable both extremities  Neuro: Sedated, on paralytic, no involuntary movements, exam limitation secondary to ventilator  Skin: Skin color, texture, turgor fair      Data:       Recent Results (from the past 24 hour(s))   GLUCOSE, POC    Collection Time: 11/28/21 11:55 PM   Result Value Ref Range    Glucose (POC) 227 (H) 70 - 110 mg/dL   D DIMER    Collection Time: 11/29/21  4:45 AM   Result Value Ref Range    D DIMER 3.31 (H) <0.46 ug/ml(FEU)   PROTHROMBIN TIME + INR    Collection Time: 11/29/21  4:45 AM   Result Value Ref Range    Prothrombin time 14.4 11.5 - 15.2 sec    INR 1.2 0.8 - 1.2     PTT    Collection Time: 11/29/21  4:45 AM   Result Value Ref Range    aPTT 29.4 23.0 - 36.4 SEC   C REACTIVE PROTEIN, QT    Collection Time: 11/29/21  4:45 AM   Result Value Ref Range    C-Reactive protein 0.6 (H) 0 - 0.3 mg/dL   LD    Collection Time: 11/29/21  4:45 AM   Result Value Ref Range     (H) 81 - 234 U/L   FERRITIN    Collection Time: 11/29/21  4:45 AM   Result Value Ref Range    Ferritin 2,134 (H) 8 - 388 NG/ML   CBC WITH AUTOMATED DIFF    Collection Time: 11/29/21  4:45 AM   Result Value Ref Range    WBC 5.1 4.6 - 13.2 K/uL    RBC 4.30 (L) 4.35 - 5.65 M/uL    HGB 12.7 (L) 13.0 - 16.0 g/dL    HCT 37.8 36.0 - 48.0 %    MCV 87.9 78.0 - 100.0 FL    MCH 29.5 24.0 - 34.0 PG    MCHC 33.6 31.0 - 37.0 g/dL    RDW 12.2 11.6 - 14.5 %    PLATELET 579 529 - 413 K/uL    MPV 8.7 (L) 9.2 - 11.8 FL    NRBC 0.0 0  WBC    ABSOLUTE NRBC 0.00 0.00 - 0.01 K/uL    NEUTROPHILS 90 (H) 40 - 73 %    LYMPHOCYTES 5 (L) 21 - 52 %    MONOCYTES 3 3 - 10 %    EOSINOPHILS 1 0 - 5 %    BASOPHILS 0 0 - 2 %    IMMATURE GRANULOCYTES 1 (H) 0.0 - 0.5 %    ABS. NEUTROPHILS 4.6 1.8 - 8.0 K/UL    ABS. LYMPHOCYTES 0.3 (L) 0.9 - 3.6 K/UL    ABS. MONOCYTES 0.2 0.05 - 1.2 K/UL    ABS. EOSINOPHILS 0.0 0.0 - 0.4 K/UL    ABS. BASOPHILS 0.0 0.0 - 0.1 K/UL    ABS. IMM. GRANS. 0.1 (H) 0.00 - 0.04 K/UL    DF AUTOMATED     METABOLIC PANEL, COMPREHENSIVE    Collection Time: 11/29/21  4:45 AM   Result Value Ref Range    Sodium 139 136 - 145 mmol/L    Potassium 4.8 3.5 - 5.5 mmol/L    Chloride 104 100 - 111 mmol/L    CO2 31 21 - 32 mmol/L    Anion gap 4 3.0 - 18 mmol/L    Glucose 211 (H) 74 - 99 mg/dL    BUN 18 7.0 - 18 MG/DL    Creatinine 0.57 (L) 0.6 - 1.3 MG/DL    BUN/Creatinine ratio 32 (H) 12 - 20      GFR est AA >60 >60 ml/min/1.73m2    GFR est non-AA >60 >60 ml/min/1.73m2    Calcium 8.5 8.5 - 10.1 MG/DL    Bilirubin, total 0.7 0.2 - 1.0 MG/DL    ALT (SGPT) 72 (H) 16 - 61 U/L    AST (SGOT) 66 (H) 10 - 38 U/L    Alk.  phosphatase 44 (L) 45 - 117 U/L    Protein, total 6.2 (L) 6.4 - 8.2 g/dL    Albumin 3.5 3.4 - 5.0 g/dL    Globulin 2.7 2.0 - 4.0 g/dL    A-G Ratio 1.3 0.8 - 1.7     MAGNESIUM    Collection Time: 11/29/21  4:45 AM   Result Value Ref Range    Magnesium 2.5 1.6 - 2.6 mg/dL   PHOSPHORUS    Collection Time: 11/29/21  4:45 AM   Result Value Ref Range    Phosphorus 4.3 2.5 - 4.9 MG/DL   FIBRINOGEN    Collection Time: 11/29/21  4:45 AM   Result Value Ref Range    Fibrinogen 297 210 - 451 mg/dL   NT-PRO BNP    Collection Time: 11/29/21  4:45 AM   Result Value Ref Range    NT pro-BNP 35 0 - 450 PG/ML   CALCIUM, IONIZED    Collection Time: 11/29/21  4:46 AM   Result Value Ref Range    Ionized Calcium 1.12 1.12 - 1.32 MMOL/L   CARDIAC PANEL,(CK, CKMB & TROPONIN)    Collection Time: 11/29/21  4:46 AM   Result Value Ref Range    CK - MB <1.0 <3.6 ng/ml    CK-MB Index  0.0 - 4.0 %     CALCULATION NOT PERFORMED WHEN RESULT IS BELOW LINEAR LIMIT    CK 99 39 - 308 U/L    Troponin-I, QT <0.02 0.0 - 0.045 NG/ML   GLUCOSE, POC    Collection Time: 11/29/21  5:41 AM   Result Value Ref Range    Glucose (POC) 201 (H) 70 - 110 mg/dL   BLOOD GAS, ARTERIAL POC    Collection Time: 11/29/21 11:18 AM   Result Value Ref Range    Device: ADULT VENT      FIO2 (POC) 100 %    pH (POC) 7.37 7.35 - 7.45      pCO2 (POC) 52.8 (H) 35.0 - 45.0 MMHG    pO2 (POC) 52 (L) 80 - 100 MMHG    HCO3 (POC) 30.2 (H) 22 - 26 MMOL/L    sO2 (POC) 84.2 (L) 92 - 97 %    Base excess (POC) 3.8 mmol/L    Mode ASSIST CONTROL      Tidal volume 450 ml    Set Rate 16 bpm    PEEP/CPAP (POC) 10 cmH2O    Allens test (POC) Positive      Total resp.  rate 16      Site RIGHT RADIAL      Specimen type (POC) ARTERIAL      Performed by Tommy Renteria Respiratory    GLUCOSE, POC    Collection Time: 11/29/21 11:23 AM   Result Value Ref Range    Glucose (POC) 150 (H) 70 - 110 mg/dL   GLUCOSE, POC    Collection Time: 11/29/21  4:52 PM   Result Value Ref Range    Glucose (POC) 106 70 - 110 mg/dL         Chemistry Recent Labs     11/29/21  0445 11/28/21  0515 11/27/21  0700   * 229* 189*    136 137   K 4.8 4.9 4.4    99* 102   CO2 31 25 27   BUN 18 20* 18   CREA 0.57* 0.71 0.68   CA 8.5 8.8 8.4*   MG 2.5 2.2 2.3   PHOS 4.3 7.6* 3.8   AGAP 4 12 8   BUCR 32* 28* 26*   AP 44* 69 60   TP 6.2* 6.6 6.0*   ALB 3.5 2.8* 2.4*   GLOB 2.7 3.8 3.6   AGRAT 1.3 0.7* 0.7*        Lactic Acid Lactic acid   Date Value Ref Range Status   11/24/2021 1.4 0.4 - 2.0 MMOL/L Final     No results for input(s): LAC in the last 72 hours. Liver Enzymes Protein, total   Date Value Ref Range Status   11/29/2021 6.2 (L) 6.4 - 8.2 g/dL Final     Albumin   Date Value Ref Range Status   11/29/2021 3.5 3.4 - 5.0 g/dL Final     Globulin   Date Value Ref Range Status   11/29/2021 2.7 2.0 - 4.0 g/dL Final     A-G Ratio   Date Value Ref Range Status   11/29/2021 1.3 0.8 - 1.7   Final     Alk.  phosphatase   Date Value Ref Range Status   11/29/2021 44 (L) 45 - 117 U/L Final     Recent Labs     11/29/21 0445 11/28/21  0515 11/27/21  0700   TP 6.2* 6.6 6.0*   ALB 3.5 2.8* 2.4*   GLOB 2.7 3.8 3.6   AGRAT 1.3 0.7* 0.7*   AP 44* 69 60        CBC w/Diff Recent Labs     11/29/21 0445 11/28/21  0515 11/27/21  0700   WBC 5.1 9.1 6.2   RBC 4.30* 5.56 5.13   HGB 12.7* 16.3* 15.1   HCT 37.8 47.7 43.8    477* 430*   GRANS 90* 88* 84*   LYMPH 5* 8* 12*   EOS 1 0 0        Cardiac Enzymes Lab Results   Component Value Date/Time    CPK 99 11/29/2021 04:46 AM    CKMB <1.0 11/29/2021 04:46 AM    CKND1  11/29/2021 04:46 AM     CALCULATION NOT PERFORMED WHEN RESULT IS BELOW LINEAR LIMIT    TROIQ <0.02 11/29/2021 04:46 AM        BNP No results found for: BNP, BNPP, XBNPT     Coagulation Recent Labs     11/29/21 0445 11/28/21  0515 11/27/21  0700   PTP 14.4 13.2 13.1   INR 1.2 1.1 1.1   APTT 29.4 28.6 28.1         Thyroid  No results found for: T4, T3U, TSH, TSHEXT, TSHEXT    No results found for: T4     Urinalysis Lab Results   Component Value Date/Time    Color YELLOW 11/24/2021 06:00 AM    Appearance CLEAR 11/24/2021 06:00 AM    Specific gravity >1.030 (H) 11/24/2021 06:00 AM    pH (UA) 5.5 11/24/2021 06:00 AM    Protein Negative 11/24/2021 06:00 AM    Glucose >1,000 (A) 11/24/2021 06:00 AM    Ketone 80 (A) 11/24/2021 06:00 AM    Bilirubin Negative 11/24/2021 06:00 AM    Urobilinogen 1.0 11/24/2021 06:00 AM    Nitrites Negative 11/24/2021 06:00 AM    Leukocyte Esterase Negative 11/24/2021 06:00 AM Micro  No results for input(s): SDES, CULT in the last 72 hours. No results for input(s): CULT in the last 72 hours. Culture data during this hospitalization. All Micro Results     Procedure Component Value Units Date/Time    CULTURE, BLOOD [[id number]] Collected: 11/23/21 1624    Order Status: Completed Specimen: Blood Updated: 11/29/21 0731     Special Requests: NO SPECIAL REQUESTS        Culture result: NO GROWTH 6 DAYS       CULTURE, BLOOD [[id number]] Collected: 11/23/21 1624    Order Status: Completed Specimen: Blood Updated: 11/29/21 0731     Special Requests: NO SPECIAL REQUESTS        Culture result: NO GROWTH 6 DAYS       CULTURE, RESPIRATORY/SPUTUM/BRONCH Agus Loupe STAIN [[id number]]     Order Status: Sent Specimen: Sputum from Transtracheal Aspirate     CULTURE, URINE [[id number]] Collected: 11/24/21 0600    Order Status: Completed Specimen: Urine from Clean catch Updated: 11/25/21 2047     Special Requests: NO SPECIAL REQUESTS        Culture result: No growth (<1,000 CFU/ML)       COVID-19 RAPID TEST [[id number]]  (Abnormal) Collected: 11/24/21 0600    Order Status: Completed Specimen: Nasopharyngeal Updated: 11/24/21 0703     Specimen source Nasopharyngeal        COVID-19 rapid test Detected        Comment:      The specimen is POSITIVE for SARS-CoV-2, the novel coronavirus associated with COVID-19. This test has been authorized by the FDA under an Emergency Use Authorization (EUA) for use by authorized laboratories.         Fact sheet for Healthcare Providers: ConventionUpdate.co.nz  Fact sheet for Patients: ConventionUpdate.co.nz       Methodology: Isothermal Nucleic Acid Amplification  CALLED TO AND CORRECTLY REPEATED BY:  SLOANE KIRKPATRICK RN AT 0509 ON 11/24/21 TO LAD         RESPIRATORY VIRUS PANEL W/COVID-19, PCR [[id number]]  (Abnormal) Collected: 11/23/21 1624    Order Status: Completed Specimen: Nasopharyngeal Updated: 11/23/21 2308     Adenovirus Not detected        Coronavirus 229E Not detected        Coronavirus HKU1 Not detected        Coronavirus CVNL63 Not detected        Coronavirus OC43 Not detected        SARS-CoV-2, PCR Detected        Comment: CALLED TO AND CORRECTLY REPEATED BY:  MS CARDENAS ALEXEI OWENS OF [facility] AT 2230 BY KDA 11/23/21  CALLED TO AND CORRECTLY REPEATED BY:  Nav Whittaker RN ICU AT 2252 ON 11/23/21 TO TSH. Metapneumovirus Not detected        Rhinovirus and Enterovirus Not detected        Influenza A Not detected        Influenza A, subtype H1 Not detected        Influenza A, subtype H3 Not detected        INFLUENZA A H1N1 PCR Not detected        Influenza B Not detected        Parainfluenza 1 Not detected        Parainfluenza 2 Not detected        Parainfluenza 3 Not detected        Parainfluenza virus 4 Not detected        RSV by PCR Not detected        B. parapertussis, PCR Not detected        Bordetella pertussis - PCR Not detected        Chlamydophila pneumoniae DNA, QL, PCR Not detected        Mycoplasma pneumoniae DNA, QL, PCR Not detected                Images report reviewed by me:  CT (Most Recent) (CT chest reviewed by me) No results found for this or any previous visit. CXR reviewed by me:  XR (Most Recent). CXR  reviewed by me and compared with previous CXR Results from Hospital Encounter encounter on 11/23/21    XR CHEST PORT    Narrative  EXAM: XR CHEST PORT    CLINICAL INDICATION/HISTORY: et tube  -Additional: None    COMPARISON: One day prior    TECHNIQUE: Portable frontal view of the chest    _______________    FINDINGS:    SUPPORT DEVICES: Endotracheal and enteric tubes unchanged. HEART AND MEDIASTINUM: Cardiomediastinal silhouette within normal limits. LUNGS AND PLEURAL SPACES: Extensive bilateral parenchymal opacities. No  pneumothorax.    _______________    Impression  Extensive bilateral parenchymal opacities, worsened from prior study.          IMPRESSION:   · Acute respiratory failure with hypoxemia - J96.01  · Severe Covid pneumonia - U07.1, J12.82  · ARDS  · Hyperglycemia due to Type 2 diabetes mellitus  · Hypertension  Patient Active Problem List   Diagnosis Code    Acute hypoxemic respiratory failure due to COVID-19 ([facility] [address] U07.1, J96.01    Pneumonia due to COVID-19 virus U07.1, J12.82    Hyperglycemia due to type 2 diabetes mellitus ([facility] E11.65    Primary hypertension I10    Hyponatremia E87.1    Lactic acidosis E87.2    Hyperlipidemia E78.5    Obesity (BMI 30-39. 9) E66.9    COVID-19 vaccination not done Z28.9   Code status: Full code   RECOMMENDATIONS:   Respiratory: Acute hypoxic respiratory failure secondary to Covid pneumonia and ARDS  Delayed presentation with severe hypoxemic respiratory failure  [facility]. Ventilated patients- aim to keep peak plateau pressure less than or equal to 30cm H2O. Titrate FiO2 for goal SPO2> 88%  VAP prevention bundle and sedation bundle followed, head of the bed at 30' all times  Daily sedation holiday and assessment for weaning with SBT as tolerated - unable to arrange paralytic or sedation off with event this AM  Continue pulmonary hygiene care  Steroids  Proning as needed  Failed high flow intubated on 11/28/2021 at night  Chest x-ray and ABG as needed  Severe ARDS due to COVID-19 pneumonia patient is not stable to perform CTA of the chest with worsening D-dimer - on full anticoagulation  Started steroids and given bronchodilators additionally added Actemra   ER consultant tried calling tertiary facility for possible ECMOcurrently no beds available    Sedation: nimbex, versed, fentanyl    CVS: Stable hemodynamics. Monitor blood pressure. Check pro-BNP   Stop albumin  Diuresis - laxis 20 mg x 1  Repeat pvl - on full dose lovenox   Patient high Risk PE/ pulmonary embolism. D-dimer elevated. Patient with severe Covid infection.   Recommend therapeutic anticoagulation with Lovenox twice daily  PVL negative but unable to do CTA    PVL 11/24/21  · No evidence of deep vein thrombosis in the right lower extremity. · No evidence of deep vein thrombosis in the left lower extremity    ECHO 11/24/21  · LV: Estimated LVEF is 50 - 55%. Normal cavity size, wall thickness and diastolic function. Low normal systolic function. E/E'= 7.12    ID: Severe COVID-19 pneumonia  ID consulted  High D-dimer on full dose of Lovenox  On steroids - started on 11/23/2021  1 dose Tocilizumab given on 11/24/2021  proCalcitonin low - off of antibiotics. Antibiotics  empirically received ceftriaxone and azithromycin   Late presentation with severe hypoxemic respiratory failurenot a candidate for remdesivir. No history of hepatitis or TB  Dexamethasone6 mg daily - changed to 20 mg as intubated and high risk of pulmonary fibroris. Blood sugar elevated with history of type 2 diabetes mellitus on insulin   Vitamin supplements  Monitor lactic acid, respiratory culture as needed, procal    Blood culture 11/23/2021negative final  Urine culture 11/24/2021negative final    Hematology/Oncology: Drop in hemoglobin, dilutional?  No other signs of active bleeding anywhere  Monitor hemoglobin and platelets; watch for any bleeding while on therapeutic anticoagulation    Renal: Monitor renal function and urine output. Monitor sodium with Lasix dosing  Replace electrolyte as needed    GI: Monitor LFT. N.p.o.  Endocrine: Monitor FSBS. SSI. Adjust Lantus insulin  Neurology: Sedated, on paralytic  Skin/Wound: ICU nursing care  Electrolytes: Replace electrolytes per ICU electrolyte replacement protocol. Prophylaxis: DVT Prophylaxis: Enoxaparin. GI Prophylaxis: Protonix. Lines/Tubes: PIV, mid line  ET tube: 11/28/2021  OG tube: 11/28/2021  Gold: 11/24/21 (Medically necessary for strict input/output monitoring in critically ill patient, will remove it when not needed. Gold bundle followed).   Prognosis seems to be guarded; patient being admitted to ICU with severe Covid pneumonia and hypoxemic respiratory failure; patient risk of mortality and going on ventilator support is very high. Will defer respective systems problem management to primary and other consultant and follow patient in ICU with primary and other medical team  Further recommendations will be based on the patient's response to recommended treatment and results of the investigation ordered. Quality Care: PPI, DVT prophylaxis, HOB elevated, Infection control all reviewed and addressed. Discussed with ER consultant. Events and notes from last 24 hours reviewed. Care plan discussed with nursing, hospitalist, RT, MDR  High complexity decision making was performed during the evaluation of this patient at high risk for decompensation with multiple organ involvement. Total critical care time spent rendering care exclusive of procedures/family discussion/coordination of care: 45 minutes. Wife updated on 11/28/2021 by Dr. Almita Szymanski       Please note: Manuel Macdonald may have been used to generate this report, which may have resulted in some phonetic-based errors in grammar and contents. Even though attempts were made to correct all the mistakes, some may have been missed, and remained in the body of the document. Lena Forbes MD  11/29/2021       Note  Patient with SARS-CoV-2 pneumonia with severe pneumonia and hypoxemic respiratory failure; patient on high oxygen support via high flow and nonrebreather mask oxygen; patient is not vaccinated, and is in the age group that is being infected with delta virus strain; the strain causes severe respiratory failure and complications reported in the [place] and worldwide; unfortunately, the prognosis is poor in unvaccinated patients, with a high risk of complications, multiorgan failure, chronic hypoxemia and respiratory failure, intubation, mechanical ventilation, thromboembolic disease risk in multiple organs, high risk for long Covid syndrome, and high risk for mortality.   The [employer] and state guidelines have emphasized repeatedly the importance of vaccination to prevent severe infection, mortality, disabilities, long Covid syndrome from Covid virus with several strains currently in the [place]. Pfizer vaccination has been approved by [place] and [place]. Vaccination has been extended to children as well. The treatment protocols are followed based on local hospital protocols, with guidance from centers such as [facility] protocols. [facility] is not part of any research protocol. The local hospital protocols have been guided by [facility] pharmacy department. Covid plasma is no longer considered standard of care in the [facility] due to lack of benefit in trials. Nebulization and CPAP therapies are not considered as part of the protocol in THE [facility] due to high risk of aerosolization, and high risks of spreading Covid infection to the healthcare staff. REMDESIVIR-delayed presentation with severe hypoxemic respiratory failure are poor candidate for remdesivir therapy per THE [facility] cough. Anti-inflammatory medication such as baricitinib or Tocilizumab based on CRP may have some benefit. Dexamethasone is considered standard of care.

## 2021-11-29 NOTE — PROGRESS NOTES
Hospitalist Progress Note    Patient: Celena Huang MRN: [id number]  CSN: [id number]    YOB: 1975  Age: 55 y.o. Sex: male    DOA: 11/23/2021 LOS:  LOS: 6 days          Chief Complaint:    resp failure      Assessment/Plan     46 y/o male with poorly controlled type 2 diabetes mellitus, hypertension, and obesity is admitted for acute hypoxemic respiratory failure due to COVID-19 pneumonia with lactic acidosis, hyperglycemia, and hyponatremia.  He has not been vaccinated.      Respiratory  acute hypoxemic respiratory failure due to COVID-19 pneumonia.   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 9) E66.9    COVID-19 vaccination not done Z28.9       Subjective:    No new events reported    Review of systems:    UTO as sedtaed and intubated      Vital signs/Intake and Output:  Visit Vitals  /71   Pulse 75   Temp 99.9 °F (37.7 °C)   Resp 16   Ht 5' 6\" (1.676 m)   Wt 87.6 kg (193 lb 2 oz)   SpO2 93%   BMI 31.17 kg/m²     Current Shift:  No intake/output data recorded.   Last three shifts:  11/27 1901 - 11/29 0700  In: 90   Out: 1975 [OPJOB:[id number]]    Exam:    General: proned white male, sedate, intubated  Head/Neck: NCAT, supple, No masses, No lymphadenopathy  CVS:Regular rate and rhythm, no M/R/G, S1/S2 heard, no thrill  Lungs:Clear to auscultation bilaterally, no wheezes, rhonchi, or rales  Extremities: No C/C/E, pulses palpable 2+  Neuro:sedate                Labs: Results:       Chemistry Recent Labs     11/29/21 0445 11/28/21  0515 11/27/21  0700   * 229* 189*    136 137   K 4.8 4.9 4.4    99* 102   CO2 31 25 27   BUN 18 20* 18   CREA 0.57* 0.71 0.68   CA 8.5 8.8 8.4*   AGAP 4 12 8   BUCR 32* 28* 26*   AP 44* 69 60   TP 6.2* 6.6 6.0*   ALB 3.5 2.8* 2.4*   GLOB 2.7 3.8 3.6   AGRAT 1.3 0.7* 0.7*      CBC w/Diff Recent Labs     11/29/21 0445 11/28/21  0515 11/27/21  0700   WBC 5.1 9.1 6.2   RBC 4.30* 5.56 5.13   HGB 12.7* 16.3* 15.1   HCT 37.8 47.7 43.8    477* 430*   GRANS 90* 88* 84*   LYMPH 5* 8* 12*   EOS 1 0 0      Cardiac Enzymes Recent Labs     11/29/21 0446   CPK 99   CKND1 CALCULATION NOT PERFORMED WHEN RESULT IS BELOW LINEAR LIMIT      Coagulation Recent Labs     11/29/21 0445 11/28/21  0515   PTP 14.4 13.2   INR 1.2 1.1   APTT 29.4 28.6       Lipid Panel Lab Results   Component Value Date/Time    Cholesterol, total 123 11/23/2021 04:24 PM    HDL Cholesterol 33 (L) 11/23/2021 04:24 PM    LDL, calculated 41.8 11/23/2021 04:24 PM    VLDL, calculated 48.2 11/23/2021 04:24 PM    Triglyceride 241 (H) 11/23/2021 04:24 PM    CHOL/HDL Ratio 3.7 11/23/2021 04:24 PM      BNP No results for input(s): BNPP in the last 72 hours.    Liver Enzymes Recent Labs     11/29/21  0445   TP 6.2*   ALB 3.5   AP 44*      Thyroid Studies No results found for: T4, T3U, TSH, TSHEXT     Procedures/imaging: see electronic medical records for all procedures/Xrays and details which were not copied into this note but were reviewed prior to creation of Jason Iqbal MD

## 2021-11-29 NOTE — PROGRESS NOTES
Problem: Pressure Injury - Risk of  Goal: *Prevention of pressure injury  Description: Document Jeffrey Scale and appropriate interventions in the flowsheet. 11/28/2021 2206 by Estela Adams RN  Outcome: Progressing Towards Goal  Note: Pressure Injury Interventions:  Sensory Interventions: Assess changes in LOC, Check visual cues for pain, Float heels, Maintain/enhance activity level, Pressure redistribution bed/mattress (bed type), Turn and reposition approx. every two hours (pillows and wedges if needed)    Activity Interventions: Pressure redistribution bed/mattress(bed type)    Mobility Interventions: HOB 30 degrees or less, Pressure redistribution bed/mattress (bed type), Turn and reposition approx. every two hours(pillow and wedges)    Nutrition Interventions: Document food/fluid/supplement intake    Friction and Shear Interventions: HOB 30 degrees or less, Minimize layers    11/28/2021 2204 by Estela Adams RN  Note: Pressure Injury Interventions:  Sensory Interventions: Assess changes in LOC, Check visual cues for pain, Float heels, Maintain/enhance activity level, Pressure redistribution bed/mattress (bed type), Turn and reposition approx. every two hours (pillows and wedges if needed)    Activity Interventions: Pressure redistribution bed/mattress(bed type)    Mobility Interventions: HOB 30 degrees or less, Pressure redistribution bed/mattress (bed type), Turn and reposition approx. every two hours(pillow and wedges)    Nutrition Interventions: Document food/fluid/supplement intake    Friction and Shear Interventions: HOB 30 degrees or less, Minimize layers      Problem: Patient Education: Go to Patient Education Activity  Goal: Patient/Family Education  Outcome: Progressing Towards Goal     Problem: Falls - Risk of  Goal: *Absence of Falls  Description: Document Stephanie Fall Risk and appropriate interventions in the flowsheet.   11/28/2021 2206 by Estela Adams RN  Outcome: Progressing Towards Goal  Note: Fall Risk Interventions:  Mobility Interventions: Bed/chair exit alarm, Communicate number of staff needed for ambulation/transfer, Mechanical lift, Strengthening exercises (ROM-active/passive)    Medication Interventions: Evaluate medications/consider consulting pharmacy    Elimination Interventions: Bed/chair exit alarm, Toileting schedule/hourly rounds    11/28/2021 2204 by Emelina Au RN  Note: Fall Risk Interventions:  Mobility Interventions: Bed/chair exit alarm, Communicate number of staff needed for ambulation/transfer, Mechanical lift, Strengthening exercises (ROM-active/passive)    Medication Interventions: Evaluate medications/consider consulting pharmacy    Elimination Interventions: Bed/chair exit alarm, Toileting schedule/hourly rounds       Problem: Patient Education: Go to Patient Education Activity  Goal: Patient/Family Education  Outcome: Progressing Towards Goal     Problem: Airway Clearance - Ineffective  Goal: Achieve or maintain patent airway  Outcome: Progressing Towards Goal     Problem: Gas Exchange - Impaired  Goal: Absence of hypoxia  Outcome: Progressing Towards Goal  Goal: Promote optimal lung function  Outcome: Progressing Towards Goal     Problem: Breathing Pattern - Ineffective  Goal: Ability to achieve and maintain a regular respiratory rate  Outcome: Progressing Towards Goal     Problem:  Body Temperature -  Risk of, Imbalanced  Goal: Ability to maintain a body temperature within defined limits  Outcome: Progressing Towards Goal  Goal: Will regain or maintain usual level of consciousness  Outcome: Progressing Towards Goal  Goal: Complications related to the disease process, condition or treatment will be avoided or minimized  Outcome: Progressing Towards Goal     Problem: Isolation Precautions - Risk of Spread of Infection  Goal: Prevent transmission of infectious organism to others  Outcome: Progressing Towards Goal     Problem: Nutrition Deficits  Goal: Optimize nutrtional status  Outcome: Progressing Towards Goal     Problem: Risk for Fluid Volume Deficit  Goal: Maintain normal heart rhythm  Outcome: Progressing Towards Goal  Goal: Maintain absence of muscle cramping  Outcome: Progressing Towards Goal  Goal: Maintain normal serum potassium, sodium, calcium, phosphorus, and pH  Outcome: Progressing Towards Goal     Problem: Loneliness or Risk for Loneliness  Goal: Demonstrate positive use of time alone when socialization is not possible  Outcome: Progressing Towards Goal     Problem: Fatigue  Goal: Verbalize increase energy and improved vitality  Outcome: Progressing Towards Goal     Problem: Diabetes Self-Management  Goal: *Disease process and treatment process  Description: Define diabetes and identify own type of diabetes; list 3 options for treating diabetes. Outcome: Progressing Towards Goal  Goal: *Incorporating nutritional management into lifestyle  Description: Describe effect of type, amount and timing of food on blood glucose; list 3 methods for planning meals. Outcome: Progressing Towards Goal  Goal: *Incorporating physical activity into lifestyle  Description: State effect of exercise on blood glucose levels. Outcome: Progressing Towards Goal  Goal: *Developing strategies to promote health/change behavior  Description: Define the ABC's of diabetes; identify appropriate screenings, schedule and personal plan for screenings. Outcome: Progressing Towards Goal  Goal: *Using medications safely  Description: State effect of diabetes medications on diabetes; name diabetes medication taking, action and side effects. Outcome: Progressing Towards Goal  Goal: *Monitoring blood glucose, interpreting and using results  Description: Identify recommended blood glucose targets  and personal targets.   Outcome: Progressing Towards Goal  Goal: *Prevention, detection, treatment of acute complications  Description: List symptoms of hyper- and hypoglycemia; describe how to treat low blood sugar and actions for lowering  high blood glucose level. Outcome: Progressing Towards Goal  Goal: *Prevention, detection and treatment of chronic complications  Description: Define the natural course of diabetes and describe the relationship of blood glucose levels to long term complications of diabetes.   Outcome: Progressing Towards Goal  Goal: *Developing strategies to address psychosocial issues  Description: Describe feelings about living with diabetes; identify support needed and support network  Outcome: Progressing Towards Goal  Goal: *Insulin pump training  Outcome: Progressing Towards Goal  Goal: *Sick day guidelines  Outcome: Progressing Towards Goal  Goal: *Patient Specific Goal (EDIT GOAL, INSERT TEXT)  Outcome: Progressing Towards Goal     Problem: Patient Education: Go to Patient Education Activity  Goal: Patient/Family Education  Outcome: Progressing Towards Goal     Problem: Non-Violent Restraints  Goal: Removal from restraints as soon as assessed to be safe  Outcome: Progressing Towards Goal  Goal: No harm/injury to patient while restraints in use  Outcome: Progressing Towards Goal  Goal: Patient's dignity will be maintained  Outcome: Progressing Towards Goal  Goal: Patient Interventions  Outcome: Progressing Towards Goal     Problem: Ventilator Management  Goal: *Adequate oxygenation and ventilation  Outcome: Progressing Towards Goal  Goal: *Patient maintains clear airway/free of aspiration  Outcome: Progressing Towards Goal  Goal: *Absence of infection signs and symptoms  Outcome: Progressing Towards Goal  Goal: *Normal spontaneous ventilation  Outcome: Progressing Towards Goal     Problem: Patient Education: Go to Patient Education Activity  Goal: Patient/Family Education  Outcome: Progressing Towards Goal     Tish Woodson RN

## 2021-11-30 NOTE — PROGRESS NOTES
Pulmonary Specialists  Pulmonary, Critical Care, and Sleep Medicine    Name: Lloyd Thompson MRN: [id number]   : 1975 Hospital: [facility] [place]    Date: 2021  Room: [address] [place] [address] Note                                              Consult requesting physician: Dr. Jakob Ramos  Reason for Consult: Severe Covid pneumonia      Subjective/History of Present Illness:   Patient is a 55 y.o. male with PMHx significant for diabetes and hypertension. He has come to ER with worsening shortness of breath with positive Covid infection. He reports that he has been having cold symptoms for more than a week. He tested positive about 1 week ago. He has come with worsening shortness of breath, and was found to be severely hypoxemic. He has been placed on combination of high flow oxygen and nonrebreather. His oxygen saturation is in the high 80s on this, and in the semiupright position. Vaccination statusnot vaccinated for COVID-19  I have reviewed history with ER consultant Dr. Timmy Santos and chart review. Currently on the maximum dose of high flow prone position started patient is not stable to perform CT for pulmonary embolus in the setting of elevated D-dimer full anticoagulation started last night PVL negative   Patient failed high flow and was intubated on 2021. ARDS protocol.  If worsen will call transfer center to evaluate for ECMO      21   Patient seen at bedside in ICU room #109  In prone position since yesterday early afternoon with improved SPO2  Didn't respond to multiple ventilator setting/modes changes during de-proning y'day  FiO2 improved to 80% during proning; AC 20/450/80%/15 PEEP, peak pressure 29 cwp  Staff aware to start process of deproning this morning  Hemodynamically stable; no arrhythmia  No high-grade fever episode  Remains sedated and on paralytic  Urine output improved overnight  Tolerating full dose anticoagulation without any evidence of bleeding  Blood glucose stable  I have reached out to [facility] transfer center and contacted [address] with Dr. Simi Jack surgery] as per their policy  No beds available today. As per transfer center, [facility] on diversion  Requested to reach other facilities such [facility]. Will await call back from tx center with more updates  I was not contacted by staff on anything about patient overnight. Review of Systems: Unable to obtain intubated sedated      Allergies   Allergen Reactions    Alupent [Metaproterenol] Swelling      Past Medical History:   Diagnosis Date    COVID-19     Diabetes ([facility] [address]     Hyperlipidemia     Hypertension       Past Surgical History:   Procedure Laterality Date    HX ORTHOPAEDIC Right     wrist surgery      Social History     Tobacco Use    Smoking status: Never Smoker    Smokeless tobacco: Not on file   Substance Use Topics    Alcohol use: Yes     Comment: drinksonce a week      Family History   Problem Relation Age of Onset    Diabetes Mother     Hypertension Mother     Stroke Mother     Hodgkin's lymphoma Mother     Diabetes Father     Hypertension Father     Cystic Fibrosis Father     Diabetes Maternal Aunt     Diabetes Maternal Uncle       Prior to Admission medications    Medication Sig Start Date End Date Taking? Authorizing Provider   atorvastatin (LIPITOR) 20 mg tablet Take 20 mg by mouth daily. Yes Provider, Historical   telmisartan (MICARDIS) 80 mg tablet Take 80 mg by mouth daily. Indications: high blood pressure   Yes Provider, Historical   fenofibrate nanocrystallized (TRICOR) 145 mg tablet Take 145 mg by mouth daily. Yes Provider, Historical   glipiZIDE SR (GLUCOTROL XL) 10 mg CR tablet Take 10 mg by mouth daily. Yes Provider, Historical   metFORMIN ER (GLUCOPHAGE XR) 500 mg tablet Take 500 mg by mouth daily (with dinner).    Yes Provider, Historical     Current Facility-Administered Medications   Medication Dose Route Frequency    chlorhexidine (PERIDEX) 0.12 % mouthwash 15 mL  15 mL Oral Q12H    [Held by provider] insulin lispro (HUMALOG) injection 3 Units  3 Units SubCUTAneous Q6H    insulin glargine (LANTUS) injection 24 Units  24 Units SubCUTAneous DAILY    enoxaparin (LOVENOX) injection 90 mg  1 mg/kg SubCUTAneous Q12H    cisatracurium (NIMBEX) 100 mg in 0.9% sodium chloride 100 mL (1 mg/mL) infusion  0-10 mcg/kg/min IntraVENous TITRATE    fentaNYL (PF) 900 mcg/30 ml infusion soln  0-200 mcg/hr IntraVENous TITRATE    NOREPINephrine (LEVOPHED) 8 mg in 0.9% NS 250ml infusion  0.5-16 mcg/min IntraVENous TITRATE    midazolam in normal saline (VERSED) 1 mg/mL infusion  0-10 mg/hr IntraVENous TITRATE    insulin lispro (HUMALOG) injection   SubCUTAneous Q6H    dexamethasone (DECADRON) 4 mg/mL injection 20 mg  20 mg IntraVENous Q24H    [Held by provider] ipratropium-albuterol (COMBIVENT RESPIMAT) 20 mcg-100 mcg inhalation spray  1 Puff Inhalation TID    lidocaine 4 % patch 1 Patch  1 Patch TransDERmal Q24H    atorvastatin (LIPITOR) tablet 20 mg  20 mg Oral DAILY    pantoprazole (PROTONIX) 40 mg in 0.9% sodium chloride 10 mL injection  40 mg IntraVENous Q24H    cholecalciferol (VITAMIN D3) (1000 Units /25 mcg) tablet 2,000 Units  2,000 Units Oral DAILY    ascorbic acid (vitamin C) (VITAMIN C) tablet 500 mg  500 mg Oral BID    zinc sulfate (ZINCATE) 50 mg zinc (220 mg) capsule 1 Capsule  1 Capsule Oral Q12H    melatonin (rapid dissolve) tablet 5 mg  5 mg Oral QHS         Objective:   Vital Signs:    Visit Vitals  BP (!) 149/79   Pulse (!) 59   Temp 99.1 °F (37.3 °C)   Resp 20   Ht 5' 6\" (1.676 m)   Wt 87.6 kg (193 lb 2 oz)   SpO2 99%   BMI 31.17 kg/m²       O2 Device: Endotracheal tube, Ventilator   O2 Flow Rate (L/min): 60 l/min   Temp (24hrs), Av.4 °F (37.4 °C), Min:98.6 °F (37 °C), Max:100.4 °F (38 °C)       Intake/Output:   Last shift:      No intake/output data recorded.     Last 3 shifts:  1901 -  0700  In: 1121.5 [I.V.:961.5]  Out: 2950 [Urine:2950]      Intake/Output Summary (Last 24 hours) at 11/30/2021 0945  Last data filed at 11/30/2021 0655  Gross per 24 hour   Intake 1031.53 ml   Output 1800 ml   Net -768.47 ml       Last 3 Recorded Weights in this Encounter    11/23/21 1607 11/24/21 1210 11/29/21 0015   Weight: 97.5 kg (215 lb) 97.5 kg (215 lb) 87.6 kg (193 lb 2 oz)        Physical Exam: Limitations in exam due to full PPE requirements. General: sedated, on paralytic, proning, in no respiratory distress, appears stated age, on ventilator  HEENT: PERRL, sclera non-icteric  Neck: No abnormally enlarged lymph nodes or thyroid, supple  Chest: normal; exam limitation  Lungs: moderate air entry, few rhonchi scattered and no wheezes bilaterally, normal percussion posterior chest wall bilaterally, no tenderness/ rash  Heart: Regular rate and rhythm, S1S2 present or without murmur or extra heart sounds  Abdomen: non distended, bowel sounds normoactive, abdomen is soft without significant tenderness or guarding, rigidity, rebound  Extremity: no edema, cyanosis, clubbing  Neuro: sedated, on paralytic, no involuntary movements, exam limitation secondary to ventilator, paralytic  Skin: Skin color, texture, turgor unchanged      Data:       Recent Results (from the past 24 hour(s))   BLOOD GAS, ARTERIAL POC    Collection Time: 11/29/21 11:18 AM   Result Value Ref Range    Device: ADULT VENT      FIO2 (POC) 100 %    pH (POC) 7.37 7.35 - 7.45      pCO2 (POC) 52.8 (H) 35.0 - 45.0 MMHG    pO2 (POC) 52 (L) 80 - 100 MMHG    HCO3 (POC) 30.2 (H) 22 - 26 MMOL/L    sO2 (POC) 84.2 (L) 92 - 97 %    Base excess (POC) 3.8 mmol/L    Mode ASSIST CONTROL      Tidal volume 450 ml    Set Rate 16 bpm    PEEP/CPAP (POC) 10 cmH2O    Allens test (POC) Positive      Total resp.  rate 16      Site RIGHT RADIAL      Specimen type (POC) ARTERIAL      Performed by Honey Amin Respiratory    GLUCOSE, POC    Collection Time: 11/29/21 11:23 AM   Result Value Ref Range    Glucose (POC) 150 (H) 70 - 110 mg/dL   GLUCOSE, POC    Collection Time: 11/29/21  4:52 PM   Result Value Ref Range    Glucose (POC) 106 70 - 110 mg/dL   GLUCOSE, POC    Collection Time: 11/30/21 12:37 AM   Result Value Ref Range    Glucose (POC) 228 (H) 70 - 110 mg/dL   D DIMER    Collection Time: 11/30/21  4:00 AM   Result Value Ref Range    D DIMER 3.61 (H) <0.46 ug/ml(FEU)   PROTHROMBIN TIME + INR    Collection Time: 11/30/21  4:00 AM   Result Value Ref Range    Prothrombin time 14.6 11.5 - 15.2 sec    INR 1.2 0.8 - 1.2     PTT    Collection Time: 11/30/21  4:00 AM   Result Value Ref Range    aPTT 28.8 23.0 - 36.4 SEC   CBC WITH AUTOMATED DIFF    Collection Time: 11/30/21  4:00 AM   Result Value Ref Range    WBC 8.0 4.6 - 13.2 K/uL    RBC 4.06 (L) 4.35 - 5.65 M/uL    HGB 12.0 (L) 13.0 - 16.0 g/dL    HCT 36.1 36.0 - 48.0 %    MCV 88.9 78.0 - 100.0 FL    MCH 29.6 24.0 - 34.0 PG    MCHC 33.2 31.0 - 37.0 g/dL    RDW 12.4 11.6 - 14.5 %    PLATELET 867 100 - 137 K/uL    MPV 8.8 (L) 9.2 - 11.8 FL    NRBC 0.0 0  WBC    ABSOLUTE NRBC 0.00 0.00 - 0.01 K/uL    NEUTROPHILS 96 (H) 40 - 73 %    BAND NEUTROPHILS 2 0 - 5 %    LYMPHOCYTES 1 (L) 21 - 52 %    MONOCYTES 1 (L) 3 - 10 %    EOSINOPHILS 0 0 - 5 %    BASOPHILS 0 0 - 2 %    IMMATURE GRANULOCYTES 0 %    ABS. NEUTROPHILS 7.8 1.8 - 8.0 K/UL    ABS. LYMPHOCYTES 0.1 (L) 0.9 - 3.6 K/UL    ABS. MONOCYTES 0.1 0.05 - 1.2 K/UL    ABS. EOSINOPHILS 0.0 0.0 - 0.4 K/UL    ABS. BASOPHILS 0.0 0.0 - 0.1 K/UL    ABS. IMM.  GRANS. 0.0 K/UL    DF MANUAL      PLATELET COMMENTS ADEQUATE PLATELETS      RBC COMMENTS NORMOCYTIC, NORMOCHROMIC     METABOLIC PANEL, COMPREHENSIVE    Collection Time: 11/30/21  4:00 AM   Result Value Ref Range    Sodium 140 136 - 145 mmol/L    Potassium 4.6 3.5 - 5.5 mmol/L    Chloride 103 100 - 111 mmol/L    CO2 30 21 - 32 mmol/L    Anion gap 7 3.0 - 18 mmol/L    Glucose 217 (H) 74 - 99 mg/dL    BUN 24 (H) 7.0 - 18 MG/DL    Creatinine 0.58 (L) 0.6 - 1.3 MG/DL    BUN/Creatinine ratio 41 (H) 12 - 20      GFR est AA >60 >60 ml/min/1.73m2    GFR est non-AA >60 >60 ml/min/1.73m2    Calcium 8.7 8.5 - 10.1 MG/DL    Bilirubin, total 0.7 0.2 - 1.0 MG/DL    ALT (SGPT) 63 (H) 16 - 61 U/L    AST (SGOT) 59 (H) 10 - 38 U/L    Alk.  phosphatase 46 45 - 117 U/L    Protein, total 6.6 6.4 - 8.2 g/dL    Albumin 3.9 3.4 - 5.0 g/dL    Globulin 2.7 2.0 - 4.0 g/dL    A-G Ratio 1.4 0.8 - 1.7     CALCIUM, IONIZED    Collection Time: 11/30/21  4:00 AM   Result Value Ref Range    Ionized Calcium 1.09 (L) 1.12 - 1.32 MMOL/L   MAGNESIUM    Collection Time: 11/30/21  4:00 AM   Result Value Ref Range    Magnesium 2.8 (H) 1.6 - 2.6 mg/dL   PHOSPHORUS    Collection Time: 11/30/21  4:00 AM   Result Value Ref Range    Phosphorus 3.8 2.5 - 4.9 MG/DL   NT-PRO BNP    Collection Time: 11/30/21  4:00 AM   Result Value Ref Range    NT pro-BNP 35 0 - 450 PG/ML   BLOOD GAS, ARTERIAL POC    Collection Time: 11/30/21  5:32 AM   Result Value Ref Range    Device: ADULT VENT      FIO2 (POC) 100 %    pH (POC) 7.39 7.35 - 7.45      pCO2 (POC) 49.3 (H) 35.0 - 45.0 MMHG    pO2 (POC) 170 (H) 80 - 100 MMHG    HCO3 (POC) 30.1 (H) 22 - 26 MMOL/L    sO2 (POC) 99.5 (H) 92 - 97 %    Base excess (POC) 4.1 mmol/L    Mode Volume Control      Tidal volume 450 ml    Set Rate 20 bpm    PIP (POC) 29      Allens test (POC) Positive      Site RIGHT RADIAL      Patient temp. 98.3      Specimen type (POC) ARTERIAL      Performed by Kayla Garza)    GLUCOSE, POC    Collection Time: 11/30/21  6:21 AM   Result Value Ref Range    Glucose (POC) 185 (H) 70 - 110 mg/dL         Chemistry Recent Labs     11/30/21  0400 11/29/21  0445 11/28/21  0515   * 211* 229*    139 136   K 4.6 4.8 4.9    104 99*   CO2 30 31 25   BUN 24* 18 20*   CREA 0.58* 0.57* 0.71   CA 8.7 8.5 8.8   MG 2.8* 2.5 2.2   PHOS 3.8 4.3 7.6*   AGAP 7 4 12   BUCR 41* 32* 28*   AP 46 44* 69   TP 6.6 6.2* 6.6   ALB 3.9 3.5 2.8*   GLOB 2.7 2.7 3. 8   AGRAT 1.4 1.3 0.7*        Lactic Acid Lactic acid   Date Value Ref Range Status   11/24/2021 1.4 0.4 - 2.0 MMOL/L Final     No results for input(s): LAC in the last 72 hours. Liver Enzymes Protein, total   Date Value Ref Range Status   11/30/2021 6.6 6.4 - 8.2 g/dL Final     Albumin   Date Value Ref Range Status   11/30/2021 3.9 3.4 - 5.0 g/dL Final     Globulin   Date Value Ref Range Status   11/30/2021 2.7 2.0 - 4.0 g/dL Final     A-G Ratio   Date Value Ref Range Status   11/30/2021 1.4 0.8 - 1.7   Final     Alk.  phosphatase   Date Value Ref Range Status   11/30/2021 46 45 - 117 U/L Final     Recent Labs     11/30/21  0400 11/29/21  0445 11/28/21  0515   TP 6.6 6.2* 6.6   ALB 3.9 3.5 2.8*   GLOB 2.7 2.7 3.8   AGRAT 1.4 1.3 0.7*   AP 46 44* 69        CBC w/Diff Recent Labs     11/30/21  0400 11/29/21  0445 11/28/21  0515   WBC 8.0 5.1 9.1   RBC 4.06* 4.30* 5.56   HGB 12.0* 12.7* 16.3*   HCT 36.1 37.8 47.7    369 477*   GRANS 96* 90* 88*   LYMPH 1* 5* 8*   EOS 0 1 0        Cardiac Enzymes No results found for: CPK, CK, CKMMB, CKMB, RCK3, CKMBT, CKNDX, CKND1, SHANIKA, TROPT, TROIQ, NIKKO, TROPT, TNIPOC, BNP, BNPP     BNP No results found for: BNP, BNPP, XBNPT     Coagulation Recent Labs     11/30/21  0400 11/29/21 0445 11/28/21  0515   PTP 14.6 14.4 13.2   INR 1.2 1.2 1.1   APTT 28.8 29.4 28.6         Thyroid  No results found for: T4, T3U, TSH, TSHEXT, TSHEXT    No results found for: T4     Urinalysis Lab Results   Component Value Date/Time    Color YELLOW 11/24/2021 06:00 AM    Appearance CLEAR 11/24/2021 06:00 AM    Specific gravity >1.030 (H) 11/24/2021 06:00 AM    pH (UA) 5.5 11/24/2021 06:00 AM    Protein Negative 11/24/2021 06:00 AM    Glucose >1,000 (A) 11/24/2021 06:00 AM    Ketone 80 (A) 11/24/2021 06:00 AM    Bilirubin Negative 11/24/2021 06:00 AM    Urobilinogen 1.0 11/24/2021 06:00 AM    Nitrites Negative 11/24/2021 06:00 AM    Leukocyte Esterase Negative 11/24/2021 06:00 AM        Micro  No results for input(s): SDES, CULT in the last 72 hours. No results for input(s): CULT in the last 72 hours. Culture data during this hospitalization. All Micro Results     Procedure Component Value Units Date/Time    CULTURE, BLOOD [[id number]] Collected: 11/23/21 1624    Order Status: Completed Specimen: Blood Updated: 11/29/21 0731     Special Requests: NO SPECIAL REQUESTS        Culture result: NO GROWTH 6 DAYS       CULTURE, BLOOD [[id number]] Collected: 11/23/21 1624    Order Status: Completed Specimen: Blood Updated: 11/29/21 0731     Special Requests: NO SPECIAL REQUESTS        Culture result: NO GROWTH 6 DAYS       CULTURE, RESPIRATORY/SPUTUM/BRONCH Erlinda Mulch STAIN [[id number]]     Order Status: Sent Specimen: Sputum from Transtracheal Aspirate     CULTURE, URINE [[id number]] Collected: 11/24/21 0600    Order Status: Completed Specimen: Urine from Clean catch Updated: 11/25/21 2047     Special Requests: NO SPECIAL REQUESTS        Culture result: No growth (<1,000 CFU/ML)       COVID-19 RAPID TEST [[id number]]  (Abnormal) Collected: 11/24/21 0600    Order Status: Completed Specimen: Nasopharyngeal Updated: 11/24/21 0703     Specimen source Nasopharyngeal        COVID-19 rapid test Detected        Comment:      The specimen is POSITIVE for SARS-CoV-2, the novel coronavirus associated with COVID-19. This test has been authorized by the FDA under an Emergency Use Authorization (EUA) for use by authorized laboratories.         Fact sheet for Healthcare Providers: ConventionUpdate.co.nz  Fact sheet for Patients: ConventionUpdate.co.nz       Methodology: Isothermal Nucleic Acid Amplification  CALLED TO AND CORRECTLY REPEATED BY:  SLOANE KIRKPATRICK RN AT 5492 ON 11/24/21 TO LAD         RESPIRATORY VIRUS PANEL W/COVID-19, PCR [[id number]]  (Abnormal) Collected: 11/23/21 1624    Order Status: Completed Specimen: Nasopharyngeal Updated: 11/23/21 2308     Adenovirus Not detected Coronavirus 229E Not detected        Coronavirus HKU1 Not detected        Coronavirus CVNL63 Not detected        Coronavirus OC43 Not detected        SARS-CoV-2, PCR Detected        Comment: CALLED TO AND CORRECTLY REPEATED BY:  MS CARDENAS ALEXEI OWENS OF [facility] AT 2230 BY KDA 11/23/21  CALLED TO AND CORRECTLY REPEATED BY:  Kaitlynn Polk RN ICU AT 2252 ON 11/23/21 TO TSH. Metapneumovirus Not detected        Rhinovirus and Enterovirus Not detected        Influenza A Not detected        Influenza A, subtype H1 Not detected        Influenza A, subtype H3 Not detected        INFLUENZA A H1N1 PCR Not detected        Influenza B Not detected        Parainfluenza 1 Not detected        Parainfluenza 2 Not detected        Parainfluenza 3 Not detected        Parainfluenza virus 4 Not detected        RSV by PCR Not detected        B. parapertussis, PCR Not detected        Bordetella pertussis - PCR Not detected        Chlamydophila pneumoniae DNA, QL, PCR Not detected        Mycoplasma pneumoniae DNA, QL, PCR Not detected                Images report reviewed by me:  CT (Most Recent) (CT chest reviewed by me) No results found for this or any previous visit. CXR reviewed by me:  XR (Most Recent). CXR  reviewed by me and compared with previous CXR Results from Hospital Encounter encounter on 11/23/21    XR CHEST PORT    Narrative  EXAM: XR CHEST PORT    CLINICAL INDICATION/HISTORY: et tube position, acute hypoxic respiratory  failure, COVID-19 pneumonia  -Additional: None    COMPARISON: One day prior    TECHNIQUE: Portable frontal view of the chest    _______________    FINDINGS:    SUPPORT DEVICES: Endotracheal tube and enteric tubes unchanged. Slight  advancement of enteric tube recommended. HEART AND MEDIASTINUM: Cardiomediastinal silhouette within normal limits. LUNGS AND PLEURAL SPACES: Bilateral parenchymal and interstitial opacities,  worsened. Possible layering effusions.  No pneumothorax.    _______________    Impression  Bilateral parenchymal and interstitial opacities, worsened. Possible layering  effusions. Slight advancement of enteric tube recommended. IMPRESSION:   · Acute respiratory failure with hypoxemia - J96.01  · Severe Covid pneumonia - U07.1, J12.82  · ARDS  · Hyperglycemia due to Type 2 diabetes mellitus  · Hypertension  · Anemia, stable Hgb  Patient Active Problem List   Diagnosis Code    Acute hypoxemic respiratory failure due to COVID-19 ([facility] [address] U07.1, J96.01    Pneumonia due to COVID-19 virus U07.1, J12.82    Hyperglycemia due to type 2 diabetes mellitus ([facility] [address] E11.65    Primary hypertension I10    Hyponatremia E87.1    Lactic acidosis E87.2    Hyperlipidemia E78.5    Obesity (BMI 30-39. 9) E66.9    COVID-19 vaccination not done Z28.9   Code status: Full code   RECOMMENDATIONS:   Respiratory: Acute hypoxic respiratory failure secondary to Covid pneumonia and ARDS  Delayed presentation with severe hypoxemic respiratory failure  AC 20/450/15 PEEP/80% fiO2 -> will increase back FiO2 200% to assist with deep proning  Once the proning stable may gradually titrate FiO2 as tolerated  Chest x-ray 11/30/2021 shows overall some worsening; possible layering effusion  proBNP normal  Mech. Ventilated patients- aim to keep peak plateau pressure less than or equal to 30cm H2O.  Titrate FiO2 for goal SPO2> 88%  VAP prevention bundle and sedation bundle followed, head of the bed at 30' all times  Daily sedation holiday and assessment for weaning with SBT as tolerated - arrange paralytic or sedation off as per tolerance of deep proning  Continue pulmonary hygiene care  Steroids -taper with clinical course  Proning as needed  Failed high flow intubated on 11/28/2021 at night  Chest x-ray and ABG as needed  Severe ARDS due to COVID-19 pneumonia patient is not stable to perform CTA of the chest with worsening D-dimer - on full anticoagulation  Started steroids and given bronchodilators additionally added Actemra   ER consultant tried calling tertiary facility for possible ECMOcurrently no beds available    Sedation: nimbex, versed, fentanyl    CVS: Stable hemodynamics. Monitor blood pressure. pro-BNP normal  Diuresis - laxis 20 mg x 1 today  D-dimer stable compared to yesterday and if start going down then may consider reducing Lovenox to prophylactic dose  Patient high Risk PE/ pulmonary embolism. D-dimer elevated. Patient with severe Covid infection. On therapeutic anticoagulation with Lovenox twice daily  PVL negative but unable to do CTA chest    PVL 11/24/21  · No evidence of deep vein thrombosis in the right lower extremity. · No evidence of deep vein thrombosis in the left lower extremity    ECHO 11/24/21  · LV: Estimated LVEF is 50 - 55%. Normal cavity size, wall thickness and diastolic function. Low normal systolic function. E/E'= 7.12    ID: Severe COVID-19 pneumonia  ID following  High D-dimer on full dose of Lovenox  On steroids - started on 11/23/2021  1 dose Tocilizumab given on 11/24/2021  proCalcitonin low - off of antibiotics. Antibiotics  empirically received ceftriaxone and azithromycin   Late presentation with severe hypoxemic respiratory failurenot a candidate for remdesivir. No history of hepatitis or TB  Dexamethasone6 mg daily - changed to 20 mg as intubated and high risk of pulmonary fibroris. Blood sugar elevated with history of type 2 diabetes mellitus   Vitamin supplements  Monitor lactic acid, respiratory culture, procal as needed    Blood culture 11/23/2021negative final  Urine culture 11/24/2021negative final    Hematology/Oncology: stable hemoglobin  No other signs of active bleeding anywhere  Monitor Hgb and Plt; watch for any bleeding while on therapeutic anticoagulation  Monitor D-dimer to adjust the dose of Lovenox    Renal: Monitor renal function and urine output.   Lasix 20 mg x 1 today  Monitor sodium with Lasix dosing  Replace electrolyte as needed     GI: Monitor LFT -stable and mildly elevated. N.p.o.  Endocrine: Monitor FSBS. SSI. Adjust Lantus insulin  Neurology: Sedated, on paralytic  Skin/Wound: ICU nursing care  Electrolytes: Replace electrolytes per ICU electrolyte replacement protocol. Prophylaxis: DVT Prophylaxis: Enoxaparin. GI Prophylaxis: Protonix. Lines/Tubes: PIV, mid line  ET tube: 11/28/2021  OG tube: 11/28/2021  Gold: 11/24/21 (Medically necessary for strict input/output monitoring in critically ill patient, will remove it when not needed. Gold bundle followed). Prognosis seems to be guarded; patient being admitted to ICU with severe Covid pneumonia and hypoxemic respiratory failure; patient risk of mortality and on going ventilator support, prolonged hospitalization, nosocomial infection, cardiovascular collapse, multiorgan system failure, mortality, morbidity, other are very high. Full code  I called and spoke with wife at her listed number. Update provided and discussed her questions. Inform about process started to evaluate for any transfer to tertiary center for additional treatment options. Dr. Antonio Ceballos  Didn't have any recommendation in addition to present treatment. No beds available at [facility] or [facility]. Discussed wife's questions about Ivermectin and explained it's not approved or authorized treatment per CDC, FDA, NIH for COVID19 infection at present. Discussed with ID consultant and Dr. Sundeep Tanner agrees same regarding Ivermectine    Will defer respective systems problem management to primary and other consultant and follow patient in ICU with primary and other medical team  Further recommendations will be based on the patient's response to recommended treatment and results of the investigation ordered. Quality Care: PPI, DVT prophylaxis, HOB elevated, Infection control all reviewed and addressed. Events and notes from last 24 hours reviewed.  Care plan discussed with nursing, hospitalist, RT, MDR  High complexity decision making was performed during the evaluation of this patient at high risk for decompensation with multiple organ involvement. Total critical care time spent rendering care exclusive of procedures/family discussion/coordination of care: 45 minutes. Please note: Voice-recognition software may have been used to generate this report, which may have resulted in some phonetic-based errors in grammar and contents. Even though attempts were made to correct all the mistakes, some may have been missed, and remained in the body of the document. Norma Michel MD  11/30/2021       Note  Patient with SARS-CoV-2 pneumonia with severe pneumonia and hypoxemic respiratory failure; patient on high oxygen support (high flow NC, nonrebreather mask oxygen, ventilator); patient is not vaccinated, and is in the age group that is being infected with delta virus strain; the strain causes severe respiratory failure and complications reported in the [place] and worldwide; unfortunately, the prognosis is poor in unvaccinated patients, with a high risk of complications, multiorgan failure, chronic hypoxemia and respiratory failure, intubation, mechanical ventilation, thromboembolic disease risk in multiple organs, high risk for long Covid syndrome, and high risk for mortality. The CDC federal and state guidelines have emphasized repeatedly the importance of vaccination to prevent severe infection, mortality, disabilities, long Covid syndrome from Covid virus with several strains currently in the [place]. Vaccinations have been approved by FDA. Vaccination has been extended to children as well. The treatment protocols are followed based on local hospital protocols, with guidance from centers such as [facility], [facility] protocols. [facility] is not part of any research protocol. The local hospital protocols have been guided by [facility] pharmacy department.   Covid plasma is no longer considered standard of care in the [facility] due to lack of benefit in trials. Nebulization and CPAP therapies are not considered as part of the protocol in [facility] due to high risk of aerosolization, and high risks of spreading Covid infection to the healthcare staff. Some of the treatments may be beneficial and may not be available at local hospital.      REMDESIVIR-delayed presentation with severe hypoxemic respiratory failure are poor candidate for remdesivir therapy per [facility]. Anti-inflammatory medication such as baricitinib or Tocilizumab based on CRP may have some benefit. Dexamethasone is considered standard of care.

## 2021-11-30 NOTE — PROGRESS NOTES
Patient has been accepted at [facility] by Dr. Trae Walters, will evaluate beds tomorrow, transfer center updated.

## 2021-11-30 NOTE — PROGRESS NOTES
[facility] transfer center called back  [facility] confirmed [facility] has no beds available and on diversion  Transfer center also confirmed [facility] on diversion for ECMO  Advised transfer center to speak with [facility] lung transplant center to discuss candidacy of any future lung transplant if need to assist with decision on ECMO bridging treatment for other tertiary centers to consider patient for ECMO  Will await for further update    Loida Oliver MD

## 2021-11-30 NOTE — PROGRESS NOTES
Hospitalist Progress Note-critical care note     Patient: Joshua Tapia MRN: [id number]  [facility]: [id number]    YOB: 1975  Age: 55 y.o. Sex: male    DOA: 11/23/2021 LOS:  LOS: 7 days            Chief complaint: acute respiratory failure, covid 19 pna, dm , htn ,     Assessment/Plan         Hospital Problems  Date Reviewed: 11/28/2021          Codes Class Noted POA    * (Principal) Acute hypoxemic respiratory failure due to COVID-19 [facility]) ICD-10-CM: U07.1, J96.01  ICD-9-CM: 518.81, 079.89, 799.02  11/23/2021 Yes        Pneumonia due to COVID-19 virus ICD-10-CM: U07.1, J12.82  ICD-9-CM: 480.8, 079.89  11/23/2021 Yes        Hyperglycemia due to type 2 diabetes mellitus ([facility] ICD-10-CM: E11.65  ICD-9-CM: 250.00  11/23/2021 Yes        Primary hypertension ICD-10-CM: I10  ICD-9-CM: 401.9  11/23/2021 Yes        Hyponatremia ICD-10-CM: E87.1  ICD-9-CM: 276.1  11/23/2021 Yes        Lactic acidosis ICD-10-CM: E87.2  ICD-9-CM: 276.2  11/23/2021 Yes        Hyperlipidemia ICD-10-CM: E78.5  ICD-9-CM: 272.4  Unknown Yes        Obesity (BMI 30-39. 9) ICD-10-CM: E66.9  ICD-9-CM: 278.00  11/23/2021 Yes        COVID-19 vaccination not done ICD-10-CM: Z28.9  ICD-9-CM: V64.00  11/23/2021 Yes              46 y/o male with poorly controlled type 2 diabetes mellitus, hypertension, and obesity is admitted for acute hypoxemic respiratory failure due to COVID-19 pneumonia with lactic acidosis, hyperglycemia, and hyponatremia.  He has not been vaccinated. His medical problems increase the risk of morbidity and mortality from [place]. He is likely to require intubation and mechanical ventilation very soon.     Respiratory  acute hypoxemic respiratory failure due to COVID-19 pneumonia.    Patient was intubated on 11/28,  Vented manager per intensivist  On PEEP 15, oxygen 80% this morning  CTA unable to be done since admission due to unstable  Repeated cxr bilateral worsening       covid 19 pna   Not vaccinated   On iv steroid , received Tociluxumab per ID   Antioxidants   Breathing tx       Cardiac  Hypotension after intubation  Continue Levophed to keep MAP greater than 65    Endo    DM type II    lantus and continue ssi, lantus dose increased for better glucose control     Heme :  Continue monitor WBC H&H  On lovenox for empiric tx pe  On admission     ID  So far not on antibiotics    Renal FEN  ICU electrolytes replacement protocol monitor urine output, Gold management    Neuro  On nimbex for paralyzed   Continue  Versed and fentanyl for sedation     GI PPI NPO for now    rn : stable,oxygen requirement mild improving       Dr. Diana Lewis called transfer center for ECMO , so far no accepting facility     30 minutes of critical care time spent in the direct evaluation and treatment of this high risk patient. The reason for providing this level of medical care for this critically ill patient was due a critical illness that impaired one or more vital organ systems such that there was a high probability of imminent or life threatening deterioration in the patients condition. This care involved high complexity decision making to assess, manipulate, and support vital system functions, to treat this degreee vital organ system failure and to prevent further life threatening deterioration of the patients condition. Disposition :tbd,   Review of systems:  Unable to obtain due to intubated  Vital signs/Intake and Output:  Visit Vitals  BP (!) 149/79   Pulse 62   Temp 99.1 °F (37.3 °C)   Resp 20   Ht 5' 6\" (1.676 m)   Wt 87.6 kg (193 lb 2 oz)   SpO2 99%   BMI 31.17 kg/m²     Current Shift:  No intake/output data recorded. Last three shifts:  11/28 1901 - 11/30 0700  In: 1121.5 [I.V.:961.5]  Out: 2950 [Urine:2950]    Physical Exam:  General: Intubated, prone position   HEENT: NC, Atraumatic.   ET tube /OG tube noted  Lungs: CTA bilaterally no wheezing  Heart:  Regular regular no murmur  Abdomen: Soft no tenderness no mass  Extremities: No c/c/e  Psych:   Calm   Neurologic:  On sedation          Labs: Results:       Chemistry Recent Labs     11/30/21 0400 11/29/21 0445 11/28/21  0515   * 211* 229*    139 136   K 4.6 4.8 4.9    104 99*   CO2 30 31 25   BUN 24* 18 20*   CREA 0.58* 0.57* 0.71   CA 8.7 8.5 8.8   AGAP 7 4 12   BUCR 41* 32* 28*   AP 46 44* 69   TP 6.6 6.2* 6.6   ALB 3.9 3.5 2.8*   GLOB 2.7 2.7 3.8   AGRAT 1.4 1.3 0.7*      CBC w/Diff Recent Labs     11/30/21 0400 11/29/21 0445 11/28/21  0515   WBC 8.0 5.1 9.1   RBC 4.06* 4.30* 5.56   HGB 12.0* 12.7* 16.3*   HCT 36.1 37.8 47.7    369 477*   GRANS 96* 90* 88*   LYMPH 1* 5* 8*   EOS 0 1 0      Cardiac Enzymes Recent Labs     11/29/21  0446   CPK 99   CKND1 CALCULATION NOT PERFORMED WHEN RESULT IS BELOW LINEAR LIMIT      Coagulation Recent Labs     11/30/21 0400 11/29/21 0445   PTP 14.6 14.4   INR 1.2 1.2   APTT 28.8 29.4       Lipid Panel Lab Results   Component Value Date/Time    Cholesterol, total 123 11/23/2021 04:24 PM    HDL Cholesterol 33 (L) 11/23/2021 04:24 PM    LDL, calculated 41.8 11/23/2021 04:24 PM    VLDL, calculated 48.2 11/23/2021 04:24 PM    Triglyceride 241 (H) 11/23/2021 04:24 PM    CHOL/HDL Ratio 3.7 11/23/2021 04:24 PM      BNP No results for input(s): BNPP in the last 72 hours. Liver Enzymes Recent Labs     11/30/21 0400   TP 6.6   ALB 3.9   AP 46      Thyroid Studies No results found for: T4, T3U, TSH, TSHEXT, TSHEXT     Procedures/imaging: see electronic medical records for all procedures/Xrays and details which were not copied into this note but were reviewed prior to creation of Plan    XR CHEST PORT    Result Date: 11/23/2021  EXAM:  AP Portable Chest X-ray 1 view INDICATION: Shortness of breath COMPARISON: None _______________ FINDINGS:  Heart and mediastinal contours are within normal limits for portable radiograph. There are bilateral interstitial and alveolar opacities most prominent at the lung bases.  Findings suggestive of pneumonia and/or pulmonary edema. There are no pleural effusions. No acute osseous findings. ________________      Bilateral interstitial and alveolar opacities suggesting pneumonia and/or pulmonary edema. No pleural effusions or pneumothorax seen.       Janett Causey MD

## 2021-11-30 NOTE — PROGRESS NOTES
[facility]  (A Division of [address] [place] [address])                                                                                                                      Ciara Morales MD  Office #: - Option # [phone]  Fax #: [phone]     Date of Admission: 11/23/2021Date of Note: 11/30/2021  Reason for Follow up: Evaluation and antibiotic management of  covid 19 infection. Current Antimicrobials:    Prior Antimicrobials:    Dexamethasone 11/23 to date  S/P Tociluzumab 11/24   CTX/Zithromax 11/23 X1 day         Assessment- ID related:  --------------------------------------------------------------------------  Critically ill patient with :   Severe COVID 19 infection in the unvaccinated   Viral PNA- bilateral- extensive   Acute hypoxemic respiratory failure 2/2 above        Intubated 11/27  · Mild transaminitis likely viral    COVID 19 PCR + RVP 11/23 and CVS 11/17  Procal NL X2  CRP 13.2-> 0.6  DD 0.93-> 3.31  Fib 640-> 297  LD -1090-> 628  ferretin >5000-> 2134  11/23- BCX NGSF  11/24 UCX : NG. UA with glucosuria only  Hep C ab/hep B s ag-neg. TB serology pending    Other Medical Issues- Mx per respective team:    · DM X10 years, poorly controlled A1C 8.3%  · hyperlipidemia   Recommendation for ID issues I am following:  ------------------------------------------------------------------------------    --vent support/ AC/vit cocktail mix per ICU team  -cont steroid  --poor progression despite above-> Tertiary care contact for ECMO transfer initiated  --monitor cbc w/diff, bmp daily. Check procal every other day- ordered. Biomarkers with decline trend for the most part    DW ICU MD, RN                        -> sugar control per ICU     Subjective:  Patient is intubated/sedated/paralyzed.  On levophed-- unable to verbalize  Events over reviewed, DW med staff  Temp 99, WBC 8K  Did ok proned, but hypoxia worsened while supine and not going back FIO2 50% after pronging back  Little secretion from trach  Off pressor- was on low dose levophed yesterday  Labs/ Notes reviewed for past few days  CXR from this am:  Extensive bilateral parenchymal opacities, worsened from prior study. Possible fluid layering         HPI:  Anabel Sanford is a 55 y.o. WHITE/NON- with PMH of DM. He is not unvaccinated and was in contact with his young stepson, who had covid. Started to develop symptom around 11/14- feeling sick/achy/ nauseea and weakness and dry cough. . Tested + PCR at [facility] on 11/17. He got progressivley worse with SOB that he came to ED on 11/23 with hypoxia to 50/s. PaO2 on 100% FIO2 was 61 on ABG, B/L infiltrate-diffuse on cXR/    Admitted and started on steroid/ABX and proned all the time on high flow oxyten support. Feel ssob, but denies other symptoms. No prior history of significant infection. TB test was negative few years ago. 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         Active Hospital Problems    Diagnosis Date Noted    Acute hypoxemic respiratory failure due to COVID-19 [facility]) 11/23/2021    Pneumonia due to COVID-19 virus 11/23/2021    Hyperglycemia due to type 2 diabetes mellitus ([address] 11/23/2021    Primary hypertension 11/23/2021    Hyponatremia 11/23/2021    Lactic acidosis 11/23/2021    Obesity (BMI 30-39.9) 11/23/2021    COVID-19 vaccination not done 11/23/2021    Hyperlipidemia      Past Medical History:   Diagnosis Date    COVID-19     Diabetes ([address]     Hyperlipidemia     Hypertension      Past Surgical History:   Procedure Laterality Date    HX ORTHOPAEDIC Right     wrist surgery     Family History   Problem Relation Age of Onset    Diabetes Mother     Hypertension Mother     Stroke Mother     Hodgkin's lymphoma Mother     Diabetes Father     Hypertension Father     Cystic Fibrosis Father     Diabetes Maternal Aunt     Diabetes Maternal Uncle      Social History     Socioeconomic History    Marital status: SINGLE     Spouse name: Not on file    Number of children: Not on file    Years of education: Not on file    Highest education level: Not on file   Occupational History    Not on file   Tobacco Use    Smoking status: Never Smoker    Smokeless tobacco: Not on file   Substance and Sexual Activity    Alcohol use: Yes     Comment: drinksonce a week    Drug use: Never    Sexual activity: Not on file   Other Topics Concern     Service Not Asked    Blood Transfusions Not Asked    Caffeine Concern Not Asked    Occupational Exposure Not Asked    Hobby Hazards Not Asked    Sleep Concern Not Asked    Stress Concern Not Asked    Weight Concern Not Asked    Special Diet Not Asked    Back Care Not Asked    Exercise Not Asked    Bike Helmet Not Asked   [address] Not Asked    Self-Exams Not Asked   Social History Narrative    Not on file     Social Determinants of Health     Financial Resource Strain:     Difficulty of Paying Living Expenses: Not on file   Food Insecurity:     Worried About Running Out of Food in the Last Year: Not on file    Elvis of Food in the Last Year: Not on file   Transportation Needs:     Lack of Transportation (Medical): Not on file    Lack of Transportation (Non-Medical):  Not on file   Physical Activity:     Days of Exercise per Week: Not on file    Minutes of Exercise per Session: Not on file   Stress:     Feeling of Stress : Not on file   Social Connections:     Frequency of Communication with Friends and Family: Not on file    Frequency of Social Gatherings with Friends and Family: Not on file    Attends Druze Services: Not on file    Active Member of Clubs or Organizations: Not on file    Attends Club or Organization Meetings: Not on file    Marital Status: Not on file   Intimate Partner Violence:     Fear of Current or Ex-Partner: Not on file    Emotionally Abused: Not on file    Physically Abused: Not on file    Sexually Abused: Not on file Housing Stability:     Unable to Pay for Housing in the Last Year: Not on file    Number of Places Lived in the Last Year: Not on file    Unstable Housing in the Last Year: Not on file       Allergies:  Alupent [metaproterenol]     Medications:  Current Facility-Administered Medications   Medication Dose Route Frequency    chlorhexidine (PERIDEX) 0.12 % mouthwash 15 mL  15 mL Oral Q12H    ELECTROLYTE REPLACEMENT PROTOCOL - Potassium Standard Dosing   1 Each Other PRN    ELECTROLYTE REPLACEMENT PROTOCOL - Magnesium   1 Each Other PRN    ELECTROLYTE REPLACEMENT PROTOCOL - Phosphorus  Standard Dosing  1 Each Other PRN    ELECTROLYTE REPLACEMENT PROTOCOL - Calcium   1 Each Other PRN    [Held by provider] insulin lispro (HUMALOG) injection 3 Units  3 Units SubCUTAneous Q6H    insulin glargine (LANTUS) injection 24 Units  24 Units SubCUTAneous DAILY    enoxaparin (LOVENOX) injection 90 mg  1 mg/kg SubCUTAneous Q12H    cisatracurium (NIMBEX) 100 mg in 0.9% sodium chloride 100 mL (1 mg/mL) infusion  0-10 mcg/kg/min IntraVENous TITRATE    fentaNYL (PF) 900 mcg/30 ml infusion soln  0-200 mcg/hr IntraVENous TITRATE    NOREPINephrine (LEVOPHED) 8 mg in 0.9% NS 250ml infusion  0.5-16 mcg/min IntraVENous TITRATE    midazolam in normal saline (VERSED) 1 mg/mL infusion  0-10 mg/hr IntraVENous TITRATE    insulin lispro (HUMALOG) injection   SubCUTAneous Q6H    dexamethasone (DECADRON) 4 mg/mL injection 20 mg  20 mg IntraVENous Q24H    [Held by provider] ipratropium-albuterol (COMBIVENT RESPIMAT) 20 mcg-100 mcg inhalation spray  1 Puff Inhalation TID    lidocaine 4 % patch 1 Patch  1 Patch TransDERmal Q24H    atorvastatin (LIPITOR) tablet 20 mg  20 mg Oral DAILY    codeine sulfate tablet 30 mg  30 mg Oral Q4H PRN    sodium chloride (NS) flush 5-10 mL  5-10 mL IntraVENous PRN    pantoprazole (PROTONIX) 40 mg in 0.9% sodium chloride 10 mL injection  40 mg IntraVENous Q24H    acetaminophen (TYLENOL) tablet 650 mg  650 mg Oral Q6H PRN    Or    acetaminophen (TYLENOL) suppository 650 mg  650 mg Rectal Q6H PRN    guaiFENesin-dextromethorphan (ROBITUSSIN DM) 100-10 mg/5 mL syrup 5 mL  5 mL Oral Q4H PRN    cholecalciferol (VITAMIN D3) (1000 Units /25 mcg) tablet 2,000 Units  2,000 Units Oral DAILY    ascorbic acid (vitamin C) (VITAMIN C) tablet 500 mg  500 mg Oral BID    glucose chewable tablet 16 g  16 g Oral PRN    glucagon (GLUCAGEN) injection 1 mg  1 mg IntraMUSCular PRN    dextrose (D50W) injection syrg 12.5-25 g  25-50 mL IntraVENous PRN    zinc sulfate (ZINCATE) 50 mg zinc (220 mg) capsule 1 Capsule  1 Capsule Oral Q12H    melatonin (rapid dissolve) tablet 5 mg  5 mg Oral QHS     Facility-Administered Medications Ordered in Other Encounters   Medication Dose Route Frequency    lidocaine (PF) (XYLOCAINE) 20 mg/mL (2 %) injection   IntraVENous PRN    propofoL (DIPRIVAN) 10 mg/mL injection   IntraVENous PRN    succinylcholine (ANECTINE) 20 mg/mL syringe   IntraVENous PRN    rocuronium injection   IntraVENous PRN        ROS:  A comprehensive review of systems was negative. Physical Exam:    Temp (24hrs), Av.4 °F (37.4 °C), Min:98.6 °F (37 °C), Max:100.4 °F (38 °C)    Visit Vitals  BP (!) 146/76   Pulse 60   Temp 99 °F (37.2 °C)   Resp 20   Ht 5' 6\" (1.676 m)   Wt 86.9 kg (191 lb 9.3 oz)   SpO2 99%   BMI 30.92 kg/m²     Peripheral PIV in place    GEN: WD, Proned, intubated, sedated, paralysed, unresponsive  HEENT: Unicteric. EOMI intact. CHEST: Non laboured breathing. CTA posteriorly  CVS:Proned- not examined  ABD: Proned- not examined  JESSA:Gold in place  EXT: No apparent swelling or redness on UE/LE joints. No distal/ext cyanosis/ischemic changes  Skin: Dry and intact. No rash, no redness.   CNS: intubated/paralysed and sedated     Microbiology  All Micro Results     Procedure Component Value Units Date/Time    CULTURE, BLOOD [[id number]] Collected: 21 1624    Order Status: Completed Specimen: Blood Updated: 11/29/21 0731     Special Requests: NO SPECIAL REQUESTS        Culture result: NO GROWTH 6 DAYS       CULTURE, BLOOD [[id number]] Collected: 11/23/21 1624    Order Status: Completed Specimen: Blood Updated: 11/29/21 0731     Special Requests: NO SPECIAL REQUESTS        Culture result: NO GROWTH 6 DAYS       CULTURE, RESPIRATORY/SPUTUM/BRONCH Мар[place] STAIN [[id number]]     Order Status: Sent Specimen: Sputum from Transtracheal Aspirate     CULTURE, URINE [[id number]] Collected: 11/24/21 0600    Order Status: Completed Specimen: Urine from Clean catch Updated: 11/25/21 2047     Special Requests: NO SPECIAL REQUESTS        Culture result: No growth (<1,000 CFU/ML)       COVID-19 RAPID TEST [[id number]]  (Abnormal) Collected: 11/24/21 0600    Order Status: Completed Specimen: Nasopharyngeal Updated: 11/24/21 0703     Specimen source Nasopharyngeal        COVID-19 rapid test Detected        Comment:      The specimen is POSITIVE for SARS-CoV-2, the novel coronavirus associated with COVID-19. This test has been authorized by the FDA under an Emergency Use Authorization (EUA) for use by authorized laboratories.         Fact sheet for Healthcare Providers: ConventionUpdate.co.nz  Fact sheet for Patients: ConventionUpdate.co.nz       Methodology: Isothermal Nucleic Acid Amplification  CALLED TO AND CORRECTLY REPEATED BY:  SLOANE KIRKPATRICK RN AT 3294 ON 11/24/21 TO LAD         RESPIRATORY VIRUS PANEL W/COVID-19, PCR [[id number]]  (Abnormal) Collected: 11/23/21 1624    Order Status: Completed Specimen: Nasopharyngeal Updated: 11/23/21 2308     Adenovirus Not detected        Coronavirus 229E Not detected        Coronavirus HKU1 Not detected        Coronavirus CVNL63 Not detected        Coronavirus OC43 Not detected        SARS-CoV-2, PCR Detected        Comment: CALLED TO AND CORRECTLY REPEATED BY:  [id number] [facility] AT 2230 BY KDA 11/23/21  CALLED TO AND CORRECTLY REPEATED BY: Quilla Sicard RN ICU AT 2252 ON 11/23/21 TO TSH.           Metapneumovirus Not detected        Rhinovirus and Enterovirus Not detected        Influenza A Not detected        Influenza A, subtype H1 Not detected        Influenza A, subtype H3 Not detected        INFLUENZA A H1N1 PCR Not detected        Influenza B Not detected        Parainfluenza 1 Not detected        Parainfluenza 2 Not detected        Parainfluenza 3 Not detected        Parainfluenza virus 4 Not detected        RSV by PCR Not detected        B. parapertussis, PCR Not detected        Bordetella pertussis - PCR Not detected        Chlamydophila pneumoniae DNA, QL, PCR Not detected        Mycoplasma pneumoniae DNA, QL, PCR Not detected              Lines / Catheters:  Lab results:    Chemistry  Recent Labs     11/30/21  0400 11/29/21  0445 11/28/21  0515   * 211* 229*    139 136   K 4.6 4.8 4.9    104 99*   CO2 30 31 25   BUN 24* 18 20*   CREA 0.58* 0.57* 0.71   CA 8.7 8.5 8.8   AGAP 7 4 12   BUCR 41* 32* 28*   AP 46 44* 69   TP 6.6 6.2* 6.6   ALB 3.9 3.5 2.8*   GLOB 2.7 2.7 3.8   AGRAT 1.4 1.3 0.7*       CBC w/ Diff  Recent Labs     11/30/21  0400 11/29/21  0445 11/28/21  0515   WBC 8.0 5.1 9.1   RBC 4.06* 4.30* 5.56   HGB 12.0* 12.7* 16.3*   HCT 36.1 37.8 47.7    369 477*   GRANS 96* 90* 88*   LYMPH 1* 5* 8*   EOS 0 1 0       Imaging: report posted as per radiologist - reviewed imaging as well as report above

## 2021-11-30 NOTE — PROGRESS NOTES
Comprehensive Nutrition Assessment    Type and Reason for Visit: Reassess    Nutrition Recommendations/Plan: Recommend starting trickle feeds of Glucerna 1.5 @ 10 ml/hr via OGT when clinically feasible to avoid prolonged NPO status. Pt currently NPO x3 days. 11/30/21 0955   Enteral Nutrition   Feeding Route Orogastric   EN Formula Diabetic   Schedule Continuous   Feeding Regimen Glucerna 1.5 @ 10 ml/hr while pt is not proning. Water Flushes Defer to MD   Goal EN & Flush Order Provides Glucerna 1.5 @ 10 ml/hr will provide: 330 kcal, 18g PRO, 29g CHO, 167 ml free water. Nutrition Assessment:  PMHx: T2DM, HTN, obesity. Admitted for acute hypoxemic respiratory failure due to COVID 19- Intubated 11/28. Per H&P, poor appetite since 11/14. Malnutrition Assessment:  Malnutrition Status: At risk for malnutrition (specify) (Poor PO intake PTA)      Estimated Daily Nutrient Needs:  Energy (kcal): 1878; Weight Used for Energy Requirements: Current  Protein (g): 88g pro; Weight Used for Protein Requirements: Current (1g)  Fluid (ml/day): 1878; Method Used for Fluid Requirements: 1 ml/kcal    Nutrition Related Findings:  Labs- glucose (217) H + recent POC tests (avg. 167), magnesium (2.8) H. Meds- zinc, vit C 500 mg, vit D3, Nimbex, humalog, lantus. No BM data since admission (x6 days). Intubated over weekend 11/28, with OGT placed 11/28. Recommend starting trickle feeds 10 ml/hr og Glucerna via OGT while pt is not proning. Wounds:    None       Current Nutrition Therapies:  ADULT ORAL NUTRITION SUPPLEMENT Lunch, Dinner; Diabetic Supplement  DIET NPO    Anthropometric Measures:  · Height:  5' 6\" (167.6 cm)  · Current Body Wt:  87.6 kg (193 lb 2 oz)   · Admission Body Wt:  215 lb    · Usual Body Wt:  90.7 kg (200 lb) (3/2019)     · Ideal Body Wt:  142 lbs:  136 %   · BMI Category:  Obese class 1 (BMI 30.0-34. 9)       Nutrition Diagnosis:   · Inadequate protein-energy intake related to impaired respiratory function as evidenced by intubation, NPO or clear liquid status due to medical condition    Nutrition Interventions:   Food and/or Nutrient Delivery: Start tube feeding (Recommend starting trickle tube feeds of Glucerna 1.5 @ 10ml/hr while pt is not proning.)  Nutrition Education and Counseling: No recommendations at this time  Coordination of Nutrition Care: Continue to monitor while inpatient, Interdisciplinary rounds    Goals:  Begin tube feeds of Glucerna 1.5 @ 10ml/hr via OGT while pt is not proning within the next 1-2 days to avoid prolonged NPO status. Nutrition Monitoring and Evaluation:   Behavioral-Environmental Outcomes: None identified  Food/Nutrient Intake Outcomes: Enteral nutrition intake/tolerance  Physical Signs/Symptoms Outcomes: Biochemical data, GI status, Weight, Skin, Nausea/vomiting    Discharge Planning:     Too soon to determine     Electronically signed by Duran Beasley RD on 11/30/2021 at 9:58 AM

## 2021-11-30 NOTE — PROGRESS NOTES
1915 : Bedside and Verbal shift change report given to Ludin Church, RN (oncoming nurse) by ARASH Martin RN (offgoing nurse). Report included the following information SBAR, Kardex, Intake/Output, MAR and Recent Results. 2000 : Patient resting in prone position, tolerating well with VSS. Intubated, sedated, and paralyzed on Nimbex w/ TOF 2 @ 7. Hygiene care performed at this time. 0000 : Reassessment. No changes to previous assessment. 0400 : Reassessment. No changes to previous assessment. 0715 : Bedside and Verbal shift change report given to Cecille Romano, RN (oncoming nurse) by Trish Camacho RN  (offgoing nurse). Report included the following information SBAR, Kardex, Intake/Output, MAR and Recent Results.

## 2021-11-30 NOTE — PROGRESS NOTES
0730 Bedside and Verbal shift change report given to Celestine, [address] [facility] (oncoming nurse) by Mendel Kansas, RN (offgoing nurse). Report included the following information SBAR, Kardex, Intake/Output, MAR, Recent Results and Cardiac Rhythm NSR-SB.     0800 shift assessment completed, VSS on mech vent pt remains intubated, paralyzed and sedated, OGT placement verified, blanc in place draining rhianna clear urine, TOF completed, pt tolerating well on current vent settings and is synchronous with the vent, gtts titrated as needed, critical care continues    1000 vent settings changed at this time per intensivist request pending placement of pt into supine position    1110 pt placed in supine position, pt tolerated well, vent settings remain unchanged, VSS, critical care continues    1200 pt remains in supine position tolerating well on current vent settings, VSS, no signs of distress or pain, critical care continues    1520 spoke with wife of pt regarding pt acceptance to [facility] or possible ECMO; provided emotional support and education to wife, updated on pt condition, explained to wife that availability of beds at accepting hospital can change at any time, wife expressed verbal understanding, wife was tearful over the phone, but very gracious for update    1600 pt reassessed, remains in supine position, continues to tolerate well, VSS on mech vent, afebrile, no signs of distress, will continue to monitor    1750 pt place in prone position    1915 Bedside and Verbal shift change report given to KATHRINE Noland (oncoming nurse) by KATHRINE Sprague (offgoing nurse). Report included the following information SBAR, Kardex, Intake/Output, MAR, Recent Results and Cardiac Rhythm NSR-SB.

## 2021-12-01 PROBLEM — U07.1 COVID: Status: ACTIVE | Noted: 2021-01-01

## 2021-12-01 NOTE — PROGRESS NOTES
Transitions of Care Plan   RUR: 12% - low   Admission Dx: COVID - ECMO   Consults: Multiple   Baseline: independent without DME; resides with spouse   Barrier(s) to Discharge: medical - ECMO   Disposition: possible lung transplant at [facility] after ECMO treatment   Estimated Discharge Date: 2+ days      Clinical update per chart review and patient discussed during Interdisciplinary Rounds:    Patient is not medically stable for discharge due to ongoing medical needs:  · Transferred to [facility] for ECMO    CM continues to follow treatment plan for medical progress and disposition needs.     Disposition:  Anticipate hospital transfer once ECMO treatment completed    Oksana Sidhu, [address]  Available via [address] [place] [address] or

## 2021-12-01 NOTE — PROCEDURES
Central line    Indication - Vasopressors  Diagnosis - COVID    A time-out was completed verifying correct patient, procedure, site, positioning, and special equipment if applicable. The patient was placed in a dependent position appropriate for central line placement based on the vein to be cannulated. The patients left shoulder was prepped and draped in sterile fashion. 1% Lidocaine was used to anesthetize the surrounding skin area. A quadruple lumen catheter was introduced into the the internal jugular vein using the Seldinger technique. The catheter was threaded smoothly over the guide wire and appropriate blood return was obtained. Each lumen of the catheter was evacuated of air and flushed with sterile saline. The catheter was then sutured in place to the skin and a sterile dressing applied. Arterial line    Indication - HD monitoring  Diagnosis - COVID    A time-out was completed verifying correct patient, procedure, site, positioning, and special equipment if applicable. Rebels test was performed to ensure adequate perfusion. The patients right wrist was prepped and draped in sterile fashion. 1% Lidocaine was used to anesthetize the area. A 20G Arrow arterial line was introduced into the radial artery. The catheter was threaded over the guide wire and the needle was removed with appropriate pulsatile blood return. The catheter was then sutured in place to the skin and a sterile dressing applied. Perfusion to the extremity distal to the point of catheter insertion was checked and found to be adequate.

## 2021-12-01 NOTE — PROGRESS NOTES
0730 Bedside and Verbal shift change report given to KATHRINE Sprague (oncoming nurse) by Donna Purcell RN (offgoing nurse).  Report included the following information SBAR, Kardex, Intake/Output, MAR, Recent Results and Cardiac Rhythm NSR.     0800 shift assessment completed, VSS on mech vent pt remains intubated sedated and on paralytic, pt pending transfer to [facility] for ECMO, wife of pt updated on condition and  Scheduled transport, kurtis and oral care completed, critical care continues    7361 spoke with hospitalist and updated on pending transfer, provided receiving physicians name and transportation ETA    1000 called report to [address] at [facility] receiving pt, pt going to CCU 4223, all questions answered SBAR report given, allowed time for clarification, no additional questions at this time    1100 pt off unit with transport to [facility], VSS at time of transport, transport team provided report on pt, all questions answered, gtts verified    [phone] wife of pt provided additional update on pt being transported at this time to [facility], provided her with name of nurse receiving pt as well as number to contact RN, RN aware, no additional questions at this time, pt belongings secured, pending pickup from facility by wife

## 2021-12-01 NOTE — PROGRESS NOTES
CM notes patient in need of ECMO, patient to be transferred to [address] [place] [address] for higher level of care. CM to continue to follow and remain available as appropriate.

## 2021-12-01 NOTE — PROGRESS NOTES
1915 : Bedside and Verbal shift change report given to Ludin Roper RN (oncoming nurse) by Mana Cristobal RN (offgoing nurse). Report included the following information SBAR, Kardex, Intake/Output, MAR and Recent Results. 2000 : Patient remains intubated, sedated, and paralyzed on Nimbex (TOF 2 @ 5). Continues to tolerate prone position with VSS. Hygiene care provided at this time. 0000 : Reassessment. No changes at this time. 0400 : Reassessment. Patient resting with VSS. TOF 2 @ 4.    0545 : Patient returned to supine position with assistance from nursing staff and RT. Patient tolerated well, VS remained stable. Bathed with CHG wipes, mouth care, kurtis care, and linen change performed. 0700 : Bedside and Verbal shift change report given to Mana Cristobla RN (oncoming nurse) by Giovani Hatch RN  (offgoing nurse). Report included the following information SBAR, Kardex, Intake/Output, MAR and Recent Results.

## 2021-12-01 NOTE — DISCHARGE SUMMARY
Discharge Summary    Patient: Tai Bermudez MRN: [id number]  CSN: [id number]    YOB: 1975  Age: 55 y.o. Sex: male    DOA: 11/23/2021 LOS:  LOS: 8 days   Discharge Date:      Primary Care Provider:  Selina Spring DO    Admission Diagnoses: Pneumonia due to COVID-19 virus [U07.1, J12.82]  Acute respiratory failure with hypoxia ([facility] [J96.01]  Hyperglycemia [R73.9]  Hyponatremia [E87.1]    Discharge Diagnoses:    Hospital Problems  Date Reviewed: 11/28/2021          Codes Class Noted POA    * (Principal) Acute hypoxemic respiratory failure due to COVID-19 [facility]) ICD-10-CM: U07.1, J96.01  ICD-9-CM: 518.81, 079.89, 799.02  11/23/2021 Yes        Pneumonia due to COVID-19 virus ICD-10-CM: U07.1, J12.82  ICD-9-CM: 480.8, 079.89  11/23/2021 Yes        Hyperglycemia due to type 2 diabetes mellitus ([facility] ICD-10-CM: E11.65  ICD-9-CM: 250.00  11/23/2021 Yes        Primary hypertension ICD-10-CM: I10  ICD-9-CM: 401.9  11/23/2021 Yes        Hyponatremia ICD-10-CM: E87.1  ICD-9-CM: 276.1  11/23/2021 Yes        Lactic acidosis ICD-10-CM: E87.2  ICD-9-CM: 276.2  11/23/2021 Yes        Hyperlipidemia ICD-10-CM: E78.5  ICD-9-CM: 272.4  Unknown Yes        Obesity (BMI 30-39. 9) ICD-10-CM: E66.9  ICD-9-CM: 278.00  11/23/2021 Yes        COVID-19 vaccination not done ICD-10-CM: Z28.9  ICD-9-CM: V64.00  11/23/2021 Yes              Discharge Condition: stable     Discharge Medications:     Current Discharge Medication List      START taking these medications    Details   ascorbic acid, vitamin C, (VITAMIN C) 500 mg tablet Take 1 Tablet by mouth two (2) times a day. Qty: 1 Tablet, Refills: 0  Start date: 12/1/2021      cholecalciferol (VITAMIN D3) (1000 Units /25 mcg) tablet Take 2 Tablets by mouth daily. Qty: 1 Tablet, Refills: 0  Start date: 12/1/2021      cisatracurium (NIMBEX) IV infusion 0-0.869 mg/min by IntraVENous route TITRATE.   Qty: 1 Each, Refills: 0  Start date: 12/1/2021      dexamethasone (DECADRON) 4 mg/mL injection 5 mL by IntraVENous route every twenty-four (24) hours for 2 doses. Qty: 1 mL, Refills: 0  Start date: 12/1/2021, End date: 12/3/2021      enoxaparin (LOVENOX) 100 mg/mL 90 mg by SubCUTAneous route every twelve (12) hours every twelve (12) hours. Qty: 1 Each, Refills: 0  Start date: 12/1/2021      fentanyl citrate-0.9 % NaCl/PF (fentaNYL, PF,) 900 mcg/30 ml soln 0-200 mcg/hr by IntraVENous route TITRATE for 3 days. Max Daily Amount: 4,800 mcg. Qty: 1 mL, Refills: 0  Start date: 12/1/2021, End date: 12/4/2021    Associated Diagnoses: Acute hypoxemic respiratory failure due to COVID-19 (HCC)      insulin glargine (LANTUS) 100 unit/mL injection 24 Units by SubCUTAneous route nightly. Indications: type 2 diabetes mellitus  Qty: 1 mL, Refills: 0  Start date: 12/1/2021      melatonin, rapid dissolve, 5 mg TbDi tablet Take 1 Tablet by mouth nightly. Qty: 1 Tablet, Refills: 0  Start date: 12/1/2021      midazolam in normal saline (VERSED) 1 mg/mL soln 0-10 mg/hr by IntraVENous route TITRATE. Max Daily Amount: 240 mg.  Qty: 1 mL, Refills: 0  Start date: 12/1/2021    Associated Diagnoses: Acute hypoxemic respiratory failure due to COVID-19 (HCC)      Norepinephrine Bitartrate (LEVOPHED) 8 mg/250 mL (32 mcg/mL) soln 0.5-16 mcg/min by IntraVENous route TITRATE. Qty: 1 mL, Refills: 0  Start date: 12/1/2021         CONTINUE these medications which have NOT CHANGED    Details   atorvastatin (LIPITOR) 20 mg tablet Take 20 mg by mouth daily. telmisartan (MICARDIS) 80 mg tablet Take 80 mg by mouth daily.  Indications: high blood pressure         STOP taking these medications       fenofibrate nanocrystallized (TRICOR) 145 mg tablet Comments:   Reason for Stopping:         glipiZIDE SR (GLUCOTROL XL) 10 mg CR tablet Comments:   Reason for Stopping:         metFORMIN ER (GLUCOPHAGE XR) 500 mg tablet Comments:   Reason for Stopping:               Procedures : intubation     Consults: Infectious Disease and Pulmonary/Critical Care      PHYSICAL EXAM   Visit Vitals  /87   Pulse 72   Temp 98.9 °F (37.2 °C)   Resp 20   Ht 5' 6\" (1.676 m)   Wt 86.9 kg (191 lb 9.3 oz)   SpO2 98%   BMI 30.92 kg/m²     General: Intubated on vent   HEENT: NC, Atraumatic. PERRLA, EOMI. Anicteric sclerae. Lungs:  Coarse bilateral bs   Heart:  Regular  rhythm,  No murmur, No Rubs, No Gallops  Abdomen: Soft, Non distended, Non tender. +Bowel sounds,   Extremities: No c/c/e  Psych:   Calm   Neurologic:  On sedation                                  Admission HPI :  Derrick Du is a 39 y.o. male with poorly controlled type 2 diabetes mellitus, hypertension, and obesity presents to the ED for worsening shortness of breath over the past day in the setting of being Covid 19+. He has not been vaccinated for Covid due to [place] drama. \"  His wife is also sick but is not sure where he became infected. He initially began feeling ill on 11/14 with poor appetite, malaise, and fever. He has also had ongoing diarrhea. He developed shortness of breath beginning yesterday. He first checked his oxygen level with a pulse oximeter today and it was 55%. He had his stepson drive him to the ER. According to his wife, she he has not been taking his diabetes meds for the past several days as he has not had much of an appetite. She has been feeding him fruit smoothies. He denies any chest pain, abdominal pain, or leg swelling. Hospital Course :   Derrick Du is a 39 y.o. male with poorly controlled type 2 diabetes mellitus, hypertension, and obesity was admitted for acute respiratory failure due to covid 19 pneumonia. He wad admitted to icu from ER directly. He was put on hihg flow oxygen on admission and was monitoring closely. ID was consulted. He received Dexamethasone 11/23 to date, S/P Tociluzumab 11/24.  He was unable to tolerate cta chest since admission, he received lovenox for empiric treatment for PE. pvl negative for dvt on 11/30. His condition become worsening, he was intubated on 11/28. He has severe covid 19 infection and he needs ECMO in higher level of facility. Dr. Yuliet Deal initiated the transfer and pt is accepted by Dr. Quinton Winter at [facility] . We also continued insulin to control his DM type II         Activity: bedrest     Diet: npo     Follow-up: Per future facility     Disposition: transfer to [address] spent on discharge: 45 min       Labs: Results:       Chemistry Recent Labs     12/01/21 0410 11/30/21 0400 11/29/21  0445   * 217* 211*    140 139   K 4.7 4.6 4.8    103 104   CO2 31 30 31   BUN 31* 24* 18   CREA 0.72 0.58* 0.57*   CA 8.9 8.7 8.5   AGAP 5 7 4   BUCR 43* 41* 32*   AP 54 46 44*   TP 6.7 6.6 6.2*   ALB 3.5 3.9 3.5   GLOB 3.2 2.7 2.7   AGRAT 1.1 1.4 1.3      CBC w/Diff Recent Labs     12/01/21 0410 11/30/21 0400 11/29/21  0445   WBC 6.7 8.0 5.1   RBC 4.28* 4.06* 4.30*   HGB 12.6* 12.0* 12.7*   HCT 37.7 36.1 37.8    371 369   GRANS 78* 96* 90*   LYMPH 8* 1* 5*   EOS 1 0 1      Cardiac Enzymes Recent Labs     11/29/21 0446   CPK 99   CKND1 CALCULATION NOT PERFORMED WHEN RESULT IS BELOW LINEAR LIMIT      Coagulation Recent Labs     12/01/21 0410 11/30/21  0400   PTP 14.3 14.6   INR 1.2 1.2   APTT 27.9 28.8       Lipid Panel Lab Results   Component Value Date/Time    Cholesterol, total 123 11/23/2021 04:24 PM    HDL Cholesterol 33 (L) 11/23/2021 04:24 PM    LDL, calculated 41.8 11/23/2021 04:24 PM    VLDL, calculated 48.2 11/23/2021 04:24 PM    Triglyceride 241 (H) 11/23/2021 04:24 PM    CHOL/HDL Ratio 3.7 11/23/2021 04:24 PM      BNP No results for input(s): BNPP in the last 72 hours.    Liver Enzymes Recent Labs     12/01/21  0410   TP 6.7   ALB 3.5   AP 54      Thyroid Studies No results found for: T4, T3U, TSH, TSHEXT           Significant Diagnostic Studies: XR CHEST PORT    Result Date: 12/1/2021  EXAM: One-view chest CLINICAL HISTORY: Respiratory distress, COMPARISON: Previous plain film FINDINGS: Frontal view of the chest demonstrate ET tube tip in the mid trachea, stable. Enteric tube tip in the stomach. . Left greater than right basilar predominant airspace disease appears mildly improved. Cardiac silhouette is normal in size and contour. No acute bony or soft tissue abnormality. Improved aeration of the lungs overall. Support tubes in place. XR CHEST PORT    Result Date: 11/30/2021  EXAM: XR CHEST PORT CLINICAL INDICATION/HISTORY: et tube position, acute hypoxic respiratory failure, COVID-19 pneumonia -Additional: None COMPARISON: One day prior TECHNIQUE: Portable frontal view of the chest _______________ FINDINGS: SUPPORT DEVICES: Endotracheal tube and enteric tubes unchanged. Slight advancement of enteric tube recommended. HEART AND MEDIASTINUM: Cardiomediastinal silhouette within normal limits. LUNGS AND PLEURAL SPACES: Bilateral parenchymal and interstitial opacities, worsened. Possible layering effusions. No pneumothorax. _______________     Bilateral parenchymal and interstitial opacities, worsened. Possible layering effusions. Slight advancement of enteric tube recommended. XR CHEST PORT    Result Date: 11/29/2021  EXAM: XR CHEST PORT CLINICAL INDICATION/HISTORY: et tube -Additional: None COMPARISON: One day prior TECHNIQUE: Portable frontal view of the chest _______________ FINDINGS: SUPPORT DEVICES: Endotracheal and enteric tubes unchanged. HEART AND MEDIASTINUM: Cardiomediastinal silhouette within normal limits. LUNGS AND PLEURAL SPACES: Extensive bilateral parenchymal opacities. No pneumothorax. _______________     Extensive bilateral parenchymal opacities, worsened from prior study.     XR CHEST PORT    Result Date: 11/28/2021  EXAM: PORTABLE  FRONTAL CHEST RADIOGRAPH CLINICAL INDICATION/HISTORY: Patient is intubated COMPARISON: 11/27/2021 at 6:18 AM TECHNIQUE: Portable frontal view of the chest _______________ FINDINGS: SUPPORT DEVICES:   > Endotracheal tube tip terminates approximately 4.5 cm above the linda.   > Enteric tube courses in the expected location of the esophagus, side port projects over the proximal stomach near the gastroesophageal junction, tip terminates below the inferior margin of the image. Consider advancing with repeat imaging to confirm placement.   >  EKG leads overlie the patient. HEART AND MEDIASTINUM: Normal heart size and mediastinal contours. LUNGS: Bilateral lung opacities appear similar to recent prior studies. PLEURAL SPACES:No large pneumothorax. No large pleural effusion. BONY THORAX AND SOFT TISSUES: No acute abnormality. _______________     1. Interval intubation and enteric tube placement, as detailed. 2.  Bilateral lung opacities consistent with multifocal pneumonia, similar to prior studies. XR CHEST PORT    Result Date: 11/27/2021  EXAM: CHEST RADIOGRAPH, SINGLE VIEW CLINICAL INDICATION/HISTORY: hypoxia      <Additional:  Covid-19 positive COMPARISON: 11/25/2021 TECHNIQUE: Portable frontal view of the chest was obtained. _______________ FINDINGS: SUPPORT DEVICES: None. HEART AND MEDIASTINUM: Cardiomediastinal silhouette appears within normal limits. LUNGS AND PLEURAL SPACES: Bilateral interstitial markings are present along with predominantly central alveolar opacities, right greater than left, with progression bilaterally. No pneumothorax or pleural effusion. BONY THORAX AND SOFT TISSUES: No acute osseous abnormality. _______________     Interval progression of bilateral interstitial and predominantly central alveolar opacities. Differential considerations include pneumonia and cardiogenic pulmonary edema. XR CHEST PORT    Result Date: 11/25/2021  EXAM: CHEST RADIOGRAPH, SINGLE VIEW CLINICAL INDICATION/HISTORY: hypoxia      <Additional:  Covid-19 positive COMPARISON: None. TECHNIQUE: Portable frontal view of the chest was obtained. _______________ FINDINGS: SUPPORT DEVICES: None.  HEART AND MEDIASTINUM: Cardiomediastinal silhouette appears within normal limits. LUNGS AND PLEURAL SPACES: Nearly diffuse bilateral interstitial infiltrates are redemonstrated with relative sparing of the left apex, overall with slight progression. No pneumothorax or pleural effusion. BONY THORAX AND SOFT TISSUES: No acute osseous abnormality. _______________     Overall slight progression of nearly diffuse bilateral interstitial infiltrates in keeping with Covid-19 pneumonia. XR CHEST PORT    Result Date: 11/23/2021  EXAM:  AP Portable Chest X-ray 1 view INDICATION: Shortness of breath COMPARISON: None _______________ FINDINGS:  Heart and mediastinal contours are within normal limits for portable radiograph. There are bilateral interstitial and alveolar opacities most prominent at the lung bases. Findings suggestive of pneumonia and/or pulmonary edema. There are no pleural effusions. No acute osseous findings. ________________      Bilateral interstitial and alveolar opacities suggesting pneumonia and/or pulmonary edema. No pleural effusions or pneumothorax seen. ECHO ADULT COMPLETE    Result Date: 11/24/2021  · LV: Estimated LVEF is 50 - 55%. Normal cavity size, wall thickness and diastolic function. Low normal systolic function. E/E'= 7.12.      DUPLEX LOWER EXT VENOUS BILAT    Result Date: 11/30/2021  · No evidence of deep vein thrombosis in the right lower extremity. · No evidence of deep vein thrombosis in the left lower extremity. DUPLEX LOWER EXT VENOUS BILAT    Result Date: 11/25/2021  · No evidence of deep vein thrombosis in the right lower extremity. · No evidence of deep vein thrombosis in the left lower extremity. [facility]     CC:  Dasia Bonilla DO

## 2021-12-01 NOTE — DIABETES MGMT
Diabetes/ Glycemic Control Plan of Care  Recommendations:    Increase Lantus to 30 units every 24 hours      Assessment: Patient being discharged this morning to another facility - POC glucose not within target range on current insulin regimen. Recommend above insulin adjustment to be initiated upon transfer. Fasting/ Morning blood glucose:   Lab Results   Component Value Date/Time    Glucose 246 (H) 12/01/2021 04:10 AM    Glucose (POC) 241 (H) 12/01/2021 05:36 AM     Recent Glucose Results:   Lab Results   Component Value Date/Time     (H) 12/01/2021 04:10 AM    GLUCPOC 241 (H) 12/01/2021 05:36 AM    GLUCPOC 235 (H) 11/30/2021 11:03 PM    GLUCPOC 140 (H) 11/30/2021 05:27 PM     Blood glucose values: Within target range (70-180mg/dL): No  Current insulin orders:    Lantus 24 units every 24 hours   Corrective Humalog every 6 hours - very resistant scale  Total Daily Dose previous 24 hours =  42 units      Nutrition/Diet:   Active Orders   Diet    DIET NPO      Home diabetes medications:   Key Antihyperglycemic Medications             insulin glargine (LANTUS) 100 unit/mL injection (Taking) 24 Units by SubCUTAneous route nightly. Indications: type 2 diabetes mellitus    glipiZIDE SR (GLUCOTROL XL) 10 mg CR tablet (Taking) Take 10 mg by mouth daily. metFORMIN ER (GLUCOPHAGE XR) 500 mg tablet (Taking) Take 500 mg by mouth daily (with dinner). Plan/Goals:   Blood glucose will be within target of 70 - 180 mg/dl within 72 hours  Reinforce dietary and medication compliance at home.           Education:  [] Refer to Diabetes Education Record                       [x] Education not indicated at this time     Cody Krishna RN, BSN, [address] [facility] [employer]  Professional   Glycemic Control Team   Phone:  [phone]  Tues - Thurs 8:30 - 4:30

## 2021-12-01 NOTE — PROGRESS NOTES
Called transfer center and confirmed pt is accepted per Dr. Rufus Forte at [facility] .

## 2021-12-01 NOTE — PROGRESS NOTES
Transfer Center provided bed assignment: [facility] [id number]. Phone number to call report: [phone]. [facility] ETA 1130.

## 2021-12-01 NOTE — PROGRESS NOTES
[facility]  Pulmonary, Critical Care, and Sleep Medicine    Name: Celena Huang MRN: [id number]   : 1975 Hospital: [facility] [place]    Date: 2021  Room: 109/     PCCM Note                                              Consult requesting physician: Dr. Dustin Karimi  Reason for Consult: Severe Covid pneumonia      Subjective/History of Present Illness:   Patient is a 55 y.o. male with PMHx significant for diabetes and hypertension. He has come to ER with worsening shortness of breath with positive Covid infection. He reports that he has been having cold symptoms for more than a week. He tested positive about 1 week ago. He has come with worsening shortness of breath, and was found to be severely hypoxemic. He has been placed on combination of high flow oxygen and nonrebreather. His oxygen saturation is in the high 80s on this, and in the semiupright position. Vaccination statusnot vaccinated for COVID-19  I have reviewed history with ER consultant Dr. Estephania Mina and chart review. Currently on the maximum dose of high flow prone position started patient is not stable to perform CT for pulmonary embolus in the setting of elevated D-dimer full anticoagulation started last night PVL negative   Patient failed high flow and was intubated on 2021. ARDS protocol. If worsen will call transfer center to evaluate for ECMO      21   Patient seen and examined at bedside in ICU room #109  Tolerated supine yesterday late morning into afternoon and this morning  On AC 20/450/75%/15 PEEP, peak pressure 29 CWP, SPO2 97%  Remains sedated and on paralytic  Hemodynamically stable  No high-grade fever episode  Good urine output. N.p.o.. Stable blood glucose  Tolerating full dose Lovenox without any evidence for bleeding  PCCM was not contacted by staff on anything about patient overnight.      I had discussion with wife yesterday and she preferred to pursue option of tx to tertiary center with ECMO  After contacting [facility] transfer [facility] accepted patient and [facility] transplant team okayed to contact them if patient needs an evaluation for lung transplant beyond ARDS stage  Staff updated wife yesterday and I called and updated wife this AM once bed availability known. Explained to wife again that ECMO does not treat underlying Covid 19 pneumonia but rather a supportive measure with unknown outcome but mostly mixed results in various available small studies and individual institutional experience. Hence lung transplant team at [facility] was also contacted. Course beyond benefit of proning remains unknown with risk of worsening in oxygenation and ventilation from cytokine surges and limited response so far to other treatments provided. Risk during transportation also addressed. Wife prefers to proceed with scheduled transportation wanting the patient to be in a facility with more options. Review of Systems: Unable to obtain intubated sedated      Allergies   Allergen Reactions    Alupent [Metaproterenol] Swelling      Past Medical History:   Diagnosis Date    COVID-19     Diabetes ([facility] [address]     Hyperlipidemia     Hypertension       Past Surgical History:   Procedure Laterality Date    HX ORTHOPAEDIC Right     wrist surgery      Social History     Tobacco Use    Smoking status: Never Smoker    Smokeless tobacco: Not on file   Substance Use Topics    Alcohol use: Yes     Comment: drinksonce a week      Family History   Problem Relation Age of Onset    Diabetes Mother     Hypertension Mother     Stroke Mother     Hodgkin's lymphoma Mother     Diabetes Father     Hypertension Father     Cystic Fibrosis Father     Diabetes Maternal Aunt     Diabetes Maternal Uncle       Prior to Admission medications    Medication Sig Start Date End Date Taking? Authorizing Provider   atorvastatin (LIPITOR) 20 mg tablet Take 20 mg by mouth daily.    Yes Provider, Historical telmisartan (MICARDIS) 80 mg tablet Take 80 mg by mouth daily. Indications: high blood pressure   Yes Provider, Historical   fenofibrate nanocrystallized (TRICOR) 145 mg tablet Take 145 mg by mouth daily. Yes Provider, Historical   glipiZIDE SR (GLUCOTROL XL) 10 mg CR tablet Take 10 mg by mouth daily. Yes Provider, Historical   metFORMIN ER (GLUCOPHAGE XR) 500 mg tablet Take 500 mg by mouth daily (with dinner).    Yes Provider, Historical     Current Facility-Administered Medications   Medication Dose Route Frequency    chlorhexidine (PERIDEX) 0.12 % mouthwash 15 mL  15 mL Oral Q12H    [Held by provider] insulin lispro (HUMALOG) injection 3 Units  3 Units SubCUTAneous Q6H    insulin glargine (LANTUS) injection 24 Units  24 Units SubCUTAneous DAILY    enoxaparin (LOVENOX) injection 90 mg  1 mg/kg SubCUTAneous Q12H    cisatracurium (NIMBEX) 100 mg in 0.9% sodium chloride 100 mL (1 mg/mL) infusion  0-10 mcg/kg/min IntraVENous TITRATE    fentaNYL (PF) 900 mcg/30 ml infusion soln  0-200 mcg/hr IntraVENous TITRATE    NOREPINephrine (LEVOPHED) 8 mg in 0.9% NS 250ml infusion  0.5-16 mcg/min IntraVENous TITRATE    midazolam in normal saline (VERSED) 1 mg/mL infusion  0-10 mg/hr IntraVENous TITRATE    insulin lispro (HUMALOG) injection   SubCUTAneous Q6H    dexamethasone (DECADRON) 4 mg/mL injection 20 mg  20 mg IntraVENous Q24H    [Held by provider] ipratropium-albuterol (COMBIVENT RESPIMAT) 20 mcg-100 mcg inhalation spray  1 Puff Inhalation TID    lidocaine 4 % patch 1 Patch  1 Patch TransDERmal Q24H    atorvastatin (LIPITOR) tablet 20 mg  20 mg Oral DAILY    pantoprazole (PROTONIX) 40 mg in 0.9% sodium chloride 10 mL injection  40 mg IntraVENous Q24H    cholecalciferol (VITAMIN D3) (1000 Units /25 mcg) tablet 2,000 Units  2,000 Units Oral DAILY    ascorbic acid (vitamin C) (VITAMIN C) tablet 500 mg  500 mg Oral BID    zinc sulfate (ZINCATE) 50 mg zinc (220 mg) capsule 1 Capsule  1 Capsule Oral Q12H  melatonin (rapid dissolve) tablet 5 mg  5 mg Oral QHS         Objective:   Vital Signs:    Visit Vitals  /87   Pulse 72   Temp 98.9 °F (37.2 °C)   Resp 20   Ht 5' 6\" (1.676 m)   Wt 86.9 kg (191 lb 9.3 oz)   SpO2 98%   BMI 30.92 kg/m²       O2 Device: Endotracheal tube   O2 Flow Rate (L/min): 60 l/min   Temp (24hrs), Av.9 °F (37.2 °C), Min:97.5 °F (36.4 °C), Max:99.7 °F (37.6 °C)       Intake/Output:   Last shift:      No intake/output data recorded. Last 3 shifts:  1901 -  0700  In: 2080.9 [I.V.:1950.9]  Out: 3575 [Urine:3575]      Intake/Output Summary (Last 24 hours) at 2021 0842  Last data filed at 2021 0400  Gross per 24 hour   Intake 736.85 ml   Output 1750 ml   Net -1013.15 ml       Last 3 Recorded Weights in this Encounter    21 1210 21 0015 21 0945   Weight: 97.5 kg (215 lb) 87.6 kg (193 lb 2 oz) 86.9 kg (191 lb 9.3 oz)        Physical Exam: Limitations in exam due to full PPE requirements. General: sedated, on paralytic, in no respiratory distress and supine, appears stated age, on ventilator  HEENT: PERRL, fundi benign, ET and OG tubes in place  Neck: No abnormally enlarged lymph nodes or thyroid, supple  Chest: normal  Lungs:  Moderate air entry, few stable rhonchi scattered and no wheezes bilaterally, normal percussion anterior chest wall bilaterally, no tenderness/ rash  Heart: Regular rate and rhythm, S1S2 present or without murmur or extra heart sounds  Abdomen: non distended, bowel sounds normoactive, tympanic, abdomen is soft without significant tenderness, masses, organomegaly or guarding, rigidity, rebound  Extremity: Trace, pitting and bilateral extremities edema without cyanosis, clubbing  Neuro: no involuntary movements, sedated, on paralytic, exam limitation  Skin: Skin color, texture, turgor fair      Data:       Recent Results (from the past 24 hour(s))   GLUCOSE, POC    Collection Time: 21 11:31 AM   Result Value Ref Range Glucose (POC) 165 (H) 70 - 110 mg/dL   CALCIUM, IONIZED    Collection Time: 11/30/21  1:41 PM   Result Value Ref Range    Ionized Calcium 1.19 1.12 - 1.32 MMOL/L   GLUCOSE, POC    Collection Time: 11/30/21  5:27 PM   Result Value Ref Range    Glucose (POC) 140 (H) 70 - 110 mg/dL   GLUCOSE, POC    Collection Time: 11/30/21 11:03 PM   Result Value Ref Range    Glucose (POC) 235 (H) 70 - 110 mg/dL   D DIMER    Collection Time: 12/01/21  4:10 AM   Result Value Ref Range    D DIMER 3.05 (H) <0.46 ug/ml(FEU)   PROTHROMBIN TIME + INR    Collection Time: 12/01/21  4:10 AM   Result Value Ref Range    Prothrombin time 14.3 11.5 - 15.2 sec    INR 1.2 0.8 - 1.2     PTT    Collection Time: 12/01/21  4:10 AM   Result Value Ref Range    aPTT 27.9 23.0 - 36.4 SEC   CBC WITH AUTOMATED DIFF    Collection Time: 12/01/21  4:10 AM   Result Value Ref Range    WBC 6.7 4.6 - 13.2 K/uL    RBC 4.28 (L) 4.35 - 5.65 M/uL    HGB 12.6 (L) 13.0 - 16.0 g/dL    HCT 37.7 36.0 - 48.0 %    MCV 88.1 78.0 - 100.0 FL    MCH 29.4 24.0 - 34.0 PG    MCHC 33.4 31.0 - 37.0 g/dL    RDW 12.5 11.6 - 14.5 %    PLATELET 692 619 - 936 K/uL    MPV 9.0 (L) 9.2 - 11.8 FL    NRBC 0.0 0  WBC    ABSOLUTE NRBC 0.00 0.00 - 0.01 K/uL    NEUTROPHILS 78 (H) 40 - 73 %    BAND NEUTROPHILS 10 (H) 0 - 5 %    LYMPHOCYTES 8 (L) 21 - 52 %    MONOCYTES 3 3 - 10 %    EOSINOPHILS 1 0 - 5 %    BASOPHILS 0 0 - 2 %    IMMATURE GRANULOCYTES 0 %    ABS. NEUTROPHILS 5.9 1.8 - 8.0 K/UL    ABS. LYMPHOCYTES 0.5 (L) 0.9 - 3.6 K/UL    ABS. MONOCYTES 0.2 0.05 - 1.2 K/UL    ABS. EOSINOPHILS 0.1 0.0 - 0.4 K/UL    ABS. BASOPHILS 0.0 0.0 - 0.1 K/UL    ABS. IMM.  GRANS. 0.0 K/UL    DF MANUAL      PLATELET COMMENTS ADEQUATE PLATELETS      RBC COMMENTS NORMOCYTIC, NORMOCHROMIC     METABOLIC PANEL, COMPREHENSIVE    Collection Time: 12/01/21  4:10 AM   Result Value Ref Range    Sodium 141 136 - 145 mmol/L    Potassium 4.7 3.5 - 5.5 mmol/L    Chloride 105 100 - 111 mmol/L    CO2 31 21 - 32 mmol/L Anion gap 5 3.0 - 18 mmol/L    Glucose 246 (H) 74 - 99 mg/dL    BUN 31 (H) 7.0 - 18 MG/DL    Creatinine 0.72 0.6 - 1.3 MG/DL    BUN/Creatinine ratio 43 (H) 12 - 20      GFR est AA >60 >60 ml/min/1.73m2    GFR est non-AA >60 >60 ml/min/1.73m2    Calcium 8.9 8.5 - 10.1 MG/DL    Bilirubin, total 0.5 0.2 - 1.0 MG/DL    ALT (SGPT) 62 (H) 16 - 61 U/L    AST (SGOT) 59 (H) 10 - 38 U/L    Alk. phosphatase 54 45 - 117 U/L    Protein, total 6.7 6.4 - 8.2 g/dL    Albumin 3.5 3.4 - 5.0 g/dL    Globulin 3.2 2.0 - 4.0 g/dL    A-G Ratio 1.1 0.8 - 1.7     CALCIUM, IONIZED    Collection Time: 12/01/21  4:10 AM   Result Value Ref Range    Ionized Calcium 1.15 1.12 - 1.32 MMOL/L   MAGNESIUM    Collection Time: 12/01/21  4:10 AM   Result Value Ref Range    Magnesium 2.7 (H) 1.6 - 2.6 mg/dL   PHOSPHORUS    Collection Time: 12/01/21  4:10 AM   Result Value Ref Range    Phosphorus 3.4 2.5 - 4.9 MG/DL   BLOOD GAS, ARTERIAL POC    Collection Time: 12/01/21  5:32 AM   Result Value Ref Range    Device: ADULT VENT      FIO2 (POC) 90 %    pH (POC) 7.44 7.35 - 7.45      pCO2 (POC) 48.3 (H) 35.0 - 45.0 MMHG    pO2 (POC) 244 (H) 80 - 100 MMHG    HCO3 (POC) 32.6 (H) 22 - 26 MMOL/L    sO2 (POC) 99.8 (H) 92 - 97 %    Base excess (POC) 7.3 mmol/L    Mode Volume Control      Tidal volume 450 ml    Set Rate 20 bpm    PEEP/CPAP (POC) 15 cmH2O    Mean Airway Pressure 20 cmH2O    PIP (POC) 29      Allens test (POC) Positive      Site RIGHT RADIAL      Patient temp.  98.9      Specimen type (POC) ARTERIAL      Performed by [address], POC    Collection Time: 12/01/21  5:36 AM   Result Value Ref Range    Glucose (POC) 241 (H) 70 - 110 mg/dL         Chemistry Recent Labs     12/01/21  0410 11/30/21  0400 11/29/21  0445   * 217* 211*    140 139   K 4.7 4.6 4.8    103 104   CO2 31 30 31   BUN 31* 24* 18   CREA 0.72 0.58* 0.57*   CA 8.9 8.7 8.5   MG 2.7* 2.8* 2.5   PHOS 3.4 3.8 4.3   AGAP 5 7 4   BUCR 43* 41* 32*   AP 54 46 44* TP 6.7 6.6 6.2*   ALB 3.5 3.9 3.5   GLOB 3.2 2.7 2.7   AGRAT 1.1 1.4 1.3        Lactic Acid Lactic acid   Date Value Ref Range Status   11/24/2021 1.4 0.4 - 2.0 MMOL/L Final     No results for input(s): LAC in the last 72 hours. Liver Enzymes Protein, total   Date Value Ref Range Status   12/01/2021 6.7 6.4 - 8.2 g/dL Final     Albumin   Date Value Ref Range Status   12/01/2021 3.5 3.4 - 5.0 g/dL Final     Globulin   Date Value Ref Range Status   12/01/2021 3.2 2.0 - 4.0 g/dL Final     A-G Ratio   Date Value Ref Range Status   12/01/2021 1.1 0.8 - 1.7   Final     Alk.  phosphatase   Date Value Ref Range Status   12/01/2021 54 45 - 117 U/L Final     Recent Labs     12/01/21 0410 11/30/21  0400 11/29/21  0445   TP 6.7 6.6 6.2*   ALB 3.5 3.9 3.5   GLOB 3.2 2.7 2.7   AGRAT 1.1 1.4 1.3   AP 54 46 44*        CBC w/Diff Recent Labs     12/01/21 0410 11/30/21  0400 11/29/21  0445   WBC 6.7 8.0 5.1   RBC 4.28* 4.06* 4.30*   HGB 12.6* 12.0* 12.7*   HCT 37.7 36.1 37.8    371 369   GRANS 78* 96* 90*   LYMPH 8* 1* 5*   EOS 1 0 1        Cardiac Enzymes No results found for: CPK, CK, CKMMB, CKMB, RCK3, CKMBT, CKNDX, CKND1, SHANIKA, TROPT, TROIQ, NIKKO, TROPT, TNIPOC, BNP, BNPP     BNP No results found for: BNP, BNPP, XBNPT     Coagulation Recent Labs     12/01/21 0410 11/30/21  0400 11/29/21  0445   PTP 14.3 14.6 14.4   INR 1.2 1.2 1.2   APTT 27.9 28.8 29.4         Thyroid  No results found for: T4, T3U, TSH, TSHEXT, TSHEXT    No results found for: T4     Urinalysis Lab Results   Component Value Date/Time    Color YELLOW 11/24/2021 06:00 AM    Appearance CLEAR 11/24/2021 06:00 AM    Specific gravity >1.030 (H) 11/24/2021 06:00 AM    pH (UA) 5.5 11/24/2021 06:00 AM    Protein Negative 11/24/2021 06:00 AM    Glucose >1,000 (A) 11/24/2021 06:00 AM    Ketone 80 (A) 11/24/2021 06:00 AM    Bilirubin Negative 11/24/2021 06:00 AM    Urobilinogen 1.0 11/24/2021 06:00 AM    Nitrites Negative 11/24/2021 06:00 AM    Leukocyte Esterase Negative 11/24/2021 06:00 AM        Micro  No results for input(s): SDES, CULT in the last 72 hours. No results for input(s): CULT in the last 72 hours. Culture data during this hospitalization. All Micro Results     Procedure Component Value Units Date/Time    CULTURE, BLOOD [[id number]] Collected: 11/23/21 1624    Order Status: Completed Specimen: Blood Updated: 11/29/21 0731     Special Requests: NO SPECIAL REQUESTS        Culture result: NO GROWTH 6 DAYS       CULTURE, BLOOD [[id number]] Collected: 11/23/21 1624    Order Status: Completed Specimen: Blood Updated: 11/29/21 0731     Special Requests: NO SPECIAL REQUESTS        Culture result: NO GROWTH 6 DAYS       CULTURE, RESPIRATORY/SPUTUM/BRONCH Marsha Michelle STAIN [[id number]] Collected: 11/28/21 1415    Order Status: Canceled Specimen: Sputum from Transtracheal Aspirate     CULTURE, URINE [[id number]] Collected: 11/24/21 0600    Order Status: Completed Specimen: Urine from Clean catch Updated: 11/25/21 2047     Special Requests: NO SPECIAL REQUESTS        Culture result: No growth (<1,000 CFU/ML)       COVID-19 RAPID TEST [[id number]]  (Abnormal) Collected: 11/24/21 0600    Order Status: Completed Specimen: Nasopharyngeal Updated: 11/24/21 0703     Specimen source Nasopharyngeal        COVID-19 rapid test Detected        Comment:      The specimen is POSITIVE for SARS-CoV-2, the novel coronavirus associated with COVID-19. This test has been authorized by the FDA under an Emergency Use Authorization (EUA) for use by authorized laboratories.         Fact sheet for Healthcare Providers: ConventionUpdate.co.nz  Fact sheet for Patients: ConventionUpdate.co.nz       Methodology: Isothermal Nucleic Acid Amplification  CALLED TO AND CORRECTLY REPEATED BY:  SLOANE KIRKPATRICK RN AT 0002 ON 11/24/21 TO LAD         RESPIRATORY VIRUS PANEL W/COVID-19, PCR [[id number]]  (Abnormal) Collected: 11/23/21 1624    Order Status: Completed Specimen: Nasopharyngeal Updated: 11/23/21 2308     Adenovirus Not detected        Coronavirus 229E Not detected        Coronavirus HKU1 Not detected        Coronavirus CVNL63 Not detected        Coronavirus OC43 Not detected        SARS-CoV-2, PCR Detected        Comment: CALLED TO AND CORRECTLY REPEATED BY:  [id number] [facility] AT 2230 BY KDA 11/23/21  CALLED TO AND CORRECTLY REPEATED BY:  Ryann Sheldon RN ICU AT 2252 ON 11/23/21 TO TSH. Metapneumovirus Not detected        Rhinovirus and Enterovirus Not detected        Influenza A Not detected        Influenza A, subtype H1 Not detected        Influenza A, subtype H3 Not detected        INFLUENZA A H1N1 PCR Not detected        Influenza B Not detected        Parainfluenza 1 Not detected        Parainfluenza 2 Not detected        Parainfluenza 3 Not detected        Parainfluenza virus 4 Not detected        RSV by PCR Not detected        B. parapertussis, PCR Not detected        Bordetella pertussis - PCR Not detected        Chlamydophila pneumoniae DNA, QL, PCR Not detected        Mycoplasma pneumoniae DNA, QL, PCR Not detected                Images report reviewed by me:  CT (Most Recent) (CT chest reviewed by me) No results found for this or any previous visit. CXR reviewed by me:  XR (Most Recent). CXR  reviewed by me and compared with previous CXR Results from Hospital Encounter encounter on 11/23/21    XR CHEST PORT    Narrative  EXAM: One-view chest    CLINICAL HISTORY: Respiratory distress,    COMPARISON: Previous plain film    FINDINGS:    Frontal view of the chest demonstrate ET tube tip in the mid trachea, stable. Enteric tube tip in the stomach. . Left greater than right basilar predominant  airspace disease appears mildly improved. Cardiac silhouette is normal in size  and contour. No acute bony or soft tissue abnormality. Impression  Improved aeration of the lungs overall. Support tubes in place.          IMPRESSION:   · Acute respiratory failure with hypoxemia - J96.01  · Severe Covid pneumonia - U07.1, J12.82  · ARDS  · Hyperglycemia due to Type 2 diabetes mellitus  · Hypertension  · Anemia, mild, stable Hgb  Patient Active Problem List   Diagnosis Code    Acute hypoxemic respiratory failure due to COVID-19 ([facility] [address] U07.1, J96.01    Pneumonia due to COVID-19 virus U07.1, J12.82    Hyperglycemia due to type 2 diabetes mellitus ([facility] [address] E11.65    Primary hypertension I10    Hyponatremia E87.1    Lactic acidosis E87.2    Hyperlipidemia E78.5    Obesity (BMI 30-39. 9) E66.9    COVID-19 vaccination not done Z28.9   Code status: Full code   RECOMMENDATIONS:   Respiratory: Acute hypoxic respiratory failure secondary to Covid-19 pneumonia and ARDS  Delayed presentation with severe hypoxemic respiratory failure  AC 20/450/15 PEEP/75% fiO2  Seen proning related benefit in oxygenation and chest x-ray  Unknown how long we could get Proning (risks vs benefit) and benefit from it  Chest x-ray 12/1/21 shows overall some minimal improvement after using Lasix 20 mg x 1 on 11/30/21  proBNP normal  Mech. Ventilated patients- aim to keep peak plateau pressure less than or equal to 30cm H2O.  Titrate FiO2 for goal SPO2> 88%  VAP prevention bundle and sedation bundle followed, head of the bed at 30' all times  Daily sedation holiday and assessment for weaning with SBT as tolerated - would need paralytic and sedation during transportation  Continue pulmonary hygiene care  Steroids -taper with clinical course  Proning as tolerated  Failed high flow intubated on 11/28/2021 at early AM  Chest x-ray and ABG daily  Severe ARDS due to COVID-19 pneumonia patient is not stable to perform CTA of the chest with worsened D-dimer - on full anticoagulation  Started steroids and given bronchodilators additionally added Actemra   Through [facility] transfer center - patient got accepted at [facility] for possible ECMO  with back up from [facility] lung transplant team if any future evaluation warranted beyond ARDS stage of COVID-19 pneumonia    Sedation: nimbex; start Propofol per transportation team policy and wean off of versed, fentanyl to facilitate transfer to [facility]    CVS: Stable hemodynamics. Monitor blood pressure. pro-BNP normal  Diuresis - laxis 20 mg as needed  D-dimer stable compared to yesterday and if start going down significantly then may consider reducing Lovenox to prophylactic dose  Patient high risk PE/ pulmonary embolism. D-dimer elevated. Patient with severe Covid infection/ARDS. On therapeutic anticoagulation with Lovenox twice daily  PVL negative but unable to do CTA chest    PVL 11/24/21  · No evidence of deep vein thrombosis in the right lower extremity. · No evidence of deep vein thrombosis in the left lower extremity    ECHO 11/24/21  · LV: Estimated LVEF is 50 - 55%. Normal cavity size, wall thickness and diastolic function. Low normal systolic function. E/E'= 7.12    ID: Severe COVID-19 pneumonia  ID following  High D-dimer on full dose of Lovenox  On steroids -Dexamethasone 20 mg daily- started on 11/23/2021  1 dose Tocilizumab given on 11/24/2021  procalcitonin low - off of antibiotics. Antibiotics  empirically received ceftriaxone and azithromycin   Late presentation with severe hypoxemic respiratory failurenot a candidate for remdesivir. No history of hepatitis or TB  Dexamethasone6 mg daily - changed to 20 mg as intubated and high risk of pulmonary fibroris. Blood sugar elevated with history of type 2 diabetes mellitus   Vitamin supplements  Monitor lactic acid, respiratory culture, procal as needed    Blood culture 11/23/2021negative final  Urine culture 11/24/2021negative final    Hematology/Oncology: hemoglobin stable   No signs of active bleeding anywhere  Monitor Hgb and Plt; watch for any bleeding while on therapeutic anticoagulation  Monitor D-dimer to adjust the dose of Lovenox    Renal: Monitor renal function and urine output.   Lasix 20 mg x 1 received 11/30/21  Monitor sodium with Lasix dosing  Replace electrolyte as needed     GI: Monitor LFT -stable and mildly elevated. N.p.o.  Endocrine: Monitor FSBS. SSI. Adjust Lantus insulin  Neurology: Sedated, on paralytic - adjustment per transportation need  Skin/Wound: ICU nursing care  Electrolytes: Replace electrolytes per ICU electrolyte replacement protocol. Prophylaxis: DVT Prophylaxis: Enoxaparin. GI Prophylaxis: Protonix. Lines/Tubes: PIV, mid line  ET tube: 11/28/2021  OG tube: 11/28/2021  Gold: 11/24/21 (Medically necessary for strict input/output monitoring in critically ill patient, will remove it when not needed. Gold bundle followed). Prognosis seems to be guarded; patient being admitted to ICU with severe Covid pneumonia and hypoxemic respiratory failure; patient risk of on going ventilator support, prolonged hospitalization, nosocomial infection, cardiovascular collapse, multiorgan system failure, mortality, morbidity, other are very high. Full code  I called and spoke with wife at her listed number. Update provided and discussed her questions. Prior, discussed wife's questions about Ivermectin and explained it's not approved or authorized treatment per CDC, FDA, NIH for COVID19 infection at present. Discussed with ID consultant and Dr. Grtea Arzate agrees same regarding Ivermectine    Will defer respective systems problem management to primary and other consultant and follow patient in ICU with primary and other medical team  Further recommendations will be based on the patient's response to recommended treatment and results of the investigation ordered. Quality Care: PPI, DVT prophylaxis, HOB elevated, Infection control all reviewed and addressed. Events and notes from last 24 hours reviewed.  Care plan discussed with nursing, hospitalist, RT, MDR  High complexity decision making was performed during the evaluation of this patient at high risk for decompensation with multiple organ involvement. Total critical care time spent rendering care exclusive of procedures/family discussion/coordination of care: 45 minutes. Please note: Voice-recognition software may have been used to generate this report, which may have resulted in some phonetic-based errors in grammar and contents. Even though attempts were made to correct all the mistakes, some may have been missed, and remained in the body of the document. Devan Charles MD  12/1/2021       Note  Patient with SARS-CoV-2 pneumonia with severe pneumonia and hypoxemic respiratory failure; patient on high oxygen support (high flow NC, nonrebreather mask oxygen, ventilator); patient is not vaccinated, and is in the age group that is being infected with delta virus strain; the strain causes severe respiratory failure and complications reported in the [place] and worldwide; unfortunately, the prognosis is poor in unvaccinated patients, with a high risk of complications, multiorgan failure, chronic hypoxemia and respiratory failure, intubation, mechanical ventilation, thromboembolic disease risk in multiple organs, high risk for long Covid syndrome, and high risk for mortality. The CDC federal and state guidelines have emphasized repeatedly the importance of vaccination to prevent severe infection, mortality, disabilities, long Covid syndrome from Covid virus with several strains currently in the [place]. Vaccinations have been approved by FDA. Vaccination has been extended to children as well. The treatment protocols are followed based on local hospital protocols, with guidance from centers such as [facility], [facility] protocols. [facility] is not part of any research protocol. The local hospital protocols have been guided by [facility] pharmacy department. Covid plasma is no longer considered standard of care in the [facility] due to lack of benefit in trials.   Nebulization and CPAP therapies are not considered as part of the protocol in [facility] due to high risk of aerosolization, and high risks of spreading Covid infection to the healthcare staff. Some of the treatments may be beneficial and may not be available at local hospital.      REMDESIVIR-delayed presentation with severe hypoxemic respiratory failure are poor candidate for remdesivir therapy per [facility]. Anti-inflammatory medication such as baricitinib or Tocilizumab based on CRP may have some benefit. Dexamethasone is considered standard of care.

## 2021-12-01 NOTE — CONSULTS
HPI    39 y. o. male with poorly controlled type 2 diabetes mellitus, hypertension who was was admitted to an OSH on 11/23 for acute respiratory failure due to covid 19 pneumonia. He wad admitted directly to the ICU on HFNC, received dexamethasone 11/23 to date, S/P Tociluzumab 11/24. His condition progressively worsened - intubated on 11/28. Transferred to [facility] for possible VV - ECMO. Patient seen, intubated on high vent support.     Review of systems unable to assess at this time    Past Medical History:   Diagnosis Date    COVID-19     Diabetes ([address]     Hyperlipidemia     Hypertension      Past Surgical History:   Procedure Laterality Date    HX ORTHOPAEDIC Right     wrist surgery     Current Outpatient Medications   Medication Instructions    ascorbic acid (vitamin C) (VITAMIN C) 500 mg, Oral, 2 TIMES DAILY    atorvastatin (LIPITOR) 20 mg, Oral, DAILY    cholecalciferol (VITAMIN D3) 2,000 Units, Oral, DAILY    cisatracurium (NIMBEX) IV infusion 0-10 mcg/kg/min, IntraVENous, TITRATE    dexamethasone (DECADRON) 20 mg, IntraVENous, EVERY 24 HOURS    enoxaparin (LOVENOX) 1 mg/kg, SubCUTAneous, EVERY 12 HOURS    fentaNYL (PF) 0-200 mcg/hr (0-200 mcg/hr), IntraVENous, TITRATE    insulin glargine (LANTUS) 24 Units, SubCUTAneous, EVERY BEDTIME    melatonin (rapid dissolve) 5 mg, Oral, EVERY BEDTIME    midazolam in normal saline (VERSED) 0-10 mg/hr (0-10 mg/hr), IntraVENous, TITRATE    Norepinephrine Bitartrate (LEVOPHED) 0.5-16 mcg/min (0.5-16 mcg/min), IntraVENous, TITRATE    telmisartan (MICARDIS) 80 mg, Oral, DAILY     Allergies   Allergen Reactions    Alupent [Metaproterenol] Swelling     Family History   Problem Relation Age of Onset    Diabetes Mother     Hypertension Mother     Stroke Mother     Hodgkin's lymphoma Mother     Diabetes Father     Hypertension Father     Cystic Fibrosis Father     Diabetes Maternal Aunt     Diabetes Maternal Uncle      Social History Socioeconomic History    Marital status: SINGLE     Spouse name: Not on file    Number of children: Not on file    Years of education: Not on file    Highest education level: Not on file   Occupational History    Not on file   Tobacco Use    Smoking status: Never Smoker    Smokeless tobacco: Not on file   Substance and Sexual Activity    Alcohol use: Yes     Comment: drinksonce a week    Drug use: Never    Sexual activity: Not on file   Other Topics Concern     Service Not Asked    Blood Transfusions Not Asked    Caffeine Concern Not Asked    Occupational Exposure Not Asked    Hobby Hazards Not Asked    Sleep Concern Not Asked    Stress Concern Not Asked    Weight Concern Not Asked    Special Diet Not Asked    Back Care Not Asked    Exercise Not Asked    Bike Helmet Not Asked   [address] [facility] [facility] Not Asked    Self-Exams Not Asked   Social History Narrative    Not on file     Social Determinants of Health     Financial Resource Strain:     Difficulty of Paying Living Expenses: Not on file   Food Insecurity:     Worried About Running Out of Food in the Last Year: Not on file    Elvis of Food in the Last Year: Not on file   Transportation Needs:     Lack of Transportation (Medical): Not on file    Lack of Transportation (Non-Medical):  Not on file   Physical Activity:     Days of Exercise per Week: Not on file    Minutes of Exercise per Session: Not on file   Stress:     Feeling of Stress : Not on file   Social Connections:     Frequency of Communication with Friends and Family: Not on file    Frequency of Social Gatherings with Friends and Family: Not on file    Attends Christianity Services: Not on file    Active Member of Clubs or Organizations: Not on file    Attends Club or Organization Meetings: Not on file    Marital Status: Not on file   Intimate Partner Violence:     Fear of Current or Ex-Partner: Not on file    Emotionally Abused: Not on file    Physically Abused: Not on file    Sexually Abused: Not on file   Housing Stability:     Unable to Pay for Housing in the Last Year: Not on file    Number of Places Lived in the Last Year: Not on file    Unstable Housing in the Last Year: Not on file     Patient Vitals for the past 24 hrs:   Temp Pulse Resp BP SpO2   12/01/21 1800  (!) 52 20  96 %   12/01/21 1700  79 20  91 %   12/01/21 1600 98.2 °F (36.8 °C) (!) 58 20 122/65 93 %   12/01/21 1500  (!) 56 20  96 %   12/01/21 1437  68 20  95 %   12/01/21 1400  69 20  94 %   12/01/21 1300  73 20 95/68 (!) 87 %   12/01/21 1230 98.8 °F (37.1 °C) 69 20 109/85 (!) 86 %     Physical exam  General-intubated, sedated, paralyzed  Neuro-pupils reactive, flaccid  Cardiac-RRR  Lungs-coarse bilaterally  Abdomen-soft, nondistended  Extremities-warm    Recent Results (from the past 24 hour(s))   GLUCOSE, POC    Collection Time: 11/30/21 11:03 PM   Result Value Ref Range    Glucose (POC) 235 (H) 70 - 110 mg/dL   D DIMER    Collection Time: 12/01/21  4:10 AM   Result Value Ref Range    D DIMER 3.05 (H) <0.46 ug/ml(FEU)   PROTHROMBIN TIME + INR    Collection Time: 12/01/21  4:10 AM   Result Value Ref Range    Prothrombin time 14.3 11.5 - 15.2 sec    INR 1.2 0.8 - 1.2     PTT    Collection Time: 12/01/21  4:10 AM   Result Value Ref Range    aPTT 27.9 23.0 - 36.4 SEC   C REACTIVE PROTEIN, QT    Collection Time: 12/01/21  4:10 AM   Result Value Ref Range    C-Reactive protein 0.7 (H) 0 - 0.3 mg/dL   LD    Collection Time: 12/01/21  4:10 AM   Result Value Ref Range     (H) 81 - 234 U/L   FERRITIN    Collection Time: 12/01/21  4:10 AM   Result Value Ref Range    Ferritin 1,831 (H) 8 - 388 NG/ML   CBC WITH AUTOMATED DIFF    Collection Time: 12/01/21  4:10 AM   Result Value Ref Range    WBC 6.7 4.6 - 13.2 K/uL    RBC 4.28 (L) 4.35 - 5.65 M/uL    HGB 12.6 (L) 13.0 - 16.0 g/dL    HCT 37.7 36.0 - 48.0 %    MCV 88.1 78.0 - 100.0 FL    MCH 29.4 24.0 - 34.0 PG    MCHC 33.4 31.0 - 37.0 g/dL    RDW 12.5 11.6 - 14.5 %    PLATELET 333 693 - 699 K/uL    MPV 9.0 (L) 9.2 - 11.8 FL    NRBC 0.0 0  WBC    ABSOLUTE NRBC 0.00 0.00 - 0.01 K/uL    NEUTROPHILS 78 (H) 40 - 73 %    BAND NEUTROPHILS 10 (H) 0 - 5 %    LYMPHOCYTES 8 (L) 21 - 52 %    MONOCYTES 3 3 - 10 %    EOSINOPHILS 1 0 - 5 %    BASOPHILS 0 0 - 2 %    IMMATURE GRANULOCYTES 0 %    ABS. NEUTROPHILS 5.9 1.8 - 8.0 K/UL    ABS. LYMPHOCYTES 0.5 (L) 0.9 - 3.6 K/UL    ABS. MONOCYTES 0.2 0.05 - 1.2 K/UL    ABS. EOSINOPHILS 0.1 0.0 - 0.4 K/UL    ABS. BASOPHILS 0.0 0.0 - 0.1 K/UL    ABS. IMM. GRANS. 0.0 K/UL    DF MANUAL      PLATELET COMMENTS ADEQUATE PLATELETS      RBC COMMENTS NORMOCYTIC, NORMOCHROMIC     METABOLIC PANEL, COMPREHENSIVE    Collection Time: 12/01/21  4:10 AM   Result Value Ref Range    Sodium 141 136 - 145 mmol/L    Potassium 4.7 3.5 - 5.5 mmol/L    Chloride 105 100 - 111 mmol/L    CO2 31 21 - 32 mmol/L    Anion gap 5 3.0 - 18 mmol/L    Glucose 246 (H) 74 - 99 mg/dL    BUN 31 (H) 7.0 - 18 MG/DL    Creatinine 0.72 0.6 - 1.3 MG/DL    BUN/Creatinine ratio 43 (H) 12 - 20      GFR est AA >60 >60 ml/min/1.73m2    GFR est non-AA >60 >60 ml/min/1.73m2    Calcium 8.9 8.5 - 10.1 MG/DL    Bilirubin, total 0.5 0.2 - 1.0 MG/DL    ALT (SGPT) 62 (H) 16 - 61 U/L    AST (SGOT) 59 (H) 10 - 38 U/L    Alk.  phosphatase 54 45 - 117 U/L    Protein, total 6.7 6.4 - 8.2 g/dL    Albumin 3.5 3.4 - 5.0 g/dL    Globulin 3.2 2.0 - 4.0 g/dL    A-G Ratio 1.1 0.8 - 1.7     CALCIUM, IONIZED    Collection Time: 12/01/21  4:10 AM   Result Value Ref Range    Ionized Calcium 1.15 1.12 - 1.32 MMOL/L   MAGNESIUM    Collection Time: 12/01/21  4:10 AM   Result Value Ref Range    Magnesium 2.7 (H) 1.6 - 2.6 mg/dL   PHOSPHORUS    Collection Time: 12/01/21  4:10 AM   Result Value Ref Range    Phosphorus 3.4 2.5 - 4.9 MG/DL   PROCALCITONIN    Collection Time: 12/01/21  4:10 AM   Result Value Ref Range    Procalcitonin 0.06 ng/mL   BLOOD GAS, ARTERIAL POC    Collection Time: 12/01/21  5:32 AM   Result Value Ref Range    Device: ADULT VENT      FIO2 (POC) 90 %    pH (POC) 7.44 7.35 - 7.45      pCO2 (POC) 48.3 (H) 35.0 - 45.0 MMHG    pO2 (POC) 244 (H) 80 - 100 MMHG    HCO3 (POC) 32.6 (H) 22 - 26 MMOL/L    sO2 (POC) 99.8 (H) 92 - 97 %    Base excess (POC) 7.3 mmol/L    Mode Volume Control      Tidal volume 450 ml    Set Rate 20 bpm    PEEP/CPAP (POC) 15 cmH2O    Mean Airway Pressure 20 cmH2O    PIP (POC) 29      Allens test (POC) Positive      Site RIGHT RADIAL      Patient temp. 98.9      Specimen type (POC) ARTERIAL      Performed by Kwaku GUTIERREZ, POC    Collection Time: 12/01/21  5:36 AM   Result Value Ref Range    Glucose (POC) 241 (H) 70 - 110 mg/dL   GLUCOSE, POC    Collection Time: 12/01/21  1:00 PM   Result Value Ref Range    Glucose (POC) 187 (H) 65 - 117 mg/dL    Performed by Wilmer Bliss    PTT    Collection Time: 12/01/21  1:41 PM   Result Value Ref Range    aPTT 24.4 22.1 - 31.0 sec    aPTT, therapeutic range     58.0 - 77.0 SECS   PROTHROMBIN TIME + INR    Collection Time: 12/01/21  1:41 PM   Result Value Ref Range    INR 1.1 0.9 - 1.1      Prothrombin time 11.3 (H) 9.0 - 28.8 sec   METABOLIC PANEL, COMPREHENSIVE    Collection Time: 12/01/21  1:41 PM   Result Value Ref Range    Sodium 143 136 - 145 mmol/L    Potassium 4.3 3.5 - 5.1 mmol/L    Chloride 106 97 - 108 mmol/L    CO2 27 21 - 32 mmol/L    Anion gap 10 5 - 15 mmol/L    Glucose 185 (H) 65 - 100 mg/dL    BUN 33 (H) 6 - 20 MG/DL    Creatinine 0.64 (L) 0.70 - 1.30 MG/DL    BUN/Creatinine ratio 52 (H) 12 - 20      GFR est AA >60 >60 ml/min/1.73m2    GFR est non-AA >60 >60 ml/min/1.73m2    Calcium 9.2 8.5 - 10.1 MG/DL    Bilirubin, total 0.6 0.2 - 1.0 MG/DL    ALT (SGPT) 62 12 - 78 U/L    AST (SGOT) 53 (H) 15 - 37 U/L    Alk.  phosphatase 55 45 - 117 U/L    Protein, total 6.5 6.4 - 8.2 g/dL    Albumin 3.6 3.5 - 5.0 g/dL    Globulin 2.9 2.0 - 4.0 g/dL    A-G Ratio 1.2 1.1 - 2.2     CBC WITH AUTOMATED DIFF    Collection Time: 12/01/21  1:41 PM   Result Value Ref Range    WBC 7.8 4.1 - 11.1 K/uL    RBC 4.32 4.10 - 5.70 M/uL    HGB 12.8 12.1 - 17.0 g/dL    HCT 38.3 36.6 - 50.3 %    MCV 88.7 80.0 - 99.0 FL    MCH 29.6 26.0 - 34.0 PG    MCHC 33.4 30.0 - 36.5 g/dL    RDW 12.6 11.5 - 14.5 %    PLATELET 890 714 - 678 K/uL    MPV 8.8 (L) 8.9 - 12.9 FL    NRBC 0.0 0  WBC    ABSOLUTE NRBC 0.00 0.00 - 0.01 K/uL    NEUTROPHILS 85 (H) 32 - 75 %    LYMPHOCYTES 9 (L) 12 - 49 %    MONOCYTES 6 5 - 13 %    EOSINOPHILS 0 0 - 7 %    BASOPHILS 0 0 - 1 %    IMMATURE GRANULOCYTES 0 0.0 - 0.5 %    ABS. NEUTROPHILS 6.6 1.8 - 8.0 K/UL    ABS. LYMPHOCYTES 0.7 (L) 0.8 - 3.5 K/UL    ABS. MONOCYTES 0.5 0.0 - 1.0 K/UL    ABS. EOSINOPHILS 0.0 0.0 - 0.4 K/UL    ABS. BASOPHILS 0.0 0.0 - 0.1 K/UL    ABS. IMM.  GRANS. 0.0 0.00 - 0.04 K/UL    DF SMEAR SCANNED      RBC COMMENTS NORMOCYTIC, NORMOCHROMIC     MAGNESIUM    Collection Time: 12/01/21  1:41 PM   Result Value Ref Range    Magnesium 2.7 (H) 1.6 - 2.4 mg/dL   PHOSPHORUS    Collection Time: 12/01/21  1:41 PM   Result Value Ref Range    Phosphorus 3.5 2.6 - 4.7 MG/DL   LACTIC ACID    Collection Time: 12/01/21  1:41 PM   Result Value Ref Range    Lactic acid 3.0 (HH) 0.4 - 2.0 MMOL/L   POC G3 - PUL    Collection Time: 12/01/21  1:42 PM   Result Value Ref Range    FIO2 (POC) 75 %    pH (POC) 7.44 7.35 - 7.45      pCO2 (POC) 39.4 35.0 - 45.0 MMHG    pO2 (POC) 62 (L) 80 - 100 MMHG    HCO3 (POC) 27.0 (H) 22 - 26 MMOL/L    sO2 (POC) 92.2 92 - 97 %    Base excess (POC) 2.8 mmol/L    Site DRAWN FROM ARTERIAL LINE      Device: ADULT VENT      Mode ASSIST CONTROL      Tidal volume 450 ml    Set Rate 20 bpm    PEEP/CPAP (POC) 15 cmH2O    Allens test (POC) NOT APPLICABLE      Specimen type (POC) ARTERIAL     VITAMIN D, 25 HYDROXY    Collection Time: 12/01/21  2:27 PM   Result Value Ref Range    Vitamin D 25-Hydroxy 22.4 (L) 30 - 100 ng/mL   MYCOPLASMA AB, IGM    Collection Time: 12/01/21  2:27 PM   Result Value Ref Range Mycoplasma Ab, IgM NONREACTIVE NR     PROCALCITONIN    Collection Time: 12/01/21  2:27 PM   Result Value Ref Range    Procalcitonin 0.05 ng/mL   POC G3 - PUL    Collection Time: 12/01/21  2:55 PM   Result Value Ref Range    FIO2 (POC) 75 %    pH (POC) 7.44 7.35 - 7.45      pCO2 (POC) 35.1 35.0 - 45.0 MMHG    pO2 (POC) 86 80 - 100 MMHG    HCO3 (POC) 23.6 22 - 26 MMOL/L    sO2 (POC) 96.9 92 - 97 %    Base deficit (POC) 0.3 mmol/L    Site DRAWN FROM ARTERIAL LINE      Device: ADULT VENT      Mode ASSIST CONTROL      Tidal volume 450 ml    Set Rate 20 bpm    PEEP/CPAP (POC) 15 cmH2O    Allens test (POC) NOT APPLICABLE      Specimen type (POC) ARTERIAL     GLUCOSE, POC    Collection Time: 12/01/21  5:12 PM   Result Value Ref Range    Glucose (POC) 210 (H) 65 - 117 mg/dL    Performed by Lizzy Barnes      Imaging reviewed    Assessment    ARDS secondary to Covid pneumonia    Plan    Neuro-analgesia/sedation with opioids, propofol and Versed as needed, continue NMB for severe hypoxemia    Cardiac-continue hemodynamic monitoring, keep maps above 65, will use low-dose midodrine for now, use Levophed as needed    Pulmonary-continue lung protective mechanical ventilation, PEEP currently at 15, saturation goal greater than 88%, permissive hypercapnia, will prone as needed, DEXA ARDS steroid dosing, ?  VV ECMO candidate-follow-up cardiac surgery recommendations    GI-start tube feeds, H2 RA GI prophylaxis    Renal-monitor urine output, correct electrolyte derangements as needed    Hematology-Lovenox for DVT prophylaxis    ID-Covid pneumonia-status post Tocilizumab, was not deemed a candidate for remdesivir at outside hospital    Endocrinology-keep glucose less than 180    Critical care time-80 minutes excluding procedure time

## 2021-12-01 NOTE — PROGRESS NOTES
0930 TRANSFER - IN REPORT:    Verbal report received from KATHRINE Nolasco(name) on Susan Reyes  being received from [facility](unit) for routine progression of care      Report consisted of patients Situation, Background, Assessment and   Recommendations(SBAR). Information from the following report(s) SBAR, Kardex, Intake/Output, MAR and Cardiac Rhythm NSR was reviewed with the receiving nurse. Opportunity for questions and clarification was provided. Assessment completed upon patients arrival to unit and care assumed. 1230 Patient arrived on unit. Gtts verified and pump switched with critical care transport. Os sats 85-87%, vent increased to 100%  Primary Nurse Yevgeniy George RN and Cat,, RN performed a dual skin assessment on this patient Impairment noted- see wound doc flow sheet. Jeffrey score is 20.    1250 Vent FiO2 75%, requesting abg.     1300 Dr. Byron Kimbrough at bedside for line placement, vent settings changed PEEP 13, FiO2 100%. L subclavian quad; R arterial line. Post line placement, vent settings back to previous, PEEP 15, FiO2 75%    1345 labs sent. abg drawn. CXR at bedside to confirm line placement    1435 ETT advanced 2cm, 26@ lip. 1500 ABG redrawn, Dr. Bolivar Lua at bedside. Midline removed, blanc catheter replaced    1700 Per CTS will continue to watch, no ECMO for now. Patient moved off [facility] stretcher onto [place] In[facility] bed. Tube feeds started. 1930 Bedside and Verbal shift change report given to [facility] (oncoming nurse) by Tr Smart (offgoing nurse). Report included the following information SBAR, Kardex, Intake/Output, MAR and Cardiac Rhythm SB;NSR.

## 2021-12-02 NOTE — CONSULTS
Comprehensive Nutrition Assessment    Type and Reason for Visit: Initial, Consult      Nutrition Recommendations/Plan:      1. Modify tube feeding to Glucerna 1.5 @ 35 mL/hr with 2 packets Prosource BID and 100 mL h2o flushes q 4 hours (provides 102-112 gm CHO based on 22-24° infusion)    2. Monitor BMs, continue bowel regimen and adjust as needed. Pt without documented BM since prior to 11/23 admission    3. Agree with Viet Nairbe Plus and Vit D. Recommend starting 100 mg thiamine d/t suspected malnutrition/ prolonged NPO and inadequate oral inake       Nutrition Assessment:     PMHx includes DM, HTN, HLD. Admitted to [facility] on 11/23 for acute respiratory failure due to covid 19 pneumonia. He wad admitted directly to the ICU on HFNC, has been receiving dexamethasone daily since 11/23, S/P Tociluzumab 11/24. His condition progressively worsened - intubated on 11/28. Transferred to [facility] for possible VV - ECMO. Remains intubated and sedated. Off Nimbex. Has not required ECMO. On small amount of pressors. BG uncontrolled. Diabetes nurse following. Appears pt may not have had a BM since PTA on 11/23 (based on flow sheets). Propofol @ 26.1 mL/hr provides 689 kcal.  Currently receiving TF via OG of Osmolite 1.2 @ 40 mL/hr with 30 mL h2o flushes q 8 hours which provides 880 mL, 1056 kcal, 49 gm pro, 139 gm CHO, and 713+68=326 mL free h2O (with dip=1745 kcal)    Appreciate RD notes from previous facility. Appears poor appetite intake prior to intubation and also without NS until this AM.  Pt's admission weight on 11/23 was 215 lb (stated), down 191 lb prior to tx, now 183 lb, so unclear if weight loss has been this extensive vs bed scale discrepancies. Would not be surprised if significant wt loss has already taken place. Unable to perform NFPE d/t COVID. Suspect some degree of malnutrition. Wellesse Plus to start. Would also consider thiamine.     Given BG and lack of BM, favor trying Glucerna 1.5 with goal of 35 mL/hr with 2 packet Prosource BID and flushes of 100 mL h2o q 4 hours to provide 770 mL, 1395 kcal, 124 gm pro, 102 gm CHO, 585+240+575=0291 mL free H2o. With dip = 2084 kcal.      Malnutrition Assessment:  Malnutrition Status: At risk for malnutrition (specify) (suspect, but not enough information)    Context:  Acute illness       Nutritionally Significant Medications:   NS@ KVO, Vit D3, Decadron, Colace, Pepcid, Fentanyl, Lantus 50 units, Versed, levo 2, miralax, propofol      Estimated Daily Nutrient Needs:   Energy (kcal):  (CY2134b)  Wt used: Admission (83.1 kg)  Protein (gm): 125 (1.5 gm/kg)    Fluid (mL/day): 1 mL/kcal       Ve Observed 9.87 l/min    Temp (24hrs), Av.7 °F (37.6 °C), Min:98.2 °F (36.8 °C), Max:100.6 °F (38.1 °C)    Nutrition Related Findings:   Edema: none  Last BM: PTA      Wounds:    None       Current Nutrition Therapies:  Diet: NPO  EN Order: TF via OG of Osmolite 1.2 @ 40 mL/hr with 30 mL H2o flushes q 8 hours    Anthropometric Measures:  · Height:  5' 6\" (167.6 cm)  · Current Body Wt:  83.1 kg (183 lb 3.2 oz)   · Ideal Body Wt:  142 lbs:  129 %     · BMI Category: Overweight (BMI 25.0-29. 9)       Wt Readings from Last 20 Encounters:   21 83.1 kg (183 lb 3.2 oz)   21 86.9 kg (191 lb 9.3 oz)         Nutrition Diagnosis:   · Inadequate oral intake related to impaired respiratory function as evidenced by intubation, nutrition support-enteral nutrition      Nutrition Interventions:   Food and/or Nutrient Delivery: Modify tube feeding  Nutrition Education and Counseling:    Coordination of Nutrition Care: Continue to monitor while inpatient    Goals:  tolerate TF to meet >80% EEN over next 5-7 days       Nutrition Monitoring and Evaluation:   Behavioral-Environmental Outcomes: None identified  Food/Nutrient Intake Outcomes: Enteral nutrition intake/tolerance  Physical Signs/Symptoms Outcomes: Biochemical data, GI status, Fluid status or edema, Hemodynamic status, Weight    Discharge Planning: Too soon to determine     Recent Labs     12/02/21  0841 12/02/21  0357 12/01/21  1341 12/01/21  0410 11/30/21  0400 11/30/21  0400   GLU  --  339* 185* 246*  --  217*   BUN  --  33* 33* 31*  --  24*   CREA  --  0.76 0.64* 0.72  --  0.58*   NA  --  138 143 141  --  140   K 4.8 5.1 4.3 4.7   < > 4.6   CL  --  107 106 105  --  103   CO2  --  27 27 31  --  30   CA  --  9.3 9.2 8.9  --  8.7   PHOS  --  4.1 3.5 3.4  --  3.8   MG 2.7* 2.9* 2.7* 2.7*   < > 2.8*    < > = values in this interval not displayed. Recent Labs     12/02/21  0357 12/01/21  1341 12/01/21  0410   ALT 58 62 62*   AP 63 55 54   TBILI 0.5 0.6 0.5   TP 6.6 6.5 6.7   ALB 3.7 3.6 3.5   GLOB 2.9 2.9 3.2       Recent Labs     12/02/21  0840 12/02/21  0152 12/01/21  1959   LAC 3.1* 2.4* 2.5*       Recent Labs     12/02/21  0357 12/01/21  1341   WBC 8.6 7.8   HGB 12.2 12.8   HCT 37.0 38.3    375       No results for input(s): TRIGL in the last 72 hours. Triglyceride   Date Value Ref Range Status   11/23/2021 241 (H) <150 MG/DL Final     Comment:     The drugs N-acetylcysteine (NAC) and  Metamiszole have been found to cause falsely  low results in this chemical assay. Please  be sure to submit blood samples obtained  BEFORE administration of either of these  drugs to assure correct results.          Recent Labs     12/02/21  1152 12/01/21  2350 12/01/21  1712 12/01/21  1300 12/01/21  0536 11/30/21  2303 11/30/21  1727 11/30/21  1131 11/30/21  0621 11/30/21  0037 11/29/21  1652   GLUCPOC 318* 292* 210* 187* 241* 235* 140* 165* 185* 228* 106       Lab Results   Component Value Date/Time    Hemoglobin A1c 8.3 (H) 11/24/2021 04:14 AM    Hemoglobin A1c 8.4 (H) 11/23/2021 04:24 PM       Vitamin D 25-Hydroxy   Date Value Ref Range Status   12/01/2021 22.4 (L) 30 - 100 ng/mL Final     Comment:     (NOTE)  Deficiency               <20 ng/mL  Insufficiency          20-30 ng/mL  Sufficient             ng/mL  Possible toxicity       >100 ng/mL    The Method used is Siemens Advia Centaur currently standardized to a   Center of Disease Control and Prevention (CDC) certified reference   [facility]. Samples containing fluorescein dye can produce falsely   elevated values when tested with the ADVIA Centaur Vitamin D Assay. It is recommended that results in the toxic range, >100 ng/mL, be   retested 72 hours post fluorescein exposure.            Vamshi Leblanc RD  Available via "[employer]

## 2021-12-02 NOTE — PROGRESS NOTES
HPI    39 y. o. male with poorly controlled type 2 diabetes mellitus, hypertension who was was admitted to an OSH on 11/23 for acute respiratory failure due to covid 19 pneumonia. He wad admitted directly to the ICU on HFNC, received dexamethasone 11/23 to date, S/P Tociluzumab 11/24. His condition progressively worsened - intubated on 11/28. Transferred to [facility] for possible VV - ECMO. Patient seen, intubated on high vent support.     Interval Changes    12/2 - came off NMB ON, still requiring high vent support, on/ff levo    Physical exam  General-intubated, sedated  Neuro-pupils reactive, strong cough  Cardiac-RRR  Lungs-coarse bilaterally  Abdomen-soft, nondistended  Extremities-warm    Recent Results (from the past 24 hour(s))   GLUCOSE, POC    Collection Time: 12/01/21  5:12 PM   Result Value Ref Range    Glucose (POC) 210 (H) 65 - 117 mg/dL    Performed by Rafael Eden    LACTIC ACID    Collection Time: 12/01/21  7:59 PM   Result Value Ref Range    Lactic acid 2.5 (HH) 0.4 - 2.0 MMOL/L   GLUCOSE, POC    Collection Time: 12/01/21 11:50 PM   Result Value Ref Range    Glucose (POC) 292 (H) 65 - 117 mg/dL    Performed by Brando Kraft    SARS-COV-2    Collection Time: 12/02/21 12:01 AM   Result Value Ref Range    Specimen source Nasopharyngeal      SARS-CoV-2 Detected (AA) NOTD     LACTIC ACID    Collection Time: 12/02/21  1:52 AM   Result Value Ref Range    Lactic acid 2.4 (HH) 0.4 - 2.0 MMOL/L   CBC WITH AUTOMATED DIFF    Collection Time: 12/02/21  3:57 AM   Result Value Ref Range    WBC 8.6 4.1 - 11.1 K/uL    RBC 4.06 (L) 4.10 - 5.70 M/uL    HGB 12.2 12.1 - 17.0 g/dL    HCT 37.0 36.6 - 50.3 %    MCV 91.1 80.0 - 99.0 FL    MCH 30.0 26.0 - 34.0 PG    MCHC 33.0 30.0 - 36.5 g/dL    RDW 12.7 11.5 - 14.5 %    PLATELET 311 275 - 015 K/uL    MPV 9.3 8.9 - 12.9 FL    NRBC 0.0 0  WBC    ABSOLUTE NRBC 0.00 0.00 - 0.01 K/uL    NEUTROPHILS 86 (H) 32 - 75 %    LYMPHOCYTES 8 (L) 12 - 49 %    MONOCYTES 6 5 - 13 % EOSINOPHILS 0 0 - 7 %    BASOPHILS 0 0 - 1 %    IMMATURE GRANULOCYTES 0 0.0 - 0.5 %    ABS. NEUTROPHILS 7.4 1.8 - 8.0 K/UL    ABS. LYMPHOCYTES 0.7 (L) 0.8 - 3.5 K/UL    ABS. MONOCYTES 0.5 0.0 - 1.0 K/UL    ABS. EOSINOPHILS 0.0 0.0 - 0.4 K/UL    ABS. BASOPHILS 0.0 0.0 - 0.1 K/UL    ABS. IMM. GRANS. 0.0 0.00 - 0.04 K/UL    DF SMEAR SCANNED      PLATELET COMMENTS Large Platelets      RBC COMMENTS NORMOCYTIC, NORMOCHROMIC     METABOLIC PANEL, COMPREHENSIVE    Collection Time: 12/02/21  3:57 AM   Result Value Ref Range    Sodium 138 136 - 145 mmol/L    Potassium 5.1 3.5 - 5.1 mmol/L    Chloride 107 97 - 108 mmol/L    CO2 27 21 - 32 mmol/L    Anion gap 4 (L) 5 - 15 mmol/L    Glucose 339 (H) 65 - 100 mg/dL    BUN 33 (H) 6 - 20 MG/DL    Creatinine 0.76 0.70 - 1.30 MG/DL    BUN/Creatinine ratio 43 (H) 12 - 20      GFR est AA >60 >60 ml/min/1.73m2    GFR est non-AA >60 >60 ml/min/1.73m2    Calcium 9.3 8.5 - 10.1 MG/DL    Bilirubin, total 0.5 0.2 - 1.0 MG/DL    ALT (SGPT) 58 12 - 78 U/L    AST (SGOT) 44 (H) 15 - 37 U/L    Alk.  phosphatase 63 45 - 117 U/L    Protein, total 6.6 6.4 - 8.2 g/dL    Albumin 3.7 3.5 - 5.0 g/dL    Globulin 2.9 2.0 - 4.0 g/dL    A-G Ratio 1.3 1.1 - 2.2     MAGNESIUM    Collection Time: 12/02/21  3:57 AM   Result Value Ref Range    Magnesium 2.9 (H) 1.6 - 2.4 mg/dL   PHOSPHORUS    Collection Time: 12/02/21  3:57 AM   Result Value Ref Range    Phosphorus 4.1 2.6 - 4.7 MG/DL   PROTHROMBIN TIME + INR    Collection Time: 12/02/21  3:57 AM   Result Value Ref Range    INR 1.0 0.9 - 1.1      Prothrombin time 10.9 9.0 - 11.1 sec   FIBRINOGEN    Collection Time: 12/02/21  3:57 AM   Result Value Ref Range    Fibrinogen 236 200 - 475 mg/dL   D DIMER    Collection Time: 12/02/21  3:57 AM   Result Value Ref Range    D-dimer 1.30 (H) 0.00 - 0.65 mg/L FEU   BLOOD GAS, ARTERIAL    Collection Time: 12/02/21  4:25 AM   Result Value Ref Range    pH 7.39 7.35 - 7.45      PCO2 43 35 - 45 mmHg    PO2 119 (H) 80 - 100 mmHg O2 SAT 98 (H) 92 - 97 %    BICARBONATE 25 22 - 26 mmol/L    BASE EXCESS 0.2 mmol/L    O2 METHOD VENT      FIO2 75 %    MODE ASSIST CONTROL      PEEP/CPAP 15.0      Sample source ARTERIAL      SITE DRAWN FROM ARTERIAL LINE      MARISELA'S TEST NOT APPLICABLE     LACTIC ACID    Collection Time: 12/02/21  8:40 AM   Result Value Ref Range    Lactic acid 3.1 (HH) 0.4 - 2.0 MMOL/L   POTASSIUM    Collection Time: 12/02/21  8:41 AM   Result Value Ref Range    Potassium 4.8 3.5 - 5.1 mmol/L   MAGNESIUM    Collection Time: 12/02/21  8:41 AM   Result Value Ref Range    Magnesium 2.7 (H) 1.6 - 2.4 mg/dL   GLUCOSE, POC    Collection Time: 12/02/21 11:52 AM   Result Value Ref Range    Glucose (POC) 318 (H) 65 - 117 mg/dL    Performed by Chelsie Weber      Imaging reviewed    Meds reviewed    Assessment    ARDS secondary to Covid pneumonia  Hypotension    Plan    Neuro-analgesia/sedation with opioids, propofol and Versed as needed    Cardiac-continue hemodynamic monitoring, keep maps above 65, cont midodrine, use Levophed as needed    Pulmonary-continue lung protective mechanical ventilation, PEEP currently at 15, saturation goal greater than 88%, permissive hypercapnia, will prone as needed, DEXA ARDS steroid dosing    GI- cont tube feeds, H2 RA GI prophylaxis    Renal-monitor urine output, correct electrolyte derangements as needed    Hematology-Lovenox for DVT prophylaxis    ID-Covid pneumonia-status post Tocilizumab, was not deemed a candidate for remdesivir at outside hospital    Endocrinology-keep glucose less than 180    Critical care time-40 minutes

## 2021-12-02 NOTE — PROGRESS NOTES
Transitions of Care Plan    · RUR: 1% - low  · Admission Dx: COVID  · Consults: Multiple  · Baseline: independent without DME; resides with spouse  · Barrier(s) to Discharge: medical   · Disposition: pending medical progress  · Estimated Discharge Date: 2+ days      Clinical update per chart review and patient discussed during Interdisciplinary Rounds:    Patient is not medically stable for discharge due to ongoing medical needs - patient not a candidate for ECMO at this time due to progress:    · Intubated/sedated on ventilator support     CM continues to follow treatment plan for medical progress and disposition needs.     Disposition:  Anticipate rehab pending medical progress    Carlos Bean, MPH  Care Manager [facility]  Available via [employer] or

## 2021-12-02 NOTE — PROGRESS NOTES
1930: Bedside and Verbal shift change report given to Elbert Carrel, RN (oncoming nurse) by Alice Hawley RN (offgoing nurse). Report included the following information SBAR, Kardex, ED Summary, Procedure Summary, Intake/Output, MAR, Recent Results, Med Rec Status, Cardiac Rhythm Sinus Tara Feast, Alarm Parameters  and Dual Neuro Assessment. Drips: Nimbex @ 4, Propofol @ 50, Fentanyl @ 200, Versed @ 2, Maria Alejandra [place] @ 8    2000: Resumed pt care, VSS. Lactic drawn and sent per protocol     2045: Lactic 2.5, MD aware    0000: repeat PCR sent     0200: Lactic sent. Levo restarted for MAP 54, stopped shortly after for      0245: Lactic 2.4     0400: AM labs drawn and sent  ABG: pO2 119; pCO2 43; pH 7.39; HCO3 25; 98%O2 Sat. Orders to shut off nimbex     0500: Nimbex off, orders for restraints received     0730: Bedside and Verbal shift change report given to Alice Hawley RN (oncoming nurse) by Elbert Carrel, RN (offgoing nurse). Report included the following information SBAR, Kardex, ED Summary, Procedure Summary, Intake/Output, MAR, Recent Results, Med Rec Status, Cardiac Rhythm NSR/SB, Alarm Parameters  and Dual Neuro Assessment.      Drips: Propofol @ 50, Fentanyl @ 200, Versed @ 5, Maria Alejandra [place] @ 8

## 2021-12-02 NOTE — DIABETES MGMT
[address]    CLINICAL NURSE SPECIALIST CONSULT     Initial Presentation   Susan Tidwell is a 55 y.o. male who presented to [facility] ED on 11/23/21 with known COVID-19 viral infection with worsening SOB. HX:   Past Medical History:   Diagnosis Date    COVID-19     Diabetes ([facility] [address]     Hyperlipidemia     Hypertension         INITIAL DX:   COVID [U07.1] Severe hypoxemic respiratory failure    Current Treatment     TX: IV steroids, intubation    Consulted by Asiya Jaramillo MD  for advanced diabetes nursing assessment and care for:   [x] Inpatient management strategy    Hospital Course   Clinical progress has been complicated by hyperglycemia, worsening respiratory failure. 11/23: ICU admission at [facility]. HFNC. IV abox, Decadron 6mg daily  11/24: Tociluxumab  11/28: Intubated. Started vasopressors. 12/1: Transfer to [facility] for higher level of care  Diabetes History   Type 2 Diabetes  Nika Chaudhry, DO: PCP    Diabetes-related Medical History  Acute complications  Steroid induced hyperglycemia  Neurological complications  Unknown  Microvascular disease  None  Macrovascular disease  None     Diabetes Medication History  Key Antihyperglycemic Medications             insulin glargine (LANTUS) 100 unit/mL injection (Taking) 24 Units by SubCUTAneous route nightly. Indications: type 2 diabetes mellitus    glipiZIDE SR (GLUCOTROL XL) 10 mg CR tablet (Discontinued) Take 10 mg by mouth daily. metFORMIN ER (GLUCOPHAGE XR) 500 mg tablet (Discontinued) Take 500 mg by mouth daily (with dinner). Subjective   Patient intubated and sedated  Objective   Physical exam  General Overweight male. Intubated   Neuro  Sedated  Vital Signs   Visit Vitals  /60   Pulse 67   Temp 100 °F (37.8 °C)   Resp 20   Wt 83.1 kg (183 lb 3.2 oz)   SpO2 94%   BMI 29.57 kg/m²     PE deferred due to COVID-19 infection.   Able to visualize through ICU doors    Laboratory  Recent Labs 21  0357 21  1341 21  0410   * 185* 246*   AGAP 4* 10 5   WBC 8.6 7.8 6.7   CREA 0.76 0.64* 0.72   GFRNA >60 >60 >60   AST 44* 53* 59*   ALT 58 62 62*         Blood glucose pattern      Significant diabetes-related events over the past 24-72 hours  Intubated/sedated  On: norepi and propofol  Decadron 20mg daily (Day 5), was previously on Decadron 6mg daily (x5 days)  Admitting B  -256 on 6mg decadron daily- Lantus started at 10 units daily and slowly titrated up to 30 units daily. Last dose last night  Most recent   Enteral feeds: Osmolite 1.2 at 40ml/hr (151 grams CHO/24h)      Assessment and Plan   Nursing Diagnosis Risk for unstable blood glucose pattern   Nursing Intervention Domain 525 Decision-making Support   Nursing Interventions Examined current inpatient diabetes/blood glucose control   Explored factors facilitating and impeding inpatient management  Explored corrective strategies with patient and responsible inpatient provider   Informed patient of rational for insulin strategy while hospitalized     Evaluation   Nahum Zamudio is a 55year old gentleman, with uncontrolled Type 2 diabetes, who was admitted with acute hypoxic respiratory failure s/t COVID-19 pneumonia. He did not achieve diabetes control prior to admission, as evidenced by A1c of 8.4%. He was admitted at [facility] for 8 days prior to transfer to [address] [place] [address],  On arrival at [facility], his initial glucose was significantly elevated at 400.  6mg decadron was initiated and continued for 5 days. Glucose there was 195-290 with low dose lantus and correctional humalog. When Decadron was advanced to 20mg daily, on , lantus was slowly titrated up to 30 units daily but he never sustained glucose control.       Several factors have played a role in blood glucose management including:  [x] Critical nature of illness state: Stress hyperglycemia/Severe COVID pna  [x]  Changing nutritive sources & needs: Enteral feeds w/ 151 grams CHO/24h   [x] Compromised insulin absorption or delivery on vasopressors   [x] Glucocorticoid use: Decadron 20mg daily        []  Adding insulin to override impact of steroids  []  Adding insulin to override carbohydrate in tube feeding  []  Adding insulin to override the dextrose within TPN    As glucose now 339, please resume basal insulin today but at a higher dose to override steroid, insulin resistance and carbs in enteral feeds. Will start with Lantus but can also consider using Q12 NPH dosing for more rapid titration. Recommendations   1. POC glucose Q6    2. Start the Subcutaneous Insulin Order set (5696)  Insulin Dosing Specific recommendation   Basal                                      (Based on weight, BMI & GFR) Start with 50 units Lantus once daily. Will likely need more    This covers:  0.2units/kg for Diabetes PLUS  0.4units/kg for Decadron (20mg) If Fasting BG over 200, will   advance basal based on   feeds, steroids and total   correctional insulin required   the day prior    Corrective                                       (Useful in adjusting insulin dosing) Insulin-resistant sensitivity Q6. Can consider Q4 dosing if BG remains elevated         Billing Code(s)   [x] 86510     Before making these care recommendations, I personally reviewed the hosptialization record, including laboratory and diagnostic data, medications and examined the patient at bedside (circumstances permitting).   Total minutes: [id number] ANICETO Mchugh  Diabetes Clinical Nurse Specialist  [facility]  Access via [employer]

## 2021-12-03 NOTE — PROGRESS NOTES
Transition of Care Plan   RUR- Low      DISPOSITION: T pending medical progression   F/U with PCP/Specialist     Transport: TBD  Patient transferred from [facility] with acute hypoxemic respiratory failure due to COVID-19 for possible ECMO. Patient admitted to ICU intubated on Fentanyl, versed and Diprivan gtts. Patient is not a candidate for ECMO. Patient's wife Eliane Stager [phone] is LNOK. Care management will follow for transitions of care.    Heather Ahumada RN,Care Mangement

## 2021-12-03 NOTE — PROGRESS NOTES
[facility]    ICU TEAM Progress Note    Name: Maureen Box   : 1975   MRN: [id number]   Date: 12/3/2021        Subjective:     Reason for ICU Admission:   39 y. o. male with poorly controlled type 2 diabetes mellitus, hypertension who was was admitted to an OSH on  for acute respiratory failure due to covid 19 pneumonia. He wad admitted directly to the ICU on HFNC, received dexamethasone  to date, S/P Tociluzumab . His condition progressively worsened - intubated on . Transferred to [facility] for possible VV - ECMO. Patient seen, intubated on high vent support, able to be weaned to 60%. Laterally transferred to ICU 12/3. Overnight Events:   12/3: Sedated and intubated, Fio2 60% P 14, LA trending down, now 2.5. Glucoses elevated, Lantus changed to NPH BID for better control. Patient's wife Katie Olguin updated via phone. Objective:   Vital Signs:  Visit Vitals  /72   Pulse 76   Temp (!) 102 °F (38.9 °C)   Resp 20   Ht 5' 6\" (1.676 m)   Wt 83.1 kg (183 lb 3.2 oz)   SpO2 94%   BMI 29.57 kg/m²      O2 Device: Endotracheal tube, Ventilator Temp (24hrs), Av.8 °F (38.2 °C), Min:99.3 °F (37.4 °C), Max:102 °F (38.9 °C)         Intake/Output:     Intake/Output Summary (Last 24 hours) at 12/3/2021 1024  Last data filed at 12/3/2021 0900  Gross per 24 hour   Intake 2111.9 ml   Output 2320 ml   Net -208.1 ml       Physical Exam:    General:  Sedated and on the ventilator. No acute distress. Eyes:  Sclera anicteric. Pupils equal, round, reactive to light. Mouth/Throat: Orotracheal tube in place. Neck: Supple. Lungs:   Clear to auscultation bilaterally, good effort. Cardiovascular:  Regular rate and rhythm, no murmur, click, rub, or gallop. Abdomen:   Soft, non-tender, bowel sounds normal, non-distended. Extremities: No cyanosis, generalized edema   Skin: No acute rash or lesions.    Lymph Nodes: Cervical and supraclavicular normal.   Musculoskeletal:  No swelling or deformity. Lines/Devices:  Intact, no erythema, drainage, or tenderness. Psychiatric: Sedated and appears comfortable on ventilator. LABS AND  DATA: Personally reviewed  Recent Labs     12/03/21 0345 12/02/21 0357   WBC 11.0 8.6   HGB 12.4 12.2   HCT 38.6 37.0    369     Recent Labs     12/03/21 0345 12/02/21  0841 12/02/21 0357 12/02/21 0357     --   --  138   K 4.9 4.8   < > 5.1     --   --  107   CO2 27  --   --  27   BUN 26*  --   --  33*   CREA 0.70  --   --  0.76   *  --   --  339*   CA 8.8  --   --  9.3   MG 2.6* 2.7*   < > 2.9*   PHOS 4.0  --   --  4.1    < > = values in this interval not displayed. Recent Labs     12/03/21 0345 12/02/21 0357   AP 65 63   TP 6.4 6.6   ALB 3.3* 3.7   GLOB 3.1 2.9     Recent Labs     12/03/21  0345 12/02/21  0357 12/01/21  1341 12/01/21  1341 12/01/21  0410 12/01/21  0410   INR 1.0 1.0   < > 1.1   < > 1.2   PTP 10.9 10.9   < > 11.3*   < > 14.3   APTT  --   --   --  24.4  --  27.9    < > = values in this interval not displayed. Recent Labs     12/03/21  0522 12/01/21  1455   PHI 7.39 7.44   PCO2I 47.3* 35.1   PO2I 68* 86   FIO2I 60 75     No results for input(s): CPK, CKMB, TROIQ, BNPP in the last 72 hours. Ventilator Settings:  Mode Rate Tidal Volume Pressure FiO2 PEEP   Assist control, Volume control   450 ml    60 % 15 cm H20     Peak airway pressure: 30 cm H2O    Minute ventilation: 10 l/min      MEDS: Reviewed    Chest X-Ray:  CXR Results  (Last 48 hours)               12/03/21 0526  XR CHEST PORT Final result    Impression:  Mildly decreased bilateral lung opacities. Narrative:  EXAM:  CR chest portable       INDICATION: Intubated. Lung opacities. COMPARISON: 12/2/2021 at 0412 hours. TECHNIQUE: Portable AP semiupright chest view at 0410 hours       FINDINGS: The endotracheal tube, enteric tube, and left subclavian catheter are   stable. The cardiomediastinal contours are stable. Bilateral lung opacities are   overall mildly decreased. There is no pleural effusion or pneumothorax. The   bones and upper abdomen are stable. 12/02/21 0456  XR CHEST PORT Final result    Impression:  Stable bilateral lung opacities. Narrative:  EXAM:  CR chest portable       INDICATION: Bilateral lung opacities       COMPARISON: 12/1/2021 at 1353 hours. TECHNIQUE: Portable AP semiupright chest view at 0412 hours       FINDINGS: The endotracheal tube, left subclavian catheter, and enteric tube are   stable. The cardiomediastinal contours are stable. Bilateral lung opacities are   unchanged. There is no pleural effusion or pneumothorax. The bones and upper   abdomen are stable. 12/01/21 1402  XR CHEST PORT Final result    Impression:  1. Status post left subclavian line placement without evidence of complication. 2.  Left greater than right airspace disease is not significantly changed. Narrative:  EXAM:  XR CHEST PORT       INDICATION: Line placement       COMPARISON: Earlier the same day. TECHNIQUE: Single frontal view of the chest.       FINDINGS: New left subclavian line tip projects over the cavoatrial junction. Endotracheal tube terminates 6.5 cm from the linda. Enteric tube terminates   below the level the diaphragm outside the field of view of the study. Left   greater than right basilar predominant airspace disease is not significantly   changed. No evidence of a pneumothorax or pleural effusion. Cardiomediastinal   silhouette measures within normal limits. ECHO 11/24:  Result status: Final result  · LV: Estimated LVEF is 50 - 55%. Normal cavity size, wall thickness and diastolic function. Low normal systolic function.  E/E'= 7.12.     Multidisciplinary Rounds Completed:  Pending    ABCDEF Bundle/Checklist Completed:  YES-See Plan    Active Problem List:     Problem List  Date Reviewed: 11/28/2021          Codes Class    COVID ICD-10-CM: U07.1  ICD-9-CM: 079.89         Acute hypoxemic respiratory failure due to COVID-19 [facility]) ICD-10-CM: U07.1, J96.01  ICD-9-CM: 518.81, 079.89, 799.02         Pneumonia due to COVID-19 virus ICD-10-CM: U07.1, J12.82  ICD-9-CM: 480.8, 079.89         Hyperglycemia due to type 2 diabetes mellitus (HCC) ICD-10-CM: E11.65  ICD-9-CM: 250.00         Primary hypertension ICD-10-CM: I10  ICD-9-CM: 401.9         Hyponatremia ICD-10-CM: E87.1  ICD-9-CM: 276.1         Lactic acidosis ICD-10-CM: E87.2  ICD-9-CM: 276.2         Hyperlipidemia ICD-10-CM: E78.5  ICD-9-CM: 272.4         Obesity (BMI 30-39. 9) ICD-10-CM: E66.9  ICD-9-CM: 278.00         COVID-19 vaccination not done ICD-10-CM: Z28.9  ICD-9-CM: V64.00             ICU Assessment/ Comprehensive Plan of Care:     NEURO  1. Sedated and intubated  -Maintain RASS -1-2 as tolerated for vent compliance    CARDIAC  1. Hypotension, possible 2/2 sedation  2. 2. Hx of HTN, HLD  -Cardiac monitoring  -Continue atorvastatin  -Keep MAP > 65  -Levophed gtt  -Continue midodrine 10mg IV Q8hr    RESPIRATORY  1. Acute hypoxic respiratory failure 2/2 COVID PNA  -Intubated on High PEEP low Fio2 support  -Nimbex off 12/2  -O2 sats > 88%  -Prone for P/F ratio < 150 (16 hr prone, 8 hr supine)  1. COVID Treatment:  a. Immunomodulator--Steroids -- Yes decadron  b. COVID-19 Specific Anticoagulation -- Yes enoxaparin  c. Vitamin C -- Yes    d. Zinc -- Yes    e. Remdesivir-NA  f. Actemra vs. Baricitinib- Toci 11/24/21    RENAL  1. No acute concerns  -Trend BMP  -Monitor U.O    Gold Catheter Present: Yes  IVFs: NA    GASTROINTESTINAL  1. No acute concerns  -cont tube feeds, H2 RA GI prophylaxis    HEMATOLOGIC  1. No acute concerns  -Stable Hgb, No evidence of bleeding  -Continue enoxparin    ID  1.  COVID 19 viral PNA  No leukocytosis, + Fevers  -Covid pneumonia-status post Tocilizumab, was not deemed a candidate for remdesivir at outside hospital  -No antibiotics at present  -Procal 0.08 12/3  -Pan cx pending, sputum 12/3, BC x2 12/3  -Mycoplasma 12/1  -Legionella 12/1    ENDOCRINE  1. Hyperglycemia with Hx of DM  -SSI resistant sensitivity Q6hr  -NPH 40 units Every 12 hours    F - Feeding:  TF  A - Analgesia: Fentanyl  S - Sedation: GOAL RASS -1 to -2  T - DVT Prophylaxis: SCD's or Sequential Compression Device , enoxaparin  H - Head of Bed: > 30 Degrees  U - Ulcer Prophylaxis: Famotidine  G - Glycemic Control: Insulin  S - Spontaneous Breathing Trial: Not appropriate at present  B - Bowel Regimen: Colace  I - Indwelling Catheter:              T/L/D  Tubes: ETT and Orogastric Tube  Lines: Peripheral IV, Arterial Line and Central Line  Drains: Gold Catheter  D - De-escalation of Antibiotics:  NA    DISPOSITION/COMMUNICATION  Discussed Plan of Care/Code Status: Full Code  Stay in ICU    CRITICAL CARE CONSULTANT NOTE  I had a face to face encounter with the patient, reviewed and interpreted patient data including clinical events, labs, images, vital signs, I/O's, and examined patient. I have discussed the case and the plan and management of the patient's care with the consulting services, the bedside nurses and the respiratory therapist.      NOTE OF PERSONAL INVOLVEMENT IN CARE    I participated in the decision-making and personally managed or directed the management of the following life and organ supporting interventions that required my frequent assessment to treat or prevent imminent deterioration. I personally spent 60 minutes of critical care time. This is time spent at this critically ill patient's bedside actively involved in patient care as well as the coordination of care and discussions with the patient's family. This does not include any procedural time which has been billed separately.     Claryce Goltz, AGACNP-BC  Intensivist Nurse Practitioner  [facility]  12/3/2021

## 2021-12-03 NOTE — DIABETES MGMT
[address]    CLINICAL NURSE SPECIALIST CONSULT     Initial Presentation   Lizette Jenkins is a 55 y.o. male who presented to [facility] ED on 11/23/21 with known COVID-19 viral infection with worsening SOB. HX:   Past Medical History:   Diagnosis Date    COVID-19     Diabetes ([facility] [address]     Hyperlipidemia     Hypertension         INITIAL DX:   COVID [U07.1] Severe hypoxemic respiratory failure    Current Treatment     TX: IV steroids, intubation    Consulted by Taylor Tracey MD  for advanced diabetes nursing assessment and care for:   [x] Inpatient management strategy    Hospital Course   Clinical progress has been complicated by hyperglycemia, worsening respiratory failure. 11/23: ICU admission at [facility]. HFNC. IV abox, Decadron 6mg daily  11/24: Tociluxumab  11/28: Intubated. Started vasopressors. 12/1: Transfer to [facility] for higher level of care  Diabetes History   Type 2 Diabetes  Yudi Anderson DO: PCP    Diabetes-related Medical History  Acute complications  Steroid induced hyperglycemia  Neurological complications  Unknown  Microvascular disease  None  Macrovascular disease  None     Diabetes Medication History  Key Antihyperglycemic Medications             insulin glargine (LANTUS) 100 unit/mL injection (Taking) 24 Units by SubCUTAneous route nightly. Indications: type 2 diabetes mellitus    glipiZIDE SR (GLUCOTROL XL) 10 mg CR tablet (Discontinued) Take 10 mg by mouth daily. metFORMIN ER (GLUCOPHAGE XR) 500 mg tablet (Discontinued) Take 500 mg by mouth daily (with dinner). Subjective   Patient intubated and sedated  Objective   Physical exam  General Overweight male. Intubated   Neuro  Sedated  Vital Signs   Visit Vitals  /70   Pulse 73   Temp (!) 101.5 °F (38.6 °C)   Resp 20   Ht 5' 6\" (1.676 m)   Wt 83.1 kg (183 lb 3.2 oz)   SpO2 93%   BMI 29.57 kg/m²     PE deferred due to COVID-19 infection.   Able to visualize through ICU [facility]    Laboratory  Recent Labs     21  0345 21  0357 21  1341   * 339* 185*   AGAP 7 4* 10   WBC 11.0 8.6 7.8   CREA 0.70 0.76 0.64*   GFRNA >60 >60 >60   AST 40* 44* 53*   ALT 59 58 62         Blood glucose pattern      Significant diabetes-related events over the past 24-72 hours  Intubated/sedated  On: norepi and propofol  Decadron 20mg daily (Day 5), was previously on Decadron 6mg daily (x5 days)  Admitting B  BG was 185-256 on 6mg decadron daily  BG now 291-368  Enteral feeds: Glucerna 1.5 at 35ml/hr (102-112 gm CHO based on 22-24° infusion)    Lantus: 50 units daily  Humalo units via correction in the last 24h      Assessment and Plan   Nursing Diagnosis Risk for unstable blood glucose pattern   Nursing Intervention Domain 5250 Decision-making Support   Nursing Interventions Examined current inpatient diabetes/blood glucose control   Explored factors facilitating and impeding inpatient management  Explored corrective strategies with patient and responsible inpatient provider   Informed patient of rational for insulin strategy while hospitalized     Evaluation   Deb Person is a 55year old gentleman, with uncontrolled Type 2 diabetes, who was admitted with acute hypoxic respiratory failure s/t COVID-19 pneumonia. He did not achieve diabetes control prior to admission, as evidenced by A1c of 8.4%. He was admitted at [facility] for 8 days prior to transfer to [facility],  On arrival at [facility], his initial glucose was significantly elevated at 400.  6mg decadron was initiated and continued for 5 days. Glucose there was 195-290 with low dose lantus and correctional humalog. When Decadron was advanced to 20mg daily, on , lantus was slowly titrated up to 30 units daily but he never sustained glucose control.       Several factors have played a role in blood glucose management including:  [x] Critical nature of illness state: Stress hyperglycemia/Severe COVID pna  [x]  Changing nutritive sources & needs: Enteral feeds w/ 151 grams CHO/24h   [x] Compromised insulin absorption or delivery on vasopressors   [x] Glucocorticoid use: Decadron 20mg daily    As glucose was elevated on admission to [facility] at 339, 50 units Lantus was started (covering low dose diabetes and high dose for decadron). His best glucose was 291 and he will need a higher basal dose to override steroid, insulin resistance and carbs in enteral feeds. Would suggest using Q12 NPH dosing for more rapid titration. Recommendations   1. POC glucose Q6    2. Enteral feeds per primary team/dietician    3. Adjust the Subcutaneous Insulin Order set (7230)  Insulin Dosing Specific recommendation   Basal                                      (Based on weight, BMI & GFR) Advance basal to 40 units NPH Q12      This covers:  0.4units/kg for Diabetes PLUS  0.4units/kg for Decadron (20mg) PLUS  1:10 carb coverage in feeds If Fasting BG over 200   tomorrow, add the total   amount of correction that   was received the day prior   and add it to basal dose   Corrective                                       (Useful in adjusting insulin dosing) Insulin-resistant sensitivity Q6. Can consider Q4 dosing if BG remains elevated         Billing Code(s)   [x] 36649    Before making these care recommendations, I personally reviewed the hosptialization record, including laboratory and diagnostic data, medications and examined the patient at bedside (circumstances permitting).   Total minutes: 21    Johanny Tidwell, CNS  Diabetes Clinical Nurse Specialist  [facility]  Access via [employer]

## 2021-12-03 NOTE — PROGRESS NOTES
Patient with ongoing, unrelenting fevers despite acetaminophen. Cefepime and fluconazole started IV for empiric coverage with no defined source of infection at present. Procal is 0.08 today and WBC 11 with elevated neutrophils. Prelim sputum with occasional GNRs, not yet finalized.     Benita Aiken, [facility]-BC

## 2021-12-03 NOTE — PROGRESS NOTES
Problem: Gas Exchange - Impaired  Goal: Absence of hypoxia  Outcome: Progressing Towards Goal  Goal: Promote optimal lung function  Outcome: Progressing Towards Goal     Problem: Gas Exchange - Impaired  Goal: Absence of hypoxia  Outcome: Progressing Towards Goal  Goal: Promote optimal lung function  Outcome: Progressing Towards Goal     Problem: Body Temperature -  Risk of, Imbalanced  Goal: Ability to maintain a body temperature within defined limits  Outcome: Progressing Towards Goal  Goal: Will regain or maintain usual level of consciousness  Outcome: Progressing Towards Goal  Goal: Complications related to the disease process, condition or treatment will be avoided or minimized  Outcome: Progressing Towards Goal     Problem: Isolation Precautions - Risk of Spread of Infection  Goal: Prevent transmission of infectious organism to others  Outcome: Progressing Towards Goal     Problem: Diabetes Self-Management  Goal: *Incorporating nutritional management into lifestyle  Description: Describe effect of type, amount and timing of food on blood glucose; list 3 methods for planning meals. Outcome: Progressing Towards Goal  Goal: *Incorporating physical activity into lifestyle  Description: State effect of exercise on blood glucose levels. Outcome: Progressing Towards Goal  Goal: *Developing strategies to promote health/change behavior  Description: Define the ABC's of diabetes; identify appropriate screenings, schedule and personal plan for screenings. Outcome: Progressing Towards Goal  Goal: *Using medications safely  Description: State effect of diabetes medications on diabetes; name diabetes medication taking, action and side effects. Outcome: Progressing Towards Goal  Goal: *Monitoring blood glucose, interpreting and using results  Description: Identify recommended blood glucose targets  and personal targets.   Outcome: Progressing Towards Goal  Goal: *Prevention, detection, treatment of acute complications  Description: List symptoms of hyper- and hypoglycemia; describe how to treat low blood sugar and actions for lowering  high blood glucose level. Outcome: Progressing Towards Goal  Goal: *Prevention, detection and treatment of chronic complications  Description: Define the natural course of diabetes and describe the relationship of blood glucose levels to long term complications of diabetes.   Outcome: Progressing Towards Goal  Goal: *Developing strategies to address psychosocial issues  Description: Describe feelings about living with diabetes; identify support needed and support network  Outcome: Progressing Towards Goal  Goal: *Insulin pump training  Outcome: Progressing Towards Goal     Problem: Ventilator Management  Goal: *Adequate oxygenation and ventilation  Outcome: Progressing Towards Goal  Goal: *Patient maintains clear airway/free of aspiration  Outcome: Progressing Towards Goal  Goal: *Absence of infection signs and symptoms  Outcome: Progressing Towards Goal  Goal: *Normal spontaneous ventilation  Outcome: Progressing Towards Goal

## 2021-12-03 NOTE — PROGRESS NOTES
1930: Bedside and Verbal shift change report given to Pradeep Cummins RN (oncoming nurse) by Doris Rosenberg RN (offgoing nurse). Report included the following information SBAR, Kardex, ED Summary, Procedure Summary, Intake/Output, MAR, Recent Results, Med Rec Status, Cardiac Rhythm NSR/SB, Alarm Parameters  and Dual Neuro Assessment. Drips: [facility] @ 8, Fentanyl @ 200, Versed @ 7, Levo @ 2, Propofol @ 50     2000: Resumed pt care, VSS, no evidence of pain. W/d pain, attempts to open eyes to stimulus. Lines & gtt's verified     2130: Temp 100.9, PRN tylenol given     2300: BG Viri MD notified. Orders for 15u insulin    2330: TRANSFER - OUT REPORT:    Verbal report given to Dalton Dickinson RN (name) on Gene Kinney  being transferred to ICU 7102(unit) for routine progression of care       Report consisted of patients Situation, Background, Assessment and   Recommendations(SBAR). Information from the following report(s) SBAR, Kardex, ED Summary, Procedure Summary, Intake/Output, MAR, Recent Results, Med Rec Status, Cardiac Rhythm NSR/SB, Alarm Parameters  and Dual Neuro Assessment was reviewed with the receiving nurse. Lines:   Quad Lumen 12/01/21 Left Subclavian (Active)   Central Line Being Utilized Yes 12/02/21 2000   Criteria for Appropriate Use Hemodynamically unstable, requiring monitoring lines, vasopressors, or volume resuscitation 12/02/21 2000   Site Assessment Clean, dry, & intact 12/02/21 2000   Infiltration Assessment 0 12/02/21 2000   Affected Extremity/Extremities Color distal to insertion site pink (or appropriate for race); Pulses palpable; Range of motion performed 12/02/21 2000   Date of Last Dressing Change 12/01/21 12/02/21 2000   Dressing Status Clean, dry, & intact 12/02/21 2000   Dressing Type Disk with Chlorhexadine gluconate (CHG); Transparent 12/02/21 2000   Action Taken Open ports on tubing capped 12/02/21 2000   Proximal Hub Color/Line Status White;  Infusing 12/02/21 2000   Positive Blood Return (Medial Site) Yes 12/02/21 2000   Medial 1 Hub Color/Line Status [place]; Infusing 12/02/21 2000   Positive Blood Return (Lateral Site) Yes 12/02/21 2000   Medial 2 Hub Color/Line Status Blue; Infusing 12/02/21 2000   Positive Blood Return (Site #3) Yes 12/02/21 2000   Distal Hub Color/Line Status [place]; Infusing 12/02/21 2000   Positive Blood Return (Site #4) Yes 12/02/21 2000   Alcohol Cap Used Yes 12/02/21 2000       Peripheral IV 11/23/21 Right Antecubital (Active)   Site Assessment Clean, dry, & intact 12/02/21 2000   Phlebitis Assessment 0 12/02/21 2000   Infiltration Assessment 0 12/02/21 2000   Dressing Status Clean, dry, & intact 12/02/21 2000   Dressing Type Transparent; Tape 12/02/21 2000   Hub Color/Line Status Green; Flushed; Capped 12/02/21 2000   Action Taken Open ports on tubing capped 12/02/21 2000   Alcohol Cap Used Yes 12/02/21 2000       Peripheral IV 11/23/21 Left Antecubital (Active)   Site Assessment Clean, dry, & intact 12/02/21 2000   Phlebitis Assessment 0 12/02/21 2000   Infiltration Assessment 0 12/02/21 2000   Dressing Status Clean, dry, & intact 12/02/21 2000   Dressing Type Transparent; Tape 12/02/21 2000   Hub Color/Line Status Pink; Flushed; Capped 12/02/21 2000   Action Taken Open ports on tubing capped 12/02/21 2000   Alcohol Cap Used Yes 12/02/21 2000       Arterial Line 12/01/21 Right Radial artery (Active)   Site Assessment Clean, dry, & intact 12/02/21 2000   Dressing Status Clean, dry, & intact 12/02/21 2000   Dressing Type Disk with Chlorhexadine gluconate (CHG); Transparent 12/02/21 2000   Line Status Intact and in place 12/02/21 2000   Treatment Zeroed or re-zeroed 12/02/21 2000   Affected Extremity/Extremities Color distal to insertion site pink (or appropriate for race); Pulses palpable; Range of motion performed 12/02/21 2000        Opportunity for questions and clarification was provided.       Patient transported with:  Monitor, RN, meds, RT

## 2021-12-03 NOTE — PROGRESS NOTES
2330: TRANSFER - IN REPORT:    Verbal report received from [facility]) on Hussein Abdi  being received from CCU(unit) for routine progression of care      Report consisted of patients Situation, Background, Assessment and   Recommendations(SBAR). Information from the following report(s) SBAR, Intake/Output, MAR, Recent Results, Cardiac Rhythm NSR and Alarm Parameters  was reviewed with the receiving nurse. Opportunity for questions and clarification was provided. Assessment completed upon patients arrival to unit and care assumed. Primary Nurse Curtis Clarke RN and Remy Leal RN performed a dual skin assessment on this patient Impairment noted- see wound doc flow sheet  Jeffrey score is 13    0500: Patient spiking fevers as high as 101.5 despite PRN tylenol, cooling room,and taking away blankets. Informed NP [place] and received orders to obtain blood cultures and sputum cultures. 0730: Bedside shift change report given to hero CHISHOLM (oncoming nurse) by Stephanie Campbell RN (offgoing nurse). Report included the following information SBAR, Intake/Output, MAR, Recent Results, Cardiac Rhythm NSR and Alarm Parameters . SHIFT SUMMARY  Transferred patient in from CCU. Patient heavily sedated on fentanyl @20mcg/hr, propofol @50mcg/kg/min, and versed @7mg/hr. Patient opens eyes to pain, does not track or focus, and pupils and equal and sluggish. Patient can withdraw in all 4 extremities but does not follow any commands. Patient remained on levo gtt at 1 mcg/min for BP support. PRN tylenol 650mg given once for fever of 101.5. Obtained blood and sputum cultures on patient. Maintained blanc, UO adequate; total UO overnight was 1425ml.

## 2021-12-04 NOTE — PROGRESS NOTES
Bedside shift change report given to Arabella Pisano RN (oncoming nurse) by Peggye Seip RN (offgoing nurse). Report included the following information SBAR, Kardex, ED Summary, Procedure Summary, Intake/Output, MAR, Recent Results, Med Rec Status, Cardiac Rhythm NSR, Alarm Parameters , Quality Measures and Dual Neuro Assessment. 0800: Pt's temperature 102.5, awaiting ibuprofen requested from pharmacy, [facility] NP notified, orders received for cooling blanket. 1330: Pt supinated with Stacy REYES, RN, [place] RN, Berenice Connell RN and PCT per [facility] NP following protocol. Pt became hypotensive with supination, Jessy NP called to bedside, pharmacy called for levo, orders received for mata push. 1430: Zoom set up with pt's wife and children. Bedside shift change report given to Omar 1923 (oncoming nurse) by Arabella Pisano RN (offgoing nurse). Report included the following information SBAR, Kardex, ED Summary, Procedure Summary, Intake/Output, MAR, Recent Results, Med Rec Status, Cardiac Rhythm Sinus Unice Clas, Alarm Parameters , Quality Measures and Dual Neuro Assessment.

## 2021-12-04 NOTE — PROGRESS NOTES
[facility]    ICU TEAM Progress Note    Name: Navneet Morel   : 1975   MRN: [id number]   Date: 2021        Subjective:     Reason for ICU Admission:   39 y. o. male with poorly controlled type 2 diabetes mellitus, hypertension who was was admitted to an OSH on  for acute respiratory failure due to covid 19 pneumonia. He wad admitted directly to the ICU on HFNC, received dexamethasone  to date, S/P Tociluzumab . His condition progressively worsened - intubated on . Transferred to [facility] for possible VV - ECMO. Patient seen, intubated on high vent support, able to be weaned to 60%. Laterally transferred to ICU 12/3. Overnight Events:   12/3: Sedated and intubated, Fio2 60% P 14, LA trending down, now 2.5. Glucoses elevated, Lantus changed to NPH BID for better control. Patient's wife Elise Slade updated via phone. : Ongoing fevers despite GNR and atypical antibiotics added 12/3. BC + GPC in clusters 3/4 and  in separate draws. Vancomycin added this am. Remains with elevated glucoses, may require insulin gtt. Lasix 40mg IV x1 today. In prone position with improved pO2 on ABG    Objective:   Vital Signs:  Visit Vitals  /68   Pulse 72   Temp (!) 102.3 °F (39.1 °C)   Resp 20   Ht 5' 6\" (1.676 m)   Wt 83.1 kg (183 lb 3.2 oz)   SpO2 99%   BMI 29.57 kg/m²      O2 Device: Endotracheal tube, Ventilator Temp (24hrs), Av.5 °F (38.6 °C), Min:99.2 °F (37.3 °C), Max:102.5 °F (39.2 °C)         Intake/Output:     Intake/Output Summary (Last 24 hours) at 2021 0930  Last data filed at 2021 0919  Gross per 24 hour   Intake 2491.11 ml   Output 2260 ml   Net 231.11 ml       Physical Exam:    General:  Sedated and on the ventilator. No acute distress. Prone position   Eyes:  Sclera anicteric. Pupils equal, round, reactive to light. Mouth/Throat: Orotracheal tube in place. Neck: Supple. Lungs:   Clear to auscultation bilaterally, good effort. Cardiovascular:  Regular rate and rhythm on CM. Deferred ausculation 2/2 prone position   Abdomen:   Soft, non-tender, bowel sounds deferred, non-distended. Extremities: No cyanosis, generalized edema   Skin: No acute rash or lesions. Lymph Nodes: Cervical and supraclavicular normal.   Musculoskeletal:  No swelling or deformity. Lines/Devices:  Intact, no erythema, drainage, or tenderness. Psychiatric: Sedated and appears comfortable on ventilator. LABS AND  DATA: Personally reviewed  Recent Labs     12/04/21 0351 12/03/21 0345   WBC 8.0 11.0   HGB 12.2 12.4   HCT 37.1 38.6    368     Recent Labs     12/04/21  0351 12/03/21 0345 12/02/21  0841 12/02/21 0357 12/02/21 0357    139  --    < > 138   K 5.1 4.9 4.8   < > 5.1    105  --    < > 107   CO2 27 27  --    < > 27   BUN 34* 26*  --    < > 33*   CREA 0.73 0.70  --    < > 0.76   * 299*  --    < > 339*   CA 8.5 8.8  --    < > 9.3   MG  --  2.6* 2.7*   < > 2.9*   PHOS  --  4.0  --   --  4.1    < > = values in this interval not displayed. Recent Labs     12/04/21 0351 12/03/21 0345   AP 60 65   TP 6.2* 6.4   ALB 3.0* 3.3*   GLOB 3.2 3.1     Recent Labs     12/04/21 0351 12/03/21 0345 12/02/21 0357 12/01/21  1341   INR 1.1 1.0   < > 1.1   PTP 11.0 10.9   < > 11.3*   APTT  --   --   --  24.4    < > = values in this interval not displayed. Recent Labs     12/03/21  0522 12/01/21  1455   PHI 7.39 7.44   PCO2I 47.3* 35.1   PO2I 68* 86   FIO2I 60 75     No results for input(s): CPK, CKMB, TROIQ, BNPP in the last 72 hours. Ventilator Settings:  Mode Rate Tidal Volume Pressure FiO2 PEEP   Assist control, Volume control   450 ml    100 % 14 cm H20     Peak airway pressure: 32 cm H2O    Minute ventilation: 9.98 l/min      MEDS: Reviewed    Chest X-Ray:  CXR Results  (Last 48 hours)               12/03/21 0573  XR CHEST PORT Final result    Impression:  Mildly decreased bilateral lung opacities.             Narrative: EXAM:  CR chest portable       INDICATION: Intubated. Lung opacities. COMPARISON: 12/2/2021 at 0412 hours. TECHNIQUE: Portable AP semiupright chest view at 0410 hours       FINDINGS: The endotracheal tube, enteric tube, and left subclavian catheter are   stable. The cardiomediastinal contours are stable. Bilateral lung opacities are   overall mildly decreased. There is no pleural effusion or pneumothorax. The   bones and upper abdomen are stable. ECHO 11/24:  Result status: Final result  · LV: Estimated LVEF is 50 - 55%. Normal cavity size, wall thickness and diastolic function. Low normal systolic function. E/E'= 7.12.     Multidisciplinary Rounds Completed:  Pending    ABCDEF Bundle/Checklist Completed:  YES-See Plan    Active Problem List:     Problem List  Date Reviewed: 11/28/2021          Codes Class    COVID ICD-10-CM: U07.1  ICD-9-CM: 079.89         Acute hypoxemic respiratory failure due to COVID-19 [facility]) ICD-10-CM: U07.1, J96.01  ICD-9-CM: 518.81, 079.89, 799.02         Pneumonia due to COVID-19 virus ICD-10-CM: U07.1, J12.82  ICD-9-CM: 480.8, 079.89         Hyperglycemia due to type 2 diabetes mellitus (HCC) ICD-10-CM: E11.65  ICD-9-CM: 250.00         Primary hypertension ICD-10-CM: I10  ICD-9-CM: 401.9         Hyponatremia ICD-10-CM: E87.1  ICD-9-CM: 276.1         Lactic acidosis ICD-10-CM: E87.2  ICD-9-CM: 276.2         Hyperlipidemia ICD-10-CM: E78.5  ICD-9-CM: 272.4         Obesity (BMI 30-39. 9) ICD-10-CM: E66.9  ICD-9-CM: 278.00         COVID-19 vaccination not done ICD-10-CM: Z28.9  ICD-9-CM: V64.00             ICU Assessment/ Comprehensive Plan of Care:     NEURO  1. Sedated and intubated  -Maintain RASS -1-2 as tolerated for vent compliance  -Currently on midazolam, propofol and fentanyl infusions    CARDIAC  1.  Hypotension, possible 2/2 sedation  2. 2. Hx of HTN, HLD  -Cardiac monitoring  -Continue atorvastatin  -Keep MAP > 65  -Levophed gtt  -Continue midodrine 10mg IV Q8hr    RESPIRATORY  1. Acute hypoxic respiratory failure 2/2 COVID PNA  -Intubated on High PEEP low Fio2 support  -Nimbex off 12/2  -O2 sats > 88%  -Prone for P/F ratio < 150 (16 hr prone, 8 hr supine)  1. COVID Treatment:  a. Immunomodulator--Steroids -- Yes decadron  b. COVID-19 Specific Anticoagulation -- Yes enoxaparin  c. Vitamin C -- Yes    d. Zinc -- Yes    e. Remdesivir-NA  f. Actemra vs. Baricitinib- Toci 11/24/21    RENAL  1. No acute concerns  -Trend BMP  -Monitor U.O    Gold Catheter Present: Yes  IVFs: NA    GASTROINTESTINAL  1. No acute concerns  -cont tube feeds, H2 RA GI prophylaxis    HEMATOLOGIC  1. No acute concerns   2. D dimer uptrending  -Stable Hgb, No evidence of bleeding  -Continue enoxparin, changed to Q12h dosing 12/3 by pharm    ID  1. COVID 19 viral PNA  2. Febrile illness   3. GPC bacteremia 3/4 bottles and 1/4 bottles  -Covid pneumonia-status post Tocilizumab, was not deemed a candidate for remdesivir at outside hospital  -Started on cefepime and fluconazole 12/3, vanco added 12/4 for ongoing fevers  -Procal 0.08 12/3  -Pan cx pending, sputum 12/3, BC x2 12/3  -Mycoplasma 12/1  -Legionella 12/1    ENDOCRINE  1.  Hyperglycemia with Hx of DM  -SSI resistant sensitivity Q6hr--> Q4hr  -NPH 40 units Every 12 hours  -May require insulin gtt    F - Feeding:  TF  A - Analgesia: Fentanyl  S - Sedation: GOAL RASS -1 to -2  T - DVT Prophylaxis: SCD's or Sequential Compression Device , enoxaparin  H - Head of Bed: > 30 Degrees  U - Ulcer Prophylaxis: Famotidine  G - Glycemic Control: Insulin  S - Spontaneous Breathing Trial: Not appropriate at present  B - Bowel Regimen: Colace  I - Indwelling Catheter:              T/L/D  Tubes: ETT and Orogastric Tube  Lines: Peripheral IV, Arterial Line and Central Line  Drains: Gold Catheter  D - De-escalation of Antibiotics:  NA    DISPOSITION/COMMUNICATION  Discussed Plan of Care/Code Status: Full Code  Stay in ICU    CRITICAL CARE CONSULTANT NOTE  I had a face to face encounter with the patient, reviewed and interpreted patient data including clinical events, labs, images, vital signs, I/O's, and examined patient. I have discussed the case and the plan and management of the patient's care with the consulting services, the bedside nurses and the respiratory therapist.      NOTE OF PERSONAL INVOLVEMENT IN CARE    I participated in the decision-making and personally managed or directed the management of the following life and organ supporting interventions that required my frequent assessment to treat or prevent imminent deterioration. I personally spent 50 minutes of critical care time. This is time spent at this critically ill patient's bedside actively involved in patient care as well as the coordination of care and discussions with the patient's family. This does not include any procedural time which has been billed separately.     Clay Veliz [facility]-BC  Intensivist Nurse Practitioner  [facility]  12/4/2021

## 2021-12-04 NOTE — PROGRESS NOTES
Pharmacist Note - Vancomycin Dosing    Consult provided for this 55 y.o. male for indication of GPC bacteremia. Antibiotic regimen(s): Cefepime + Fluconazole + Vancomycin  Patient on vancomycin PTA? NO     Recent Labs     21  0351 21  0345 21  0357   WBC 8.0 11.0 8.6   CREA 0.73 0.70 0.76   BUN 34* 26* 33*     Frequency of BMP: Daily  Height: 167.6 cm  Weight: 83.1 kg  Est CrCl: >100 ml/min; UO: 1 ml/kg/hr  Temp (24hrs), Av.6 °F (38.7 °C), Min:99.2 °F (37.3 °C), Max:102.5 °F (39.2 °C)    Cultures:  12/3 Blood: GPCs in clusters in 3/4 bottles, pending  12/3 Sputum: moderate normal taryn, pending    MRSA Swab ordered (if applicable)? N/A    The plan below is expected to result in a target range of AUC/-600    Therapy will be initiated with a loading dose of 2000 mg IV x 1 to be followed by a maintenance dose of 1250 mg IV every 12 hours. Pharmacy to follow patient daily and order levels / make dose adjustments as appropriate. *Vancomycin has been dosed used Bayesian kinetics software to target an AUC/TIM of 400-600, which provides adequate exposure for an assumed infection due to MRSA with an TIM of 1 or less while reducing the risk of nephrotoxicity as seen with traditional trough based dosing goals.

## 2021-12-04 NOTE — PROGRESS NOTES
1930-bedside report received from RN using sbar fomat  2100-ETT taped per RT pt proned with assist of RT/RN x3 and PCT    0730-bedside report given to RN using sbar format

## 2021-12-05 NOTE — PROGRESS NOTES
1930-bedside report received using sbar format  2040-lantus given per order  2130-insulin gtt stopped  2200-wife updated by phone and all questions answered to her satisfaction          0730-bedside report given to RN using sbar format

## 2021-12-05 NOTE — CONSULTS
Infectious Disease Consult    Today's Date: 12/5/2021   Admit Date: 12/1/2021    Impression:   · VDRF  · COVID 19 pneumonia  · Positive sputum culture--pneumococcus  · Positive blood culture--concerning for line infection    Plan:   · IV antibiotic therapy--current regimen is appropriate  · Change lines as soon as feasible  · Adjust antibiotics prn    Anti-infectives:   · Vancomycin   · Cefepime     Subjective:   Date of Consultation:  December 5, 2021  Referring Clinician: Arleth Francis NP    Patient is a 55 y.o. male admitted to outside hospital with s/s COVID 19 pneumonia. He has DM and is unvaccinated. He was intubated 11/28 and transferred for possible ECMO. Sputum cultures are growing pneumococcus and blood cultures are growing 4/4 with S epidermidis. He is on broad antibiotic coverage and we are asked to see him in consultation. He is gravely ill on high levels of support. Patient Active Problem List   Diagnosis Code    Acute hypoxemic respiratory failure due to COVID-19 ([facility] U07.1, J96.01    Pneumonia due to COVID-19 virus U07.1, J12.82    Hyperglycemia due to type 2 diabetes mellitus ([facility] E11.65    Primary hypertension I10    Hyponatremia E87.1    Lactic acidosis E87.2    Hyperlipidemia E78.5    Obesity (BMI 30-39. 9) E66.9    COVID-19 vaccination not done Z28.9    COVID U07.1     Past Medical History:   Diagnosis Date    COVID-19     Diabetes ([facility]     Hyperlipidemia     Hypertension       Family History   Problem Relation Age of Onset    Diabetes Mother     Hypertension Mother     Stroke Mother     Hodgkin's lymphoma Mother     Diabetes Father     Hypertension Father     Cystic Fibrosis Father     Diabetes Maternal Aunt     Diabetes Maternal Uncle       Social History     Tobacco Use    Smoking status: Never Smoker    Smokeless tobacco: Not on file   Substance Use Topics    Alcohol use: Yes     Comment: drinksonce a week     Past Surgical History:   Procedure Laterality Date    HX ORTHOPAEDIC Right     wrist surgery      Prior to Admission medications    Medication Sig Start Date End Date Taking? Authorizing Provider   cisatracurium (NIMBEX) IV infusion 0-0.869 mg/min by IntraVENous route TITRATE. 21  Yes Bruna Kaye MD   enoxaparin (LOVENOX) 100 mg/mL 90 mg by SubCUTAneous route every twelve (12) hours every twelve (12) hours. 21  Yes Bruna Kaye MD   fentanyl citrate-0.9 % NaCl/PF (fentaNYL, PF,) 900 mcg/30 ml soln 0-200 mcg/hr by IntraVENous route TITRATE for 3 days. Max Daily Amount: 4,800 mcg. 21 Yes Bruna Kaye MD   insulin glargine (LANTUS) 100 unit/mL injection 24 Units by SubCUTAneous route nightly. Indications: type 2 diabetes mellitus 21  Yes Bruna Kaye MD   midazolam in normal saline (VERSED) 1 mg/mL soln 0-10 mg/hr by IntraVENous route TITRATE. Max Daily Amount: 240 mg. 21  Yes Bruna Kaye MD   Norepinephrine Bitartrate (LEVOPHED) 8 mg/250 mL (32 mcg/mL) soln 0.5-16 mcg/min by IntraVENous route TITRATE. 21  Yes Bruna Kaye MD   ascorbic acid, vitamin C, (VITAMIN C) 500 mg tablet Take 1 Tablet by mouth two (2) times a day. 21   Bruna Kaye MD   cholecalciferol (VITAMIN D3) (1000 Units /25 mcg) tablet Take 2 Tablets by mouth daily. 21   Bruna Kaye MD   melatonin, rapid dissolve, 5 mg TbDi tablet Take 1 Tablet by mouth nightly. 21   Bruna Kaye MD   atorvastatin (LIPITOR) 20 mg tablet Take 20 mg by mouth daily. Provider, Historical   telmisartan (MICARDIS) 80 mg tablet Take 80 mg by mouth daily. Indications: high blood pressure    Provider, Historical       Allergies   Allergen Reactions    Alupent [Metaproterenol] Swelling        Review of Systems:  Review of systems not obtained due to patient factors.     Objective:     Visit Vitals  /77   Pulse (!) 56   Temp 99.9 °F (37.7 °C)   Resp 20   Ht 5' 6\" (1.676 m)   Wt 81.5 kg (179 lb 10.8 oz)   SpO2 97%   BMI 29.00 kg/m²     Temp (24hrs), Av.9 °F (37.2 °C), Min:97.9 °F (36.6 °C), Max:100.2 °F (37.9 °C)       Lines:  Arterial Line:   and Central Venous Catheter:       Physical Exam:  Lungs:  diminished breath sounds R base, L base  Heart:  regular rate and rhythm  Abdomen:  Proned, unable to assess  Skin:  no rash or abnormalities    Data Review:     CBC:  Recent Labs     12/05/21  0546 12/04/21  0351 12/03/21  0345 12/03/21  0345   WBC 10.4 8.0  --  11.0   GRANS 85* 84*   < > 85*   MONOS 6 5   < > 6   EOS 0 0   < > 0   ANEU 9.0* 6.8   < > 9.4*   ABL 0.8 0.8   < > 0.9   HGB 12.3 12.2  --  12.4   HCT 38.2 37.1  --  38.6    288  --  368    < > = values in this interval not displayed. BMP:  Recent Labs     12/05/21  0546 12/04/21  1805 12/04/21  1406   CREA 0.61* 0.62* 1.07   BUN 37* 44* 44*    139 137   K 4.7 4.1 4.2    105 103   CO2 29 26 26   AGAP 7 8 8   * 290* 427*       LFTS:  Recent Labs     12/05/21  0546 12/04/21  0351 12/03/21  0345   TBILI 0.6 0.6 0.5   ALT 60 65 59   AP 64 60 65   TP 5.9* 6.2* 6.4   ALB 2.9* 3.0* 3.3*       Microbiology:     All Micro Results     Procedure Component Value Units Date/Time    CULTURE, BLOOD, PAIRED [[id number]] Collected: 12/05/21 0938    Order Status: Completed Specimen: Blood Updated: 12/05/21 1110    CULTURE, BLOOD, PAIRED [[id number]]  (Abnormal)  (Susceptibility) Collected: 12/03/21 0601    Order Status: Completed Specimen: Blood Updated: 12/05/21 0751     Special Requests: NO SPECIAL REQUESTS        Culture result:       STAPHYLOCOCCUS EPIDERMIDIS GROWING IN ALL 4 BOTTLES DRAWN FROM THE RIGHT ARM            (NOTE) CALLED POSITIVE BLOOD CULTURE OF GPC IN CLUSTERS GROWING IN 1 OUT OF 4 BOTTLES TO ASHLEY AT 2237 ON 12/3/21. AT  REPORT OF GRAM POSITIVE COCCI IN CLUSTERS GROWING IN 3 OF 4 BOTTLES CALLED TO READ BACK BY BRADY CHISHOLM ON 12/4/21 AT 0630. AOW    CULTURE, RESPIRATORY/SPUTUM/BRONCH Jerene Linton STAIN [[id number]]  (Abnormal) Collected: 12/03/21 0601    Order Status: Completed Specimen: Sputum,ET Suction Updated: 12/04/21 1151 Special Requests: NO SPECIAL REQUESTS        GRAM STAIN OCCASIONAL WBCS SEEN               OCCASIONAL GRAM NEGATIVE RODS           Culture result:       MODERATE STREPTOCOCCUS PNEUMONIAE SENSITIVITY TO FOLLOW                  LIGHT NORMAL RESPIRATORY TAMARA          LEGIONELLA PNEUMOPHILA AG, URINE [[id number]] Collected: 12/01/21 1432    Order Status: Completed Specimen: Urine Updated: 12/03/21 1636     Source URINE        L pneumophila S1 Ag, urine Negative        Comment: (NOTE)  Presumptive negative for L. pneumophila serogroup 1 antigen in urine,  suggesting no recent or current infection. Legionnaires' disease  cannot be ruled out since other serogroups and species may also  cause disease. Performed At: [address] [id number]  Dc Gonzalez MD [id number]         RESPIRATORY VIRUS PANEL W/COVID-19, PCR [[id number]] Collected: 12/02/21 0001    Order Status: Canceled Specimen: NASOPHARYNGEAL Huber Jury [[id number]] Collected: 12/01/21 1427    Order Status: Completed Specimen: Serum Updated: 12/01/21 1712     Mycoplasma Ab, IgM NONREACTIVE       MYCOPLASMA AB, IGM [[id number]] Collected: 12/01/21 1415    Order Status: Canceled Specimen: Serum     KAROLINA Riossapna Tracey, UR/CSF [[id number]] Collected: 12/01/21 1400    Order Status: Canceled           Imaging:   Reviewed personally    Signed By: Melvin Atkinson MD     December 5, 2021

## 2021-12-05 NOTE — PROGRESS NOTES
[facility]    ICU TEAM Progress Note    Name: Luisana Ellsworth   : 1975   MRN: [id number]   Date: 2021        Subjective:     Reason for ICU Admission:   39 y. o. male with poorly controlled type 2 diabetes mellitus, hypertension who was was admitted to an OSH on  for acute respiratory failure due to covid 19 pneumonia. He wad admitted directly to the ICU on HFNC, received dexamethasone  to date, S/P Tociluzumab . His condition progressively worsened - intubated on . Transferred to [facility] for possible VV - ECMO. Patient seen, intubated on high vent support, able to be weaned to 60%. Laterally transferred to ICU 12/3. Overnight Events:   12/3: Sedated and intubated, Fio2 60% P 14, LA trending down, now 2.5. Glucoses elevated, Lantus changed to NPH BID for better control. Patient's wife Griselda Hernandez updated via phone. : Sedated and intubated, required increase in FiO2 overnight to 80%, now back to 60% with PEEP of 12. Currently proned ( 10 AM). Respiratory culture with Strep, blood cultures with staph epi. Now on Vanc and Cefepime. Continues with low grade fevers. Updated wife Griselda Hernandez via telephone. Objective:   Vital Signs:  Visit Vitals  /77   Pulse 68   Temp 99.9 °F (37.7 °C)   Resp 20   Ht 5' 6\" (1.676 m)   Wt 179 lb 10.8 oz (81.5 kg)   SpO2 97%   BMI 29.00 kg/m²      O2 Device: Endotracheal tube, Ventilator Temp (24hrs), Av °F (37.2 °C), Min:97.9 °F (36.6 °C), Max:100.2 °F (37.9 °C)         Intake/Output:     Intake/Output Summary (Last 24 hours) at 2021 1320  Last data filed at 2021 1305  Gross per 24 hour   Intake 2920.99 ml   Output 2055 ml   Net 865.99 ml       Physical Exam:    General:  Sedated and on the ventilator. No acute distress. Eyes:  Sclera anicteric. Pupils equal, round, reactive to light. Mouth/Throat: Orotracheal tube in place. Neck: Supple. Lungs:   Clear to auscultation bilaterally, good effort. Cardiovascular:  Regular rate and rhythm, no murmur, click, rub, or gallop. Abdomen:   Soft, non-tender, bowel sounds normal, non-distended. Extremities: No cyanosis, generalized edema   Skin: No acute rash or lesions. Lymph Nodes: Cervical and supraclavicular normal.   Musculoskeletal:  No swelling or deformity. Lines/Devices:  Intact, no erythema, drainage, or tenderness. Psychiatric: Sedated and appears comfortable on ventilator. LABS AND  DATA: Personally reviewed  Recent Labs     12/05/21  0546 12/04/21  0351   WBC 10.4 8.0   HGB 12.3 12.2   HCT 38.2 37.1    288     Recent Labs     12/05/21  0546 12/04/21  1805    139   K 4.7 4.1    105   CO2 29 26   BUN 37* 44*   CREA 0.61* 0.62*   * 290*   CA 8.2* 8.3*   MG 2.7* 2.9*   PHOS 3.8 3.9     Recent Labs     12/05/21  0546 12/04/21  0351   AP 64 60   TP 5.9* 6.2*   ALB 2.9* 3.0*   GLOB 3.0 3.2     Recent Labs     12/05/21  0546 12/04/21  0351   INR 1.1 1.1   PTP 11.1 11.0      Recent Labs     12/05/21  0307 12/03/21  0522   PHI 7.43 7.39   PCO2I 46.1* 47.3*   PO2I 47* 68*   FIO2I 60 60     No results for input(s): CPK, CKMB, TROIQ, BNPP in the last 72 hours. Ventilator Settings:  Mode Rate Tidal Volume Pressure FiO2 PEEP   Assist control, Volume control   450 ml    60 % (per Taty Nelson NP) 12 cm H20     Peak airway pressure: 27 cm H2O    Minute ventilation: 7.8 l/min      MEDS: Reviewed    Chest X-Ray:  CXR Results  (Last 48 hours)               12/05/21 0500  XR CHEST PORT Final result    Impression:      Increased moderate pneumonia. No pneumothorax. Narrative:  EXAM: XR CHEST PORT       INDICATION: Respiratory distress, COVID19 pneumonia. COMPARISON: Portable chest on 12/4/2021 and 12/1/2021       TECHNIQUE: Semiupright portable chest AP view       FINDINGS: Endotracheal tube terminates 3 cm proximal to the linda. Enteric tube   extends into the abdomen and terminates nondistended stomach.  Left subclavian   central line is in good position. Cardiac monitoring wires overlie the thorax. The cardiomediastinal and hilar contours are within normal limits. The pulmonary   vasculature is within normal limits. Bilateral heterogeneous pneumonia is moderate and increased. No pneumothorax. The visualized bones and upper abdomen are age-appropriate. 12/04/21 1459  XR CHEST PORT Final result    Impression:  No significant change. Covid 19 pneumonia, left worse than right. Narrative:  INDICATION:    ETT/COVID        EXAMINATION:  AP CHEST, PORTABLE       COMPARISON: December 3       FINDINGS: Single AP portable view of the chest at 506 hours demonstrates no   change in position of the lines and tubes. ET tube is in satisfactory position. The cardiomediastinal silhouette is stable. Diffuse bilateral airspace disease   persists, left worse than right, similar to prior study. There is no   pneumothorax. ECHO 11/24:  Result status: Final result  · LV: Estimated LVEF is 50 - 55%. Normal cavity size, wall thickness and diastolic function. Low normal systolic function. E/E'= 7.12.     Multidisciplinary Rounds Completed:  Pending    ABCDEF Bundle/Checklist Completed:  YES-See Plan    Active Problem List:     Problem List  Date Reviewed: 11/28/2021          Codes Class    COVID ICD-10-CM: U07.1  ICD-9-CM: 079.89         Acute hypoxemic respiratory failure due to COVID-19 [facility]) ICD-10-CM: U07.1, J96.01  ICD-9-CM: 518.81, 079.89, 799.02         Pneumonia due to COVID-19 virus ICD-10-CM: U07.1, J12.82  ICD-9-CM: 480.8, 079.89         Hyperglycemia due to type 2 diabetes mellitus (HCC) ICD-10-CM: E11.65  ICD-9-CM: 250.00         Primary hypertension ICD-10-CM: I10  ICD-9-CM: 401.9         Hyponatremia ICD-10-CM: E87.1  ICD-9-CM: 276.1         Lactic acidosis ICD-10-CM: E87.2  ICD-9-CM: 276.2         Hyperlipidemia ICD-10-CM: E78.5  ICD-9-CM: 272.4         Obesity (BMI 30-39. 9) ICD-10-CM: E66.9  ICD-9-CM: 278.00         COVID-19 vaccination not done ICD-10-CM: Z28.9  ICD-9-CM: V64.00             ICU Assessment/ Comprehensive Plan of Care:     NEURO  1. Sedated and intubated  -Maintain RASS -1-2 as tolerated for vent compliance    CARDIAC  1. Hypotension, possible 2/2 sedation  2. 2. Hx of HTN, HLD  -Cardiac monitoring  -Continue atorvastatin  -Keep MAP > 65  -Levophed gtt  -Continue midodrine 10mg IV Q8hr    RESPIRATORY  1. Acute hypoxic respiratory failure 2/2 COVID PNA  -Intubated P 12, FiO2 60%  -Nimbex off 12/2  -O2 sats > 88%  -Prone for P/F ratio < 150 (16 hr prone, 8 hr supine)  1. COVID Treatment:  a. Immunomodulator--Steroids -- Yes decadron  b. COVID-19 Specific Anticoagulation -- Yes enoxaparin  c. Vitamin C -- Yes    d. Zinc -- Yes    e. Remdesivir-NA  f. Actemra vs. Baricitinib- Toci 11/24/21    RENAL  1. No acute concerns  -Trend BMP  -Monitor U.O    Gold Catheter Present: Yes  IVFs: NA    GASTROINTESTINAL  1. No acute concerns  -cont tube feeds, H2 RA GI prophylaxis  - + BM on 12/4     HEMATOLOGIC  1. No acute concerns  -Stable Hgb, No evidence of bleeding  -Continue enoxparin    ID  1. COVID 19 viral PNA  No leukocytosis, + Fevers, now low grade  -Covid pneumonia-status post Tocilizumab, was not deemed a candidate for remdesivir at outside hospital  -Cefepime + Vanc   -Procal 0.08 12/3  - sputum 12/3--> growing strep pneumonia   -Mycoplasma 12/1- non reactive   -Legionella 12/1- negative    2. Staph Epi Bacteremia   - growing in 4/4 bottles   - etiology is unclear, ID consult placed   - ECHO ordered   - repeat blood cultures sent 12/5   - patient now on Vanc and Cefepime     ENDOCRINE  1.  Hyperglycemia with Hx of DM  -SSI resistant sensitivity Q6hr  - increased Lantus from 20 units to 30 units Daily 12/5    F - Feeding:  TF  A - Analgesia: Fentanyl  S - Sedation: GOAL RASS -1 to -2  T - DVT Prophylaxis: SCD's or Sequential Compression Device , enoxaparin  H - Head of Bed: > 30 Degrees  U - Ulcer Prophylaxis: Famotidine  G - Glycemic Control: Insulin  S - Spontaneous Breathing Trial: Not appropriate at present  B - Bowel Regimen: Colace  I - Indwelling Catheter:              T/L/D  Tubes: ETT and Orogastric Tube  Lines: Peripheral IV, Arterial Line and Central Line  Drains: Gold Catheter  D - De-escalation of Antibiotics:  NA    DISPOSITION/COMMUNICATION  Discussed Plan of Care/Code Status: Full Code  Stay in ICU    CRITICAL CARE CONSULTANT NOTE  I had a face to face encounter with the patient, reviewed and interpreted patient data including clinical events, labs, images, vital signs, I/O's, and examined patient. I have discussed the case and the plan and management of the patient's care with the consulting services, the bedside nurses and the respiratory therapist.      NOTE OF PERSONAL INVOLVEMENT IN CARE    I participated in the decision-making and personally managed or directed the management of the following life and organ supporting interventions that required my frequent assessment to treat or prevent imminent deterioration. I personally spent 75 minutes of critical care time. This is time spent at this critically ill patient's bedside actively involved in patient care as well as the coordination of care and discussions with the patient's family. This does not include any procedural time which has been billed separately.     ELISSA ElizabethCNP-BC  Intensivist Nurse Practitioner  [facility], [place] [facility]  12/5/2021

## 2021-12-05 NOTE — PROGRESS NOTES
Bedside shift change report given to Aliya Alexandra RN (oncoming nurse) by Lizzeth Lin RN (offgoing nurse). Report included the following information SBAR, Kardex, ED Summary, Procedure Summary, Intake/Output, MAR, Recent Results, Med Rec Status, Cardiac Rhythm NSR, Alarm Parameters , Quality Measures and Dual Neuro Assessment. 0830: Discussed pt with Linda Schreiber NP at bedside including sedation (pt awake and following commands) and pt's triglyceride levels. Attempting to wean propofol, orders received to increase fentanyl gtt, recheck ABG and prone pt when sedated. 1000: Pt proned per Linda Schreiber NP, following policy with Rui Lama RT, RN, Barbara Renner RN, Emelina RN and Melanie Hicks. 1430: ID consult called. Bedside shift change report given to Omar 1923 (oncoming nurse) by Aliya Alexandra RN (offgoing nurse). Report included the following information SBAR, Kardex, ED Summary, Procedure Summary, Intake/Output, MAR, Recent Results, Cardiac Rhythm NSR, Alarm Parameters , Quality Measures and Dual Neuro Assessment.

## 2021-12-06 NOTE — PROGRESS NOTES
NUTRITION brief  Recommendations:   1. Modify tube feeds:   Glucerna 1.5 @ 65 ml/hr with 1 packet of ProSource daily + 165 ml flush q 4 hr.    2. Recommend increasing bowel regimen      Diet: NPO  Supplements/Nutrition Support: Glucerna 1.5 @ 35 ml/hr with 2 packets of ProSource BID + 100 ml flush q 4 hrs  Nutrition-related meds: vitamin c, cefepime, vitamin D3, colace, pepcid, fentanyl, humalog, insulin NPH, versed in NS, midodrine, miralax, vancomycin, zinc sulfate, theragran    Pt is no longer on propofol, will modify tube feeds to better meet pt needs. New recommendation: Glucerna 1.5 @ 65 ml/hr with 1 packet of ProSource daily + 165 ml flush q 4 hr. This goal will provide 1300 ml, 2010 kcal, 122 g protein, 173 g CHO, and 2037 ml free water (987 ml from TF + 1050 ml from flush) to meet daily estimated needs. Pt still without significant BM. Currently receiving scheduled colace and miralax once daily, consider increasing. See full RD assessment from 12/2 for additional details, goals, and monitoring/evaluation.    Estimated Nutrition Needs:   Energy: 2050 ([id number])  Wt used: Admission (83.1 kg)  Protein: 125 (1.5 gm/kg)  Wt used:     Fluid: 1 mL/kcal     Arielle Wagner, Dietetic Intern)

## 2021-12-06 NOTE — PROGRESS NOTES
0730:Verbal shift change report given to Briana GUTIERREZ RN (oncoming nurse) by Sergio Melgar RN (offgoing nurse). Report included the following information SBAR, Kardex, Intake/Output, MAR, Recent Results and Cardiac Rhythm SB.     1930: Bedside and Verbal shift change report given to Valerio RN (oncoming nurse) by Lana Syed RN (offgoing nurse). Report included the following information SBAR, Kardex, Intake/Output, MAR, Recent Results and Cardiac Rhythm NSR.

## 2021-12-06 NOTE — PROGRESS NOTES
Bedside shift change report given to Omar 1923 (oncoming nurse) by Roxana Cespedes RN (offgoing nurse). Report included the following information SBAR, Kardex, ED Summary, Procedure Summary, Intake/Output, MAR, Recent Results, Cardiac Rhythm NSR, Alarm Parameters , Quality Measures and Dual Neuro Assessment.        0210-Pt  Placed supine with Terrence REYES RN, Briana RN,Naomy and this writer/ NP Magaña Finger following protocol  0730-bedside report given to RN using sbar format

## 2021-12-06 NOTE — PROGRESS NOTES
Transition of Care Plan   RUR- Low    DISPOSITION: Likely  Rehab   F/U with PCP/Specialist     Transport: [facility]    Patient presented to [facility] on 11/23  with shortness of breath, Covid positive. Patient transferred to [facility] on 12/01 for possible ECMO, patient deemed not a candidate. Transferred to ICU on 12/03  patient intubated and sedated. Continues on isolation for Covid, on IV abx and low grade fevers. Care management will continue to follow, will need therapy recommendations once patient is able to participate  Patient 's wife Darryle Carton [phone] is Ginna Boor. Care Management Interventions  PCP Verified by CM:  Yes (Dr Susanna Cabrera)  Transition of Care Consult (CM Consult): Discharge Planning  MyChart Signup: Yes  Discharge Durable Medical Equipment: No  Physical Therapy Consult: No  Occupational Therapy Consult: No  Speech Therapy Consult: No  Support Systems: Spouse/Significant Other (wife Darryle Carton [phone])  Confirm Follow Up Transport: Other (see comment) (will need ambulance transport ALS vs BLS)  Discharge Location  Discharge Placement: Rehab hospital/unit acute

## 2021-12-06 NOTE — PROGRESS NOTES
Day #4 of Vancomycin  Indication:  GPC bacteremia  - COVID-19 positive  Current regimen:  Cefepime + Vancomycin  Abx regimen:  1250 mg IV Q 12 hr  ID Following ?: NO  Concomitant nephrotoxic drugs (requires more frequent monitoring): Vasopressors  Frequency of BMP?: Daily    Recent Labs     21  0429 21  0546 21  1805 21  1406 21  0351   WBC 8.2 10.4  --   --  8.0   CREA 0.65* 0.61* 0.62*   < > 0.73   BUN 37* 37* 44*   < > 34*    < > = values in this interval not displayed. Est CrCl: >100 ml/min; UO: >1 ml/kg/hr  Temp (24hrs), Av.9 °F (37.7 °C), Min:99.2 °F (37.3 °C), Max:100.4 °F (38 °C)    Cultures:    Legionella Ag [Ur]: (-)   Mycoplasma Ab [IGM]: (-)   COVID-19 [PCR]: (+)  12/3 Blood: Staph epidermidis (pan-S)  - final  12/3 Sputum: moderate Strep pneumo, light normal taryn - pending   Blood: pend    MRSA Swab ordered (if applicable)? N/A    Goal target range AUC/-600    Recent level history:  Date/Time Dose & Interval Measured Level (mcg/mL) Associated AUC/TIM Dose Adjustment     @ 0430 1250 mg q12h 7.7 ~ 6.5 hrs p dose 310 1250 mg q8h                                         Plan: AUC does is below goal; Change dose to 1250 mg IV q8h to obtain a therapeutic AUC within 400-600 range. Pharmacy will continue to monitor this patient daily for changes in clinical status and renal function. *Random vancomycin levels are used to calculate AUC/TIM, this level should not be interpreted as a trough. Vancomycin has been dosed using Bayesian kinetics software to target an AUC24:TIM of 400-600, which provides adequate exposure for as assumed infection due to MRSA with an TIM of 1 or less while reducing the risk of nephrotoxicity as seen with traditional trough based dosing goals.

## 2021-12-06 NOTE — PROGRESS NOTES
ID Progress Note  2021    Subjective:     Still on high levels of support, proning    Objective:     Antibiotics:  1. Vancomycin   2. Cefepime       Vitals:   Visit Vitals  BP (P) 93/63 (BP 1 Location: Left upper arm, BP Patient Position: At rest)   Pulse 72   Temp (!) (P) 101 °F (38.3 °C)   Resp 20   Ht 5' 6\" (1.676 m)   Wt 83 kg (182 lb 15.7 oz)   SpO2 91%   BMI 29.53 kg/m²        Tmax:  Temp (24hrs), Av °F (37.8 °C), Min:99.3 °F (37.4 °C), Max:101 °F (38.3 °C)      Exam:  Observational only    Labs:      Recent Labs     21  0429 21  0546 21  1805 21  1406 21  0351   WBC 8.2 10.4  --   --  8.0   HGB 12.0* 12.3  --   --  12.2    265  --   --  288   BUN 37* 37* 44* 44* 34*   CREA 0.65* 0.61* 0.62* 1.07 0.73   AP 54 64  --   --  60   TBILI 0.5 0.6  --   --  0.6       Cultures:     No results found for: SDES  Lab Results   Component Value Date/Time    Culture result: NO GROWTH AFTER 18 HOURS 2021 09:38 AM    Culture result: (A) 2021 06:01 AM     STAPHYLOCOCCUS EPIDERMIDIS GROWING IN ALL 4 BOTTLES DRAWN FROM THE RIGHT ARM    Culture result:  2021 06:01 AM     (NOTE) CALLED POSITIVE BLOOD CULTURE OF GPC IN CLUSTERS GROWING IN 1 OUT OF 4 BOTTLES TO [facility] AT 2237 ON 12/3/21. AT  REPORT OF GRAM POSITIVE COCCI IN CLUSTERS GROWING IN 3 OF 4 BOTTLES CALLED TO READ BACK BY BRADY CHISHOLM ON 21 AT 0630. AOW    Culture result: (A) 2021 06:01 AM     MODERATE STREPTOCOCCUS PNEUMONIAE SENSITIVITY TO FOLLOW    Culture result: LIGHT NORMAL RESPIRATORY TAMARA 2021 06:01 AM       Radiology:     Line/Insert Date:           Assessment:     1. Severe COVID pneumonia  2. VDRF  3. Hypoxic respiratory failure  4. Bacteremia--S epi--probable line source    Objective:     1. Continue antibiotic therapy  2.  Lines to be changed    Pb Albright MD

## 2021-12-06 NOTE — PROGRESS NOTES
[facility]    ICU TEAM Progress Note    Name: Melene Schaumann   : 1975   MRN: [id number]   Date: 2021        Subjective:     Reason for ICU Admission:   39 y. o. male with poorly controlled type 2 diabetes mellitus, hypertension who was was admitted to an OSH on  for acute respiratory failure due to covid 19 pneumonia. He wad admitted directly to the ICU on HFNC, received dexamethasone  to date, S/P Tociluzumab . His condition progressively worsened - intubated on . Transferred to [facility] for possible VV - ECMO. Patient seen, intubated on high vent support, able to be weaned to 60%. Laterally transferred to ICU 12/3. Overnight Events:   12/3: Sedated and intubated, Fio2 60% P 14, LA trending down, now 2.5. Glucoses elevated, Lantus changed to NPH BID for better control. Patient's wife Rodriguez Roqueight updated via phone. : Sedated and intubated, required increase in FiO2 overnight to 80%, now back to 60% with PEEP of 12. Currently proned ( 10 AM). Respiratory culture with Strep, blood cultures with staph epi. Now on Vanc and Cefepime. Continues with low grade fevers. Updated wife Rodriguez Corbett via telephone. : Low grade fevers, cont abx, Prone tonight, Vent 60% and 10 PEEP. PICC ordered for tomorrow. Objective:   Vital Signs:  Visit Vitals  /84   Pulse (!) 53   Temp 99.6 °F (37.6 °C)   Resp 20   Ht 5' 6\" (1.676 m)   Wt 83 kg (182 lb 15.7 oz)   SpO2 95%   BMI 29.53 kg/m²      O2 Device: Endotracheal tube, Ventilator Temp (24hrs), Av.9 °F (37.7 °C), Min:99.3 °F (37.4 °C), Max:100.4 °F (38 °C)         Intake/Output:     Intake/Output Summary (Last 24 hours) at 2021 0924  Last data filed at 2021 0700  Gross per 24 hour   Intake 1723 ml   Output 1992 ml   Net -269 ml       Physical Exam:  General:  Sedated and on the ventilator. No acute distress. Eyes:  Sclera anicteric. Pupils equal, round, reactive to light.    Mouth/Throat: Orotracheal tube in place.   Neck: Supple. Lungs:   Dimnished to auscultation bilaterally. Cardiovascular:  Regular rate and rhythm, no murmur, click, rub, or gallop. Abdomen:   Soft, non-tender, bowel sounds normal, non-distended. Extremities: No cyanosis, generalized edema   Skin: No acute rash or lesions. Lymph Nodes: Cervical and supraclavicular normal.   Musculoskeletal:  No swelling or deformity. Lines/Devices:  Intact, no erythema, drainage, or tenderness. Psychiatric: Sedated and appears comfortable on ventilator. LABS AND  DATA: Personally reviewed  Recent Labs     12/06/21 0429 12/05/21  0546   WBC 8.2 10.4   HGB 12.0* 12.3   HCT 37.8 38.2    265     Recent Labs     12/06/21 0429 12/05/21  0546    140   K 4.7 4.7    104   CO2 29 29   BUN 37* 37*   CREA 0.65* 0.61*   * 323*   CA 8.7 8.2*   MG 2.7* 2.7*   PHOS 3.9 3.8     Recent Labs     12/06/21 0429 12/05/21  0546   AP 54 64   TP 5.7* 5.9*   ALB 2.7* 2.9*   GLOB 3.0 3.0     Recent Labs     12/06/21 0429 12/05/21  0546   INR 1.1 1.1   PTP 11.3* 11.1      Recent Labs     12/06/21  0354 12/05/21  0307   PHI 7.42 7.43   PCO2I 45.9* 46.1*   PO2I 74* 47*   FIO2I 60 60     No results for input(s): CPK, CKMB, TROIQ, BNPP in the last 72 hours. Ventilator Settings:  Mode Rate Tidal Volume Pressure FiO2 PEEP   Assist control, Volume control   450 ml    60 % 10 cm H20     Peak airway pressure: 27 cm H2O    Minute ventilation: 9.58 l/min      MEDS: Reviewed    Chest X-Ray:  CXR Results  (Last 48 hours)               12/06/21 0510  XR CHEST PORT Final result    Impression:  Mild improvement in bilateral airspace opacification. Narrative:  EXAM: XR CHEST PORT       INDICATION: ETT/COVID       COMPARISON: December 5       FINDINGS: A portable AP radiograph of the chest was obtained at 0415 hours. Tubes and lines are stable. The patient is on a cardiac monitor.  Patchy   opacification in the lungs bilaterally, left greater than right, with mild   improvement. The cardiac and mediastinal contours and pulmonary vascularity are   normal.  The bones and soft tissues are grossly within normal limits. 12/05/21 0500  XR CHEST PORT Final result    Impression:      Increased moderate pneumonia. No pneumothorax. Narrative:  EXAM: XR CHEST PORT       INDICATION: Respiratory distress, COVID19 pneumonia. COMPARISON: Portable chest on 12/4/2021 and 12/1/2021       TECHNIQUE: Semiupright portable chest AP view       FINDINGS: Endotracheal tube terminates 3 cm proximal to the linda. Enteric tube   extends into the abdomen and terminates nondistended stomach. Left subclavian   central line is in good position. Cardiac monitoring wires overlie the thorax. The cardiomediastinal and hilar contours are within normal limits. The pulmonary   vasculature is within normal limits. Bilateral heterogeneous pneumonia is moderate and increased. No pneumothorax. The visualized bones and upper abdomen are age-appropriate. 12/04/21 1459  XR CHEST PORT Final result    Impression:  No significant change. Covid 19 pneumonia, left worse than right. Narrative:  INDICATION:    ETT/COVID        EXAMINATION:  AP CHEST, PORTABLE       COMPARISON: December 3       FINDINGS: Single AP portable view of the chest at 506 hours demonstrates no   change in position of the lines and tubes. ET tube is in satisfactory position. The cardiomediastinal silhouette is stable. Diffuse bilateral airspace disease   persists, left worse than right, similar to prior study. There is no   pneumothorax. ECHO 11/24:  Result status: Final result  · LV: Estimated LVEF is 50 - 55%. Normal cavity size, wall thickness and diastolic function. Low normal systolic function. E/E'= 7.12.     Multidisciplinary Rounds Completed:   Yes    ABCDEF Bundle/Checklist Completed:  YES-See Plan    Active Problem List:     Problem List  Date Reviewed: 11/28/2021          Codes Class    COVID ICD-10-CM: U07.1  ICD-9-CM: 079.89         Acute hypoxemic respiratory failure due to COVID-19 [facility]) ICD-10-CM: U07.1, J96.01  ICD-9-CM: 518.81, 079.89, 799.02         Pneumonia due to COVID-19 virus ICD-10-CM: U07.1, J12.82  ICD-9-CM: 480.8, 079.89         Hyperglycemia due to type 2 diabetes mellitus (HCC) ICD-10-CM: E11.65  ICD-9-CM: 250.00         Primary hypertension ICD-10-CM: I10  ICD-9-CM: 401.9         Hyponatremia ICD-10-CM: E87.1  ICD-9-CM: 276.1         Lactic acidosis ICD-10-CM: E87.2  ICD-9-CM: 276.2         Hyperlipidemia ICD-10-CM: E78.5  ICD-9-CM: 272.4         Obesity (BMI 30-39. 9) ICD-10-CM: E66.9  ICD-9-CM: 278.00         COVID-19 vaccination not done ICD-10-CM: Z28.9  ICD-9-CM: V64.00             ICU Assessment/ Comprehensive Plan of Care:     NEURO  1. Sedated and intubated  -Maintain RASS -1-2 as tolerated for vent compliance    CARDIAC  1. Hypotension, possible 2/2 sedation  2. 2. Hx of HTN, HLD  -Cardiac monitoring  -Continue atorvastatin  -Keep MAP > 65mmHg  -Levophed gtt  -Continue midodrine 10mg IV q 8hr  - ECHO 11/24/21 Estimated LVEF is 50 - 55%. Normal cavity size, wall thickness and diastolic function. Low normal systolic function. E/E'= 7.12.    RESPIRATORY  1. Acute hypoxic respiratory failure 2/2 COVID PNA  -Intubated P 12, FiO2 60%  -Nimbex off 12/2  -O2 sats > 88%  -Prone for P/F ratio < 150 (16 hr prone, 8 hr supine)  1. COVID Treatment:  a. Immunomodulator--Steroids -- Yes decadron  b. COVID-19 Specific Anticoagulation -- Yes enoxaparin  c. Vitamin C -- Yes    d. Zinc -- Yes    e. Remdesivir-NA  f. Actemra vs. Baricitinib- Toci 11/24/21    RENAL  1. No acute concerns  -Trend BMP  -Monitor U.O    Gold Catheter Present: Yes  IVFs: NA    GASTROINTESTINAL  1. No acute concerns  -cont tube feeds, H2 RA GI prophylaxis    HEMATOLOGIC  1. No acute concerns  -Stable Hgb, No evidence of bleeding  -Continue enoxparin    ID  1.  COVID 19 viral PNA  No leukocytosis, + Fevers, now low grade  -Covid pneumonia-status post Tocilizumab, was not deemed a candidate for remdesivir at outside hospital  -Cefepime + Vanc   - sputum 12/3--> growing strep pneumonia   -Mycoplasma 12/1- non reactive   -Legionella 12/1- negative    2. Staph Epi Bacteremia   - growing in 4/4 bottles  - repeat blood cultures sent 12/5 - NGTD  - patient now on Vanc and Cefepime     ENDOCRINE  1. Hyperglycemia with Hx of DM  -SSI resistant sensitivity Q6hr  - Lantus changed to NPH 30 units BID on 12/6    F - Feeding:  TF  A - Analgesia: Fentanyl  S - Sedation: GOAL RASS -1 to -2  T - DVT Prophylaxis: SCD's or Sequential Compression Device , enoxaparin  H - Head of Bed: > 30 Degrees  U - Ulcer Prophylaxis: Famotidine  G - Glycemic Control: Insulin SSI  S - Spontaneous Breathing Trial: Not appropriate at present  B - Bowel Regimen: Colace  I - Indwelling Catheter:              T/L/D  Tubes: ETT and Orogastric Tube  Lines: Peripheral IV, Arterial Line and Central Line PICC ordered for tomorrow. Drains: Gold Catheter  D - De-escalation of Antibiotics:  NA    DISPOSITION/COMMUNICATION  Discussed Plan of Care/Code Status: Full Code  Stay in ICU    CRITICAL CARE CONSULTANT NOTE  I had a face to face encounter with the patient, reviewed and interpreted patient data including clinical events, labs, images, vital signs, I/O's, and examined patient. I have discussed the case and the plan and management of the patient's care with the consulting services, the bedside nurses and the respiratory therapist.      NOTE OF PERSONAL INVOLVEMENT IN CARE    I participated in the decision-making and personally managed or directed the management of the following life and organ supporting interventions that required my frequent assessment to treat or prevent imminent deterioration. I personally spent 60 minutes of critical care time.   This is time spent at this critically ill patient's bedside actively involved in patient care as well as the coordination of care and discussions with the patient's family. This does not include any procedural time which has been billed separately.     Erika Rutherford AGACNP-BC     [facility]

## 2021-12-06 NOTE — DIABETES MGMT
[address]    CLINICAL NURSE SPECIALIST CONSULT     Initial Presentation   Kaitlynn Luis is a 55 y.o. male who presented to [facility] ED on 11/23/21 with known COVID-19 viral infection with worsening SOB. HX:   Past Medical History:   Diagnosis Date    COVID-19     Diabetes ([facility] [address]     Hyperlipidemia     Hypertension         INITIAL DX:   COVID [U07.1] Severe hypoxemic respiratory failure    Current Treatment     TX: IV steroids, intubation    Consulted by Tulio Jarrett MD  for advanced diabetes nursing assessment and care for:   [x] Inpatient management strategy    Hospital Course   Clinical progress has been complicated by hyperglycemia, worsening respiratory failure. 11/23: ICU admission at [facility]. HFNC. IV abox, Decadron 6mg daily  11/24: Tociluxumab  11/28: Intubated. Started vasopressors. 12/1: Transfer to [facility] for higher level of care  12/3: Fevers, abox adjusted. BC + GPC in clusters 3/4 and 1/4 in separate draws   12/5: Respiratory culture with Strep, blood cultures with staph epi. Diabetes History   Type 2 Diabetes  Sid Kwon DO: PCP    Diabetes-related Medical History  Acute complications  Steroid induced hyperglycemia  Neurological complications  Unknown  Microvascular disease  None  Macrovascular disease  None     Diabetes Medication History  Key Antihyperglycemic Medications             insulin glargine (LANTUS) 100 unit/mL injection (Taking) 24 Units by SubCUTAneous route nightly. Indications: type 2 diabetes mellitus    glipiZIDE SR (GLUCOTROL XL) 10 mg CR tablet (Discontinued) Take 10 mg by mouth daily. metFORMIN ER (GLUCOPHAGE XR) 500 mg tablet (Discontinued) Take 500 mg by mouth daily (with dinner). Subjective   Patient intubated and sedated  Objective   Physical exam  General Overweight male.  Intubated   Neuro  Sedated  Vital Signs   Visit Vitals  /84   Pulse (!) 53   Temp 99.6 °F (37.6 °C)   Resp 20   Ht 5' 6\" (1.676 m)   Wt 83 kg (182 lb 15.7 oz)   SpO2 95%   BMI 29.53 kg/m²     PE deferred due to COVID-19 infection. Able to visualize through ICU doors    Laboratory  Recent Labs     21  0429 21  0546 21  1805 21  1406 21  0500 21  0351   * 323* 290*   < >  --  383*   AGAP 4* 7 8   < >  --  9   TRIGL  --  86  --   --  1,458*  --    WBC 8.2 10.4  --   --   --  8.0   CREA 0.65* 0.61* 0.62*   < >  --  0.73   GFRNA >60 >60 >60   < >  --  >60   AST 20 28  --   --   --  35   ALT 40 60  --   --   --  65    < > = values in this interval not displayed. Blood glucose pattern      Significant diabetes-related events over the past 24-72 hours  Intubated/sedated  On: norepi and propofol  Decadron 20mg daily (Day 8), was previously on Decadron 6mg daily (x5 days). Reducing to 10mg daily today  Admitting B  BG was 185-256 on 6mg decadron daily  BG now 250-296  Enteral feeds: Glucerna 1.5 at 35ml/hr (102-112 gm CHO based on 22-24° infusion)  Insulin gtt  from 2p-8p  Lantus: 30 units daily  Humalo units via correction in the last 24h      Assessment and Plan   Nursing Diagnosis Risk for unstable blood glucose pattern   Nursing Intervention Domain 4572 Decision-making Support   Nursing Interventions Examined current inpatient diabetes/blood glucose control   Explored factors facilitating and impeding inpatient management  Explored corrective strategies with patient and responsible inpatient provider   Informed patient of rational for insulin strategy while hospitalized     Evaluation   Mayi Graham is a 55year old gentleman, with uncontrolled Type 2 diabetes, who was admitted with acute hypoxic respiratory failure s/t COVID-19 pneumonia. He did not achieve diabetes control prior to admission, as evidenced by A1c of 8.4%.   He was admitted at [facility] for 8 days prior to transfer to [address] [place] [address],  On arrival at [facility], his initial glucose was significantly elevated at 400.  6mg decadron was initiated and continued for 5 days. Glucose there was 195-290 with low dose lantus and correctional humalog. When Decadron was advanced to 20mg daily, on 11/28, lantus was slowly titrated up to 30 units daily but he never sustained glucose control. Several factors have played a role in blood glucose management including:  [x] Critical nature of illness state: Stress hyperglycemia/Severe COVID pna  [x]  Changing nutritive sources & needs: Enteral feeds w/ 151 grams CHO/24h   [x] Compromised insulin absorption or delivery on vasopressors   [x] Glucocorticoid use: Decadron 20mg daily    As glucose was elevated on admission to [facility] at 339, 50 units Lantus was started (covering low dose diabetes and high dose for decadron). His best glucose was 290 and he transiently switched to NPH before going on an insulin gtt for a few hours over the weekend. He transitioned off with lower insulin doses- Lantus 30 units daily and he will need a higher basal dose to override steroid, insulin resistance and carbs in enteral feeds. Would suggest resuming Q12 NPH dosing for more rapid titration. Recommendations   1. POC glucose Q6    2. Enteral feeds per primary team/dietician    3. Adjust the Subcutaneous Insulin Order set (2646)  Insulin Dosing Specific recommendation   Basal                                      (Based on weight, BMI & GFR) Advance basal to 30 units NPH Q12      This covers:  0.4units/kg for Diabetes PLUS  0.4units/kg for Decadron (10mg) PLUS  1:10 carb coverage in feeds If Fasting BG over 200   tomorrow, add the total   amount of correction that   was received the day prior   and add it to basal dose   Corrective                                       (Useful in adjusting insulin dosing) Insulin-resistant sensitivity Q6.       Can consider Q4 dosing if BG remains elevated         Billing Code(s)   [x] 44117    Before making these care recommendations, I personally reviewed the hosptialization record, including laboratory and diagnostic data, medications and examined the patient at bedside (circumstances permitting).   Total minutes: 21    ANICETO Bueno  Diabetes Clinical Nurse Specialist  [facility]  Access via [employer]

## 2021-12-07 NOTE — PROGRESS NOTES
[facility]    ICU TEAM Progress Note    Name: Navneet Morel   : 1975   MRN: [id number]   Date: 2021        Subjective:     Reason for ICU Admission:   39 y. o. male with poorly controlled type 2 diabetes mellitus, hypertension who was was admitted to an OSH on  for acute respiratory failure due to covid 19 pneumonia. He wad admitted directly to the ICU on HFNC, received dexamethasone  to date, S/P Tociluzumab . His condition progressively worsened - intubated on . Transferred to [facility] for possible VV - ECMO. Patient seen, intubated on high vent support, able to be weaned to 60%. Laterally transferred to ICU 12/3. Overnight Events:   12/3: Sedated and intubated, Fio2 60% P 14, LA trending down, now 2.5. Glucoses elevated, Lantus changed to NPH BID for better control. Patient's wife Elise Slade updated via phone. : Sedated and intubated, required increase in FiO2 overnight to 80%, now back to 60% with PEEP of 12. Currently proned ( 10 AM). Respiratory culture with Strep, blood cultures with staph epi. Now on Vanc and Cefepime. Continues with low grade fevers. Updated wife Elise Slade via telephone. : Low grade fevers, cont abx, Prone tonight, Vent 60% and 10 PEEP. PICC ordered for tomorrow. : PICC placed today, magi changed out, jayashree changed out. All lines exchanged per ID recs. Tolerated Prone overnight. Follows commands when sedation is weaned.     Objective:   Vital Signs:  Visit Vitals  BP (!) 130/94   Pulse (!) 143   Temp (!) 100.6 °F (38.1 °C)   Resp 20   Ht 5' 6\" (1.676 m)   Wt 86 kg (189 lb 9.5 oz)   SpO2 (!) 88%   BMI 30.60 kg/m²      O2 Device: Endotracheal tube, Ventilator Temp (24hrs), Av.2 °F (37.9 °C), Min:99.8 °F (37.7 °C), Max:100.6 °F (38.1 °C)         Intake/Output:     Intake/Output Summary (Last 24 hours) at 2021 1845  Last data filed at 2021 1700  Gross per 24 hour   Intake 2849 ml   Output 3015 ml   Net -166 ml Physical Exam:  General:  Sedated and on the ventilator. No acute distress. Eyes:  Sclera anicteric. Pupils equal, round, reactive to light. Mouth/Throat: Orotracheal tube in place. Neck: Supple. Lungs:   Dimnished to auscultation bilaterally. Cardiovascular:  Regular rate and rhythm, no murmur, click, rub, or gallop. Abdomen:   Soft, non-tender, bowel sounds normal, non-distended. Extremities: No cyanosis, generalized edema   Skin: No acute rash or lesions. Lymph Nodes: Cervical and supraclavicular normal.   Musculoskeletal:  No swelling or deformity. Lines/Devices:  Intact, no erythema, drainage, or tenderness. Psychiatric: Sedated and appears comfortable on ventilator. LABS AND  DATA: Personally reviewed  Recent Labs     12/07/21 0421 12/06/21 0429   WBC 8.1 8.2   HGB 12.1 12.0*   HCT 37.3 37.8    241     Recent Labs     12/07/21 0421 12/06/21 0429    139   K 4.4 4.7    106   CO2 29 29   BUN 33* 37*   CREA 0.54* 0.65*   * 290*   CA 8.3* 8.7   MG 2.4 2.7*   PHOS 3.7 3.9     Recent Labs     12/07/21 0421 12/06/21 0429   AP 64 54   TP 5.5* 5.7*   ALB 2.6* 2.7*   GLOB 2.9 3.0     Recent Labs     12/07/21  0421 12/06/21 0429   INR 1.1 1.1   PTP 11.4* 11.3*      Recent Labs     12/07/21  1734 12/07/21  0337 12/06/21  0354 12/06/21  0354   PHI 7.44 7.43   < > 7.42   PCO2I 41.3 40.7   < > 45.9*   PO2I 147* 100   < > 74*   FIO2I  --  60  --  60    < > = values in this interval not displayed. No results for input(s): CPK, CKMB, TROIQ, BNPP in the last 72 hours. Ventilator Settings:  Mode Rate Tidal Volume Pressure FiO2 PEEP   Assist control, Volume control   450 ml    60 % 10 cm H20     Peak airway pressure: 26 cm H2O    Minute ventilation: 9.67 l/min      MEDS: Reviewed    Chest X-Ray:  CXR Results  (Last 48 hours)               12/07/21 1426  XR CHEST PORT Final result    Impression:  1.  Bilateral infiltrates consistent with pneumonia have mildly worsened medially   in each lower lobe. Narrative:  EXAM: XR CHEST PORT       INDICATION: ETT/COVID       COMPARISON: 12/6/2021       FINDINGS: A portable AP radiograph of the chest was obtained at 1421 hours. The   patient is on a cardiac monitor. The ET tube is in satisfactory position. Left   subclavian catheter overlies the SVC. NG tube has its tip in the stomach. Bilateral infiltrates in the mid and lower lung zones are again noted. This has   mildly worsened medially at the right lung base and medially at the left lung   base. 12/06/21 0510  XR CHEST PORT Final result    Impression:  Mild improvement in bilateral airspace opacification. Narrative:  EXAM: XR CHEST PORT       INDICATION: ETT/COVID       COMPARISON: December 5       FINDINGS: A portable AP radiograph of the chest was obtained at 0415 hours. Tubes and lines are stable. The patient is on a cardiac monitor. Patchy   opacification in the lungs bilaterally, left greater than right, with mild   improvement. The cardiac and mediastinal contours and pulmonary vascularity are   normal.  The bones and soft tissues are grossly within normal limits. ECHO 11/24:  Result status: Final result  · LV: Estimated LVEF is 50 - 55%. Normal cavity size, wall thickness and diastolic function. Low normal systolic function. E/E'= 7.12.     Multidisciplinary Rounds Completed:   Yes    ABCDEF Bundle/Checklist Completed:  YES-See Plan    Active Problem List:     Problem List  Date Reviewed: 11/28/2021          Codes Class    COVID ICD-10-CM: U07.1  ICD-9-CM: 079.89         Acute hypoxemic respiratory failure due to COVID-19 [facility]) ICD-10-CM: U07.1, J96.01  ICD-9-CM: 518.81, 079.89, 799.02         Pneumonia due to COVID-19 virus ICD-10-CM: U07.1, J12.82  ICD-9-CM: 480.8, 079.89         Hyperglycemia due to type 2 diabetes mellitus (HCC) ICD-10-CM: E11.65  ICD-9-CM: 250.00         Primary hypertension ICD-10-CM: I10  ICD-9-CM: 401.9 Hyponatremia ICD-10-CM: E87.1  ICD-9-CM: 276.1         Lactic acidosis ICD-10-CM: E87.2  ICD-9-CM: 276.2         Hyperlipidemia ICD-10-CM: E78.5  ICD-9-CM: 272.4         Obesity (BMI 30-39. 9) ICD-10-CM: E66.9  ICD-9-CM: 278.00         COVID-19 vaccination not done ICD-10-CM: Z28.9  ICD-9-CM: V64.00             ICU Assessment/ Comprehensive Plan of Care:     NEURO  1. Sedated and intubated  -Maintain RASS -1-2 as tolerated for vent compliance  - Fentanyl/Versed    CARDIAC  1. Hypotension, possible 2/2 sedation  2. 2. Hx of HTN, HLD  -Cardiac monitoring  -Continue atorvastatin  -Keep MAP > 65mmHg  -Levophed gtt PRN  -Continue midodrine 10mg IV q 8hr  - ECHO 11/24/21 Estimated LVEF is 50 - 55%. Normal cavity size, wall thickness and diastolic function. Low normal systolic function. E/E'= 7.12.    RESPIRATORY  1. Acute hypoxic respiratory failure 2/2 COVID PNA  -Intubated P 12, FiO2 60%  -Nimbex off 12/2  -O2 sats > 88%  -Prone for P/F ratio < 150 (16 hr prone, 8 hr supine)  1. COVID Treatment:  a. Immunomodulator--Steroids -- Yes decadron  b. COVID-19 Specific Anticoagulation -- Yes enoxaparin  c. Vitamin C -- Yes    d. Zinc -- Yes    e. Remdesivir-NA  f. Actemra vs. Baricitinib- Toci 11/24/21    RENAL  1. No acute concerns  -Trend BMP  -Monitor U.O    Gold Catheter Present: Yes  IVFs: NA    GASTROINTESTINAL  1. No acute concerns  -cont tube feeds, H2 RA GI prophylaxis    HEMATOLOGIC  1. No acute concerns  -Stable Hgb, No evidence of bleeding  -Continue enoxparin    ID  1. COVID 19 viral PNA  No leukocytosis, + Fevers, now low grade  -Covid pneumonia-status post Tocilizumab, was not deemed a candidate for remdesivir at outside hospital  -Cefepime + Vanc   - sputum 12/3--> growing strep pneumonia   -Mycoplasma 12/1- non reactive   -Legionella 12/1- negative    2.  Staph Epi Bacteremia   - growing in 4/4 bottles  - repeat blood cultures sent 12/5 - NGTD  - patient now on Vanc and Cefepime   - Lines changed out 12/7    ENDOCRINE  1. Hyperglycemia with Hx of DM  -SSI resistant sensitivity Q6hr  - Increased NPH 40 units BID on 12/7    F - Feeding:  TF  A - Analgesia: Fentanyl  S - Sedation: GOAL RASS -1 to -2  T - DVT Prophylaxis: SCD's or Sequential Compression Device , enoxaparin  H - Head of Bed: > 30 Degrees  U - Ulcer Prophylaxis: Famotidine  G - Glycemic Control: Insulin SSI and NPH  S - Spontaneous Breathing Trial: Not appropriate at present  B - Bowel Regimen: Colace  I - Indwelling Catheter:              T/L/D  Tubes: ETT and Orogastric Tube  Lines: Peripheral IV, Arterial Line, Central Line and PICC Line  Dc CVL and old Melony  Drains: Gold Catheter  D - De-escalation of Antibiotics:  NA    DISPOSITION/COMMUNICATION  Discussed Plan of Care/Code Status: Full Code  Stay in ICU    CRITICAL CARE CONSULTANT NOTE  I had a face to face encounter with the patient, reviewed and interpreted patient data including clinical events, labs, images, vital signs, I/O's, and examined patient. I have discussed the case and the plan and management of the patient's care with the consulting services, the bedside nurses and the respiratory therapist.      NOTE OF PERSONAL INVOLVEMENT IN CARE    I participated in the decision-making and personally managed or directed the management of the following life and organ supporting interventions that required my frequent assessment to treat or prevent imminent deterioration. I personally spent 45 minutes of critical care time. This is time spent at this critically ill patient's bedside actively involved in patient care as well as the coordination of care and discussions with the patient's family. This does not include any procedural time which has been billed separately.     Ramona Hernandez [facility]     [address] [facility]

## 2021-12-07 NOTE — PROGRESS NOTES
ID Progress Note  2021    Subjective:     Still on high levels of support--remains intermittently febrile    Objective:     Antibiotics:  1. Vancomycin   2. Cefepime    3. Ceftriaxone --     Vitals:   Visit Vitals  BP 91/65   Pulse 66   Temp (!) 100.6 °F (38.1 °C)   Resp 20   Ht 5' 6\" (1.676 m)   Wt 86 kg (189 lb 9.5 oz)   SpO2 95%   BMI 30.60 kg/m²        Tmax:  Temp (24hrs), Av.2 °F (37.9 °C), Min:99.8 °F (37.7 °C), Max:100.6 °F (38.1 °C)      Exam:  Observational only    Labs:      Recent Labs     21  04221  04221  0546 21  1805   WBC 8.1 8.2 10.4  --    HGB 12.1 12.0* 12.3  --     241 265  --    BUN 33* 37* 37* 44*   CREA 0.54* 0.65* 0.61* 0.62*   AP 64 54 64  --    TBILI 0.5 0.5 0.6  --        Cultures:     No results found for: SDES  Lab Results   Component Value Date/Time    Culture result: NO GROWTH 2 DAYS 2021 09:38 AM    Culture result: (A) 2021 06:01 AM     STAPHYLOCOCCUS EPIDERMIDIS GROWING IN ALL 4 BOTTLES DRAWN FROM THE RIGHT ARM    Culture result:  2021 06:01 AM     (NOTE) CALLED POSITIVE BLOOD CULTURE OF GPC IN CLUSTERS GROWING IN 1 OUT OF 4 BOTTLES TO [facility] AT 2237 ON 12/3/21. AT  REPORT OF GRAM POSITIVE COCCI IN CLUSTERS GROWING IN 3 OF 4 BOTTLES CALLED TO READ BACK BY BRADY CHISHOLM ON 21 AT 0630. AOW    Culture result: MODERATE STREPTOCOCCUS PNEUMONIAE (A) 2021 06:01 AM    Culture result: LIGHT NORMAL RESPIRATORY TAMARA 2021 06:01 AM       Radiology:     Line/Insert Date:           Assessment:     1. Severe COVID pneumonia--Pneumococcus from respiratory culture  2. VDRF  3. Hypoxic respiratory failure  4. Bacteremia--S epi--probable line source    Objective:     1. Continue antibiotic therapy  2. Follow up cultures  3. Work up fevers  4.  May need to further image chest, abdomen and pelvis when able    Melvin Atkinson MD

## 2021-12-07 NOTE — PROGRESS NOTES
PICC Placement Note    PRE-PROCEDURE VERIFICATION  Correct Procedure: yes  Correct Site:  yes  Temperature: Temp: (!) 100.6 °F (38.1 °C), Temperature Source: Temp Source: Bladder  Recent Labs     12/07/21  0421   BUN 33*   CREA 0.54*      INR 1.1   WBC 8.1     Allergies: Alupent [metaproterenol]  Education materials, including PICC Booklet, for PICC Care given to patient: yes. See Patient Education activity for further details. PROCEDURE DETAIL  A triple lumen PICC line was started for vascular access. The following documentation is in addition to the PICC properties in the lines/airways flowsheet :  Lot #: [id number]  Was xylocaine 1% used intradermally:  yes  Catheter Length: 39 (cm)  Vein Selection for PICC:right basilic  Central Line Bundle followed yes  Complication Related to Insertion: none    The placement was verified by ECG/Sapiens technology: The  tip location is on the right side and the tip is in the superior vena cava. See ECG results for PICC tip placement. Report given to nurse, Cory Rubinstein. Line is okay to use.     Swati Sewell RN

## 2021-12-07 NOTE — PROCEDURES
[facility]      Procedure Note - Arterial Access:   Performed by ALLIE Navarro. Diagnosis: ARDS, COVID  Insertion Date: 12/07/21   Time: 6:43 PM   Obtained Consent? yes; informed   Procedure Location:  ICU. Immediately prior to the procedure, the patient was reevaluated and found suitable for the planned procedure and any planned medications. Immediately prior to the procedure a time out was called to verify the correct patient, procedure, equipment, staff, and marking as appropriate. Line Bundle:  Full sterile barrier precautions used. 5 mL 1% Lidocaine placed at insertion site. Method: Seldinger technique. Site Prep: ChloraPrep and Sterile draping. Procedure: Arterial Catheter Insertion in Left, Radial Artery   Catheter inserted into a new site. Number of Attempts:  x1  Indication: Monitoring and Blood Drawing. There was bright red, pulsatile blood return. Femoral Site? no. If Yes, reason femoral site was chosen: NA  Catheter secured. Biopatch in place? yes. Sterile Bio-occlusive dressing placed. Complication None. The procedure was tolerated well.     ALLIE Navarro   Critical Care Medicine  [facility]

## 2021-12-07 NOTE — PROGRESS NOTES
1930: Bedside and Verbal shift change report given to Sheila Jones RN (oncoming nurse) by Charla Bragg RN (offgoing nurse). Report included the following information SBAR, Kardex, Intake/Output, MAR, Recent Results, Cardiac Rhythm SB/NSR and Alarm Parameters . 2130: RT and RN x 4 at bedside to prone patient. Patients head is facing towards the left and left arm is up. Tolerated well.     2300: Spoke with patients wife Griselda Hernandez via phone. Gave her updates on patients condition and wife made aware of plan to change the patients arterial line tomorrow and place a PICC line. She was okay with both of these things. 0730: Bedside and Verbal shift change report given to Wandy Altamirano RN (oncoming nurse) by Sheila Jones RN (offgoing nurse). Report included the following information SBAR, Kardex, Intake/Output, MAR, Recent Results, Cardiac Rhythm SB/NSR and Alarm Parameters .

## 2021-12-07 NOTE — DIABETES MGMT
[address]    CLINICAL NURSE SPECIALIST CONSULT     Initial Presentation   Salty Bull is a 55 y.o. male who presented to [facility] ED on 11/23/21 with known COVID-19 viral infection with worsening SOB. HX:   Past Medical History:   Diagnosis Date    COVID-19     Diabetes ([facility] [address]     Hyperlipidemia     Hypertension         INITIAL DX:   COVID [U07.1] Severe hypoxemic respiratory failure    Current Treatment     TX: IV steroids, intubation    Consulted by Irene Tineo MD  for advanced diabetes nursing assessment and care for:   [x] Inpatient management strategy    Hospital Course   Clinical progress has been complicated by hyperglycemia, worsening respiratory failure. 11/23: ICU admission at [facility]. HFNC. IV abox, Decadron 6mg daily  11/24: Tociluxumab  11/28: Intubated. Started vasopressors. 12/1: Transfer to [facility] for higher level of care  12/3: Fevers, abox adjusted. BC + GPC in clusters 3/4 and 1/4 in separate draws   12/5: Respiratory culture with Strep, blood cultures with staph epi. Diabetes History   Type 2 Diabetes  Doe Latif DO: PCP    Diabetes-related Medical History  Acute complications  Steroid induced hyperglycemia  Neurological complications  Unknown  Microvascular disease  None  Macrovascular disease  None     Diabetes Medication History  Key Antihyperglycemic Medications             insulin glargine (LANTUS) 100 unit/mL injection (Taking) 24 Units by SubCUTAneous route nightly. Indications: type 2 diabetes mellitus    glipiZIDE SR (GLUCOTROL XL) 10 mg CR tablet (Discontinued) Take 10 mg by mouth daily. metFORMIN ER (GLUCOPHAGE XR) 500 mg tablet (Discontinued) Take 500 mg by mouth daily (with dinner). Subjective   Patient intubated and sedated  Objective   Physical exam  General Overweight male.  Intubated   Neuro  Sedated  Vital Signs   Visit Vitals  /70   Pulse 60   Temp 99.8 °F (37.7 °C)   Resp 20   Ht 5' 6\" (1.676 m) Wt 86 kg (189 lb 9.5 oz)   SpO2 99%   BMI 30.60 kg/m²     PE deferred due to COVID-19 infection. Able to visualize through ICU doors    Laboratory  Recent Labs     21  04221  04221  0546   * 290* 323*   AGAP 6 4* 7   TRIGL  --   --  86   WBC 8.1 8.2 10.4   CREA 0.54* 0.65* 0.61*   GFRNA >60 >60 >60   AST 29 20 28   ALT 46 40 60         Blood glucose pattern      Significant diabetes-related events over the past 24-72 hours  Intubated/sedated  On: norepi and propofol  Decadron 20mg daily (Day 8), was previously on Decadron 6mg daily (x5 days). Reducing to 10mg daily today  Admitting B  BG was 185-256 on 6mg decadron daily  BG now 242-296  Enteral feeds: Glucerna 1.5 at 65ml/hr (207gm CHO based on 24° infusion)  Insulin gtt  from 2p-8p  NPH: 30 units Q12  Humalo units via correction in the last 24h      Assessment and Plan   Nursing Diagnosis Risk for unstable blood glucose pattern   Nursing Intervention Domain 5257 Decision-making Support   Nursing Interventions Examined current inpatient diabetes/blood glucose control   Explored factors facilitating and impeding inpatient management  Explored corrective strategies with patient and responsible inpatient provider   Informed patient of rational for insulin strategy while hospitalized     Evaluation   Derrell Bell is a 55year old gentleman, with uncontrolled Type 2 diabetes, who was admitted with acute hypoxic respiratory failure s/t COVID-19 pneumonia. He did not achieve diabetes control prior to admission, as evidenced by A1c of 8.4%. He was admitted at [facility] for 8 days prior to transfer to [facility],  On arrival at [facility], his initial glucose was significantly elevated at 400.  6mg decadron was initiated and continued for 5 days. Glucose there was 195-290 with low dose lantus and correctional humalog.   When Decadron was advanced to 20mg daily, on , lantus was slowly titrated up to 30 units daily but he never sustained glucose control. Several factors have played a role in blood glucose management including:  [x] Critical nature of illness state: Stress hyperglycemia/Severe COVID pna  [x]  Changing nutritive sources & needs: Enteral feeds w/ 151 grams CHO/24h   [x] Compromised insulin absorption or delivery on vasopressors   [x] Glucocorticoid use: Decadron 20mg daily    As glucose was elevated on admission to [facility] at 339, 50 units Lantus was started (covering low dose diabetes and high dose for decadron). His best glucose was 290 and he transiently switched to NPH before going on an insulin gtt for a few hours over the weekend. He transitioned off with lower insulin doses- Lantus 30 units daily for which he remained hyperglycemic. NPH adjusted to 30 units Q12 and he will still need a higher basal dose to override steroid, insulin resistance and carbs in enteral feeds. ICU BG goal 140mg/dl-180mg/dl    Recommendations   1. POC glucose Q6    2. Enteral feeds per primary team/dietician    3. Adjust the Subcutaneous Insulin Order set (9016)  Insulin Dosing Specific recommendation   Basal                                      (Based on weight, BMI & GFR) Advance basal to 44 units NPH Q12    Please give another 14 units x1 now      This covers:  0.4units/kg for Diabetes PLUS  0.4units/kg for Decadron (10mg) PLUS  1:10 carb coverage in feeds (20) If Fasting BG over 200   tomorrow, add the total   amount of correction that   was received the day prior   and add it to basal dose   Corrective                                       (Useful in adjusting insulin dosing) Insulin-resistant sensitivity Q6. Can consider Q4 dosing if BG remains elevated         Billing Code(s)   [x] 31559    Before making these care recommendations, I personally reviewed the hosptialization record, including laboratory and diagnostic data, medications and examined the patient at bedside (circumstances permitting).   Total minutes: 15    Bubba Art CNS  Diabetes Clinical Nurse Specialist  [facility]  Access via [facility]

## 2021-12-08 NOTE — PROGRESS NOTES
[facility]    ICU TEAM Progress Note    Name: Rula Fuchs   : 1975   MRN: [id number]   Date: 2021        Subjective:     Reason for ICU Admission:   39 y. o. male with poorly controlled type 2 diabetes mellitus, hypertension who was was admitted to an OSH on  for acute respiratory failure due to covid 19 pneumonia. He wad admitted directly to the ICU on HFNC, received dexamethasone  to date, S/P Tociluzumab . His condition progressively worsened - intubated on . Transferred to [facility] for possible VV - ECMO. Patient seen, intubated on high vent support, able to be weaned to 60%. Laterally transferred to ICU 12/3. Overnight Events:   12/3: Sedated and intubated, Fio2 60% P 14, LA trending down, now 2.5. Glucoses elevated, Lantus changed to NPH BID for better control. Patient's wife Veronica Herrera updated via phone. : Sedated and intubated, required increase in FiO2 overnight to 80%, now back to 60% with PEEP of 12. Currently proned ( 10 AM). Respiratory culture with Strep, blood cultures with staph epi. Now on Vanc and Cefepime. Continues with low grade fevers. Updated wife Veronica Herrera via telephone. : Low grade fevers, cont abx, Prone tonight, Vent 60% and 10 PEEP. PICC ordered for tomorrow. : PICC placed today, magi changed out, jayashree changed out. All lines exchanged per ID recs. Tolerated Prone overnight. Follows commands when sedation is weaned. : Patient needed to be prone last night after trial of holding. Needed increased sedation for vent compliance. Started on ketamine gtt. And proned in early am. Will prone again tonight. Lines have all been changed out. NPH insulin increased. Diurese with 40 mg lasix today. Continue vanc/cefepime. Discussed with ID.      Objective:   Vital Signs:  Visit Vitals  BP 93/68   Pulse 73   Temp 99.8 °F (37.7 °C)   Resp 20   Ht 5' 6\" (1.676 m)   Wt 86 kg (189 lb 9.5 oz)   SpO2 96%   BMI 30.60 kg/m²    O2 Flow Rate (L/min): 60 l/min O2 Device: Endotracheal tube, Ventilator Temp (24hrs), Av.3 °F (37.9 °C), Min:99.6 °F (37.6 °C), Max:100.7 °F (38.2 °C)         Intake/Output:     Intake/Output Summary (Last 24 hours) at 2021 0900  Last data filed at 2021 0843  Gross per 24 hour   Intake 1907.2 ml   Output 2670 ml   Net -762.8 ml       Physical Exam:  General:  Sedated and on the ventilator. No acute distress. Prone position   Eyes:  Sclera anicteric. Pupils equal, round, reactive to light. Mouth/Throat: Orotracheal tube in place. Neck: Supple. Lungs:   Dimnished to auscultation bilaterally. Cardiovascular:  Regular rate and rhythm, no murmur, click, rub, or gallop. Abdomen:   Soft, non-tender, bowel sounds normal, non-distended. Extremities: No cyanosis, generalized edema noted   Skin: No acute rash or lesions. Lymph Nodes: Cervical and supraclavicular normal.   Musculoskeletal:  No swelling or deformity. Lines/Devices:  Intact, no erythema, drainage, or tenderness. Psychiatric: Sedated and appears comfortable on ventilator. LABS AND  DATA: Personally reviewed  Recent Labs     21   WBC 11.5* 8.1   HGB 11.9* 12.1   HCT 34.8* 37.3    202     Recent Labs     21   * 138   K 4.0 4.4    103   CO2 29 29   BUN 31* 33*   CREA 0.58* 0.54*   * 276*   CA 8.3* 8.3*   MG 2.2 2.4   PHOS 3.1 3.7     Recent Labs     21   AP 73 64   TP 5.4* 5.5*   ALB 2.5* 2.6*   GLOB 2.9 2.9     Recent Labs     21   INR 1.1 1.1   PTP 11.7* 11.4*      Recent Labs     21  0423 12/07/21  1734 21  0337 21  0337   PHI 7.41 7.44   < > 7.43   PCO2I 46.4* 41.3   < > 40.7   PO2I 54* 147*   < > 100   FIO2I 70  --   --  60    < > = values in this interval not displayed. No results for input(s): CPK, CKMB, TROIQ, BNPP in the last 72 hours.     Ventilator Settings:  Mode Rate Tidal Volume Pressure FiO2 PEEP   Assist control, Volume control   450 ml    70 % (Increased due to desaturations.) 12 cm H20 (Increased due to desaturations.)     Peak airway pressure: 28 cm H2O    Minute ventilation: 9.19 l/min      MEDS: Reviewed    Chest X-Ray:  CXR Results  (Last 48 hours)               12/08/21 0512  XR CHEST PORT Final result    Impression:  1. Patchy bilateral opacification, greater on the left. 2. The PICC line tip is in the region of the right atrium and should be pulled   back 3-4 cm. Narrative:  EXAM: XR CHEST PORT       INDICATION: ETT/COVID       COMPARISON: Previous day       FINDINGS: A portable AP radiograph of the chest was obtained at 0413 hours. The   endotracheal tube and nasogastric tube are seen in place. The central line has   been removed in the right upper M.D. PICC line has been placed with the tip in   the region of the right atrium. The patient is on a cardiac monitor. There is   patchy opacification in the lungs, greater on the left. Cardiomediastinal   contours are unremarkable. The bones and soft tissues are grossly within normal   limits. 12/07/21 1426  XR CHEST PORT Final result    Impression:  1. Bilateral infiltrates consistent with pneumonia have mildly worsened medially   in each lower lobe. Narrative:  EXAM: XR CHEST PORT       INDICATION: ETT/COVID       COMPARISON: 12/6/2021       FINDINGS: A portable AP radiograph of the chest was obtained at 1421 hours. The   patient is on a cardiac monitor. The ET tube is in satisfactory position. Left   subclavian catheter overlies the SVC. NG tube has its tip in the stomach. Bilateral infiltrates in the mid and lower lung zones are again noted. This has   mildly worsened medially at the right lung base and medially at the left lung   base. ECHO 11/24:  Result status: Final result  · LV: Estimated LVEF is 50 - 55%. Normal cavity size, wall thickness and diastolic function.  Low normal systolic function. E/E'= 7.12.     Multidisciplinary Rounds Completed: Yes    ABCDEF Bundle/Checklist Completed:  YES-See Plan    Active Problem List:     Problem List  Date Reviewed: 11/28/2021          Codes Class    COVID ICD-10-CM: U07.1  ICD-9-CM: 079.89         Acute hypoxemic respiratory failure due to COVID-19 [facility]) ICD-10-CM: U07.1, J96.01  ICD-9-CM: 518.81, 079.89, 799.02         Pneumonia due to COVID-19 virus ICD-10-CM: U07.1, J12.82  ICD-9-CM: 480.8, 079.89         Hyperglycemia due to type 2 diabetes mellitus (HCC) ICD-10-CM: E11.65  ICD-9-CM: 250.00         Primary hypertension ICD-10-CM: I10  ICD-9-CM: 401.9         Hyponatremia ICD-10-CM: E87.1  ICD-9-CM: 276.1         Lactic acidosis ICD-10-CM: E87.2  ICD-9-CM: 276.2         Hyperlipidemia ICD-10-CM: E78.5  ICD-9-CM: 272.4         Obesity (BMI 30-39. 9) ICD-10-CM: E66.9  ICD-9-CM: 278.00         COVID-19 vaccination not done ICD-10-CM: Z28.9  ICD-9-CM: V64.00             ICU Assessment/ Comprehensive Plan of Care:     NEURO  1. Sedated and intubated  -Maintain RASS -1-2 as tolerated for vent compliance  - Fentanyl/Versed  - add Ketamine gtt    CARDIAC  1. Hypotension, possible 2/2 sedation  2. 2. Hx of HTN, HLD  -Cardiac monitoring  -Continue atorvastatin  -Keep MAP > 65mmHg  -Levophed gtt PRN  -Continue midodrine 10mg IV q 8hr  - ECHO 11/24/21 Estimated LVEF is 50 - 55%. Normal cavity size, wall thickness and diastolic function. Low normal systolic function. E/E'= 7.12.    RESPIRATORY  1. Acute hypoxic respiratory failure 2/2 COVID PNA  -Intubated P 12, FiO2 60%  -Nimbex off 12/2  -O2 sats > 88%  -Prone for P/F ratio < 150 (16 hr prone, 8 hr supine)  1. COVID Treatment:  a. Immunomodulator--Steroids -- Yes decadron  b. COVID-19 Specific Anticoagulation -- Yes enoxaparin  c. Vitamin C -- Yes    d. Zinc -- Yes    e. Remdesivir-NA  f. Actemra vs. Baricitinib- Toci 11/24/21    RENAL  1. No acute concerns  -Trend BMP  - Monitor U.O  - PRN Diuretics    Gold Catheter Present: Yes  IVFs: NA    GASTROINTESTINAL  1. No acute concerns  -cont tube feeds, H2 RA GI prophylaxis    HEMATOLOGIC  1. No acute concerns  -Stable Hgb, No evidence of bleeding  -Continue enoxparin    ID  1. COVID 19 viral PNA  No leukocytosis, + Fevers, now low grade  -Covid pneumonia-status post Tocilizumab, was not deemed a candidate for remdesivir at outside hospital  -Cefepime + Vanc   - sputum 12/3--> growing strep pneumonia   -Mycoplasma 12/1- non reactive   -Legionella 12/1- negative    2. Staph Epi Bacteremia   - growing in 4/4 bottles  - repeat blood cultures sent 12/5 - NGTD  - patient now on Vanc and Cefepime   - Lines changed out 12/7    ENDOCRINE  1. Hyperglycemia with Hx of DM  - SSI resistant sensitivity Q6hr  - Increased NPH to 55 units BID on 12/7    F - Feeding:  TF  A - Analgesia: Fentanyl/versed Ketamine  S - Sedation: GOAL RASS -1 to -2  T - DVT Prophylaxis: SCD's or Sequential Compression Device , enoxaparin  H - Head of Bed: > 30 Degrees  U - Ulcer Prophylaxis: Famotidine   G - Glycemic Control: Insulin SSI and NPH  S - Spontaneous Breathing Trial: Not appropriate at present  B - Bowel Regimen: Colace  I - Indwelling Catheter:              T/L/D  Tubes: ETT and Orogastric Tube  Lines: Arterial Line and PICC Line   Drains: Gold Catheter  D - De-escalation of Antibiotics:  NA    DISPOSITION/COMMUNICATION  Discussed Plan of Care/Code Status: Full Code  Stay in ICU    CRITICAL CARE CONSULTANT NOTE  I had a face to face encounter with the patient, reviewed and interpreted patient data including clinical events, labs, images, vital signs, I/O's, and examined patient.   I have discussed the case and the plan and management of the patient's care with the consulting services, the bedside nurses and the respiratory therapist.      NOTE OF PERSONAL INVOLVEMENT IN CARE    I participated in the decision-making and personally managed or directed the management of the following life and organ supporting interventions that required my frequent assessment to treat or prevent imminent deterioration. I personally spent 45 minutes of critical care time. This is time spent at this critically ill patient's bedside actively involved in patient care as well as the coordination of care and discussions with the patient's family. This does not include any procedural time which has been billed separately.     Jennie [facility]-BC     [address] [facility]

## 2021-12-08 NOTE — PROGRESS NOTES
ID Progress Note  2021    Subjective:     Still on high levels of support--remains intermittently febrile--intermittently requires prone    Objective:     Antibiotics:  1. Vancomycin   2. Cefepime    3. Ceftriaxone --     Vitals:   Visit Vitals  /68   Pulse 82   Temp 99.7 °F (37.6 °C)   Resp 20   Ht 5' 6\" (1.676 m)   Wt 86 kg (189 lb 9.5 oz)   SpO2 90%   BMI 30.60 kg/m²        Tmax:  Temp (24hrs), Av °F (37.8 °C), Min:99.6 °F (37.6 °C), Max:100.7 °F (38.2 °C)      Exam:  Observational only    Labs:      Recent Labs     211 21  0429   WBC 11.5* 8.1 8.2   HGB 11.9* 12.1 12.0*    202 241   BUN 31* 33* 37*   CREA 0.58* 0.54* 0.65*   AP 73 64 54   TBILI 0.4 0.5 0.5       Cultures:     No results found for: SDES  Lab Results   Component Value Date/Time    Culture result: NO GROWTH 3 DAYS 2021 09:38 AM    Culture result: (A) 2021 06:01 AM     STAPHYLOCOCCUS EPIDERMIDIS GROWING IN ALL 4 BOTTLES DRAWN FROM THE RIGHT ARM    Culture result:  2021 06:01 AM     (NOTE) CALLED POSITIVE BLOOD CULTURE OF GPC IN CLUSTERS GROWING IN 1 OUT OF 4 BOTTLES TO [facility] AT 2237 ON 12/3/21. AT  REPORT OF GRAM POSITIVE COCCI IN CLUSTERS GROWING IN 3 OF 4 BOTTLES CALLED TO READ BACK BY BRADY CHISHOLM ON 21 AT 0630. AOW    Culture result: MODERATE STREPTOCOCCUS PNEUMONIAE (A) 2021 06:01 AM    Culture result: LIGHT NORMAL RESPIRATORY TAMARA 2021 06:01 AM       Radiology:     Line/Insert Date:           Assessment:     1. Severe COVID pneumonia--Pneumococcus from respiratory culture  2. VDRF  3. Hypoxic respiratory failure  4. Bacteremia--S epi--probable line source  1. Lines all changes    Objective:     1. Continue antibiotic therapy  2. Follow up cultures  3. Work up fevers  4. May need to further image chest, abdomen and pelvis when able  5.  Discussed     Rashard Kiser MD

## 2021-12-08 NOTE — PROGRESS NOTES
PICC in the atrium per chest x-ray. Retracted to 2 cm paulina. Awaiting chest x-ray result. PICC now in SVC. RN made aware.

## 2021-12-08 NOTE — PROGRESS NOTES
Comprehensive Nutrition Assessment    Type and Reason for Visit: Reassess    Nutrition Recommendations/Plan:    1. Continue current tube feed order:   -Glucerna @ 65 ml/hr with 1 packet of ProSource + 100 ml flush q 4 hr    2. Discontinue zinc sulfate on 12/13    Nutrition Assessment:    PMHx includes DM, HTN, HLD. Admitted to [facility] on 11/23 for acute respiratory failure due to Covid-19 PNA. He was admitted directly to the ICU on HFNC, has been receiving dexamethasone daily since 11/23, S/P Tociluzumab 11/24. His condition progressively worsened - intubated on 11/28. Transferred to [facility] for possible VV -[address]. Remains intubated and sedated. Has not required ECMO. Off nimbex, pressors, and propofol. BG uncontrolled, diabetes nurse following. PICC placed on 12/7. Pt had first BM since admission on 12/8. Noted pt had vitamin D deficiency (22 ng/ml), vitamin D3 increased. Tube feeds modified on 12/6 to Glucerna @ 65 ml/hr with 1 packet of ProSource + 165 ml flush q 4 hr as pt is no longer on propofol. Noted pt with low sodium today, will reduce flush to 100 ml q 4 hr and continue to monitor. This goal will provide: 1300 ml, 2010 kcal, 122 g protein, 173 g CHO, and 1647 ml free water (987 ml from TF + 660 ml from flush) to meet daily estimated needs. Malnutrition Assessment:  Malnutrition Status: At risk for malnutrition (specify) (suspect, but not enough information)    Context:  Acute illness       Nutritionally Significant Medications: vitamin C, rocephin, vitamin D3, decadron, colace, pepcid, fentanyl, humalog, ketamine, versed, midodrine, miralax, theragran, vancomycin, zinc sulfate    Estimated Daily Nutrient Needs:  Energy (kcal): 2050 ([id number]); Weight Used for Energy Requirements: Admission (83.1 kg)  Protein (g): 125 (1.5 gm/kg);  Weight Used for Protein Requirements:    Fluid (ml/day): 1 mL/kcal; Method Used for Fluid Requirements:      Nutrition Related Findings:       BM: 12/8-loose  Edema: 1+ generalized   Wounds:  None       Current Nutrition Therapies:   Diet: NPO  Supplements/Nutrition Support: Glucerna @ 65 ml/hr with 1 packet of ProSource + 165 ml flush q 4 hr. Additional Caloric Sources:        Anthropometric Measures:  · Height:  5' 6\" (167.6 cm)  · Current Body Wt:  86 kg (189 lb 9.5 oz)   · Admission Body Wt:  193 lb 2 oz    · Ideal Body Wt:  142:  133.5 %   · Adjusted Body Weight:   ; Weight Adjustment for: No adjustment   · BMI Categories:  Obese class 1 (BMI 30.0-34. 9)     Wt Readings from Last 10 Encounters:   12/07/21 86 kg (189 lb 9.5 oz)   11/30/21 86.9 kg (191 lb 9.3 oz)     Nutrition Diagnosis:   · Inadequate oral intake related to impaired respiratory function as evidenced by intubation, nutrition support-enteral nutrition    Nutrition Interventions:   Food and/or Nutrient Delivery: Continue tube feeding  Nutrition Education and Counseling: No recommendations at this time  Coordination of Nutrition Care: Continue to monitor while inpatient    Goals:  EN to meet at least 80% of needs x 5-7 days. Nutrition Monitoring and Evaluation:   Behavioral-Environmental Outcomes: None identified  Food/Nutrient Intake Outcomes: Enteral nutrition intake/tolerance  Physical Signs/Symptoms Outcomes: Biochemical data, Weight    Discharge Planning:     Too soon to determine     Recent Labs     12/08/21  0517 12/07/21  0421 12/06/21  0429   * 276* 290*   BUN 31* 33* 37*   CREA 0.58* 0.54* 0.65*   * 138 139   K 4.0 4.4 4.7    103 106   CO2 29 29 29   CA 8.3* 8.3* 8.7   PHOS 3.1 3.7 3.9   MG 2.2 2.4 2.7*     Recent Labs     12/08/21  1110 12/08/21  0611 12/08/21  0011 12/07/21  1911 12/07/21  1056 12/07/21  0535 12/07/21  0019 12/06/21  1823 12/06/21  1258 12/06/21  1010 12/06/21  0518 12/05/21  2328 12/05/21  1704   GLUCPOC 217* 223* 229* 268* 218* 271* 296* 283* 242* 279* 250* 280* 322*     Noe Longo, Dietetic Intern)

## 2021-12-08 NOTE — PROGRESS NOTES
Day #6 of Vancomycin - Level Update  Indication:  MSSE bacteremia; Strep pneumo PNA  - COVID-19 positive  Current regimen:  Ceftriaxone + Vancomycin  Abx regimen:  1250 mg IV Q 8 hr  ID Following ?: NO  Concomitant nephrotoxic drugs (requires more frequent monitoring): Vasopressors  Frequency of BMP?: Daily    Recent Labs     21  0517 21  0421 21  0429   WBC 11.5* 8.1 8.2   CREA 0.58* 0.54* 0.65*   BUN 31* 33* 37*     Est CrCl: >100 ml/min; UO: >1 ml/kg/hr  Temp (24hrs), Av.3 °F (37.9 °C), Min:99.6 °F (37.6 °C), Max:100.7 °F (38.2 °C)    Cultures:    Legionella Ag [Ur]: (-)   Mycoplasma Ab [IGM]: (-)   COVID-19 [PCR]: (+)  12/3 Blood: Staph epidermidis (pan-S)  - final  12/3 Sputum: moderate Strep pneumo, light normal taryn, final   Blood: NGTD    MRSA Swab ordered (if applicable)? N/A    Goal target range AUC/-600    Recent level history:  Date/Time Dose & Interval Measured Level (mcg/mL) Associated AUC/TIM Dose Adjustment     @ 0430 1250 mg q12h 7.7 ~ 6.5 hrs p dose 310 1250 mg q8h    @ 0517 1250 mg q8h 15.8 mcg/ml 439 No change                                  Plan: The vancomycin random level drawn this morning correlates with an AUC/TIM of 439, which is within the goal range of 400-600. Will continue the current regimen for now. Pharmacy will continue to monitor patient daily and will make dosage adjustments based upon changing renal function. *Random vancomycin levels are used to calculate AUC/TIM, this level should not be interpreted as a trough. Vancomycin has been dosed using Bayesian kinetics software to target an AUC24:TIM of 400-600, which provides adequate exposure for as assumed infection due to MRSA with an TIM of 1 or less while reducing the risk of nephrotoxicity as seen with traditional trough based dosing goals.

## 2021-12-09 NOTE — PROGRESS NOTES
[facility]    ICU TEAM Progress Note    Name: Clarence Juarez   : 1975   MRN: [id number]   Date: 2021        Subjective:     Reason for ICU Admission:   39 y. o. male with poorly controlled type 2 diabetes mellitus, hypertension who was was admitted to an OSH on  for acute respiratory failure due to covid 19 pneumonia. He wad admitted directly to the ICU on HFNC, received dexamethasone  to date, S/P Tociluzumab . His condition progressively worsened - intubated on . Transferred to [facility] for possible VV - ECMO. Patient seen, intubated on high vent support, able to be weaned to 60%. Laterally transferred to ICU 12/3. Overnight Events:   12/3: Sedated and intubated, Fio2 60% P 14, LA trending down, now 2.5. Glucoses elevated, Lantus changed to NPH BID for better control. Patient's wife Una Bartlett updated via phone. : Sedated and intubated, required increase in FiO2 overnight to 80%, now back to 60% with PEEP of 12. Currently proned ( 10 AM). Respiratory culture with Strep, blood cultures with staph epi. Now on Vanc and Cefepime. Continues with low grade fevers. Updated wife Una Bartlett via telephone. : Low grade fevers, cont abx, Prone tonight, Vent 60% and 10 PEEP. PICC ordered for tomorrow. : PICC placed today, magi changed out, jayashree changed out. All lines exchanged per ID recs. Tolerated Prone overnight. Follows commands when sedation is weaned. : Patient needed to be prone last night after trial of holding. Needed increased sedation for vent compliance. Started on ketamine gtt. And proned in early am. Will prone again tonight. Lines have all been changed out. NPH insulin increased. Diurese with 40 mg lasix today. Continue vanc/cefepime. Discussed with ID.   : Proned overnight. Diuresing well. Continue daily and trend renal indices. Temp improved. Wean ketamine drip off as able. NPH dosing increased.  Abx de-escalated to ceftriaxone. Objective:   Vital Signs:  Visit Vitals  /84   Pulse 64   Temp 99 °F (37.2 °C)   Resp 20   Ht 5' 6\" (1.676 m)   Wt 86 kg (189 lb 9.5 oz)   SpO2 98%   BMI 30.60 kg/m²    O2 Flow Rate (L/min): 60 l/min O2 Device: Ventilator, Endotracheal tube, Heated, Humidifier Temp (24hrs), Av.7 °F (37.6 °C), Min:99 °F (37.2 °C), Max:100.1 °F (37.8 °C)         Intake/Output:     Intake/Output Summary (Last 24 hours) at 2021 0755  Last data filed at 2021 0600  Gross per 24 hour   Intake 3016.9 ml   Output 2800 ml   Net 216.9 ml       Physical Exam:  General:  Sedated and on the ventilator. No acute distress. Prone position   Eyes:  Sclera anicteric. Pupils equal, round, reactive to light. Mouth/Throat: Orotracheal tube in place. Neck: Supple. Lungs:   Dimnished to auscultation bilaterally. Cardiovascular:  Regular rate and rhythm, no murmur, click, rub, or gallop. Abdomen:   Soft, non-tender, bowel sounds normal, non-distended. Extremities: No cyanosis, generalized edema noted   Skin: No acute rash or lesions. Lymph Nodes: Cervical and supraclavicular normal.   Musculoskeletal:  No swelling or deformity. Lines/Devices:  Intact, no erythema, drainage, or tenderness. Psychiatric: Sedated and appears comfortable on ventilator.        LABS AND  DATA: Personally reviewed  Recent Labs     21   WBC 9.2 11.5*   HGB 11.6* 11.9*   HCT 34.8* 34.8*    161     Recent Labs     21    135*   K 4.4 4.0    102   CO2 30 29   BUN 23* 31*   CREA 0.48* 0.58*   * 241*   CA 8.3* 8.3*   MG 2.3 2.2   PHOS 3.5 3.1     Recent Labs     21   AP 67 73   TP 5.3* 5.4*   ALB 2.4* 2.5*   GLOB 2.9 2.9     Recent Labs     21  0455 21  0517   INR 1.1 1.1   PTP 11.3* 11.7*      Recent Labs     21  0423 21  1734 21  0337 21  0337   PHI 7.41 7.44   < > 7.43   PCO2I 46.4* 41.3   < > 40.7   PO2I 54* 147*   < > 100   FIO2I 70  --   --  60    < > = values in this interval not displayed. No results for input(s): CPK, CKMB, TROIQ, BNPP in the last 72 hours. Ventilator Settings:  Mode Rate Tidal Volume Pressure FiO2 PEEP   Assist control, Volume control   450 ml    60 % 12 cm H20     Peak airway pressure: 30 cm H2O    Minute ventilation: 8.8 l/min      MEDS: Reviewed    Chest X-Ray:  CXR Results  (Last 48 hours)               12/08/21 1612  XR CHEST PORT Final result    Impression:  Satisfactory PICC line position. Stable nonspecific pulmonary   disease. Narrative:  EXAM: Portable CXR. 1609 hours. COMPARISON: 0413 hours. INDICATION: tip placement post retraction       FINDINGS:   There is satisfactory positioning of right arm PICC line with tip in the SVC. ET   tube remains satisfactory. NG tube remains in the stomach. Lungs are stable with   mild bibasilar predominant airspace disease/edema. Heart size is normal. There   is no pneumothorax, midline shift or sizable pleural effusion. 12/08/21 0512  XR CHEST PORT Final result    Impression:  1. Patchy bilateral opacification, greater on the left. 2. The PICC line tip is in the region of the right atrium and should be pulled   back 3-4 cm. Narrative:  EXAM: XR CHEST PORT       INDICATION: ETT/COVID       COMPARISON: Previous day       FINDINGS: A portable AP radiograph of the chest was obtained at 0413 hours. The   endotracheal tube and nasogastric tube are seen in place. The central line has   been removed in the right upper M.D. PICC line has been placed with the tip in   the region of the right atrium. The patient is on a cardiac monitor. There is   patchy opacification in the lungs, greater on the left. Cardiomediastinal   contours are unremarkable. The bones and soft tissues are grossly within normal   limits. 12/07/21 1426  XR CHEST PORT Final result    Impression:  1.  Bilateral infiltrates consistent with pneumonia have mildly worsened medially   in each lower lobe. Narrative:  EXAM: XR CHEST PORT       INDICATION: ETT/COVID       COMPARISON: 12/6/2021       FINDINGS: A portable AP radiograph of the chest was obtained at 1421 hours. The   patient is on a cardiac monitor. The ET tube is in satisfactory position. Left   subclavian catheter overlies the SVC. NG tube has its tip in the stomach. Bilateral infiltrates in the mid and lower lung zones are again noted. This has   mildly worsened medially at the right lung base and medially at the left lung   base. ECHO 11/24:  Result status: Final result  · LV: Estimated LVEF is 50 - 55%. Normal cavity size, wall thickness and diastolic function. Low normal systolic function. E/E'= 7.12.     Multidisciplinary Rounds Completed: Yes    ABCDEF Bundle/Checklist Completed:  YES-See Plan    Active Problem List:     Problem List  Date Reviewed: 11/28/2021          Codes Class    COVID ICD-10-CM: U07.1  ICD-9-CM: 079.89         Acute hypoxemic respiratory failure due to COVID-19 [facility]) ICD-10-CM: U07.1, J96.01  ICD-9-CM: 518.81, 079.89, 799.02         Pneumonia due to COVID-19 virus ICD-10-CM: U07.1, J12.82  ICD-9-CM: 480.8, 079.89         Hyperglycemia due to type 2 diabetes mellitus (HCC) ICD-10-CM: E11.65  ICD-9-CM: 250.00         Primary hypertension ICD-10-CM: I10  ICD-9-CM: 401.9         Hyponatremia ICD-10-CM: E87.1  ICD-9-CM: 276.1         Lactic acidosis ICD-10-CM: E87.2  ICD-9-CM: 276.2         Hyperlipidemia ICD-10-CM: E78.5  ICD-9-CM: 272.4         Obesity (BMI 30-39. 9) ICD-10-CM: E66.9  ICD-9-CM: 278.00         COVID-19 vaccination not done ICD-10-CM: Z28.9  ICD-9-CM: V64.00             ICU Assessment/ Comprehensive Plan of Care:     NEURO  1. Sedated and intubated  -Maintain RASS -1-2 as tolerated for vent compliance  - Fentanyl/Versed  - Ketamine gtt - wean off as tolerated    CARDIAC  1.  Hypotension, possible 2/2 sedation  2. 2. Hx of HTN, HLD  -Cardiac monitoring  -Continue atorvastatin  -Keep MAP > 65mmHg  -Levophed gtt PRN  -Continue midodrine 10mg IV q 8hr  - ECHO 11/24/21 Estimated LVEF is 50 - 55%. Normal cavity size, wall thickness and diastolic function. Low normal systolic function. E/E'= 7.12.    RESPIRATORY  1. Acute hypoxic respiratory failure 2/2 COVID PNA  -Intubated P 12, FiO2 60%  -Nimbex off 12/2  -O2 sats > 88%  -Prone for P/F ratio < 150 (16 hr prone, 8 hr supine)  1. COVID Treatment:  a. Immunomodulator--Steroids -- Yes decadron  b. COVID-19 Specific Anticoagulation -- Yes enoxaparin  c. Vitamin C -- Yes    d. Zinc -- Yes    e. Remdesivir-NA  f. Actemra vs. Baricitinib- Toci 11/24/21    RENAL  1. No acute concerns  -Trend BMP  - Monitor U.O  - PRN Diuretics    Gold Catheter Present: Yes  IVFs: NA    GASTROINTESTINAL  1. No acute concerns  -cont tube feeds, H2 RA GI prophylaxis    HEMATOLOGIC  1. No acute concerns  -Stable Hgb, No evidence of bleeding  -Continue enoxparin    ID  1. COVID 19 viral PNA  No leukocytosis, + Fevers, now low grade  -Covid pneumonia-status post Tocilizumab, was not deemed a candidate for remdesivir at outside hospital  - sputum 12/3--> growing strep pneumonia   -Mycoplasma 12/1- non reactive   -Legionella 12/1- negative    2. Staph Epi Bacteremia   - growing in 4/4 bottles  - repeat blood cultures sent 12/5 - NGTD  - Lines changed out 12/7  - de-escalated to ceftriaxone. Vanc/Cefepime DC on 12/9    ENDOCRINE  1.  Hyperglycemia with Hx of DM  - SSI resistant sensitivity Q6hr  - Increased NPH to 30 units q6hrs on 12/9    F - Feeding:  TF  A - Analgesia: Fentanyl/versed Ketamine  S - Sedation: GOAL RASS -1 to -2  T - DVT Prophylaxis: SCD's or Sequential Compression Device , enoxaparin  H - Head of Bed: > 30 Degrees  U - Ulcer Prophylaxis: Famotidine   G - Glycemic Control: Insulin SSI and NPH  S - Spontaneous Breathing Trial: Not appropriate at present  B - Bowel Regimen: Colace  I - Indwelling Catheter:              T/L/D  Tubes: ETT and Orogastric Tube  Lines: Arterial Line and PICC Line   Drains: Gold Catheter  D - De-escalation of Antibiotics:  NA    DISPOSITION/COMMUNICATION  Discussed Plan of Care/Code Status: Full Code  Stay in ICU    CRITICAL CARE CONSULTANT NOTE  I had a face to face encounter with the patient, reviewed and interpreted patient data including clinical events, labs, images, vital signs, I/O's, and examined patient. I have discussed the case and the plan and management of the patient's care with the consulting services, the bedside nurses and the respiratory therapist.      NOTE OF PERSONAL INVOLVEMENT IN CARE    I participated in the decision-making and personally managed or directed the management of the following life and organ supporting interventions that required my frequent assessment to treat or prevent imminent deterioration. I personally spent 45 minutes of critical care time. This is time spent at this critically ill patient's bedside actively involved in patient care as well as the coordination of care and discussions with the patient's family. This does not include any procedural time which has been billed separately.     Thuy Francisco AGACNP-BC     [address] [facility]

## 2021-12-09 NOTE — PROGRESS NOTES
ID Progress Note  2021    Subjective:     T-max 100.4  Intubated it    Objective:     Antibiotics:  1. Vancomycin --  2. Cefepime 12/3-   3. Ceftriaxone --     Vitals:   Visit Vitals  /84   Pulse 64   Temp 99 °F (37.2 °C)   Resp 20   Ht 5' 6\" (1.676 m)   Wt 86 kg (189 lb 9.5 oz)   SpO2 98%   BMI 30.60 kg/m²        Tmax:  Temp (24hrs), Av.7 °F (37.6 °C), Min:99 °F (37.2 °C), Max:100.1 °F (37.8 °C)      Exam:  Observational only    Labs:      Recent Labs     21  0455 21  0517 21  0421   WBC 9.2 11.5* 8.1   HGB 11.6* 11.9* 12.1    161 202   BUN 23* 31* 33*   CREA 0.48* 0.58* 0.54*   AP 67 73 64   TBILI 0.4 0.4 0.5       Cultures:     No results found for: SDES  Lab Results   Component Value Date/Time    Culture result: NO GROWTH 4 DAYS 2021 09:38 AM    Culture result: (A) 2021 06:01 AM     STAPHYLOCOCCUS EPIDERMIDIS GROWING IN ALL 4 BOTTLES DRAWN FROM THE RIGHT ARM    Culture result:  2021 06:01 AM     (NOTE) CALLED POSITIVE BLOOD CULTURE OF GPC IN CLUSTERS GROWING IN 1 OUT OF 4 BOTTLES TO [facility] AT 2237 ON 12/3/21. AT  REPORT OF GRAM POSITIVE COCCI IN CLUSTERS GROWING IN 3 OF 4 BOTTLES CALLED TO READ BACK BY BRADY CHISHOLM ON 21 AT 0630. AOW    Culture result: MODERATE STREPTOCOCCUS PNEUMONIAE (A) 2021 06:01 AM    Culture result: LIGHT NORMAL RESPIRATORY TAMARA 2021 06:01 AM        FIO2 60%, prone position, suctioned bloody drainage through nostril earlier, no active bleeding noted it during visit  Assessment:     1. Severe COVID pneumonia--Pneumococcus from respiratory culture  2. VDRF  3. Hypoxic respiratory failure  4. Bacteremia--S epi--probable line source  1. Lines all changes    Objective:     1. Continue antibiotic therapy  2. Follow up cultures  3. Work up fevers  4.  May need to further image chest, abdomen and pelvis when able    Lisa Medina NP

## 2021-12-09 NOTE — ADT AUTH CERT NOTES
Viral Illness, Acute - Care Day 8 (12/8/2021) by Ena Campos RN       Review Status Review Entered   Completed 12/8/2021 16:15      Criteria Review      Care Day: 8 Care Date: 12/8/2021 Level of Care: ICU    Guideline Day 3    Level Of Care    (X) Floor to discharge    12/8/2021 16:11:33 EST by Ezella Hand      icu bed    Clinical Status    (X) * Hemodynamic stability    12/8/2021 16:11:33 EST by Ezella Hand      99.6 81 18 97 93/68 vent    (X) * Afebrile or temperature acceptable for next level of care    (X) * Tachypnea absent    (X) * Hypoxemia absent    ( ) * Mental status at baseline    ( ) * Renal function at baseline, or stable and acceptable for next level of care    ( ) * Discharge plans and education understood    Activity    ( ) * Ambulatory or acceptable for next level of care    Routes    ( ) * Oral hydration    (X) * Oral medications or regimen acceptable for next level of care    12/8/2021 16:13:58 EST by [employer]      ketalar 100 mg iv once nph 40 u sc q12,    12/8/2021 16:13:37 EST by Ezella Hand      lipitor 20 mg po qd decdron 10 mg iv qd colcae 100 mg po bid, lovenox 30 mg sc q12, pepcid 20 mg iv q12, humalog ssi qid, ketalar 0.05 mg kghr, versed gtt, proamatine 10 mg po q8 vanco 1250 mg iv q8 lasix 40 mg iv once nph 15 u sc once    (X) * Oral diet or acceptable for next level of care    12/8/2021 16:11:46 EST by Ezella Hand      adult    Interventions    ( ) * Isolation not indicated, or is performable at next level of care    Medications    ( ) * Antimicrobial medication absent or regimen established for next level of care    12/8/2021 16:15:05 EST by Ezella Hand    Subject: Additional Clinical Information    wbc 11.5 rbc 3.92 h/h 11.9/34.8 fib 124 dd 4.72 na 135 anion gap 4 glu 241 bun 31 crit 0.58 bun raito 53 ca 8.3 alb 2.5 ag raito 0.9      cxray IMPRESSION    1. Patchy bilateral opacification, greater on the left.     2. The PICC line tip is in the region of the right atrium and should be pulled    back 3-4 cm.        * Milestone   Additional Notes   12/8/21

## 2021-12-09 NOTE — DIABETES MGMT
[address]    CLINICAL NURSE SPECIALIST CONSULT     Initial Presentation   Susan Tidwell is a 55 y.o. male who presented to [facility] ED on 11/23/21 with known COVID-19 viral infection with worsening SOB. HX:   Past Medical History:   Diagnosis Date    COVID-19     Diabetes ([facility] [address]     Hyperlipidemia     Hypertension         INITIAL DX:   COVID [U07.1] Severe hypoxemic respiratory failure    Current Treatment     TX: IV steroids, intubation    Consulted by Asiya Jaramillo MD  for advanced diabetes nursing assessment and care for:   [x] Inpatient management strategy    Hospital Course   Clinical progress has been complicated by hyperglycemia, worsening respiratory failure. 11/23: ICU admission at [facility]. HFNC. IV abox, Decadron 6mg daily  11/24: Tociluxumab  11/28: Intubated. Started vasopressors. 12/1: Transfer to [facility] for higher level of care  12/3: Fevers, abox adjusted. BC + GPC in clusters 3/4 and 1/4 in separate draws   12/5: Respiratory culture with Strep, blood cultures with staph epi. 12/7: Lines changed  Diabetes History   Type 2 Diabetes  Yukon Hard, DO: PCP    Diabetes-related Medical History  Acute complications  Steroid induced hyperglycemia  Neurological complications  Unknown  Microvascular disease  None  Macrovascular disease  None     Diabetes Medication History  Key Antihyperglycemic Medications             insulin glargine (LANTUS) 100 unit/mL injection (Taking) 24 Units by SubCUTAneous route nightly. Indications: type 2 diabetes mellitus    glipiZIDE SR (GLUCOTROL XL) 10 mg CR tablet (Discontinued) Take 10 mg by mouth daily. metFORMIN ER (GLUCOPHAGE XR) 500 mg tablet (Discontinued) Take 500 mg by mouth daily (with dinner). Subjective   Patient intubated and sedated  Objective   Physical exam  General Overweight male.  Intubated   Neuro  Sedated  Vital Signs   Visit Vitals  /84   Pulse 64   Temp 99 °F (37.2 °C)   Resp 20 Ht 5' 6\" (1.676 m)   Wt 86 kg (189 lb 9.5 oz)   SpO2 98%   BMI 30.60 kg/m²     PE deferred due to COVID-19 infection. Able to visualize through ICU doors    Laboratory  Recent Labs     21  0455 21  0517 21  0421   * 241* 276*   AGAP 4* 4* 6   WBC 9.2 11.5* 8.1   CREA 0.48* 0.58* 0.54*   GFRNA >60 >60 >60   AST 22 32 29   ALT 48 53 46         Blood glucose pattern      Significant diabetes-related events over the past 24-72 hours  Intubated/sedated  On: norepi and propofol  Decadron 10mg daily (Day 2), was previously on Decadron 6mg daily (x5 days) and 20mg (8 days). Admitting B  BG was 185-256 on 6mg decadron daily  BG now 207-267  Enteral feeds: Glucerna 1.5 at 65ml/hr (207gm CHO based on 24° infusion)  NPH: 55 units Q12  Humalo units via correction in the last 24h      Assessment and Plan   Nursing Diagnosis Risk for unstable blood glucose pattern   Nursing Intervention Domain 5251 Decision-making Support   Nursing Interventions Examined current inpatient diabetes/blood glucose control   Explored factors facilitating and impeding inpatient management  Explored corrective strategies with patient and responsible inpatient provider   Informed patient of rational for insulin strategy while hospitalized     Evaluation   Cornelia Montano is a 55year old gentleman, with uncontrolled Type 2 diabetes, who was admitted with acute hypoxic respiratory failure s/t COVID-19 pneumonia. He did not achieve diabetes control prior to admission, as evidenced by A1c of 8.4%. He was admitted at [facility] for 8 days prior to transfer to [facility],  On arrival at [facility], his initial glucose was significantly elevated at 400.  6mg decadron was initiated and continued for 5 days. Glucose there was 195-290 with low dose lantus and correctional humalog. When Decadron was advanced to 20mg daily, on , lantus was slowly titrated up to 30 units daily but he never sustained glucose control.       Several factors have played a role in blood glucose management including:  [x] Critical nature of illness state: Stress hyperglycemia/Severe COVID pna  [x]  Changing nutritive sources & needs: Enteral feeds w/ 151 grams CHO/24h   [x] Compromised insulin absorption or delivery on vasopressors   [x] Glucocorticoid use: Decadron 20mg daily    As glucose was elevated on admission to [facility] at 339, 50 units Lantus was started (covering low dose diabetes and high dose for decadron). His best glucose was 290 and he transiently switched to NPH before going on an insulin gtt for a few hours over the weekend. He transitioned off with lower insulin doses- Lantus 30 units daily for which he remained hyperglycemic. NPH adjusted to 40 units Q12 and then 55 units Q12 and he will still need a higher basal dose to override steroid, insulin resistance and carbs in enteral feeds. As BG remains elevated despite advancement, consider dividing the total daily NPH dose into Q6hr injections. Often, we have seen success with glucose control with more frequent dosing with poor perfusion and severe insulin resistance. I do expect that his insulin needs will start to drop as his clinical course prolongs. ICU BG goal 140mg/dl-180mg/dl    Recommendations   1. POC glucose Q6    2. Enteral feeds per primary team/dietician    3. Adjust the Subcutaneous Insulin Order set (2033)  Insulin Dosing Specific recommendation   Basal                                      (Based on weight, BMI & GFR) Advance basal to 34 units NPH Q6 ( units)    This covers:  0.9units/kg for Diabetes PLUS  0.4units/kg for Decadron (10mg) PLUS  1:10 carb coverage in feeds (20) If Fasting BG over 200   tomorrow, add the total   amount of correction that   was received the day prior   and add it to total basal dose      Once his BG trends towards   120mg/dl- would reduce   total insulin dose by 40%   initially then 20% daily.    Corrective (Useful in adjusting insulin dosing) Insulin-resistant sensitivity Q6. Can consider Q4 dosing if BG remains elevated         Billing Code(s)   [x] 92425    Before making these care recommendations, I personally reviewed the hosptialization record, including laboratory and diagnostic data, medications and examined the patient at bedside (circumstances permitting).   Total minutes: 13    Johanny Tidwell CNS  Diabetes Clinical Nurse Specialist  [facility]  Access via [employer]

## 2021-12-09 NOTE — PROGRESS NOTES
Spiritual Care Assessment/Progress Note  [facility]      NAME: Vijaya Marrufo      MRN: [id number]  AGE: 55 y.o. SEX: male  Baptism Affiliation: No preference   Language: English     12/9/2021     Total Time (in minutes): 30     Spiritual Assessment begun in [address] [facility] through conversation with:         []Patient        [x] Family    [] Friend(s)        Reason for Consult: Initial/Spiritual assessment, critical care, Family care     Spiritual beliefs: (Please include comment if needed)     [x] Identifies with a eleazar tradition:         [] Supported by a eleazar community:            [] Claims no spiritual orientation:           [] Seeking spiritual identity:                [] Adheres to an individual form of spirituality:           [] Not able to assess:                           Identified resources for coping:      [x] Prayer                               [] Music                  [] Guided Imagery     [x] Family/friends                 [] Pet visits     [] Devotional reading                         [] Unknown     [] Other:                                             Interventions offered during this visit: (See comments for more details)          Family/Friend(s):  Affirmation of emotions/emotional suffering, Affirmation of eleazar, Catharsis/review of pertinent events in supportive environment, Coping skills reviewed/reinforced, Normalization of emotional/spiritual concerns, Prayer (assurance of)     Plan of Care:     [] Support spiritual and/or cultural needs    [] Support AMD and/or advance care planning process      [] Support grieving process   [] Coordinate Rites and/or Rituals    [] Coordination with community clergy   [] No spiritual needs identified at this time   [] Detailed Plan of Care below (See Comments)  [] Make referral to Music Therapy  [] Make referral to Pet Therapy     [] Make referral to Addiction services  [] Make referral to [facility]  [] Make referral to Spiritual Care Partner  [] No future visits requested        [x] Contact Spiritual Care for further referrals     Comments:  for initial spiritual support. Pt is intubated and has covid precautions, I contacted pt's wife Bree Cruz. She share about the support that she has been receiving from family, friends and work. 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 Welcoming of prayers and light to pt and family. Let her know of  availability. Provided pastoral listening support and prayer. Please contact [id number] for further support.      [address] Apolinar Holley, MACE   [phone])

## 2021-12-09 NOTE — PROGRESS NOTES
0730-Bedside and Verbal shift change report given to [address] [facility] (oncoming nurse) by Estelle Carolina (offgoing nurse). Report included the following information SBAR, Kardex, ED Summary, Intake/Output, MAR, Recent Results and Cardiac Rhythm SR.     1930-Bedside and Verbal shift change report given to Estelle Carolina (oncoming nurse) by [address] [facility] (offgoing nurse). Report included the following information SBAR, Kardex, ED Summary, Intake/Output, MAR, Recent Results and Cardiac Rhythm SR.        1530-Patient unproned.

## 2021-12-10 NOTE — PROGRESS NOTES
0730: Verbal shift change report given to Melanie Ramos RN (oncoming nurse) by oNris Damian RN (offgoing nurse). Report included the following information SBAR, Kardex, Intake/Output, MAR, Accordion and Recent Results. Primary Nurse Pinky Desai and Contreras Naranjo RN performed a dual skin assessment on this patient Impairment noted- see wound doc flow sheet  Jeffrey score is 11    2345: Verbal shift change report given to Bright Linton RN (oncoming nurse) by Melanie Ramos RN (offgoing nurse). Report included the following information SBAR, Kardex, Intake/Output, MAR, Accordion, Recent Results and Cardiac Rhythm NSR-SB.

## 2021-12-10 NOTE — PROGRESS NOTES
[facility]    ICU TEAM Progress Note    Name: Brennon Kinney   : 1975   MRN: [id number]   Date: 12/10/2021        Subjective:     Reason for ICU Admission:   39 y. o. male with poorly controlled type 2 diabetes mellitus, hypertension who was was admitted to an OSH on  for acute respiratory failure due to covid 19 pneumonia. He wad admitted directly to the ICU on HFNC, received dexamethasone  to date, S/P Tociluzumab . His condition progressively worsened - intubated on . Transferred to [facility] for possible VV - ECMO. Patient seen, intubated on high vent support, able to be weaned to 60%. Laterally transferred to ICU 12/3. Overnight Events:   12/3: Sedated and intubated, Fio2 60% P 14, LA trending down, now 2.5. Glucoses elevated, Lantus changed to NPH BID for better control. Patient's wife Nish Roblero updated via phone. : Sedated and intubated, required increase in FiO2 overnight to 80%, now back to 60% with PEEP of 12. Currently proned ( 10 AM). Respiratory culture with Strep, blood cultures with staph epi. Now on Vanc and Cefepime. Continues with low grade fevers. Updated wife Nish Roblero via telephone. : Low grade fevers, cont abx, Prone tonight, Vent 60% and 10 PEEP. PICC ordered for tomorrow. : PICC placed today, magi changed out, jayashree changed out. All lines exchanged per ID recs. Tolerated Prone overnight. Follows commands when sedation is weaned. : Patient needed to be prone last night after trial of holding. Needed increased sedation for vent compliance. Started on ketamine gtt. And proned in early am. Will prone again tonight. Lines have all been changed out. NPH insulin increased. Diurese with 40 mg lasix today. Continue vanc/cefepime. Discussed with ID.   : Proned overnight. Diuresing well. Continue daily and trend renal indices. Temp improved. Wean ketamine drip off as able. NPH dosing increased. Abx de-escalated to ceftriaxone.   12/10: Proned overnight, Stopped scheduled stool softeners, liq BMs, new small area of redness on bottom, wound care following. Unable to wean vent. 60% and 12 PEEP. Blood glucose more controlled, decrease NPH. Objective:   Vital Signs:  Visit Vitals  BP 96/71   Pulse (!) 56   Temp 98.5 °F (36.9 °C)   Resp 20   Ht 5' 6\" (1.676 m)   Wt 87.2 kg (192 lb 3.9 oz)   SpO2 99%   BMI 31.03 kg/m²    O2 Flow Rate (L/min): 60 l/min O2 Device: Ventilator, Endotracheal tube, Heated, Humidifier Temp (24hrs), Av.3 °F (37.4 °C), Min:98.5 °F (36.9 °C), Max:100.1 °F (37.8 °C)         Intake/Output:     Intake/Output Summary (Last 24 hours) at 12/10/2021 0923  Last data filed at 12/10/2021 0700  Gross per 24 hour   Intake 3034.53 ml   Output 2920 ml   Net 114.53 ml       Physical Exam:  General:  Sedated and on the ventilator. No acute distress. Prone position   Eyes:  Sclera anicteric. Pupils equal, round, reactive to light. Mouth/Throat: Orotracheal tube in place. Neck: Supple. Lungs:   Dimnished to auscultation bilaterally. Cardiovascular:  Regular rate and rhythm, no murmur, click, rub, or gallop. Abdomen:   Soft, non-tender, bowel sounds normal, non-distended. Extremities: No cyanosis, generalized edema noted   Skin: No acute rash or lesions. Lymph Nodes: Cervical and supraclavicular normal.   Musculoskeletal:  No swelling or deformity. Lines/Devices:  Intact, no erythema, drainage, or tenderness. Psychiatric: Sedated and appears comfortable on ventilator.  SORIANO       LABS AND  DATA: Personally reviewed  Recent Labs     12/10/21  0352 12/09/21  0455   WBC 9.9 9.2   HGB 11.2* 11.6*   HCT 33.5* 34.8*    157     Recent Labs     12/10/21  0352 12/09/21  0455    136   K 3.8 4.4    102   CO2 31 30   BUN 24* 23*   CREA 0.42* 0.48*   * 207*   CA 8.5 8.3*   MG 2.2 2.3   PHOS 3.9 3.5     Recent Labs     12/10/21  0352 21  0455   AP 56 67   TP 5.7* 5.3*   ALB 2.9* 2.4*   GLOB 2.8 2.9 Recent Labs     12/10/21  0352 12/09/21  0455   INR 1.1 1.1   PTP 11.4* 11.3*      Recent Labs     12/10/21  0551 12/09/21  1605   PHI 7.47* 7.44   PCO2I 38.8 44.8   PO2I 77* 61*   FIO2I 60 80     No results for input(s): CPK, CKMB, TROIQ, BNPP in the last 72 hours. Ventilator Settings:  Mode Rate Tidal Volume Pressure FiO2 PEEP   Assist control   450 ml    60 % 12 cm H20     Peak airway pressure: 32 cm H2O    Minute ventilation: 9.69 l/min      MEDS: Reviewed    Chest X-Ray:  CXR Results  (Last 48 hours)               12/09/21 1736  XR CHEST PORT Final result    Impression:  1. No significant change in the appearance of the chest or support hardware. Narrative:  INDICATION: . ETT/COVID   COMPARISON: Previous chest xray, yesterday. LIMITATIONS: Portable technique. Cloteal Helms FINDINGS: Single frontal view of the chest.    .   Lines/tubes/surgical: No significant change in the appearance of the ET tube,   gastric tube and right-sided PICC. Heart/mediastinum: Unremarkable. Lungs/pleura: Interstitial prominence throughout the lungs. Left basilar   opacity. No visible pneumothorax. Additional Comments: None. .       12/08/21 1612  XR CHEST PORT Final result    Impression:  Satisfactory PICC line position. Stable nonspecific pulmonary   disease. Narrative:  EXAM: Portable CXR. 1609 hours. COMPARISON: 0413 hours. INDICATION: tip placement post retraction       FINDINGS:   There is satisfactory positioning of right arm PICC line with tip in the SVC. ET   tube remains satisfactory. NG tube remains in the stomach. Lungs are stable with   mild bibasilar predominant airspace disease/edema. Heart size is normal. There   is no pneumothorax, midline shift or sizable pleural effusion. ECHO 11/24:  Result status: Final result  · LV: Estimated LVEF is 50 - 55%. Normal cavity size, wall thickness and diastolic function. Low normal systolic function. E/E'= 7.12. Multidisciplinary Rounds Completed: Yes    ABCDEF Bundle/Checklist Completed:  YES-See Plan    Active Problem List:     Problem List  Date Reviewed: 11/28/2021          Codes Class    COVID ICD-10-CM: U07.1  ICD-9-CM: 079.89         Acute hypoxemic respiratory failure due to COVID-19 [facility]) ICD-10-CM: U07.1, J96.01  ICD-9-CM: 518.81, 079.89, 799.02         Pneumonia due to COVID-19 virus ICD-10-CM: U07.1, J12.82  ICD-9-CM: 480.8, 079.89         Hyperglycemia due to type 2 diabetes mellitus (HCC) ICD-10-CM: E11.65  ICD-9-CM: 250.00         Primary hypertension ICD-10-CM: I10  ICD-9-CM: 401.9         Hyponatremia ICD-10-CM: E87.1  ICD-9-CM: 276.1         Lactic acidosis ICD-10-CM: E87.2  ICD-9-CM: 276.2         Hyperlipidemia ICD-10-CM: E78.5  ICD-9-CM: 272.4         Obesity (BMI 30-39. 9) ICD-10-CM: E66.9  ICD-9-CM: 278.00         COVID-19 vaccination not done ICD-10-CM: Z28.9  ICD-9-CM: V64.00             ICU Assessment/ Comprehensive Plan of Care:     NEURO  1. Sedated and intubated  -Maintain RASS -1-2 as tolerated for vent compliance  - Fentanyl/Versed   - Ketamine gtt dc    CARDIAC  1. Hypotension, possible 2/2 sedation  2. 2. Hx of HTN, HLD  -Cardiac monitoring  -Continue atorvastatin  -Keep MAP > 65mmHg  -Levophed gtt PRN  -Continue midodrine 10mg IV q 8hr  - ECHO 11/24/21 Estimated LVEF is 50 - 55%. Normal cavity size, wall thickness and diastolic function. Low normal systolic function. E/E'= 7.12.    RESPIRATORY  1. Acute hypoxic respiratory failure 2/2 COVID PNA  -Intubated P 12, FiO2 60%  -Nimbex off 12/2  -O2 sats > 88%  -Prone for P/F ratio < 150 (16 hr prone, 8 hr supine)  1. COVID Treatment:  a. Immunomodulator--Steroids -- Yes decadron  b. COVID-19 Specific Anticoagulation -- Yes enoxaparin  c. Vitamin C -- Yes    d. Zinc -- Yes    e. Remdesivir-NA  f. Actemra vs. Baricitinib- Toci 11/24/21    RENAL  1. No acute concerns  -Trend BMP  - Monitor U.O  - PRN Diuretics    Gold Catheter Present: Yes  IVFs: NA    GASTROINTESTINAL  1. No acute concerns  -cont tube feeds, H2 RA GI prophylaxis  - DC stool scheduled stool softeners for - liq stools    HEMATOLOGIC  1. No acute concerns  -Stable Hgb, No evidence of bleeding  -Continue enoxparin    ID  1. COVID 19 viral PNA  No leukocytosis, + Fevers, now low grade  -Covid pneumonia-status post Tocilizumab, was not deemed a candidate for remdesivir at outside hospital  - sputum 12/3--> growing strep pneumonia   -Mycoplasma 12/1- non reactive   -Legionella 12/1- negative    2. Staph Epi Bacteremia   - growing in 4/4 bottles  - repeat blood cultures sent 12/5 - NGTD  - Lines changed out 12/7  - Continue ceftriaxone. - Will follow up with ID.      ENDOCRINE  1. Hyperglycemia with Hx of DM  - SSI resistant sensitivity Q6hr  - Decrease NPH to 20 units q 6hrs on 12/10    F - Feeding:  TF  A - Analgesia: Fentanyl/versed  S - Sedation: GOAL RASS -1 to -2  T - DVT Prophylaxis: SCD's or Sequential Compression Device , enoxaparin  H - Head of Bed: > 30 Degrees  U - Ulcer Prophylaxis: Famotidine   G - Glycemic Control: Insulin SSI and NPH  S - Spontaneous Breathing Trial: Not appropriate at present  B - Bowel Regimen: Colace  I - Indwelling Catheter:              T/L/D  Tubes: ETT and Orogastric Tube  Lines: Arterial Line and PICC Line   Drains: Gold Catheter  D - De-escalation of Antibiotics:  NA    DISPOSITION/COMMUNICATION  Discussed Plan of Care/Code Status: Full Code  Stay in ICU    CRITICAL CARE CONSULTANT NOTE  I had a face to face encounter with the patient, reviewed and interpreted patient data including clinical events, labs, images, vital signs, I/O's, and examined patient.   I have discussed the case and the plan and management of the patient's care with the consulting services, the bedside nurses and the respiratory therapist.      NOTE OF PERSONAL INVOLVEMENT IN CARE    I participated in the decision-making and personally managed or directed the management of the following life and organ supporting interventions that required my frequent assessment to treat or prevent imminent deterioration. I personally spent 40 minutes of critical care time. This is time spent at this critically ill patient's bedside actively involved in patient care as well as the coordination of care and discussions with the patient's family. This does not include any procedural time which has been billed separately.     Yessi Harrell AGACNP-BC     [address] [facility]

## 2021-12-10 NOTE — DIABETES MGMT
[address]    CLINICAL NURSE SPECIALIST CONSULT     Initial Presentation   Rula Fuchs is a 55 y.o. male who presented to [facility] ED on 11/23/21 with known COVID-19 viral infection with worsening SOB. HX:   Past Medical History:   Diagnosis Date    COVID-19     Diabetes ([address]     Hyperlipidemia     Hypertension         INITIAL DX:   COVID [U07.1] Severe hypoxemic respiratory failure    Current Treatment     TX: IV steroids, intubation    Consulted by Renate Locke MD  for advanced diabetes nursing assessment and care for:   [x] Inpatient management strategy    Hospital Course   Clinical progress has been complicated by hyperglycemia, worsening respiratory failure. 11/23: ICU admission at [facility]. HFNC. IV abox, Decadron 6mg daily  11/24: Tociluxumab  11/28: Intubated. Started vasopressors. 12/1: Transfer to [facility] for higher level of care  12/3: Fevers, abox adjusted. BC + GPC in clusters 3/4 and 1/4 in separate draws   12/5: Respiratory culture with Strep, blood cultures with staph epi. 12/7: Lines changed  Diabetes History   Type 2 Diabetes  Cleavon Settler, DO: PCP    Diabetes-related Medical History  Acute complications  Steroid induced hyperglycemia  Neurological complications  Unknown  Microvascular disease  None  Macrovascular disease  None     Diabetes Medication History  Key Antihyperglycemic Medications             insulin glargine (LANTUS) 100 unit/mL injection (Taking) 24 Units by SubCUTAneous route nightly. Indications: type 2 diabetes mellitus    glipiZIDE SR (GLUCOTROL XL) 10 mg CR tablet (Discontinued) Take 10 mg by mouth daily. metFORMIN ER (GLUCOPHAGE XR) 500 mg tablet (Discontinued) Take 500 mg by mouth daily (with dinner). Subjective   Patient intubated and sedated  Objective   Physical exam  General Overweight male.  Intubated   Neuro  Sedated  Vital Signs   Visit Vitals  BP (!) 152/88   Pulse (!) 55   Temp 98.5 °F (36.9 °C) Resp 20   Ht 5' 6\" (1.676 m)   Wt 87.2 kg (192 lb 3.9 oz)   SpO2 99%   BMI 31.03 kg/m²     PE deferred due to COVID-19 infection. Able to visualize through ICU doors    Laboratory  Recent Labs     12/10/21  0352 21  0455 21  0517   * 207* 241*   AGAP 5 4* 4*   WBC 9.9 9.2 11.5*   CREA 0.42* 0.48* 0.58*   GFRNA >60 >60 >60   AST 23 22 32   ALT 49 48 53         Blood glucose pattern      Significant diabetes-related events over the past 24-72 hours  Intubated/sedated  On: norepi and propofol  Decadron 10mg daily (Day 3), was previously on Decadron 6mg daily (x5 days) and 20mg (8 days). Admitting B  BG was 185-256 on 6mg decadron daily  BG now 104-186 in the past 24h  Enteral feeds: Glucerna 1.5 at 65ml/hr (207gm CHO based on 24° infusion)  NPH: 30 units NPH Q6  Humalo units via correction in the last 24h      Assessment and Plan   Nursing Diagnosis Risk for unstable blood glucose pattern   Nursing Intervention Domain 9672 Decision-making Support   Nursing Interventions Examined current inpatient diabetes/blood glucose control   Explored factors facilitating and impeding inpatient management  Explored corrective strategies with patient and responsible inpatient provider   Informed patient of rational for insulin strategy while hospitalized     Evaluation   Mami Jorge is a 55year old gentleman, with uncontrolled Type 2 diabetes, who was admitted with acute hypoxic respiratory failure s/t COVID-19 pneumonia. He did not achieve diabetes control prior to admission, as evidenced by A1c of 8.4%. He was admitted at [facility] for 8 days prior to transfer to [facility],  On arrival at [facility], his initial glucose was significantly elevated at 400.  6mg decadron was initiated and continued for 5 days. Glucose there was 195-290 with low dose lantus and correctional humalog.   When Decadron was advanced to 20mg daily, on , lantus was slowly titrated up to 30 units daily but he never sustained glucose control. Several factors have played a role in blood glucose management including:  [x] Critical nature of illness state: Stress hyperglycemia/Severe COVID pna  [x]  Changing nutritive sources & needs: Enteral feeds w/ 151 grams CHO/24h   [x] Compromised insulin absorption or delivery on vasopressors   [x] Glucocorticoid use: Decadron 20mg daily    As glucose was elevated on admission to [facility] at 339, 50 units Lantus was started (covering low dose diabetes and high dose for decadron). His best glucose was 290 and he transiently switched to NPH before going on an insulin gtt for a few hours over the weekend. He transitioned off with lower insulin doses- Lantus 30 units daily for which he remained hyperglycemic. NPH adjusted to 40 units Q12 and then 55 units Q12 and he will still need a higher basal dose to override steroid, insulin resistance and carbs in enteral feeds. As BG remains elevated despite advancement, NPH was dosed at Q6hr intervals for which he had excellent glucose response s/t poor perfusion and severe insulin resistance. I do expect that his insulin needs may continue to decline as his clinical course prolongs. As glucose 104 this morning, we can start to reduce the Q6 NPH dosing. ICU BG goal 140mg/dl-180mg/dl    Recommendations   1. POC glucose Q6    2. Enteral feeds per primary team/dietician    3. Adjust the Subcutaneous Insulin Order set (4818)  Insulin Dosing Specific recommendation   Basal                                      (Based on weight, BMI & GFR) Reduce basal by 25% to 22 units NPH Q6 (TDD 88 units)    This covers:  0.4units/kg for Diabetes PLUS  0.4units/kg for Decadron (10mg) PLUS  1:10 carb coverage in feeds (20) If BG trends below   120mg/dl- reduce   total insulin dose by 20%   daily. Corrective                                       (Useful in adjusting insulin dosing) Insulin-resistant sensitivity Q6.            Billing Code(s)   [x] [id number]    Before making these care recommendations, I personally reviewed the hosptialization record, including laboratory and diagnostic data, medications and examined the patient at bedside (circumstances permitting).   Total minutes: 13    ANICETO Saleem  Diabetes Clinical Nurse Specialist  [facility]  Access via [employer]

## 2021-12-10 NOTE — PROGRESS NOTES
ID Progress Note  12/10/2021    Subjective:     Afebrile over night  Intubated it    Objective:     Antibiotics:  1. Vancomycin --  2. Cefepime 12/3-   3. Ceftriaxone --     Vitals:   Visit Vitals  BP 96/71   Pulse (!) 55   Temp 98.5 °F (36.9 °C)   Resp 20   Ht 5' 6\" (1.676 m)   Wt 87.2 kg (192 lb 3.9 oz)   SpO2 97%   BMI 31.03 kg/m²        Tmax:  Temp (24hrs), Av.3 °F (37.4 °C), Min:98.5 °F (36.9 °C), Max:100.1 °F (37.8 °C)      Intubated it, not following commends  Clear in apex with decreased breath sounds at bases  Pt is in prone position during visit  Gold cath in place    Labs:      Recent Labs     12/10/21  0352 21  0455 21  0517   WBC 9.9 9.2 11.5*   HGB 11.2* 11.6* 11.9*    157 161   BUN 24* 23* 31*   CREA 0.42* 0.48* 0.58*   AP 56 67 73   TBILI 0.5 0.4 0.4       Cultures:     No results found for: SDES  Lab Results   Component Value Date/Time    Culture result: NO GROWTH 5 DAYS 2021 09:38 AM    Culture result: (A) 2021 06:01 AM     STAPHYLOCOCCUS EPIDERMIDIS GROWING IN ALL 4 BOTTLES DRAWN FROM THE RIGHT ARM    Culture result:  2021 06:01 AM     (NOTE) CALLED POSITIVE BLOOD CULTURE OF GPC IN CLUSTERS GROWING IN 1 OUT OF 4 BOTTLES TO [place] AT 2237 ON 12/3/21. AT  REPORT OF GRAM POSITIVE COCCI IN CLUSTERS GROWING IN 3 OF 4 BOTTLES CALLED TO READ BACK BY BRADY CHISHOLM ON 21 AT 0630. AOW    Culture result: MODERATE STREPTOCOCCUS PNEUMONIAE (A) 2021 06:01 AM    Culture result: LIGHT NORMAL RESPIRATORY TAMARA 2021 06:01 AM        FIO2 60%, prone position, suctioned bloody drainage through nostril earlier, no active bleeding noted it during visit  PICC line placed   Assessment:     1. Severe COVID pneumonia--Pneumococcus from respiratory culture  2. VDRF  3. Hypoxic respiratory failure  4. Bacteremia--S epi--probable line source    Objective:     1.  Continue antibiotic therapy        Cefepime+rocephin = treat total 10 days        Vancomycin - treat total 2 weeks, last dose 12/20/2021  2. Follow up cultures  3. Work up fevers  4.  May need to further image chest, abdomen and pelvis when able; having fevers on and off    Lisa Dawn NP

## 2021-12-10 NOTE — PROGRESS NOTES
Physician Progress Note      PATIENT:               Florina Vu  CSN #:                  [id number]  :                       1975  ADMIT DATE:       2021 12:26 PM  [address] [facility] DATE:  RESPONDING  PROVIDER #:        Erasto KELLEY          QUERY TEXT:    Pt admitted with COVID 19/PNA. Pt noted to have Sepsis indicators. If possible, please document in the progress notes and discharge summary if you are evaluating and /or treating any of the following: The medical record reflects the following:  Risk Factors: 39 y. o.?male?with?poorly controlled type 2 diabetes mellitus, hypertension? who was? was admitted?to an OSH on ? for acute respiratory failure due to covid 19 pneumonia. He wad admitted?directly to the ICU on HFNC,? received?dexamethasone  to date,? S/P Tociluzumab . His condition?progressively worsened -?intubated on . ?Transferred to [facility] for possible VV -?ECMO. Patient seen, intubated on high vent support, able to be weaned to 60%. Laterally transferred to ICU 12/3. Has Peg tube, blanc catheter, PICC, and A-Line  Clinical Indicators: responsive to pain , Low grade fevers 100.1, 99.6, lactic acid trend from  3.0, 2.5, 2.4, 3.1, 2.5, Wbc 11.5 on , hypotensive b/p trend 80s/60s, BC + GPC in clusters 3/ and 1/4 in separate draws 12/3, Respiratory culture with Strep, blood cultures with staph epi.  , per PN  Bacteremia--S epi--probable line source, per CXR Bilateral infiltrates consistent with pneumonia have mildly worsened medially in each lower lobe  Treatment: Intubated/Vent, IVFs, IV Abx Vanco IV, Cefepime, Rocephin started , Decadron, Levophed, Propofol, zinc, VitC, Peg Tube feeding, Blanc catheter, PICC Line, and A-Line all changed per ID recs, ID consulted, ICU admit,  Options provided:  -- Sepsis, present on admission  -- Sepsis, not present on admission  -- localized infection such as pneumonia or bacteremia without Sepsis  -- Sepsis was ruled out  -- Other - I will add my own diagnosis  -- Disagree - Not applicable / Not valid  -- Disagree - Clinically unable to determine / Unknown  -- Refer to Clinical Documentation Reviewer    PROVIDER RESPONSE TEXT:    Sepsis, present on admission to [facility] [address] created by:  Darlyn Frazier on 12/10/2021 2:27 PM      Electronically signed by:  Barbara KELLEY 12/10/2021 2:46 PM

## 2021-12-10 NOTE — WOUND CARE
Wound Care Note:     New consult placed by nurse request for gluteal cleft breakdown    Patient is on Droplet Plus Precautions for COVID-19 positive  PPE:  N95, face shield, gown and gloves    Chart shows:  Admitted for Janie    Past Medical History:   Diagnosis Date    COVID-19     Diabetes ([facility] [address]     Hyperlipidemia     Hypertension      WBC = 9.9 on 12/10/21  Admitted from OSH    Assessment:   Patient is intubated and sedated, communicative, incontinent with moderate assistance needed in repositioning. Bed: In Touch [place]  Patient has a Gold. Diet: Tube feeding  Patient did not grimace or moan. Bilateral heels, buttocks, and sacral skin intact and without erythema. 1. Gluteal cleft with approximately 3 cm x 0.1 cm x 0.1 cm fissure, wound bed is pink, no drainage, wound edges are open, kurtis-wound intact. Z guard paste applied. Desitin to be ordered. Spoke with Jennyfer Burch NP, wound care orders obtained. Patient currently proned. Recommendations:    Gluteal cleft, kurtis-anal skin, scrotum and upper inner thighs- Every 12 hours and as needed cleanse away any soiled cream with soap and water, blot dry, and apply a thin layer of Desitin. Skin Care & Pressure Prevention:  Minimize layers of linen/pads under patient to optimize support surface. Turn/reposition approximately every 2 hours and offload heels.   Manage incontinence / promote continence   Nourishing Skin Cream to dry skin, minimize use of briefs when able    Discussed above plan with patient & Ivelisse Jordan RN    Transition of Care: Plan to follow as needed while admitted to hospital.    Anand Sparks" SHAYE Reeves, RN, [facility], [facility].  office [phone]  pager [phone] or call  to page

## 2021-12-10 NOTE — PROGRESS NOTES
0730: Verbal shift change report given to Marcela Davis RN (oncoming nurse) by Dariel Lujan RN (offgoing nurse). Report included the following information SBAR, Kardex, Intake/Output, MAR, Accordion, Recent Results and Cardiac Rhythm sinus bradycardia. 1745: Pt repositioned to supine position. Tolerated flip well.     1800: Primary Nurse Stephanie Sarabia and Leif Collins RN performed a dual skin assessment on this patient Impairment noted- see wound doc flow sheet  Jeffrey score is 12    1930: Verbal shift change report given to [facility], RN (oncoming nurse) by Marcela Davis RN (offgoing nurse). Report included the following information SBAR, Kardex, Intake/Output, MAR, Accordion and Recent Results.

## 2021-12-11 NOTE — PROGRESS NOTES
Nursing Shift Note 1930-0800  1950: Bedside and Verbal shift change report given to Carlos Eduardo Rojas RN (oncoming nurse) by Jesu Luna RN (offgoing nurse). Report included the following information SBAR, Kardex, Procedure Summary, Intake/Output, MAR, Accordion, Recent Results and Cardiac Rhythm NSR, Sbrady. Plan: Wean sedation while supine  Prone at 0200  Levo as needed to maintain MAP >65   Gtts:   Fentanyl @ 300 mcg/hr  Versed @ 20 mg/hr    2302: Versed down to 17mg/hr, fent to 275mcg/hr     0200: Proned patient. Inceased Versed back to 20mg/hr, gave 100mcg push of fentanyl. 4950: Patient with second loose stool. 9007: After turning head with RT, patient stiff, trying to move himself. Administered 5mg prn Versed push. 0745: Bedside and Verbal shift change report given to Amber Young RN (oncoming nurse) by Carlos Eduardo Rojas RN (offgoing nurse). Report included the following information SBAR, Kardex, Procedure Summary, Intake/Output, MAR, Accordion, Recent Results, Med Rec Status and Cardiac Rhythm NSR, Sbrady.    Plan: Supine at 1800  Fentanyl @ 300 mcg/hr  Versed @ 20 mg/hr  Levo @ 1mcg/min

## 2021-12-11 NOTE — PROGRESS NOTES
Bedside and Verbal shift change report given to Cherylene Due, RN (oncoming nurse) by Blayne Valdez RN (offgoing nurse). Report included the following information SBAR, Kardex, ED Summary, Intake/Output, MAR, Accordion, Recent Results, Med Rec Status, Cardiac Rhythm NSR and Alarm Parameters .

## 2021-12-11 NOTE — PROGRESS NOTES
[facility]    ICU TEAM Progress Note    Name: Diana Ramon   : 1975   MRN: [id number]   Date: 2021        Subjective:     Reason for ICU Admission:   39 y. o. male with poorly controlled type 2 diabetes mellitus, hypertension who was was admitted to an OSH on  for acute respiratory failure due to covid 19 pneumonia. He wad admitted directly to the ICU on HFNC, received dexamethasone  to date, S/P Tociluzumab . His condition progressively worsened - intubated on . Transferred to [facility] for possible VV - ECMO. Patient seen, intubated on high vent support, able to be weaned to 60%. Laterally transferred to ICU 12/3. Overnight Events:   12/3: Sedated and intubated, Fio2 60% P 14, LA trending down, now 2.5. Glucoses elevated, Lantus changed to NPH BID for better control. Patient's wife Amalia Martinez updated via phone. : Sedated and intubated, required increase in FiO2 overnight to 80%, now back to 60% with PEEP of 12. Currently proned ( 10 AM). Respiratory culture with Strep, blood cultures with staph epi. Now on Vanc and Cefepime. Continues with low grade fevers. Updated wife Amalia Martinez via telephone. : Low grade fevers, cont abx, Prone tonight, Vent 60% and 10 PEEP. PICC ordered for tomorrow. : PICC placed today, magi changed out, jayashree changed out. All lines exchanged per ID recs. Tolerated Prone overnight. Follows commands when sedation is weaned. : Patient needed to be prone last night after trial of holding. Needed increased sedation for vent compliance. Started on ketamine gtt. And proned in early am. Will prone again tonight. Lines have all been changed out. NPH insulin increased. Diurese with 40 mg lasix today. Continue vanc/cefepime. Discussed with ID.   : Proned overnight. Diuresing well. Continue daily and trend renal indices. Temp improved. Wean ketamine drip off as able. NPH dosing increased. Abx de-escalated to ceftriaxone.   12/10: Proned overnight, Stopped scheduled stool softeners, liq BMs, new small area of redness on bottom, wound care following. Unable to wean vent. 60% and 12 PEEP. Blood glucose more controlled, decrease NPH. Objective:   Vital Signs:  Visit Vitals  /68 (BP 1 Location: Left lower arm, BP Patient Position: At rest)   Pulse 67   Temp 99.6 °F (37.6 °C)   Resp 20   Ht 5' 6\" (1.676 m)   Wt 84.6 kg (186 lb 8 oz)   SpO2 98%   BMI 30.10 kg/m²    O2 Flow Rate (L/min): 60 l/min O2 Device: Endotracheal tube, Ventilator Temp (24hrs), Av.2 °F (37.3 °C), Min:98.9 °F (37.2 °C), Max:99.6 °F (37.6 °C)         Intake/Output:     Intake/Output Summary (Last 24 hours) at 2021 1521  Last data filed at 2021 1400  Gross per 24 hour   Intake 3240.32 ml   Output 3700 ml   Net -459.68 ml       Physical Exam:  General:  Sedated and on the ventilator. No acute distress. Prone position   Eyes:  Sclera anicteric. Pupils equal, round, reactive to light. Eyes opening spontaneously   Mouth/Throat: Orotracheal tube in place. Neck: Supple. Lungs:   Dimnished to auscultation bilaterally. Cardiovascular:  Regular rate and rhythm, no murmur, click, rub, or gallop. Abdomen:   Soft, non-tender, bowel sounds normal, non-distended. Extremities: No cyanosis, generalized edema noted   Skin: No acute rash or lesions. Lymph Nodes: Cervical and supraclavicular normal.   Musculoskeletal:  No swelling or deformity. Lines/Devices:  Intact, no erythema, drainage, or tenderness. Psychiatric: Sedated and appears comfortable on ventilator.  SORIANO       LABS AND  DATA: Personally reviewed  Recent Labs     12/11/21  0220 12/10/21  035   WBC 10.0 9.9   HGB 12.1 11.2*   HCT 35.4* 33.5*    163     Recent Labs     12/11/21  0220 12/10/21  035    136   K 4.4 3.8    100   CO2 28 31   BUN 24* 24*   CREA 0.52* 0.42*   * 131*   CA 8.7 8.5   MG 2.3 2.2   PHOS 4.4 3.9     Recent Labs     12/11/21  0220 12/10/21  2069 AP 59 56   TP 5.8* 5.7*   ALB 3.1* 2.9*   GLOB 2.7 2.8     Recent Labs     12/11/21  0220 12/10/21  0352   INR 1.1 1.1   PTP 11.0 11.4*      Recent Labs     12/11/21  0508 12/10/21  1930   PHI 7.42 7.42   PCO2I 45.8* 44.5   PO2I 91 66*   FIO2I 60 60     No results for input(s): CPK, CKMB, TROIQ, BNPP in the last 72 hours. Ventilator Settings:  Mode Rate Tidal Volume Pressure FiO2 PEEP   Assist control, Volume control   450 ml    60 % 12 cm H20     Peak airway pressure: 32 cm H2O    Minute ventilation: 9.78 l/min      MEDS: Reviewed    Chest X-Ray:  CXR Results  (Last 48 hours)               12/11/21 0212  XR CHEST PORT Final result    Impression:  No change. Narrative:  EXAM: XR CHEST PORT       INDICATION: ETT/COVID       COMPARISON: Previous day       FINDINGS: A portable AP radiograph of the chest was obtained at 0149 hours. The   patient is on a cardiac monitor. The airspace disease bilaterally, left greater   than right, is unchanged. The endotracheal tube and nasogastric tube are stable. The PICC line is stable. The cardiac and mediastinal contours and pulmonary   vascularity are normal.  The bones and soft tissues are grossly within normal   limits. 12/10/21 1814  XR CHEST PORT Final result    Impression:  Endotracheal tube in satisfactory position. Unchanged patchy   bilateral airspace disease, compatible with COVID pneumonia. Narrative:  INDICATION: ET tube placement. COVID. COMPARISON: 12/9/2021       FINDINGS: AP portable imaging of the chest performed at 6:06 PM demonstrates a   stable cardiomediastinal silhouette. Endotracheal tube has its tip at the level   of the clavicular heads. Right arm PICC line is stable in position. Patchy   bilateral airspace disease, left greater than right, is not significantly   changed. 12/09/21 1736  XR CHEST PORT Final result    Impression:  1. No significant change in the appearance of the chest or support hardware. Narrative:  INDICATION: . ETT/COVID   COMPARISON: Previous chest xray, yesterday. LIMITATIONS: Portable technique. Prowers Chime FINDINGS: Single frontal view of the chest.    .   Lines/tubes/surgical: No significant change in the appearance of the ET tube,   gastric tube and right-sided PICC. Heart/mediastinum: Unremarkable. Lungs/pleura: Interstitial prominence throughout the lungs. Left basilar   opacity. No visible pneumothorax. Additional Comments: None. Aaron Chime ECHO 11/24:  Result status: Final result  · LV: Estimated LVEF is 50 - 55%. Normal cavity size, wall thickness and diastolic function. Low normal systolic function. E/E'= 7.12.     Multidisciplinary Rounds Completed: Yes    ABCDEF Bundle/Checklist Completed:  YES-See Plan    Active Problem List:     Problem List  Date Reviewed: 11/28/2021          Codes Class    COVID ICD-10-CM: U07.1  ICD-9-CM: 079.89         Acute hypoxemic respiratory failure due to COVID-19 [facility]) ICD-10-CM: U07.1, J96.01  ICD-9-CM: 518.81, 079.89, 799.02         Pneumonia due to COVID-19 virus ICD-10-CM: U07.1, J12.82  ICD-9-CM: 480.8, 079.89         Hyperglycemia due to type 2 diabetes mellitus (HCC) ICD-10-CM: E11.65  ICD-9-CM: 250.00         Primary hypertension ICD-10-CM: I10  ICD-9-CM: 401.9         Hyponatremia ICD-10-CM: E87.1  ICD-9-CM: 276.1         Lactic acidosis ICD-10-CM: E87.2  ICD-9-CM: 276.2         Hyperlipidemia ICD-10-CM: E78.5  ICD-9-CM: 272.4         Obesity (BMI 30-39. 9) ICD-10-CM: E66.9  ICD-9-CM: 278.00         COVID-19 vaccination not done ICD-10-CM: Z28.9  ICD-9-CM: V64.00             ICU Assessment/ Comprehensive Plan of Care:     NEURO  1. Sedated and intubated  - Maintain RASS -1-2 as tolerated for vent compliance  - Fentanyl/Versed     CARDIAC  1. Hypotension, possible 2/2 sedation  2.  Hx of HTN, HLD  -Cardiac monitoring  -Continue atorvastatin  -Keep MAP > 65mmHg  -Levophed gtt PRN (off right now)  -Continue midodrine 10mg IV q 8hr  - ECHO 11/24/21 Estimated LVEF is 50 - 55%. Normal cavity size, wall thickness and diastolic function. Low normal systolic function. E/E'= 7.12.    RESPIRATORY  1. Acute hypoxic respiratory failure 2/2 COVID PNA  -Intubated P 12, FiO2 60%  -Nimbex off 12/2  -O2 sats > 88%  -Prone for P/F ratio < 150 (16 hr prone, 8 hr supine)  1. COVID Treatment:  a. Immunomodulator--Steroids -- Yes decadron  b. COVID-19 Specific Anticoagulation -- Yes enoxaparin  c. Vitamin C -- Yes    d. Zinc -- Yes    e. Remdesivir-NA  f. Actemra vs. Baricitinib- Toci 11/24/21    RENAL  1. No acute concerns  -Trend BMP  - Monitor U. O  - Lasix daily    Gold Catheter Present: Yes  IVFs: NA    GASTROINTESTINAL  1. No acute concerns  - cont tube feeds, H2 RA GI prophylaxis  - DC stool scheduled stool softeners for - liq stools    HEMATOLOGIC  1. No acute concerns  -Stable Hgb, No evidence of bleeding  -Continue enoxparin    ID  1. COVID 19 viral PNA  No leukocytosis, + Fevers, now low grade  -Covid pneumonia-status post Tocilizumab, was not deemed a candidate for remdesivir at outside hospital  - sputum 12/3--> growing strep pneumonia   -Mycoplasma 12/1- non reactive   -Legionella 12/1- negative    2. Staph Epi Bacteremia   - growing in 4/4 bottles  - repeat blood cultures sent 12/5 - NGTD  - Lines changed out 12/7  - Continue ceftriaxone. ENDOCRINE  1.  Hyperglycemia with Hx of DM  - SSI resistant sensitivity Q6hr  - Decrease NPH to 20 units q 6hrs on 12/10    F - Feeding:  TF  A - Analgesia: Fentanyl/versed  S - Sedation: GOAL RASS -1 to -2  T - DVT Prophylaxis: SCD's or Sequential Compression Device , enoxaparin  H - Head of Bed: > 30 Degrees  U - Ulcer Prophylaxis: Famotidine   G - Glycemic Control: Insulin SSI and NPH  S - Spontaneous Breathing Trial: Not appropriate at present  B - Bowel Regimen: Colace  I - Indwelling Catheter:              T/L/D  Tubes: ETT and Orogastric Tube  Lines: Arterial Line and PICC Line   Drains: Gold Catheter  D - De-escalation of Antibiotics:  NA    DISPOSITION/COMMUNICATION  Discussed Plan of Care/Code Status: Full Code  Stay in ICU    CRITICAL CARE CONSULTANT NOTE  I had a face to face encounter with the patient, reviewed and interpreted patient data including clinical events, labs, images, vital signs, I/O's, and examined patient. I have discussed the case and the plan and management of the patient's care with the consulting services, the bedside nurses, the attending physician and the respiratory therapist.      NOTE OF PERSONAL INVOLVEMENT IN CARE    I participated in the decision-making and personally managed or directed the management of the following life and organ supporting interventions that required my frequent assessment to treat or prevent imminent deterioration. I personally spent 40 minutes of critical care time. This is time spent at this critically ill patient's bedside actively involved in patient care as well as the coordination of care and discussions with the patient's family. This does not include any procedural time which has been billed separately.     Dede العلي DNP, ACNP-BC     Critical Care Medicine  [facility]

## 2021-12-12 NOTE — PROGRESS NOTES
Nursing Shift Note 1930-0800 1945: Bedside and Verbal shift change report given to Asiya Moreno RN (oncoming nurse) by Kellie Calderon RN (offgoing nurse). Report included the following information SBAR, Kardex, Procedure Summary, Intake/Output, MAR, Accordion, Recent Results and Cardiac Rhythm NSR.     2320: Sedation reduced to versed 12mg/hr and Fentanyl 250mcg/hr while supine. 0115: Sedation returned to previous dose, Versed 20mg/hr, fentanyl 300mcg/hr. 0145: Re-tested for COVID    0220: Scheduled to prone, however patient very awake, moving limbs, HR up to 150. Gave prn fentanyl 100mcg and versed 5mg without significant improvement. Received a one time order for an additional 4mg versed and 100mcg fentanyl. Patient still opening eyes but calm and little reaction to stimuli, HR normal     0735: Bedside and Verbal shift change report given to Kellie Calderon RN (oncoming nurse) by Asiya Moreno RN (offgoing nurse). Report included the following information SBAR, Kardex, Procedure Summary, Intake/Output, MAR, Accordion, Recent Results and Cardiac Rhythm NSR. Shift Summary: Moderately weaned patient sedation while supine (Versed 12mg, Fent 250mcg). Patient opening eyes and moving all limbs spontaneously, no track/follow or command following. Mostly calm and compliant with ventilator on this lower sedation, had occasional coughing spells with tachycardia resolving on own. Returned sedation to previous levels ahead of proning, however required multiple pushes of fentanyl and versed to be sedate enough to flip. AM CXR showed \"worsening airspace disease in R lower lobe\". ABG showed PO2 now improved to 120.

## 2021-12-12 NOTE — PROGRESS NOTES
0730: Bedside and Verbal shift change report given to Nikolay Sewell RN (oncoming nurse) by Joseph King RN (offgoing nurse). Report included the following information SBAR, Kardex, ED Summary, Procedure Summary, Intake/Output, MAR, Accordion, Recent Results, Med Rec Status, Cardiac Rhythm Sinus Rafa Adilia and Alarm Parameters .

## 2021-12-12 NOTE — PROGRESS NOTES
[facility]    ICU TEAM Progress Note    Name: Navneet Morel   : 1975   MRN: [id number]   Date: 2021        Subjective:     Reason for ICU Admission:   39 y. o. male with poorly controlled type 2 diabetes mellitus, hypertension who was was admitted to an OSH on  for acute respiratory failure due to covid 19 pneumonia. He wad admitted directly to the ICU on HFNC, received dexamethasone  to date, S/P Tociluzumab . His condition progressively worsened - intubated on . Transferred to [facility] for possible VV - ECMO. Patient seen, intubated on high vent support, able to be weaned to 60%. Laterally transferred to ICU 12/3. Overnight Events:   12/3: Sedated and intubated, Fio2 60% P 14, LA trending down, now 2.5. Glucoses elevated, Lantus changed to NPH BID for better control. Patient's wife Elise Slade updated via phone. : Sedated and intubated, required increase in FiO2 overnight to 80%, now back to 60% with PEEP of 12. Currently proned ( 10 AM). Respiratory culture with Strep, blood cultures with staph epi. Now on Vanc and Cefepime. Continues with low grade fevers. Updated wife Elise Slade via telephone. : Low grade fevers, cont abx, Prone tonight, Vent 60% and 10 PEEP. PICC ordered for tomorrow. : PICC placed today, magi changed out, jayashree changed out. All lines exchanged per ID recs. Tolerated Prone overnight. Follows commands when sedation is weaned. : Patient needed to be prone last night after trial of holding. Needed increased sedation for vent compliance. Started on ketamine gtt. And proned in early am. Will prone again tonight. Lines have all been changed out. NPH insulin increased. Diurese with 40 mg lasix today. Continue vanc/cefepime. Discussed with ID.   : Proned overnight. Diuresing well. Continue daily and trend renal indices. Temp improved. Wean ketamine drip off as able. NPH dosing increased. Abx de-escalated to ceftriaxone.   12/10: Proned overnight, Stopped scheduled stool softeners, liq BMs, new small area of redness on bottom, wound care following. Unable to wean vent. 60% and 12 PEEP. Blood glucose more controlled, decrease NPH.   : Overall decent night but was much more awake and required additional sedation as he was asynchronous with the vent. Objective:   Vital Signs:  Visit Vitals  BP (!) 103/58 (BP 1 Location: Left lower arm)   Pulse 70   Temp 99.3 °F (37.4 °C)   Resp 20   Ht 5' 6\" (1.676 m)   Wt 85.7 kg (189 lb)   SpO2 98%   BMI 30.51 kg/m²    O2 Flow Rate (L/min): 60 l/min O2 Device: Endotracheal tube, Ventilator Temp (24hrs), Av.3 °F (37.4 °C), Min:98.8 °F (37.1 °C), Max:99.8 °F (37.7 °C)         Intake/Output:     Intake/Output Summary (Last 24 hours) at 2021 1344  Last data filed at 2021 1200  Gross per 24 hour   Intake 3630.59 ml   Output 3030 ml   Net 600.59 ml       Physical Exam:  General:  Sedated and on the ventilator. No acute distress. Prone position   Eyes:  Sclera anicteric. Pupils equal, round, reactive to light. Eyes opening spontaneously   Mouth/Throat: Orotracheal tube in place. Neck: Supple. Lungs:   Dimnished to auscultation bilaterally. Cardiovascular:  Regular rate and rhythm, no murmur, click, rub, or gallop. Abdomen:   Soft, non-tender, bowel sounds normal, non-distended. Extremities: No cyanosis, generalized edema noted   Skin: No acute rash or lesions. Lymph Nodes: Cervical and supraclavicular normal.   Musculoskeletal:  No swelling or deformity. Lines/Devices:  Intact, no erythema, drainage, or tenderness. Psychiatric: Sedated and appears comfortable on ventilator.  SORIANO       LABS AND  DATA: Personally reviewed  Recent Labs     21   WBC 10.8 10.0   HGB 11.8* 12.1   HCT 35.4* 35.4*    186     Recent Labs     12/12/21  0151 12/11/21  0220    136   K 4.4 4.4    102   CO2 28 28   BUN 24* 24*   CREA 0.50* 0.52*   * 216* CA 8.9 8.7   MG 2.4 2.3   PHOS 3.3 4.4     Recent Labs     12/12/21  0151 12/11/21  0220   AP 66 59   TP 6.1* 5.8*   ALB 3.0* 3.1*   GLOB 3.1 2.7     Recent Labs     12/12/21  0151 12/11/21  0220   INR 1.0 1.1   PTP 10.6 11.0      Recent Labs     12/12/21  0501 12/11/21  2043   PHI 7.41 7.41   PCO2I 46.9* 45.4*   PO2I 120* 53*   FIO2I 70 60     No results for input(s): CPK, CKMB, TROIQ, BNPP in the last 72 hours. Ventilator Settings:  Mode Rate Tidal Volume Pressure FiO2 PEEP   Assist control, Volume control   450 ml    70 % 14 cm H20     Peak airway pressure: 32 cm H2O    Minute ventilation: 9.53 l/min      MEDS: Reviewed    Chest X-Ray:  CXR Results  (Last 48 hours)               12/12/21 0317  XR CHEST PORT Final result    Impression:  Worsening airspace disease in the right lower lobe. Otherwise no   change. Narrative:  EXAM: XR CHEST PORT       INDICATION: ETT/COVID       COMPARISON: December 11       FINDINGS: A portable AP radiograph of the chest was obtained at 0310 hours. The   endotracheal tube tip is at the thoracic inlet. The PICC line and nasogastric   tube are stable. The patient is on a cardiac monitor. Bilateral opacification,   greater on left, is again seen with worsening of the right lower lobe. Cardiac   mediastinal contours are stable. The bones and soft tissues are grossly within   normal limits. 12/11/21 0212  XR CHEST PORT Final result    Impression:  No change. Narrative:  EXAM: XR CHEST PORT       INDICATION: ETT/COVID       COMPARISON: Previous day       FINDINGS: A portable AP radiograph of the chest was obtained at 0149 hours. The   patient is on a cardiac monitor. The airspace disease bilaterally, left greater   than right, is unchanged. The endotracheal tube and nasogastric tube are stable. The PICC line is stable. The cardiac and mediastinal contours and pulmonary   vascularity are normal.  The bones and soft tissues are grossly within normal   limits. 12/10/21 1814  XR CHEST PORT Final result    Impression:  Endotracheal tube in satisfactory position. Unchanged patchy   bilateral airspace disease, compatible with COVID pneumonia. Narrative:  INDICATION: ET tube placement. COVID. COMPARISON: 12/9/2021       FINDINGS: AP portable imaging of the chest performed at 6:06 PM demonstrates a   stable cardiomediastinal silhouette. Endotracheal tube has its tip at the level   of the clavicular heads. Right arm PICC line is stable in position. Patchy   bilateral airspace disease, left greater than right, is not significantly   changed. ECHO 11/24:  Result status: Final result  · LV: Estimated LVEF is 50 - 55%. Normal cavity size, wall thickness and diastolic function. Low normal systolic function. E/E'= 7.12.     Multidisciplinary Rounds Completed: Yes    ABCDEF Bundle/Checklist Completed:  YES-See Plan    Active Problem List:     Problem List  Date Reviewed: 11/28/2021          Codes Class    COVID ICD-10-CM: U07.1  ICD-9-CM: 079.89         Acute hypoxemic respiratory failure due to COVID-19 [facility]) ICD-10-CM: U07.1, J96.01  ICD-9-CM: 518.81, 079.89, 799.02         Pneumonia due to COVID-19 virus ICD-10-CM: U07.1, J12.82  ICD-9-CM: 480.8, 079.89         Hyperglycemia due to type 2 diabetes mellitus (HCC) ICD-10-CM: E11.65  ICD-9-CM: 250.00         Primary hypertension ICD-10-CM: I10  ICD-9-CM: 401.9         Hyponatremia ICD-10-CM: E87.1  ICD-9-CM: 276.1         Lactic acidosis ICD-10-CM: E87.2  ICD-9-CM: 276.2         Hyperlipidemia ICD-10-CM: E78.5  ICD-9-CM: 272.4         Obesity (BMI 30-39. 9) ICD-10-CM: E66.9  ICD-9-CM: 278.00         COVID-19 vaccination not done ICD-10-CM: Z28.9  ICD-9-CM: V64.00             ICU Assessment/ Comprehensive Plan of Care:     NEURO  1.  Sedated and intubated  - Maintain RASS -1-2 as tolerated for vent compliance  - Fentanyl/Versed   - Dose of Ketamine given today as pulling low volumes due to being very awake    CARDIAC  1. Hypotension, possible 2/2 sedation  2. Hx of HTN, HLD  -Cardiac monitoring  -Continue atorvastatin  -Keep MAP > 65mmHg  -Levophed gtt PRN (off right now)  -Continue midodrine 10mg IV q 8hr  -ECHO 11/24/21 Estimated LVEF is 50 - 55%. Normal cavity size, wall thickness and diastolic function. Low normal systolic function. E/E'= 7.12.    RESPIRATORY  1. Acute hypoxic respiratory failure 2/2 COVID PNA  -Intubated P 12, FiO2 60%  -Nimbex off 12/2  -O2 sats > 88%  -Need to consider trach given prolonged intubation  -Prone for P/F ratio < 150 (16 hr prone, 8 hr supine)  1. COVID Treatment:  a. Immunomodulator--Steroids -- Yes decadron  b. COVID-19 Specific Anticoagulation -- Yes enoxaparin  c. Vitamin C -- Yes    d. Zinc -- Yes    e. Remdesivir-NA  f. Actemra vs. Baricitinib- Toci 11/24/21    RENAL  1. No acute concerns  -Trend BMP  - Monitor U. O  - Lasix daily    Gold Catheter Present: Yes  IVFs: NA    GASTROINTESTINAL  1. No acute concerns  - cont tube feeds, H2 RA GI prophylaxis  - DC stool scheduled stool softeners for - liq stools    HEMATOLOGIC  1. No acute concerns  -Stable Hgb, No evidence of bleeding  -Continue enoxparin    ID  1. COVID 19 viral PNA  No leukocytosis, + Fevers, now low grade  -Covid pneumonia-status post Tocilizumab, was not deemed a candidate for remdesivir at outside hospital  - sputum 12/3--> growing strep pneumonia   -Mycoplasma 12/1- non reactive   -Legionella 12/1- negative    2. Staph Epi Bacteremia   - growing in 4/4 bottles  - repeat blood cultures sent 12/5 - NGTD  - Lines changed out 12/7  - Continue ceftriaxone (12/16) and Vancomycin (12/20) per ID     ENDOCRINE  1.  Hyperglycemia with Hx of DM  - SSI resistant sensitivity Q6hr  - Decrease NPH to 20 units q 6hrs on 12/10    F - Feeding:  TF  A - Analgesia: Fentanyl/versed  S - Sedation: GOAL RASS -1 to -2  T - DVT Prophylaxis: SCD's or Sequential Compression Device , enoxaparin  H - Head of Bed: > 30 Degrees  U - Ulcer Prophylaxis: Famotidine   G - Glycemic Control: Insulin SSI and NPH  S - Spontaneous Breathing Trial: Not appropriate at present  B - Bowel Regimen: Colace  I - Indwelling Catheter:              T/L/D  Tubes: ETT and Orogastric Tube  Lines: Arterial Line and PICC Line   Drains: Gold Catheter  D - De-escalation of Antibiotics:    Vanco stops 12/20  Ceftriaxone stops 12/16    DISPOSITION/COMMUNICATION  Discussed Plan of Care/Code Status: Full Code  Stay in ICU    CRITICAL CARE CONSULTANT NOTE  I had a face to face encounter with the patient, reviewed and interpreted patient data including clinical events, labs, images, vital signs, I/O's, and examined patient. I have discussed the case and the plan and management of the patient's care with the consulting services, the bedside nurses, the attending physician and the respiratory therapist.      NOTE OF PERSONAL INVOLVEMENT IN CARE    I participated in the decision-making and personally managed or directed the management of the following life and organ supporting interventions that required my frequent assessment to treat or prevent imminent deterioration. I personally spent 40 minutes of critical care time. This is time spent at this critically ill patient's bedside actively involved in patient care as well as the coordination of care and discussions with the patient's family. This does not include any procedural time which has been billed separately.     Antoinette Georges DNP, ACNP-BC     Critical Care Medicine  [facility]

## 2021-12-13 NOTE — PROGRESS NOTES
ID Progress Note  2021    Subjective:     Afebrile over night  Intubated it    Objective:     Antibiotics:  1. Vancomycin --  2. Cefepime 12/3-   3. Ceftriaxone --     Vitals:   Visit Vitals  BP 98/71   Pulse (!) 58   Temp 98.2 °F (36.8 °C)   Resp 20   Ht 5' 6\" (1.676 m)   Wt 86.2 kg (190 lb 0.6 oz)   SpO2 97%   BMI 30.67 kg/m²        Tmax:  Temp (24hrs), Av.4 °F (36.9 °C), Min:97.1 °F (36.2 °C), Max:99.3 °F (37.4 °C)      Intubated it, not following commends  Clear in apex with decreased breath sounds at bases  Pt is in left lateral side position during visit  Gold cath in place    Labs:      Recent Labs     21  0626 21  0151 21  0220   WBC 8.5 10.8 10.0   HGB 11.0* 11.8* 12.1    197 186   BUN 18 24* 24*   CREA 0.39* 0.50* 0.52*   AP 61 66 59   TBILI 0.4 0.4 0.5       Cultures:     No results found for: SDES  Lab Results   Component Value Date/Time    Culture result: NO GROWTH 5 DAYS 2021 09:38 AM    Culture result: (A) 2021 06:01 AM     STAPHYLOCOCCUS EPIDERMIDIS GROWING IN ALL 4 BOTTLES DRAWN FROM THE RIGHT ARM    Culture result:  2021 06:01 AM     (NOTE) CALLED POSITIVE BLOOD CULTURE OF GPC IN CLUSTERS GROWING IN 1 OUT OF 4 BOTTLES TO [facility] AT 2237 ON 12/3/21. AT  REPORT OF GRAM POSITIVE COCCI IN CLUSTERS GROWING IN 3 OF 4 BOTTLES CALLED TO READ BACK BY BRADY CHISHOLM ON 21 AT 0630. AOW    Culture result: MODERATE STREPTOCOCCUS PNEUMONIAE (A) 2021 06:01 AM    Culture result: LIGHT NORMAL RESPIRATORY TAMARA 2021 06:01 AM        FIO2 60%, prone position, suctioned bloody drainage through nostril earlier, no active bleeding noted it during visit  PICC line placed   Assessment:     1. Severe COVID pneumonia--Pneumococcus from respiratory culture; completed ABX therapy as of   2. VDRF  3. Hypoxic respiratory failure  4. Bacteremia--S epi--probable line source    Objective:     1.  Continue antibiotic therapy        Vancomycin - treat total 2 weeks, last dose 12/20/2021  2. Follow up cultures  3. Work up fevers  4.  May need to further image chest, abdomen and pelvis when able; having fevers on and off    Lisa Caldera NP

## 2021-12-13 NOTE — PROGRESS NOTES
[facility]    ICU TEAM Progress Note    Name: Carmen Finn   : 1975   MRN: [id number]   Date: 2021        Subjective:     Reason for ICU Admission:   39 y. o. male with poorly controlled type 2 diabetes mellitus, hypertension who was was admitted to an OSH on  for acute respiratory failure due to covid 19 pneumonia. He wad admitted directly to the ICU on HFNC, received dexamethasone  to date, S/P Tociluzumab . His condition progressively worsened - intubated on . Transferred to [facility] for possible VV - ECMO. Patient seen, intubated on high vent support, able to be weaned to 60%. Laterally transferred to ICU 12/3. Overnight Events:   12/3: Sedated and intubated, Fio2 60% P 14, LA trending down, now 2.5. Glucoses elevated, Lantus changed to NPH BID for better control. Patient's wife Chely Rides updated via phone. : Sedated and intubated, required increase in FiO2 overnight to 80%, now back to 60% with PEEP of 12. Currently proned ( 10 AM). Respiratory culture with Strep, blood cultures with staph epi. Now on Vanc and Cefepime. Continues with low grade fevers. Updated wife Chely Ridstephany via telephone. : Low grade fevers, cont abx, Prone tonight, Vent 60% and 10 PEEP. PICC ordered for tomorrow. : PICC placed today, magi changed out, jayashree changed out. All lines exchanged per ID recs. Tolerated Prone overnight. Follows commands when sedation is weaned. : Patient needed to be prone last night after trial of holding. Needed increased sedation for vent compliance. Started on ketamine gtt. And proned in early am. Will prone again tonight. Lines have all been changed out. NPH insulin increased. Diurese with 40 mg lasix today. Continue vanc/cefepime. Discussed with ID.   : Proned overnight. Diuresing well. Continue daily and trend renal indices. Temp improved. Wean ketamine drip off as able. NPH dosing increased. Abx de-escalated to ceftriaxone.   12/10: Proned overnight, Stopped scheduled stool softeners, liq BMs, new small area of redness on bottom, wound care following. Unable to wean vent. 60% and 12 PEEP. Blood glucose more controlled, decrease NPH.   : Overall decent night but was much more awake and required additional sedation as he was asynchronous with the vent.  No acute events overnight. Remains on 70% FiO2 and PEEP 14. IDR completed, to be supinated at 1800 today. Objective:   Vital Signs:  Visit Vitals  BP 98/71   Pulse (!) 58   Temp 98.2 °F (36.8 °C)   Resp 20   Ht 5' 6\" (1.676 m)   Wt 86.2 kg (190 lb 0.6 oz)   SpO2 97%   BMI 30.67 kg/m²    O2 Flow Rate (L/min): 60 l/min O2 Device: Endotracheal tube, Ventilator Temp (24hrs), Av.4 °F (36.9 °C), Min:97.1 °F (36.2 °C), Max:99.3 °F (37.4 °C)         Intake/Output:     Intake/Output Summary (Last 24 hours) at 2021 0756  Last data filed at 2021 0600  Gross per 24 hour   Intake 3649 ml   Output 3310 ml   Net 339 ml       Physical Exam:  General:  Sedated and on the ventilator. No acute distress. Prone position   Eyes:  Sclera anicteric. Pupils equal, round, reactive to light. Eyes opening spontaneously   Mouth/Throat: Orotracheal tube in place. Neck: Supple. Lungs:   Dimnished to auscultation bilaterally. Cardiovascular:  Regular rate and rhythm, no murmur, click, rub, or gallop. Abdomen:   Soft, non-tender, bowel sounds normal, non-distended. Extremities: No cyanosis, generalized edema noted   Skin: No acute rash or lesions. Lymph Nodes: Cervical and supraclavicular normal.   Musculoskeletal:  No swelling or deformity. Lines/Devices:  Intact, no erythema, drainage, or tenderness. Psychiatric: Sedated and appears comfortable on ventilator.  SORIANO       LABS AND  DATA: Personally reviewed  Recent Labs     21  0626 21  0151   WBC 8.5 10.8   HGB 11.0* 11.8*   HCT 33.5* 35.4*    197     Recent Labs     21  0626 21  0151    136 K 4.2 4.4    102   CO2 30 28   BUN 18 24*   CREA 0.39* 0.50*   * 213*   CA 8.4* 8.9   MG 2.3 2.4   PHOS 3.3 3.3     Recent Labs     12/13/21  0626 12/12/21  0151   AP 61 66   TP 5.5* 6.1*   ALB 2.6* 3.0*   GLOB 2.9 3.1     Recent Labs     12/13/21  0626 12/12/21  0151   INR 1.0 1.0   PTP 10.4 10.6      Recent Labs     12/13/21  0349 12/13/21  0102   PHI 7.42 7.39   PCO2I 47.5* 49.1*   PO2I 80 72*   FIO2I 70 70     No results for input(s): CPK, CKMB, TROIQ, BNPP in the last 72 hours. Ventilator Settings:  Mode Rate Tidal Volume Pressure FiO2 PEEP   Assist control, Volume control   450 ml    70 % 14 cm H20     Peak airway pressure: 37 cm H2O    Minute ventilation: 9.75 l/min      MEDS: Reviewed    Chest X-Ray:  CXR Results  (Last 48 hours)               12/12/21 0317  XR CHEST PORT Final result    Impression:  Worsening airspace disease in the right lower lobe. Otherwise no   change. Narrative:  EXAM: XR CHEST PORT       INDICATION: ETT/COVID       COMPARISON: December 11       FINDINGS: A portable AP radiograph of the chest was obtained at 0310 hours. The   endotracheal tube tip is at the thoracic inlet. The PICC line and nasogastric   tube are stable. The patient is on a cardiac monitor. Bilateral opacification,   greater on left, is again seen with worsening of the right lower lobe. Cardiac   mediastinal contours are stable. The bones and soft tissues are grossly within   normal limits. ECHO 11/24:  Result status: Final result  · LV: Estimated LVEF is 50 - 55%. Normal cavity size, wall thickness and diastolic function. Low normal systolic function. E/E'= 7.12.     Multidisciplinary Rounds Completed:   Yes    ABCDEF Bundle/Checklist Completed:  YES-See Plan    Active Problem List:     Problem List  Date Reviewed: 11/28/2021          Codes Class    COVID ICD-10-CM: U07.1  ICD-9-CM: 079.89         Acute hypoxemic respiratory failure due to COVID-19 [facility]) ICD-10-CM: U07.1, J96.01  ICD-9-CM: 518.81, 079.89, 799.02         Pneumonia due to COVID-19 virus ICD-10-CM: U07.1, J12.82  ICD-9-CM: 480.8, 079.89         Hyperglycemia due to type 2 diabetes mellitus (HCC) ICD-10-CM: E11.65  ICD-9-CM: 250.00         Primary hypertension ICD-10-CM: I10  ICD-9-CM: 401.9         Hyponatremia ICD-10-CM: E87.1  ICD-9-CM: 276.1         Lactic acidosis ICD-10-CM: E87.2  ICD-9-CM: 276.2         Hyperlipidemia ICD-10-CM: E78.5  ICD-9-CM: 272.4         Obesity (BMI 30-39. 9) ICD-10-CM: E66.9  ICD-9-CM: 278.00         COVID-19 vaccination not done ICD-10-CM: Z28.9  ICD-9-CM: V64.00             ICU Assessment/ Comprehensive Plan of Care:     NEURO  1. Sedated and intubated  - Maintain RASS -1-2 as tolerated for vent compliance  - Fentanyl/Versed   -Opening eyes and withdrawing to all extremities, not following commands    CARDIAC  1. Hypotension, possible 2/2 sedation  2. Hx of HTN, HLD  -Cardiac monitoring  -Continue atorvastatin  -Keep MAP > 65mmHg  -Levophed off since 2300 on 12/12/2021  -Continue midodrine 10mg IV q 8hr  -ECHO 11/24/21 Estimated LVEF is 50 - 55%. Normal cavity size, wall thickness and diastolic function. Low normal systolic function. E/E'= 7.12.    RESPIRATORY  1. Acute hypoxic respiratory failure 2/2 COVID PNA  -Intubated P 12, FiO2 60%  -Nimbex off 12/2  -O2 sats > 88%  -Need to consider trach given prolonged intubation  -Prone for P/F ratio < 150 (16 hr prone, 8 hr supine)  1. COVID Treatment:  a. Immunomodulator--Steroids -- Yes decadron  b. COVID-19 Specific Anticoagulation -- Yes enoxaparin  c. Vitamin C -- Yes    d. Zinc -- Yes    e. Remdesivir-NA  f. Actemra vs. Baricitinib- Toci 11/24/21    RENAL  1. No acute concerns  -Trend BMP  - Monitor U. O  - Lasix daily    Gold Catheter Present: Yes  IVFs: NA    GASTROINTESTINAL  1. No acute concerns  - cont tube feeds, H2 RA GI prophylaxis  - DC stool scheduled stool softeners for - liq stools    HEMATOLOGIC  1.  No acute concerns  -Stable Hgb, No evidence of bleeding  -Continue enoxparin    ID  1. COVID 19 viral PNA  No leukocytosis, + Fevers, now low grade  -Covid pneumonia-status post Tocilizumab, was not deemed a candidate for remdesivir at outside hospital  - sputum 12/3--> growing strep pneumonia   -Mycoplasma 12/1- non reactive   -Legionella 12/1- negative  -DC zinc today    2. Staph Epi Bacteremia   - growing in 4/4 bottles  - repeat blood cultures sent 12/5 - NGTD  - Lines changed out 12/7  - Continue ceftriaxone (12/16) and Vancomycin (12/20) per ID     ENDOCRINE  1. Hyperglycemia with Hx of DM  - SSI resistant sensitivity Q6hr  - NPH 20 units q 6hrs    F - Feeding:  TF  A - Analgesia: Fentanyl/versed  S - Sedation: GOAL RASS -1 to -2  T - DVT Prophylaxis: SCD's or Sequential Compression Device , enoxaparin  H - Head of Bed: > 30 Degrees  U - Ulcer Prophylaxis: Famotidine   G - Glycemic Control: Insulin SSI and NPH  S - Spontaneous Breathing Trial: Not appropriate at present  B - Bowel Regimen: Colace  I - Indwelling Catheter:              T/L/D  Tubes: ETT and Orogastric Tube  Lines: Arterial Line and PICC Line   Drains: Gold Catheter  D - De-escalation of Antibiotics:    Vanco stops 12/20  Ceftriaxone stops 12/16    DISPOSITION/COMMUNICATION  Discussed Plan of Care/Code Status: Full Code  Stay in ICU    CRITICAL CARE CONSULTANT NOTE  I had a face to face encounter with the patient, reviewed and interpreted patient data including clinical events, labs, images, vital signs, I/O's, and examined patient.   I have discussed the case and the plan and management of the patient's care with the consulting services, the bedside nurses, the attending physician and the respiratory therapist.      NOTE OF PERSONAL INVOLVEMENT IN CARE    I participated in the decision-making and personally managed or directed the management of the following life and organ supporting interventions that required my frequent assessment to treat or prevent imminent deterioration. I personally spent 40 minutes of critical care time. This is time spent at this critically ill patient's bedside actively involved in patient care as well as the coordination of care and discussions with the patient's family. This does not include any procedural time which has been billed separately.       Jenae Ta [facility]     Critical Care Medicine  [facility]

## 2021-12-13 NOTE — PROGRESS NOTES
Transition of Care Plan   RUR-  Medium    DISPOSITION:  pending medical progression   F/U with PCP/Specialist     Transport: TBD  Patient remains vented  on isolation for Covid. IVF: Fentanyl and Versed gtts. Care management is continuing to follow.    Jesús Ledezma RN,Care Management

## 2021-12-13 NOTE — PROGRESS NOTES
Day #11 of Vancomycin - Level Update  Indication:  MSSE bacteremia; Strep pneumo PNA  - COVID-19 positive  Current regimen:  1250 mg IV Q 8 hr  Abx regimen:  Monotherapy  ID Following ?:YES  Concomitant nephrotoxic drugs (requires more frequent monitoring): loop diuretics, NSAIDs PRN  Frequency of BMP?: Daily    Recent Labs     21  0626 21  0151 21  0220   WBC 8.5 10.8 10.0   CREA 0.39* 0.50* 0.52*   BUN 18 24* 24*     Est CrCl: >100 ml/min; UO: >1 ml/kg/hr  Temp (24hrs), Av °F (36.7 °C), Min:97.1 °F (36.2 °C), Max:99.2 °F (37.3 °C)    Cultures:    Legionella Ag [Ur]: (-)   Mycoplasma Ab [IGM]: (-)   COVID-19 [PCR]: (+)  12/3 Blood: Staph epidermidis (pan-S)  - final  12/3 Sputum: moderate Strep pneumo, light normal taryn, final   Blood: NG, final    MRSA Swab ordered (if applicable)? N/A    Goal target range AUC/-600    Recent level history:  Date/Time Dose & Interval Measured Level (mcg/mL) Associated AUC/TIM Dose Adjustment     @ 0430 1250 mg q12h 7.7 ~ 6.5 hrs p dose 310 1250 mg q8h    @ 0517 1250 mg q8h 15.8 mcg/ml 439 No change    @ 0626 1250 mg q8h 17.3 mcg/ml 456 No change                           Plan: The random vancomycin level drawn this morning correlates with an AUC/TIM of 456, which is within our goal range. Will continue the current regimen for now. Pharmacy will continue to monitor patient daily and will make dosage adjustments based upon changing renal function. *Random vancomycin levels are used to calculate AUC/TIM, this level should not be interpreted as a trough. Vancomycin has been dosed using Bayesian kinetics software to target an AUC24:TIM of 400-600, which provides adequate exposure for as assumed infection due to MRSA with an TIM of 1 or less while reducing the risk of nephrotoxicity as seen with traditional trough based dosing goals.

## 2021-12-13 NOTE — WOUND CARE
Consulted for knees. Admitted for Covid-19 pneumonia. previously seen by [facility] for gluteal cleft fissure. Intubated on ISIDRO mattress. Team in room to move from prone to supine just prior to my arrival.  Left knee with diffuse pink. Right knee with clear-fluid filled bullae = 2 x 0.8 x 0 cm. Foam dressings applied to each knee. venelex already in use - will add knees to application sites. Will continue to follow.   CONNOR Zabala

## 2021-12-13 NOTE — PROGRESS NOTES
Problem: Gas Exchange - Impaired  Goal: Absence of hypoxia  Outcome: Progressing Towards Goal  Goal: Promote optimal lung function  Outcome: Progressing Towards Goal     Problem: Ventilator Management  Goal: *Adequate oxygenation and ventilation  Outcome: Progressing Towards Goal  Goal: *Patient maintains clear airway/free of aspiration  Outcome: Progressing Towards Goal  Goal: *Absence of infection signs and symptoms  Outcome: Progressing Towards Goal  Goal: *Normal spontaneous ventilation  Outcome: Progressing Towards Goal     Problem: Patient Education: Go to Patient Education Activity  Goal: Patient/Family Education  Outcome: Progressing Towards Goal     Problem: Pressure Injury - Risk of  Goal: *Prevention of pressure injury  Description: Document Jeffrey Scale and appropriate interventions in the flowsheet. Outcome: Progressing Towards Goal  Note: Pressure Injury Interventions:  Sensory Interventions: Assess changes in LOC, Discuss PT/OT consult with provider, Float heels, Keep linens dry and wrinkle-free, Minimize linen layers, Monitor skin under medical devices, Turn and reposition approx. every two hours (pillows and wedges if needed)    Moisture Interventions: Absorbent underpads, Apply protective barrier, creams and emollients, Check for incontinence Q2 hours and as needed, Internal/External urinary devices, Maintain skin hydration (lotion/cream)    Activity Interventions: Assess need for specialty bed, Pressure redistribution bed/mattress(bed type)    Mobility Interventions: Assess need for specialty bed, Pressure redistribution bed/mattress (bed type), HOB 30 degrees or less, Turn and reposition approx.  every two hours(pillow and wedges)    Nutrition Interventions: Document food/fluid/supplement intake    Friction and Shear Interventions: Apply protective barrier, creams and emollients, Foam dressings/transparent film/skin sealants, HOB 30 degrees or less, Lift sheet                Problem: Patient Education: Go to Patient Education Activity  Goal: Patient/Family Education  Outcome: Progressing Towards Goal     Problem: Pain  Goal: *Control of Pain  Outcome: Progressing Towards Goal  Goal: *PALLIATIVE CARE:  Alleviation of Pain  Outcome: Progressing Towards Goal     Problem: Patient Education: Go to Patient Education Activity  Goal: Patient/Family Education  Outcome: Progressing Towards Goal

## 2021-12-13 NOTE — PROGRESS NOTES
1930: Bedside shift change report given to [address] (oncoming nurse) by Gucci Kirkland (offgoing nurse). Report included the following information SBAR, Intake/Output, MAR, Recent Results, Cardiac Rhythm NSR and Alarm Parameters . 0200: Patient proned. Tolerated well. 0730: Bedside shift change report given to [address] [place] [address] (oncoming nurse) by Mango Parish RN  (offgoing nurse). Report included the following information SBAR, Intake/Output, MAR, Recent Results, Cardiac Rhythm NSR and Alarm Parameters . SHIFT SUMMARY  Patient remained intubated and sedated on fentanyl at 300mcg/hr and versed and 20mg/hr. Patient opens eyes to stimulus/ pain but did not focus nor follow, pupils equal round and reactive. No command following, patient withdrew weakly in all extremities. Tube feeds maintained, patient tolerated well. Gold maintained, UO total overnight was 985ml. Patient had multiple liquid BMs overnight. Patient proned at 0200, tolerated well.

## 2021-12-14 NOTE — PROGRESS NOTES
Bedside shift change report received from [address]. Report included the following information SBAR, Kardex, Procedure Summary, Intake/Output, MAR and Recent Results. 4793: Prone w/o complication.

## 2021-12-14 NOTE — DIABETES MGMT
[address]    CLINICAL NURSE SPECIALIST CONSULT     Initial Presentation   Luisana Ellsworth is a 55 y.o. male who presented to [facility] ED on 11/23/21 with known COVID-19 viral infection with worsening SOB. HX:   Past Medical History:   Diagnosis Date    COVID-19     Diabetes ([facility] [address]     Hyperlipidemia     Hypertension         INITIAL DX:   COVID [U07.1] Severe hypoxemic respiratory failure    Current Treatment     TX: IV steroids, intubation    Consulted by Justin Penny MD  for advanced diabetes nursing assessment and care for:   [x] Inpatient management strategy    Hospital Course   Clinical progress has been complicated by hyperglycemia, worsening respiratory failure. 11/23: ICU admission at [facility]. HFNC. IV abox, Decadron 6mg daily  11/24: Tociluxumab  11/28: Intubated. Started vasopressors. 12/1: Transfer to [facility] for higher level of care  12/3: Fevers, abox adjusted. BC + GPC in clusters 3/4 and 1/4 in separate draws   12/5: Respiratory culture with Strep, blood cultures with staph epi. 12/7: Lines changed  12/14: Decadron weaned to 5mg daily (from 10mg daily)  Diabetes History   Type 2 Diabetes  Javi Klein DO: PCP    Diabetes-related Medical History  Acute complications  Steroid induced hyperglycemia  Neurological complications  Unknown  Microvascular disease  None  Macrovascular disease  None     Diabetes Medication History  Key Antihyperglycemic Medications             insulin glargine (LANTUS) 100 unit/mL injection (Taking) 24 Units by SubCUTAneous route nightly. Indications: type 2 diabetes mellitus    glipiZIDE SR (GLUCOTROL XL) 10 mg CR tablet (Discontinued) Take 10 mg by mouth daily. metFORMIN ER (GLUCOPHAGE XR) 500 mg tablet (Discontinued) Take 500 mg by mouth daily (with dinner). Subjective   Patient intubated and sedated  Objective   Physical exam  General Overweight male.  Intubated   Neuro  Sedated  Vital Signs   Visit Vitals  BP 102/76   Pulse (!) 57   Temp 98.6 °F (37 °C)   Resp 20   Ht 5' 6\" (1.676 m)   Wt 87 kg (191 lb 12.8 oz)   SpO2 96%   BMI 30.96 kg/m²     PE deferred due to COVID-19 infection. Able to visualize through ICU doors    Laboratory  Recent Labs     21  0353 21  0626 21  0151   * 158* 213*   AGAP 6 5 6   WBC 10.1 8.5 10.8   CREA 0.39* 0.39* 0.50*   GFRNA >60 >60 >60   AST 16 15 16   ALT 39 40 45         Blood glucose pattern      Significant diabetes-related events over the past 24-72 hours  Intubated/sedated  On: norepi and propofol  Decadron- 5mg to start today, 10mg daily (x8 days), was previously on Decadron 6mg daily (x5 days) and 20mg (8 days). Admitting B  BG now 139-165 in the past 48h  Enteral feeds: Glucerna 1.5 at 65ml/hr (207gm CHO based on 24° infusion)  NPH: 20 units NPH Q6  Humalo units via correction in the last 24h      Assessment and Plan   Nursing Diagnosis Risk for unstable blood glucose pattern   Nursing Intervention Domain 5257 Decision-making Support   Nursing Interventions Examined current inpatient diabetes/blood glucose control   Explored factors facilitating and impeding inpatient management  Explored corrective strategies with patient and responsible inpatient provider   Informed patient of rational for insulin strategy while hospitalized     Evaluation   Jemma Mcgowan is a 55year old gentleman, with uncontrolled Type 2 diabetes, who was admitted with acute hypoxic respiratory failure s/t COVID-19 pneumonia. He did not achieve diabetes control prior to admission, as evidenced by A1c of 8.4%. He was admitted at [facility] for 8 days prior to transfer to [facility],  On arrival at [facility], his initial glucose was significantly elevated at 400.  6mg decadron was initiated and continued for 5 days. Glucose there was 195-290 with low dose lantus and correctional humalog.   When Decadron was advanced to 20mg daily, on , lantus was slowly titrated up to 30 units daily but he never sustained glucose control. Several factors have played a role in blood glucose management including:  [x] Critical nature of illness state: Stress hyperglycemia/Severe COVID pna  [x]  Changing nutritive sources & needs: Enteral feeds w/ 151 grams CHO/24h   [x] Compromised insulin absorption or delivery on vasopressors   [x] Glucocorticoid use: Decadron 5 mg daily    As glucose was elevated on admission to [facility] at 339, 50 units Lantus was started (covering low dose diabetes and high dose for decadron). His best glucose was 290 and he transiently switched to NPH before going on an insulin gtt for a few hours over the weekend. He transitioned off with lower insulin doses- Lantus 30 units daily for which he remained hyperglycemic. NPH adjusted to 40 units Q12 and then 55 units Q12 and he will still need a higher basal dose to override steroid, insulin resistance and carbs in enteral feeds. As BG remains elevated despite advancement, NPH was dosed at Q6hr intervals for which he had excellent glucose response s/t poor perfusion and severe insulin resistance. Decadron is weaning to 5mg daily (from 10mg daily) and will need to reduce NPH today. I do expect that his insulin needs may continue to decline as his clinical course prolongs. ICU BG goal 140mg/dl-180mg/dl    Recommendations   1. POC glucose Q6    2. Enteral feeds per primary team/dietician    3. Adjust the Subcutaneous Insulin Order set (8162)  Insulin Dosing Specific recommendation   Basal                                      (Based on weight, BMI & GFR) Reduce basal to 14 units NPH Q6 (TDD 56 units)    This covers:  0.3units/kg for Diabetes PLUS  0.2units/kg for Decadron (10mg) PLUS  1:10 carb coverage in feeds (20) If BG trends below   120mg/dl- reduce   total insulin dose by 20%   daily. Corrective                                       (Useful in adjusting insulin dosing) Insulin-resistant sensitivity Q6.            Billing Code(s) [x] 97478    Before making these care recommendations, I personally reviewed the hosptialization record, including laboratory and diagnostic data, medications and examined the patient at bedside (circumstances permitting).   Total minutes: 13    Johanny Tidwell, CNS  Diabetes Clinical Nurse Specialist  [facility]  Access via [employer]

## 2021-12-14 NOTE — PROGRESS NOTES
ID Progress Note  2021    Subjective:     Afebrile over night  Intubated it    Objective:     Antibiotics:  1. Vancomycin --  2. Cefepime 12/3-   3. Ceftriaxone --     Vitals:   Visit Vitals  /82   Pulse (!) 57   Temp 98.4 °F (36.9 °C)   Resp 20   Ht 5' 6\" (1.676 m)   Wt 87 kg (191 lb 12.8 oz)   SpO2 99%   BMI 30.96 kg/m²        Tmax:  Temp (24hrs), Av.7 °F (36.5 °C), Min:97.1 °F (36.2 °C), Max:98.4 °F (36.9 °C)      Intubated it, not following commends  Clear in apex with decreased breath sounds at bases  Pt is in prone position  Gold cath in place    Labs:      Recent Labs     21  0353 21  0626 21  0151   WBC 10.1 8.5 10.8   HGB 11.2* 11.0* 11.8*    188 197   BUN 20 18 24*   CREA 0.39* 0.39* 0.50*   AP 70 61 66   TBILI 0.4 0.4 0.4       Cultures:     No results found for: SDES  Lab Results   Component Value Date/Time    Culture result: NO GROWTH 5 DAYS 2021 09:38 AM    Culture result: (A) 2021 06:01 AM     STAPHYLOCOCCUS EPIDERMIDIS GROWING IN ALL 4 BOTTLES DRAWN FROM THE RIGHT ARM    Culture result:  2021 06:01 AM     (NOTE) CALLED POSITIVE BLOOD CULTURE OF GPC IN CLUSTERS GROWING IN 1 OUT OF 4 BOTTLES TO [place] AT 2237 ON 12/3/21. AT  REPORT OF GRAM POSITIVE COCCI IN CLUSTERS GROWING IN 3 OF 4 BOTTLES CALLED TO READ BACK BY BRADY CHISHOLM ON 21 AT 0630. AOW    Culture result: MODERATE STREPTOCOCCUS PNEUMONIAE (A) 2021 06:01 AM    Culture result: LIGHT NORMAL RESPIRATORY TAMARA 2021 06:01 AM        FIO2 60%, prone position, suctioned bloody drainage through nostril earlier, no active bleeding noted it during visit  PICC line placed   Assessment:     1. Severe COVID pneumonia--Pneumococcus from respiratory culture; completed ABX therapy as of   2. VDRF  3. Hypoxic respiratory failure  4. Bacteremia--S epi--probable line source    Objective:     1.  Continue antibiotic therapy        Vancomycin - treat total 2 weeks from the infected line removal, last dose 12/20/2021  2. Follow up cultures  3. Work up fevers  4.  May need to further image chest, abdomen and pelvis when able; having fevers on and off    Hyunsun Claude Beaver, NP

## 2021-12-14 NOTE — PROGRESS NOTES
Clinical Pharmacy Note: IV to PO Automatic Conversion  Please note: Vivek Carter Edgar medication(s) (famotidine) has/have been changed from IV to PO (to be given via NG tube) based on the following critiera:    - Patient is tolerating oral medications  - Patient is tolerating a diet more advanced than clear liquids  - Patient is not requiring vasopressors    This IV to PO conversion is based on the P&T approved automatic conversion policy for eligible patients. Please call with questions.

## 2021-12-14 NOTE — PROGRESS NOTES
[facility]    ICU TEAM Progress Note    Name: Cecilia Cervantes   : 1975   MRN: [id number]   Date: 2021        Subjective:     Reason for ICU Admission:   39 y. o. male with poorly controlled type 2 diabetes mellitus, hypertension who was was admitted to an OSH on  for acute respiratory failure due to covid 19 pneumonia. He wad admitted directly to the ICU on HFNC, received dexamethasone  to date, S/P Tociluzumab . His condition progressively worsened - intubated on . Transferred to [facility] for possible VV - ECMO. Patient seen, intubated on high vent support, able to be weaned to 60%. Laterally transferred to ICU 12/3. Overnight Events:   12/3: Sedated and intubated, Fio2 60% P 14, LA trending down, now 2.5. Glucoses elevated, Lantus changed to NPH BID for better control. Patient's wife Jovon Garcia updated via phone. : Sedated and intubated, required increase in FiO2 overnight to 80%, now back to 60% with PEEP of 12. Currently proned ( 10 AM). Respiratory culture with Strep, blood cultures with staph epi. Now on Vanc and Cefepime. Continues with low grade fevers. Updated wife Jovon Garcia via telephone. : Low grade fevers, cont abx, Prone tonight, Vent 60% and 10 PEEP. PICC ordered for tomorrow. : PICC placed today, magi changed out, jayashree changed out. All lines exchanged per ID recs. Tolerated Prone overnight. Follows commands when sedation is weaned. : Patient needed to be prone last night after trial of holding. Needed increased sedation for vent compliance. Started on ketamine gtt. And proned in early am. Will prone again tonight. Lines have all been changed out. NPH insulin increased. Diurese with 40 mg lasix today. Continue vanc/cefepime. Discussed with ID.   : Proned overnight. Diuresing well. Continue daily and trend renal indices. Temp improved. Wean ketamine drip off as able. NPH dosing increased. Abx de-escalated to ceftriaxone.   12/10: Proned overnight, Stopped scheduled stool softeners, liq BMs, new small area of redness on bottom, wound care following. Unable to wean vent. 60% and 12 PEEP. Blood glucose more controlled, decrease NPH.   : Overall decent night but was much more awake and required additional sedation as he was asynchronous with the vent.  No acute events overnight. Remains on 70% FiO2 and PEEP 14. IDR completed, to be supinated at 1800 today.  No acute events overnight. Continue with pronation, supine today at 1800. Vancomycin day 12. Objective:   Vital Signs:  Visit Vitals  /82   Pulse (!) 57   Temp 98.4 °F (36.9 °C)   Resp 20   Ht 5' 6\" (1.676 m)   Wt 87 kg (191 lb 12.8 oz)   SpO2 99%   BMI 30.96 kg/m²    O2 Flow Rate (L/min): 60 l/min O2 Device: Ventilator Temp (24hrs), Av.7 °F (36.5 °C), Min:97.1 °F (36.2 °C), Max:98.4 °F (36.9 °C)         Intake/Output:     Intake/Output Summary (Last 24 hours) at 2021 0804  Last data filed at 2021 0700  Gross per 24 hour   Intake 2798 ml   Output 2395 ml   Net 403 ml       Physical Exam:  General:  Sedated and on the ventilator. No acute distress. Prone position   Eyes:  Sclera anicteric. Pupils equal, round, reactive to light. Eyes opening spontaneously   Mouth/Throat: Orotracheal tube in place. Neck: Supple. Lungs:   Dimnished to auscultation bilaterally. Cardiovascular:  Regular rate and rhythm, no murmur, click, rub, or gallop. Abdomen:   Soft, non-tender, bowel sounds normal, non-distended. Extremities: No cyanosis, generalized edema noted   Skin: No acute rash or lesions. Lymph Nodes: Cervical and supraclavicular normal.   Musculoskeletal:  No swelling or deformity. Lines/Devices:  Intact, no erythema, drainage, or tenderness. Psychiatric: Sedated and appears comfortable on ventilator.  SORIANO       LABS AND  DATA: Personally reviewed  Recent Labs     21  0353 21  0626   WBC 10.1 8.5   HGB 11.2* 11.0*   HCT 33.2* 33.5*    188     Recent Labs     12/14/21  0353 12/13/21  0626    136   K 4.3 4.2    101   CO2 31 30   BUN 20 18   CREA 0.39* 0.39*   * 158*   CA 8.7 8.4*   MG 2.3 2.3   PHOS 3.4 3.3     Recent Labs     12/14/21  0353 12/13/21  0626   AP 70 61   TP 5.6* 5.5*   ALB 2.7* 2.6*   GLOB 2.9 2.9     Recent Labs     12/14/21  0353 12/13/21  0626   INR 1.0 1.0   PTP 10.5 10.4      Recent Labs     12/13/21  0349 12/13/21  0102   PHI 7.42  7.42  7.42 7.39  7.39  7.39   PCO2I 47.5*  47.5*  47.5* 49.1*  49.1*  49.1*   PO2I 80  80  80 72*  72*  72*   FIO2I 70  70  70 70  70  70     No results for input(s): CPK, CKMB, TROIQ, BNPP in the last 72 hours. Ventilator Settings:  Mode Rate Tidal Volume Pressure FiO2 PEEP   Assist control, Volume control   450 ml    70 % (S) 12 cm H20 (weaned after ABG per protocol)     Peak airway pressure: 33 cm H2O    Minute ventilation: 9.83 l/min      MEDS: Reviewed    Chest X-Ray:  CXR Results  (Last 48 hours)               12/13/21 1958  XR CHEST PORT Final result    Impression:  Persistent diffuse asymmetric left lung airspace disease       Narrative:  EXAM: XR CHEST PORT       INDICATION: ETT/COVID       COMPARISON: Prior day       FINDINGS: A portable AP radiograph of the chest was obtained at 1949 hours. The   patient is on a cardiac monitor. Endotracheal tube terminates at the thoracic   inlet. The NG tube extends below the hemidiaphragm. There is diffuse asymmetric   airspace disease in the left lung. The cardiac and mediastinal contours and   pulmonary vascularity are stable. The bones and soft tissues are grossly within   normal limits. ECHO 11/24:  Result status: Final result  · LV: Estimated LVEF is 50 - 55%. Normal cavity size, wall thickness and diastolic function. Low normal systolic function. E/E'= 7.12.     Multidisciplinary Rounds Completed:   Yes    ABCDEF Bundle/Checklist Completed:  YES-See Plan    Active Problem List: Problem List  Date Reviewed: 11/28/2021          Codes Class    COVID ICD-10-CM: U07.1  ICD-9-CM: 079.89         Acute hypoxemic respiratory failure due to COVID-19 [facility]) ICD-10-CM: U07.1, J96.01  ICD-9-CM: 518.81, 079.89, 799.02         Pneumonia due to COVID-19 virus ICD-10-CM: U07.1, J12.82  ICD-9-CM: 480.8, 079.89         Hyperglycemia due to type 2 diabetes mellitus (HCC) ICD-10-CM: E11.65  ICD-9-CM: 250.00         Primary hypertension ICD-10-CM: I10  ICD-9-CM: 401.9         Hyponatremia ICD-10-CM: E87.1  ICD-9-CM: 276.1         Lactic acidosis ICD-10-CM: E87.2  ICD-9-CM: 276.2         Hyperlipidemia ICD-10-CM: E78.5  ICD-9-CM: 272.4         Obesity (BMI 30-39. 9) ICD-10-CM: E66.9  ICD-9-CM: 278.00         COVID-19 vaccination not done ICD-10-CM: Z28.9  ICD-9-CM: V64.00             ICU Assessment/ Comprehensive Plan of Care:     NEURO  1. Sedated and intubated  - Maintain RASS -1-2 as tolerated for vent compliance  - Fentanyl/Versed   -Opening eyes and withdrawing to all extremities, not following commands    CARDIAC  1. Hypotension, possible 2/2 sedation  2. Hx of HTN, HLD  -Cardiac monitoring  -Continue atorvastatin  -Keep MAP > 65mmHg  -Levophed off since 2300 on 12/12/2021  -Continue midodrine 10mg IV q 8hr  -ECHO 11/24/21 Estimated LVEF is 50 - 55%. Normal cavity size, wall thickness and diastolic function. Low normal systolic function. E/E'= 7.12.    RESPIRATORY  1. Acute hypoxic respiratory failure 2/2 COVID PNA  -Intubated P 12, FiO2 60%  -Nimbex off 12/2  -O2 sats > 88%  -Need to consider trach given prolonged intubation  -Prone for P/F ratio < 150 (16 hr prone, 8 hr supine)  1. COVID Treatment:  a. Immunomodulator--Steroids -- Yes decadron  b. COVID-19 Specific Anticoagulation -- Yes enoxaparin  c. Vitamin C -- Yes    d. Zinc -- Yes    e. Remdesivir-NA  f. Actemra vs. Baricitinib- Toci 11/24/21    RENAL  1. No acute concerns  -Trend BMP  - Monitor U. O  - Lasix daily    Gold Catheter Present: Yes  IVFs: NA    GASTROINTESTINAL  1. No acute concerns  - cont tube feeds, H2 RA GI prophylaxis  - DC stool scheduled stool softeners for - liq stools    HEMATOLOGIC  1. No acute concerns  -Stable Hgb, No evidence of bleeding  -Continue enoxparin    ID  1. COVID 19 viral PNA  No leukocytosis, + Fevers, now low grade  -Covid pneumonia-status post Tocilizumab, was not deemed a candidate for remdesivir at outside hospital  - sputum 12/3--> growing strep pneumonia   -Mycoplasma 12/1- non reactive   -Legionella 12/1- negative  -DC zinc today    2. Staph Epi Bacteremia   - growing in 4/4 bottles  - repeat blood cultures sent 12/5 - NGTD  - Lines changed out 12/7  - Continue ceftriaxone (12/16) and Vancomycin (12/20) per ID     ENDOCRINE  1. Hyperglycemia with Hx of DM  - SSI resistant sensitivity Q6hr  - NPH 20 units q 6hrs    F - Feeding:  TF  A - Analgesia: Fentanyl/versed  S - Sedation: GOAL RASS -1 to -2  T - DVT Prophylaxis: SCD's or Sequential Compression Device , enoxaparin  H - Head of Bed: > 30 Degrees  U - Ulcer Prophylaxis: Famotidine   G - Glycemic Control: Insulin SSI and NPH  S - Spontaneous Breathing Trial: Not appropriate at present  B - Bowel Regimen: Colace  I - Indwelling Catheter:              T/L/D  Tubes: ETT and Orogastric Tube  Lines: Arterial Line and PICC Line   Drains: Gold Catheter  D - De-escalation of Antibiotics:    Vanco stops 12/20  Ceftriaxone stops 12/16    DISPOSITION/COMMUNICATION  Discussed Plan of Care/Code Status: Full Code  Stay in ICU    CRITICAL CARE CONSULTANT NOTE  I had a face to face encounter with the patient, reviewed and interpreted patient data including clinical events, labs, images, vital signs, I/O's, and examined patient.   I have discussed the case and the plan and management of the patient's care with the consulting services, the bedside nurses, the attending physician and the respiratory therapist.      NOTE OF PERSONAL INVOLVEMENT IN CARE    I participated in the decision-making and personally managed or directed the management of the following life and organ supporting interventions that required my frequent assessment to treat or prevent imminent deterioration. I personally spent 45 minutes of critical care time. This is time spent at this critically ill patient's bedside actively involved in patient care as well as the coordination of care and discussions with the patient's family. This does not include any procedural time which has been billed separately.       Naeem Bull [facility]     Critical Care Medicine  [facility]

## 2021-12-14 NOTE — PROGRESS NOTES
Bedside and Verbal shift change report given to Mike Campbell RN (oncoming nurse) by Harrison Gaviria RN (offgoing nurse). Report included the following information SBAR, Kardex, ED Summary, Procedure Summary, Intake/Output, MAR, Accordion, Recent Results, Med Rec Status, Cardiac Rhythm sinus [place] and Alarm Parameters .

## 2021-12-15 NOTE — PROGRESS NOTES
1930: Bedside shift change report given to Jefrey Claude (oncoming nurse) by Dottie Silvestre (offgoing nurse). Report included the following information SBAR, Kardex, Intake/Output, MAR, Accordion, Recent Results, Med Rec Status and Cardiac Rhythm SR. Current gtts: Fentanyl 300, Versed 18  Current vent settings: AC 20, , FiO2 70%, Peep 12    0015:  Pt awake and reaching towards ETT, this RN redirected, pt continues to reach for tube, NP notified, orders received for bilateral wrist restraints    0100: ABG as follows: pH 7.43, CO2 48.6, PO2 82, HCO3 32.1, per NP pt is to not be proned, will continue to monitor pt oxygenation status    0500: Morning ABG: pH 7.44 CO2 46, PO2 104, HCO3 31.2, NP notified, orders not to prone at this time

## 2021-12-15 NOTE — DIABETES MGMT
[address]    CLINICAL NURSE SPECIALIST CONSULT     Initial Presentation   Shane Carias is a 55 y.o. male who presented to [facility] ED on 11/23/21 with known COVID-19 viral infection with worsening SOB. HX:   Past Medical History:   Diagnosis Date    COVID-19     Diabetes ([address]     Hyperlipidemia     Hypertension         INITIAL DX:   COVID [U07.1] Severe hypoxemic respiratory failure    Current Treatment     TX: IV steroids, intubation    Consulted by Nelson Todd MD  for advanced diabetes nursing assessment and care for:   [x] Inpatient management strategy    Hospital Course   Clinical progress has been complicated by hyperglycemia, worsening respiratory failure. 11/23: ICU admission at [facility]. HFNC. IV abox, Decadron 6mg daily  11/24: Tociluxumab  11/28: Intubated. Started vasopressors. 12/1: Transfer to [facility] for higher level of care  12/3: Fevers, abox adjusted. BC + GPC in clusters 3/4 and 1/4 in separate draws   12/5: Respiratory culture with Strep, blood cultures with staph epi. 12/7: Lines changed  12/14: Decadron weaned to 5mg daily (from 10mg daily)  Diabetes History   Type 2 Diabetes  Steven Me, DO: PCP    Diabetes-related Medical History  Acute complications  Steroid induced hyperglycemia  Neurological complications  Unknown  Microvascular disease  None  Macrovascular disease  None     Diabetes Medication History  Key Antihyperglycemic Medications             insulin glargine (LANTUS) 100 unit/mL injection (Taking) 24 Units by SubCUTAneous route nightly. Indications: type 2 diabetes mellitus    glipiZIDE SR (GLUCOTROL XL) 10 mg CR tablet (Discontinued) Take 10 mg by mouth daily. metFORMIN ER (GLUCOPHAGE XR) 500 mg tablet (Discontinued) Take 500 mg by mouth daily (with dinner). Subjective   Patient intubated and sedated  Objective   Physical exam  General Overweight male.  Intubated   Neuro  Sedated  Vital Signs   Visit Vitals  BP 114/82   Pulse (!) 55   Temp 99 °F (37.2 °C)   Resp 20   Ht 5' 6\" (1.676 m)   Wt 87 kg (191 lb 12.8 oz)   SpO2 98%   BMI 30.96 kg/m²     PE deferred due to COVID-19 infection. Able to visualize through ICU doors    Laboratory  Recent Labs     12/15/21  0454 21  0353 21  0626   * 166* 158*   AGAP 7 6 5   WBC 10.2 10.1 8.5   CREA 0.43* 0.39* 0.39*   GFRNA >60 >60 >60   AST 14* 16 15   ALT 37 39 40         Blood glucose pattern      Significant diabetes-related events over the past 24-72 hours  Intubated/sedated  On: propofol, versed, fentanyl   Decadron- 5mg to start today, 10mg daily (x8 days), was previously on Decadron 6mg daily (x5 days) and 20mg (8 days). Admitting B  BG now 150-176  Enteral feeds: Glucerna 1.5 at 65ml/hr (207gm CHO based on 24° infusion)  NPH: 14 units NPH Q6  Humalo units via correction in the last 24h      Assessment and Plan   Nursing Diagnosis Risk for unstable blood glucose pattern   Nursing Intervention Domain 8452 Decision-making Support   Nursing Interventions Examined current inpatient diabetes/blood glucose control   Explored factors facilitating and impeding inpatient management  Explored corrective strategies with patient and responsible inpatient provider   Informed patient of rational for insulin strategy while hospitalized     Evaluation   Gage Kennedy is a 55year old gentleman, with uncontrolled Type 2 diabetes, who was admitted with acute hypoxic respiratory failure s/t COVID-19 pneumonia. He did not achieve diabetes control prior to admission, as evidenced by A1c of 8.4%. He was admitted at [facility] for 8 days prior to transfer to [facility],  On arrival at [facility], his initial glucose was significantly elevated at 400.  6mg decadron was initiated and continued for 5 days. Glucose there was 195-290 with low dose lantus and correctional humalog.   When Decadron was advanced to 20mg daily, on , lantus was slowly titrated up to 30 units daily but he never sustained glucose control. Several factors have played a role in blood glucose management including:  [x] Critical nature of illness state: Stress hyperglycemia/Severe COVID pna  [x]  Changing nutritive sources & needs: Enteral feeds w/ 151 grams CHO/24h   [x] Compromised insulin absorption or delivery on vasopressors   [x] Glucocorticoid use: Decadron 5 mg daily    As glucose was elevated on admission to [facility] at 339, 50 units Lantus was started (covering low dose diabetes and high dose for decadron). His best glucose was 290 and he transiently switched to NPH before going on an insulin gtt for a few hours over the weekend. He transitioned off with lower insulin doses- Lantus 30 units daily for which he remained hyperglycemic. NPH adjusted to 40 units Q12 and then 55 units Q12 and he will still need a higher basal dose to override steroid, insulin resistance and carbs in enteral feeds. As BG remains elevated despite advancement, NPH was dosed at Q6hr intervals for which he had excellent glucose response s/t poor perfusion and severe insulin resistance. Decadron is weaning to 5mg daily (from 10mg daily) and NPH reduced to 14 units Q6 in response. BG remains in goal with this adjustment. I do expect that his insulin needs may continue to decline as his clinical course prolongs. ICU BG goal 140mg/dl-180mg/dl. Recommendations   1. POC glucose Q6    2. Enteral feeds per primary team/dietician    3.  Adjust the Subcutaneous Insulin Order set (3742)  Insulin Dosing Specific recommendation   Basal                                      (Based on weight, BMI & GFR) Continue basal to 14 units NPH Q6 (TDD 56 units)    This covers:  0.3units/kg for Diabetes PLUS  0.2units/kg for Decadron (10mg) PLUS  1:10 carb coverage in feeds (20) If BG trends below   120mg/dl- reduce   total NPH dose by 20%    Feeds off: 18 units NPH   Q12    Steroids d/c: 14 units NPH   Q12   Corrective (Useful in adjusting insulin dosing) Insulin-resistant sensitivity Q6. Billing Code(s)   [x] 57800    Before making these care recommendations, I personally reviewed the hosptialization record, including laboratory and diagnostic data, medications and examined the patient at bedside (circumstances permitting).   Total minutes: 13    ANICETO Sauceda  Diabetes Clinical Nurse Specialist  [facility]  Access via ioSafe

## 2021-12-15 NOTE — PROGRESS NOTES
[facility]    ICU TEAM Progress Note    Name: Rula Fuchs   : 1975   MRN: [id number]   Date: 12/15/2021      Subjective:   Progress Note: 12/15/2021      Mr Kacey Mcdaniel is a 54 yo male with a PMH of DM2, HTN and obesity admitted on  to [facility] for COVID-19 pneumonia. He was treated with tocilizumab and steroids. He was not vaccinated. He decompensated and was intubated and . He was paralyzed, proned/supinated. He was transferred to [facility] on  to be evaluated for possible ECMO. CVC and arterial line placed at [facility] on . Nimbex was turned off . He was weaned to 60% Fio2 as of 12/3. Proning/supinating therapy continued. He continued to demonstrated fever. He was started on cefepime/fluconazole on 12/3 with vanc added .      his FIo2 increased to 70% and decreased back to 60% on . CVC changed to PICC on  as blood culture had been positive for 4/4 S epi on 12/3 (cleared in culture on ). He was on vanc for 2 weeks, until . Changed to cefazolin on 12/15. Active Problem List:     Problem List  Date Reviewed: 2021          Codes Class    COVID ICD-10-CM: U07.1  ICD-9-CM: 079.89         Acute hypoxemic respiratory failure due to COVID-19 [facility]) ICD-10-CM: U07.1, J96.01  ICD-9-CM: 518.81, 079.89, 799.02         Pneumonia due to COVID-19 virus ICD-10-CM: U07.1, J12.82  ICD-9-CM: 480.8, 079.89         Hyperglycemia due to type 2 diabetes mellitus (HCC) ICD-10-CM: E11.65  ICD-9-CM: 250.00         Primary hypertension ICD-10-CM: I10  ICD-9-CM: 401.9         Hyponatremia ICD-10-CM: E87.1  ICD-9-CM: 276.1         Lactic acidosis ICD-10-CM: E87.2  ICD-9-CM: 276.2         Hyperlipidemia ICD-10-CM: E78.5  ICD-9-CM: 272.4         Obesity (BMI 30-39. 9) ICD-10-CM: E66.9  ICD-9-CM: 278.00         COVID-19 vaccination not done ICD-10-CM: Z28.9  ICD-9-CM: V64.00               Past Medical History:      has a past medical history of COVID-19, Diabetes ([facility] [[email or web address].), Hyperlipidemia, and Hypertension. Past Surgical History:      has a past surgical history that includes hx orthopaedic (Right). Home Medications:     Prior to Admission medications    Medication Sig Start Date End Date Taking? Authorizing Provider   cisatracurium (NIMBEX) IV infusion 0-0.869 mg/min by IntraVENous route TITRATE. 12/1/21  Yes Raquel Woo MD   enoxaparin (LOVENOX) 100 mg/mL 90 mg by SubCUTAneous route every twelve (12) hours every twelve (12) hours. 12/1/21  Yes Raquel Woo MD   insulin glargine (LANTUS) 100 unit/mL injection 24 Units by SubCUTAneous route nightly. Indications: type 2 diabetes mellitus 12/1/21  Yes Raquel Woo MD   midazolam in normal saline (VERSED) 1 mg/mL soln 0-10 mg/hr by IntraVENous route TITRATE. Max Daily Amount: 240 mg. 12/1/21  Yes Raquel Woo MD   Norepinephrine Bitartrate (LEVOPHED) 8 mg/250 mL (32 mcg/mL) soln 0.5-16 mcg/min by IntraVENous route TITRATE. 12/1/21  Yes Raquel Woo MD   ascorbic acid, vitamin C, (VITAMIN C) 500 mg tablet Take 1 Tablet by mouth two (2) times a day. 12/1/21   Raquel Woo MD   cholecalciferol (VITAMIN D3) (1000 Units /25 mcg) tablet Take 2 Tablets by mouth daily. 12/1/21   Raquel Woo MD   melatonin, rapid dissolve, 5 mg TbDi tablet Take 1 Tablet by mouth nightly. 12/1/21   Raquel Woo MD   atorvastatin (LIPITOR) 20 mg tablet Take 20 mg by mouth daily. Provider, Historical   telmisartan (MICARDIS) 80 mg tablet Take 80 mg by mouth daily. Indications: high blood pressure    Provider, Historical       Allergies/Social/Family History:      Allergies   Allergen Reactions    Alupent [Metaproterenol] Swelling      Social History     Tobacco Use    Smoking status: Never Smoker    Smokeless tobacco: Not on file   Substance Use Topics    Alcohol use: Yes     Comment: drinksonce a week      Family History   Problem Relation Age of Onset    Diabetes Mother     Hypertension Mother     Stroke Mother     Hodgkin's lymphoma Mother     Diabetes Father    [facility] Hypertension Father     Cystic Fibrosis Father     Diabetes Maternal Aunt     Diabetes Maternal Uncle        Review of Systems:     Unable    Objective:   Vital Signs:  Visit Vitals  /82   Pulse (!) 55   Temp 99 °F (37.2 °C)   Resp 20   Ht 5' 6\" (1.676 m)   Wt 87 kg (191 lb 12.8 oz)   SpO2 98%   BMI 30.96 kg/m²    O2 Flow Rate (L/min): 60 l/min O2 Device: Endotracheal tube,Ventilator Temp (24hrs), Av.9 °F (37.2 °C), Min:98.4 °F (36.9 °C), Max:99.5 °F (37.5 °C)           Intake/Output:     Intake/Output Summary (Last 24 hours) at 12/15/2021 1032  Last data filed at 12/15/2021 0800  Gross per 24 hour   Intake 3498 ml   Output 3385 ml   Net 113 ml       Physical Exam:    General:  Sedated/intubated/RASS -5  Eye: PERRLA  Neurologic: Furrows brow when noxious stimuli applied to BUE; does not withdraw to painful stimuli in any extremity; no blink to threat; does not open eyes or follow commands  Neck: Supple, no JVD  Lungs: On mechanical ventilation; CTAB  Heart: RRR, 2+ pulses, 1+ edema of BLE  Abdomen:Soft, nontender, nondistended  Skin:  No rash or lesion    LABS AND  DATA: Personally reviewed  Recent Labs     12/15/21  0454 12/14/21  0353   WBC 10.2 10.1   HGB 10.8* 11.2*   HCT 32.7* 33.2*    198     Recent Labs     12/15/21  0454 12/14/21  0353    137   K 4.0 4.3    100   CO2 29 31   BUN 21* 20   CREA 0.43* 0.39*   * 166*   CA 8.7 8.7   MG 2.2 2.3   PHOS 3.7 3.4     Recent Labs     12/15/21  0454 12/14/21  0353   AP 68 70   TP 5.5* 5.6*   ALB 2.5* 2.7*   GLOB 3.0 2.9     Recent Labs     12/15/21  0454 12/14/21  0353   INR 1.0 1.0   PTP 10.3 10.5      Recent Labs     12/15/21  0451 12/15/21  0059   PHI 7.44 7.43   PCO2I 46.0* 48.6*   PO2I 104* 82   FIO2I 70 70     No results for input(s): CPK, CKMB, TROIQ, BNPP in the last 72 hours.     Ventilator Settings:  Mode Rate Tidal Volume Pressure FiO2 PEEP   Assist control   450 ml    70 % 12 cm H20     Peak airway pressure: 28 cm H2O Minue ventilation: 9.56 l/min      MEDS: Reviewed    Chest X-Ray: personally reviewed and report checked    · TTE: 11/24: LV: Estimated LVEF is 50 - 55%. Normal cavity size, wall thickness and diastolic function. Low normal systolic function. Assessment and Plan     [id number] Pneumonia complicated by Severe ARDS c/b pneumococcal pneumopnia: Received tocilizumab. Continue RASS -4. Continue fentanyl versed. S pneumonia in sputum 12/3. Off nimbex since 12/2.  - Continue proning given PF ratio today 148  - Diuresis goal -500 to 1L today  - Continue steroids  - Continue cefazolin    HLD: Continue statin    DM2 c/b hyperglycemia:   - Continue NPH  - Continue SSI    Hypotension: Possibly related to sedation. Not on pressors  - Continue midodrine    Deconditioning: Unable to participate in PT/OT at this time    Multidisciplinary Rounds Completed: Y    ABCDEF Bundle/Checklist  Pain Medications: Fentanyl  Target RASS: -4 - Deep Sedation - No response to voice, but movement or eye opening  Sedation Medications:Versed  CAM-ICU: SILVIA  Mobility:Poor  PT/OT: Unable to participate: PROM  Restraints:Y  Discussed Plan of Care (goals of care): Y  Addressed Code Status: F    CARDIOVASCULAR  Cardiac Gtts: N  SBP Goal of: > 90 mmHg  MAP Goal of: > 65 mmHg  Transfusion Trigger (Hgb): <7 g/dL    RESPIRATORY  Vent Goals:   Optimize PEEP/Ventilation/Oxygenation  DVT Prophylaxis (if no, list reason): Lovenox   SPO2 Goal: > 92%  Pulmonary toilet: Duo-Nebs     GI/  Gold Catheter Present: Yes  GI Prophylaxis: Pepcid (famotidine)   Nutrition: Yes TF  IVFs:N  Bowel Movement:Y  Bowel Regimen:Y  Insulin: Y    ANTIBIOTICS  Antibiotics:  Cefazolin    T/L/D  Tubes:ETT  Lines:PICC  Drains: Gold    SPECIAL EQUIPMENT  Vent    DISPOSITION  ICU    CRITICAL CARE CONSULTANT NOTE  I had a face to face encounter with the patient, reviewed and interpreted patient data including clinical events, labs, images, vital signs, I/O's, and examined patient.   I have discussed the case and the plan and management of the patient's care with the consulting services, the bedside nurses and the respiratory therapist.      NOTE OF PERSONAL INVOLVEMENT IN CARE   This patient has a high probability of imminent, clinically significant deterioration, which requires the highest level of preparedness to intervene urgently. I participated in the decision-making and personally managed or directed the management of the following life and organ supporting interventions that required my frequent assessment to treat or prevent imminent deterioration. I personally spent 65 minutes of critical care time. This is time spent at this critically ill patient's bedside actively involved in patient care as well as the coordination of care and discussions with the patient's family. This does not include any procedural time which has been billed separately.     Amanda Padilla NP  [facility]  12/15/2021

## 2021-12-16 NOTE — PROGRESS NOTES
ID Progress Note  2021    Subjective:     Afebrile over night  Intubated it    Objective:     Antibiotics:  1. Vancomycin --12/15  2. Cefepime 12/3-   3. Ceftriaxone --  4. Ancef 12/15-     Vitals:   Visit Vitals  BP 99/69   Pulse 61   Temp 97.8 °F (36.6 °C)   Resp 20   Ht 5' 6\" (1.676 m)   Wt 87 kg (191 lb 12.8 oz)   SpO2 98%   BMI 30.96 kg/m²        Tmax:  Temp (24hrs), Av.8 °F (37.1 °C), Min:97.8 °F (36.6 °C), Max:99.8 °F (37.7 °C)      Intubated it, not following commends  Clear in apex with decreased breath sounds at bases  Pt was in supine position  Gold cath in place    Labs:      Recent Labs     21  0518 12/15/21  0454 21  0353   WBC 10.0 10.2 10.1   HGB 13.0 10.8* 11.2*    209 198   BUN 20 21* 20   CREA 0.54* 0.43* 0.39*   AP 85 68 70   TBILI 0.4 0.4 0.4       Cultures:     No results found for: SDES  Lab Results   Component Value Date/Time    Culture result: NO GROWTH 5 DAYS 2021 09:38 AM    Culture result: (A) 2021 06:01 AM     STAPHYLOCOCCUS EPIDERMIDIS GROWING IN ALL 4 BOTTLES DRAWN FROM THE RIGHT ARM    Culture result:  2021 06:01 AM     (NOTE) CALLED POSITIVE BLOOD CULTURE OF GPC IN CLUSTERS GROWING IN 1 OUT OF 4 BOTTLES TO [place] AT 2237 ON 12/3/21. AT  REPORT OF GRAM POSITIVE COCCI IN CLUSTERS GROWING IN 3 OF 4 BOTTLES CALLED TO READ BACK BY BRADY CHISHOLM ON 21 AT 0630. AOW    Culture result: MODERATE STREPTOCOCCUS PNEUMONIAE (A) 2021 06:01 AM    Culture result: LIGHT NORMAL RESPIRATORY TAMARA 2021 06:01 AM        FIO2 60%, prone position, suctioned bloody drainage through nostril earlier, no active bleeding noted it during visit  PICC line placed   Assessment:     1. Severe COVID pneumonia--Pneumococcus from respiratory culture; completed ABX therapy as of   2. VDRF  3. Hypoxic respiratory failure  4. Bacteremia--S epi--probable line source    Objective:     1.  Continue antibiotic therapy        Ancef - treat total 2 weeks from the infected line removal, last dose 12/20/2021  2. Follow up cultures  3. Work up fevers  4.  May need to further image chest, abdomen and pelvis when able; having fevers on and off    Lisa Ortiz, NP

## 2021-12-16 NOTE — PROGRESS NOTES
Comprehensive Nutrition Assessment    Type and Reason for Visit: Reassess    Nutrition Recommendations/Plan:    1. Continue current tube feed with reduced flush:   -Glucerna 1.5 @ 65 ml/hr with 1 packet ProSource + 75 ml flush q 4 hr. Nutrition Assessment:    PMHx includes DM, HTN, HLD. Admitted to [facility] on 11/23 for acute respiratory failure due to Covid-19 PNA. He was admitted directly to the ICU on HFNC, has been receiving dexamethasone daily since 11/23, S/P Tociluzumab 11/24. His condition progressively worsened - intubated on 11/28. Transferred to [facility] for possible VV - ECMO.  Remains intubated and sedated. Has not required ECMO. Off nimbex, pressors, and propofol. Weaning sedation. Holding prone today, PF ratio improving. BG uncontrolled but improving since last week, diabetes nurse following. PICC placed on 12/7. Pt had first BM since admission on 12/8, now with watery stools, benefiber added TID during MDR. Flush reduced on 12/8 due to low sodium. Pt remains hyponatremic, will further reduce flush. Recommend continuing current tube feed order of Glucerna 1.5 @ 65 ml/hr with 1 packet ProSource + 75 ml flush q 4 hr. Provides: 1300 ml, 2010 kcal, 122 g protein, 173 g CHO, and 1437 ml free water (987 ml from TF + 450 ml from flush) to meet daily estimated needs. Malnutrition Assessment:  Malnutrition Status: At risk for malnutrition (specify) (suspect, but not enough information)    Context:  Acute illness       Nutritionally Significant Medications: Vitamin C, cefazolin, vitmain D3, decadron, pepcid, fentanyl, fentanyl citrate  PRN, benefiber, humalog, insulin NPH, versed, midodrine, miralax PRN, theragran    Estimated Daily Nutrient Needs:  Energy (kcal): 1969 ([place]); Weight Used for Energy Requirements: Current (87 kg)  Protein (g): 125 (1.5 gm/kg);  Weight Used for Protein Requirements:    Fluid (ml/day): 1 mL/kcal; Method Used for Fluid Requirements:      Nutrition Related Findings:       BM: 12/16- watery   Edema: 2+ facial  Wounds:  None       Current Nutrition Therapies:   Diet: NPO  Supplements/Nutrition Support: Glucerna 1.5 @ 65 ml/hr with 1 packet ProSource + 100 ml flush q 4 hr  Additional Caloric Sources:  none    Anthropometric Measures:  · Height:  5' 6\" (167.6 cm)  · Current Body Wt:  87 kg (191 lb 12.8 oz)   · Admission Body Wt:  193 lb 2 oz    · Ideal Body Wt:  142:  135.1 %   · Adjusted Body Weight:   ; Weight Adjustment for: No adjustment   · BMI Categories:  Obese class 1 (BMI 30.0-34. 9)     Wt Readings from Last 10 Encounters:   12/14/21 87 kg (191 lb 12.8 oz)   11/30/21 86.9 kg (191 lb 9.3 oz)     Nutrition Diagnosis:   · Inadequate oral intake related to impaired respiratory function as evidenced by intubation,nutrition support-enteral nutrition    Nutrition Interventions:   Food and/or Nutrient Delivery: Modify tube feeding  Nutrition Education and Counseling: No recommendations at this time  Coordination of Nutrition Care: Continue to monitor while inpatient    Goals:  EN to meet at least 80% of needs x 5-7 days. Nutrition Monitoring and Evaluation:   Behavioral-Environmental Outcomes: None identified  Food/Nutrient Intake Outcomes: Enteral nutrition intake/tolerance  Physical Signs/Symptoms Outcomes: Biochemical data,GI status,Weight    Discharge Planning:     Too soon to determine     Recent Labs     12/16/21  0518 12/15/21  0454 12/14/21  0353   * 163* 166*   BUN 20 21* 20   CREA 0.54* 0.43* 0.39*   * 136 137   K 4.1 4.0 4.3   CL 94* 100 100   CO2 32 29 31   CA 9.3 8.7 8.7   PHOS 3.9 3.7 3.4   MG 2.4 2.2 2.3     Ayla Mcneil, Dietetic Intern)

## 2021-12-16 NOTE — PROGRESS NOTES
[facility]    ICU TEAM Progress Note    Name: Harlan Veronica   : 1975   MRN: [id number]   Date: 2021      Subjective:   Progress Note: 2021      Mr Sarah Espinal is a 54 yo male with a PMH of DM2, HTN and obesity admitted on  to [facility] for COVID-19 pneumonia. He was treated with tocilizumab and steroids. He was not vaccinated. He decompensated and was intubated and . He was paralyzed, proned/supinated. He was transferred to [facility] on  to be evaluated for possible ECMO. CVC and arterial line placed at [facility] on . Nimbex was turned off . He was weaned to 60% Fio2 as of 12/3. Proning/supinating therapy continued. He continued to demonstrated fever. He was started on cefepime/fluconazole on 12/3 with vanc added .      his FIo2 increased to 70% and decreased back to 60% on . CVC changed to PICC on  as blood culture had been positive for 4/4 S epi on 12/3 (cleared in culture on ). He was on vanc for 2 weeks, until . Changed to cefazolin on 12/15. Proned through 12/15. PF ratio 157 today, . Will hold proning. Active Problem List:     Problem List  Date Reviewed: 2021          Codes Class    COVID ICD-10-CM: U07.1  ICD-9-CM: 079.89         Acute hypoxemic respiratory failure due to COVID-19 [facility]) ICD-10-CM: U07.1, J96.01  ICD-9-CM: 518.81, 079.89, 799.02         Pneumonia due to COVID-19 virus ICD-10-CM: U07.1, J12.82  ICD-9-CM: 480.8, 079.89         Hyperglycemia due to type 2 diabetes mellitus (HCC) ICD-10-CM: E11.65  ICD-9-CM: 250.00         Primary hypertension ICD-10-CM: I10  ICD-9-CM: 401.9         Hyponatremia ICD-10-CM: E87.1  ICD-9-CM: 276.1         Lactic acidosis ICD-10-CM: E87.2  ICD-9-CM: 276.2         Hyperlipidemia ICD-10-CM: E78.5  ICD-9-CM: 272.4         Obesity (BMI 30-39. 9) ICD-10-CM: E66.9  ICD-9-CM: 278.00         COVID-19 vaccination not done ICD-10-CM: Z28.9  ICD-9-CM: V64.00               Past Medical History:      has a past medical history of COVID-19, Diabetes ([address] Hyperlipidemia, and Hypertension. Past Surgical History:      has a past surgical history that includes hx orthopaedic (Right). Home Medications:     Prior to Admission medications    Medication Sig Start Date End Date Taking? Authorizing Provider   cisatracurium (NIMBEX) IV infusion 0-0.869 mg/min by IntraVENous route TITRATE. 12/1/21  Yes Eda Kim MD   enoxaparin (LOVENOX) 100 mg/mL 90 mg by SubCUTAneous route every twelve (12) hours every twelve (12) hours. 12/1/21  Yes Eda Kim MD   insulin glargine (LANTUS) 100 unit/mL injection 24 Units by SubCUTAneous route nightly. Indications: type 2 diabetes mellitus 12/1/21  Yes Eda Kim MD   midazolam in normal saline (VERSED) 1 mg/mL soln 0-10 mg/hr by IntraVENous route TITRATE. Max Daily Amount: 240 mg. 12/1/21  Yes Eda Kim MD   Norepinephrine Bitartrate (LEVOPHED) 8 mg/250 mL (32 mcg/mL) soln 0.5-16 mcg/min by IntraVENous route TITRATE. 12/1/21  Yes Eda Kim MD   ascorbic acid, vitamin C, (VITAMIN C) 500 mg tablet Take 1 Tablet by mouth two (2) times a day. 12/1/21   Eda Kim MD   cholecalciferol (VITAMIN D3) (1000 Units /25 mcg) tablet Take 2 Tablets by mouth daily. 12/1/21   Eda Kim MD   melatonin, rapid dissolve, 5 mg TbDi tablet Take 1 Tablet by mouth nightly. 12/1/21   Eda Kim MD   atorvastatin (LIPITOR) 20 mg tablet Take 20 mg by mouth daily. Provider, Historical   telmisartan (MICARDIS) 80 mg tablet Take 80 mg by mouth daily. Indications: high blood pressure    Provider, Historical       Allergies/Social/Family History:      Allergies   Allergen Reactions    Alupent [Metaproterenol] Swelling      Social History     Tobacco Use    Smoking status: Never Smoker    Smokeless tobacco: Not on file   Substance Use Topics    Alcohol use: Yes     Comment: drinksonce a week      Family History   Problem Relation Age of Onset    Diabetes Mother     Hypertension Mother    Marie Stroke Mother     Hodgkin's lymphoma Mother     Diabetes Father     Hypertension Father     Cystic Fibrosis Father     Diabetes Maternal Aunt     Diabetes Maternal Uncle        Review of Systems:     Unable    Objective:   Vital Signs:  Visit Vitals  BP 99/69 (BP 1 Location: Left arm, BP Patient Position: At rest)   Pulse 61   Temp 98.6 °F (37 °C)   Resp 20   Ht 5' 6\" (1.676 m)   Wt 87 kg (191 lb 12.8 oz)   SpO2 98%   BMI 30.96 kg/m²    O2 Flow Rate (L/min): 60 l/min O2 Device: Ventilator,Endotracheal tube Temp (24hrs), Av.7 °F (37.1 °C), Min:97.8 °F (36.6 °C), Max:99.8 °F (37.7 °C)           Intake/Output:     Intake/Output Summary (Last 24 hours) at 2021 0841  Last data filed at 2021 0800  Gross per 24 hour   Intake 3133 ml   Output 7015 ml   Net -3882 ml       Physical Exam:    General:  Sedated/intubated/RASS -5  Eye: PERRLA  Neurologic: Furrows brow when noxious stimuli applied to BUE; does not withdraw to painful stimuli in any extremity; no blink to threat; does not open eyes or follow commands  Neck: Supple, no JVD  Lungs: On mechanical ventilation; CTAB  Heart: RRR, 2+ pulses, 1+ edema of BLE  Abdomen:Soft, nontender, nondistended  Skin:  No rash or lesion    LABS AND  DATA: Personally reviewed  Recent Labs     12/16/21  0518 12/15/21  045   WBC 10.0 10.2   HGB 13.0 10.8*   HCT 38.6 32.7*    209     Recent Labs     2118 12/15/21  0454   * 136   K 4.1 4.0   CL 94* 100   CO2 32 29   BUN 20 21*   CREA 0.54* 0.43*   * 163*   CA 9.3 8.7   MG 2.4 2.2   PHOS 3.9 3.7     Recent Labs     2118 12/15/21  0454   AP 85 68   TP 6.5 5.5*   ALB 3.0* 2.5*   GLOB 3.5 3.0     Recent Labs     12/16/21  0518 12/15/21  045   INR 1.0 1.0   PTP 10.0 10.3      Recent Labs     21  0336 12/15/21  0451   PHI 7.44 7.44   PCO2I 46.7* 46.0*   PO2I 110* 104*   FIO2I 70 70     No results for input(s): CPK, CKMB, TROIQ, BNPP in the last 72 hours.     Ventilator Settings:  Mode Rate Tidal Volume Pressure FiO2 PEEP   Assist control,Volume control   450 ml    70 % 12 cm H20     Peak airway pressure: 34 cm H2O    Minue ventilation: 9.23 l/min      MEDS: Reviewed    Chest X-Ray: personally reviewed and report checked    · TTE: 11/24: LV: Estimated LVEF is 50 - 55%. Normal cavity size, wall thickness and diastolic function. Low normal systolic function. Assessment and Plan     TOVVP-46 Pneumonia complicated by Severe ARDS c/b pneumococcal pneumopnia: Received tocilizumab. Continue RASS -4. Continue fentanyl versed. S pneumonia in sputum 12/3. Off nimbex since 12/2.  - Hold proning today, 12/16 given   - Diuresis goal -500 to 1L today with bumex/diuril   - Continue steroids  - Continue cefazolin    HLD: Continue statin    DM2 c/b hyperglycemia:   - Continue NPH  - Continue SSI    Hypotension: Possibly related to sedation. Not on pressors  - Continue midodrine    Deconditioning: Unable to participate in PT/OT at this time    Multidisciplinary Rounds Completed: Y    ABCDEF Bundle/Checklist  Pain Medications: Fentanyl  Target RASS: -4 - Deep Sedation - No response to voice, but movement or eye opening  Sedation Medications:Versed  CAM-ICU: SILVIA  Mobility:Poor  PT/OT: Unable to participate: PROM  Restraints:Y  Discussed Plan of Care (goals of care):  Y  Addressed Code Status: F    CARDIOVASCULAR  Cardiac Gtts: N  SBP Goal of: > 90 mmHg  MAP Goal of: > 65 mmHg  Transfusion Trigger (Hgb): <7 g/dL    RESPIRATORY  Vent Goals:   Optimize PEEP/Ventilation/Oxygenation  DVT Prophylaxis (if no, list reason): Lovenox   SPO2 Goal: > 92%  Pulmonary toilet: Duo-Nebs     GI/  Gold Catheter Present: Yes  GI Prophylaxis: Pepcid (famotidine)   Nutrition: Yes TF  IVFs:N  Bowel Movement:Y  Bowel Regimen:Y  Insulin: Y    ANTIBIOTICS  Antibiotics:  Cefazolin    T/L/D  Tubes:ETT  Lines:PICC  Drains: Gold    SPECIAL EQUIPMENT  Vent    DISPOSITION  ICU    CRITICAL CARE CONSULTANT NOTE  I had a face to face encounter with the patient, reviewed and interpreted patient data including clinical events, labs, images, vital signs, I/O's, and examined patient. I have discussed the case and the plan and management of the patient's care with the consulting services, the bedside nurses and the respiratory therapist.      NOTE OF PERSONAL INVOLVEMENT IN CARE   This patient has a high probability of imminent, clinically significant deterioration, which requires the highest level of preparedness to intervene urgently. I participated in the decision-making and personally managed or directed the management of the following life and organ supporting interventions that required my frequent assessment to treat or prevent imminent deterioration. I personally spent 60 minutes of critical care time. This is time spent at this critically ill patient's bedside actively involved in patient care as well as the coordination of care and discussions with the patient's family. This does not include any procedural time which has been billed separately.     [facility]  12/16/2021

## 2021-12-16 NOTE — ROUTINE PROCESS
Bedside and Verbal shift change report given to [address] (oncoming nurse) by Nohelia Woodson (offgoing nurse). Report included the following information SBAR, Kardex, Procedure Summary, Intake/Output, MAR, Recent Results, Med Rec Status, Cardiac Rhythm NSR, Alarm Parameters  and Dual Neuro Assessment. Patient is on 70% FiO2, regular breathing, lung sounds diminished. PICC line present with Versed at 18, Fentanyl at 300. Gold in place for urine output. Patient opens eyes to voice, WD from all extremities, spontaneously moves upper extremities, reaches for ETT, bilateral wrist restraints on. Todays care plan and goals include, weaning sedation and dieresis. 1045: Wife updated on patients condition and patients goals for the day.

## 2021-12-16 NOTE — PROGRESS NOTES
Transition of Care Plan   RUR-  Medium    DISPOSITION: pending medical progression   F/U with PCP/Specialist     Transport: TBD   Covid positive  Patient remains in the ICU on isolation, vented on Fentanyl and Versed gtts. Prior to admission patient was independent, patient's wife Samantha Faulkner [phone] is Mandi Correa. Care management is continuing to follow.   Loan Estevez RN,Care Management

## 2021-12-16 NOTE — PROGRESS NOTES
1930: Bedside shift change report given to Maria C (oncoming nurse) by Hazel Flores (offgoing nurse).  Report included the following information SBAR, Kardex, Intake/Output, MAR, Accordion, Recent Results, Med Rec Status and Cardiac Rhythm ST.

## 2021-12-16 NOTE — DIABETES MGMT
[address]    CLINICAL NURSE SPECIALIST CONSULT     Initial Presentation   Navneet Morel is a 55 y.o. male who presented to [facility] ED on 11/23/21 with known COVID-19 viral infection with worsening SOB. HX:   Past Medical History:   Diagnosis Date    COVID-19     Diabetes ([address]     Hyperlipidemia     Hypertension         INITIAL DX:   COVID [U07.1] Severe hypoxemic respiratory failure    Current Treatment     TX: IV steroids, intubation    Consulted by Trinidad Flores MD  for advanced diabetes nursing assessment and care for:   [x] Inpatient management strategy    Hospital Course   Clinical progress has been complicated by hyperglycemia, worsening respiratory failure. 11/23: ICU admission at [facility]. HFNC. IV abox, Decadron 6mg daily  11/24: Tociluxumab  11/28: Intubated. Started vasopressors. 12/1: Transfer to [address] [place] [address] for higher level of care  12/3: Fevers, abox adjusted. BC + GPC in clusters 3/4 and 1/4 in separate draws   12/5: Respiratory culture with Strep, blood cultures with staph epi. 12/7: Lines changed  12/14: Decadron weaned to 5mg daily (from 10mg daily)  Diabetes History   Type 2 Diabetes  Nemesio Flynn DO: PCP    Diabetes-related Medical History  Acute complications  Steroid induced hyperglycemia  Neurological complications  Unknown  Microvascular disease  None  Macrovascular disease  None     Diabetes Medication History  Key Antihyperglycemic Medications             insulin glargine (LANTUS) 100 unit/mL injection (Taking) 24 Units by SubCUTAneous route nightly. Indications: type 2 diabetes mellitus    glipiZIDE SR (GLUCOTROL XL) 10 mg CR tablet (Discontinued) Take 10 mg by mouth daily. metFORMIN ER (GLUCOPHAGE XR) 500 mg tablet (Discontinued) Take 500 mg by mouth daily (with dinner). Subjective   Patient intubated and sedated  Objective   Physical exam  General Overweight male.  Intubated   Neuro  Sedated  Vital Signs   Visit Vitals  BP 105/71   Pulse 81   Temp 98.6 °F (37 °C)   Resp 20   Ht 5' 6\" (1.676 m)   Wt 87 kg (191 lb 12.8 oz)   SpO2 95%   BMI 30.96 kg/m²     PE deferred due to COVID-19 infection. Able to visualize through ICU doors    Laboratory  Recent Labs     21  0518 12/15/21  0454 21  0353   * 163* 166*   AGAP 8 7 6   WBC 10.0 10.2 10.1   CREA 0.54* 0.43* 0.39*   GFRNA >60 >60 >60   AST 21 14* 16   ALT 43 37 39         Blood glucose pattern      Significant diabetes-related events over the past 24-72 hours  Intubated/sedated  On: propofol, versed, fentanyl   Decadron- 5mg daily (ends ), 10mg daily (x8 days), was previously on Decadron 6mg daily (x5 days) and 20mg (8 days). Admitting B  BG now 170-239  Enteral feeds: Glucerna 1.5 at 65ml/hr (207gm CHO based on 24° infusion)  NPH: 14 units NPH Q6  Humalo units via correction in the last 24h      Assessment and Plan   Nursing Diagnosis Risk for unstable blood glucose pattern   Nursing Intervention Domain 5256 Decision-making Support   Nursing Interventions Examined current inpatient diabetes/blood glucose control   Explored factors facilitating and impeding inpatient management  Explored corrective strategies with patient and responsible inpatient provider   Informed patient of rational for insulin strategy while hospitalized     Evaluation   Devin Montano is a 55year old gentleman, with uncontrolled Type 2 diabetes, who was admitted with acute hypoxic respiratory failure s/t COVID-19 pneumonia. He did not achieve diabetes control prior to admission, as evidenced by A1c of 8.4%. He was admitted at [facility] for 8 days prior to transfer to [facility],  On arrival at [facility], his initial glucose was significantly elevated at 400.  6mg decadron was initiated and continued for 5 days. Glucose there was 195-290 with low dose lantus and correctional humalog.   When Decadron was advanced to 20mg daily, on , lantus was slowly titrated up to 30 units daily but he never sustained glucose control. Several factors have played a role in blood glucose management including:  [x] Critical nature of illness state: Stress hyperglycemia/Severe COVID pna  [x]  Changing nutritive sources & needs: Enteral feeds w/ 151 grams CHO/24h   [x] Compromised insulin absorption or delivery on vasopressors   [x] Glucocorticoid use: Decadron 5 mg daily    As glucose was elevated on admission to [facility] at 339, 50 units Lantus was started (covering low dose diabetes and high dose for decadron). His best glucose was 290 and he transiently switched to NPH before going on an insulin gtt for a few hours over the weekend. He transitioned off with lower insulin doses- Lantus 30 units daily for which he remained hyperglycemic. NPH adjusted to 40 units Q12 and then 55 units Q12 and he will still need a higher basal dose to override steroid, insulin resistance and carbs in enteral feeds. As BG remains elevated despite advancement, NPH was dosed at Q6hr intervals for which he had excellent glucose response s/t poor perfusion and severe insulin resistance. Decadron is weaning to 5mg daily (from 10mg daily) and NPH reduced to 14 units Q6 in response. BG remains in goal with this adjustment. I do expect that his insulin needs may continue to decline as his clinical course prolongs. ICU BG goal 140mg/dl-180mg/dl. Recommendations   1. POC glucose Q6    2. Enteral feeds per primary team/dietician    3.  Continue the Subcutaneous Insulin Order set (1967)  Insulin Dosing Specific recommendation   Basal                                      (Based on weight, BMI & GFR) Continue basal to 14 units NPH Q6 (TDD 56 units)    This covers:  0.3units/kg for Diabetes PLUS  0.2units/kg for Decadron (10mg) PLUS  1:10 carb coverage in feeds (20)    Can increase NPH dose to 17 units Q6 if BG remains over goal tomorrow If BG trends below   120mg/dl- reduce   total NPH dose by 20%    Feeds off: 18 units NPH Q12    Steroids d/c: 14 units NPH   Q12   Corrective                                       (Useful in adjusting insulin dosing) Insulin-resistant sensitivity Q6. Billing Code(s)   [x] 41847    Before making these care recommendations, I personally reviewed the hosptialization record, including laboratory and diagnostic data, medications and examined the patient at bedside (circumstances permitting).   Total minutes: 13    ANICETO Spain  Diabetes Clinical Nurse Specialist  [facility]  Access via [employer]

## 2021-12-17 NOTE — PROGRESS NOTES
[facility]    ICU TEAM Progress Note    Name: Sweetie Mcmillan   : 1975   MRN: [id number]   Date: 2021      Subjective:   Progress Note: 2021      Mr Gloria Posaads is a 56 yo male with a PMH of DM2, HTN and obesity admitted on  to [facility] for COVID-19 pneumonia. He was treated with tocilizumab and steroids. He was not vaccinated. He decompensated and was intubated and . He was paralyzed, proned/supinated. He was transferred to [facility] on  to be evaluated for possible ECMO. CVC and arterial line placed at [facility] on . Nimbex was turned off . He was weaned to 60% Fio2 as of 12/3. Proning/supinating therapy continued. He continued to demonstrated fever. He was started on cefepime/fluconazole on 12/3 with vanc added .      his FIo2 increased to 70% and decreased back to 60% on . CVC changed to PICC on  as blood culture had been positive for 4/4 S epi on 12/3 (cleared in culture on ). He was on vanc for 2 weeks, until . Changed to cefazolin on 12/15. He has been responsive to diuresis over the past few days and has been weaned to 60% FiO2. Attempts were made to decrease sedation on , but this was not successful due to increased agitation. Active Problem List:     Problem List  Date Reviewed: 2021          Codes Class    COVID ICD-10-CM: U07.1  ICD-9-CM: 079.89         Acute hypoxemic respiratory failure due to COVID-19 [facility]) ICD-10-CM: U07.1, J96.01  ICD-9-CM: 518.81, 079.89, 799.02         Pneumonia due to COVID-19 virus ICD-10-CM: U07.1, J12.82  ICD-9-CM: 480.8, 079.89         Hyperglycemia due to type 2 diabetes mellitus (HCC) ICD-10-CM: E11.65  ICD-9-CM: 250.00         Primary hypertension ICD-10-CM: I10  ICD-9-CM: 401.9         Hyponatremia ICD-10-CM: E87.1  ICD-9-CM: 276.1         Lactic acidosis ICD-10-CM: E87.2  ICD-9-CM: 276.2         Hyperlipidemia ICD-10-CM: E78.5  ICD-9-CM: 272.4         Obesity (BMI 30-39. 9) ICD-10-CM: E66.9  ICD-9-CM: 278.00         COVID-19 vaccination not done ICD-10-CM: Z28.9  ICD-9-CM: V64.00               Past Medical History:      has a past medical history of COVID-19, Diabetes ([address] Hyperlipidemia, and Hypertension. Past Surgical History:      has a past surgical history that includes hx orthopaedic (Right). Home Medications:     Prior to Admission medications    Medication Sig Start Date End Date Taking? Authorizing Provider   cisatracurium (NIMBEX) IV infusion 0-0.869 mg/min by IntraVENous route TITRATE. 12/1/21  Yes Jorge Solis MD   enoxaparin (LOVENOX) 100 mg/mL 90 mg by SubCUTAneous route every twelve (12) hours every twelve (12) hours. 12/1/21  Yes Jorge Solis MD   insulin glargine (LANTUS) 100 unit/mL injection 24 Units by SubCUTAneous route nightly. Indications: type 2 diabetes mellitus 12/1/21  Yes Jorge Solis MD   midazolam in normal saline (VERSED) 1 mg/mL soln 0-10 mg/hr by IntraVENous route TITRATE. Max Daily Amount: 240 mg. 12/1/21  Yes Jorge Solis MD   Norepinephrine Bitartrate (LEVOPHED) 8 mg/250 mL (32 mcg/mL) soln 0.5-16 mcg/min by IntraVENous route TITRATE. 12/1/21  Yes Jorge Solis MD   ascorbic acid, vitamin C, (VITAMIN C) 500 mg tablet Take 1 Tablet by mouth two (2) times a day. 12/1/21   Jorge Solis MD   cholecalciferol (VITAMIN D3) (1000 Units /25 mcg) tablet Take 2 Tablets by mouth daily. 12/1/21   Jorge Solis MD   melatonin, rapid dissolve, 5 mg TbDi tablet Take 1 Tablet by mouth nightly. 12/1/21   Jorge Solis MD   atorvastatin (LIPITOR) 20 mg tablet Take 20 mg by mouth daily. Provider, Historical   telmisartan (MICARDIS) 80 mg tablet Take 80 mg by mouth daily. Indications: high blood pressure    Provider, Historical       Allergies/Social/Family History: Allergies   Allergen Reactions    Alupent [Metaproterenol] Swelling      Social History     Tobacco Use    Smoking status: Never Smoker    Smokeless tobacco: Not on file   Substance Use Topics    Alcohol use:  Yes Comment: drinksonce a week      Family History   Problem Relation Age of Onset    Diabetes Mother     Hypertension Mother     Stroke Mother     Hodgkin's lymphoma Mother     Diabetes Father     Hypertension Father     Cystic Fibrosis Father     Diabetes Maternal Aunt     Diabetes Maternal Uncle        Review of Systems:     Unable    Objective:   Vital Signs:  Visit Vitals  /72   Pulse 73   Temp 99.3 °F (37.4 °C)   Resp 20   Ht 5' 6\" (1.676 m)   Wt 87 kg (191 lb 12.8 oz)   SpO2 94%   BMI 30.96 kg/m²    O2 Flow Rate (L/min): 60 l/min O2 Device: Endotracheal tube,Ventilator Temp (24hrs), Av.6 °F (37.6 °C), Min:98.8 °F (37.1 °C), Max:100.4 °F (38 °C)           Intake/Output:     Intake/Output Summary (Last 24 hours) at 2021 6652  Last data filed at 2021 0800  Gross per 24 hour   Intake 2248.98 ml   Output 3505 ml   Net -1256.02 ml       Physical Exam:    General:  Sedated/intubated/RASS -5  Eye: PERRLA  Neurologic: Furrows brow when noxious stimuli applied to BUE; does not withdraw to painful stimuli in any extremity; no blink to threat; does not open eyes or follow commands  Neck: Supple, no JVD  Lungs:   On mechanical ventilation; CTAB  Heart: RRR, 2+ pulses, 1+ edema of BLE  Abdomen:Soft, nontender, nondistended  Skin:  No rash or lesion  Verified     LABS AND  DATA: Personally reviewed  Recent Labs     21  0655 21  0518   WBC 9.4 10.0   HGB 11.3* 13.0   HCT 33.4* 38.6    240     Recent Labs     21  0655 21  0518   * 134*   K 3.6 4.1   CL 95* 94*   CO2 32 32   BUN 23* 20   CREA 0.48* 0.54*   * 209*   CA 8.6 9.3   MG 2.3 2.4   PHOS 3.4 3.9     Recent Labs     21  0655 21  0518   AP 70 85   TP 5.7* 6.5   ALB 2.5* 3.0*   GLOB 3.2 3.5     Recent Labs     21  0655 21  0518   INR 1.0 1.0   PTP 10.3 10.0      Recent Labs     21  0108 21  0336   PHI 7.47* 7.44   PCO2I 44.3 46.7*   PO2I 93 110*   FIO2I 70 70 No results for input(s): CPK, CKMB, TROIQ, BNPP in the last 72 hours. Ventilator Settings:  Mode Rate Tidal Volume Pressure FiO2 PEEP   Assist control,Volume control   450 ml    60 % 12 cm H20     Peak airway pressure: 28 cm H2O    Minue ventilation: 9.76 l/min      MEDS: Reviewed    Chest X-Ray: personally reviewed and report checked    · TTE: 11/24: LV: Estimated LVEF is 50 - 55%. Normal cavity size, wall thickness and diastolic function. Low normal systolic function. 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 Continue RASS -4. Continue fentanyl versed. S pneumonia in sputum 12/3. Off nimbex since 12/2.  - Diuresis goal 1L+ today  - Continue steroids  - Continue cefazolin    HLD: Continue statin    DM2 c/b hyperglycemia:   - Continue NPH  - Continue SSI    Hypotension: Possibly related to sedation. Not on pressors  - Continue midodrine    Agitation: Increased agitation with attempts to decrease sedation  - Add enteral sedation/opiods  - Attempt to decrease IV sedation  - Add seroquel     Deconditioning: Unable to participate in PT/OT at this time    Multidisciplinary Rounds Completed: Y    ABCDEF Bundle/Checklist  Pain Medications: Fentanyl  Target RASS: -4 - Deep Sedation - No response to voice, but movement or eye opening  Sedation Medications:Versed  CAM-ICU: SILVIA  Mobility:Poor  PT/OT: Unable to participate: PROM  Restraints:Y  Discussed Plan of Care (goals of care):  Y  Addressed Code Status: F    CARDIOVASCULAR  Cardiac Gtts: N  SBP Goal of: > 90 mmHg  MAP Goal of: > 65 mmHg  Transfusion Trigger (Hgb): <7 g/dL    RESPIRATORY  Vent Goals:   Optimize PEEP/Ventilation/Oxygenation  DVT Prophylaxis (if no, list reason): Lovenox   SPO2 Goal: > 92%  Pulmonary toilet: Duo-Nebs     GI/  Gold Catheter Present: Yes  GI Prophylaxis: Pepcid (famotidine)   Nutrition: Yes TF  IVFs:N  Bowel Movement:Y  Bowel Regimen:Y  Insulin: Y    ANTIBIOTICS  Antibiotics:  Cefazolin    T/L/D  Tubes:ETT  Lines:PICC  Drains: Gold    SPECIAL EQUIPMENT  Vent    DISPOSITION  ICU    CRITICAL CARE CONSULTANT NOTE  I had a face to face encounter with the patient, reviewed and interpreted patient data including clinical events, labs, images, vital signs, I/O's, and examined patient. I have discussed the case and the plan and management of the patient's care with the consulting services, the bedside nurses and the respiratory therapist.      NOTE OF PERSONAL INVOLVEMENT IN CARE   This patient has a high probability of imminent, clinically significant deterioration, which requires the highest level of preparedness to intervene urgently. I participated in the decision-making and personally managed or directed the management of the following life and organ supporting interventions that required my frequent assessment to treat or prevent imminent deterioration. I personally spent 60 minutes of critical care time. This is time spent at this critically ill patient's bedside actively involved in patient care as well as the coordination of care and discussions with the patient's family. This does not include any procedural time which has been billed separately.     Susy Bermudez, BETTINA  [facility]  12/17/2021

## 2021-12-18 NOTE — PROGRESS NOTES
0730: Bedside shift change report given to rn (oncoming nurse) by Cornelia Herrmann (offgoing nurse). Report included the following information SBAR, Kardex, Intake/Output, MAR, Recent Results and Cardiac Rhythm sr.

## 2021-12-18 NOTE — PROGRESS NOTES
0730 Bedside and Verbal shift change report given to Julissa Lizama (oncoming nurse) by Vanesa Puente and Lyn Emery RN (offgoing nurse). Report included the following information SBAR, Kardex, ED Summary, Intake/Output, MAR, Accordion, Recent Results, Med Rec Status, Cardiac Rhythm sinus rhythm/sinus ras and Alarm Parameters . 1220 Updated pt's wife, Rachna Horn, via phone. Let Carolynn Lane, NP know that Rachna Carusor would like her to call her today. 1800 Set up ZOOM call with Rachna Carusor. She would like to have another ZOOM call tomorrow, 12/19/21 at 1800.     1930 Bedside and Verbal shift change report given to Ariana Zelaya RN (oncoming nurse) by Pieter Poe RN (offgoing nurse). Report included the following information SBAR, Kardex, ED Summary, Intake/Output, MAR, Accordion, Recent Results, Med Rec Status, Cardiac Rhythm Sinus rhythm/sinus ras and Alarm Parameters .

## 2021-12-18 NOTE — PROGRESS NOTES
[facility]    ICU TEAM Progress Note    Name: Yary Yates   : 1975   MRN: [id number]   Date: 2021      Subjective:   Progress Note: 2021      Mr Brandi Rondon is a 56 yo male with a PMH of DM2, HTN and obesity admitted on  to [facility] for COVID-19 pneumonia. He was treated with tocilizumab and steroids. He was not vaccinated. He decompensated and was intubated and . He was paralyzed, proned/supinated. He was transferred to [facility] on  to be evaluated for possible ECMO. CVC and arterial line placed at [facility] on . Nimbex was turned off . He was weaned to 60% Fio2 as of 12/3. Proning/supinating therapy continued. He continued to demonstrated fever. He was started on cefepime/fluconazole on 12/3 with vanc added .      his FIo2 increased to 70% and decreased back to 60% on . CVC changed to PICC on  as blood culture had been positive for 4/4 S epi on 12/3 (cleared in culture on ). He was on vanc for 2 weeks, until . Changed to cefazolin on 12/15. He has been responsive to diuresis over the past few days and has been weaned to 60% FiO2. Attempts were made to decrease sedation on , but this was not successful due to increased agitation. Due to this, seroquel was added on . Enteral oxycodone and valium were also added on . His versed was weaned from 19mg/h to 17/mg/h on pm.  We will attempt further weaning on . Seroquel will be increased today as well.     Active Problem List:     Problem List  Date Reviewed: 2021          Codes Class    COVID ICD-10-CM: U07.1  ICD-9-CM: 079.89         Acute hypoxemic respiratory failure due to COVID-19 [facility]) ICD-10-CM: U07.1, J96.01  ICD-9-CM: 518.81, 079.89, 799.02         Pneumonia due to COVID-19 virus ICD-10-CM: U07.1, J12.82  ICD-9-CM: 480.8, 079.89         Hyperglycemia due to type 2 diabetes mellitus (HCC) ICD-10-CM: E11.65  ICD-9-CM: 250.00         Primary hypertension ICD-10-CM: I10  ICD-9-CM: 401.9         Hyponatremia ICD-10-CM: E87.1  ICD-9-CM: 276.1         Lactic acidosis ICD-10-CM: E87.2  ICD-9-CM: 276.2         Hyperlipidemia ICD-10-CM: E78.5  ICD-9-CM: 272.4         Obesity (BMI 30-39. 9) ICD-10-CM: E66.9  ICD-9-CM: 278.00         COVID-19 vaccination not done ICD-10-CM: Z28.9  ICD-9-CM: V64.00               Past Medical History:      has a past medical history of COVID-19, Diabetes ([address] Hyperlipidemia, and Hypertension. Past Surgical History:      has a past surgical history that includes hx orthopaedic (Right). Home Medications:     Prior to Admission medications    Medication Sig Start Date End Date Taking? Authorizing Provider   cisatracurium (NIMBEX) IV infusion 0-0.869 mg/min by IntraVENous route TITRATE. 12/1/21  Yes Michael Daniels MD   enoxaparin (LOVENOX) 100 mg/mL 90 mg by SubCUTAneous route every twelve (12) hours every twelve (12) hours. 12/1/21  Yes Michael Daniels MD   insulin glargine (LANTUS) 100 unit/mL injection 24 Units by SubCUTAneous route nightly. Indications: type 2 diabetes mellitus 12/1/21  Yes Michael Daniels MD   midazolam in normal saline (VERSED) 1 mg/mL soln 0-10 mg/hr by IntraVENous route TITRATE. Max Daily Amount: 240 mg. 12/1/21  Yes Michael Daniels MD   Norepinephrine Bitartrate (LEVOPHED) 8 mg/250 mL (32 mcg/mL) soln 0.5-16 mcg/min by IntraVENous route TITRATE. 12/1/21  Yes Michael Daniels MD   ascorbic acid, vitamin C, (VITAMIN C) 500 mg tablet Take 1 Tablet by mouth two (2) times a day. 12/1/21   Michael Daniels MD   cholecalciferol (VITAMIN D3) (1000 Units /25 mcg) tablet Take 2 Tablets by mouth daily. 12/1/21   Michael Daniels MD   melatonin, rapid dissolve, 5 mg TbDi tablet Take 1 Tablet by mouth nightly. 12/1/21   Michael Daniels MD   atorvastatin (LIPITOR) 20 mg tablet Take 20 mg by mouth daily. Provider, Historical   telmisartan (MICARDIS) 80 mg tablet Take 80 mg by mouth daily.  Indications: high blood pressure    Provider, Historical Allergies/Social/Family History: Allergies   Allergen Reactions    Alupent [Metaproterenol] Swelling      Social History     Tobacco Use    Smoking status: Never Smoker    Smokeless tobacco: Not on file   Substance Use Topics    Alcohol use: Yes     Comment: drinksonce a week      Family History   Problem Relation Age of Onset    Diabetes Mother     Hypertension Mother     Stroke Mother     Hodgkin's lymphoma Mother     Diabetes Father     Hypertension Father     Cystic Fibrosis Father     Diabetes Maternal Aunt     Diabetes Maternal Uncle        Review of Systems:     Unable    Objective:   Vital Signs:  Visit Vitals  /88 (BP 1 Location: Left arm, BP Patient Position: At rest)   Pulse 70   Temp 98.8 °F (37.1 °C)   Resp 18   Ht 5' 6\" (1.676 m)   Wt 87 kg (191 lb 12.8 oz)   SpO2 98%   BMI 30.96 kg/m²    O2 Flow Rate (L/min): 60 l/min O2 Device: Endotracheal tube,Ventilator Temp (24hrs), Av.1 °F (37.3 °C), Min:98.8 °F (37.1 °C), Max:99.6 °F (37.6 °C)           Intake/Output:     Intake/Output Summary (Last 24 hours) at 2021 0910  Last data filed at 2021 0800  Gross per 24 hour   Intake 2644.06 ml   Output 3490 ml   Net -845.94 ml       Physical Exam:    General:  Sedated/intubated/RASS -3  Eye: PERRLA  Neurologic: Furrows brow when noxious stimuli applied to BUE; withdraws to noxious stimuli in all extremities; does not follow comamnds   Neck: Supple, no JVD  Lungs:   On mechanical ventilation; CTAB  Heart: RRR, 2+ pulses, 1+ edema of BLE  Abdomen:Soft, nontender, nondistended  Skin:  No rash or lesion  Verified     LABS AND  DATA: Personally reviewed  Recent Labs     21  0520 21  0655   WBC 13.5* 9.4   HGB 9.7* 11.3*   HCT 28.8* 33.4*    223     Recent Labs     21  0520 21  0655   * 133*   K 3.6 3.6   CL 91* 95*   CO2 34* 32   BUN 25* 23*   CREA 0.53* 0.48*   * 170*   CA 8.8 8.6   MG 2.6* 2.3   PHOS 3.4 3.4     Recent Labs 12/18/21  0520 12/17/21  0655   AP 72 70   TP 6.3* 5.7*   ALB 2.8* 2.5*   GLOB 3.5 3.2     Recent Labs     12/18/21  0520 12/17/21  0655   INR 1.0 1.0   PTP 10.3 10.3      Recent Labs     12/18/21  0520 12/17/21  0108   PHI 7.51* 7.47*   PCO2I 43.9 44.3   PO2I 65* 93   FIO2I 60 70     No results for input(s): CPK, CKMB, TROIQ, BNPP in the last 72 hours. Ventilator Settings:  Mode Rate Tidal Volume Pressure FiO2 PEEP   Assist control,Volume control   450 ml    70 % 10 cm H20     Peak airway pressure: 27 cm H2O    Minue ventilation: 8.89 l/min      MEDS: Reviewed    Chest X-Ray: personally reviewed and report checked    · TTE: 11/24: LV: Estimated LVEF is 50 - 55%. Normal cavity size, wall thickness and diastolic function. Low normal systolic function. Assessment and Plan     [facility]-85 Pneumonia complicated by Severe ARDS c/b pneumococcal pneumopnia: Received tocilizumab. Continue RASS -4. Continue fentanyl versed. S pneumonia in sputum 12/3. Off nimbex since 12/2.  - Diuresis goal 500-1L today w diamox due to development of contraction alkalosis  - Continue steroids  - Continue cefazolin    HLD: Continue statin    DM2 c/b hyperglycemia:   - Continue NPH  - Continue SSI    Hypotension: Possibly related to sedation. Not on pressors  - Continue midodrine    Agitation: Increased agitation with attempts to decrease sedation  - Continue enteral sedation/opiods  - Attempt to decrease IV sedation  - Increase seroquel to 25mg TID    Deconditioning: Unable to participate in PT/OT at this time    Multidisciplinary Rounds Completed: Y    ABCDEF Bundle/Checklist  Pain Medications: Fentanyl  Target RASS: 0  Sedation Medications:Versed  CAM-ICU: SILVIA  Mobility:Poor  PT/OT: Unable to participate: PROM  Restraints:Y  Discussed Plan of Care (goals of care):  Y  Addressed Code Status: F    CARDIOVASCULAR  Cardiac Gtts: N  SBP Goal of: > 90 mmHg  MAP Goal of: > 65 mmHg  Transfusion Trigger (Hgb): <7 g/dL    RESPIRATORY  Vent Goals: Optimize PEEP/Ventilation/Oxygenation  DVT Prophylaxis (if no, list reason): Lovenox   SPO2 Goal: > 92%  Pulmonary toilet: Duo-Nebs     GI/  Gold Catheter Present: Yes  GI Prophylaxis: Pepcid (famotidine)   Nutrition: Yes TF  IVFs:N  Bowel Movement:Y  Bowel Regimen:Y  Insulin: Y    ANTIBIOTICS  Antibiotics:  Cefazolin    T/L/D  Tubes:ETT  Lines:PICC  Drains: Gold    SPECIAL EQUIPMENT  Vent    DISPOSITION  ICU    CRITICAL CARE CONSULTANT NOTE  I had a face to face encounter with the patient, reviewed and interpreted patient data including clinical events, labs, images, vital signs, I/O's, and examined patient. I have discussed the case and the plan and management of the patient's care with the consulting services, the bedside nurses and the respiratory therapist.      NOTE OF PERSONAL INVOLVEMENT IN CARE   This patient has a high probability of imminent, clinically significant deterioration, which requires the highest level of preparedness to intervene urgently. I participated in the decision-making and personally managed or directed the management of the following life and organ supporting interventions that required my frequent assessment to treat or prevent imminent deterioration. I personally spent 60 minutes of critical care time. This is time spent at this critically ill patient's bedside actively involved in patient care as well as the coordination of care and discussions with the patient's family. This does not include any procedural time which has been billed separately.     Kristine Kim NP  [facility]  12/18/2021

## 2021-12-19 NOTE — PROGRESS NOTES
[facility]    ICU TEAM Progress Note    Name: Chon James   : 1975   MRN: [id number]   Date: 2021      Subjective:   Progress Note: 2021      Mr Mayi Graham is a 54 yo male with a PMH of DM2, HTN and obesity admitted on  to [facility] for COVID-19 pneumonia. He was treated with tocilizumab and steroids. He was not vaccinated. He decompensated and was intubated and . He was paralyzed, proned/supinated. He was transferred to [facility] on  to be evaluated for possible ECMO. CVC and arterial line placed at [facility] on . Nimbex was turned off . He was weaned to 60% Fio2 as of 12/3. Proning/supinating therapy continued. He continued to demonstrated fever. He was started on cefepime/fluconazole on 12/3 with vanc added .      his FIo2 increased to 70% and decreased back to 60% on . CVC changed to PICC on  as blood culture had been positive for 4/4 S epi on 12/3 (cleared in culture on ). He was on vanc for 2 weeks, until . Changed to cefazolin on 12/15. He has been responsive to diuresis over the past few days and has been weaned to 60% FiO2. Attempts were made to decrease sedation on , but this was not successful due to increased agitation. Due to this, seroquel was added on . Enteral oxycodone and valium were also added on . His versed was weaned from 19mg/h to 17/mg/h on pm.  His versed was further weaned on  as his seroquel was increased. As of , his versed is at 11mg/h. He continues on the fentanyl infusion. He remained afebrile the last 24h. Diuresis was attempted with diamox due to contraction alkalosis. This was not successful and he was net positive 1L. His contraction alkalosis has resolved and diuresis will resume today with bumex and diuril.      Active Problem List:     Problem List  Date Reviewed: 2021          Codes Class    COVID ICD-10-CM: U07.1  ICD-9-CM: 079.89         Acute hypoxemic respiratory failure due to COVID-19 [facility]) ICD-10-CM: U07.1, J96.01  ICD-9-CM: 518.81, 079.89, 799.02         Pneumonia due to COVID-19 virus ICD-10-CM: U07.1, J12.82  ICD-9-CM: 480.8, 079.89         Hyperglycemia due to type 2 diabetes mellitus (HCC) ICD-10-CM: E11.65  ICD-9-CM: 250.00         Primary hypertension ICD-10-CM: I10  ICD-9-CM: 401.9         Hyponatremia ICD-10-CM: E87.1  ICD-9-CM: 276.1         Lactic acidosis ICD-10-CM: E87.2  ICD-9-CM: 276.2         Hyperlipidemia ICD-10-CM: E78.5  ICD-9-CM: 272.4         Obesity (BMI 30-39. 9) ICD-10-CM: E66.9  ICD-9-CM: 278.00         COVID-19 vaccination not done ICD-10-CM: Z28.9  ICD-9-CM: V64.00               Past Medical History:      has a past medical history of COVID-19, Diabetes ([address] Hyperlipidemia, and Hypertension. Past Surgical History:      has a past surgical history that includes hx orthopaedic (Right). Home Medications:     Prior to Admission medications    Medication Sig Start Date End Date Taking? Authorizing Provider   cisatracurium (NIMBEX) IV infusion 0-0.869 mg/min by IntraVENous route TITRATE. 12/1/21  Yes Tonya Li MD   enoxaparin (LOVENOX) 100 mg/mL 90 mg by SubCUTAneous route every twelve (12) hours every twelve (12) hours. 12/1/21  Yes Tonya Li MD   insulin glargine (LANTUS) 100 unit/mL injection 24 Units by SubCUTAneous route nightly. Indications: type 2 diabetes mellitus 12/1/21  Yes Tonya Li MD   midazolam in normal saline (VERSED) 1 mg/mL soln 0-10 mg/hr by IntraVENous route TITRATE. Max Daily Amount: 240 mg. 12/1/21  Yes Tonya Li MD   Norepinephrine Bitartrate (LEVOPHED) 8 mg/250 mL (32 mcg/mL) soln 0.5-16 mcg/min by IntraVENous route TITRATE. 12/1/21  Yes Tonya Li MD   ascorbic acid, vitamin C, (VITAMIN C) 500 mg tablet Take 1 Tablet by mouth two (2) times a day. 12/1/21   Tonya Li MD   cholecalciferol (VITAMIN D3) (1000 Units /25 mcg) tablet Take 2 Tablets by mouth daily.  12/1/21   Tonya Li MD   melatonin, rapid dissolve, 5 mg TbDi tablet Take 1 Tablet by mouth nightly. 21   Lottie Billingsley MD   atorvastatin (LIPITOR) 20 mg tablet Take 20 mg by mouth daily. Provider, Historical   telmisartan (MICARDIS) 80 mg tablet Take 80 mg by mouth daily. Indications: high blood pressure    Provider, Historical       Allergies/Social/Family History: Allergies   Allergen Reactions    Alupent [Metaproterenol] Swelling      Social History     Tobacco Use    Smoking status: Never Smoker    Smokeless tobacco: Not on file   Substance Use Topics    Alcohol use: Yes     Comment: drinksonce a week      Family History   Problem Relation Age of Onset    Diabetes Mother     Hypertension Mother     Stroke Mother     Hodgkin's lymphoma Mother     Diabetes Father     Hypertension Father     Cystic Fibrosis Father     Diabetes Maternal Aunt     Diabetes Maternal Uncle        Review of Systems:     Unable    Objective:   Vital Signs:  Visit Vitals  /86   Pulse 96   Temp 98.5 °F (36.9 °C)   Resp 18   Ht 5' 6\" (1.676 m)   Wt 79 kg (174 lb 2.6 oz)   SpO2 96%   BMI 28.11 kg/m²    O2 Flow Rate (L/min): 60 l/min O2 Device: Ventilator,Endotracheal tube,Heated,Humidifier Temp (24hrs), Av.8 °F (37.1 °C), Min:98.5 °F (36.9 °C), Max:99.3 °F (37.4 °C)           Intake/Output:     Intake/Output Summary (Last 24 hours) at 2021 0803  Last data filed at 2021 0700  Gross per 24 hour   Intake 3091.09 ml   Output 2015 ml   Net 1076.09 ml       Physical Exam:    General:  Sedated/intubated/RASS -3  Eye: PERRLA  Neurologic: Furrows brow when noxious stimuli applied to BUE; withdraws to noxious stimuli in all extremities; does not follow comamnds   Neck: Supple, no JVD  Lungs:   On mechanical ventilation; CTAB  Heart: RRR, 2+ pulses, 1+ edema of BLE  Abdomen:Soft, nontender, nondistended  Skin:  No rash or lesion  Verified     LABS AND  DATA: Personally reviewed  Recent Labs     21  0325 21  0520   WBC 14.5* 13.5*   HGB 12.2 9.7*   HCT 35.9* 28.8*    291     Recent Labs     12/19/21  0325 12/18/21  0520   * 131*   K 3.5 3.6   CL 94* 91*   CO2 32 34*   BUN 19 25*   CREA 0.45* 0.53*   * 186*   CA 8.8 8.8   MG 2.4 2.6*   PHOS 3.3 3.4     Recent Labs     12/19/21  0325 12/18/21  0520   AP 79 72   TP 6.2* 6.3*   ALB 2.7* 2.8*   GLOB 3.5 3.5     Recent Labs     12/19/21  0325 12/18/21  0520   INR 1.0 1.0   PTP 10.3 10.3      Recent Labs     12/18/21  0520 12/17/21  0108   PHI 7.51* 7.47*   PCO2I 43.9 44.3   PO2I 65* 93   FIO2I 60 70     No results for input(s): CPK, CKMB, TROIQ, BNPP in the last 72 hours. Ventilator Settings:  Mode Rate Tidal Volume Pressure FiO2 PEEP   Assist control,Volume control   450 ml  8 cm H2O 60 % 10 cm H20     Peak airway pressure: 32 cm H2O    Minue ventilation: 8.78 l/min      MEDS: Reviewed    Chest X-Ray: personally reviewed and report checked    · TTE: 11/24: LV: Estimated LVEF is 50 - 55%. Normal cavity size, wall thickness and diastolic function. Low normal systolic function. 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 Continue RASS -4. Continue fentanyl versed. S pneumonia in sputum 12/3. Off nimbex since 12/2.  - Diuresis goal 500-1L today w bumex and diuril  - Continue steroids  - Continue cefazolin    HLD: Continue statin    DM2 c/b hyperglycemia:   - Continue NPH  - Continue SSI    Hypotension: Possibly related to sedation. Not on pressors  - Continue midodrine    Agitation: Started enteral benzo/opioids with attempts to decrease IV sedatives.    - Continue enteral sedation/opiods  - Attempt to decrease IV sedation  - Continue seroquel at 25mg TID    Deconditioning: Unable to participate in PT/OT at this time    Multidisciplinary Rounds Completed: Y    ABCDEF Bundle/Checklist  Pain Medications: Fentanyl  Target RASS: 0  Sedation Medications:Versed  CAM-ICU: SILVIA  Mobility:Poor  PT/OT: Unable to participate: PROM  Restraints:Y  Discussed Plan of Care (goals of care): Y  Addressed Code Status: F    CARDIOVASCULAR  Cardiac Gtts: N  SBP Goal of: > 90 mmHg  MAP Goal of: > 65 mmHg  Transfusion Trigger (Hgb): <7 g/dL    RESPIRATORY  Vent Goals:   Optimize PEEP/Ventilation/Oxygenation  DVT Prophylaxis (if no, list reason): Lovenox   SPO2 Goal: > 92%  Pulmonary toilet: Duo-Nebs     GI/  Gold Catheter Present: Yes  GI Prophylaxis: Pepcid (famotidine)   Nutrition: Yes TF  IVFs:N  Bowel Movement:Y  Bowel Regimen:Y  Insulin: Y    ANTIBIOTICS  Antibiotics:  Cefazolin    T/L/D  Tubes:ETT  Lines:PICC  Drains: Gold    SPECIAL EQUIPMENT  Vent    DISPOSITION  ICU    CRITICAL CARE CONSULTANT NOTE  I had a face to face encounter with the patient, reviewed and interpreted patient data including clinical events, labs, images, vital signs, I/O's, and examined patient. I have discussed the case and the plan and management of the patient's care with the consulting services, the bedside nurses and the respiratory therapist.      NOTE OF PERSONAL INVOLVEMENT IN CARE   This patient has a high probability of imminent, clinically significant deterioration, which requires the highest level of preparedness to intervene urgently. I participated in the decision-making and personally managed or directed the management of the following life and organ supporting interventions that required my frequent assessment to treat or prevent imminent deterioration. I personally spent 60 minutes of critical care time. This is time spent at this critically ill patient's bedside actively involved in patient care as well as the coordination of care and discussions with the patient's family. This does not include any procedural time which has been billed separately.     Yumiko Mata NP  [facility]  12/19/2021

## 2021-12-19 NOTE — PROGRESS NOTES
5490- bedside report received from [facility] using sbar format-sedative wean continues as pt tolerates  0600- with lower dose of iv sedation pt slowly/weakly SORIANO  Eyes open spont though does not focus/track  No command following-bilat wrist rest applied for pt and staff safety    0730-bedside report given to RN using sbar format

## 2021-12-20 NOTE — ROUTINE PROCESS
Bedside and Verbal shift change report given to [address] Batista (oncoming nurse) by [address] (offgoing nurse).  Report included the following information SBAR, Kardex, Recent Results and Cardiac Rhythm SR.

## 2021-12-20 NOTE — DIABETES MGMT
[address]    CLINICAL NURSE SPECIALIST CONSULT     Initial Presentation   Brennon Kinney is a 55 y.o. male who presented to [facility] ED on 11/23/21 with known COVID-19 viral infection with worsening SOB. HX:   Past Medical History:   Diagnosis Date    COVID-19     Diabetes ([address]     Hyperlipidemia     Hypertension         INITIAL DX:   COVID [U07.1] Severe hypoxemic respiratory failure    Current Treatment     TX: IV steroids, intubation    Consulted by Tima Elmore MD  for advanced diabetes nursing assessment and care for:   [x] Inpatient management strategy    Hospital Course   Clinical progress has been complicated by hyperglycemia, worsening respiratory failure. 11/23: ICU admission at [facility]. HFNC. IV abox, Decadron 6mg daily  11/24: Tociluxumab  11/28: Intubated. Started vasopressors. 12/1: Transfer to [facility] for higher level of care  12/3: Fevers, abox adjusted. BC + GPC in clusters 3/4 and 1/4 in separate draws   12/5: Respiratory culture with Strep, blood cultures with staph epi. 12/7: Lines changed  12/14: Decadron weaned to 5mg daily (from 10mg daily)  12/20: With agitation, difficulty adjusting sedation meds  Diabetes History   Type 2 Diabetes  Marina Robles, DO: PCP    Diabetes-related Medical History  Acute complications  Steroid induced hyperglycemia  Neurological complications  Unknown  Microvascular disease  None  Macrovascular disease  None     Diabetes Medication History  Key Antihyperglycemic Medications             insulin glargine (LANTUS) 100 unit/mL injection (Taking) 24 Units by SubCUTAneous route nightly. Indications: type 2 diabetes mellitus    glipiZIDE SR (GLUCOTROL XL) 10 mg CR tablet (Discontinued) Take 10 mg by mouth daily. metFORMIN ER (GLUCOPHAGE XR) 500 mg tablet (Discontinued) Take 500 mg by mouth daily (with dinner). Subjective   Patient intubated and sedated  Objective   Physical exam  General Overweight male. Intubated   Neuro  Sedated  Vital Signs   Visit Vitals  /82   Pulse (!) 104   Temp 99.1 °F (37.3 °C)   Resp 14   Ht 5' 6\" (1.676 m)   Wt 77.9 kg (171 lb 11.8 oz)   SpO2 97%   BMI 27.72 kg/m²     PE deferred due to COVID-19 infection. Able to visualize through ICU doors    Laboratory  Recent Labs     21  0431 21  0325 21  0520   * 141* 186*   AGAP 8 6 6   WBC 10.1 14.5* 13.5*   CREA 0.51* 0.45* 0.53*   GFRNA >60 >60 >60   AST 22 14* 20   ALT 27 26 31         Blood glucose pattern      Significant diabetes-related events over the past 24-72 hours  Intubated/sedated  On:  versed, fentanyl   Decadron- d/c  Admitting B  BG now   Enteral feeds: Glucerna 1.5 at 65ml/hr (207gm CHO based on 24° infusion)  NPH: 14 units NPH Q12  Humalog: 3 units via correction in the last 24h      Assessment and Plan   Nursing Diagnosis Risk for unstable blood glucose pattern   Nursing Intervention Domain 5258 Decision-making Support   Nursing Interventions Examined current inpatient diabetes/blood glucose control   Explored factors facilitating and impeding inpatient management  Explored corrective strategies with patient and responsible inpatient provider   Informed patient of rational for insulin strategy while hospitalized     Evaluation   Luciano Galeas is a 55year old gentleman, with uncontrolled Type 2 diabetes, who was admitted with acute hypoxic respiratory failure s/t COVID-19 pneumonia. He did not achieve diabetes control prior to admission, as evidenced by A1c of 8.4%. He was admitted at [facility] for 8 days prior to transfer to [facility],  On arrival at [facility], his initial glucose was significantly elevated at 400.  6mg decadron was initiated and continued for 5 days. Glucose there was 195-290 with low dose lantus and correctional humalog. When Decadron was advanced to 20mg daily, on , lantus was slowly titrated up to 30 units daily but he never sustained glucose control.       Several factors have played a role in blood glucose management including:  [x] Critical nature of illness state: Stress hyperglycemia/Severe COVID pna  [x]  Changing nutritive sources & needs: Enteral feeds w/ 151 grams CHO/24h   [x] Compromised insulin absorption or delivery on vasopressors   [x] Glucocorticoid use: Decadron 5 mg daily    As glucose was elevated on admission to [facility] at 339, 50 units Lantus was started (covering low dose diabetes and high dose for decadron). His best glucose was 290 and he transiently switched to NPH before going on an insulin gtt for a few hours over the weekend. He transitioned off with lower insulin doses- Lantus 30 units daily for which he remained hyperglycemic. NPH adjusted to 40 units Q12 and then 55 units Q12 and he will still need a higher basal dose to override steroid, insulin resistance and carbs in enteral feeds. As BG remains elevated despite advancement, NPH was dosed at Q6hr intervals for which he had excellent glucose response s/t poor perfusion and severe insulin resistance. Decadron was weaned to 5mg daily (from 10mg daily) and NPH reduced to 14 units. Decadron was discontinued over the weekend and will need to continue to reduce basal insulin as glucsoe now 89. I do expect that his insulin needs may continue to decline as his clinical course prolongs. ICU BG goal 140mg/dl-180mg/dl. Recommendations   1. POC glucose Q6    2. Enteral feeds per primary team/dietician    3. Continue the Subcutaneous Insulin Order set (7999)  Insulin Dosing Specific recommendation   Basal                                      (Based on weight, BMI & GFR) Reduce NPH to 10 units NPH Q12   Reduce tomorrow to 8 units   NPH Q12 if fasting BG   below 120 (this is low dose   0.2units/kg/day)   Corrective                                       (Useful in adjusting insulin dosing) Adjust to normal sensitivity Q6.            Billing Code(s)   [x] [id number]    Before making these care recommendations, I personally reviewed the hosptialization record, including laboratory and diagnostic data, medications and examined the patient at bedside (circumstances permitting).   Total minutes: 13    ANICETO River  Diabetes Clinical Nurse Specialist  [facility]  Access via [email or web address]

## 2021-12-20 NOTE — PROGRESS NOTES
1145 ICU multidisciplinary rounds lead by Mitul Plummer NP (Intensivist): The following were reviewed and discussed: current labs,  recent imaging, lines/drains, review of body systems, nutrition, cultures, mobility, length of ICU stay. The plan of care for the day is as follows  Plan to keep sedation at the same rate, no weaning off sedation today(written note by RN)         1630 Pt' HR in 130's to 140's. Bp in 140's to 150's. pt more awake,restless,moving all extremities,prn Versed given. HR 99 /71,pt is calm now.

## 2021-12-20 NOTE — PROGRESS NOTES
ID Progress Note  2021    Subjective:     T-max 100.6  Intubated it    Objective:     Antibiotics:  1. Vancomycin --  2. Cefepime 12/3-   3. Ceftriaxone --     Vitals:   Visit Vitals  /69   Pulse (!) 107   Temp 99.1 °F (37.3 °C)   Resp 15   Ht 5' 6\" (1.676 m)   Wt 77.9 kg (171 lb 11.8 oz)   SpO2 94%   BMI 27.72 kg/m²        Tmax:  Temp (24hrs), Av.9 °F (37.7 °C), Min:99.1 °F (37.3 °C), Max:100.6 °F (38.1 °C)      Exam:  Observational only    Labs:      Recent Labs     21  0431 21  0325 21  0520   WBC 10.1 14.5* 13.5*   HGB 13.2 12.2 9.7*    233 291   BUN 22* 19 25*   CREA 0.51* 0.45* 0.53*   AP 93 79 72   TBILI 0.5 0.3 0.4       Cultures:     No results found for: SDES  Lab Results   Component Value Date/Time    Culture result: NO GROWTH 5 DAYS 2021 09:38 AM    Culture result: (A) 2021 06:01 AM     STAPHYLOCOCCUS EPIDERMIDIS GROWING IN ALL 4 BOTTLES DRAWN FROM THE RIGHT ARM    Culture result:  2021 06:01 AM     (NOTE) CALLED POSITIVE BLOOD CULTURE OF GPC IN CLUSTERS GROWING IN 1 OUT OF 4 BOTTLES TO [place] AT 2237 ON 12/3/21. AT  REPORT OF GRAM POSITIVE COCCI IN CLUSTERS GROWING IN 3 OF 4 BOTTLES CALLED TO READ BACK BY BRADY CHISHOLM ON 21 AT 0630. AOW    Culture result: MODERATE STREPTOCOCCUS PNEUMONIAE (A) 2021 06:01 AM    Culture result: LIGHT NORMAL RESPIRATORY TAMARA 2021 06:01 AM        FIO2 60%, prone position, suctioned bloody drainage through nostril earlier, no active bleeding noted it during visit  Assessment:     1. Severe COVID pneumonia--Pneumococcus from respiratory culture  2. VDRF  3. Hypoxic respiratory failure  4. Bacteremia--S epi--line has been changed, completed the ABX therapy on  with ancef    Objective:     1. Completed antibiotic therapy, monitor for off ABX  2. Fever work up if temp >= 100.4  3.  May need to further image chest, abdomen and pelvis when able    Above plan of care discussed and agreed with Dr. Leslie Gandhi, NP

## 2021-12-20 NOTE — PROGRESS NOTES
Vent Settings: PC +12/ f 14/ +12 PEEP/ 80% FIO2  Ti 1.44  I:E  1:2     No vent changes made after this ABG. This RT discussed ABG results with NP. Pt agitated at time of blood draw. NP giving sedation. No order to repeat ABG obtained. Results for Xavier Santiago (MRN [id number]) as of 12/20/2021 05:51   Ref.  Range 12/20/2021 05:12   pH Latest Ref Range: 7.35 - 7.45   7.55 (H)   PCO2 Latest Ref Range: 35 - 45 mmHg 38   PO2 Latest Ref Range: 80 - 100 mmHg 59 (L)   BICARBONATE Latest Ref Range: 22 - 26 mmol/L 32 (H)   O2 SAT Latest Ref Range: 92 - 97 % 93   BASE EXCESS Latest Units: mmol/L 9.2   Sample source Latest Units:   ARTERIAL   SITE Latest Units:   DRAWN FROM ARTERIAL LINE   MARISELA'S TEST Latest Units:   NOT APPLICABLE   MODE Latest Units:   PRESSURE CONTROL   FIO2 Latest Units: % 80   PEEP/CPAP Latest Units:   12.0   O2 METHOD Latest Units:   VENT   SET RATE Latest Units:   14

## 2021-12-20 NOTE — PROGRESS NOTES
1930-Bedside and Verbal shift change report given to Lexus CHISHOLM (oncoming nurse) by Fredis Bosch (offgoing nurse).  Report included the following information SBAR, Kardex, Recent Results and Cardiac Rhythm SR.   2340-fent dec to 175mcg/versed weaning continues   0000-waking up/ tearful weakly follows commands in BLE moving toes    spont SORIANO weakly  0325-awake/restless reaching for ETT 's sbp 140  fent 100mcg given  0330-sedated/calm  HR 95  0425-versed dec to 9mg/hr  0529-awake/agitated 's sbp 140's raising arms slowly up in the air-NP Anne updated and valium inc to 10mg PO  D2wl-pajpo  0600-calm HR 99 sbp 110      0730-bedside report given to RN using sbar format

## 2021-12-20 NOTE — PROGRESS NOTES
[facility]    ICU TEAM Progress Note    Name: Wilbur Medina   : 1975   MRN: [id number]   Date: 2021      Subjective:   Progress Note: 2021      Mr Charu Cardenas is a 54 yo male with a PMH of DM2, HTN and obesity admitted on  to [facility] for COVID-19 pneumonia. He was treated with tocilizumab and steroids. He was not vaccinated. He decompensated and was intubated and . He was paralyzed, proned/supinated. He was transferred to [facility] on  to be evaluated for possible ECMO. CVC and arterial line placed at [facility] on . Nimbex was turned off . He was weaned to 60% Fio2 as of 12/3. Proning/supinating therapy continued. He continued to demonstrated fever. He was started on cefepime/fluconazole on 12/3 with vanc added .      his FIo2 increased to 70% and decreased back to 60% on . CVC changed to PICC on  as blood culture had been positive for 4/4 S epi on 12/3 (cleared in culture on ). He was on vanc for 2 weeks, until . Changed to cefazolin on 12/15. He has been responsive to diuresis over the past few days and has been weaned to 60% FiO2. Attempts were made to decrease sedation on , but this was not successful due to increased agitation. Due to this, seroquel was added on . Enteral oxycodone and valium were also added on . His versed was weaned from 19mg/h to 17/mg/h on pm.  His versed was further weaned on  as his seroquel was increased. As of , his versed is at 9  He continues on the fentanyl infusion.  He had a temp of 39.1 overnight, but he also experienced an episode of severe agitation and appears diaphoretic this am, possibly associated with withdrawal.      Active Problem List:     Problem List  Date Reviewed: 2021          Codes Class    COVID ICD-10-CM: U07.1  ICD-9-CM: 079.89         Acute hypoxemic respiratory failure due to COVID-19 [facility]) ICD-10-CM: U07.1, J96.01  ICD-9-CM: 518.81, 079.89, 799.02         Pneumonia due to COVID-19 virus ICD-10-CM: U07.1, J12.82  ICD-9-CM: 480.8, 079.89         Hyperglycemia due to type 2 diabetes mellitus (HCC) ICD-10-CM: E11.65  ICD-9-CM: 250.00         Primary hypertension ICD-10-CM: I10  ICD-9-CM: 401.9         Hyponatremia ICD-10-CM: E87.1  ICD-9-CM: 276.1         Lactic acidosis ICD-10-CM: E87.2  ICD-9-CM: 276.2         Hyperlipidemia ICD-10-CM: E78.5  ICD-9-CM: 272.4         Obesity (BMI 30-39. 9) ICD-10-CM: E66.9  ICD-9-CM: 278.00         COVID-19 vaccination not done ICD-10-CM: Z28.9  ICD-9-CM: V64.00               Past Medical History:      has a past medical history of COVID-19, Diabetes ([facility] [address] Hyperlipidemia, and Hypertension. Past Surgical History:      has a past surgical history that includes hx orthopaedic (Right). Home Medications:     Prior to Admission medications    Medication Sig Start Date End Date Taking? Authorizing Provider   cisatracurium (NIMBEX) IV infusion 0-0.869 mg/min by IntraVENous route TITRATE. 12/1/21  Yes Agustina Cline MD   enoxaparin (LOVENOX) 100 mg/mL 90 mg by SubCUTAneous route every twelve (12) hours every twelve (12) hours. 12/1/21  Yes Agustina Cline MD   insulin glargine (LANTUS) 100 unit/mL injection 24 Units by SubCUTAneous route nightly. Indications: type 2 diabetes mellitus 12/1/21  Yes Agustina Cline MD   midazolam in normal saline (VERSED) 1 mg/mL soln 0-10 mg/hr by IntraVENous route TITRATE. Max Daily Amount: 240 mg. 12/1/21  Yes Agustina Cline MD   Norepinephrine Bitartrate (LEVOPHED) 8 mg/250 mL (32 mcg/mL) soln 0.5-16 mcg/min by IntraVENous route TITRATE. 12/1/21  Yes Agustina Cline MD   ascorbic acid, vitamin C, (VITAMIN C) 500 mg tablet Take 1 Tablet by mouth two (2) times a day. 12/1/21   Agustina Cline MD   cholecalciferol (VITAMIN D3) (1000 Units /25 mcg) tablet Take 2 Tablets by mouth daily. 12/1/21   Agustina Cline MD   melatonin, rapid dissolve, 5 mg TbDi tablet Take 1 Tablet by mouth nightly.  12/1/21   Agustina Cline MD   atorvastatin (LIPITOR) 20 mg tablet Take 20 mg by mouth daily. Provider, Historical   telmisartan (MICARDIS) 80 mg tablet Take 80 mg by mouth daily. Indications: high blood pressure    Provider, Historical       Allergies/Social/Family History: Allergies   Allergen Reactions    Alupent [Metaproterenol] Swelling      Social History     Tobacco Use    Smoking status: Never Smoker    Smokeless tobacco: Not on file   Substance Use Topics    Alcohol use: Yes     Comment: drinksonce a week      Family History   Problem Relation Age of Onset    Diabetes Mother     Hypertension Mother     Stroke Mother     Hodgkin's lymphoma Mother     Diabetes Father     Hypertension Father     Cystic Fibrosis Father     Diabetes Maternal Aunt     Diabetes Maternal Uncle        Review of Systems:     Unable    Objective:   Vital Signs:  Visit Vitals  BP 97/72   Pulse 99   Temp 100.3 °F (37.9 °C)   Resp 15   Ht 5' 6\" (1.676 m)   Wt 77.9 kg (171 lb 11.8 oz)   SpO2 95%   BMI 27.72 kg/m²    O2 Flow Rate (L/min): 60 l/min O2 Device: Endotracheal tube,Ventilator Temp (24hrs), Av °F (37.8 °C), Min:99.2 °F (37.3 °C), Max:100.6 °F (38.1 °C)           Intake/Output:     Intake/Output Summary (Last 24 hours) at 2021 0705  Last data filed at 2021 0600  Gross per 24 hour   Intake 2161.85 ml   Output 3415 ml   Net -1253.15 ml       Physical Exam:    General:  Sedated/intubated/RASS -1 to -2  Eye: PERRLA  Neurologic: Furrowed brow; withdraws to noxious stimuli in all extremities; does not follow commands, SORIANO spontaneously; diaphoretic forehead; looks around room sponteanously  Neck: Supple, no JVD  Lungs:   On mechanical ventilation; CTAB  Heart: RRR, 2+ pulses, 1+ edema of BLE  Abdomen:Soft, nontender, nondistended  Skin:  No rash or lesion  Verified     LABS AND  DATA: Personally reviewed  Recent Labs     21  0431 21  0325   WBC 10.1 14.5*   HGB 13.2 12.2   HCT 38.9 35.9*    233     Recent Labs 12/20/21  0431 12/19/21  0325   * 132*   K 3.0* 3.5   CL 90* 94*   CO2 34* 32   BUN 22* 19   CREA 0.51* 0.45*   * 141*   CA 8.8 8.8   MG 2.1 2.4   PHOS 3.5 3.3     Recent Labs     12/20/21  0431 12/19/21  0325   AP 93 79   TP 6.4 6.2*   ALB 2.9* 2.7*   GLOB 3.5 3.5     Recent Labs     12/20/21  0431 12/19/21  0325   INR 1.0 1.0   PTP 10.4 10.3      Recent Labs     12/18/21  0520   PHI 7.51*   PCO2I 43.9   PO2I 65*   FIO2I 60     No results for input(s): CPK, CKMB, TROIQ, BNPP in the last 72 hours. Ventilator Settings:  Mode Rate Tidal Volume Pressure FiO2 PEEP   Assist control,Volume control   500 ml  8 cm H2O 80 % 12 cm H20     Peak airway pressure: 26 cm H2O    Minue ventilation: 12.5 l/min      MEDS: Reviewed    Chest X-Ray: personally reviewed and report checked    · TTE: 11/24: LV: Estimated LVEF is 50 - 55%. Normal cavity size, wall thickness and diastolic function. Low normal systolic function. Assessment and Plan     [facility]- Pneumonia complicated by Severe ARDS c/b pneumococcal pneumonia: Received tocilizumab. Continue RASS -4. Continue fentanyl versed. S pneumonia in sputum 12/3. Off nimbex since 12/2.  - Continue steroids  - Continue cefazolin  - Repeat sputum culture on 12/10 given fever overnight  - Procal negative  - CXR unrevealing  - Fever overnight likely associated with agitation and withdrawal  - Required increase in FiO2 overnight in the setting of severe agitation; will repeat ABG this afteroon after attempting to wean    HLD: Continue statin    DM2 c/b hyperglycemia:   - Continue NPH  - Continue SSI    Hypotension: Possibly related to sedation. Not on pressors  - Continue midodrine    Agitation: Started enteral benzo/opioids with attempts to decrease IV sedatives.    - Continue enteral sedation/opiods  - Hold today on further attempts to decrease versed given overnight agitation  - Increase seroquel at 50mg TID    Deconditioning: Unable to participate in PT/OT at this time    Multidisciplinary Rounds Completed: Y    ABCDEF Bundle/Checklist  Pain Medications: Fentanyl  Target RASS: 0  Sedation Medications:Versed  CAM-ICU: SILVIA  Mobility:Poor  PT/OT: Unable to participate: PROM  Restraints:Y  Discussed Plan of Care (goals of care): Y  Addressed Code Status: F    CARDIOVASCULAR  Cardiac Gtts: N  SBP Goal of: > 90 mmHg  MAP Goal of: > 65 mmHg  Transfusion Trigger (Hgb): <7 g/dL    RESPIRATORY  Vent Goals:   Optimize PEEP/Ventilation/Oxygenation  DVT Prophylaxis (if no, list reason): Lovenox   SPO2 Goal: > 92%  Pulmonary toilet: Duo-Nebs     GI/  Gold Catheter Present: Yes  GI Prophylaxis: Pepcid (famotidine)   Nutrition: Yes TF  IVFs:N  Bowel Movement:Y  Bowel Regimen:Y  Insulin: Y    ANTIBIOTICS  Antibiotics:  Cefazolin    T/L/D  Tubes:ETT  Lines:PICC  Drains: Gold    SPECIAL EQUIPMENT  Vent    DISPOSITION  ICU    CRITICAL CARE CONSULTANT NOTE  I had a face to face encounter with the patient, reviewed and interpreted patient data including clinical events, labs, images, vital signs, I/O's, and examined patient. I have discussed the case and the plan and management of the patient's care with the consulting services, the bedside nurses and the respiratory therapist.      NOTE OF PERSONAL INVOLVEMENT IN CARE   This patient has a high probability of imminent, clinically significant deterioration, which requires the highest level of preparedness to intervene urgently. I participated in the decision-making and personally managed or directed the management of the following life and organ supporting interventions that required my frequent assessment to treat or prevent imminent deterioration. I personally spent 60 minutes of critical care time. This is time spent at this critically ill patient's bedside actively involved in patient care as well as the coordination of care and discussions with the patient's family.   This does not include any procedural time which has been billed separately.     Evelyn España NP  [facility] [place] [facility]  12/20/2021

## 2021-12-21 NOTE — DIABETES MGMT
[address]    CLINICAL NURSE SPECIALIST CONSULT     Initial Presentation   Harlan Veronica is a 55 y.o. male who presented to [facility] ED on 11/23/21 with known COVID-19 viral infection with worsening SOB. HX:   Past Medical History:   Diagnosis Date    COVID-19     Diabetes ([address]     Hyperlipidemia     Hypertension         INITIAL DX:   COVID [U07.1] Severe hypoxemic respiratory failure    Current Treatment     TX: IV steroids, intubation    Consulted by Marinell Boeck, MD  for advanced diabetes nursing assessment and care for:   [x] Inpatient management strategy    Hospital Course   Clinical progress has been complicated by hyperglycemia, worsening respiratory failure. 11/23: ICU admission at [facility]. HFNC. IV abox, Decadron 6mg daily  11/24: Tociluxumab  11/28: Intubated. Started vasopressors. 12/1: Transfer to [facility] for higher level of care  12/3: Fevers, abox adjusted. BC + GPC in clusters 3/4 and 1/4 in separate draws   12/5: Respiratory culture with Strep, blood cultures with staph epi. 12/7: Lines changed  12/14: Decadron weaned to 5mg daily (from 10mg daily)  12/20: With agitation, difficulty adjusting sedation meds  Diabetes History   Type 2 Diabetes  Nelson South DO: PCP    Diabetes-related Medical History  Acute complications  Steroid induced hyperglycemia  Neurological complications  Unknown  Microvascular disease  None  Macrovascular disease  None     Diabetes Medication History  Key Antihyperglycemic Medications             insulin glargine (LANTUS) 100 unit/mL injection (Taking) 24 Units by SubCUTAneous route nightly. Indications: type 2 diabetes mellitus    glipiZIDE SR (GLUCOTROL XL) 10 mg CR tablet (Discontinued) Take 10 mg by mouth daily. metFORMIN ER (GLUCOPHAGE XR) 500 mg tablet (Discontinued) Take 500 mg by mouth daily (with dinner). Subjective   Patient intubated and sedated  Objective   Physical exam  General Overweight male. Intubated   Neuro  Sedated  Vital Signs   Visit Vitals  /77   Pulse 95   Temp 100.2 °F (37.9 °C)   Resp 16   Ht 5' 6\" (1.676 m)   Wt 77.9 kg (171 lb 11.8 oz)   SpO2 97%   BMI 27.72 kg/m²     PE deferred due to COVID-19 infection. Able to visualize through ICU doors    Laboratory  Recent Labs     21  0407 21  0431 21  0325   * 111* 141*   AGAP 2* 8 6   WBC 7.5 10.1 14.5*   CREA 0.39* 0.51* 0.45*   GFRNA >60 >60 >60   AST 18 22 14*   ALT 24 27 26         Blood glucose pattern      Significant diabetes-related events over the past 24-72 hours  Intubated/sedated  On:  versed, fentanyl   Decadron- d/c  Admitting B  BG now   Enteral feeds: Glucerna 1.5 at 65ml/hr (207gm CHO based on 24° infusion)  NPH: 10 units NPH Q12  Humalog: 3 units via correction in the last 24h      Assessment and Plan   Nursing Diagnosis Risk for unstable blood glucose pattern   Nursing Intervention Domain 5253 Decision-making Support   Nursing Interventions Examined current inpatient diabetes/blood glucose control   Explored factors facilitating and impeding inpatient management  Explored corrective strategies with patient and responsible inpatient provider   Informed patient of rational for insulin strategy while hospitalized     Evaluation   Chung Colorado is a 55year old gentleman, with uncontrolled Type 2 diabetes, who was admitted with acute hypoxic respiratory failure s/t COVID-19 pneumonia. He did not achieve diabetes control prior to admission, as evidenced by A1c of 8.4%. He was admitted at [facility] for 8 days prior to transfer to [facility],  On arrival at [facility], his initial glucose was significantly elevated at 400.  6mg decadron was initiated and continued for 5 days. Glucose there was 195-290 with low dose lantus and correctional humalog. When Decadron was advanced to 20mg daily, on , lantus was slowly titrated up to 30 units daily but he never sustained glucose control.       Several factors have played a role in blood glucose management including:  [x] Critical nature of illness state: Stress hyperglycemia/Severe COVID pna  [x]  Changing nutritive sources & needs: Enteral feeds w/ 151 grams CHO/24h   [x] Compromised insulin absorption or delivery on vasopressors   [x] Glucocorticoid use: Decadron 5 mg daily    As glucose was elevated on admission to [facility] at 339, 50 units Lantus was started (covering low dose diabetes and high dose for decadron). His best glucose was 290 and he transiently switched to NPH before going on an insulin gtt for a few hours over the weekend. He transitioned off with lower insulin doses- Lantus 30 units daily for which he remained hyperglycemic. NPH adjusted to 40 units Q12 and then 55 units Q12 and he will still need a higher basal dose to override steroid, insulin resistance and carbs in enteral feeds. As BG remains elevated despite advancement, NPH was dosed at Q6hr intervals for which he had excellent glucose response s/t poor perfusion and severe insulin resistance. Decadron was weaned to 5mg daily (from 10mg daily) and NPH reduced to 14 units. Decadron was discontinued over the weekend and basal insulin reduced to low dose 10 units Q12. Please continue these doses to maintain ICU BG goal 140mg/dl-180mg/dl. Recommendations   1. POC glucose Q6    2. Enteral feeds per primary team/dietician    3. Continue the Subcutaneous Insulin Order set (7015)  Insulin Dosing Specific recommendation   Basal                                      (Based on weight, BMI & GFR) NPH to 10 units NPH Q12        If off feeds: Reduce to 5 units Q12   Reduce tomorrow to 8 units   NPH Q12 if fasting BG   below 120 (this is low dose   0.2units/kg/day)       Corrective                                       (Useful in adjusting insulin dosing) Adjust to normal sensitivity Q6. Diabetes Management Team to sign off at this point as patient's blood glucose remains stable.   Please re-consult us if patient needs change. Thank you for including us in their care. Billing Code(s)   [x] 90854    Before making these care recommendations, I personally reviewed the hosptialization record, including laboratory and diagnostic data, medications and examined the patient at bedside (circumstances permitting).   Total minutes: 13    ANICETO Saleem  Diabetes Clinical Nurse Specialist  [facility]  Access via [employer]

## 2021-12-21 NOTE — PROGRESS NOTES
0730 Bedside and Verbal shift change report given to Pawan Morse RN and Juan M Leonardo RN (oncoming nurse) by TAMI Diane RN (offgoing nurse). Report included the following information SBAR, MAR, Recent Results, Med Rec Status and Cardiac Rhythm NSR- ST.     1130 Updated wife via telephone on patient status. 1141 Versed decreased from 9 mg to 8 mg.    1253 Patient FiO2 decreased to 60%. Patient sats in the low 90s. Will continue to monitor. 1318 Fentanyl decreased from 175 mcg to 150 mcg. 1427 Covid PCR ordered. Patient re- swabbed for covid. 1442 Patient O2 saturation 89% at this time. FiO2 returned to 70%. 1645 Patient febrile. PRN Tylenol administered. AZAEL/Jonny Adkins call with wife and family. Writer and Pawan Morse RN at the bedside. 65 Wife update via telephone on patient condition. 1930 Bedside and Verbal shift change report given to KATHRINE Galvan (oncoming nurse) by Pawan Morse RN and Juan M Leonardo RN (offgoing nurse). Report included the following information SBAR, Intake/Output, MAR, Recent Results, Cardiac Rhythm NSR- ST and Alarm Parameters .

## 2021-12-21 NOTE — PROGRESS NOTES
Transition of Care Plan   RUR-  Medium    DISPOSITION: TBD pending medical progression   F/U with PCP/Specialist     Transport: [facility]  Patient remains on isolation for Covid pneumonia. Patient is intubated on Fentanyl and Versed gtts. Care management is continuing to follow.  Carlee Ladd RN,Care Management

## 2021-12-21 NOTE — PROGRESS NOTES
[facility]    ICU TEAM Progress Note    Name: eLila Boggs   : 1975   MRN: [id number]   Date: 2021      Subjective:   Progress Note: 2021      Mr Gosia Ulloa is a 56 yo male with a PMH of DM2, HTN and obesity admitted on  to [facility] for COVID-19 pneumonia. He was treated with tocilizumab and steroids. He was not vaccinated. He decompensated and was intubated and . He was paralyzed, proned/supinated. He was transferred to [facility] on  to be evaluated for possible ECMO. CVC and arterial line placed at [facility] on . Nimbex was turned off . He was weaned to 60% Fio2 as of 12/3. Proning/supinating therapy continued. He continued to demonstrated fever. He was started on cefepime/fluconazole on 12/3 with vanc added .      his FIo2 increased to 70% and decreased back to 60% on . CVC changed to PICC on  as blood culture had been positive for 4/4 S epi on 12/3 (cleared in culture on ). He was on vanc for 2 weeks, until . Changed to cefazolin on 12/15. He has been responsive to diuresis over the past few days and has been weaned to 60% FiO2. Attempts were made to decrease sedation on , but this was not successful due to increased agitation. Due to this, seroquel was added on . Enteral oxycodone and valium were also added on . His versed was weaned from 19mg/h to 17/mg/h on pm.  His versed was further weaned on  as his seroquel was increased. As of , his versed is at 9  He continues on the fentanyl infusion. He has been afebrile overnight.       Active Problem List:     Problem List  Date Reviewed: 2021          Codes Class    COVID ICD-10-CM: U07.1  ICD-9-CM: 079.89         Acute hypoxemic respiratory failure due to COVID-19 [facility]) ICD-10-CM: U07.1, J96.01  ICD-9-CM: 518.81, 079.89, 799.02         Pneumonia due to COVID-19 virus ICD-10-CM: U07.1, J12.82  ICD-9-CM: 480.8, 079.89 Hyperglycemia due to type 2 diabetes mellitus (HCC) ICD-10-CM: E11.65  ICD-9-CM: 250.00         Primary hypertension ICD-10-CM: I10  ICD-9-CM: 401.9         Hyponatremia ICD-10-CM: E87.1  ICD-9-CM: 276.1         Lactic acidosis ICD-10-CM: E87.2  ICD-9-CM: 276.2         Hyperlipidemia ICD-10-CM: E78.5  ICD-9-CM: 272.4         Obesity (BMI 30-39. 9) ICD-10-CM: E66.9  ICD-9-CM: 278.00         COVID-19 vaccination not done ICD-10-CM: Z28.9  ICD-9-CM: V64.00               Past Medical History:      has a past medical history of COVID-19, Diabetes ([address] Hyperlipidemia, and Hypertension. Past Surgical History:      has a past surgical history that includes hx orthopaedic (Right). Home Medications:     Prior to Admission medications    Medication Sig Start Date End Date Taking? Authorizing Provider   cisatracurium (NIMBEX) IV infusion 0-0.869 mg/min by IntraVENous route TITRATE. 12/1/21  Yes France Morris MD   enoxaparin (LOVENOX) 100 mg/mL 90 mg by SubCUTAneous route every twelve (12) hours every twelve (12) hours. 12/1/21  Yes France Morris MD   insulin glargine (LANTUS) 100 unit/mL injection 24 Units by SubCUTAneous route nightly. Indications: type 2 diabetes mellitus 12/1/21  Yes France Morris MD   midazolam in normal saline (VERSED) 1 mg/mL soln 0-10 mg/hr by IntraVENous route TITRATE. Max Daily Amount: 240 mg. 12/1/21  Yes France Morris MD   Norepinephrine Bitartrate (LEVOPHED) 8 mg/250 mL (32 mcg/mL) soln 0.5-16 mcg/min by IntraVENous route TITRATE. 12/1/21  Yes France Morris MD   ascorbic acid, vitamin C, (VITAMIN C) 500 mg tablet Take 1 Tablet by mouth two (2) times a day. 12/1/21   France Morris MD   cholecalciferol (VITAMIN D3) (1000 Units /25 mcg) tablet Take 2 Tablets by mouth daily. 12/1/21   France Morris MD   melatonin, rapid dissolve, 5 mg TbDi tablet Take 1 Tablet by mouth nightly. 12/1/21   France Morris MD   atorvastatin (LIPITOR) 20 mg tablet Take 20 mg by mouth daily.     Provider, Historical   telmisartan (MICARDIS) 80 mg tablet Take 80 mg by mouth daily. Indications: high blood pressure    Provider, Historical       Allergies/Social/Family History: Allergies   Allergen Reactions    Alupent [Metaproterenol] Swelling      Social History     Tobacco Use    Smoking status: Never Smoker    Smokeless tobacco: Not on file   Substance Use Topics    Alcohol use: Yes     Comment: drinksonce a week      Family History   Problem Relation Age of Onset    Diabetes Mother     Hypertension Mother     Stroke Mother     Hodgkin's lymphoma Mother     Diabetes Father     Hypertension Father     Cystic Fibrosis Father     Diabetes Maternal Aunt     Diabetes Maternal Uncle        Review of Systems:     Unable    Objective:   Vital Signs:  Visit Vitals  /81   Pulse 97   Temp 99.2 °F (37.3 °C)   Resp 15   Ht 5' 6\" (1.676 m)   Wt 77.9 kg (171 lb 11.8 oz)   SpO2 98%   BMI 27.72 kg/m²    O2 Flow Rate (L/min): 60 l/min O2 Device: Endotracheal tube,Ventilator Temp (24hrs), Av.7 °F (37.1 °C), Min:97.8 °F (36.6 °C), Max:99.2 °F (37.3 °C)           Intake/Output:     Intake/Output Summary (Last 24 hours) at 2021 1003  Last data filed at 2021 0700  Gross per 24 hour   Intake 2499.5 ml   Output 1245 ml   Net 1254.5 ml       Physical Exam:    General:  Sedated/intubated/RASS -1 to -2  Eye: PERRLA  Neurologic: Furrowed brow; withdraws to noxious stimuli in all extremities; does not follow commands, SORIANO spontaneously; looks around room sponteanously  Neck: Supple, no JVD  Lungs:   On mechanical ventilation; CTAB  Heart: RRR, 2+ pulses, 1+ edema of BLE  Abdomen:Soft, nontender, nondistended  Skin:  No rash or lesion  Verified     LABS AND  DATA: Personally reviewed  Recent Labs     21  0407 21  0431   WBC 7.5 10.1   HGB 12.1 13.2   HCT 36.2* 38.9    207     Recent Labs     21  0407 21  0431   * 132*   K 3.5 3.0*   CL 97 90*   CO2 34* 34*   BUN 16 22*   CREA 0.39* 0.51*   * 111*   CA 8.7 8.8   MG 2. 5* 2.1   PHOS 3.1 3.5     Recent Labs     12/21/21  0407 12/20/21  0431   AP 68 93   TP 6.2* 6.4   ALB 2.4* 2.9*   GLOB 3.8 3.5     Recent Labs     12/21/21  0407 12/20/21  0431   INR 1.0 1.0   PTP 10.5 10.4      Recent Labs     12/21/21  0514   PHI 7.46*   PCO2I 42.1   PO2I 94   FIO2I 70     No results for input(s): CPK, CKMB, TROIQ, BNPP in the last 72 hours. Ventilator Settings:  Mode Rate Tidal Volume Pressure FiO2 PEEP   Assist control,Pressure control   500 ml  8 cm H2O 70 % 12 cm H20     Peak airway pressure: 25 cm H2O    Minue ventilation: 6.72 l/min      MEDS: Reviewed    Chest X-Ray: personally reviewed and report checked    · TTE: 11/24: LV: Estimated LVEF is 50 - 55%. Normal cavity size, wall thickness and diastolic function. Low normal systolic function. Assessment and Plan     NWCV-46 Pneumonia complicated by Severe ARDS c/b pneumococcal pneumonia: Received tocilizumab. Continue RASS -4. Continue fentanyl versed. S pneumonia in sputum 12/3. Off nimbex since 12/2.  - Continue steroids  - Continue cefazolin  - Procal negative  - CXR unrevealing  - Wean Fio2 tp 60%, recheck ABG in afternoon  - Goal for trach/PEG when patient medically able  - Diuresis with 2mg bumex/500mg diuril    HLD: Continue statin    DM2 c/b hyperglycemia:   - Decreased NPH dose on 12/20   - Continue SSI    Hypotension: Possibly related to sedation. Not on pressors  - Continue midodrine    Agitation: Started enteral benzo/opioids with attempts to decrease IV sedatives. - Continue enteral sedation/opiods  - Attempt to decrease versed to 7mg/h and fent to 150mg/h  - Seroquel at 50mg TID    Deconditioning: Unable to participate in PT/OT at this time    Multidisciplinary Rounds Completed: Y    ABCDEF Bundle/Checklist  Pain Medications: Fentanyl  Target RASS: 0  Sedation Medications:Versed  CAM-ICU: SILVIA  Mobility:Poor  PT/OT: Unable to participate: PROM  Restraints:Y  Discussed Plan of Care (goals of care):  Y  Addressed Code Status: F    CARDIOVASCULAR  Cardiac Gtts: N  SBP Goal of: > 90 mmHg  MAP Goal of: > 65 mmHg  Transfusion Trigger (Hgb): <7 g/dL    RESPIRATORY  Vent Goals:   Optimize PEEP/Ventilation/Oxygenation  DVT Prophylaxis (if no, list reason): Lovenox   SPO2 Goal: > 92%  Pulmonary toilet: Duo-Nebs     GI/  Gold Catheter Present: Yes  GI Prophylaxis: Pepcid (famotidine)   Nutrition: Yes TF  IVFs:N  Bowel Movement:Y  Bowel Regimen:Y  Insulin: Y    ANTIBIOTICS  Antibiotics:  Cefazolin    T/L/D  Tubes:ETT  Lines:PICC  Drains: Gold    SPECIAL EQUIPMENT  Vent    DISPOSITION  ICU    CRITICAL CARE CONSULTANT NOTE  I had a face to face encounter with the patient, reviewed and interpreted patient data including clinical events, labs, images, vital signs, I/O's, and examined patient. I have discussed the case and the plan and management of the patient's care with the consulting services, the bedside nurses and the respiratory therapist.      NOTE OF PERSONAL INVOLVEMENT IN CARE   This patient has a high probability of imminent, clinically significant deterioration, which requires the highest level of preparedness to intervene urgently. I participated in the decision-making and personally managed or directed the management of the following life and organ supporting interventions that required my frequent assessment to treat or prevent imminent deterioration. I personally spent 60 minutes of critical care time. This is time spent at this critically ill patient's bedside actively involved in patient care as well as the coordination of care and discussions with the patient's family. This does not include any procedural time which has been billed separately.     Hi Del Rio NP  [facility]  12/21/2021

## 2021-12-21 NOTE — PROGRESS NOTES
2330 - Bedside and Verbal shift change report given to [address] Am[address] Beckman (oncoming nurse) by Pratibha CHISHOLM (offgoing nurse). Report included the following information SBAR, Kardex, ED Summary, Procedure Summary, Intake/Output, MAR, Recent Results, Cardiac Rhythm NSR/ST and Alarm Parameters . 0730 - Bedside and Verbal shift change report given to [id number] Peter Perry (oncoming nurse) by Claire Acosta RN (offgoing nurse). Report included the following information SBAR, Kardex, ED Summary, Procedure Summary, Intake/Output, MAR, Recent Results, Cardiac Rhythm NSR/ST and Alarm Parameters .

## 2021-12-22 NOTE — PROGRESS NOTES
[facility]    ICU TEAM Progress Note    Name: Diana Ramon   : 1975   MRN: [id number]   Date: 2021      Subjective:   Progress Note: 2021      Mr Nahum Zamudio is a 54 yo male with a PMH of DM2, HTN and obesity admitted on  to [facility] for COVID-19 pneumonia. He was treated with tocilizumab and steroids. He was not vaccinated. He decompensated and was intubated and . He was paralyzed, proned/supinated. He was transferred to [facility] on  to be evaluated for possible ECMO. CVC and arterial line placed at [facility] on . Nimbex was turned off . He was weaned to 60% Fio2 as of 12/3. Proning/supinating therapy continued. He continued to demonstrated fever. He was started on cefepime/fluconazole on 12/3 with vanc added .      his FIo2 increased to 70% and decreased back to 60% on . CVC changed to PICC on  as blood culture had been positive for 4/4 S epi on 12/3 (cleared in culture on ). He was on vanc for 2 weeks, until . Changed to cefazolin on 12/15. He has been responsive to diuresis over the past few days and has been weaned to 60% FiO2. Attempts were made to decrease sedation on , but this was not successful due to increased agitation. Due to this, seroquel was added on . Enteral oxycodone and valium were also added on . His versed was weaned from 19mg/h to 17/mg/h on  pm.  His versed was further weaned on  as his seroquel was increased. : Versed is at 9. He continues on the fentanyl infusion. He has been afebrile overnight. : Versed gtt 7 and fentanyl gtt at 150. Vent weaned to 65% and continued PEEP of 12. Opens eyes and moves ext spontaneously. But does not follow commands, track with eyes, or WD to pain. NGT clogged, will replace today. Completed cefazolin.  Chest xray tomorrow am.    Active Problem List:     Problem List  Date Reviewed: 2021          Codes Class    COVID ICD-10-CM: U07.1  ICD-9-CM: 079.89         Acute hypoxemic respiratory failure due to COVID-19 [facility]) ICD-10-CM: U07.1, J96.01  ICD-9-CM: 518.81, 079.89, 799.02         Pneumonia due to COVID-19 virus ICD-10-CM: U07.1, J12.82  ICD-9-CM: 480.8, 079.89         Hyperglycemia due to type 2 diabetes mellitus (HCC) ICD-10-CM: E11.65  ICD-9-CM: 250.00         Primary hypertension ICD-10-CM: I10  ICD-9-CM: 401.9         Hyponatremia ICD-10-CM: E87.1  ICD-9-CM: 276.1         Lactic acidosis ICD-10-CM: E87.2  ICD-9-CM: 276.2         Hyperlipidemia ICD-10-CM: E78.5  ICD-9-CM: 272.4         Obesity (BMI 30-39. 9) ICD-10-CM: E66.9  ICD-9-CM: 278.00         COVID-19 vaccination not done ICD-10-CM: Z28.9  ICD-9-CM: V64.00               Past Medical History:      has a past medical history of COVID-19, Diabetes ([address] Hyperlipidemia, and Hypertension. Past Surgical History:      has a past surgical history that includes hx orthopaedic (Right). Home Medications:     Prior to Admission medications    Medication Sig Start Date End Date Taking? Authorizing Provider   cisatracurium (NIMBEX) IV infusion 0-0.869 mg/min by IntraVENous route TITRATE. 12/1/21  Yes Wilbert Dubois MD   enoxaparin (LOVENOX) 100 mg/mL 90 mg by SubCUTAneous route every twelve (12) hours every twelve (12) hours. 12/1/21  Yes Wilbert Dubois MD   insulin glargine (LANTUS) 100 unit/mL injection 24 Units by SubCUTAneous route nightly. Indications: type 2 diabetes mellitus 12/1/21  Yes Wilbert Dubois MD   midazolam in normal saline (VERSED) 1 mg/mL soln 0-10 mg/hr by IntraVENous route TITRATE. Max Daily Amount: 240 mg. 12/1/21  Yes Wilbert Dubois MD   Norepinephrine Bitartrate (LEVOPHED) 8 mg/250 mL (32 mcg/mL) soln 0.5-16 mcg/min by IntraVENous route TITRATE. 12/1/21  Yes Wilbert Dubois MD   ascorbic acid, vitamin C, (VITAMIN C) 500 mg tablet Take 1 Tablet by mouth two (2) times a day.  12/1/21   Wilbert Dubois MD   cholecalciferol (VITAMIN D3) (1000 Units /25 mcg) tablet Take 2 Tablets by mouth daily. 21   Jessica Aguero MD   melatonin, rapid dissolve, 5 mg TbDi tablet Take 1 Tablet by mouth nightly. 21   Jessica Aguero MD   atorvastatin (LIPITOR) 20 mg tablet Take 20 mg by mouth daily. Provider, Historical   telmisartan (MICARDIS) 80 mg tablet Take 80 mg by mouth daily. Indications: high blood pressure    Provider, Historical       Allergies/Social/Family History: Allergies   Allergen Reactions    Alupent [Metaproterenol] Swelling      Social History     Tobacco Use    Smoking status: Never Smoker    Smokeless tobacco: Not on file   Substance Use Topics    Alcohol use: Yes     Comment: drinksonce a week      Family History   Problem Relation Age of Onset    Diabetes Mother     Hypertension Mother     Stroke Mother     Hodgkin's lymphoma Mother     Diabetes Father     Hypertension Father     Cystic Fibrosis Father     Diabetes Maternal Aunt     Diabetes Maternal Uncle        Review of Systems:     Unable    Objective:   Vital Signs:  Visit Vitals  /78   Pulse 99   Temp 97.8 °F (36.6 °C)   Resp 16   Ht 5' 6\" (1.676 m)   Wt 77.9 kg (171 lb 11.8 oz)   SpO2 97%   BMI 27.72 kg/m²    O2 Flow Rate (L/min): 60 l/min O2 Device: Endotracheal tube,Ventilator Temp (24hrs), Av °F (37.2 °C), Min:97.8 °F (36.6 °C), Max:100.7 °F (38.2 °C)           Intake/Output:     Intake/Output Summary (Last 24 hours) at 2021 0805  Last data filed at 2021 0700  Gross per 24 hour   Intake 2648.48 ml   Output 3230 ml   Net -581.52 ml       Physical Exam:    General:  Sedated/intubated/RASS -1 to -2  Eye: Pupils are round and reactive bilaterally  Neurologic: Furrowed brow; withdraws to noxious stimuli in all extremities; does not follow commands, SORIANO spontaneously; looks around room sponteanously  Neck: Supple, no JVD  Lungs:   On mechanical ventilation; CTAB  Heart: RRR, 2+ pulses, 1+ edema of BLE   Abdomen:Soft, nontender, nondistended  Skin:  No rash or lesion  Verified     LABS AND  DATA: Personally reviewed  Recent Labs     12/22/21  0413 12/21/21  0407   WBC 6.6 7.5   HGB 12.4 12.1   HCT 37.0 36.2*    200     Recent Labs     12/22/21  0413 12/21/21  0407   * 133*   K 3.0* 3.5   CL 92* 97   CO2 35* 34*   BUN 17 16   CREA 0.38* 0.39*   * 128*   CA 8.9 8.7   MG 2.4 2.5*   PHOS 2.0* 3.1     Recent Labs     12/22/21  0413 12/21/21  0407   AP 79 68   TP 6.5 6.2*   ALB 2.4* 2.4*   GLOB 4.1* 3.8     Recent Labs     12/22/21  0413 12/21/21  0407   INR 1.0 1.0   PTP 10.8 10.5      Recent Labs     12/21/21  1205 12/21/21  0514   PHI 7.46* 7.46*   PCO2I 47.5* 42.1   PO2I 79* 94   FIO2I 60 70     No results for input(s): CPK, CKMB, TROIQ, BNPP in the last 72 hours. Ventilator Settings:  Mode Rate Tidal Volume Pressure FiO2 PEEP   Assist control,Pressure control   500 ml  8 cm H2O 70 % 12 cm H20     Peak airway pressure: 25 cm H2O    Minue ventilation: 8.3 l/min      MEDS: Reviewed    Chest X-Ray: personally reviewed and report checked    · TTE: 11/24: LV: Estimated LVEF is 50 - 55%. Normal cavity size, wall thickness and diastolic function. Low normal systolic function. Assessment and Plan     [id number] Pneumonia complicated by Severe ARDS c/b pneumococcal pneumonia: Received tocilizumab. Continue RASS -4. Continue fentanyl versed. S pneumonia in sputum 12/3. Off nimbex since 12/2.  - Continue steroids  - Completed cefazolin  - Procal negative  - CXR unrevealing  - Wean Fio2 from 70 to 65% - slow wean  - Goal for trach/PEG when patient medically able  - Diuresis PRN, 1 mg Bumex today    HLD: Continue statin    DM2 c/b hyperglycemia:   - Continue NPH 10 q 12hrs   - Continue SSI q 6hrs     Hypotension: Possibly related to sedation. Not on pressors  - Continue midodrine    Agitation: Started enteral benzo/opioids with attempts to decrease IV sedatives.    - Continue enteral sedation/opiods  - Wean sedation as tolerated, on versed 7mg/h and fentanyl 150mg/h  - Seroquel at 50mg TID    Deconditioning: Unable to participate in PT/OT at this time    Multidisciplinary Rounds Completed: Y    ABCDEF Bundle/Checklist  Pain Medications: Fentanyl  Target RASS: 0  Sedation Medications:Versed  CAM-ICU: SILVIA  Mobility:Poor  PT/OT: Unable to participate: PROM  Restraints:Y  Discussed Plan of Care (goals of care): Y  Addressed Code Status: F    CARDIOVASCULAR  Cardiac Gtts: N  SBP Goal of: > 90 mmHg  MAP Goal of: > 65 mmHg  Transfusion Trigger (Hgb): <7 g/dL    RESPIRATORY  Vent Goals:   Optimize PEEP/Ventilation/Oxygenation  DVT Prophylaxis (if no, list reason): Lovenox   SPO2 Goal: > 92%  Pulmonary toilet: Duo-Nebs     GI/  Gold Catheter Present: Yes  GI Prophylaxis: Pepcid (famotidine)   Nutrition: Yes TF  IVFs:N  Bowel Movement:Y  Bowel Regimen:Y  Insulin: Y    ANTIBIOTICS  Antibiotics:  None    T/L/D  Tubes: ETT  Lines: PICC  Drains: Gold    SPECIAL EQUIPMENT  Vent    DISPOSITION  ICU    CRITICAL CARE CONSULTANT NOTE  I had a face to face encounter with the patient, reviewed and interpreted patient data including clinical events, labs, images, vital signs, I/O's, and examined patient. I have discussed the case and the plan and management of the patient's care with the consulting services, the bedside nurses and the respiratory therapist.      NOTE OF PERSONAL INVOLVEMENT IN CARE   This patient has a high probability of imminent, clinically significant deterioration, which requires the highest level of preparedness to intervene urgently. I participated in the decision-making and personally managed or directed the management of the following life and organ supporting interventions that required my frequent assessment to treat or prevent imminent deterioration. I personally spent 45 minutes of critical care time. This is time spent at this critically ill patient's bedside actively involved in patient care as well as the coordination of care and discussions with the patient's family.   This does not include any procedural time which has been billed separately.       Gabriela Torres [facility]     [address] [facility]

## 2021-12-22 NOTE — PROGRESS NOTES
ID Progress Note  2021    Subjective:     T-max 100.7; clinically stablee  Intubated it    Objective:     Antibiotics:  1. Vancomycin --  2. Cefepime 12/3-   3. Ceftriaxone --     Vitals:   Visit Vitals  /88   Pulse (!) 109   Temp 97.8 °F (36.6 °C)   Resp 17   Ht 5' 6\" (1.676 m)   Wt 77.9 kg (171 lb 11.8 oz)   SpO2 99%   BMI 27.72 kg/m²        Tmax:  Temp (24hrs), Av °F (37.2 °C), Min:97.8 °F (36.6 °C), Max:100.7 °F (38.2 °C)      Exam:  Observational only    Labs:      Recent Labs     21  0413 21  0407 21  0431   WBC 6.6 7.5 10.1   HGB 12.4 12.1 13.2    200 207   BUN 17 16 22*   CREA 0.38* 0.39* 0.51*   AP 79 68 93   TBILI 0.6 0.5 0.5       Cultures:     No results found for: SDES  Lab Results   Component Value Date/Time    Culture result: LIGHT NORMAL RESPIRATORY TAMARA 2021 03:33 PM    Culture result: NO GROWTH 5 DAYS 2021 09:38 AM    Culture result: (A) 2021 06:01 AM     STAPHYLOCOCCUS EPIDERMIDIS GROWING IN ALL 4 BOTTLES DRAWN FROM THE RIGHT ARM    Culture result:  2021 06:01 AM     (NOTE) CALLED POSITIVE BLOOD CULTURE OF GPC IN CLUSTERS GROWING IN 1 OUT OF 4 BOTTLES TO ASHLEY AT 2237 ON 12/3/21. AT  REPORT OF GRAM POSITIVE COCCI IN CLUSTERS GROWING IN 3 OF 4 BOTTLES CALLED TO READ BACK BY BRADY CHISHOLM ON 21 AT 0630. AOW    Culture result: MODERATE STREPTOCOCCUS PNEUMONIAE (A) 2021 06:01 AM    Culture result: LIGHT NORMAL RESPIRATORY TAMARA 2021 06:01 AM        FIO2 60%, prone position, suctioned bloody drainage through nostril earlier, no active bleeding noted it during visit  Assessment:     1. Severe COVID pneumonia--Pneumococcus from respiratory culture  2. VDRF  3. Hypoxic respiratory failure  4. Bacteremia--S epi--line has been changed, completed the ABX therapy on  with ancef    Objective:     1.  Completed antibiotic therapy, continue to have on and off daily fever episode, WBC remain normal, no clinical changes. Continue to monitor off ABX  2.  May need to further image chest, abdomen and pelvis when able      Above plan of care discussed and agreed with Dr. Renee Plasencia, NP

## 2021-12-22 NOTE — PROGRESS NOTES
1930: Bedside shift change report given to Abby Villavicencio RN (oncoming nurse) by KATHRINE Murillo (offgoing nurse). Report included the following information SBAR, Kardex, Intake/Output, MAR, Recent Results, Cardiac Rhythm NSR and Alarm Parameters . 0445: ABG improved from AM. PH 7.49, CO2 53.5, O2 82.5, HCO3 40.5. Per RT, rate on vent will be changed from 14 to 16.     0544: K+ 3.0 and phos 2.0 with AM labs. Orders received from Annamarie Garvey 33: Pt awake and very restless. HR 120s-130s and SBP as high as 180s. O2 sats 90s. PRN fentanyl given gor agitation. Will continue to monitor. 0730: Bedside shift change report given to Nelia (oncoming nurse) by Abby Villavicencio RN (offgoing nurse). Report included the following information SBAR, Kardex, Intake/Output, MAR, Recent Results, Cardiac Rhythm Sinus tach and Alarm Parameters .

## 2021-12-22 NOTE — H&P
History and Physical      Irene Tineo MD   Physician   Critical Care Medicine   Consults      Addendum   Date of Service:  12/01/21 1807                 Expand All Collapse All        []Hide copied text    []Jose Antonio for details    HPI     39 y. o. male with poorly controlled type 2 diabetes mellitus, hypertension who was was admitted to an OSH on 11/23 for acute respiratory failure due to covid 19 pneumonia. He wad admitted directly to the ICU on HFNC, received dexamethasone 11/23 to date, S/P Tociluzumab 11/24. His condition progressively worsened - intubated on 11/28. Transferred to [facility] for possible VV - ECMO.  Patient seen, intubated on high vent support.     Review of systems unable to assess at this time          Past Medical History:   Diagnosis Date    COVID-19      Diabetes ([facility] [address]      Hyperlipidemia      Hypertension              Past Surgical History:   Procedure Laterality Date    HX ORTHOPAEDIC Right       wrist surgery           Current Outpatient Medications   Medication Instructions    ascorbic acid (vitamin C) (VITAMIN C) 500 mg, Oral, 2 TIMES DAILY    atorvastatin (LIPITOR) 20 mg, Oral, DAILY    cholecalciferol (VITAMIN D3) 2,000 Units, Oral, DAILY    cisatracurium (NIMBEX) IV infusion 0-10 mcg/kg/min, IntraVENous, TITRATE    dexamethasone (DECADRON) 20 mg, IntraVENous, EVERY 24 HOURS    enoxaparin (LOVENOX) 1 mg/kg, SubCUTAneous, EVERY 12 HOURS    fentaNYL (PF) 0-200 mcg/hr (0-200 mcg/hr), IntraVENous, TITRATE    insulin glargine (LANTUS) 24 Units, SubCUTAneous, EVERY BEDTIME    melatonin (rapid dissolve) 5 mg, Oral, EVERY BEDTIME    midazolam in normal saline (VERSED) 0-10 mg/hr (0-10 mg/hr), IntraVENous, TITRATE    Norepinephrine Bitartrate (LEVOPHED) 0.5-16 mcg/min (0.5-16 mcg/min), IntraVENous, TITRATE    telmisartan (MICARDIS) 80 mg, Oral, DAILY           Allergies   Allergen Reactions    Alupent [Metaproterenol] Swelling      Family History   Problem Relation Age of Onset    Diabetes Mother      Hypertension Mother      Stroke Mother      Hodgkin's lymphoma Mother      Diabetes Father      Hypertension Father      Cystic Fibrosis Father      Diabetes Maternal Aunt      Diabetes Maternal Uncle        Social History            Socioeconomic History    Marital status: SINGLE       Spouse name: Not on file    Number of children: Not on file    Years of education: Not on file    Highest education level: Not on file   Occupational History    Not on file   Tobacco Use    Smoking status: Never Smoker    Smokeless tobacco: Not on file   Substance and Sexual Activity    Alcohol use: Yes       Comment: drinksonce a week    Drug use: Never    Sexual activity: Not on file   Other Topics Concern     Service Not Asked    Blood Transfusions Not Asked    Caffeine Concern Not Asked    Occupational Exposure Not Asked    Hobby Hazards Not Asked    Sleep Concern Not Asked    Stress Concern Not Asked    Weight Concern Not Asked    Special Diet Not Asked    Back Care Not Asked    Exercise Not Asked    Bike Helmet Not Asked   Union Not Asked    Self-Exams Not Asked   Social History Narrative    Not on file      Social Determinants of Health          Financial Resource Strain:     Difficulty of Paying Living Expenses: Not on file   Food Insecurity:     Worried About Running Out of Food in the Last Year: Not on file    Elvis of Food in the Last Year: Not on file   Transportation Needs:     Lack of Transportation (Medical): Not on file    Lack of Transportation (Non-Medical):  Not on file   Physical Activity:     Days of Exercise per Week: Not on file    Minutes of Exercise per Session: Not on file   Stress:     Feeling of Stress : Not on file   Social Connections:     Frequency of Communication with Friends and Family: Not on file    Frequency of Social Gatherings with Friends and Family: Not on file    Attends Holiness Services: Not on file    Active Member of Clubs or Organizations: Not on file    Attends Club or Organization Meetings: Not on file    Marital Status: Not on file   Intimate Partner Violence:     Fear of Current or Ex-Partner: Not on file    Emotionally Abused: Not on file    Physically Abused: Not on file    Sexually Abused: Not on file   Housing Stability:     Unable to Pay for Housing in the Last Year: Not on file    Number of Places Lived in the Last Year: Not on file    Unstable Housing in the Last Year: Not on file      Patient Vitals for the past 24 hrs:    Temp Pulse Resp BP SpO2   12/01/21 1800  (!) 52 20  96 %   12/01/21 1700  79 20  91 %   12/01/21 1600 98.2 °F (36.8 °C) (!) 58 20 122/65 93 %   12/01/21 1500  (!) 56 20  96 %   12/01/21 1437  68 20  95 %   12/01/21 1400  69 20  94 %   12/01/21 1300  73 20 95/68 (!) 87 %   12/01/21 1230 98.8 °F (37.1 °C) 69 20 109/85 (!) 86 %      Physical exam  General-intubated, sedated, paralyzed  Neuro-pupils reactive, flaccid  Cardiac-RRR  Lungs-coarse bilaterally  Abdomen-soft, nondistended  Extremities-warm     Recent Results   Recent Results (from the past 24 hour(s))   GLUCOSE, POC     Collection Time: 11/30/21 11:03 PM   Result Value Ref Range     Glucose (POC) 235 (H) 70 - 110 mg/dL   D DIMER     Collection Time: 12/01/21  4:10 AM   Result Value Ref Range     D DIMER 3.05 (H) <0.46 ug/ml(FEU)   PROTHROMBIN TIME + INR     Collection Time: 12/01/21  4:10 AM   Result Value Ref Range     Prothrombin time 14.3 11.5 - 15.2 sec     INR 1.2 0.8 - 1.2     PTT     Collection Time: 12/01/21  4:10 AM   Result Value Ref Range     aPTT 27.9 23.0 - 36.4 SEC   C REACTIVE PROTEIN, QT     Collection Time: 12/01/21  4:10 AM   Result Value Ref Range     C-Reactive protein 0.7 (H) 0 - 0.3 mg/dL   LD     Collection Time: 12/01/21  4:10 AM   Result Value Ref Range      (H) 81 - 234 U/L   FERRITIN     Collection Time: 12/01/21  4:10 AM   Result Value Ref Range     Ferritin 1,831 (H) 8 - 388 NG/ML   CBC WITH AUTOMATED DIFF     Collection Time: 12/01/21  4:10 AM   Result Value Ref Range     WBC 6.7 4.6 - 13.2 K/uL     RBC 4.28 (L) 4.35 - 5.65 M/uL     HGB 12.6 (L) 13.0 - 16.0 g/dL     HCT 37.7 36.0 - 48.0 %     MCV 88.1 78.0 - 100.0 FL     MCH 29.4 24.0 - 34.0 PG     MCHC 33.4 31.0 - 37.0 g/dL     RDW 12.5 11.6 - 14.5 %     PLATELET 823 225 - 270 K/uL     MPV 9.0 (L) 9.2 - 11.8 FL     NRBC 0.0 0  WBC     ABSOLUTE NRBC 0.00 0.00 - 0.01 K/uL     NEUTROPHILS 78 (H) 40 - 73 %     BAND NEUTROPHILS 10 (H) 0 - 5 %     LYMPHOCYTES 8 (L) 21 - 52 %     MONOCYTES 3 3 - 10 %     EOSINOPHILS 1 0 - 5 %     BASOPHILS 0 0 - 2 %     IMMATURE GRANULOCYTES 0 %     ABS. NEUTROPHILS 5.9 1.8 - 8.0 K/UL     ABS. LYMPHOCYTES 0.5 (L) 0.9 - 3.6 K/UL     ABS. MONOCYTES 0.2 0.05 - 1.2 K/UL     ABS. EOSINOPHILS 0.1 0.0 - 0.4 K/UL     ABS. BASOPHILS 0.0 0.0 - 0.1 K/UL     ABS. IMM. GRANS. 0.0 K/UL     DF MANUAL       PLATELET COMMENTS ADEQUATE PLATELETS       RBC COMMENTS NORMOCYTIC, NORMOCHROMIC     METABOLIC PANEL, COMPREHENSIVE     Collection Time: 12/01/21  4:10 AM   Result Value Ref Range     Sodium 141 136 - 145 mmol/L     Potassium 4.7 3.5 - 5.5 mmol/L     Chloride 105 100 - 111 mmol/L     CO2 31 21 - 32 mmol/L     Anion gap 5 3.0 - 18 mmol/L     Glucose 246 (H) 74 - 99 mg/dL     BUN 31 (H) 7.0 - 18 MG/DL     Creatinine 0.72 0.6 - 1.3 MG/DL     BUN/Creatinine ratio 43 (H) 12 - 20       GFR est AA >60 >60 ml/min/1.73m2     GFR est non-AA >60 >60 ml/min/1.73m2     Calcium 8.9 8.5 - 10.1 MG/DL     Bilirubin, total 0.5 0.2 - 1.0 MG/DL     ALT (SGPT) 62 (H) 16 - 61 U/L     AST (SGOT) 59 (H) 10 - 38 U/L     Alk.  phosphatase 54 45 - 117 U/L     Protein, total 6.7 6.4 - 8.2 g/dL     Albumin 3.5 3.4 - 5.0 g/dL     Globulin 3.2 2.0 - 4.0 g/dL     A-G Ratio 1.1 0.8 - 1.7     CALCIUM, IONIZED     Collection Time: 12/01/21  4:10 AM   Result Value Ref Range     Ionized Calcium 1.15 1.12 - 1.32 MMOL/L   MAGNESIUM     Collection Time: 12/01/21  4:10 AM   Result Value Ref Range     Magnesium 2.7 (H) 1.6 - 2.6 mg/dL   PHOSPHORUS     Collection Time: 12/01/21  4:10 AM   Result Value Ref Range     Phosphorus 3.4 2.5 - 4.9 MG/DL   PROCALCITONIN     Collection Time: 12/01/21  4:10 AM   Result Value Ref Range     Procalcitonin 0.06 ng/mL   BLOOD GAS, ARTERIAL POC     Collection Time: 12/01/21  5:32 AM   Result Value Ref Range     Device: ADULT VENT       FIO2 (POC) 90 %     pH (POC) 7.44 7.35 - 7.45       pCO2 (POC) 48.3 (H) 35.0 - 45.0 MMHG     pO2 (POC) 244 (H) 80 - 100 MMHG     HCO3 (POC) 32.6 (H) 22 - 26 MMOL/L     sO2 (POC) 99.8 (H) 92 - 97 %     Base excess (POC) 7.3 mmol/L     Mode Volume Control       Tidal volume 450 ml     Set Rate 20 bpm     PEEP/CPAP (POC) 15 cmH2O     Mean Airway Pressure 20 cmH2O     PIP (POC) 29       Allens test (POC) Positive       Site RIGHT RADIAL       Patient temp.  98.9       Specimen type (POC) ARTERIAL       Performed by Nicole Noguera     GLUCOSE, POC     Collection Time: 12/01/21  5:36 AM   Result Value Ref Range     Glucose (POC) 241 (H) 70 - 110 mg/dL   GLUCOSE, POC     Collection Time: 12/01/21  1:00 PM   Result Value Ref Range     Glucose (POC) 187 (H) 65 - 117 mg/dL     Performed by Kyle Castanon     PTT     Collection Time: 12/01/21  1:41 PM   Result Value Ref Range     aPTT 24.4 22.1 - 31.0 sec     aPTT, therapeutic range     58.0 - 77.0 SECS   PROTHROMBIN TIME + INR     Collection Time: 12/01/21  1:41 PM   Result Value Ref Range     INR 1.1 0.9 - 1.1       Prothrombin time 11.3 (H) 9.0 - 28.0 sec   METABOLIC PANEL, COMPREHENSIVE     Collection Time: 12/01/21  1:41 PM   Result Value Ref Range     Sodium 143 136 - 145 mmol/L     Potassium 4.3 3.5 - 5.1 mmol/L     Chloride 106 97 - 108 mmol/L     CO2 27 21 - 32 mmol/L     Anion gap 10 5 - 15 mmol/L     Glucose 185 (H) 65 - 100 mg/dL     BUN 33 (H) 6 - 20 MG/DL     Creatinine 0.64 (L) 0.70 - 1.30 MG/DL     BUN/Creatinine ratio 52 (H) 12 - 20       GFR est AA >60 >60 ml/min/1.73m2     GFR est non-AA >60 >60 ml/min/1.73m2     Calcium 9.2 8.5 - 10.1 MG/DL     Bilirubin, total 0.6 0.2 - 1.0 MG/DL     ALT (SGPT) 62 12 - 78 U/L     AST (SGOT) 53 (H) 15 - 37 U/L     Alk. phosphatase 55 45 - 117 U/L     Protein, total 6.5 6.4 - 8.2 g/dL     Albumin 3.6 3.5 - 5.0 g/dL     Globulin 2.9 2.0 - 4.0 g/dL     A-G Ratio 1.2 1.1 - 2.2     CBC WITH AUTOMATED DIFF     Collection Time: 12/01/21  1:41 PM   Result Value Ref Range     WBC 7.8 4.1 - 11.1 K/uL     RBC 4.32 4.10 - 5.70 M/uL     HGB 12.8 12.1 - 17.0 g/dL     HCT 38.3 36.6 - 50.3 %     MCV 88.7 80.0 - 99.0 FL     MCH 29.6 26.0 - 34.0 PG     MCHC 33.4 30.0 - 36.5 g/dL     RDW 12.6 11.5 - 14.5 %     PLATELET 488 543 - 643 K/uL     MPV 8.8 (L) 8.9 - 12.9 FL     NRBC 0.0 0  WBC     ABSOLUTE NRBC 0.00 0.00 - 0.01 K/uL     NEUTROPHILS 85 (H) 32 - 75 %     LYMPHOCYTES 9 (L) 12 - 49 %     MONOCYTES 6 5 - 13 %     EOSINOPHILS 0 0 - 7 %     BASOPHILS 0 0 - 1 %     IMMATURE GRANULOCYTES 0 0.0 - 0.5 %     ABS. NEUTROPHILS 6.6 1.8 - 8.0 K/UL     ABS. LYMPHOCYTES 0.7 (L) 0.8 - 3.5 K/UL     ABS. MONOCYTES 0.5 0.0 - 1.0 K/UL     ABS. EOSINOPHILS 0.0 0.0 - 0.4 K/UL     ABS. BASOPHILS 0.0 0.0 - 0.1 K/UL     ABS. IMM.  GRANS. 0.0 0.00 - 0.04 K/UL     DF SMEAR SCANNED       RBC COMMENTS NORMOCYTIC, NORMOCHROMIC     MAGNESIUM     Collection Time: 12/01/21  1:41 PM   Result Value Ref Range     Magnesium 2.7 (H) 1.6 - 2.4 mg/dL   PHOSPHORUS     Collection Time: 12/01/21  1:41 PM   Result Value Ref Range     Phosphorus 3.5 2.6 - 4.7 MG/DL   LACTIC ACID     Collection Time: 12/01/21  1:41 PM   Result Value Ref Range     Lactic acid 3.0 (HH) 0.4 - 2.0 MMOL/L   POC G3 - PUL     Collection Time: 12/01/21  1:42 PM   Result Value Ref Range     FIO2 (POC) 75 %     pH (POC) 7.44 7.35 - 7.45       pCO2 (POC) 39.4 35.0 - 45.0 MMHG     pO2 (POC) 62 (L) 80 - 100 MMHG     HCO3 (POC) 27.0 (H) 22 - 26 MMOL/L     sO2 (POC) 92.2 92 - 97 %     Base excess (POC) 2.8 mmol/L     Site DRAWN FROM ARTERIAL LINE       Device: ADULT VENT       Mode ASSIST CONTROL       Tidal volume 450 ml     Set Rate 20 bpm     PEEP/CPAP (POC) 15 cmH2O     Allens test (POC) NOT APPLICABLE       Specimen type (POC) ARTERIAL     VITAMIN D, 25 HYDROXY     Collection Time: 12/01/21  2:27 PM   Result Value Ref Range     Vitamin D 25-Hydroxy 22.4 (L) 30 - 100 ng/mL   MYCOPLASMA AB, IGM     Collection Time: 12/01/21  2:27 PM   Result Value Ref Range     Mycoplasma Ab, IgM NONREACTIVE NR     PROCALCITONIN     Collection Time: 12/01/21  2:27 PM   Result Value Ref Range     Procalcitonin 0.05 ng/mL   POC G3 - PUL     Collection Time: 12/01/21  2:55 PM   Result Value Ref Range     FIO2 (POC) 75 %     pH (POC) 7.44 7.35 - 7.45       pCO2 (POC) 35.1 35.0 - 45.0 MMHG     pO2 (POC) 86 80 - 100 MMHG     HCO3 (POC) 23.6 22 - 26 MMOL/L     sO2 (POC) 96.9 92 - 97 %     Base deficit (POC) 0.3 mmol/L     Site DRAWN FROM ARTERIAL LINE       Device: ADULT VENT       Mode ASSIST CONTROL       Tidal volume 450 ml     Set Rate 20 bpm     PEEP/CPAP (POC) 15 cmH2O     Allens test (POC) NOT APPLICABLE       Specimen type (POC) ARTERIAL     GLUCOSE, POC     Collection Time: 12/01/21  5:12 PM   Result Value Ref Range     Glucose (POC) 210 (H) 65 - 117 mg/dL     Performed by Alejandra Camara           Imaging reviewed     Assessment     ARDS secondary to Covid pneumonia     Plan     Neuro-analgesia/sedation with opioids, propofol and Versed as needed, continue NMB for severe hypoxemia     Cardiac-continue hemodynamic monitoring, keep maps above 65, will use low-dose midodrine for now, use Levophed as needed     Pulmonary-continue lung protective mechanical ventilation, PEEP currently at 15, saturation goal greater than 88%, permissive hypercapnia, will prone as needed, DEXA ARDS steroid dosing, ?  VV ECMO candidate-follow-up cardiac surgery recommendations     GI-start tube feeds, H2 RA GI prophylaxis     Renal-monitor urine output, correct electrolyte derangements as needed     Hematology-Lovenox for DVT prophylaxis     ID-Covid pneumonia-status post Tocilizumab, was not deemed a candidate for remdesivir at outside hospital     Endocrinology-keep glucose less than 180     Critical care time-80 minutes excluding procedure time          Revision History

## 2021-12-22 NOTE — PROGRESS NOTES
0730 Bedside and Verbal shift change report given to Odette Johnston RN and Andrew Castellano RN  (oncoming nurse) by Ana Lawrence. KATHRINE Humphrey (offgoing nurse). Report included the following information SBAR, Kardex, ED Summary, Procedure Summary, Intake/Output, MAR, Accordion, Recent Results, Med Rec Status, Cardiac Rhythm Sinus rhythm/sinus tach and Alarm Parameters . 1551 Pt tachycardic and hypertensive. PRN 5 mg versed given. Delaney Bernard 33 call with pt's family. 1930 Bedside and Verbal shift change report given to KATHRINE Campos (oncoming nurse) by Odette Johnston RN (offgoing nurse). Report included the following information SBAR, Kardex, ED Summary, Intake/Output, MAR, Accordion, Recent Results, Med Rec Status, Cardiac Rhythm Sinus rhythm/sinus tach and Alarm Parameters .

## 2021-12-23 NOTE — PROGRESS NOTES
1930: Bedside and Verbal shift change report given to David Zarate (oncoming nurse) by Lacey Wall RN (offgoing nurse). Report included the following information SBAR, Kardex, ED Summary, MAR, Recent Results, and Cardiac Rhythm NSR .

## 2021-12-23 NOTE — PROGRESS NOTES
0730 Bedside and Verbal shift change report given to Raul Bill RN and ARGENIS Kim RN (oncoming nurse) by Murray Grace RN (offgoing nurse). Report included the following information SBAR, Intake/Output, MAR, Recent Results and Cardiac Rhythm NSR- ST.     0900 FiO2 decreased to 55%. Patient tolerating well. 0930 Updated patients' wife via telephone regarding patients status. Wife to email interim manager patients' disability form for intensivist to fill out.     1200 Patient febrile at this time. PRN Tylenol administered. 0 Writer observed patient diaphoretic, tachy and hypertensive at this time. Complete bed change and and reposition. Leads changed also. PRN 5 mg versed push administered. 1500 Patient vital signs stable and WNL at this time. Will continue to monitor. 1600 PEEP reduced from 12 to 10. Patient tolerating well. 1930 Bedside and Verbal shift change report given to KATHRINE Campos (oncoming nurse) by Raul Bill Rn and Sylvia Benz RN (offgoing nurse).  Report included the following information SBAR, Intake/Output, MAR, Recent Results and Cardiac Rhythm NSR- ST.

## 2021-12-23 NOTE — PROGRESS NOTES
Transition of Care Plan   RUR- Medium    Covid positive   DISPOSITION: pending medical progression   F/U with PCP/Specialist     Transport: [facility]/family   Patient remains in ICU on isolation for Covid virus. Patient remains on Fentanyl and Versed gtts. Per notes patient able to open his eyes, moves extremities spontaneously , but does not follow commands, he does track with his eyes. Care management is continuing to follow.   Gissel Adams RN,Care Management

## 2021-12-23 NOTE — PROGRESS NOTES
[facility]    ICU TEAM Progress Note    Name: Maureen Box   : 1975   MRN: [id number]   Date: 2021      Subjective:   Progress Note: 2021      Mr Gage Kennedy is a 56 yo male with a PMH of DM2, HTN and obesity admitted on  to [facility] for COVID-19 pneumonia. He was treated with tocilizumab and steroids. He was not vaccinated. He decompensated and was intubated and . He was paralyzed, proned/supinated. He was transferred to [facility] on  to be evaluated for possible ECMO. CVC and arterial line placed at [facility] on . Nimbex was turned off . He was weaned to 60% Fio2 as of 12/3. Proning/supinating therapy continued. He continued to demonstrated fever. He was started on cefepime/fluconazole on 12/3 with vanc added .      his FIo2 increased to 70% and decreased back to 60% on . CVC changed to PICC on  as blood culture had been positive for 4/4 S epi on 12/3 (cleared in culture on ). He was on vanc for 2 weeks, until . Changed to cefazolin on 12/15. He has been responsive to diuresis over the past few days and has been weaned to 60% FiO2. Attempts were made to decrease sedation on , but this was not successful due to increased agitation. Due to this, seroquel was added on . Enteral oxycodone and valium were also added on . His versed was weaned from 19mg/h to 17/mg/h on  pm.  His versed was further weaned on  as his seroquel was increased. : Versed is at 9. He continues on the fentanyl infusion. He has been afebrile overnight. : Versed gtt 7 and fentanyl gtt at 150. Vent weaned to 65% and continued PEEP of 12. Opens eyes and moves ext spontaneously. But does not follow commands, track with eyes, or WD to pain. NGT clogged, will replace today. Completed cefazolin. Chest xray tomorrow am.    : No acute events overnight. Did not require pronation overnight.  Plan for today is to decrease FiO2 to 60% and wean Fentanyl. Mobilize as tolerated. Not following commands. SORIANO spontaneously. Active Problem List:     Problem List  Date Reviewed: 11/28/2021          Codes Class    COVID ICD-10-CM: U07.1  ICD-9-CM: 079.89         Acute hypoxemic respiratory failure due to COVID-19 [facility]) ICD-10-CM: U07.1, J96.01  ICD-9-CM: 518.81, 079.89, 799.02         Pneumonia due to COVID-19 virus ICD-10-CM: U07.1, J12.82  ICD-9-CM: 480.8, 079.89         Hyperglycemia due to type 2 diabetes mellitus (HCC) ICD-10-CM: E11.65  ICD-9-CM: 250.00         Primary hypertension ICD-10-CM: I10  ICD-9-CM: 401.9         Hyponatremia ICD-10-CM: E87.1  ICD-9-CM: 276.1         Lactic acidosis ICD-10-CM: E87.2  ICD-9-CM: 276.2         Hyperlipidemia ICD-10-CM: E78.5  ICD-9-CM: 272.4         Obesity (BMI 30-39. 9) ICD-10-CM: E66.9  ICD-9-CM: 278.00         COVID-19 vaccination not done ICD-10-CM: Z28.9  ICD-9-CM: V64.00               Past Medical History:      has a past medical history of COVID-19, Diabetes ([address] Hyperlipidemia, and Hypertension. Past Surgical History:      has a past surgical history that includes hx orthopaedic (Right). Home Medications:     Prior to Admission medications    Medication Sig Start Date End Date Taking? Authorizing Provider   cisatracurium (NIMBEX) IV infusion 0-0.869 mg/min by IntraVENous route TITRATE. 12/1/21  Yes Donavan Maldonado MD   enoxaparin (LOVENOX) 100 mg/mL 90 mg by SubCUTAneous route every twelve (12) hours every twelve (12) hours. 12/1/21  Yes Donavan Maldonado MD   insulin glargine (LANTUS) 100 unit/mL injection 24 Units by SubCUTAneous route nightly. Indications: type 2 diabetes mellitus 12/1/21  Yes Donavan Maldonado MD   midazolam in normal saline (VERSED) 1 mg/mL soln 0-10 mg/hr by IntraVENous route TITRATE.  Max Daily Amount: 240 mg. 12/1/21  Yes Donavan Maldonado MD   Norepinephrine Bitartrate (LEVOPHED) 8 mg/250 mL (32 mcg/mL) soln 0.5-16 mcg/min by IntraVENous route TITRATE. 12/1/21  Yes Donavan Maldonado MD   ascorbic acid, vitamin C, (VITAMIN C) 500 mg tablet Take 1 Tablet by mouth two (2) times a day. 21   Liliana Cameron MD   cholecalciferol (VITAMIN D3) (1000 Units /25 mcg) tablet Take 2 Tablets by mouth daily. 21   Liliana Cameron MD   melatonin, rapid dissolve, 5 mg TbDi tablet Take 1 Tablet by mouth nightly. 21   Liliana Cameron MD   atorvastatin (LIPITOR) 20 mg tablet Take 20 mg by mouth daily. Provider, Historical   telmisartan (MICARDIS) 80 mg tablet Take 80 mg by mouth daily. Indications: high blood pressure    Provider, Historical       Allergies/Social/Family History:      Allergies   Allergen Reactions    Alupent [Metaproterenol] Swelling      Social History     Tobacco Use    Smoking status: Never Smoker    Smokeless tobacco: Not on file   Substance Use Topics    Alcohol use: Yes     Comment: drinksonce a week      Family History   Problem Relation Age of Onset    Diabetes Mother     Hypertension Mother     Stroke Mother     Hodgkin's lymphoma Mother     Diabetes Father     Hypertension Father     Cystic Fibrosis Father     Diabetes Maternal Aunt     Diabetes Maternal Uncle        Review of Systems:     Unable    Objective:   Vital Signs:  Visit Vitals  /76   Pulse 99   Temp 99.8 °F (37.7 °C)   Resp 16   Ht 5' 6\" (1.676 m)   Wt 81.4 kg (179 lb 7.3 oz)   SpO2 97%   BMI 28.96 kg/m²    O2 Flow Rate (L/min): 60 l/min O2 Device: Ventilator,Endotracheal tube,Heated,Humidifier Temp (24hrs), Av.9 °F (37.2 °C), Min:97 °F (36.1 °C), Max:100 °F (37.8 °C)           Intake/Output:     Intake/Output Summary (Last 24 hours) at 2021 0813  Last data filed at 2021 0700  Gross per 24 hour   Intake 1688.62 ml   Output 1425 ml   Net 263.62 ml       Physical Exam:    General:  Sedated/intubated/RASS -1 to -2  Eye: Pupils are round and reactive bilaterally  Neurologic: Furrowed brow; withdraws to noxious stimuli in all extremities; does not follow commands, SORIANO spontaneously; looks around room sponteanously  Neck: Supple, no JVD  Lungs: On mechanical ventilation; CTAB  Heart: RRR, 2+ pulses, 1+ edema of BLE   Abdomen:Soft, nontender, nondistended  Skin:  No rash or lesion  Verified 12/22    LABS AND  DATA: Personally reviewed  Recent Labs     12/23/21  0408 12/22/21  0413   WBC 5.7 6.6   HGB 11.4* 12.4   HCT 34.6* 37.0    203     Recent Labs     12/23/21  0408 12/22/21  0413   * 133*   K 3.3* 3.0*   CL 94* 92*   CO2 34* 35*   BUN 17 17   CREA 0.43* 0.38*   * 161*   CA 8.8 8.9   MG 2.2 2.4   PHOS 2.6 2.0*     Recent Labs     12/23/21  0408 12/22/21  0413   AP 70 79   TP 6.3* 6.5   ALB 2.3* 2.4*   GLOB 4.0 4.1*     Recent Labs     12/23/21  0408 12/22/21  0413   INR 1.0 1.0   PTP 10.8 10.8      Recent Labs     12/21/21  1205 12/21/21  0514   PHI 7.46* 7.46*   PCO2I 47.5* 42.1   PO2I 79* 94   FIO2I 60 70     No results for input(s): CPK, CKMB, TROIQ, BNPP in the last 72 hours. Ventilator Settings:  Mode Rate Tidal Volume Pressure FiO2 PEEP   Assist control,Pressure control   500 ml  8 cm H2O 65 % 12 cm H20     Peak airway pressure: 25 cm H2O    Minue ventilation: 7.84 l/min      MEDS: Reviewed    Chest X-Ray: personally reviewed and report checked    · TTE: 11/24: LV: Estimated LVEF is 50 - 55%. Normal cavity size, wall thickness and diastolic function. Low normal systolic function. Assessment and Plan     [id number] Pneumonia complicated by Severe ARDS c/b pneumococcal pneumonia: Received tocilizumab. Continue RASS -4. Continue fentanyl versed. S pneumonia in sputum 12/3. Off nimbex since 12/2.  - Continue steroids  - Completed cefazolin  - Procal negative  - CXR unrevealing  - Wean Fio2 from 65 to 60% - slow wean  - Goal for trach/PEG when patient medically able  - Diuresis PRN    HLD: Continue statin    DM2 c/b hyperglycemia:   - Continue NPH 10 q 12hrs   - Continue SSI q 6hrs     Hypotension: Possibly related to sedation.  Not on pressors  - Continue midodrine    Agitation: Start enteral benzo/opioids with attempts to decrease IV sedatives. - Continue enteral sedation/opiods  - Wean sedation as tolerated, on versed 5mg/h and fentanyl 150mg/h  - Seroquel at 50mg TID    Deconditioning: Unable to participate in PT/OT at this time    Multidisciplinary Rounds Completed: Y    ABCDEF Bundle/Checklist  Pain Medications: Fentanyl  Target RASS: 0  Sedation Medications:Versed  CAM-ICU: SILVIA  Mobility:Poor  PT/OT: Unable to participate: PROM  Restraints:Y  Discussed Plan of Care (goals of care): Y  Addressed Code Status: F    CARDIOVASCULAR  Cardiac Gtts: N  SBP Goal of: > 90 mmHg  MAP Goal of: > 65 mmHg  Transfusion Trigger (Hgb): <7 g/dL    RESPIRATORY  Vent Goals:   Optimize PEEP/Ventilation/Oxygenation  DVT Prophylaxis (if no, list reason): Lovenox   SPO2 Goal: > 92%  Pulmonary toilet: Duo-Nebs     GI/  Gold Catheter Present: Yes  GI Prophylaxis: Pepcid (famotidine)   Nutrition: Yes TF  IVFs:N  Bowel Movement:Y  Bowel Regimen:Y  Insulin: Y    ANTIBIOTICS  Antibiotics:  None    T/L/D  Tubes: ETT  Lines: PICC  Drains: Gold    SPECIAL EQUIPMENT  Vent    DISPOSITION  ICU    CRITICAL CARE CONSULTANT NOTE  I had a face to face encounter with the patient, reviewed and interpreted patient data including clinical events, labs, images, vital signs, I/O's, and examined patient. I have discussed the case and the plan and management of the patient's care with the consulting services, the bedside nurses and the respiratory therapist.      NOTE OF PERSONAL INVOLVEMENT IN CARE   This patient has a high probability of imminent, clinically significant deterioration, which requires the highest level of preparedness to intervene urgently. I participated in the decision-making and personally managed or directed the management of the following life and organ supporting interventions that required my frequent assessment to treat or prevent imminent deterioration.     I personally spent 45 minutes of critical care time.  This is time spent at this critically ill patient's bedside actively involved in patient care as well as the coordination of care and discussions with the patient's family. This does not include any procedural time which has been billed separately.         Fatoumata Leal [facility]     Critical Care Medicine  [facility]

## 2021-12-23 NOTE — PROGRESS NOTES
Comprehensive Nutrition Assessment    Type and Reason for Visit: Reassess    Nutrition Recommendations/Plan:      Replace potassium    Add Pericolace once daily; continue to give Miralax prn    Modify tube feeding: Glucerna 1.5 @ 70 ml/hr with 30 ml water flush q 6 hr; Discontinue Prosource     Nutrition Assessment:    PMHx includes DM, HTN, HLD. Admitted to [facility] on 11/23 for acute respiratory failure due to Covid-19 PNA. He was admitted directly to the ICU on HFNC, S/P Tociluzumab and Decadron. His condition progressively worsened - intubated on 11/28. Transferred to [facility] for possible VV - ECMO.  Remains intubated and sedated. Has not required ECMO. Off nimbex, pressors, and propofol. Weaning sedation. Was not proned overnight. Needs trach and PEG placement. BG control improved. Ongoing hyponatremia; water flush has been reduced intermittently-will further reduce today. Pt had first BM since admission on 12/8, then had watery stools so Benefiber added TID 12/16. Placed hold on Benefiber 12/20 since last BM was 12/17-still no BM. Miralax ordered prn and was given this morning. Recommend adding Pericolace once daily. Mr Lacey Ryan has lost ~14# since admission (7.25%) which is significant. Suspect loss reflects muscle atrophy as well as hypermetabolic state associated with COVID. Tube feeding as ordered meets lowered end of estimated needs. Recommend increasing rate slightly to provide more calories. New goal: Glucerna 1.5 @ 70 ml/hr with 30 ml water flush q 6 hr. This will provide 1540 ml, 2310 calories, 127 gm protein and 1290 ml free water (tube feeding/flush) per day to meet estimated needs. Malnutrition Assessment:  Malnutrition Status:   Moderate malnutrition    Context:  Acute illness     Findings of the 6 clinical characteristics of malnutrition:   Energy Intake:  No significant decrease in energy intake  Weight Loss:  7.0 - Greater than 5% over 1 month     Body Fat Loss:  Unable to assess, Muscle Mass Loss:  Unable to assess,    Fluid Accumulation:  1 - Mild, Generalized   Strength:  Not performed     Nutritionally Significant Medications:   Fentanyl, Vit C, Lipitor, Vit D3, Pepcid, Humalog, NPH, MVI, Miralax prn    Estimated Daily Nutrient Needs:  Energy (kcal): 7249-2919 (25-28 kcal/kg); Weight Used for Energy Requirements: Current (87 kg)  Protein (g): 122-138 (1.5-1.7g/kg); Weight Used for Protein Requirements:    Fluid (ml/day): 1 mL/kcal; Method Used for Fluid Requirements: 1 ml/kcal    Nutrition Related Findings:       BM: 12/17  Edema: NP generalized, 1+ facial, Trace BUE, BLE  Wounds:  None       Current Nutrition Therapies:   Diet: NPO  Tube Feeding: Glucerna 1.5 @ 65 ml/hr with 75 ml water flush q 4 hr; 1 packet Prosource daily  Additional Caloric Sources:  None      Anthropometric Measures:  · Height:  5' 6\" (167.6 cm)  · Current Body Wt:  81.4 kg (179 lb 7.3 oz)   · Admission Body Wt:  193 lb 2 oz       · Ideal Body Wt:  142:  126.4 %   · BMI Categories:  Obese class 1 (BMI 30.0-34. 9)     Wt Readings from Last 10 Encounters:   12/23/21 81.4 kg (179 lb 7.3 oz)   11/30/21 86.9 kg (191 lb 9.3 oz)       Nutrition Diagnosis:   · Inadequate oral intake related to impaired respiratory function as evidenced by intubation,nutrition support-enteral nutrition    · Altered GI function related to catabolic illness as evidenced by constipation    Nutrition Interventions:   Food and/or Nutrient Delivery: Modify tube feeding  Nutrition Education and Counseling: No recommendations at this time  Coordination of Nutrition Care: Continue to monitor while inpatient,Interdisciplinary rounds    Goals:  EN to meet at least 85% of estimated needs x 5-7 days.        Nutrition Monitoring and Evaluation:   Behavioral-Environmental Outcomes: None identified  Food/Nutrient Intake Outcomes: Enteral nutrition intake/tolerance  Physical Signs/Symptoms Outcomes: Biochemical data,GI status,Weight    Discharge Planning:     Too soon to determine     Brenna Marie RD CNSC  Contact: [employer]

## 2021-12-24 NOTE — PROGRESS NOTES
ID Progress Note  2021    Subjective:     T-max 100.8  Intubated it    Objective:     Antibiotics:  1. Vancomycin --  2. Cefepime 12/3-   3. Ceftriaxone --     Vitals:   Visit Vitals  /79   Pulse 93   Temp (!) 100.8 °F (38.2 °C)   Resp 20   Ht 5' 6\" (1.676 m)   Wt 82.5 kg (181 lb 14.1 oz)   SpO2 97%   BMI 29.36 kg/m²        Tmax:  Temp (24hrs), Av.5 °F (37.5 °C), Min:98.2 °F (36.8 °C), Max:100.8 °F (38.2 °C)      Exam:  Observational only    Labs:      Recent Labs     21  0358 21  0408 21  0413   WBC 5.6 5.7 6.6   HGB 11.3* 11.4* 12.4    202 203   BUN 11 17 17   CREA 0.43* 0.43* 0.38*   AP 79 70 79   TBILI 0.4 0.4 0.6       Cultures:     No results found for: SDES  Lab Results   Component Value Date/Time    Culture result: LIGHT NORMAL RESPIRATORY TAMARA 2021 03:33 PM    Culture result: NO GROWTH 5 DAYS 2021 09:38 AM    Culture result: (A) 2021 06:01 AM     STAPHYLOCOCCUS EPIDERMIDIS GROWING IN ALL 4 BOTTLES DRAWN FROM THE RIGHT ARM    Culture result:  2021 06:01 AM     (NOTE) CALLED POSITIVE BLOOD CULTURE OF GPC IN CLUSTERS GROWING IN 1 OUT OF 4 BOTTLES TO NewellPATRICIA AT 2237 ON 12/3/21. AT  REPORT OF GRAM POSITIVE COCCI IN CLUSTERS GROWING IN 3 OF 4 BOTTLES CALLED TO READ BACK BY BRADY CHISHOLM ON 21 AT 0630. AOW    Culture result: MODERATE STREPTOCOCCUS PNEUMONIAE (A) 2021 06:01 AM    Culture result: LIGHT NORMAL RESPIRATORY TAMARA 2021 06:01 AM       Assessment:     1. Severe COVID pneumonia--Pneumococcus from respiratory culture; completed the therapy  2. VDRF  3. Hypoxic respiratory failure  4. Bacteremia--S epi--line has been changed, completed the ABX therapy on  with ancef    Objective:     1.  Hold ABX for now unless WBC trending up, any of the cx becomes (+)        Persistent daily fever; no diarrhea        Blood cx () pending, resp cx () to be done        U/A with reflex (-), procal <0.05        CXR (12/24) Low lung volumes are noted compared to the prior exam without change in the bilateral interstitial infiltrates. 2. May need to further image chest, abdomen and pelvis if fever persist      Above plan of care discussed and agreed with Dr. Sharri Vines will see prn, call if issues arise.     Lisa Phillips, NP

## 2021-12-24 NOTE — PROGRESS NOTES
1930: Bedside and Verbal shift change report given to Horace Avery (oncoming nurse) by Brigido Fraser RN (offgoing nurse). Report included the following information SBAR, Kardex, ED Summary, MAR, Recent Results, and Cardiac Rhythm NSR/tachy .

## 2021-12-24 NOTE — PROGRESS NOTES
[facility]    ICU TEAM Progress Note    Name: Rony Zapata   : 1975   MRN: [id number]   Date: 2021      Subjective:   Progress Note: 2021      Mr Jose Jo is a 56 yo male with a PMH of DM2, HTN and obesity admitted on  to [facility] for COVID-19 pneumonia. He was treated with tocilizumab and steroids. He was not vaccinated. He decompensated and was intubated and . He was paralyzed, proned/supinated. He was transferred to [facility] on  to be evaluated for possible ECMO. CVC and arterial line placed at [facility] on . Nimbex was turned off . He was weaned to 60% Fio2 as of 12/3. Proning/supinating therapy continued. He continued to demonstrated fever. He was started on cefepime/fluconazole on 12/3 with vanc added .      his FIo2 increased to 70% and decreased back to 60% on . CVC changed to PICC on  as blood culture had been positive for 4/4 S epi on 12/3 (cleared in culture on ). He was on vanc for 2 weeks, until . Changed to cefazolin on 12/15. He has been responsive to diuresis over the past few days and has been weaned to 60% FiO2. Attempts were made to decrease sedation on , but this was not successful due to increased agitation. Due to this, seroquel was added on . Enteral oxycodone and valium were also added on . His versed was weaned from 19mg/h to 17/mg/h on  pm.  His versed was further weaned on  as his seroquel was increased. : Versed is at 9. He continues on the fentanyl infusion. He has been afebrile overnight. : Versed gtt 7 and fentanyl gtt at 150. Vent weaned to 65% and continued PEEP of 12. Opens eyes and moves ext spontaneously. But does not follow commands, track with eyes, or WD to pain. NGT clogged, will replace today. Completed cefazolin. Chest xray tomorrow am.    : No acute events overnight. Did not require pronation overnight.  Plan for today is to decrease FiO2 to 60% and wean Fentanyl. Mobilize as tolerated. Not following commands. SORIANO spontaneously. 12/24: No acute events overnight, remains on 55% FiO2, PEEP 10. Will continue to wean FiO2 for goal of 50% today, will wean PEEP to 8 if tolerated. SORIANO spontaneously. Continue to wean IV sedation and liberalize PO opiates and benzos to avoid withdraw without oversedating. Active Problem List:     Problem List  Date Reviewed: 11/28/2021          Codes Class    COVID ICD-10-CM: U07.1  ICD-9-CM: 079.89         Acute hypoxemic respiratory failure due to COVID-19 [facility]) ICD-10-CM: U07.1, J96.01  ICD-9-CM: 518.81, 079.89, 799.02         Pneumonia due to COVID-19 virus ICD-10-CM: U07.1, J12.82  ICD-9-CM: 480.8, 079.89         Hyperglycemia due to type 2 diabetes mellitus (HCC) ICD-10-CM: E11.65  ICD-9-CM: 250.00         Primary hypertension ICD-10-CM: I10  ICD-9-CM: 401.9         Hyponatremia ICD-10-CM: E87.1  ICD-9-CM: 276.1         Lactic acidosis ICD-10-CM: E87.2  ICD-9-CM: 276.2         Hyperlipidemia ICD-10-CM: E78.5  ICD-9-CM: 272.4         Obesity (BMI 30-39. 9) ICD-10-CM: E66.9  ICD-9-CM: 278.00         COVID-19 vaccination not done ICD-10-CM: Z28.9  ICD-9-CM: V64.00               Past Medical History:      has a past medical history of COVID-19, Diabetes ([address] Hyperlipidemia, and Hypertension. Past Surgical History:      has a past surgical history that includes hx orthopaedic (Right). Home Medications:     Prior to Admission medications    Medication Sig Start Date End Date Taking? Authorizing Provider   cisatracurium (NIMBEX) IV infusion 0-0.869 mg/min by IntraVENous route TITRATE. 12/1/21  Yes Raquel Woo MD   enoxaparin (LOVENOX) 100 mg/mL 90 mg by SubCUTAneous route every twelve (12) hours every twelve (12) hours. 12/1/21  Yes Raquel Failing, MD   insulin glargine (LANTUS) 100 unit/mL injection 24 Units by SubCUTAneous route nightly.  Indications: type 2 diabetes mellitus 12/1/21  Yes Prikailyn Failing, MD   midazolam in normal saline (VERSED) 1 mg/mL soln 0-10 mg/hr by IntraVENous route TITRATE. Max Daily Amount: 240 mg. 21  Yes Fabiano Galicia MD   Norepinephrine Bitartrate (LEVOPHED) 8 mg/250 mL (32 mcg/mL) soln 0.5-16 mcg/min by IntraVENous route TITRATE. 21  Yes Fabiano Galicia MD   ascorbic acid, vitamin C, (VITAMIN C) 500 mg tablet Take 1 Tablet by mouth two (2) times a day. 21   Fabiano Galicia MD   cholecalciferol (VITAMIN D3) (1000 Units /25 mcg) tablet Take 2 Tablets by mouth daily. 21   Fabiano Galicia MD   melatonin, rapid dissolve, 5 mg TbDi tablet Take 1 Tablet by mouth nightly. 21   Fabiano Galicia MD   atorvastatin (LIPITOR) 20 mg tablet Take 20 mg by mouth daily. Provider, Historical   telmisartan (MICARDIS) 80 mg tablet Take 80 mg by mouth daily. Indications: high blood pressure    Provider, Historical       Allergies/Social/Family History:      Allergies   Allergen Reactions    Alupent [Metaproterenol] Swelling      Social History     Tobacco Use    Smoking status: Never Smoker    Smokeless tobacco: Not on file   Substance Use Topics    Alcohol use: Yes     Comment: drinksonce a week      Family History   Problem Relation Age of Onset    Diabetes Mother     Hypertension Mother     Stroke Mother     Hodgkin's lymphoma Mother     Diabetes Father     Hypertension Father     Cystic Fibrosis Father     Diabetes Maternal Aunt     Diabetes Maternal Uncle        Review of Systems:     Unable    Objective:   Vital Signs:  Visit Vitals  /79   Pulse 93   Temp (!) 100.8 °F (38.2 °C)   Resp 20   Ht 5' 6\" (1.676 m)   Wt 82.5 kg (181 lb 14.1 oz)   SpO2 97%   BMI 29.36 kg/m²    O2 Flow Rate (L/min): 60 l/min O2 Device: Ventilator,Endotracheal tube,Heated,Humidifier Temp (24hrs), Av.6 °F (37.6 °C), Min:98.2 °F (36.8 °C), Max:100.8 °F (38.2 °C)           Intake/Output:     Intake/Output Summary (Last 24 hours) at 2021 0737  Last data filed at 2021 0700  Gross per 24 hour   Intake 1773.87 ml   Output 1250 ml   Net 523.87 ml       Physical Exam:    General:  Sedated/intubated/RASS -1 to -2  Eye: Pupils are round and reactive bilaterally  Neurologic: Furrowed brow; withdraws to noxious stimuli in all extremities; does not follow commands, SORIANO spontaneously; looks around room sponteanously  Neck: Supple, no JVD  Lungs: On mechanical ventilation; CTAB  Heart: RRR, 2+ pulses, 1+ edema of BLE   Abdomen:Soft, nontender, nondistended  Skin:  No rash or lesion  Verified 12/22    LABS AND  DATA: Personally reviewed  Recent Labs     12/24/21  0358 12/23/21  0408   WBC 5.6 5.7   HGB 11.3* 11.4*   HCT 33.6* 34.6*    202     Recent Labs     12/24/21  0358 12/23/21  0408   * 131*   K 3.6 3.3*   CL 95* 94*   CO2 32 34*   BUN 11 17   CREA 0.43* 0.43*   * 196*   CA 9.0 8.8   MG 2.1 2.2   PHOS 2.6 2.6     Recent Labs     12/24/21  0358 12/23/21  0408   AP 79 70   TP 6.4 6.3*   ALB 2.3* 2.3*   GLOB 4.1* 4.0     Recent Labs     12/24/21  0358 12/23/21  0408   INR 1.0 1.0   PTP 10.5 10.8      Recent Labs     12/24/21  0546 12/21/21  1205   PHI 7.46* 7.46*   PCO2I 46.4* 47.5*   PO2I 70* 79*   FIO2I 55 60     No results for input(s): CPK, CKMB, TROIQ, BNPP in the last 72 hours. Ventilator Settings:  Mode Rate Tidal Volume Pressure FiO2 PEEP   Assist control,Pressure control   500 ml  8 cm H2O 55 % 10 cm H20     Peak airway pressure: 25 cm H2O    Minue ventilation: 6.06 l/min      MEDS: Reviewed    Chest X-Ray: personally reviewed and report checked    · TTE: 11/24: LV: Estimated LVEF is 50 - 55%. Normal cavity size, wall thickness and diastolic function. Low normal systolic function. Assessment and Plan     [id number] Pneumonia complicated by Severe ARDS c/b pneumococcal pneumonia: Received tocilizumab. Continue RASS -4. Continue fentanyl versed. S pneumonia in sputum 12/3.  Off nimbex since 12/2.  - Continue steroids  - Completed cefazolin  - Procal negative  - CXR unrevealing  - Wean Fio2 from 55 to 50% - slow wean  - Goal for trach/PEG when patient medically able  - Diuresis PRN    HLD: Continue statin    DM2 c/b hyperglycemia:   - Continue NPH 10 q 12hrs   - Continue SSI q 6hrs     Hypotension: Possibly related to sedation. Not on pressors  - Continue midodrine    Agitation: Start enteral benzo/opioids with attempts to decrease IV sedatives. - Continue enteral sedation/opioids  - Wean sedation as tolerated, on versed 7 mg/h and fentanyl 125 mcg/h  - Seroquel at 50mg TID    Deconditioning: Unable to participate in PT/OT at this time    Multidisciplinary Rounds Completed: Y    ABCDEF Bundle/Checklist  Pain Medications: Fentanyl  Target RASS: 0  Sedation Medications:Versed  CAM-ICU: SILVIA  Mobility:Poor  PT/OT: Unable to participate: PROM  Restraints:Y  Discussed Plan of Care (goals of care): Y  Addressed Code Status: F    CARDIOVASCULAR  Cardiac Gtts: N  SBP Goal of: > 90 mmHg  MAP Goal of: > 65 mmHg  Transfusion Trigger (Hgb): <7 g/dL    RESPIRATORY  Vent Goals:   Optimize PEEP/Ventilation/Oxygenation  DVT Prophylaxis (if no, list reason): Lovenox   SPO2 Goal: > 92%  Pulmonary toilet: Duo-Nebs     GI/  Gold Catheter Present: Yes  GI Prophylaxis: Pepcid (famotidine)   Nutrition: Yes TF  IVFs:N  Bowel Movement:Y  Bowel Regimen:Y  Insulin: Y    ANTIBIOTICS  Antibiotics:  None    T/L/D  Tubes: ETT  Lines: PICC  Drains: Gold    SPECIAL EQUIPMENT  Vent    DISPOSITION  ICU    CRITICAL CARE CONSULTANT NOTE  I had a face to face encounter with the patient, reviewed and interpreted patient data including clinical events, labs, images, vital signs, I/O's, and examined patient.   I have discussed the case and the plan and management of the patient's care with the consulting services, the bedside nurses and the respiratory therapist.      NOTE OF PERSONAL INVOLVEMENT IN CARE   This patient has a high probability of imminent, clinically significant deterioration, which requires the highest level of preparedness to intervene urgently. I participated in the decision-making and personally managed or directed the management of the following life and organ supporting interventions that required my frequent assessment to treat or prevent imminent deterioration. I personally spent 55 minutes of critical care time. This is time spent at this critically ill patient's bedside actively involved in patient care as well as the coordination of care and discussions with the patient's family. This does not include any procedural time which has been billed separately.         Fatoumata Leal [facility]-BC     Critical Care Medicine  [facility]

## 2021-12-24 NOTE — PROGRESS NOTES
Shift report received from [facility] using [employer]

## 2021-12-25 NOTE — PROGRESS NOTES
for follow up support. Pt's wife and son and family visited outside pt room. Provided pastoral listening, support and assurance of prayer. Let them know of  availability. Please contact [phone] [facility] for further support.      [address] Apolinar Holley, MACE   [phone]

## 2021-12-25 NOTE — PROGRESS NOTES
1930: Bedside and Verbal shift change report given to Diane Patel Rn (oncoming nurse) by Werner Yeung RN (offgoing nurse). Report included the following information SBAR, Kardex, Intake/Output, MAR, Recent Results, Cardiac Rhythm NSR/ST and Alarm Parameters . 2130: Orders received from Kiersten Whittaker NP to start Precedex gtt at 0.4 mcg/kg/hr and begin titrating down on Versed as appropriate. [id number] Montes NP of patients MAP < 65. Orders received. 0065Katgerardo Montes NP of patients MAP < 65 despite giving Albumin. Orders received. 5540: Patient became extremely restless, tachycardic, hypertensive, tachypneic, and asynchronise with the vent. Kiersten Whittaker NP notified, RT notified. 5 mg IVP or versed given and 100 mcg IVP of Fentanyl. 6916: Precedex maxed to 1.5 mcg/kg/hr. Versed gtt increased to 10 mg/hr. 2260Cindra Makos additional dose of 5mg IVP versed and 100mcg IVP fentanyl per NP Rafaela. On vent FIO2 changed to 100%. 7446: Per NP Rafaela gave 50mg rocuronium IVP. NP notified of patients increasing temperature. Tylenol given and ice packs placed on patients. Fentanyl gtt increased to 200 mcg/hr. 0730: Bedside and Verbal shift change report given to KATHRINE Marinelli (oncoming nurse) by Diane Patel RN (offgoing nurse). Report included the following information SBAR, Kardex, Intake/Output, MAR, Recent Results, Cardiac Rhythm NSR/ST and Alarm Parameters .

## 2021-12-25 NOTE — PROGRESS NOTES
Please note,  ETT is getting too old. Passing the inline suction catheter is getting increasingly difficult & air is needed to be added to cuff several times a shift.   RT notified NP.

## 2021-12-25 NOTE — PROGRESS NOTES
[facility]    ICU TEAM Progress Note    Name: Brennon Kinney   : 1975   MRN: [id number]   Date: 2021      Subjective:   Progress Note: 2021      Mr Sheryle Stalls is a 54 yo male with a PMH of DM2, HTN and obesity admitted on  to [facility] for COVID-19 pneumonia. He was treated with tocilizumab and steroids. He was not vaccinated. He decompensated and was intubated and . He was paralyzed, proned/supinated. He was transferred to [facility] on  to be evaluated for possible ECMO. CVC and arterial line placed at [facility] on . Nimbex was turned off . He was weaned to 60% Fio2 as of 12/3. Proning/supinating therapy continued. He continued to demonstrated fever. He was started on cefepime/fluconazole on 12/3 with vanc added .      his FIo2 increased to 70% and decreased back to 60% on . CVC changed to PICC on  as blood culture had been positive for 4/4 S epi on 12/3 (cleared in culture on ). He was on vanc for 2 weeks, until . Changed to cefazolin on 12/15. He has been responsive to diuresis over the past few days and has been weaned to 60% FiO2. Attempts were made to decrease sedation on , but this was not successful due to increased agitation. Due to this, seroquel was added on . Enteral oxycodone and valium were also added on . His versed was weaned from 19mg/h to 17/mg/h on  pm.  His versed was further weaned on  as his seroquel was increased. : Versed is at 9. He continues on the fentanyl infusion. He has been afebrile overnight. : Versed gtt 7 and fentanyl gtt at 150. Vent weaned to 65% and continued PEEP of 12. Opens eyes and moves ext spontaneously. But does not follow commands, track with eyes, or WD to pain. NGT clogged, will replace today. Completed cefazolin. Chest xray tomorrow am.    : No acute events overnight. Did not require pronation overnight.  Plan for today is to decrease FiO2 to 60% and wean Fentanyl. Mobilize as tolerated. Not following commands. SORIANO spontaneously. 12/24: No acute events overnight, remains on 55% FiO2, PEEP 10. Will continue to wean FiO2 for goal of 50% today, will wean PEEP to 8 if tolerated. SORIANO spontaneously. Continue to wean IV sedation and liberalize PO opiates and benzos to avoid withdraw without oversedating. 12/25: Early this AM about 0600 patient spiked fever 103.3, elevated RR and HR. Given PRN pushes of versed and fentanyl as well as one time dose of rocuronium with some recovery. Chest x-ray obtained does not show obvious pneumothorax, formal read is pending. Will get BLE dopplers to asses for DVT, once stable will send to CT for head and chest CT to rule out neurological causes of fever such as stroke and also PE. Will likely need to resume sedation. Will place ENT consult for tracheostomy. Per respiratory note, patients ETT is having air leaks and suction catheter is difficult to pass, will likely require tube exchange today. Cooling blanket for fever. Levo as needed for support of blood pressure. Active Problem List:     Problem List  Date Reviewed: 11/28/2021          Codes Class    COVID ICD-10-CM: U07.1  ICD-9-CM: 079.89         Acute hypoxemic respiratory failure due to COVID-19 [facility]) ICD-10-CM: U07.1, J96.01  ICD-9-CM: 518.81, 079.89, 799.02         Pneumonia due to COVID-19 virus ICD-10-CM: U07.1, J12.82  ICD-9-CM: 480.8, 079.89         Hyperglycemia due to type 2 diabetes mellitus (HCC) ICD-10-CM: E11.65  ICD-9-CM: 250.00         Primary hypertension ICD-10-CM: I10  ICD-9-CM: 401.9         Hyponatremia ICD-10-CM: E87.1  ICD-9-CM: 276.1         Lactic acidosis ICD-10-CM: E87.2  ICD-9-CM: 276.2         Hyperlipidemia ICD-10-CM: E78.5  ICD-9-CM: 272.4         Obesity (BMI 30-39. 9) ICD-10-CM: E66.9  ICD-9-CM: 278.00         COVID-19 vaccination not done ICD-10-CM: Z28.9  ICD-9-CM: V64.00               Past Medical History:      has a past medical history of COVID-19, Diabetes ([address] Hyperlipidemia, and Hypertension. Past Surgical History:      has a past surgical history that includes hx orthopaedic (Right). Home Medications:     Prior to Admission medications    Medication Sig Start Date End Date Taking? Authorizing Provider   cisatracurium (NIMBEX) IV infusion 0-0.869 mg/min by IntraVENous route TITRATE. 12/1/21  Yes Maye Frankel MD   enoxaparin (LOVENOX) 100 mg/mL 90 mg by SubCUTAneous route every twelve (12) hours every twelve (12) hours. 12/1/21  Yes Maye Frankel MD   insulin glargine (LANTUS) 100 unit/mL injection 24 Units by SubCUTAneous route nightly. Indications: type 2 diabetes mellitus 12/1/21  Yes Maye Frankel MD   midazolam in normal saline (VERSED) 1 mg/mL soln 0-10 mg/hr by IntraVENous route TITRATE. Max Daily Amount: 240 mg. 12/1/21  Yes Maye Frankel MD   Norepinephrine Bitartrate (LEVOPHED) 8 mg/250 mL (32 mcg/mL) soln 0.5-16 mcg/min by IntraVENous route TITRATE. 12/1/21  Yes Maye Frankel MD   ascorbic acid, vitamin C, (VITAMIN C) 500 mg tablet Take 1 Tablet by mouth two (2) times a day. 12/1/21   Maye Frankel MD   cholecalciferol (VITAMIN D3) (1000 Units /25 mcg) tablet Take 2 Tablets by mouth daily. 12/1/21   Maye Frankel MD   melatonin, rapid dissolve, 5 mg TbDi tablet Take 1 Tablet by mouth nightly. 12/1/21   Maye Frankel MD   atorvastatin (LIPITOR) 20 mg tablet Take 20 mg by mouth daily. Provider, Historical   telmisartan (MICARDIS) 80 mg tablet Take 80 mg by mouth daily. Indications: high blood pressure    Provider, Historical       Allergies/Social/Family History:      Allergies   Allergen Reactions    Alupent [Metaproterenol] Swelling      Social History     Tobacco Use    Smoking status: Never Smoker    Smokeless tobacco: Not on file   Substance Use Topics    Alcohol use: Yes     Comment: drinksonce a week      Family History   Problem Relation Age of Onset    Diabetes Mother     Hypertension Mother     Stroke Mother     Hodgkin's lymphoma Mother     Diabetes Father     Hypertension Father     Cystic Fibrosis Father     Diabetes Maternal Aunt     Diabetes Maternal Uncle        Review of Systems:     Unable    Objective:   Vital Signs:  Visit Vitals  BP (!) 134/98   Pulse (!) 118   Temp 100 °F (37.8 °C)   Resp 18   Ht 5' 6\" (1.676 m)   Wt 82.5 kg (181 lb 14.1 oz)   SpO2 95%   BMI 29.36 kg/m²    O2 Flow Rate (L/min): 60 l/min O2 Device: Ventilator,Endotracheal tube,Heated,Humidifier Temp (24hrs), Av.2 °F (37.9 °C), Min:99.6 °F (37.6 °C), Max:101 °F (38.3 °C)           Intake/Output:     Intake/Output Summary (Last 24 hours) at 2021 0810  Last data filed at 2021 0600  Gross per 24 hour   Intake 2050.76 ml   Output 1110 ml   Net 940.76 ml       Physical Exam:    General:  Sedated/intubated/RASS -1 to -2  Eye: Pupils are round and reactive bilaterally  Neurologic: Furrowed brow; withdraws to noxious stimuli in all extremities; does not follow commands, SORIANO spontaneously; looks around room sponteanously  Neck: Supple, no JVD  Lungs: On mechanical ventilation; CTAB  Heart: RRR, 2+ pulses, 1+ edema of BLE   Abdomen:Soft, nontender, nondistended  Skin:  No rash or lesion  Verified     LABS AND  DATA: Personally reviewed  Recent Labs     21  0358   WBC 5.3 5.6   HGB 10.4* 11.3*   HCT 30.8* 33.6*    199     Recent Labs     210 21  0358   * 132*   K 3.9 3.6   CL 97 95*   CO2 33* 32   BUN 15 11   CREA 0.47* 0.43*   * 190*   CA 9.1 9.0   MG 2.1 2.1   PHOS 3.5 2.6     Recent Labs     210 21  0358   AP 62 79   TP 6.1* 6.4   ALB 2.2* 2.3*   GLOB 3.9 4.1*     Recent Labs     21  0420 21  0358   INR 1.1 1.0   PTP 11.8* 10.5      Recent Labs     21  0538 21  0546   PHI 7.45 7.46*   PCO2I 47.2* 46.4*   PO2I 64* 70*   FIO2I 55 55     No results for input(s): CPK, CKMB, TROIQ, BNPP in the last 72 hours.     Ventilator Settings:  Mode Rate Tidal Volume Pressure FiO2 PEEP   Assist control,Pressure control   500 ml  8 cm H2O 55 % 10 cm H20     Peak airway pressure: 32 cm H2O    Minue ventilation: 14 l/min      MEDS: Reviewed    Chest X-Ray: personally reviewed and report checked    · TTE: 11/24: LV: Estimated LVEF is 50 - 55%. Normal cavity size, wall thickness and diastolic function. Low normal systolic function. Assessment and Plan     [id number] Pneumonia complicated by Severe ARDS c/b pneumococcal pneumonia: Received tocilizumab. Continue RASS -4. Continue fentanyl versed. S pneumonia in sputum 12/3. Off nimbex since 12/2.  - Continue steroids  - Completed cefazolin  - Procal negative  - CXR unrevealing  - FiO2 increased overnight   - Goal for trach/PEG when patient medically able  - Diuresis PRN    HLD: Continue statin    DM2 c/b hyperglycemia:   - Continue NPH 10 q 12hrs   - Continue SSI q 6hrs     Hypotension: Possibly related to sedation. Not on pressors  - Continue midodrine    Agitation: Start enteral benzo/opioids with attempts to decrease IV sedatives. - Continue enteral sedation/opioids  - Wean sedation as tolerated, on versed 7 mg/h and fentanyl 125 mcg/h  - Seroquel at 50mg TID    Deconditioning: Unable to participate in PT/OT at this time    Multidisciplinary Rounds Completed: Y    ABCDEF Bundle/Checklist  Pain Medications: Fentanyl  Target RASS: 0  Sedation Medications:Versed  CAM-ICU: SILVIA  Mobility:Poor  PT/OT: Unable to participate: PROM  Restraints:Y  Discussed Plan of Care (goals of care):  Y  Addressed Code Status: F    CARDIOVASCULAR  Cardiac Gtts: N  SBP Goal of: > 90 mmHg  MAP Goal of: > 65 mmHg  Transfusion Trigger (Hgb): <7 g/dL    RESPIRATORY  Vent Goals:   Optimize PEEP/Ventilation/Oxygenation  DVT Prophylaxis (if no, list reason): Lovenox   SPO2 Goal: > 92%  Pulmonary toilet: Duo-Nebs     GI/  Gold Catheter Present: Yes  GI Prophylaxis: Pepcid (famotidine)   Nutrition: Yes TF  IVFs:N  Bowel Movement:Y  Bowel Regimen:Y  Insulin: Y    ANTIBIOTICS  Antibiotics:  None    T/L/D  Tubes: ETT  Lines: PICC  Drains: Gold    SPECIAL EQUIPMENT  Vent    DISPOSITION  ICU    CRITICAL CARE CONSULTANT NOTE  I had a face to face encounter with the patient, reviewed and interpreted patient data including clinical events, labs, images, vital signs, I/O's, and examined patient. I have discussed the case and the plan and management of the patient's care with the consulting services, the bedside nurses and the respiratory therapist.      NOTE OF PERSONAL INVOLVEMENT IN CARE   This patient has a high probability of imminent, clinically significant deterioration, which requires the highest level of preparedness to intervene urgently. I participated in the decision-making and personally managed or directed the management of the following life and organ supporting interventions that required my frequent assessment to treat or prevent imminent deterioration. I personally spent 65 minutes of critical care time. This is time spent at this critically ill patient's bedside actively involved in patient care as well as the coordination of care and discussions with the patient's family. This does not include any procedural time which has been billed separately.         Ely Severance [facility]     Critical Care Medicine  [facility]

## 2021-12-25 NOTE — PROGRESS NOTES
Attempted to take patient for ordered scans, but O2 saturation dropped to 80s, Bowen Caruso NP notified, will hold off on scans until patient is more stable, communicated situation and plan with CT staff.

## 2021-12-25 NOTE — PROGRESS NOTES
Cooling blanket placed due to elevated temperature that's not resoliving with tylenol and ice packs. Communicated plan of care with Junie Castleman, NP. Imaging orders received, will take patient for scans when stable, NP aware.

## 2021-12-26 NOTE — PROGRESS NOTES
1930: Bedside and Verbal shift change report given to Andrez Green RN (oncoming nurse) by Lu Mart RN (offgoing nurse). Report included the following information SBAR, Kardex, Intake/Output, MAR, Recent Results, Cardiac Rhythm NSR and Alarm Parameters . 2330: Spoke with Leeanna Nguyen NP regarding patients SBP in 80's. Orders received to restart patient on 2 mcg/min of Levophed and maintain a SBP > 100. NP notified of patients increasing temperature. 0400: Orders received from Leeanna Nguyen NP to restart tube feeds at 20 ml/hr. 0730: Bedside and Verbal shift change report given to KATHRINE Marinelli (oncoming nurse) by Andrez Green RN (offgoing nurse). Report included the following information SBAR, Kardex, Intake/Output, MAR, Recent Results, Cardiac Rhythm NSR and Alarm Parameters .

## 2021-12-26 NOTE — PROGRESS NOTES
Shift report received from KATHRINE Rob using [employer]

## 2021-12-26 NOTE — PROGRESS NOTES
[facility]    ICU TEAM Progress Note    Name: Wilbur Medina   : 1975   MRN: [id number]   Date: 2021      Subjective:   Progress Note: 2021      Mr Charu Cardenas is a 54 yo male with a PMH of DM2, HTN and obesity admitted on  to [facility] for COVID-19 pneumonia. He was treated with tocilizumab and steroids. He was not vaccinated. He decompensated and was intubated and . He was paralyzed, proned/supinated. He was transferred to [facility] on  to be evaluated for possible ECMO. CVC and arterial line placed at [facility] on . Nimbex was turned off . He was weaned to 60% Fio2 as of 12/3. Proning/supinating therapy continued. He continued to demonstrated fever. He was started on cefepime/fluconazole on 12/3 with vanc added .      his FIo2 increased to 70% and decreased back to 60% on . CVC changed to PICC on  as blood culture had been positive for 4/4 S epi on 12/3 (cleared in culture on ). He was on vanc for 2 weeks, until . Changed to cefazolin on 12/15. He has been responsive to diuresis over the past few days and has been weaned to 60% FiO2. Attempts were made to decrease sedation on , but this was not successful due to increased agitation. Due to this, seroquel was added on . Enteral oxycodone and valium were also added on . His versed was weaned from 19mg/h to 17/mg/h on  pm.  His versed was further weaned on  as his seroquel was increased. : Versed is at 9. He continues on the fentanyl infusion. He has been afebrile overnight. : Versed gtt 7 and fentanyl gtt at 150. Vent weaned to 65% and continued PEEP of 12. Opens eyes and moves ext spontaneously. But does not follow commands, track with eyes, or WD to pain. NGT clogged, will replace today. Completed cefazolin. Chest xray tomorrow am.    : No acute events overnight. Did not require pronation overnight.  Plan for today is to decrease FiO2 to 60% and wean Fentanyl. Mobilize as tolerated. Not following commands. SORIANO spontaneously. 12/24: No acute events overnight, remains on 55% FiO2, PEEP 10. Will continue to wean FiO2 for goal of 50% today, will wean PEEP to 8 if tolerated. SORIANO spontaneously. Continue to wean IV sedation and liberalize PO opiates and benzos to avoid withdraw without oversedating. 12/25: Early this AM about 0600 patient spiked fever 103.3, elevated RR and HR. Given PRN pushes of versed and fentanyl as well as one time dose of rocuronium with some recovery. Chest x-ray obtained does not show obvious pneumothorax, formal read is pending. Will get BLE dopplers to asses for DVT, once stable will send to CT for head and chest CT to rule out neurological causes of fever such as stroke and also PE. Will likely need to resume sedation. Will place ENT consult for tracheostomy. Per respiratory note, patients ETT is having air leaks and suction catheter is difficult to pass, will likely require tube exchange today. Cooling blanket for fever. Levo as needed for support of blood pressure. 12/26: No acute events over the night. Unable to obtain CT scans due to labile BP, risk of travel outweighed benefit of scan overnight. Will attempt to get CT scans today, will add CT abdomen for further evaluation of fevers. Received motrin over the night for T-max 100.9. Remains on levophed infusion at 2 mcg/min for BP support. Labs remain stable, no acute abnormality. Monitor tube feed residuals, they were held for a few hours yesterday for high gastric residual, likely secondary to sedative medications decreasing gastric motility, resume feeds today. Increase rate of feeds as tolerated. Plan to re-test for COVID on 12/29. No issues with ET tube over the night, will hold off on tube exchange. ENT consultation for trach. Discussed with wife yesterday. Continue to prone for PF ratio less than 150, aggressive pulmonary hygiene.      Active Problem List:     Problem List  Date Reviewed: 11/28/2021          Codes Class    COVID ICD-10-CM: U07.1  ICD-9-CM: 079.89         Acute hypoxemic respiratory failure due to COVID-19 [facility]) ICD-10-CM: U07.1, J96.01  ICD-9-CM: 518.81, 079.89, 799.02         Pneumonia due to COVID-19 virus ICD-10-CM: U07.1, J12.82  ICD-9-CM: 480.8, 079.89         Hyperglycemia due to type 2 diabetes mellitus (HCC) ICD-10-CM: E11.65  ICD-9-CM: 250.00         Primary hypertension ICD-10-CM: I10  ICD-9-CM: 401.9         Hyponatremia ICD-10-CM: E87.1  ICD-9-CM: 276.1         Lactic acidosis ICD-10-CM: E87.2  ICD-9-CM: 276.2         Hyperlipidemia ICD-10-CM: E78.5  ICD-9-CM: 272.4         Obesity (BMI 30-39. 9) ICD-10-CM: E66.9  ICD-9-CM: 278.00         COVID-19 vaccination not done ICD-10-CM: Z28.9  ICD-9-CM: V64.00               Past Medical History:      has a past medical history of COVID-19, Diabetes ([address] Hyperlipidemia, and Hypertension. Past Surgical History:      has a past surgical history that includes hx orthopaedic (Right). Home Medications:     Prior to Admission medications    Medication Sig Start Date End Date Taking? Authorizing Provider   cisatracurium (NIMBEX) IV infusion 0-0.869 mg/min by IntraVENous route TITRATE. 12/1/21  Yes Zahira Cote MD   enoxaparin (LOVENOX) 100 mg/mL 90 mg by SubCUTAneous route every twelve (12) hours every twelve (12) hours. 12/1/21  Yes Zahira Cote MD   insulin glargine (LANTUS) 100 unit/mL injection 24 Units by SubCUTAneous route nightly. Indications: type 2 diabetes mellitus 12/1/21  Yes Zahira Cote MD   midazolam in normal saline (VERSED) 1 mg/mL soln 0-10 mg/hr by IntraVENous route TITRATE. Max Daily Amount: 240 mg. 12/1/21  Yes Zahira Cote MD   Norepinephrine Bitartrate (LEVOPHED) 8 mg/250 mL (32 mcg/mL) soln 0.5-16 mcg/min by IntraVENous route TITRATE. 12/1/21  Yes Zahira Cote MD   ascorbic acid, vitamin C, (VITAMIN C) 500 mg tablet Take 1 Tablet by mouth two (2) times a day.  12/1/21   Zahira Cote MD cholecalciferol (VITAMIN D3) (1000 Units /25 mcg) tablet Take 2 Tablets by mouth daily. 21   Fabiano Galicia MD   melatonin, rapid dissolve, 5 mg TbDi tablet Take 1 Tablet by mouth nightly. 21   Fabiano Galicia MD   atorvastatin (LIPITOR) 20 mg tablet Take 20 mg by mouth daily. Provider, Historical   telmisartan (MICARDIS) 80 mg tablet Take 80 mg by mouth daily. Indications: high blood pressure    Provider, Historical       Allergies/Social/Family History: Allergies   Allergen Reactions    Alupent [Metaproterenol] Swelling      Social History     Tobacco Use    Smoking status: Never Smoker    Smokeless tobacco: Not on file   Substance Use Topics    Alcohol use: Yes     Comment: drinksonce a week      Family History   Problem Relation Age of Onset    Diabetes Mother     Hypertension Mother     Stroke Mother     Hodgkin's lymphoma Mother     Diabetes Father     Hypertension Father     Cystic Fibrosis Father     Diabetes Maternal Aunt     Diabetes Maternal Uncle        Review of Systems:     Unable    Objective:   Vital Signs:  Visit Vitals  BP 99/72   Pulse 80   Temp (!) 100.9 °F (38.3 °C)   Resp 24   Ht 5' 6\" (1.676 m)   Wt 82.5 kg (181 lb 14.1 oz)   SpO2 99%   BMI 29.36 kg/m²    O2 Flow Rate (L/min): 60 l/min O2 Device: Endotracheal tube,Ventilator Temp (24hrs), Av.8 °F (37.7 °C), Min:97.8 °F (36.6 °C), Max:101.6 °F (38.7 °C)           Intake/Output:     Intake/Output Summary (Last 24 hours) at 2021 0844  Last data filed at 2021 0700  Gross per 24 hour   Intake 2171.68 ml   Output 915 ml   Net 1256.68 ml       Physical Exam:    General:  Sedated/intubated/RASS -1 to -2  Eye: Pupils are round and reactive bilaterally  Neurologic: Furrowed brow; withdraws to noxious stimuli in all extremities; does not follow commands, SORIANO spontaneously; looks around room sponteanously  Neck: Supple, no JVD  Lungs:   On mechanical ventilation; CTAB  Heart: RRR, 2+ pulses, 1+ edema of BLE Abdomen:Soft, nontender, nondistended  Skin:  No rash or lesion  Verified 12/22    LABS AND  DATA: Personally reviewed  Recent Labs     12/26/21 0424 12/25/21 0420   WBC 8.1 5.3   HGB 10.8* 10.4*   HCT 32.7* 30.8*    210     Recent Labs     12/26/21  0424 12/25/21  0420   * 135*   K 3.6 3.9    97   CO2 30 33*   BUN 11 15   CREA 0.38* 0.47*   * 222*   CA 9.1 9.1   MG 2.0 2.1   PHOS 3.5 3.5     Recent Labs     12/26/21  0424 12/25/21  0420   AP 64 62   TP 6.4 6.1*   ALB 2.3* 2.2*   GLOB 4.1* 3.9     Recent Labs     12/25/21  0420 12/24/21  0358   INR 1.1 1.0   PTP 11.8* 10.5      Recent Labs     12/25/21  0538 12/24/21  0546   PHI 7.45 7.46*   PCO2I 47.2* 46.4*   PO2I 64* 70*   FIO2I 55 55     No results for input(s): CPK, CKMB, TROIQ, BNPP in the last 72 hours. Ventilator Settings:  Mode Rate Tidal Volume Pressure FiO2 PEEP   Assist control,Pressure control   500 ml  8 cm H2O 90 % 10 cm H20     Peak airway pressure: 29 cm H2O    Minue ventilation: 9.45 l/min      MEDS: Reviewed    Chest X-Ray: personally reviewed and report checked    · TTE: 11/24: LV: Estimated LVEF is 50 - 55%. Normal cavity size, wall thickness and diastolic function. Low normal systolic function. Assessment and Plan     [id number] Pneumonia complicated by Severe ARDS c/b pneumococcal pneumonia: Received tocilizumab. Continue RASS -4. Continue fentanyl versed. S pneumonia in sputum 12/3. Off nimbex since 12/2.  - Continue steroids  - Completed cefazolin  - Procal negative  - CXR unrevealing  - FiO2 decreased to 90, contine to wean O2 as toelrated. Maintain sats greater than 92%   - Goal for trach/PEG when patient medically able  - Diuresis PRN    HLD: Continue statin    DM2 c/b hyperglycemia:   - Continue NPH 10 q 12hrs   - Continue SSI q 6hrs     Hypotension: Possibly related to sedation. 2mcg levophed.    - Continue midodrine    Agitation:   - Continue enteral sedation/opioids  - Wean sedation as tolerated, on versed 10vmg/h and fentanyl 200 mcg/h, increased overnight for agitation  - Seroquel at 50mg TID    Deconditioning: Unable to participate in PT/OT at this time    Multidisciplinary Rounds Completed: Y    ABCDEF Bundle/Checklist  Pain Medications: Fentanyl  Target RASS: 0  Sedation Medications:Versed  CAM-ICU: SILVIA  Mobility:Poor  PT/OT: Unable to participate: PROM  Restraints:Y  Discussed Plan of Care (goals of care): Y  Addressed Code Status: F    CARDIOVASCULAR  Cardiac Gtts: N  SBP Goal of: > 90 mmHg  MAP Goal of: > 65 mmHg  Transfusion Trigger (Hgb): <7 g/dL    RESPIRATORY  Vent Goals:   Optimize PEEP/Ventilation/Oxygenation  DVT Prophylaxis (if no, list reason): Lovenox   SPO2 Goal: > 92%  Pulmonary toilet: Duo-Nebs     GI/  Gold Catheter Present: Yes  GI Prophylaxis: Pepcid (famotidine)   Nutrition: Yes TF  IVFs:N  Bowel Movement:Y  Bowel Regimen:Y  Insulin: Y    ANTIBIOTICS  Antibiotics:  None    T/L/D  Tubes: ETT  Lines: PICC  Drains: Gold    SPECIAL EQUIPMENT  Vent    DISPOSITION  ICU    CRITICAL CARE CONSULTANT NOTE  I had a face to face encounter with the patient, reviewed and interpreted patient data including clinical events, labs, images, vital signs, I/O's, and examined patient. I have discussed the case and the plan and management of the patient's care with the consulting services, the bedside nurses and the respiratory therapist.      NOTE OF PERSONAL INVOLVEMENT IN CARE   This patient has a high probability of imminent, clinically significant deterioration, which requires the highest level of preparedness to intervene urgently. I participated in the decision-making and personally managed or directed the management of the following life and organ supporting interventions that required my frequent assessment to treat or prevent imminent deterioration. I personally spent 55 minutes of critical care time.   This is time spent at this critically ill patient's bedside actively involved in patient care as well as the coordination of care and discussions with the patient's family. This does not include any procedural time which has been billed separately.         Ajay Begum [facility]     Critical Care Medicine  [facility]

## 2021-12-27 NOTE — PROGRESS NOTES
1930-bedside report received from Yves CHISHOLM using sbar format-art line and cuff correlate-using art line to monitor and treat BP  2052-cooling blanket turned off bladder temp 98.4  2057-levo stopped  by art line  2112-levo resumed MAP 63 by ART line/ TF stopped d/t residual of 150cc(returned to pt) OGT flushed and capped off-NP updated  2320-temp climbing 99.5 by bladder probe/cooling blanket turned on to cool pt  2328-hep adjusted per protocol for PTT 51.3  0200-gastric residual still 150cc+-OGT placed to suction-recovered 500cc of yellow/orange liquid  0211-paced OGT to LIWS and NP Vangie Simon updated  TF remain off          0730-bedside report given to RN using sbar format

## 2021-12-27 NOTE — PROGRESS NOTES
Pharmacist Note - Vancomycin Dosing    Consult provided for this 55 y.o. male for indication of MRSA pneumonia. Antibiotic regimen(s): Zosyn + Vancomycin  Patient on vancomycin PTA? NO     Recent Labs     21  0538 21  1358 21  0424 21  0420 21  0420   WBC 7.4 7.9 8.1   < > 5.3   CREA 0.24*  --  0.38*  --  0.47*   BUN 6  --  11  --  15    < > = values in this interval not displayed. Frequency of BMP: Daily   Height: 167.6 cm  Weight: 83.9 kg  Est CrCl: >100 ml/min; UO: 0.6 ml/kg/hr  Temp (24hrs), Av.5 °F (37.5 °C), Min:98.4 °F (36.9 °C), Max:100.3 °F (37.9 °C)    Cultures:  12/3 Blood: MSSE in 4/4 bottles, final  12/3 Sputum: moderate Strep pneumo, final   Blood: NG, final   Sputum: light normal taryn, final   Blood: NGTD   Sputum: moderate MRSA, final   MRSA screen: (-)    MRSA Swab ordered (if applicable)? YES    The plan below is expected to result in a target range of AUC/-600    Therapy will be initiated with a loading dose of 2000 mg IV x 1 to be followed by a maintenance dose of 1250 mg IV every 12 hours. Pharmacy to follow patient daily and order levels / make dose adjustments as appropriate. *Vancomycin has been dosed used Bayesian kinetics software to target an AUC/TIM of 400-600, which provides adequate exposure for an assumed infection due to MRSA with an TIM of 1 or less while reducing the risk of nephrotoxicity as seen with traditional trough based dosing goals.

## 2021-12-27 NOTE — PROGRESS NOTES
ID Progress Note  2021    Subjective:     Still on high levels of support--remains intermittently febrile--MRSA from recent sputm    Objective:     Antibiotics:  1. Vancomycin   2. Cefepime    3. Ceftriaxone  4. Zosyn --     Vitals:   Visit Vitals  BP (!) 89/69   Pulse 70   Temp 98.7 °F (37.1 °C)   Resp 25   Ht 5' 6\" (1.676 m)   Wt 83.9 kg (184 lb 15.5 oz)   SpO2 98%   BMI 29.85 kg/m²        Tmax:  Temp (24hrs), Av.5 °F (37.5 °C), Min:98.4 °F (36.9 °C), Max:100.3 °F (37.9 °C)      Exam:  Observational only    Labs:      Recent Labs     21  0538 21  1358 21  0424 21  0420   WBC 7.4 7.9 8.1 5.3   HGB 10.5* 10.8* 10.8* 10.4*    212 221 210   BUN 6  --  11 15   CREA 0.24*  --  0.38* 0.47*   AP 79  --  64 62   TBILI 0.5  --  0.5 0.4       Cultures:     No results found for: SDES  Lab Results   Component Value Date/Time    Culture result: MRSA NOT PRESENT 2021 06:53 AM    Culture result:  2021 06:53 AM     Screening of patient nares for MRSA is for surveillance purposes and, if positive, to facilitate isolation considerations in high risk settings. It is not intended for automatic decolonization interventions per se as regimens are not sufficiently effective to warrant routine use. Culture result: (A) 2021 01:25 PM     MODERATE * METHICILLIN RESISTANT STAPHYLOCOCCUS AUREUS *    Culture result: HEAVY NORMAL RESPIRATORY TAMARA 2021 01:25 PM       Radiology:     Line/Insert Date:           Assessment:     1. Severe COVID pneumonia--Pneumococcus from respiratory culture  2. VDRF  3. Hypoxic respiratory failure  4. Bacteremia--S epi--probable line source  5. MRSA from sputum, now  6. Seriously ill with guarded prognosis, at best    Objective:     1. Continue antibiotic therapy  2. Follow up cultures  3. Work up fevers  4.  Imaging is pending--depends on BP issues, etc      Almita Camacho MD

## 2021-12-28 NOTE — PROGRESS NOTES
Pt off cooling blanket from 2850-1741. Tube feeds resumed at 20 ml/hr at start of shift. Versed weaned to 7mg/hr, pt became non-compliant with the vent after 1800 turn and reposition, rapid respiratory rate and low tidal volumes, pt received PRN fentanyl and versed without resolution, versed gtt increased to 10 mg/hr. Vanc started for (+) MRSA in sputum.

## 2021-12-28 NOTE — PROGRESS NOTES
Transition of Care Plan   RUR-   Medium    Covid positive    DISPOSITION:pending medical progression   F/U with PCP/Specialist     Transport: [facility]/family  Patient remains on isolation, intubated on Precedex,Versed and  Levophed gtts. ENT to evaluate for Trach placement. Care management is continuing to follow.    Bela Gannon RN, Care Management

## 2021-12-28 NOTE — PROGRESS NOTES
1930-bedside report received Bernadette Silva RN using sbar format-remains on cooling blanket  2109-prn versed given after repositioning and kurtis care-pt became non compliant with vent/inc RR34/low volumes/sats dropping< 90%/-o2 inc to 100%. art line and cuff correlate-using art line to monitor and treat BP  2130-back to baseline  RT to wean O2  2238-febrile  101.5 bladder probe/medicated per order  Remains on cooling blanket  2348-blanc replaced  Sputum/PBC collected and sent to lab- small area of erosion noted at meatus when old blanc removed/scant bleeding with removal of old cath and the placement of new. WC consulted  0010-white port of PICC w/o blood return after 1 dose of cath vijay-2nd dose instilled per order  2348-urine collected and sent to lab-NP Vangie Simon updated.  NP to place orders to change sedation  0000-levo titrated to effect  0200-awaiting pharmacy to deliver dilaudid  0214-bladder temp 97.9-cooling blanket placed on stand by  0219-fent gtt stopped  0221-dilaudid gtt started/white port of picc w/bld return  5cc blood wasted and line flushed  0237- IV moved to red port of PICC & cath vijay instilled into grey port of PICC  0251-abd breathing noted  dilaudid inc to 2mg/hr  0400-compliant w/vent- no further weaning of sedation at this time Per NP Vangie Simon  0455-[place]  port of picc w/bld return  5cc blood wasted and line flushed-[place]ports with bld return and all in use        0730-bedside report given to RN using sbar format

## 2021-12-28 NOTE — CONSULTS
Ears/Nose/Throat Consult    Subjective:     Date of Consultation:  December 27, 2021    Referring Physician: None    History of Present Illness:   Patient is a 55 y.o.  male who is being seen for chronic respiratory failure. he  was admitted to the hospital for COVID [U07.1]. Diagnosed with covid at least by nov 20th. Transferred to [facility] on 12/1. Has been intubated and sedated since then. Currently having a setback with new fevers and new mrsa pneumonia. Currently on 80%FIO2 and had significant oxygen desaturization when attempted to be moved for CT scan. We have been asked to evaluate him for a tracheotomy.     Past Medical History:   Diagnosis Date    COVID-19     Diabetes ([address]     Hyperlipidemia     Hypertension       Family History   Problem Relation Age of Onset    Diabetes Mother     Hypertension Mother     Stroke Mother     Hodgkin's lymphoma Mother     Diabetes Father     Hypertension Father     Cystic Fibrosis Father     Diabetes Maternal Aunt     Diabetes Maternal Uncle       Social History     Tobacco Use    Smoking status: Never Smoker    Smokeless tobacco: Not on file   Substance Use Topics    Alcohol use: Yes     Comment: drinksonce a week     Past Surgical History:   Procedure Laterality Date    HX ORTHOPAEDIC Right     wrist surgery      Current Facility-Administered Medications   Medication Dose Route Frequency    metoclopramide HCl (REGLAN) injection 5 mg  5 mg IntraVENous Q6H    enoxaparin (LOVENOX) injection 80 mg  1 mg/kg SubCUTAneous Q12H    Vancomycin - Pharmacy to Dose   Other Rx Dosing/Monitoring    [START ON 12/28/2021] vancomycin (VANCOCIN) 1,250 mg in 0.9% sodium chloride 250 mL (VIAL-MATE)  1,250 mg IntraVENous Q12H    NOREPINephrine (LEVOPHED) 8 mg in 5% dextrose 250mL (32 mcg/mL) infusion  0.5-30 mcg/min IntraVENous TITRATE    midodrine (PROAMATINE) tablet 20 mg  20 mg Oral Q8H    senna-docusate (PERICOLACE) 8.6-50 mg per tablet 1 Tablet  1 Tablet Oral DAILY    piperacillin-tazobactam (ZOSYN) 3.375 g in 0.9% sodium chloride (MBP/ADV) 100 mL MBP  3.375 g IntraVENous Q8H    dexmedeTOMidine in 0.9 % NaCl (PRECEDEX) 400 mcg/100 mL (4 mcg/mL) infusion soln  0.1-1.5 mcg/kg/hr IntraVENous TITRATE    diazePAM (VALIUM) tablet 10 mg  10 mg Per NG tube Q6H    QUEtiapine (SEROquel) tablet 50 mg  50 mg Oral TID    insulin NPH (NOVOLIN N, HUMULIN N) injection 10 Units  10 Units SubCUTAneous Q12H    oxyCODONE IR (ROXICODONE) tablet 10 mg  10 mg Per NG tube Q6H    [Held by provider] guar gum (BENEFIBER) packet 1 Packet  4 g Nasogastric TID    cholecalciferol (VITAMIN D3) (1000 Units /25 mcg) tablet 2,000 Units  2,000 Units Per NG tube DAILY    famotidine (PEPCID) 40 mg/5 mL (8 mg/mL) oral suspension 20 mg  20 mg Per NG tube Q12H    polyethylene glycol (MIRALAX) packet 17 g  17 g Oral DAILY PRN    zinc oxide-cod liver oil (DESITIN) 40 % paste   Topical Q12H    fentaNYL citrate (PF) injection 100 mcg  100 mcg IntraVENous Q2H PRN    midazolam (VERSED) injection 5 mg  5 mg IntraVENous Q1H PRN    alteplase (CATHFLO) 1 mg in sterile water (preservative free) 1 mL injection  1 mg InterCATHeter PRN    bacitracin 500 unit/gram packet 1 Packet  1 Packet Topical PRN    therapeutic multivitamin (THERAGRAN) tablet 1 Tablet  1 Tablet Oral DAILY    dextrose (D50W) injection syrg 12.5-25 g  12.5-25 g IntraVENous PRN    insulin lispro (HUMALOG) injection   SubCUTAneous Q6H    ascorbic acid (vitamin C) (VITAMIN C) tablet 500 mg  500 mg Oral DAILY    atorvastatin (LIPITOR) tablet 20 mg  20 mg Oral DAILY    ibuprofen (ADVIL;MOTRIN) 100 mg/5 mL oral suspension 400 mg  400 mg Per NG tube Q6H PRN    SALINE PERIPHERAL FLUSH Q8H soln 5-40 mL  5-40 mL InterCATHeter Q8H    saline peripheral flush soln 5-40 mL  5-40 mL InterCATHeter PRN    acetaminophen (TYLENOL) solution 650 mg  650 mg Per NG tube Q6H PRN    midazolam in normal saline (VERSED) 1 mg/mL infusion  0-20 mg/hr IntraVENous TITRATE    fentaNYL (PF) 1,500 mcg/30 mL (50 mcg/mL) infusion  0-300 mcg/hr IntraVENous TITRATE    balsam peru-castor oiL (VENELEX) ointment   Topical BID    glucose chewable tablet 16 g  4 Tablet Oral PRN    glucagon (GLUCAGEN) injection 1 mg  1 mg IntraMUSCular PRN    chlorhexidine (PERIDEX) 0.12 % mouthwash 15 mL  15 mL Oral Q12H     Facility-Administered Medications Ordered in Other Encounters   Medication Dose Route Frequency    lidocaine (PF) (XYLOCAINE) 20 mg/mL (2 %) injection   IntraVENous PRN    propofoL (DIPRIVAN) 10 mg/mL injection   IntraVENous PRN    succinylcholine (ANECTINE) 20 mg/mL syringe   IntraVENous PRN    rocuronium injection   IntraVENous PRN      Allergies   Allergen Reactions    Alupent [Metaproterenol] Swelling        Review of Systems:  Pertinent items are noted in the History of Present Illness. Objective:     Patient Vitals for the past 8 hrs:   BP Temp Pulse Resp SpO2   21 1800 107/81  75 24 96 %   21 1700 104/77  74 23 96 %   21 1612   74 25 98 %   21 1600 97/72 98.7 °F (37.1 °C) 72 26 97 %   21 1500 (!) 86/62  69 23 97 %   21 1400 105/75  74 21 98 %   21 1300 (!) 89/69  70 25 98 %   21 1200 114/80 98.7 °F (37.1 °C) 72 20 99 %     Temp (24hrs), Av.4 °F (37.4 °C), Min:98.4 °F (36.9 °C), Max:100.3 °F (37.9 °C)     0701 -  1900  In: 3916.1 [I.V.:2886.1]  Out: 4192 [Urine:2330]    Physical Exam:   General  General Appearance- ill individual sedated on the vent. HEENT  Head: Normally developed without evidence of trauma or lesions. Face:  Skin:  Normal color, texture, and turgor. There are no lesions of concern nor swelling or induration. Lips- normal.    Ear: Auricle- Left- Normal development without sinus pit, cyst or any other lesion.  Right- Normal development without sinus pit, cyst or any other lesion  Auditory Canal (otoscopic examination)  Left- clean, without edema, discharge, or lesions. Right  clean, without edema, discharge, or lesions. Nose: External Nose- Normally developed without lesion. Neck:-- Trachea- midline with normal laryngeal framework with no crepitus. Chest and Lung Exam: mechanical ventilation    Cardiovascular: Regular rate and rhythm. Assessment:     Chronic respiratory failure due to covid and now bacterial pneumonia    Hospital Problems  Date Reviewed: 11/28/2021          Codes Class Noted POA    COVID ICD-10-CM: U07.1  ICD-9-CM: 079.89  12/1/2021 Unknown                Plan: This patient is too ill to transport to the OR for tracheotomy. I recommend continue current medical therapy and consider tracheotomy when he can consistently remain on less respiratory support. Please reconsult us when he needs less respiratory support.       Signed By: Melvi Carlisle MD     December 27, 2021

## 2021-12-29 NOTE — PROGRESS NOTES
[facility]    ICU TEAM Progress Note    Name: Brennon Kinney   : 1975   MRN: [id number]   Date: 2021      Subjective:   Progress Note: 2021      Mr Sheryle Stalls is a 56 yo male with a PMH of DM2, HTN and obesity admitted on  to [facility] for COVID-19 pneumonia. He was treated with tocilizumab and steroids. He was not vaccinated. He decompensated and was intubated and . He was paralyzed, proned/supinated. He was transferred to [facility] on  to be evaluated for possible ECMO. CVC and arterial line placed at [facility] on . Nimbex was turned off . He was weaned to 60% Fio2 as of 12/3. Proning/supinating therapy continued. He continued to demonstrated fever. He was started on cefepime/fluconazole on 12/3 with vanc added .      his FIo2 increased to 70% and decreased back to 60% on . CVC changed to PICC on  as blood culture had been positive for 4/4 S epi on 12/3 (cleared in culture on ). He was on vanc for 2 weeks, until . Changed to cefazolin on 12/15. He has been responsive to diuresis over the past few days and has been weaned to 60% FiO2. Attempts were made to decrease sedation on , but this was not successful due to increased agitation. Due to this, seroquel was added on . Enteral oxycodone and valium were also added on . His versed was weaned from 19mg/h to 17/mg/h on  pm.  His versed was further weaned on  as his seroquel was increased. : Versed is at 9. He continues on the fentanyl infusion. He has been afebrile overnight. : Versed gtt 7 and fentanyl gtt at 150. Vent weaned to 65% and continued PEEP of 12. Opens eyes and moves ext spontaneously. But does not follow commands, track with eyes, or WD to pain. NGT clogged, will replace today. Completed cefazolin. Chest xray tomorrow am.    : No acute events overnight. Did not require pronation overnight.  Plan for today is to decrease FiO2 to 60% and wean Fentanyl. Mobilize as tolerated. Not following commands. SORIANO spontaneously. 12/24: No acute events overnight, remains on 55% FiO2, PEEP 10. Will continue to wean FiO2 for goal of 50% today, will wean PEEP to 8 if tolerated. SORIANO spontaneously. Continue to wean IV sedation and liberalize PO opiates and benzos to avoid withdraw without oversedating. 12/25: Early this AM about 0600 patient spiked fever 103.3, elevated RR and HR. Given PRN pushes of versed and fentanyl as well as one time dose of rocuronium with some recovery. Chest x-ray obtained does not show obvious pneumothorax, formal read is pending. Will get BLE dopplers to asses for DVT, once stable will send to CT for head and chest CT to rule out neurological causes of fever such as stroke and also PE. Will likely need to resume sedation. Will place ENT consult for tracheostomy. Per respiratory note, patients ETT is having air leaks and suction catheter is difficult to pass, will likely require tube exchange today. Cooling blanket for fever. Levo as needed for support of blood pressure. 12/26: No acute events over the night. Unable to obtain CT scans due to labile BP, risk of travel outweighed benefit of scan overnight. Will attempt to get CT scans today, will add CT abdomen for further evaluation of fevers. Received motrin over the night for T-max 100.9. Remains on levophed infusion at 2 mcg/min for BP support. Labs remain stable, no acute abnormality. Monitor tube feed residuals, they were held for a few hours yesterday for high gastric residual, likely secondary to sedative medications decreasing gastric motility, resume feeds today. Increase rate of feeds as tolerated. Plan to re-test for COVID on 12/29. No issues with ET tube over the night, will hold off on tube exchange. ENT consultation for trach. Discussed with wife yesterday. Continue to prone for PF ratio less than 150, aggressive pulmonary hygiene.      12/27: Patient seen. NAD. Fevers noted. MRSA in sputum, antibiotics adjusted. Continue current management. Active Problem List:     Problem List  Date Reviewed: 11/28/2021          Codes Class    COVID ICD-10-CM: U07.1  ICD-9-CM: 079.89         Acute hypoxemic respiratory failure due to COVID-19 [facility]) ICD-10-CM: U07.1, J96.01  ICD-9-CM: 518.81, 079.89, 799.02         Pneumonia due to COVID-19 virus ICD-10-CM: U07.1, J12.82  ICD-9-CM: 480.8, 079.89         Hyperglycemia due to type 2 diabetes mellitus (HCC) ICD-10-CM: E11.65  ICD-9-CM: 250.00         Primary hypertension ICD-10-CM: I10  ICD-9-CM: 401.9         Hyponatremia ICD-10-CM: E87.1  ICD-9-CM: 276.1         Lactic acidosis ICD-10-CM: E87.2  ICD-9-CM: 276.2         Hyperlipidemia ICD-10-CM: E78.5  ICD-9-CM: 272.4         Obesity (BMI 30-39. 9) ICD-10-CM: E66.9  ICD-9-CM: 278.00         COVID-19 vaccination not done ICD-10-CM: Z28.9  ICD-9-CM: V64.00               Past Medical History:      has a past medical history of COVID-19, Diabetes ([address] Hyperlipidemia, and Hypertension. Past Surgical History:      has a past surgical history that includes hx orthopaedic (Right). Home Medications:     Prior to Admission medications    Medication Sig Start Date End Date Taking? Authorizing Provider   cisatracurium (NIMBEX) IV infusion 0-0.869 mg/min by IntraVENous route TITRATE. 12/1/21  Yes Izzy Grijalva MD   enoxaparin (LOVENOX) 100 mg/mL 90 mg by SubCUTAneous route every twelve (12) hours every twelve (12) hours. 12/1/21  Yes Izzy Grijalva MD   insulin glargine (LANTUS) 100 unit/mL injection 24 Units by SubCUTAneous route nightly. Indications: type 2 diabetes mellitus 12/1/21  Yes Izzy Grijalva MD   midazolam in normal saline (VERSED) 1 mg/mL soln 0-10 mg/hr by IntraVENous route TITRATE.  Max Daily Amount: 240 mg. 12/1/21  Yes Izzy Grijalva MD   Norepinephrine Bitartrate (LEVOPHED) 8 mg/250 mL (32 mcg/mL) soln 0.5-16 mcg/min by IntraVENous route TITRATE. 12/1/21  Yes Izzy Grijalva MD ascorbic acid, vitamin C, (VITAMIN C) 500 mg tablet Take 1 Tablet by mouth two (2) times a day. 21   Mel Espinoza MD   cholecalciferol (VITAMIN D3) (1000 Units /25 mcg) tablet Take 2 Tablets by mouth daily. 21   Mel Espinoza MD   melatonin, rapid dissolve, 5 mg TbDi tablet Take 1 Tablet by mouth nightly. 21   Mel Espinoza MD   atorvastatin (LIPITOR) 20 mg tablet Take 20 mg by mouth daily. Provider, Historical   telmisartan (MICARDIS) 80 mg tablet Take 80 mg by mouth daily. Indications: high blood pressure    Provider, Historical       Allergies/Social/Family History:      Allergies   Allergen Reactions    Alupent [Metaproterenol] Swelling      Social History     Tobacco Use    Smoking status: Never Smoker    Smokeless tobacco: Not on file   Substance Use Topics    Alcohol use: Yes     Comment: drinksonce a week      Family History   Problem Relation Age of Onset    Diabetes Mother     Hypertension Mother     Stroke Mother     Hodgkin's lymphoma Mother     Diabetes Father     Hypertension Father     Cystic Fibrosis Father     Diabetes Maternal Aunt     Diabetes Maternal Uncle        Review of Systems:     Unable    Objective:   Vital Signs:  Visit Vitals  /87   Pulse 78   Temp 99.1 °F (37.3 °C)   Resp 18   Ht 5' 6\" (1.676 m)   Wt 85 kg (187 lb 6.3 oz)   SpO2 96%   BMI 30.25 kg/m²    O2 Flow Rate (L/min): 60 l/min O2 Device: Endotracheal tube,Ventilator Temp (24hrs), Av.5 °F (38.1 °C), Min:98.6 °F (37 °C), Max:103.3 °F (39.6 °C)           Intake/Output:     Intake/Output Summary (Last 24 hours) at 2021 1810  Last data filed at 2021 1600  Gross per 24 hour   Intake 2628.68 ml   Output 1600 ml   Net 1028.68 ml       Physical Exam:    General:  Sedated/intubated/RASS -1 to -2  Eye: Pupils are round and reactive bilaterally  Neurologic: Furrowed brow; withdraws to noxious stimuli in all extremities; does not follow commands, SORIANO spontaneously; looks around room sponteanously  Neck: Supple, no JVD  Lungs: On mechanical ventilation; CTAB  Heart: RRR, 2+ pulses, 1+ edema of BLE   Abdomen:Soft, nontender, nondistended  Skin:  No rash or lesion  Verified 12/22    LABS AND  DATA: Personally reviewed  Recent Labs     12/29/21 0329 12/28/21  0233   WBC 7.4 8.3   HGB 9.8* 10.1*   HCT 30.1* 30.8*    232     Recent Labs     12/29/21 0329 12/28/21  0233    139   K 3.9 3.5    106   CO2 28 27   BUN 6 5*   CREA 0.33* 0.33*   * 175*   CA 9.0 9.0   MG 2.4 2.1   PHOS 3.6 3.4     Recent Labs     12/29/21 0329 12/28/21  0233   AP 88 142*   TP 6.3* 6.4   ALB 1.8* 1.9*   GLOB 4.5* 4.5*     Recent Labs     12/27/21  0650 12/27/21  0538   APTT 48.7* 45.7*      Recent Labs     12/28/21  0332 12/27/21  0415   PHI 7.39 7.44   PCO2I 46.8* 40.2   PO2I 64* 54*   FIO2I 90 80     No results for input(s): CPK, CKMB, TROIQ, BNPP in the last 72 hours. Ventilator Settings:  Mode Rate Tidal Volume Pressure FiO2 PEEP   Assist control,Pressure control   500 ml  8 cm H2O 70 % 10 cm H20     Peak airway pressure: 29 cm H2O    Minue ventilation: 8.08 l/min      MEDS: Reviewed    Chest X-Ray: personally reviewed and report checked    · TTE: 11/24: LV: Estimated LVEF is 50 - 55%. Normal cavity size, wall thickness and diastolic function. Low normal systolic function. Assessment and Plan     [id number] Pneumonia complicated by Severe ARDS c/b pneumococcal pneumonia: Received tocilizumab. Continue RASS -4. Continue fentanyl versed. S pneumonia in sputum 12/3. Off nimbex since 12/2.  - Continue steroids  - Completed cefazolin  - Procal negative  - CXR unrevealing  - FiO2 decreased to 90, contine to wean O2 as toelrated. Maintain sats greater than 92%   - Goal for trach/PEG when patient medically able  - Diuresis PRN    HLD: Continue statin    DM2 c/b hyperglycemia:   - Continue NPH 10 q 12hrs   - Continue SSI q 6hrs     Hypotension: Possibly related to sedation. 2mcg levophed.    - Continue midodrine    Agitation:   - Continue enteral sedation/opioids  - Wean sedation as tolerated, on versed 10vmg/h and fentanyl 200 mcg/h, increased overnight for agitation  - Seroquel at 50mg TID    Deconditioning: Unable to participate in PT/OT at this time    Multidisciplinary Rounds Completed: Y    ABCDEF Bundle/Checklist  Pain Medications: Fentanyl  Target RASS: 0  Sedation Medications:Versed  CAM-ICU: SILVIA  Mobility:Poor  PT/OT: Unable to participate: PROM  Restraints:Y  Discussed Plan of Care (goals of care): Y  Addressed Code Status: F    CARDIOVASCULAR  Cardiac Gtts: N  SBP Goal of: > 90 mmHg  MAP Goal of: > 65 mmHg  Transfusion Trigger (Hgb): <7 g/dL    RESPIRATORY  Vent Goals:   Optimize PEEP/Ventilation/Oxygenation  DVT Prophylaxis (if no, list reason): Lovenox   SPO2 Goal: > 92%  Pulmonary toilet: Duo-Nebs     GI/  Gold Catheter Present: Yes  GI Prophylaxis: Pepcid (famotidine)   Nutrition: Yes TF  IVFs:N  Bowel Movement:Y  Bowel Regimen:Y  Insulin: Y    ANTIBIOTICS  Antibiotics:  None    T/L/D  Tubes: ETT  Lines: PICC  Drains: Gold    SPECIAL EQUIPMENT  Vent    DISPOSITION  ICU    CRITICAL CARE CONSULTANT NOTE  I had a face to face encounter with the patient, reviewed and interpreted patient data including clinical events, labs, images, vital signs, I/O's, and examined patient. I have discussed the case and the plan and management of the patient's care with the consulting services, the bedside nurses and the respiratory therapist.      NOTE OF PERSONAL INVOLVEMENT IN CARE   This patient has a high probability of imminent, clinically significant deterioration, which requires the highest level of preparedness to intervene urgently. I participated in the decision-making and personally managed or directed the management of the following life and organ supporting interventions that required my frequent assessment to treat or prevent imminent deterioration.     I personally spent 55 minutes of critical care time. This is time spent at this critically ill patient's bedside actively involved in patient care as well as the coordination of care and discussions with the patient's family. This does not include any procedural time which has been billed separately.         Razia Woo [facility]     [place] NP  [facility]

## 2021-12-29 NOTE — PROGRESS NOTES
ID Progress Note  2021    Subjective:     Still on high levels of support--remains intermittently febrile--MRSA from recent sputm    Objective:     Antibiotics:  1. Vancomycin --   2. Cefepime    3. Ceftriaxone  4. Zosyn --     Vitals:   Visit Vitals  /80   Pulse 98   Temp (!) 103.3 °F (39.6 °C) Comment: pt has cooling blanet on, ice packs, and tylenol was given   Resp 15   Ht 5' 6\" (1.676 m)   Wt 85 kg (187 lb 6.3 oz)   SpO2 95%   BMI 30.25 kg/m²        Tmax:  Temp (24hrs), Av.4 °F (38 °C), Min:98.6 °F (37 °C), Max:103.3 °F (39.6 °C)      Exam:  Observational only    Labs:      Recent Labs     21  0329 21  0233 21  0538   WBC 7.4 8.3 7.4   HGB 9.8* 10.1* 10.5*    232 225   BUN 6 5* 6   CREA 0.33* 0.33* 0.24*   AP 88 142* 79   TBILI 0.4 0.5 0.5       Cultures:     No results found for: SDES  Lab Results   Component Value Date/Time    Culture result: NO GROWTH 1 DAY 2021 11:50 PM    Culture result: HEAVY POSSIBLE STAPHYLOCOCCUS AUREUS (A) 2021 11:50 PM    Culture result: NO NORMAL RESPIRATORY TAMARA ISOLATED 2021 11:50 PM       Radiology:     Line/Insert Date:           Assessment:     1. Severe COVID pneumonia--Pneumococcus/staph from respiratory culture  2. VDRF  3. Hypoxic respiratory failure  4. Bacteremia--S epi--probable line source  5. MRSA from sputum, now  6. Seriously ill with guarded prognosis, at best  7. Ongoing fevers--cultures negative to date--normal WBC--might be drug fever    Objective:     1. Continue antibiotic therapy  2. Follow up cultures  3. Work up fevers  4. Imaging is pending--depends on BP issues, etc  5.  Would change lines--review medications for possible drug fever       Junaid Aburto MD

## 2021-12-29 NOTE — PROGRESS NOTES
Pharmacist Note - Vancomycin Dosing  Therapy day 3  Indication:  MRSA pneumonia  Current regimen:  1250 mg IV Q 12 hr    Recent Labs     12/29/21  0329 12/28/21  0233 12/27/21  0538   WBC 7.4 8.3 7.4   CREA 0.33* 0.33* 0.24*   BUN 6 5* 6       A random vancomycin level of 15.4 mcg/mL was obtained and from this level, the patient's AUC24 is calculated to be 430 with the current regimen. Goal target range AUC/-600      Plan: Continue current regimen. Pharmacy will continue to monitor this patient daily for changes in clinical status and renal function. *Random vancomycin levels are used to calculate AUC/TIM, this level should not be interpreted as a trough. Vancomycin has been dosed using Bayesian kinetics software to target an AUC24:TIM of 400-600, which provides adequate exposure for as assumed infection due to MRSA with an TIM of 1 or less while reducing the risk of nephrotoxicity as seen with traditional trough based dosing goals.

## 2021-12-29 NOTE — PROGRESS NOTES
[facility]    ICU TEAM Progress Note    Name: Rony Zapata   : 1975   MRN: [id number]   Date: 2021      Subjective:   Progress Note: 2021      Mr Jose Jo is a 56 yo male with a PMH of DM2, HTN and obesity admitted on  to [facility] for COVID-19 pneumonia. He was treated with tocilizumab and steroids. He was not vaccinated. He decompensated and was intubated and . He was paralyzed, proned/supinated. He was transferred to [facility] on  to be evaluated for possible ECMO. CVC and arterial line placed at [facility] on . Nimbex was turned off . He was weaned to 60% Fio2 as of 12/3. Proning/supinating therapy continued. He continued to demonstrated fever. He was started on cefepime/fluconazole on 12/3 with vanc added .      his FIo2 increased to 70% and decreased back to 60% on . CVC changed to PICC on  as blood culture had been positive for 4/4 S epi on 12/3 (cleared in culture on ). He was on vanc for 2 weeks, until . Changed to cefazolin on 12/15. He has been responsive to diuresis over the past few days and has been weaned to 60% FiO2. Attempts were made to decrease sedation on , but this was not successful due to increased agitation. Due to this, seroquel was added on . Enteral oxycodone and valium were also added on . His versed was weaned from 19mg/h to 17/mg/h on  pm.  His versed was further weaned on  as his seroquel was increased. : Versed is at 9. He continues on the fentanyl infusion. He has been afebrile overnight. : Versed gtt 7 and fentanyl gtt at 150. Vent weaned to 65% and continued PEEP of 12. Opens eyes and moves ext spontaneously. But does not follow commands, track with eyes, or WD to pain. NGT clogged, will replace today. Completed cefazolin. Chest xray tomorrow am.    : No acute events overnight. Did not require pronation overnight.  Plan for today is to decrease FiO2 to 60% and wean Fentanyl. Mobilize as tolerated. Not following commands. SORIANO spontaneously. 12/24: No acute events overnight, remains on 55% FiO2, PEEP 10. Will continue to wean FiO2 for goal of 50% today, will wean PEEP to 8 if tolerated. SORIANO spontaneously. Continue to wean IV sedation and liberalize PO opiates and benzos to avoid withdraw without oversedating. 12/25: Early this AM about 0600 patient spiked fever 103.3, elevated RR and HR. Given PRN pushes of versed and fentanyl as well as one time dose of rocuronium with some recovery. Chest x-ray obtained does not show obvious pneumothorax, formal read is pending. Will get BLE dopplers to asses for DVT, once stable will send to CT for head and chest CT to rule out neurological causes of fever such as stroke and also PE. Will likely need to resume sedation. Will place ENT consult for tracheostomy. Per respiratory note, patients ETT is having air leaks and suction catheter is difficult to pass, will likely require tube exchange today. Cooling blanket for fever. Levo as needed for support of blood pressure. 12/26: No acute events over the night. Unable to obtain CT scans due to labile BP, risk of travel outweighed benefit of scan overnight. Will attempt to get CT scans today, will add CT abdomen for further evaluation of fevers. Received motrin over the night for T-max 100.9. Remains on levophed infusion at 2 mcg/min for BP support. Labs remain stable, no acute abnormality. Monitor tube feed residuals, they were held for a few hours yesterday for high gastric residual, likely secondary to sedative medications decreasing gastric motility, resume feeds today. Increase rate of feeds as tolerated. Plan to re-test for COVID on 12/29. No issues with ET tube over the night, will hold off on tube exchange. ENT consultation for trach. Discussed with wife yesterday. Continue to prone for PF ratio less than 150, aggressive pulmonary hygiene.      12/27: Patient seen. NAD. Fevers noted. MRSA in sputum, antibiotics adjusted. Continue current management. 12/28: Patient fevers persisted. Fentanyl gtt discontinued and transitioned to dilaudid gtt. ENT evaluated patient, current settings are too high, will have to be re consulted when settings improve. Active Problem List:     Problem List  Date Reviewed: 11/28/2021          Codes Class    COVID ICD-10-CM: U07.1  ICD-9-CM: 079.89         Acute hypoxemic respiratory failure due to COVID-19 [facility]) ICD-10-CM: U07.1, J96.01  ICD-9-CM: 518.81, 079.89, 799.02         Pneumonia due to COVID-19 virus ICD-10-CM: U07.1, J12.82  ICD-9-CM: 480.8, 079.89         Hyperglycemia due to type 2 diabetes mellitus (HCC) ICD-10-CM: E11.65  ICD-9-CM: 250.00         Primary hypertension ICD-10-CM: I10  ICD-9-CM: 401.9         Hyponatremia ICD-10-CM: E87.1  ICD-9-CM: 276.1         Lactic acidosis ICD-10-CM: E87.2  ICD-9-CM: 276.2         Hyperlipidemia ICD-10-CM: E78.5  ICD-9-CM: 272.4         Obesity (BMI 30-39. 9) ICD-10-CM: E66.9  ICD-9-CM: 278.00         COVID-19 vaccination not done ICD-10-CM: Z28.9  ICD-9-CM: V64.00               Past Medical History:      has a past medical history of COVID-19, Diabetes ([address] Hyperlipidemia, and Hypertension. Past Surgical History:      has a past surgical history that includes hx orthopaedic (Right). Home Medications:     Prior to Admission medications    Medication Sig Start Date End Date Taking? Authorizing Provider   cisatracurium (NIMBEX) IV infusion 0-0.869 mg/min by IntraVENous route TITRATE. 12/1/21  Yes Agustina Cline MD   enoxaparin (LOVENOX) 100 mg/mL 90 mg by SubCUTAneous route every twelve (12) hours every twelve (12) hours. 12/1/21  Yes Agustina Cline MD   insulin glargine (LANTUS) 100 unit/mL injection 24 Units by SubCUTAneous route nightly.  Indications: type 2 diabetes mellitus 12/1/21  Yes Agustina Cline MD   midazolam in normal saline (VERSED) 1 mg/mL soln 0-10 mg/hr by IntraVENous route TITRATE. Max Daily Amount: 240 mg. 21  Yes Jessica Aguero MD   Norepinephrine Bitartrate (LEVOPHED) 8 mg/250 mL (32 mcg/mL) soln 0.5-16 mcg/min by IntraVENous route TITRATE. 21  Yes Jessica Aguero MD   ascorbic acid, vitamin C, (VITAMIN C) 500 mg tablet Take 1 Tablet by mouth two (2) times a day. 21   Jessica Aguero MD   cholecalciferol (VITAMIN D3) (1000 Units /25 mcg) tablet Take 2 Tablets by mouth daily. 21   Jessica Aguero MD   melatonin, rapid dissolve, 5 mg TbDi tablet Take 1 Tablet by mouth nightly. 21   Jessica Aguero MD   atorvastatin (LIPITOR) 20 mg tablet Take 20 mg by mouth daily. Provider, Historical   telmisartan (MICARDIS) 80 mg tablet Take 80 mg by mouth daily. Indications: high blood pressure    Provider, Historical       Allergies/Social/Family History:      Allergies   Allergen Reactions    Alupent [Metaproterenol] Swelling      Social History     Tobacco Use    Smoking status: Never Smoker    Smokeless tobacco: Not on file   Substance Use Topics    Alcohol use: Yes     Comment: drinksonce a week      Family History   Problem Relation Age of Onset    Diabetes Mother     Hypertension Mother     Stroke Mother     Hodgkin's lymphoma Mother     Diabetes Father     Hypertension Father     Cystic Fibrosis Father     Diabetes Maternal Aunt     Diabetes Maternal Uncle        Review of Systems:     Unable    Objective:   Vital Signs:  Visit Vitals  /87   Pulse 78   Temp 99.1 °F (37.3 °C)   Resp 18   Ht 5' 6\" (1.676 m)   Wt 85 kg (187 lb 6.3 oz)   SpO2 96%   BMI 30.25 kg/m²    O2 Flow Rate (L/min): 60 l/min O2 Device: Endotracheal tube,Ventilator Temp (24hrs), Av.5 °F (38.1 °C), Min:98.6 °F (37 °C), Max:103.3 °F (39.6 °C)           Intake/Output:     Intake/Output Summary (Last 24 hours) at 2021  Last data filed at 2021 1600  Gross per 24 hour   Intake 2628.68 ml   Output 1600 ml   Net 1028.68 ml       Physical Exam:    General:  Sedated/intubated/RASS -1 to -2  Eye: Pupils are round and reactive bilaterally  Neurologic: Furrowed brow; withdraws to noxious stimuli in all extremities; does not follow commands, SORIANO spontaneously; looks around room sponteanously  Neck: Supple, no JVD  Lungs: On mechanical ventilation; CTAB  Heart: RRR, 2+ pulses, 1+ edema of BLE   Abdomen:Soft, nontender, nondistended  Skin:  No rash or lesion  Verified 12/22    LABS AND  DATA: Personally reviewed  Recent Labs     12/29/21 0329 12/28/21  0233   WBC 7.4 8.3   HGB 9.8* 10.1*   HCT 30.1* 30.8*    232     Recent Labs     12/29/21 0329 12/28/21  0233    139   K 3.9 3.5    106   CO2 28 27   BUN 6 5*   CREA 0.33* 0.33*   * 175*   CA 9.0 9.0   MG 2.4 2.1   PHOS 3.6 3.4     Recent Labs     12/29/21 0329 12/28/21  0233   AP 88 142*   TP 6.3* 6.4   ALB 1.8* 1.9*   GLOB 4.5* 4.5*     Recent Labs     12/27/21  0650 12/27/21  0538   APTT 48.7* 45.7*      Recent Labs     12/28/21  0332 12/27/21  0415   PHI 7.39 7.44   PCO2I 46.8* 40.2   PO2I 64* 54*   FIO2I 90 80     No results for input(s): CPK, CKMB, TROIQ, BNPP in the last 72 hours. Ventilator Settings:  Mode Rate Tidal Volume Pressure FiO2 PEEP   Assist control,Pressure control   500 ml  8 cm H2O 70 % 10 cm H20     Peak airway pressure: 29 cm H2O    Minue ventilation: 8.08 l/min      MEDS: Reviewed    Chest X-Ray: personally reviewed and report checked    · TTE: 11/24: LV: Estimated LVEF is 50 - 55%. Normal cavity size, wall thickness and diastolic function. Low normal systolic function. Assessment and Plan     NIWOK-05 Pneumonia complicated by Severe ARDS c/b pneumococcal pneumonia: Received tocilizumab. Continue RASS -4. Continue fentanyl versed. S pneumonia in sputum 12/3. Off nimbex since 12/2.  - Continue steroids  - Completed cefazolin  - Procal negative  - CXR unrevealing  - FiO2 decreased to 90, contine to wean O2 as toelrated.  Maintain sats greater than 92%   - Goal for trach/PEG when patient medically able  - Diuresis PRN    HLD: Continue statin    DM2 c/b hyperglycemia:   - Continue NPH 10 q 12hrs   - Continue SSI q 6hrs     Hypotension: Possibly related to sedation. 2mcg levophed. - Continue midodrine    Agitation:   - Continue enteral sedation/opioids  - Wean sedation as tolerated, on versed 10vmg/h and fentanyl 200 mcg/h, increased overnight for agitation  - Seroquel at 50mg TID    Deconditioning: Unable to participate in PT/OT at this time    Multidisciplinary Rounds Completed: Y    ABCDEF Bundle/Checklist  Pain Medications: Fentanyl  Target RASS: 0  Sedation Medications:Versed  CAM-ICU: SILVIA  Mobility:Poor  PT/OT: Unable to participate: PROM  Restraints:Y  Discussed Plan of Care (goals of care): Y  Addressed Code Status: F    CARDIOVASCULAR  Cardiac Gtts: N  SBP Goal of: > 90 mmHg  MAP Goal of: > 65 mmHg  Transfusion Trigger (Hgb): <7 g/dL    RESPIRATORY  Vent Goals:   Optimize PEEP/Ventilation/Oxygenation  DVT Prophylaxis (if no, list reason): Lovenox   SPO2 Goal: > 92%  Pulmonary toilet: Duo-Nebs     GI/  Gold Catheter Present: Yes  GI Prophylaxis: Pepcid (famotidine)   Nutrition: Yes TF  IVFs:N  Bowel Movement:Y  Bowel Regimen:Y  Insulin: Y    ANTIBIOTICS  Antibiotics:  None    T/L/D  Tubes: ETT  Lines: PICC  Drains: Gold    SPECIAL EQUIPMENT  Vent    DISPOSITION  ICU    CRITICAL CARE CONSULTANT NOTE  I had a face to face encounter with the patient, reviewed and interpreted patient data including clinical events, labs, images, vital signs, I/O's, and examined patient. I have discussed the case and the plan and management of the patient's care with the consulting services, the bedside nurses and the respiratory therapist.      NOTE OF PERSONAL INVOLVEMENT IN CARE   This patient has a high probability of imminent, clinically significant deterioration, which requires the highest level of preparedness to intervene urgently.  I participated in the decision-making and personally managed or directed the management of the following life and organ supporting interventions that required my frequent assessment to treat or prevent imminent deterioration. I personally spent 55 minutes of critical care time. This is time spent at this critically ill patient's bedside actively involved in patient care as well as the coordination of care and discussions with the patient's family. This does not include any procedural time which has been billed separately.         Trevon Hatfield AGACNPHodgeman County Health Center NP  [facility]

## 2021-12-29 NOTE — PROGRESS NOTES
1930: Bedside and Verbal shift change report given to Timothy Duran RN (oncoming nurse) by Ana Maria Patel RN (offgoing nurse). Report included the following information SBAR, Kardex, Intake/Output, MAR, Recent Results, Cardiac Rhythm NSR/ST and Alarm Parameters . 2015: Spoke with BETTINA Jiménez regarding patients BG of 90 at 1800. Orders received to hold patients NPH. Discussed tube feeds being off for high residuals. Orders received to check residual and restart tube feeds at 20 ml/hr if residuals are less than 400 ml.    2045: Ratna Robb NP notified of patients residual of 275 ml and tube feeds restarted to 20 ml/hr. Orders received to keep tube feeds going unless residuals reach 400 ml, then hold feeds. 0730: Bedside and Verbal shift change report given to KATHRINE Warren (oncoming nurse) by Timothy Duran RN (offgoing nurse). Report included the following information SBAR, Kardex, Intake/Output, MAR, Recent Results, Cardiac Rhythm NSR/ST and Alarm Parameters .

## 2021-12-29 NOTE — WOUND CARE
WOCN Note:     Follow-up visit for knees and re-consulted for penis. Chart shows:  Admitted for Covid-19+    Assessment:   Intubated & sedated. RN, Briana, reports fairly unstable. She assessed buttocks/sacrum this morning with no concerns so we did not turn at this time. Gold  Tube feeds   Surface: ISIDRO mattress    Bilateral heels intact and without erythema. Heels offloaded with pillows. Deflated serous bullae to right knee = 2 x 2 cm & venelex in use. No wound noted to penis. Wound Recommendations:    Continue venelex to knee    PI Prevention:  Turn/reposition approximately every 2 hours  Offload heels with heels hanging off end of pillow at all times while in bed. Sacral Foam dressing: lift to assess regularly; change as needed. Discontinue if incontinence is frequently soiling dressing. Air mattress: Use only flat sheet and one incontinence pad. Discussed with RN. No changes to report to physician.      Transition of Care: Plan to follow weekly and as needed while admitted to hospital.    Claude Lunger, BSN, RN, [facility]  Certified Wound, Ostomy, Continence Nurse  office [phone]  Available via [facility]

## 2021-12-30 NOTE — PROGRESS NOTES
[facility]    ICU TEAM Progress Note    Name: Brennon Kinney   : 1975   MRN: [id number]   Date: 2021      Subjective:   Progress Note: 2021      Mr Sheryle Stalls is a 56 yo male with a PMH of DM2, HTN and obesity admitted on  to [facility] for COVID-19 pneumonia. He was treated with tocilizumab and steroids. He was not vaccinated. He decompensated and was intubated and . He was paralyzed, proned/supinated. He was transferred to [facility] on  to be evaluated for possible ECMO. CVC and arterial line placed at [facility] on . Nimbex was turned off . He was weaned to 60% Fio2 as of 12/3. Proning/supinating therapy continued. He continued to demonstrated fever. He was started on cefepime/fluconazole on 12/3 with vanc added .      his FIo2 increased to 70% and decreased back to 60% on . CVC changed to PICC on  as blood culture had been positive for 4/4 S epi on 12/3 (cleared in culture on ). He was on vanc for 2 weeks, until . Changed to cefazolin on 12/15. He has been responsive to diuresis over the past few days and has been weaned to 60% FiO2. Attempts were made to decrease sedation on , but this was not successful due to increased agitation. Due to this, seroquel was added on . Enteral oxycodone and valium were also added on . His versed was weaned from 19mg/h to 17/mg/h on  pm.  His versed was further weaned on  as his seroquel was increased. : Versed is at 9. He continues on the fentanyl infusion. He has been afebrile overnight. : Versed gtt 7 and fentanyl gtt at 150. Vent weaned to 65% and continued PEEP of 12. Opens eyes and moves ext spontaneously. But does not follow commands, track with eyes, or WD to pain. NGT clogged, will replace today. Completed cefazolin. Chest xray tomorrow am.    : No acute events overnight. Did not require pronation overnight.  Plan for today is to decrease FiO2 to 60% and wean Fentanyl. Mobilize as tolerated. Not following commands. SORIANO spontaneously. 12/24: No acute events overnight, remains on 55% FiO2, PEEP 10. Will continue to wean FiO2 for goal of 50% today, will wean PEEP to 8 if tolerated. SORIANO spontaneously. Continue to wean IV sedation and liberalize PO opiates and benzos to avoid withdraw without oversedating. 12/25: Early this AM about 0600 patient spiked fever 103.3, elevated RR and HR. Given PRN pushes of versed and fentanyl as well as one time dose of rocuronium with some recovery. Chest x-ray obtained does not show obvious pneumothorax, formal read is pending. Will get BLE dopplers to asses for DVT, once stable will send to CT for head and chest CT to rule out neurological causes of fever such as stroke and also PE. Will likely need to resume sedation. Will place ENT consult for tracheostomy. Per respiratory note, patients ETT is having air leaks and suction catheter is difficult to pass, will likely require tube exchange today. Cooling blanket for fever. Levo as needed for support of blood pressure. 12/26: No acute events over the night. Unable to obtain CT scans due to labile BP, risk of travel outweighed benefit of scan overnight. Will attempt to get CT scans today, will add CT abdomen for further evaluation of fevers. Received motrin over the night for T-max 100.9. Remains on levophed infusion at 2 mcg/min for BP support. Labs remain stable, no acute abnormality. Monitor tube feed residuals, they were held for a few hours yesterday for high gastric residual, likely secondary to sedative medications decreasing gastric motility, resume feeds today. Increase rate of feeds as tolerated. Plan to re-test for COVID on 12/29. No issues with ET tube over the night, will hold off on tube exchange. ENT consultation for trach. Discussed with wife yesterday. Continue to prone for PF ratio less than 150, aggressive pulmonary hygiene.      12/27: Patient seen. NAD. Fevers noted. MRSA in sputum, antibiotics adjusted. Continue current management. 12/28: Patient fevers persisted. Fentanyl gtt discontinued and transitioned to dilaudid gtt. ENT evaluated patient, current settings are too high, will have to be re consulted when settings improve. 12/29: patient seen. NAD. ID Following: continue current antimicrobial regimen. Wound care RN management recommendations noted. Tube feedings on hold as gastric residuals were too high. Will continue to trend    Active Problem List:     Problem List  Date Reviewed: 11/28/2021          Codes Class    COVID ICD-10-CM: U07.1  ICD-9-CM: 079.89         Acute hypoxemic respiratory failure due to COVID-19 [facility]) ICD-10-CM: U07.1, J96.01  ICD-9-CM: 518.81, 079.89, 799.02         Pneumonia due to COVID-19 virus ICD-10-CM: U07.1, J12.82  ICD-9-CM: 480.8, 079.89         Hyperglycemia due to type 2 diabetes mellitus (HCC) ICD-10-CM: E11.65  ICD-9-CM: 250.00         Primary hypertension ICD-10-CM: I10  ICD-9-CM: 401.9         Hyponatremia ICD-10-CM: E87.1  ICD-9-CM: 276.1         Lactic acidosis ICD-10-CM: E87.2  ICD-9-CM: 276.2         Hyperlipidemia ICD-10-CM: E78.5  ICD-9-CM: 272.4         Obesity (BMI 30-39. 9) ICD-10-CM: E66.9  ICD-9-CM: 278.00         COVID-19 vaccination not done ICD-10-CM: Z28.9  ICD-9-CM: V64.00               Past Medical History:      has a past medical history of COVID-19, Diabetes ([address] Hyperlipidemia, and Hypertension. Past Surgical History:      has a past surgical history that includes hx orthopaedic (Right). Home Medications:     Prior to Admission medications    Medication Sig Start Date End Date Taking? Authorizing Provider   cisatracurium (NIMBEX) IV infusion 0-0.869 mg/min by IntraVENous route TITRATE. 12/1/21  Yes Liliana Cameron MD   enoxaparin (LOVENOX) 100 mg/mL 90 mg by SubCUTAneous route every twelve (12) hours every twelve (12) hours.  12/1/21  Yes Liliana Cameron MD   insulin glargine (LANTUS) 100 unit/mL injection 24 Units by SubCUTAneous route nightly. Indications: type 2 diabetes mellitus 21  Yes Donavan Maldonado MD   midazolam in normal saline (VERSED) 1 mg/mL soln 0-10 mg/hr by IntraVENous route TITRATE. Max Daily Amount: 240 mg. 21  Yes Donavan Maldonado MD   Norepinephrine Bitartrate (LEVOPHED) 8 mg/250 mL (32 mcg/mL) soln 0.5-16 mcg/min by IntraVENous route TITRATE. 21  Yes Donavan Maldonado MD   ascorbic acid, vitamin C, (VITAMIN C) 500 mg tablet Take 1 Tablet by mouth two (2) times a day. 21   Donavan Maldonado MD   cholecalciferol (VITAMIN D3) (1000 Units /25 mcg) tablet Take 2 Tablets by mouth daily. 21   Donavan Maldonado MD   melatonin, rapid dissolve, 5 mg TbDi tablet Take 1 Tablet by mouth nightly. 21   Donavan Maldonado MD   atorvastatin (LIPITOR) 20 mg tablet Take 20 mg by mouth daily. Provider, Historical   telmisartan (MICARDIS) 80 mg tablet Take 80 mg by mouth daily. Indications: high blood pressure    Provider, Historical       Allergies/Social/Family History:      Allergies   Allergen Reactions    Alupent [Metaproterenol] Swelling      Social History     Tobacco Use    Smoking status: Never Smoker    Smokeless tobacco: Not on file   Substance Use Topics    Alcohol use: Yes     Comment: drinksonce a week      Family History   Problem Relation Age of Onset    Diabetes Mother     Hypertension Mother     Stroke Mother     Hodgkin's lymphoma Mother     Diabetes Father     Hypertension Father     Cystic Fibrosis Father     Diabetes Maternal Aunt     Diabetes Maternal Uncle        Review of Systems:     Unable    Objective:   Vital Signs:  Visit Vitals  /66 (BP 1 Location: Right arm, BP Patient Position: At rest)   Pulse 84   Temp (!) 100.9 °F (38.3 °C)   Resp 18   Ht 5' 6\" (1.676 m)   Wt 85.7 kg (188 lb 15 oz)   SpO2 96%   BMI 30.49 kg/m²    O2 Flow Rate (L/min): 60 l/min O2 Device: Endotracheal tube,Ventilator Temp (24hrs), Av.6 °F (37.6 °C), Min:97.7 °F (36.5 °C), Max:101 °F (38.3 °C)           Intake/Output:     Intake/Output Summary (Last 24 hours) at 12/30/2021 1747  Last data filed at 12/30/2021 1600  Gross per 24 hour   Intake 3341.39 ml   Output 3925 ml   Net -583.61 ml       Physical Exam:    General:  Sedated/intubated/RASS -1 to -2  Eye: Pupils are round and reactive bilaterally  Neurologic: Furrowed brow; withdraws to noxious stimuli in all extremities; does not follow commands, SORIANO spontaneously; looks around room sponteanously  Neck: Supple, no JVD  Lungs: On mechanical ventilation; CTAB  Heart: RRR, 2+ pulses, 1+ edema of BLE   Abdomen:Soft, nontender, nondistended  Skin:  No rash or lesion  Verified 12/22    LABS AND  DATA: Personally reviewed  Recent Labs     12/30/21 0341 12/29/21 0329   WBC 7.2 7.4   HGB 13.6 9.8*   HCT 41.8 30.1*    260     Recent Labs     12/30/21 0341 12/29/21 0329    142   K 3.9 3.9   * 107   CO2 29 28   BUN 6 6   CREA 0.43* 0.33*   * 130*   CA 9.1 9.0   MG 2.1 2.4   PHOS 3.5 3.6     Recent Labs     12/30/21 0341 12/29/21 0329   AP 93 88   TP 6.5 6.3*   ALB 2.0* 1.8*   GLOB 4.5* 4.5*     No results for input(s): INR, PTP, APTT, INREXT, INREXT in the last 72 hours. Recent Labs     12/28/21 0332   PHI 7.39   PCO2I 46.8*   PO2I 64*   FIO2I 90     No results for input(s): CPK, CKMB, TROIQ, BNPP in the last 72 hours. Ventilator Settings:  Mode Rate Tidal Volume Pressure FiO2 PEEP   Pressure control   535 ml  0 cm H2O 90 % 14 cm H20     Peak airway pressure: 39 cm H2O    Minue ventilation: 9.62 l/min      MEDS: Reviewed    Chest X-Ray: personally reviewed and report checked    · TTE: 11/24: LV: Estimated LVEF is 50 - 55%. Normal cavity size, wall thickness and diastolic function. Low normal systolic function. Assessment and Plan     [facility] by Severe ARDS c/b pneumococcal pneumonia: Received tocilizumab. Continue RASS -4. Continue fentanyl versed. S pneumonia in sputum 12/3.  Off nimbex since 12/2.  - Continue steroids  - Completed cefazolin  - Procal negative  - CXR unrevealing  - FiO2 decreased to 90, contine to wean O2 as toelrated. Maintain sats greater than 92%   - Goal for trach/PEG when patient medically able  - Diuresis PRN    HLD: Continue statin    DM2 c/b hyperglycemia:   - Continue NPH 10 q 12hrs   - Continue SSI q 6hrs     Hypotension: Possibly related to sedation. 2mcg levophed. - Continue midodrine    Agitation:   - Continue enteral sedation/opioids  - Wean sedation as tolerated, on versed 10vmg/h and fentanyl 200 mcg/h, increased overnight for agitation  - Seroquel at 50mg TID    Deconditioning: Unable to participate in PT/OT at this time    Multidisciplinary Rounds Completed: Y    ABCDEF Bundle/Checklist  Pain Medications: Fentanyl  Target RASS: 0  Sedation Medications:Versed  CAM-ICU: SILVIA  Mobility:Poor  PT/OT: Unable to participate: PROM  Restraints:Y  Discussed Plan of Care (goals of care): Y  Addressed Code Status: F    CARDIOVASCULAR  Cardiac Gtts: N  SBP Goal of: > 90 mmHg  MAP Goal of: > 65 mmHg  Transfusion Trigger (Hgb): <7 g/dL    RESPIRATORY  Vent Goals:   Optimize PEEP/Ventilation/Oxygenation  DVT Prophylaxis (if no, list reason): Lovenox   SPO2 Goal: > 92%  Pulmonary toilet: Duo-Nebs     GI/  Gold Catheter Present: Yes  GI Prophylaxis: Pepcid (famotidine)   Nutrition: Yes TF  IVFs:N  Bowel Movement:Y  Bowel Regimen:Y  Insulin: Y    ANTIBIOTICS  Antibiotics:  None    T/L/D  Tubes: ETT  Lines: PICC  Drains: Gold    SPECIAL EQUIPMENT  Vent    DISPOSITION  ICU    CRITICAL CARE CONSULTANT NOTE  I had a face to face encounter with the patient, reviewed and interpreted patient data including clinical events, labs, images, vital signs, I/O's, and examined patient.   I have discussed the case and the plan and management of the patient's care with the consulting services, the bedside nurses and the respiratory therapist.      NOTE OF PERSONAL INVOLVEMENT IN CARE   This patient has a high probability of imminent, clinically significant deterioration, which requires the highest level of preparedness to intervene urgently. I participated in the decision-making and personally managed or directed the management of the following life and organ supporting interventions that required my frequent assessment to treat or prevent imminent deterioration. I personally spent 55 minutes of critical care time. This is time spent at this critically ill patient's bedside actively involved in patient care as well as the coordination of care and discussions with the patient's family. This does not include any procedural time which has been billed separately.         Mamadou Andersen [facility] NP  [facility]

## 2021-12-30 NOTE — PROGRESS NOTES
ID Progress Note  2021    Subjective:     Still on high levels of support--remains intermittently febrile--MRSA from recent sputm    Objective:     Antibiotics:  1. Vancomycin --   2. Cefepime    3. Ceftriaxone  4. Zosyn --  5. Eraxis --     Vitals:   Visit Vitals  /76 (BP 1 Location: Left arm, BP Patient Position: At rest)   Pulse 90   Temp (!) 101 °F (38.3 °C)   Resp 19   Ht 5' 6\" (1.676 m)   Wt 85.7 kg (188 lb 15 oz)   SpO2 96%   BMI 30.49 kg/m²        Tmax:  Temp (24hrs), Av.3 °F (37.4 °C), Min:97.7 °F (36.5 °C), Max:101 °F (38.3 °C)      Exam:  Observational only    Labs:      Recent Labs     21  0341 21  0329 21  0233   WBC 7.2 7.4 8.3   HGB 13.6 9.8* 10.1*    260 232   BUN 6 6 5*   CREA 0.43* 0.33* 0.33*   AP 93 88 142*   TBILI 0.3 0.4 0.5       Cultures:     No results found for: SDES  Lab Results   Component Value Date/Time    Culture result: NO GROWTH 2 DAYS 2021 11:50 PM    Culture result: (A) 2021 11:50 PM     HEAVY * METHICILLIN RESISTANT STAPHYLOCOCCUS AUREUS *    Culture result: SCANT NORMAL RESPIRATORY TAMARA 2021 11:50 PM    Culture result:  2021 11:50 PM     (NOTE) MRSA CALLED TO An Guardado RN AT [phone] ON 21 RB       Radiology:     Line/Insert Date:           Assessment:     1. Severe COVID pneumonia--Pneumococcus/staph from respiratory culture  2. VDRF  3. Hypoxic respiratory failure  4. Bacteremia--S epi--probable line source  5. MRSA from sputum, now  6. Seriously ill with guarded prognosis, at best  7. Ongoing fevers--cultures negative to date--normal WBC--might be drug fever    Objective:     1. Continue antibiotic therapy  2. Follow up cultures  3. Work up fevers  4. Imaging is pending--depends on BP issues, etc  5.  Would change lines--review medications for possible drug fever      Elayne Dubin, MD

## 2021-12-30 NOTE — PROGRESS NOTES
Comprehensive Nutrition Assessment    Type and Reason for Visit: Reassess    Nutrition Recommendations/Plan:      Resume EN support-slowly advance to goal: Glucerna 1.5 @ 70 ml/hr with 150 ml water flush q 4 hr    Modify bowel regimen:     Discontinue lactulose and Benefiber     Increase Pericolace to bid    Check Vit D    Nutrition Assessment:    PMHx includes DM, HTN, HLD. Admitted to [facility] on 11/23 for acute respiratory failure due to Covid-19 PNA. He was admitted directly to the ICU on HFNC, S/P Tociluzumab and Decadron. His condition progressively worsened - intubated on 11/28. Transferred to [facility] for possible VV - ECMO (did not meet criteria). Remains intubated and sedated; proned today. Will need trach and PEG placement; respiratory support too high to place trach at this time. BG control improved. Hyponatremia resolved-sodium now high normal. + MRSA in sputum; persistent fevers-ID following. Tube feeding has been held on and off (mostly off) since 12/25 due GRV's up to 450 ml. Until yesterday pt's last BM was 12/17. Now that pt has had 4 BM's in the past 24 hr recommend resuming tube feeding @ 20 ml/hr and advancing back to goal. If abdomen is soft, pt having BM's regularly would not check GRV's. If pt becomes constipated again and abdomen firm/distended-resume GRV checks. Do not want to hold tube feeding unnecessarily. Recommend advancing tube feeding to previous goal (see below) but with increased water flush (150 ml q 4 hr). This will provide 1540 ml, 2310 calories, 127 gm protein and 2070 ml free water (tube feeding/flush) per day to meet estimated needs. Check Vit D; may be able to discontinue supplementation if WNL. Malnutrition Assessment:  Malnutrition Status:   Moderate malnutrition    Context:  Acute illness     Findings of the 6 clinical characteristics of malnutrition:   Energy Intake:  No significant decrease in energy intake  Weight Loss:  7.0 - Greater than 5% over 1 month Body Fat Loss:  Unable to assess,     Muscle Mass Loss:  Unable to assess,    Fluid Accumulation:  1 - Mild, Generalized   Strength:  Not performed     Nutritionally Significant Medications:   Levophed @ 4, Lipitor, Vit C, Vit D3, Pepcid, Lasix, Humalog, NPH, Lactulose, Pericolace, MVI    Estimated Daily Nutrient Needs:  Energy (kcal): 1021-4501 (25-28 kcal/kg); Weight Used for Energy Requirements: Current (87 kg)  Protein (g): 122-138 (1.5-1.7g/kg); Weight Used for Protein Requirements:    Fluid (ml/day): 1 mL/kcal; Method Used for Fluid Requirements: 1 ml/kcal    Nutrition Related Findings:       BM: 12/30  Edema: 1+ generalized, facial, Trace BUE, BLE  Wounds:  None       Current Nutrition Therapies:   Diet: NPO  Tube Feeding on hold: Glucerna 1.5 @ 70 ml/hr with 30 ml water flush q 6 hr  Additional Caloric Sources:  None    Anthropometric Measures:  · Height:  5' 6\" (167.6 cm)  · Current Body Wt:  85.7 kg (188 lb 15 oz)   · Admission Body Wt:  193 lb 2 oz    · Ideal Body Wt:  142:  133.1 %    · BMI Categories:  Obese class 1 (BMI 30.0-34. 9)     Wt Readings from Last 10 Encounters:   12/30/21 85.7 kg (188 lb 15 oz)   11/30/21 86.9 kg (191 lb 9.3 oz)       Nutrition Diagnosis:   · Inadequate protein-energy intake related to altered GI function/constipation/elevated GRV's as evidenced by  (inadequate enteral nutrition infusion.)    · Altered GI function related to catabolic illness as evidenced by constipation-resolved. Nutrition Interventions:   Food and/or Nutrient Delivery: Start tube feeding  Nutrition Education and Counseling: No recommendations at this time  Coordination of Nutrition Care: Continue to monitor while inpatient,Interdisciplinary rounds    Goals: Tolerate EN at goal in next 1-2 days.        Nutrition Monitoring and Evaluation:   Behavioral-Environmental Outcomes: None identified  Food/Nutrient Intake Outcomes: Enteral nutrition intake/tolerance  Physical Signs/Symptoms Outcomes: Biochemical data,GI status,Weight    Discharge Planning:     Too soon to determine     Parley Skiff, RD CNSC  Contact: [employer]

## 2021-12-30 NOTE — PROGRESS NOTES
Verbal shift change report given to mary ellen (oncoming nurse) by Bob Castro (offgoing nurse). Report included the following information SBAR, Kardex, Intake/Output, MAR, Accordion and Recent Results. Uneventful shift. Pt remains at 70% fio2. UOP adequate. Pt had smear bowel movement. Tube feeds running at 20cc/hr. Gastric residuals <500. Pt spiked temperature of 103.3 today. Cooling blanket on, ice packs on, and tylenol given. After several hours, temp down to 97.7. Pt remains on levophed to maintain MAP >65. Verbal shift change report given to Valerio (oncoming nurse) by Wiliam Scott (offgoing nurse). Report included the following information SBAR, Kardex, Intake/Output, MAR, Accordion and Recent Results.

## 2021-12-30 NOTE — PROGRESS NOTES
1930: Bedside and Verbal shift change report given to Mars Mayen RN (oncoming nurse) by Diane Yusuf RN (offgoing nurse). Report included the following information SBAR, Kardex, Intake/Output, MAR, Recent Results, Cardiac Rhythm NSR and Alarm Parameters . 2140: Patient became tachycardic, asynchronise with the vent and o2 dropped to the 70's. RT at bedside and increased patients Fio2 to 90%. 5 mg IVP of Versed given. Patient to remain supine until stable. Flori Cox NP notified. Orders received. 0045: Notified BETTINA Jiménez of patients SBP in the 80's and MAP < 65. Levo gtt increased to 8 and orders received. 0400: RN x 4 and RT at bedside to prone patient. Tube feeds clamped per Flori Cox NP. Patients HR elevated and o2 in the 80's. Versed gtt increased to 20 mg/hr and PRN 5 mg IVP of versed given per Flori Cox NP. Orders received to give a 10 mg IVP of vecuronium per NP.    0500: BETTINA Jiménez notified of patients HR in the 130's and o2 in the 80's. Orders received to give fentanyl 100 mcg IVP. Patients HR increased to the 140's. Orders received to give 2.5 mg metoprolol IVP. BETTINA Jiménez, RT, and RN x 2 at bedside. Vent settings changed and chest x-ray ordered. 0530: BETTINA Jiménez notified about OGT not flushing or pulling back. Will hold PO meds for now. 0730: Bedside and Verbal shift change report given to [facility] KATHRINE MCCLOUD (oncoming nurse) by Melanie Ramos RN (offgoing nurse). Report included the following information SBAR, Kardex, Intake/Output, MAR, Recent Results, Cardiac Rhythm NSR/ST and Alarm Parameters .

## 2021-12-30 NOTE — PROGRESS NOTES
[facility]    ICU TEAM Progress Note    Name: Sweetie Mcmillan   : 1975   MRN: [id number]   Date: 2021      Subjective:   Progress Note: 2021      Mr Gloria Posadas is a 56 yo male with a PMH of DM2, HTN and obesity admitted on  to [facility] for COVID-19 pneumonia. He was treated with tocilizumab and steroids. He was not vaccinated. He decompensated and was intubated and . He was paralyzed, proned/supinated. He was transferred to [facility] on  to be evaluated for possible ECMO. CVC and arterial line placed at [facility] on . Nimbex was turned off . He was weaned to 60% Fio2 as of 12/3. Proning/supinating therapy continued. He continued to demonstrated fever. He was started on cefepime/fluconazole on 12/3 with vanc added .      his FIo2 increased to 70% and decreased back to 60% on . CVC changed to PICC on  as blood culture had been positive for 4/4 S epi on 12/3 (cleared in culture on ). He was on vanc for 2 weeks, until . Changed to cefazolin on 12/15. He has been responsive to diuresis over the past few days and has been weaned to 60% FiO2. Attempts were made to decrease sedation on , but this was not successful due to increased agitation. Due to this, seroquel was added on . Enteral oxycodone and valium were also added on . His versed was weaned from 19mg/h to 17/mg/h on  pm.  His versed was further weaned on  as his seroquel was increased. : Versed is at 9. He continues on the fentanyl infusion. He has been afebrile overnight. : Versed gtt 7 and fentanyl gtt at 150. Vent weaned to 65% and continued PEEP of 12. Opens eyes and moves ext spontaneously. But does not follow commands, track with eyes, or WD to pain. NGT clogged, will replace today. Completed cefazolin. Chest xray tomorrow am.    : No acute events overnight. Did not require pronation overnight.  Plan for today is to decrease FiO2 to 60% and wean Fentanyl. Mobilize as tolerated. Not following commands. SORIANO spontaneously. 12/24: No acute events overnight, remains on 55% FiO2, PEEP 10. Will continue to wean FiO2 for goal of 50% today, will wean PEEP to 8 if tolerated. SORIANO spontaneously. Continue to wean IV sedation and liberalize PO opiates and benzos to avoid withdraw without oversedating. 12/25: Early this AM about 0600 patient spiked fever 103.3, elevated RR and HR. Given PRN pushes of versed and fentanyl as well as one time dose of rocuronium with some recovery. Chest x-ray obtained does not show obvious pneumothorax, formal read is pending. Will get BLE dopplers to asses for DVT, once stable will send to CT for head and chest CT to rule out neurological causes of fever such as stroke and also PE. Will likely need to resume sedation. Will place ENT consult for tracheostomy. Per respiratory note, patients ETT is having air leaks and suction catheter is difficult to pass, will likely require tube exchange today. Cooling blanket for fever. Levo as needed for support of blood pressure. 12/26: No acute events over the night. Unable to obtain CT scans due to labile BP, risk of travel outweighed benefit of scan overnight. Will attempt to get CT scans today, will add CT abdomen for further evaluation of fevers. Received motrin over the night for T-max 100.9. Remains on levophed infusion at 2 mcg/min for BP support. Labs remain stable, no acute abnormality. Monitor tube feed residuals, they were held for a few hours yesterday for high gastric residual, likely secondary to sedative medications decreasing gastric motility, resume feeds today. Increase rate of feeds as tolerated. Plan to re-test for COVID on 12/29. No issues with ET tube over the night, will hold off on tube exchange. ENT consultation for trach. Discussed with wife yesterday. Continue to prone for PF ratio less than 150, aggressive pulmonary hygiene.      12/27: Patient seen. NAD. Fevers noted. MRSA in sputum, antibiotics adjusted. Continue current management. 12/28: Patient fevers persisted. Fentanyl gtt discontinued and transitioned to dilaudid gtt. ENT evaluated patient, current settings are too high, will have to be re consulted when settings improve. 12/29: patient seen. NAD. ID Following: continue current antimicrobial regimen. Wound care RN management recommendations noted. Tube feedings on hold as gastric residuals were too high. Will continue to trend    12/30: Patient seen. NAD. Patient noted to have 2 bowel movements after previous bm was on 12/17. ABG results reviewed with decision made to prone patient. Further orders per clinical course. Active Problem List:     Problem List  Date Reviewed: 11/28/2021          Codes Class    COVID ICD-10-CM: U07.1  ICD-9-CM: 079.89         Acute hypoxemic respiratory failure due to COVID-19 [facility]) ICD-10-CM: U07.1, J96.01  ICD-9-CM: 518.81, 079.89, 799.02         Pneumonia due to COVID-19 virus ICD-10-CM: U07.1, J12.82  ICD-9-CM: 480.8, 079.89         Hyperglycemia due to type 2 diabetes mellitus (HCC) ICD-10-CM: E11.65  ICD-9-CM: 250.00         Primary hypertension ICD-10-CM: I10  ICD-9-CM: 401.9         Hyponatremia ICD-10-CM: E87.1  ICD-9-CM: 276.1         Lactic acidosis ICD-10-CM: E87.2  ICD-9-CM: 276.2         Hyperlipidemia ICD-10-CM: E78.5  ICD-9-CM: 272.4         Obesity (BMI 30-39. 9) ICD-10-CM: E66.9  ICD-9-CM: 278.00         COVID-19 vaccination not done ICD-10-CM: Z28.9  ICD-9-CM: V64.00               Past Medical History:      has a past medical history of COVID-19, Diabetes ([facility] [address] Hyperlipidemia, and Hypertension. Past Surgical History:      has a past surgical history that includes hx orthopaedic (Right). Home Medications:     Prior to Admission medications    Medication Sig Start Date End Date Taking?  Authorizing Provider   cisatracurium (NIMBEX) IV infusion 0-0.869 mg/min by IntraVENous route TITRATE. 12/1/21  Yes Tonya Li MD   enoxaparin (LOVENOX) 100 mg/mL 90 mg by SubCUTAneous route every twelve (12) hours every twelve (12) hours. 12/1/21  Yes Tonya Li MD   insulin glargine (LANTUS) 100 unit/mL injection 24 Units by SubCUTAneous route nightly. Indications: type 2 diabetes mellitus 12/1/21  Yes Tonya Li MD   midazolam in normal saline (VERSED) 1 mg/mL soln 0-10 mg/hr by IntraVENous route TITRATE. Max Daily Amount: 240 mg. 12/1/21  Yes Tonya Li MD   Norepinephrine Bitartrate (LEVOPHED) 8 mg/250 mL (32 mcg/mL) soln 0.5-16 mcg/min by IntraVENous route TITRATE. 12/1/21  Yes Tonya Li MD   ascorbic acid, vitamin C, (VITAMIN C) 500 mg tablet Take 1 Tablet by mouth two (2) times a day. 12/1/21   Tonya Li MD   cholecalciferol (VITAMIN D3) (1000 Units /25 mcg) tablet Take 2 Tablets by mouth daily. 12/1/21   Tonya Li MD   melatonin, rapid dissolve, 5 mg TbDi tablet Take 1 Tablet by mouth nightly. 12/1/21   Tonya Li MD   atorvastatin (LIPITOR) 20 mg tablet Take 20 mg by mouth daily. Provider, Historical   telmisartan (MICARDIS) 80 mg tablet Take 80 mg by mouth daily. Indications: high blood pressure    Provider, Historical       Allergies/Social/Family History:      Allergies   Allergen Reactions    Alupent [Metaproterenol] Swelling      Social History     Tobacco Use    Smoking status: Never Smoker    Smokeless tobacco: Not on file   Substance Use Topics    Alcohol use: Yes     Comment: drinksonce a week      Family History   Problem Relation Age of Onset    Diabetes Mother     Hypertension Mother     Stroke Mother     Hodgkin's lymphoma Mother     Diabetes Father     Hypertension Father     Cystic Fibrosis Father     Diabetes Maternal Aunt     Diabetes Maternal Uncle        Review of Systems:     Unable    Objective:   Vital Signs:  Visit Vitals  /66 (BP 1 Location: Right arm, BP Patient Position: At rest)   Pulse 84   Temp (!) 100.9 °F (38.3 °C)   Resp 18   Ht 5' 6\" (1.676 m)   Wt 85.7 kg (188 lb 15 oz)   SpO2 96%   BMI 30.49 kg/m²    O2 Flow Rate (L/min): 60 l/min O2 Device: Endotracheal tube,Ventilator Temp (24hrs), Av.6 °F (37.6 °C), Min:97.7 °F (36.5 °C), Max:101 °F (38.3 °C)           Intake/Output:     Intake/Output Summary (Last 24 hours) at 2021 1751  Last data filed at 2021 1600  Gross per 24 hour   Intake 3341.39 ml   Output 3925 ml   Net -583.61 ml       Physical Exam:    General:  Sedated/intubated/RASS -1 to -2  Eye: Pupils are round and reactive bilaterally  Neurologic: Furrowed brow; withdraws to noxious stimuli in all extremities; does not follow commands, SORIANO spontaneously; looks around room sponteanously  Neck: Supple, no JVD  Lungs: On mechanical ventilation; CTAB  Heart: RRR, 2+ pulses, 1+ edema of BLE   Abdomen:Soft, nontender, nondistended  Skin:  No rash or lesion  Verified     LABS AND  DATA: Personally reviewed  Recent Labs     21   WBC 7.2 7.4   HGB 13.6 9.8*   HCT 41.8 30.1*    260     Recent Labs     21  0341 21  032    142   K 3.9 3.9   * 107   CO2 29 28   BUN 6 6   CREA 0.43* 0.33*   * 130*   CA 9.1 9.0   MG 2.1 2.4   PHOS 3.5 3.6     Recent Labs     21  032   AP 93 88   TP 6.5 6.3*   ALB 2.0* 1.8*   GLOB 4.5* 4.5*     No results for input(s): INR, PTP, APTT, INREXT, INREXT in the last 72 hours. Recent Labs     21  033   PHI 7.39   PCO2I 46.8*   PO2I 64*   FIO2I 90     No results for input(s): CPK, CKMB, TROIQ, BNPP in the last 72 hours. Ventilator Settings:  Mode Rate Tidal Volume Pressure FiO2 PEEP   Pressure control   535 ml  0 cm H2O 90 % 14 cm H20     Peak airway pressure: 39 cm H2O    Minue ventilation: 9.62 l/min      MEDS: Reviewed    Chest X-Ray: personally reviewed and report checked    · TTE: : LV: Estimated LVEF is 50 - 55%. Normal cavity size, wall thickness and diastolic function. Low normal systolic function.     Assessment and Plan [id number] Pneumonia complicated by Severe ARDS c/b pneumococcal pneumonia: Received tocilizumab. Continue RASS -4. Continue fentanyl versed. S pneumonia in sputum 12/3. Off nimbex since 12/2.  - Continue steroids  - Completed cefazolin  - Procal negative  - CXR unrevealing  - FiO2 decreased to 90, contine to wean O2 as toelrated. Maintain sats greater than 92%   - Goal for trach/PEG when patient medically able  - Diuresis PRN    HLD: Continue statin    DM2 c/b hyperglycemia:   - Continue NPH 10 q 12hrs   - Continue SSI q 6hrs     Hypotension: Possibly related to sedation. 2mcg levophed. - Continue midodrine    Agitation:   - Continue enteral sedation/opioids  - Wean sedation as tolerated, on versed 10vmg/h and fentanyl 200 mcg/h, increased overnight for agitation  - Seroquel at 50mg TID    Deconditioning: Unable to participate in PT/OT at this time    Multidisciplinary Rounds Completed: Y    ABCDEF Bundle/Checklist  Pain Medications: Fentanyl  Target RASS: 0  Sedation Medications:Versed  CAM-ICU: SILVIA  Mobility:Poor  PT/OT: Unable to participate: PROM  Restraints:Y  Discussed Plan of Care (goals of care): Y  Addressed Code Status: F    CARDIOVASCULAR  Cardiac Gtts: N  SBP Goal of: > 90 mmHg  MAP Goal of: > 65 mmHg  Transfusion Trigger (Hgb): <7 g/dL    RESPIRATORY  Vent Goals:   Optimize PEEP/Ventilation/Oxygenation  DVT Prophylaxis (if no, list reason): Lovenox   SPO2 Goal: > 92%  Pulmonary toilet: Duo-Nebs     GI/  Gold Catheter Present: Yes  GI Prophylaxis: Pepcid (famotidine)   Nutrition: Yes TF  IVFs:N  Bowel Movement:Y  Bowel Regimen:Y  Insulin: Y    ANTIBIOTICS  Antibiotics:  None    T/L/D  Tubes: ETT  Lines: PICC  Drains: Gold    SPECIAL EQUIPMENT  Vent    DISPOSITION  ICU    CRITICAL CARE CONSULTANT NOTE  I had a face to face encounter with the patient, reviewed and interpreted patient data including clinical events, labs, images, vital signs, I/O's, and examined patient.   I have discussed the case and the plan and management of the patient's care with the consulting services, the bedside nurses and the respiratory therapist.      NOTE OF PERSONAL INVOLVEMENT IN CARE   This patient has a high probability of imminent, clinically significant deterioration, which requires the highest level of preparedness to intervene urgently. I participated in the decision-making and personally managed or directed the management of the following life and organ supporting interventions that required my frequent assessment to treat or prevent imminent deterioration. I personally spent 55 minutes of critical care time. This is time spent at this critically ill patient's bedside actively involved in patient care as well as the coordination of care and discussions with the patient's family. This does not include any procedural time which has been billed separately.         Eliza Sky AGAP-BC     [place] NP  [facility]

## 2021-12-30 NOTE — PROGRESS NOTES
Transition of Care Plan   RUR-  Medium    Covid positive   DISPOSITION: pending medical progression   F/U with PCP/Specialist     Transport: [facility]/family   Patient remains in the ICU on isolation, vented on Precedex, dilaudid, Versed and Levophed gtts. Per notes ENT has evaluated patient for trach placement, however patient not a candidate currently as vent settings are too high. Care management will continue to follow for transitions of care. Khloe Pérez RN,Care Management    3:00 pm Care manager faxed patient's disability papaerwork to the [employer] at [phone].  also spoke with patient's wife and emailed her a copy to [email or web address]  Original placed on patient's bedside chart to be scanned.    Khloe Pérez RN,Care Manager

## 2021-12-31 NOTE — PROGRESS NOTES
[facility]    ICU TEAM Progress Note    Name: Salty Bull   : 1975   MRN: [id number]   Date: 2021      Subjective:   Progress Note: 2021      Mr Hugh Alston is a 56 yo male with a PMH of DM2, HTN and obesity admitted on  to [facility] for COVID-19 pneumonia. He was treated with tocilizumab and steroids. He was not vaccinated. He decompensated and was intubated and . He was paralyzed, proned/supinated. He was transferred to [facility] on  to be evaluated for possible ECMO. CVC and arterial line placed at [facility] on . Nimbex was turned off . He was weaned to 60% Fio2 as of 12/3. Proning/supinating therapy continued. He continued to demonstrated fever. He was started on cefepime/fluconazole on 12/3 with vanc added .      his FIo2 increased to 70% and decreased back to 60% on . CVC changed to PICC on  as blood culture had been positive for 4/4 S epi on 12/3 (cleared in culture on ). He was on vanc for 2 weeks, until . Changed to cefazolin on 12/15. He has been responsive to diuresis over the past few days and has been weaned to 60% FiO2. Attempts were made to decrease sedation on , but this was not successful due to increased agitation. Due to this, seroquel was added on . Enteral oxycodone and valium were also added on . His versed was weaned from 19mg/h to 17/mg/h on  pm.  His versed was further weaned on  as his seroquel was increased. : Versed is at 9. He continues on the fentanyl infusion. He has been afebrile overnight. : Versed gtt 7 and fentanyl gtt at 150. Vent weaned to 65% and continued PEEP of 12. Opens eyes and moves ext spontaneously. But does not follow commands, track with eyes, or WD to pain. NGT clogged, will replace today. Completed cefazolin. Chest xray tomorrow am.    : No acute events overnight. Did not require pronation overnight.  Plan for today is to decrease FiO2 to 60% and wean Fentanyl. Mobilize as tolerated. Not following commands. SORIANO spontaneously. 12/24: No acute events overnight, remains on 55% FiO2, PEEP 10. Will continue to wean FiO2 for goal of 50% today, will wean PEEP to 8 if tolerated. SORIANO spontaneously. Continue to wean IV sedation and liberalize PO opiates and benzos to avoid withdraw without oversedating. 12/25: Early this AM about 0600 patient spiked fever 103.3, elevated RR and HR. Given PRN pushes of versed and fentanyl as well as one time dose of rocuronium with some recovery. Chest x-ray obtained does not show obvious pneumothorax, formal read is pending. Will get BLE dopplers to asses for DVT, once stable will send to CT for head and chest CT to rule out neurological causes of fever such as stroke and also PE. Will likely need to resume sedation. Will place ENT consult for tracheostomy. Per respiratory note, patients ETT is having air leaks and suction catheter is difficult to pass, will likely require tube exchange today. Cooling blanket for fever. Levo as needed for support of blood pressure. 12/26: No acute events over the night. Unable to obtain CT scans due to labile BP, risk of travel outweighed benefit of scan overnight. Will attempt to get CT scans today, will add CT abdomen for further evaluation of fevers. Received motrin over the night for T-max 100.9. Remains on levophed infusion at 2 mcg/min for BP support. Labs remain stable, no acute abnormality. Monitor tube feed residuals, they were held for a few hours yesterday for high gastric residual, likely secondary to sedative medications decreasing gastric motility, resume feeds today. Increase rate of feeds as tolerated. Plan to re-test for COVID on 12/29. No issues with ET tube over the night, will hold off on tube exchange. ENT consultation for trach. Discussed with wife yesterday. Continue to prone for PF ratio less than 150, aggressive pulmonary hygiene.      12/27: Patient seen. NAD. Fevers noted. MRSA in sputum, antibiotics adjusted. Continue current management. 12/28: Patient fevers persisted. Fentanyl gtt discontinued and transitioned to dilaudid gtt. ENT evaluated patient, current settings are too high, will have to be re consulted when settings improve. 12/29: patient seen. NAD. ID Following: continue current antimicrobial regimen. Wound care RN management recommendations noted. Tube feedings on hold as gastric residuals were too high. Will continue to trend    12/30: Patient seen. NAD. Patient noted to have 2 bowel movements after previous bm was on 12/17. ABG results reviewed with decision made to prone patient. Further orders per clinical course. 12/31: Early this morning patient placed in prone position. Had episode of tachycardia and desaturation. Given rocuronium 50 mg x1, Nimbex resumed. HR remained in the 150's, was given metoprolol now with better rate control. Resume propofol to ensure sedation with paralytic. Stop PO benzos and opiates while on IV. Still with elevated temp, up to 103, continue micafungin, vancomycin. ID following. ABG 1-2 hours after pronation. May need to add Whit. Net positive 3.5 L fluid volume since admit, net negative over past 24 hours.      Active Problem List:     Problem List  Date Reviewed: 11/28/2021          Codes Class    COVID ICD-10-CM: U07.1  ICD-9-CM: 079.89         Acute hypoxemic respiratory failure due to COVID-19 [facility]) ICD-10-CM: U07.1, J96.01  ICD-9-CM: 518.81, 079.89, 799.02         Pneumonia due to COVID-19 virus ICD-10-CM: U07.1, J12.82  ICD-9-CM: 480.8, 079.89         Hyperglycemia due to type 2 diabetes mellitus (HCC) ICD-10-CM: E11.65  ICD-9-CM: 250.00         Primary hypertension ICD-10-CM: I10  ICD-9-CM: 401.9         Hyponatremia ICD-10-CM: E87.1  ICD-9-CM: 276.1         Lactic acidosis ICD-10-CM: E87.2  ICD-9-CM: 276.2         Hyperlipidemia ICD-10-CM: E78.5  ICD-9-CM: 272.4         Obesity (BMI 30-39. 9) ICD-10-CM: E66.9  ICD-9-CM: 278.00         COVID-19 vaccination not done ICD-10-CM: Z28.9  ICD-9-CM: V64.00               Past Medical History:      has a past medical history of COVID-19, Diabetes ([address] Hyperlipidemia, and Hypertension. Past Surgical History:      has a past surgical history that includes hx orthopaedic (Right). Home Medications:     Prior to Admission medications    Medication Sig Start Date End Date Taking? Authorizing Provider   cisatracurium (NIMBEX) IV infusion 0-0.869 mg/min by IntraVENous route TITRATE. 12/1/21  Yes Milena Penny MD   enoxaparin (LOVENOX) 100 mg/mL 90 mg by SubCUTAneous route every twelve (12) hours every twelve (12) hours. 12/1/21  Yes Milena Penny MD   insulin glargine (LANTUS) 100 unit/mL injection 24 Units by SubCUTAneous route nightly. Indications: type 2 diabetes mellitus 12/1/21  Yes Milena Penny MD   midazolam in normal saline (VERSED) 1 mg/mL soln 0-10 mg/hr by IntraVENous route TITRATE. Max Daily Amount: 240 mg. 12/1/21  Yes Milena Penny MD   Norepinephrine Bitartrate (LEVOPHED) 8 mg/250 mL (32 mcg/mL) soln 0.5-16 mcg/min by IntraVENous route TITRATE. 12/1/21  Yes Milena Penny MD   ascorbic acid, vitamin C, (VITAMIN C) 500 mg tablet Take 1 Tablet by mouth two (2) times a day. 12/1/21   Milena Penny MD   cholecalciferol (VITAMIN D3) (1000 Units /25 mcg) tablet Take 2 Tablets by mouth daily. 12/1/21   Milena Penny MD   melatonin, rapid dissolve, 5 mg TbDi tablet Take 1 Tablet by mouth nightly. 12/1/21   Milena Penny MD   atorvastatin (LIPITOR) 20 mg tablet Take 20 mg by mouth daily. Provider, Historical   telmisartan (MICARDIS) 80 mg tablet Take 80 mg by mouth daily. Indications: high blood pressure    Provider, Historical       Allergies/Social/Family History: Allergies   Allergen Reactions    Alupent [Metaproterenol] Swelling      Social History     Tobacco Use    Smoking status: Never Smoker    Smokeless tobacco: Not on file   Substance Use Topics    Alcohol use:  Yes Comment: drinksonce a week      Family History   Problem Relation Age of Onset    Diabetes Mother     Hypertension Mother     Stroke Mother     Hodgkin's lymphoma Mother     Diabetes Father     Hypertension Father     Cystic Fibrosis Father     Diabetes Maternal Aunt     Diabetes Maternal Uncle        Review of Systems:     Unable    Objective:   Vital Signs:  Visit Vitals  BP 92/67 (BP 1 Location: Right arm, BP Patient Position: At rest)   Pulse (!) 166   Temp (!) 104.3 °F (40.2 °C)   Resp 18   Ht 5' 6\" (1.676 m)   Wt 85.7 kg (188 lb 15 oz)   SpO2 93%   BMI 30.49 kg/m²    O2 Flow Rate (L/min): 60 l/min O2 Device: Endotracheal tube,Ventilator Temp (24hrs), Av.6 °F (38.1 °C), Min:98.6 °F (37 °C), Max:104.3 °F (40.2 °C)           Intake/Output:     Intake/Output Summary (Last 24 hours) at 2021 0756  Last data filed at 2021 0700  Gross per 24 hour   Intake 3345.95 ml   Output 5140 ml   Net -1794.05 ml       Physical Exam:    General:  Sedated/intubated/RASS -1 to -2  Eye: Pupils are round and reactive bilaterally  Neurologic: Furrowed brow; withdraws to noxious stimuli in all extremities; does not follow commands, SORIANO spontaneously; looks around room sponteanously  Neck: Supple, no JVD  Lungs: On mechanical ventilation; CTAB  Heart: RRR, 2+ pulses, 1+ edema of BLE   Abdomen:Soft, nontender, nondistended  Skin:  No rash or lesion  Verified     LABS AND  DATA: Personally reviewed  Recent Labs     21   WBC 9.2 7.2   HGB 9.8* 13.6   HCT 31.3* 41.8    230     Recent Labs     21    143   K 3.0* 3.9    110*   CO2 29 29   BUN 6 6   CREA 0.54* 0.43*   * 122*   CA 9.2 9.1   MG 1.8 2.1   PHOS 4.0 3.5     Recent Labs     21   AP 95 93   TP 6.3* 6.5   ALB 2.0* 2.0*   GLOB 4.3* 4.5*     No results for input(s): INR, PTP, APTT, INREXT, INREXT in the last 72 hours.    No results for input(s): PHI, PCO2I, PO2I, FIO2I in the last 72 hours. No results for input(s): CPK, CKMB, TROIQ, BNPP in the last 72 hours. Ventilator Settings:  Mode Rate Tidal Volume Pressure FiO2 PEEP   Pressure control   535 ml  0 cm H2O 90 % 14 cm H20     Peak airway pressure: 39 cm H2O    Minue ventilation: 7.43 l/min      MEDS: Reviewed    Chest X-Ray: personally reviewed and report checked    · TTE: 11/24: LV: Estimated LVEF is 50 - 55%. Normal cavity size, wall thickness and diastolic function. Low normal systolic function. Assessment and Plan     ABHGQ-83 Pneumonia complicated by Severe ARDS c/b pneumococcal pneumonia: Received tocilizumab. Continue RASS -4. Continue fentanyl versed. S pneumonia in sputum 12/3. Off nimbex since 12/2.  - Continue steroids  - Completed cefazolin  - Procal negative  - CXR unrevealing  - FiO2 increased to 100, contine to wean O2 as toelrated. Maintain sats greater than 92%   - Goal for trach/PEG when patient medically able  - Diuresis PRN    HLD: Continue statin    DM2 c/b hyperglycemia:   - Continue NPH 10 q 12hrs   - Continue SSI q 6hrs     Hypotension: Possibly related to sedation. 8 mcg levophed. - Continue midodrine    Agitation:   - Continue enteral sedation/opioids  - Wean sedation as tolerated, on versed 20 mg/h and dilaudid 4 mg/h,  - Seroquel at 50mg TID, currently on hold given resumption of propofol     Deconditioning: Unable to participate in PT/OT at this time    Multidisciplinary Rounds Completed: Y    ABCDEF Bundle/Checklist  Pain Medications: Fentanyl  Target RASS: 0  Sedation Medications:Versed  CAM-ICU: SILVIA  Mobility:Poor  PT/OT: Unable to participate: PROM  Restraints:Y  Discussed Plan of Care (goals of care):  Y  Addressed Code Status: F    CARDIOVASCULAR  Cardiac Gtts: N  SBP Goal of: > 90 mmHg  MAP Goal of: > 65 mmHg  Transfusion Trigger (Hgb): <7 g/dL    RESPIRATORY  Vent Goals:   Optimize PEEP/Ventilation/Oxygenation  DVT Prophylaxis (if no, list reason): Lovenox   SPO2 Goal: > 92%  Pulmonary toilet: Duo-Nebs     GI/  Gold Catheter Present: Yes  GI Prophylaxis: Pepcid (famotidine)   Nutrition: Yes TF  IVFs:N  Bowel Movement:Y  Bowel Regimen:Y  Insulin: Y    ANTIBIOTICS  Antibiotics:  None    T/L/D  Tubes: ETT  Lines: PICC  Drains: Gold    SPECIAL EQUIPMENT  Vent    DISPOSITION  ICU    CRITICAL CARE CONSULTANT NOTE  I had a face to face encounter with the patient, reviewed and interpreted patient data including clinical events, labs, images, vital signs, I/O's, and examined patient. I have discussed the case and the plan and management of the patient's care with the consulting services, the bedside nurses and the respiratory therapist.      NOTE OF PERSONAL INVOLVEMENT IN CARE   This patient has a high probability of imminent, clinically significant deterioration, which requires the highest level of preparedness to intervene urgently. I participated in the decision-making and personally managed or directed the management of the following life and organ supporting interventions that required my frequent assessment to treat or prevent imminent deterioration. I personally spent 60 minutes of critical care time. This is time spent at this critically ill patient's bedside actively involved in patient care as well as the coordination of care and discussions with the patient's family. This does not include any procedural time which has been billed separately.           Elvira Bravo [facility]     Critical Care Medicine  [facility]

## 2021-12-31 NOTE — PROGRESS NOTES
Nursing Shift Note 1930-0800 1945: Bedside and Verbal shift change report given to Argelia Galarza RN (oncoming nurse) by Corina Aguirre RN (offgoing nurse). Report included the following information SBAR, Kardex, Procedure Summary, Intake/Output and Cardiac Rhythm NSR.     2240: Patient flipped supine. 0530: Patient HR elevated, coughing, RR high. Gave 100 mcg fentanyl push  0612: HR up to 150, desaturating, tachypneic to 40. Temperature rising rapidly. Gave 5mg versed push. Called RT to prepare to emergently prone. 0700: Proned at 0630 onto a cooling blanket, patient HR up to 170, desaturating as low as 79, sustaining in low 80s. Gave another 100mcg push fentanyl, increased FIO2 no vent to 100% FIO2. RR improved but remains hypoxic and highly tachycardic. Patient temperature up to 104, patient with whole body rigors/muscle contractions. Received order for Rocuronium 50 mg and motrin liquid. Received order to start patient on Nimbex and to start at 1 to help establish baseline TOF since already paralyzed. 0720: Temp 104.3 despite being on cooling blanket, placed ice packs on patient. 0745: Bedside and Verbal shift change report given to Greg Solis RN (oncoming nurse) by Argelia Galarza RN (offgoing nurse). Report included the following information SBAR, Kardex, Procedure Summary, Intake/Output, MAR, Accordion, Recent Results and Cardiac Rhythm NSR . Shift Summary:   Supine at 2240. Patient had uneventful shift until 0530, HR and temperature started to rise, desaturating, emergently proned at 0630. Temps arti rapidly with full body contractions, Tmax 104. 3. Pt remained hypoxic in low 80s post prone, started on paralytic.

## 2021-12-31 NOTE — PROGRESS NOTES
ID Progress Note  2021    Subjective:     Still on high levels of support--remains intermittently febrile--MRSA from recent sputm    Objective:     Antibiotics:  1. Vancomycin --   2. Cefepime    3. Ceftriaxone  4. Zosyn --  5. Eraxis --     Vitals:   Visit Vitals  BP (!) 78/48   Pulse 98   Temp 96.8 °F (36 °C)   Resp 18   Ht 5' 6\" (1.676 m)   Wt 85.7 kg (188 lb 15 oz)   SpO2 97%   BMI 30.49 kg/m²        Tmax:  Temp (24hrs), Av.5 °F (38.1 °C), Min:96.8 °F (36 °C), Max:104.3 °F (40.2 °C)      Exam:  Observational only    Labs:      Recent Labs     21  0317 21  0341 21  0329   WBC 9.2 7.2 7.4   HGB 9.8* 13.6 9.8*    230 260   BUN 6 6 6   CREA 0.54* 0.43* 0.33*   AP 95 93 88   TBILI 0.4 0.3 0.4       Cultures:     No results found for: SDES  Lab Results   Component Value Date/Time    Culture result: NO GROWTH 3 DAYS 2021 11:50 PM    Culture result: (A) 2021 11:50 PM     HEAVY * METHICILLIN RESISTANT STAPHYLOCOCCUS AUREUS *    Culture result: SCANT NORMAL RESPIRATORY TAMARA 2021 11:50 PM    Culture result:  2021 11:50 PM     (NOTE) MRSA CALLED TO Tuan Alonso RN AT [id number] ON 21 RB       Radiology:     Line/Insert Date:           Assessment:     1. Severe COVID pneumonia--Pneumococcus/staph from respiratory culture  2. VDRF  3. Hypoxic respiratory failure  4. Bacteremia--S epi--probable line source  5. MRSA from sputum, now  6. Seriously ill with guarded prognosis, at best  7. Ongoing fevers--cultures negative to date--normal WBC--might be drug fever    Objective:     1. Continue antibiotic therapy  2. Follow up cultures  3. Work up fevers  4. Imaging is pending--depends on BP issues, etc  5.  Would change lines--review medications for possible drug fever      Gil Stallworth MD

## 2021-12-31 NOTE — PROGRESS NOTES
NUTRITION brief  Recommendations:     Modify tube feeding: Glucerna 1.5 @ 40 ml/hr with 2 packets Prosource bid and 100 ml water flush q 4 hr    Adjust Bowel regimen:     Discontinue lactulose and Benefiber     Increase Pericolace to bid       Diet: NPO  Nutrition Support: Glucerna 1.5 @ 40 ml/hr with 150 ml water flush q 4 hr  Nutrition-related meds: Propofol @ 25.7 ml/hr, Levo @ 13, NPH, Humalog, KCL, MVI, Pericolace, lactulose, Vit C, Vit D    New events impacting nutrition plan of care:   Early AM events noted-pt back on propofol; levophed rate increased. Per RN pt now stable; doing better. Discussed with NP as well. Will need to modify tube feeding goal since propofol now providing ~680 lipid calories per day. New tube feeding goal: Glucerna 1.5 @ 40 ml/hr with 2 packets Prosource bid and 100 ml water flush q 4 hr. This will provide 800 ml, 1440 calories, 126 gm protein and 1440 ml free water (tube feeding/flush) per day to meet estimated protein needs. Tube feeding and propofol combined will provide a total of 2120 calories per day. FMS with 925 ml out yesterday-recommend modifying bowel regimen. See full RD assessment from 12/30 for additional details, goals, and monitoring/evaluation.     Estimated Nutrition Needs:   Energy: 4693-7194 (25-28 kcal/kg)  Wt used: Current (81 kg)  Protein: 122-138 (1.5-1.7g/kg)  Wt used:   81 kg  Fluid: 1 mL/kcal     Elena Pablo RD [facility]

## 2021-12-31 NOTE — PROGRESS NOTES
0745: Bedside and Verbal shift change report given to Sher Horn RN (oncoming nurse) by Yuniel Farley RN (offgoing nurse). Report included the following information SBAR, Intake/Output, Cardiac Rhythm SVT and Alarm Parameters    1930: Verbal shift change report given to Ganga Rodriguez RN (oncoming nurse) by Sher Horn RN (offgoing nurse). Report included the following information SBAR, Intake/Output, Cardiac Rhythm ST and Alarm Parameters .

## 2022-01-01 ENCOUNTER — APPOINTMENT (OUTPATIENT)
Dept: GENERAL RADIOLOGY | Age: 47
DRG: 870 | End: 2022-01-01
Attending: NURSE PRACTITIONER
Payer: COMMERCIAL

## 2022-01-01 ENCOUNTER — APPOINTMENT (OUTPATIENT)
Dept: GENERAL RADIOLOGY | Age: 47
DRG: 870 | End: 2022-01-01
Attending: INTERNAL MEDICINE
Payer: COMMERCIAL

## 2022-01-01 VITALS
TEMPERATURE: 98.8 F | OXYGEN SATURATION: 61 % | SYSTOLIC BLOOD PRESSURE: 98 MMHG | BODY MASS INDEX: 32.81 KG/M2 | DIASTOLIC BLOOD PRESSURE: 67 MMHG | HEIGHT: 66 IN | RESPIRATION RATE: 26 BRPM | WEIGHT: 204.15 LBS | HEART RATE: 61 BPM

## 2022-01-01 LAB
1,3 BETA GLUCAN SER-MCNC: <31 PG/ML
25(OH)D3 SERPL-MCNC: 20.5 NG/ML (ref 30–100)
ABO + RH BLD: NORMAL
ALBUMIN SERPL-MCNC: 1.3 G/DL (ref 3.5–5)
ALBUMIN SERPL-MCNC: 1.4 G/DL (ref 3.5–5)
ALBUMIN SERPL-MCNC: 1.5 G/DL (ref 3.5–5)
ALBUMIN SERPL-MCNC: 1.6 G/DL (ref 3.5–5)
ALBUMIN SERPL-MCNC: 1.9 G/DL (ref 3.5–5)
ALBUMIN SERPL-MCNC: 2.1 G/DL (ref 3.5–5)
ALBUMIN SERPL-MCNC: 2.8 G/DL (ref 3.5–5)
ALBUMIN SERPL-MCNC: 3 G/DL (ref 3.5–5)
ALBUMIN SERPL-MCNC: 3 G/DL (ref 3.5–5)
ALBUMIN SERPL-MCNC: 3.1 G/DL (ref 3.5–5)
ALBUMIN SERPL-MCNC: 3.1 G/DL (ref 3.5–5)
ALBUMIN SERPL-MCNC: 3.4 G/DL (ref 3.5–5)
ALBUMIN SERPL-MCNC: 3.5 G/DL (ref 3.5–5)
ALBUMIN SERPL-MCNC: 3.7 G/DL (ref 3.5–5)
ALBUMIN SERPL-MCNC: 3.9 G/DL (ref 3.5–5)
ALBUMIN SERPL-MCNC: 4.1 G/DL (ref 3.5–5)
ALBUMIN SERPL-MCNC: 4.1 G/DL (ref 3.5–5)
ALBUMIN/GLOB SERPL: 0.2 {RATIO} (ref 1.1–2.2)
ALBUMIN/GLOB SERPL: 0.3 {RATIO} (ref 1.1–2.2)
ALBUMIN/GLOB SERPL: 0.4 {RATIO} (ref 1.1–2.2)
ALBUMIN/GLOB SERPL: 0.8 {RATIO} (ref 1.1–2.2)
ALBUMIN/GLOB SERPL: 0.9 {RATIO} (ref 1.1–2.2)
ALBUMIN/GLOB SERPL: 1 {RATIO} (ref 1.1–2.2)
ALBUMIN/GLOB SERPL: 1 {RATIO} (ref 1.1–2.2)
ALBUMIN/GLOB SERPL: 1.2 {RATIO} (ref 1.1–2.2)
ALP SERPL-CCNC: 126 U/L (ref 45–117)
ALP SERPL-CCNC: 144 U/L (ref 45–117)
ALP SERPL-CCNC: 144 U/L (ref 45–117)
ALP SERPL-CCNC: 150 U/L (ref 45–117)
ALP SERPL-CCNC: 150 U/L (ref 45–117)
ALP SERPL-CCNC: 169 U/L (ref 45–117)
ALP SERPL-CCNC: 178 U/L (ref 45–117)
ALP SERPL-CCNC: 193 U/L (ref 45–117)
ALP SERPL-CCNC: 196 U/L (ref 45–117)
ALP SERPL-CCNC: 198 U/L (ref 45–117)
ALP SERPL-CCNC: 221 U/L (ref 45–117)
ALP SERPL-CCNC: 223 U/L (ref 45–117)
ALP SERPL-CCNC: 228 U/L (ref 45–117)
ALP SERPL-CCNC: 230 U/L (ref 45–117)
ALP SERPL-CCNC: 230 U/L (ref 45–117)
ALP SERPL-CCNC: 254 U/L (ref 45–117)
ALP SERPL-CCNC: 277 U/L (ref 45–117)
ALP SERPL-CCNC: 309 U/L (ref 45–117)
ALP SERPL-CCNC: 484 U/L (ref 45–117)
ALT SERPL-CCNC: 109 U/L (ref 12–78)
ALT SERPL-CCNC: 126 U/L (ref 12–78)
ALT SERPL-CCNC: 137 U/L (ref 12–78)
ALT SERPL-CCNC: 19 U/L (ref 12–78)
ALT SERPL-CCNC: 199 U/L (ref 12–78)
ALT SERPL-CCNC: 21 U/L (ref 12–78)
ALT SERPL-CCNC: 219 U/L (ref 12–78)
ALT SERPL-CCNC: 26 U/L (ref 12–78)
ALT SERPL-CCNC: 27 U/L (ref 12–78)
ALT SERPL-CCNC: 38 U/L (ref 12–78)
ALT SERPL-CCNC: 41 U/L (ref 12–78)
ALT SERPL-CCNC: 44 U/L (ref 12–78)
ALT SERPL-CCNC: 50 U/L (ref 12–78)
ALT SERPL-CCNC: 54 U/L (ref 12–78)
ALT SERPL-CCNC: 566 U/L (ref 12–78)
ALT SERPL-CCNC: 57 U/L (ref 12–78)
ALT SERPL-CCNC: 66 U/L (ref 12–78)
ALT SERPL-CCNC: 66 U/L (ref 12–78)
ALT SERPL-CCNC: 91 U/L (ref 12–78)
ANION GAP SERPL CALC-SCNC: 2 MMOL/L (ref 5–15)
ANION GAP SERPL CALC-SCNC: 3 MMOL/L (ref 5–15)
ANION GAP SERPL CALC-SCNC: 4 MMOL/L (ref 5–15)
ANION GAP SERPL CALC-SCNC: 4 MMOL/L (ref 5–15)
ANION GAP SERPL CALC-SCNC: 5 MMOL/L (ref 5–15)
ANION GAP SERPL CALC-SCNC: 5 MMOL/L (ref 5–15)
ANION GAP SERPL CALC-SCNC: 6 MMOL/L (ref 5–15)
ANION GAP SERPL CALC-SCNC: 7 MMOL/L (ref 5–15)
ANION GAP SERPL CALC-SCNC: 8 MMOL/L (ref 5–15)
ANION GAP SERPL CALC-SCNC: 8 MMOL/L (ref 5–15)
ARTERIAL PATENCY WRIST A: ABNORMAL
ARTERIAL PATENCY WRIST A: NORMAL
AST SERPL-CCNC: 16 U/L (ref 15–37)
AST SERPL-CCNC: 169 U/L (ref 15–37)
AST SERPL-CCNC: 18 U/L (ref 15–37)
AST SERPL-CCNC: 19 U/L (ref 15–37)
AST SERPL-CCNC: 20 U/L (ref 15–37)
AST SERPL-CCNC: 25 U/L (ref 15–37)
AST SERPL-CCNC: 261 U/L (ref 15–37)
AST SERPL-CCNC: 27 U/L (ref 15–37)
AST SERPL-CCNC: 27 U/L (ref 15–37)
AST SERPL-CCNC: 28 U/L (ref 15–37)
AST SERPL-CCNC: 28 U/L (ref 15–37)
AST SERPL-CCNC: 31 U/L (ref 15–37)
AST SERPL-CCNC: 315 U/L (ref 15–37)
AST SERPL-CCNC: 32 U/L (ref 15–37)
AST SERPL-CCNC: 38 U/L (ref 15–37)
AST SERPL-CCNC: 42 U/L (ref 15–37)
AST SERPL-CCNC: 61 U/L (ref 15–37)
AST SERPL-CCNC: 63 U/L (ref 15–37)
AST SERPL-CCNC: 762 U/L (ref 15–37)
ATRIAL RATE: 94 BPM
B PERT DNA SPEC QL NAA+PROBE: NOT DETECTED
BACTERIA SPEC CULT: ABNORMAL
BACTERIA SPEC CULT: ABNORMAL
BACTERIA SPEC CULT: NORMAL
BASE DEFICIT BLD-SCNC: 1.6 MMOL/L
BASE DEFICIT BLD-SCNC: 2.9 MMOL/L
BASE DEFICIT BLD-SCNC: 2.9 MMOL/L
BASE DEFICIT BLD-SCNC: 3.9 MMOL/L
BASE DEFICIT BLDA-SCNC: 0.8 MMOL/L
BASE DEFICIT BLDA-SCNC: 1 MMOL/L
BASE DEFICIT BLDA-SCNC: 1.6 MMOL/L
BASE DEFICIT BLDA-SCNC: 2.6 MMOL/L
BASE EXCESS BLD CALC-SCNC: 2.8 MMOL/L
BASE EXCESS BLD CALC-SCNC: 3.6 MMOL/L
BASE EXCESS BLD CALC-SCNC: 3.7 MMOL/L
BASE EXCESS BLD CALC-SCNC: 8.6 MMOL/L
BASE EXCESS BLDA CALC-SCNC: 0.7 MMOL/L
BASE EXCESS BLDA CALC-SCNC: 1 MMOL/L
BASE EXCESS BLDA CALC-SCNC: 11.2 MMOL/L
BASE EXCESS BLDA CALC-SCNC: 11.2 MMOL/L
BASE EXCESS BLDA CALC-SCNC: 13.8 MMOL/L
BASE EXCESS BLDA CALC-SCNC: 14.3 MMOL/L
BASE EXCESS BLDA CALC-SCNC: 16.6 MMOL/L
BASE EXCESS BLDA CALC-SCNC: 4.1 MMOL/L
BASE EXCESS BLDA CALC-SCNC: 7.8 MMOL/L
BASE EXCESS BLDA CALC-SCNC: 9.6 MMOL/L
BASOPHILS # BLD: 0 K/UL (ref 0–0.1)
BASOPHILS # BLD: 0.4 K/UL (ref 0–0.1)
BASOPHILS # BLD: 0.6 K/UL (ref 0–0.1)
BASOPHILS NFR BLD: 0 % (ref 0–1)
BASOPHILS NFR BLD: 1 % (ref 0–1)
BASOPHILS NFR BLD: 2 % (ref 0–1)
BDY SITE: ABNORMAL
BDY SITE: NORMAL
BILIRUB SERPL-MCNC: 0.3 MG/DL (ref 0.2–1)
BILIRUB SERPL-MCNC: 0.4 MG/DL (ref 0.2–1)
BILIRUB SERPL-MCNC: 0.5 MG/DL (ref 0.2–1)
BILIRUB SERPL-MCNC: 0.6 MG/DL (ref 0.2–1)
BILIRUB SERPL-MCNC: 0.6 MG/DL (ref 0.2–1)
BLD PROD TYP BPU: NORMAL
BLOOD GROUP ANTIBODIES SERPL: NORMAL
BORDETELLA PARAPERTUSSIS PCR, BORPAR: NOT DETECTED
BPU ID: NORMAL
BUN SERPL-MCNC: 13 MG/DL (ref 6–20)
BUN SERPL-MCNC: 15 MG/DL (ref 6–20)
BUN SERPL-MCNC: 17 MG/DL (ref 6–20)
BUN SERPL-MCNC: 19 MG/DL (ref 6–20)
BUN SERPL-MCNC: 29 MG/DL (ref 6–20)
BUN SERPL-MCNC: 30 MG/DL (ref 6–20)
BUN SERPL-MCNC: 30 MG/DL (ref 6–20)
BUN SERPL-MCNC: 32 MG/DL (ref 6–20)
BUN SERPL-MCNC: 33 MG/DL (ref 6–20)
BUN SERPL-MCNC: 33 MG/DL (ref 6–20)
BUN SERPL-MCNC: 35 MG/DL (ref 6–20)
BUN SERPL-MCNC: 36 MG/DL (ref 6–20)
BUN SERPL-MCNC: 37 MG/DL (ref 6–20)
BUN SERPL-MCNC: 38 MG/DL (ref 6–20)
BUN SERPL-MCNC: 39 MG/DL (ref 6–20)
BUN SERPL-MCNC: 40 MG/DL (ref 6–20)
BUN SERPL-MCNC: 42 MG/DL (ref 6–20)
BUN SERPL-MCNC: 51 MG/DL (ref 6–20)
BUN SERPL-MCNC: 9 MG/DL (ref 6–20)
BUN/CREAT SERPL: 11 (ref 12–20)
BUN/CREAT SERPL: 13 (ref 12–20)
BUN/CREAT SERPL: 15 (ref 12–20)
BUN/CREAT SERPL: 16 (ref 12–20)
BUN/CREAT SERPL: 18 (ref 12–20)
BUN/CREAT SERPL: 19 (ref 12–20)
BUN/CREAT SERPL: 26 (ref 12–20)
BUN/CREAT SERPL: 31 (ref 12–20)
BUN/CREAT SERPL: 32 (ref 12–20)
BUN/CREAT SERPL: 33 (ref 12–20)
BUN/CREAT SERPL: 34 (ref 12–20)
BUN/CREAT SERPL: 37 (ref 12–20)
BUN/CREAT SERPL: 38 (ref 12–20)
BUN/CREAT SERPL: 40 (ref 12–20)
BUN/CREAT SERPL: 42 (ref 12–20)
BUN/CREAT SERPL: 43 (ref 12–20)
BUN/CREAT SERPL: 49 (ref 12–20)
BUN/CREAT SERPL: 50 (ref 12–20)
BUN/CREAT SERPL: 54 (ref 12–20)
BUN/CREAT SERPL: 62 (ref 12–20)
BUN/CREAT SERPL: 64 (ref 12–20)
C PNEUM DNA SPEC QL NAA+PROBE: NOT DETECTED
CA-I BLD-SCNC: 1.09 MMOL/L (ref 1.12–1.32)
CALCIUM SERPL-MCNC: 5.4 MG/DL (ref 8.5–10.1)
CALCIUM SERPL-MCNC: 7 MG/DL (ref 8.5–10.1)
CALCIUM SERPL-MCNC: 7.7 MG/DL (ref 8.5–10.1)
CALCIUM SERPL-MCNC: 7.7 MG/DL (ref 8.5–10.1)
CALCIUM SERPL-MCNC: 7.9 MG/DL (ref 8.5–10.1)
CALCIUM SERPL-MCNC: 8 MG/DL (ref 8.5–10.1)
CALCIUM SERPL-MCNC: 8.2 MG/DL (ref 8.5–10.1)
CALCIUM SERPL-MCNC: 8.4 MG/DL (ref 8.5–10.1)
CALCIUM SERPL-MCNC: 8.4 MG/DL (ref 8.5–10.1)
CALCIUM SERPL-MCNC: 8.5 MG/DL (ref 8.5–10.1)
CALCIUM SERPL-MCNC: 8.6 MG/DL (ref 8.5–10.1)
CALCIUM SERPL-MCNC: 8.7 MG/DL (ref 8.5–10.1)
CALCIUM SERPL-MCNC: 8.7 MG/DL (ref 8.5–10.1)
CALCIUM SERPL-MCNC: 8.9 MG/DL (ref 8.5–10.1)
CALCIUM SERPL-MCNC: 8.9 MG/DL (ref 8.5–10.1)
CALCIUM SERPL-MCNC: 9 MG/DL (ref 8.5–10.1)
CALCIUM SERPL-MCNC: 9 MG/DL (ref 8.5–10.1)
CALCIUM SERPL-MCNC: 9.4 MG/DL (ref 8.5–10.1)
CALCIUM SERPL-MCNC: 9.5 MG/DL (ref 8.5–10.1)
CALCIUM SERPL-MCNC: 9.7 MG/DL (ref 8.5–10.1)
CALCIUM SERPL-MCNC: 9.8 MG/DL (ref 8.5–10.1)
CALCULATED R AXIS, ECG10: 7 DEGREES
CALCULATED T AXIS, ECG11: -70 DEGREES
CHLORIDE SERPL-SCNC: 102 MMOL/L (ref 97–108)
CHLORIDE SERPL-SCNC: 105 MMOL/L (ref 97–108)
CHLORIDE SERPL-SCNC: 106 MMOL/L (ref 97–108)
CHLORIDE SERPL-SCNC: 106 MMOL/L (ref 97–108)
CHLORIDE SERPL-SCNC: 108 MMOL/L (ref 97–108)
CHLORIDE SERPL-SCNC: 109 MMOL/L (ref 97–108)
CHLORIDE SERPL-SCNC: 109 MMOL/L (ref 97–108)
CHLORIDE SERPL-SCNC: 112 MMOL/L (ref 97–108)
CHLORIDE SERPL-SCNC: 115 MMOL/L (ref 97–108)
CHLORIDE SERPL-SCNC: 90 MMOL/L (ref 97–108)
CHLORIDE SERPL-SCNC: 91 MMOL/L (ref 97–108)
CHLORIDE SERPL-SCNC: 91 MMOL/L (ref 97–108)
CHLORIDE SERPL-SCNC: 93 MMOL/L (ref 97–108)
CHLORIDE SERPL-SCNC: 93 MMOL/L (ref 97–108)
CHLORIDE SERPL-SCNC: 94 MMOL/L (ref 97–108)
CHLORIDE SERPL-SCNC: 95 MMOL/L (ref 97–108)
CHLORIDE SERPL-SCNC: 95 MMOL/L (ref 97–108)
CHLORIDE SERPL-SCNC: 96 MMOL/L (ref 97–108)
CHLORIDE SERPL-SCNC: 98 MMOL/L (ref 97–108)
CO2 SERPL-SCNC: 22 MMOL/L (ref 21–32)
CO2 SERPL-SCNC: 22 MMOL/L (ref 21–32)
CO2 SERPL-SCNC: 23 MMOL/L (ref 21–32)
CO2 SERPL-SCNC: 24 MMOL/L (ref 21–32)
CO2 SERPL-SCNC: 26 MMOL/L (ref 21–32)
CO2 SERPL-SCNC: 28 MMOL/L (ref 21–32)
CO2 SERPL-SCNC: 30 MMOL/L (ref 21–32)
CO2 SERPL-SCNC: 34 MMOL/L (ref 21–32)
CO2 SERPL-SCNC: 35 MMOL/L (ref 21–32)
CO2 SERPL-SCNC: 36 MMOL/L (ref 21–32)
CO2 SERPL-SCNC: 37 MMOL/L (ref 21–32)
CO2 SERPL-SCNC: 38 MMOL/L (ref 21–32)
CO2 SERPL-SCNC: 38 MMOL/L (ref 21–32)
COHGB MFR BLD: 1.1 % (ref 1–2)
COHGB MFR BLD: 1.3 % (ref 1–2)
COHGB MFR BLD: 1.3 % (ref 1–2)
COHGB MFR BLD: 1.4 % (ref 1–2)
COHGB MFR BLD: 1.7 % (ref 1–2)
COHGB MFR BLD: 2.7 % (ref 1–2)
CREAT SERPL-MCNC: 0.61 MG/DL (ref 0.7–1.3)
CREAT SERPL-MCNC: 0.65 MG/DL (ref 0.7–1.3)
CREAT SERPL-MCNC: 0.66 MG/DL (ref 0.7–1.3)
CREAT SERPL-MCNC: 0.69 MG/DL (ref 0.7–1.3)
CREAT SERPL-MCNC: 0.69 MG/DL (ref 0.7–1.3)
CREAT SERPL-MCNC: 0.71 MG/DL (ref 0.7–1.3)
CREAT SERPL-MCNC: 0.77 MG/DL (ref 0.7–1.3)
CREAT SERPL-MCNC: 0.8 MG/DL (ref 0.7–1.3)
CREAT SERPL-MCNC: 0.8 MG/DL (ref 0.7–1.3)
CREAT SERPL-MCNC: 0.82 MG/DL (ref 0.7–1.3)
CREAT SERPL-MCNC: 0.95 MG/DL (ref 0.7–1.3)
CREAT SERPL-MCNC: 0.97 MG/DL (ref 0.7–1.3)
CREAT SERPL-MCNC: 0.97 MG/DL (ref 0.7–1.3)
CREAT SERPL-MCNC: 0.98 MG/DL (ref 0.7–1.3)
CREAT SERPL-MCNC: 1.03 MG/DL (ref 0.7–1.3)
CREAT SERPL-MCNC: 1.05 MG/DL (ref 0.7–1.3)
CREAT SERPL-MCNC: 1.09 MG/DL (ref 0.7–1.3)
CREAT SERPL-MCNC: 1.14 MG/DL (ref 0.7–1.3)
CREAT SERPL-MCNC: 1.24 MG/DL (ref 0.7–1.3)
CREAT SERPL-MCNC: 1.49 MG/DL (ref 0.7–1.3)
CREAT SERPL-MCNC: 1.64 MG/DL (ref 0.7–1.3)
CROSSMATCH RESULT,%XM: NORMAL
DIAGNOSIS, 93000: NORMAL
DIFFERENTIAL METHOD BLD: ABNORMAL
EOSINOPHIL # BLD: 0 K/UL (ref 0–0.4)
EOSINOPHIL # BLD: 0.1 K/UL (ref 0–0.4)
EOSINOPHIL # BLD: 0.1 K/UL (ref 0–0.4)
EOSINOPHIL # BLD: 0.2 K/UL (ref 0–0.4)
EOSINOPHIL # BLD: 0.3 K/UL (ref 0–0.4)
EOSINOPHIL # BLD: 0.9 K/UL (ref 0–0.4)
EOSINOPHIL NFR BLD: 0 % (ref 0–7)
EOSINOPHIL NFR BLD: 1 % (ref 0–7)
EOSINOPHIL NFR BLD: 3 % (ref 0–7)
ERYTHROCYTE [DISTWIDTH] IN BLOOD BY AUTOMATED COUNT: 15.6 % (ref 11.5–14.5)
ERYTHROCYTE [DISTWIDTH] IN BLOOD BY AUTOMATED COUNT: 15.7 % (ref 11.5–14.5)
ERYTHROCYTE [DISTWIDTH] IN BLOOD BY AUTOMATED COUNT: 15.8 % (ref 11.5–14.5)
ERYTHROCYTE [DISTWIDTH] IN BLOOD BY AUTOMATED COUNT: 15.9 % (ref 11.5–14.5)
ERYTHROCYTE [DISTWIDTH] IN BLOOD BY AUTOMATED COUNT: 16.1 % (ref 11.5–14.5)
ERYTHROCYTE [DISTWIDTH] IN BLOOD BY AUTOMATED COUNT: 16.3 % (ref 11.5–14.5)
ERYTHROCYTE [DISTWIDTH] IN BLOOD BY AUTOMATED COUNT: 16.7 % (ref 11.5–14.5)
ERYTHROCYTE [DISTWIDTH] IN BLOOD BY AUTOMATED COUNT: 16.7 % (ref 11.5–14.5)
ERYTHROCYTE [DISTWIDTH] IN BLOOD BY AUTOMATED COUNT: 17 % (ref 11.5–14.5)
ERYTHROCYTE [DISTWIDTH] IN BLOOD BY AUTOMATED COUNT: 17 % (ref 11.5–14.5)
ERYTHROCYTE [DISTWIDTH] IN BLOOD BY AUTOMATED COUNT: 18.6 % (ref 11.5–14.5)
ERYTHROCYTE [DISTWIDTH] IN BLOOD BY AUTOMATED COUNT: 18.8 % (ref 11.5–14.5)
ERYTHROCYTE [DISTWIDTH] IN BLOOD BY AUTOMATED COUNT: 19.4 % (ref 11.5–14.5)
ERYTHROCYTE [DISTWIDTH] IN BLOOD BY AUTOMATED COUNT: 19.5 % (ref 11.5–14.5)
ERYTHROCYTE [DISTWIDTH] IN BLOOD BY AUTOMATED COUNT: 19.5 % (ref 11.5–14.5)
ERYTHROCYTE [DISTWIDTH] IN BLOOD BY AUTOMATED COUNT: 19.6 % (ref 11.5–14.5)
ERYTHROCYTE [DISTWIDTH] IN BLOOD BY AUTOMATED COUNT: 19.9 % (ref 11.5–14.5)
ERYTHROCYTE [DISTWIDTH] IN BLOOD BY AUTOMATED COUNT: 20.2 % (ref 11.5–14.5)
FIO2 ON VENT: 100 %
FIO2 ON VENT: 80 %
FIO2 ON VENT: 80 %
FLUAV H1 2009 PAND RNA SPEC QL NAA+PROBE: NOT DETECTED
FLUAV H1 RNA SPEC QL NAA+PROBE: NOT DETECTED
FLUAV H3 RNA SPEC QL NAA+PROBE: NOT DETECTED
FLUAV SUBTYP SPEC NAA+PROBE: NOT DETECTED
FLUBV RNA SPEC QL NAA+PROBE: NOT DETECTED
GAS FLOW.O2 O2 DELIVERY SYS: ABNORMAL L/MIN
GAS FLOW.O2 O2 DELIVERY SYS: NORMAL L/MIN
GAS FLOW.O2 SETTING OXYMISER: 18 BPM
GAS FLOW.O2 SETTING OXYMISER: 20 BPM
GAS FLOW.O2 SETTING OXYMISER: 22 BPM
GAS FLOW.O2 SETTING OXYMISER: 22 L/MIN
GAS FLOW.O2 SETTING OXYMISER: 24 BPM
GAS FLOW.O2 SETTING OXYMISER: 26 L/MIN
GAS FLOW.O2 SETTING OXYMISER: 28 L/MIN
GLOBULIN SER CALC-MCNC: 2.8 G/DL (ref 2–4)
GLOBULIN SER CALC-MCNC: 3 G/DL (ref 2–4)
GLOBULIN SER CALC-MCNC: 3.2 G/DL (ref 2–4)
GLOBULIN SER CALC-MCNC: 3.2 G/DL (ref 2–4)
GLOBULIN SER CALC-MCNC: 3.3 G/DL (ref 2–4)
GLOBULIN SER CALC-MCNC: 3.4 G/DL (ref 2–4)
GLOBULIN SER CALC-MCNC: 3.4 G/DL (ref 2–4)
GLOBULIN SER CALC-MCNC: 3.5 G/DL (ref 2–4)
GLOBULIN SER CALC-MCNC: 3.5 G/DL (ref 2–4)
GLOBULIN SER CALC-MCNC: 3.6 G/DL (ref 2–4)
GLOBULIN SER CALC-MCNC: 3.7 G/DL (ref 2–4)
GLOBULIN SER CALC-MCNC: 3.9 G/DL (ref 2–4)
GLOBULIN SER CALC-MCNC: 4.3 G/DL (ref 2–4)
GLOBULIN SER CALC-MCNC: 4.5 G/DL (ref 2–4)
GLOBULIN SER CALC-MCNC: 4.5 G/DL (ref 2–4)
GLOBULIN SER CALC-MCNC: 4.6 G/DL (ref 2–4)
GLOBULIN SER CALC-MCNC: 5 G/DL (ref 2–4)
GLOBULIN SER CALC-MCNC: 5.1 G/DL (ref 2–4)
GLOBULIN SER CALC-MCNC: 5.3 G/DL (ref 2–4)
GLUCOSE BLD STRIP.AUTO-MCNC: 100 MG/DL (ref 65–117)
GLUCOSE BLD STRIP.AUTO-MCNC: 103 MG/DL (ref 65–117)
GLUCOSE BLD STRIP.AUTO-MCNC: 104 MG/DL (ref 65–117)
GLUCOSE BLD STRIP.AUTO-MCNC: 105 MG/DL (ref 65–117)
GLUCOSE BLD STRIP.AUTO-MCNC: 109 MG/DL (ref 65–117)
GLUCOSE BLD STRIP.AUTO-MCNC: 114 MG/DL (ref 65–117)
GLUCOSE BLD STRIP.AUTO-MCNC: 121 MG/DL (ref 65–117)
GLUCOSE BLD STRIP.AUTO-MCNC: 125 MG/DL (ref 65–117)
GLUCOSE BLD STRIP.AUTO-MCNC: 131 MG/DL (ref 65–117)
GLUCOSE BLD STRIP.AUTO-MCNC: 132 MG/DL (ref 65–117)
GLUCOSE BLD STRIP.AUTO-MCNC: 132 MG/DL (ref 65–117)
GLUCOSE BLD STRIP.AUTO-MCNC: 133 MG/DL (ref 65–117)
GLUCOSE BLD STRIP.AUTO-MCNC: 133 MG/DL (ref 65–117)
GLUCOSE BLD STRIP.AUTO-MCNC: 134 MG/DL (ref 65–117)
GLUCOSE BLD STRIP.AUTO-MCNC: 139 MG/DL (ref 65–117)
GLUCOSE BLD STRIP.AUTO-MCNC: 142 MG/DL (ref 65–117)
GLUCOSE BLD STRIP.AUTO-MCNC: 145 MG/DL (ref 65–117)
GLUCOSE BLD STRIP.AUTO-MCNC: 147 MG/DL (ref 65–117)
GLUCOSE BLD STRIP.AUTO-MCNC: 147 MG/DL (ref 65–117)
GLUCOSE BLD STRIP.AUTO-MCNC: 150 MG/DL (ref 65–117)
GLUCOSE BLD STRIP.AUTO-MCNC: 150 MG/DL (ref 65–117)
GLUCOSE BLD STRIP.AUTO-MCNC: 153 MG/DL (ref 65–117)
GLUCOSE BLD STRIP.AUTO-MCNC: 153 MG/DL (ref 65–117)
GLUCOSE BLD STRIP.AUTO-MCNC: 154 MG/DL (ref 65–117)
GLUCOSE BLD STRIP.AUTO-MCNC: 154 MG/DL (ref 65–117)
GLUCOSE BLD STRIP.AUTO-MCNC: 157 MG/DL (ref 65–117)
GLUCOSE BLD STRIP.AUTO-MCNC: 160 MG/DL (ref 65–117)
GLUCOSE BLD STRIP.AUTO-MCNC: 162 MG/DL (ref 65–117)
GLUCOSE BLD STRIP.AUTO-MCNC: 165 MG/DL (ref 65–117)
GLUCOSE BLD STRIP.AUTO-MCNC: 165 MG/DL (ref 65–117)
GLUCOSE BLD STRIP.AUTO-MCNC: 166 MG/DL (ref 65–117)
GLUCOSE BLD STRIP.AUTO-MCNC: 167 MG/DL (ref 65–117)
GLUCOSE BLD STRIP.AUTO-MCNC: 169 MG/DL (ref 65–117)
GLUCOSE BLD STRIP.AUTO-MCNC: 172 MG/DL (ref 65–117)
GLUCOSE BLD STRIP.AUTO-MCNC: 172 MG/DL (ref 65–117)
GLUCOSE BLD STRIP.AUTO-MCNC: 174 MG/DL (ref 65–117)
GLUCOSE BLD STRIP.AUTO-MCNC: 175 MG/DL (ref 65–117)
GLUCOSE BLD STRIP.AUTO-MCNC: 176 MG/DL (ref 65–117)
GLUCOSE BLD STRIP.AUTO-MCNC: 178 MG/DL (ref 65–117)
GLUCOSE BLD STRIP.AUTO-MCNC: 178 MG/DL (ref 65–117)
GLUCOSE BLD STRIP.AUTO-MCNC: 179 MG/DL (ref 65–117)
GLUCOSE BLD STRIP.AUTO-MCNC: 180 MG/DL (ref 65–117)
GLUCOSE BLD STRIP.AUTO-MCNC: 181 MG/DL (ref 65–117)
GLUCOSE BLD STRIP.AUTO-MCNC: 181 MG/DL (ref 65–117)
GLUCOSE BLD STRIP.AUTO-MCNC: 184 MG/DL (ref 65–117)
GLUCOSE BLD STRIP.AUTO-MCNC: 186 MG/DL (ref 65–117)
GLUCOSE BLD STRIP.AUTO-MCNC: 196 MG/DL (ref 65–117)
GLUCOSE BLD STRIP.AUTO-MCNC: 196 MG/DL (ref 65–117)
GLUCOSE BLD STRIP.AUTO-MCNC: 197 MG/DL (ref 65–117)
GLUCOSE BLD STRIP.AUTO-MCNC: 199 MG/DL (ref 65–117)
GLUCOSE BLD STRIP.AUTO-MCNC: 202 MG/DL (ref 65–117)
GLUCOSE BLD STRIP.AUTO-MCNC: 203 MG/DL (ref 65–117)
GLUCOSE BLD STRIP.AUTO-MCNC: 210 MG/DL (ref 65–117)
GLUCOSE BLD STRIP.AUTO-MCNC: 211 MG/DL (ref 65–117)
GLUCOSE BLD STRIP.AUTO-MCNC: 215 MG/DL (ref 65–117)
GLUCOSE BLD STRIP.AUTO-MCNC: 216 MG/DL (ref 65–117)
GLUCOSE BLD STRIP.AUTO-MCNC: 216 MG/DL (ref 65–117)
GLUCOSE BLD STRIP.AUTO-MCNC: 218 MG/DL (ref 65–117)
GLUCOSE BLD STRIP.AUTO-MCNC: 226 MG/DL (ref 65–117)
GLUCOSE BLD STRIP.AUTO-MCNC: 229 MG/DL (ref 65–117)
GLUCOSE BLD STRIP.AUTO-MCNC: 235 MG/DL (ref 65–117)
GLUCOSE BLD STRIP.AUTO-MCNC: 235 MG/DL (ref 65–117)
GLUCOSE BLD STRIP.AUTO-MCNC: 236 MG/DL (ref 65–117)
GLUCOSE BLD STRIP.AUTO-MCNC: 245 MG/DL (ref 65–117)
GLUCOSE BLD STRIP.AUTO-MCNC: 251 MG/DL (ref 65–117)
GLUCOSE BLD STRIP.AUTO-MCNC: 253 MG/DL (ref 65–117)
GLUCOSE BLD STRIP.AUTO-MCNC: 258 MG/DL (ref 65–117)
GLUCOSE BLD STRIP.AUTO-MCNC: 265 MG/DL (ref 65–117)
GLUCOSE BLD STRIP.AUTO-MCNC: 274 MG/DL (ref 65–117)
GLUCOSE BLD STRIP.AUTO-MCNC: 278 MG/DL (ref 65–117)
GLUCOSE BLD STRIP.AUTO-MCNC: 279 MG/DL (ref 65–117)
GLUCOSE BLD STRIP.AUTO-MCNC: 283 MG/DL (ref 65–117)
GLUCOSE BLD STRIP.AUTO-MCNC: 286 MG/DL (ref 65–117)
GLUCOSE BLD STRIP.AUTO-MCNC: 295 MG/DL (ref 65–117)
GLUCOSE BLD STRIP.AUTO-MCNC: 296 MG/DL (ref 65–117)
GLUCOSE BLD STRIP.AUTO-MCNC: 300 MG/DL (ref 65–117)
GLUCOSE BLD STRIP.AUTO-MCNC: 305 MG/DL (ref 65–117)
GLUCOSE BLD STRIP.AUTO-MCNC: 310 MG/DL (ref 65–117)
GLUCOSE BLD STRIP.AUTO-MCNC: 315 MG/DL (ref 65–117)
GLUCOSE BLD STRIP.AUTO-MCNC: 322 MG/DL (ref 65–117)
GLUCOSE BLD STRIP.AUTO-MCNC: 329 MG/DL (ref 65–117)
GLUCOSE BLD STRIP.AUTO-MCNC: 334 MG/DL (ref 65–117)
GLUCOSE BLD STRIP.AUTO-MCNC: 335 MG/DL (ref 65–117)
GLUCOSE BLD STRIP.AUTO-MCNC: 350 MG/DL (ref 65–117)
GLUCOSE BLD STRIP.AUTO-MCNC: 357 MG/DL (ref 65–117)
GLUCOSE BLD STRIP.AUTO-MCNC: 358 MG/DL (ref 65–117)
GLUCOSE BLD STRIP.AUTO-MCNC: 64 MG/DL (ref 65–117)
GLUCOSE BLD STRIP.AUTO-MCNC: 65 MG/DL (ref 65–117)
GLUCOSE BLD STRIP.AUTO-MCNC: 78 MG/DL (ref 65–117)
GLUCOSE BLD STRIP.AUTO-MCNC: 80 MG/DL (ref 65–117)
GLUCOSE BLD STRIP.AUTO-MCNC: 85 MG/DL (ref 65–117)
GLUCOSE BLD STRIP.AUTO-MCNC: 99 MG/DL (ref 65–117)
GLUCOSE SERPL-MCNC: 109 MG/DL (ref 65–100)
GLUCOSE SERPL-MCNC: 121 MG/DL (ref 65–100)
GLUCOSE SERPL-MCNC: 133 MG/DL (ref 65–100)
GLUCOSE SERPL-MCNC: 133 MG/DL (ref 65–100)
GLUCOSE SERPL-MCNC: 155 MG/DL (ref 65–100)
GLUCOSE SERPL-MCNC: 166 MG/DL (ref 65–100)
GLUCOSE SERPL-MCNC: 172 MG/DL (ref 65–100)
GLUCOSE SERPL-MCNC: 177 MG/DL (ref 65–100)
GLUCOSE SERPL-MCNC: 192 MG/DL (ref 65–100)
GLUCOSE SERPL-MCNC: 195 MG/DL (ref 65–100)
GLUCOSE SERPL-MCNC: 210 MG/DL (ref 65–100)
GLUCOSE SERPL-MCNC: 219 MG/DL (ref 65–100)
GLUCOSE SERPL-MCNC: 222 MG/DL (ref 65–100)
GLUCOSE SERPL-MCNC: 249 MG/DL (ref 65–100)
GLUCOSE SERPL-MCNC: 250 MG/DL (ref 65–100)
GLUCOSE SERPL-MCNC: 277 MG/DL (ref 65–100)
GLUCOSE SERPL-MCNC: 323 MG/DL (ref 65–100)
GLUCOSE SERPL-MCNC: 60 MG/DL (ref 65–100)
GLUCOSE SERPL-MCNC: 82 MG/DL (ref 65–100)
GLUCOSE SERPL-MCNC: 96 MG/DL (ref 65–100)
GLUCOSE SERPL-MCNC: 96 MG/DL (ref 65–100)
GRAM STN SPEC: ABNORMAL
GRAM STN SPEC: ABNORMAL
HADV DNA SPEC QL NAA+PROBE: NOT DETECTED
HCO3 BLD-SCNC: 21.2 MMOL/L (ref 22–26)
HCO3 BLD-SCNC: 22.1 MMOL/L (ref 22–26)
HCO3 BLD-SCNC: 22.6 MMOL/L (ref 22–26)
HCO3 BLD-SCNC: 24.1 MMOL/L (ref 22–26)
HCO3 BLD-SCNC: 28.7 MMOL/L (ref 22–26)
HCO3 BLD-SCNC: 28.9 MMOL/L (ref 22–26)
HCO3 BLD-SCNC: 31 MMOL/L (ref 22–26)
HCO3 BLD-SCNC: 33.8 MMOL/L (ref 22–26)
HCO3 BLDA-SCNC: 24 MMOL/L (ref 22–26)
HCO3 BLDA-SCNC: 24 MMOL/L (ref 22–26)
HCO3 BLDA-SCNC: 25 MMOL/L (ref 22–26)
HCO3 BLDA-SCNC: 26 MMOL/L (ref 22–26)
HCO3 BLDA-SCNC: 28 MMOL/L (ref 22–26)
HCO3 BLDA-SCNC: 29 MMOL/L (ref 22–26)
HCO3 BLDA-SCNC: 32 MMOL/L (ref 22–26)
HCO3 BLDA-SCNC: 34 MMOL/L (ref 22–26)
HCO3 BLDA-SCNC: 35 MMOL/L (ref 22–26)
HCO3 BLDA-SCNC: 36 MMOL/L (ref 22–26)
HCO3 BLDA-SCNC: 37 MMOL/L (ref 22–26)
HCO3 BLDA-SCNC: 38 MMOL/L (ref 22–26)
HCO3 BLDA-SCNC: 40 MMOL/L (ref 22–26)
HCO3 BLDA-SCNC: 42 MMOL/L (ref 22–26)
HCOV 229E RNA SPEC QL NAA+PROBE: NOT DETECTED
HCOV HKU1 RNA SPEC QL NAA+PROBE: NOT DETECTED
HCOV NL63 RNA SPEC QL NAA+PROBE: NOT DETECTED
HCOV OC43 RNA SPEC QL NAA+PROBE: NOT DETECTED
HCT VFR BLD AUTO: 22.1 % (ref 36.6–50.3)
HCT VFR BLD AUTO: 22.7 % (ref 36.6–50.3)
HCT VFR BLD AUTO: 22.8 % (ref 36.6–50.3)
HCT VFR BLD AUTO: 23.1 % (ref 36.6–50.3)
HCT VFR BLD AUTO: 23.6 % (ref 36.6–50.3)
HCT VFR BLD AUTO: 23.7 % (ref 36.6–50.3)
HCT VFR BLD AUTO: 23.7 % (ref 36.6–50.3)
HCT VFR BLD AUTO: 24.2 % (ref 36.6–50.3)
HCT VFR BLD AUTO: 25.5 % (ref 36.6–50.3)
HCT VFR BLD AUTO: 25.9 % (ref 36.6–50.3)
HCT VFR BLD AUTO: 26.3 % (ref 36.6–50.3)
HCT VFR BLD AUTO: 26.4 % (ref 36.6–50.3)
HCT VFR BLD AUTO: 27.3 % (ref 36.6–50.3)
HCT VFR BLD AUTO: 27.3 % (ref 36.6–50.3)
HCT VFR BLD AUTO: 27.4 % (ref 36.6–50.3)
HCT VFR BLD AUTO: 28.1 % (ref 36.6–50.3)
HCT VFR BLD AUTO: 28.6 % (ref 36.6–50.3)
HCT VFR BLD AUTO: 29.3 % (ref 36.6–50.3)
HCT VFR BLD AUTO: 31.3 % (ref 36.6–50.3)
HGB BLD OXIMETRY-MCNC: 8 G/DL (ref 14–17)
HGB BLD OXIMETRY-MCNC: 8 G/DL (ref 14–17)
HGB BLD OXIMETRY-MCNC: 8.9 G/DL (ref 14–17)
HGB BLD OXIMETRY-MCNC: 9 G/DL (ref 14–17)
HGB BLD OXIMETRY-MCNC: 9.4 G/DL (ref 14–17)
HGB BLD OXIMETRY-MCNC: 9.8 G/DL (ref 14–17)
HGB BLD-MCNC: 10 G/DL (ref 12.1–17)
HGB BLD-MCNC: 6.7 G/DL (ref 12.1–17)
HGB BLD-MCNC: 6.9 G/DL (ref 12.1–17)
HGB BLD-MCNC: 7 G/DL (ref 12.1–17)
HGB BLD-MCNC: 7.2 G/DL (ref 12.1–17)
HGB BLD-MCNC: 7.2 G/DL (ref 12.1–17)
HGB BLD-MCNC: 7.3 G/DL (ref 12.1–17)
HGB BLD-MCNC: 7.3 G/DL (ref 12.1–17)
HGB BLD-MCNC: 7.4 G/DL (ref 12.1–17)
HGB BLD-MCNC: 8 G/DL (ref 12.1–17)
HGB BLD-MCNC: 8.1 G/DL (ref 12.1–17)
HGB BLD-MCNC: 8.1 G/DL (ref 12.1–17)
HGB BLD-MCNC: 8.2 G/DL (ref 12.1–17)
HGB BLD-MCNC: 8.3 G/DL (ref 12.1–17)
HGB BLD-MCNC: 8.5 G/DL (ref 12.1–17)
HGB BLD-MCNC: 8.7 G/DL (ref 12.1–17)
HGB BLD-MCNC: 8.8 G/DL (ref 12.1–17)
HGB BLD-MCNC: 9 G/DL (ref 12.1–17)
HGB BLD-MCNC: 9.1 G/DL (ref 12.1–17)
HISTORY CHECKED?,CKHIST: NORMAL
HMPV RNA SPEC QL NAA+PROBE: NOT DETECTED
HPIV1 RNA SPEC QL NAA+PROBE: NOT DETECTED
HPIV2 RNA SPEC QL NAA+PROBE: NOT DETECTED
HPIV3 RNA SPEC QL NAA+PROBE: NOT DETECTED
HPIV4 RNA SPEC QL NAA+PROBE: NOT DETECTED
IMM GRANULOCYTES # BLD AUTO: 0 K/UL
IMM GRANULOCYTES # BLD AUTO: 0.2 K/UL (ref 0–0.04)
IMM GRANULOCYTES # BLD AUTO: 0.2 K/UL (ref 0–0.04)
IMM GRANULOCYTES # BLD AUTO: 0.3 K/UL (ref 0–0.04)
IMM GRANULOCYTES # BLD AUTO: 0.4 K/UL (ref 0–0.04)
IMM GRANULOCYTES # BLD AUTO: 0.6 K/UL (ref 0–0.04)
IMM GRANULOCYTES # BLD AUTO: 1 K/UL (ref 0–0.04)
IMM GRANULOCYTES # BLD AUTO: 2.1 K/UL (ref 0–0.04)
IMM GRANULOCYTES NFR BLD AUTO: 0 %
IMM GRANULOCYTES NFR BLD AUTO: 1 % (ref 0–0.5)
IMM GRANULOCYTES NFR BLD AUTO: 2 % (ref 0–0.5)
IMM GRANULOCYTES NFR BLD AUTO: 2 % (ref 0–0.5)
IMM GRANULOCYTES NFR BLD AUTO: 5 % (ref 0–0.5)
IMM GRANULOCYTES NFR BLD AUTO: 8 % (ref 0–0.5)
INSPIRATION.DURATION SETTING TIME VENT: 0.91 SEC
INSPIRATION.DURATION SETTING TIME VENT: 1.11 SEC
INSPIRATION.DURATION SETTING TIME VENT: 1.11 SEC
IRON SATN MFR SERPL: 36 % (ref 20–50)
IRON SERPL-MCNC: 59 UG/DL (ref 35–150)
LACTATE SERPL-SCNC: 2.6 MMOL/L (ref 0.4–2)
LACTATE SERPL-SCNC: 3.5 MMOL/L (ref 0.4–2)
LYMPHOCYTES # BLD: 0.9 K/UL (ref 0.8–3.5)
LYMPHOCYTES # BLD: 1 K/UL (ref 0.8–3.5)
LYMPHOCYTES # BLD: 1.2 K/UL (ref 0.8–3.5)
LYMPHOCYTES # BLD: 1.6 K/UL (ref 0.8–3.5)
LYMPHOCYTES # BLD: 1.6 K/UL (ref 0.8–3.5)
LYMPHOCYTES # BLD: 1.9 K/UL (ref 0.8–3.5)
LYMPHOCYTES # BLD: 2 K/UL (ref 0.8–3.5)
LYMPHOCYTES # BLD: 2 K/UL (ref 0.8–3.5)
LYMPHOCYTES # BLD: 2.1 K/UL (ref 0.8–3.5)
LYMPHOCYTES # BLD: 2.1 K/UL (ref 0.8–3.5)
LYMPHOCYTES # BLD: 2.2 K/UL (ref 0.8–3.5)
LYMPHOCYTES # BLD: 2.2 K/UL (ref 0.8–3.5)
LYMPHOCYTES # BLD: 2.4 K/UL (ref 0.8–3.5)
LYMPHOCYTES # BLD: 2.7 K/UL (ref 0.8–3.5)
LYMPHOCYTES # BLD: 21.2 K/UL (ref 0.8–3.5)
LYMPHOCYTES # BLD: 3.7 K/UL (ref 0.8–3.5)
LYMPHOCYTES NFR BLD: 10 % (ref 12–49)
LYMPHOCYTES NFR BLD: 10 % (ref 12–49)
LYMPHOCYTES NFR BLD: 12 % (ref 12–49)
LYMPHOCYTES NFR BLD: 12 % (ref 12–49)
LYMPHOCYTES NFR BLD: 13 % (ref 12–49)
LYMPHOCYTES NFR BLD: 3 % (ref 12–49)
LYMPHOCYTES NFR BLD: 4 % (ref 12–49)
LYMPHOCYTES NFR BLD: 4 % (ref 12–49)
LYMPHOCYTES NFR BLD: 6 % (ref 12–49)
LYMPHOCYTES NFR BLD: 7 % (ref 12–49)
LYMPHOCYTES NFR BLD: 8 % (ref 12–49)
LYMPHOCYTES NFR BLD: 8 % (ref 12–49)
LYMPHOCYTES NFR BLD: 83 % (ref 12–49)
LYMPHOCYTES NFR BLD: 9 % (ref 12–49)
M PNEUMO DNA SPEC QL NAA+PROBE: NOT DETECTED
MAGNESIUM SERPL-MCNC: 1.8 MG/DL (ref 1.6–2.4)
MAGNESIUM SERPL-MCNC: 2 MG/DL (ref 1.6–2.4)
MAGNESIUM SERPL-MCNC: 2.1 MG/DL (ref 1.6–2.4)
MAGNESIUM SERPL-MCNC: 2.3 MG/DL (ref 1.6–2.4)
MAGNESIUM SERPL-MCNC: 2.3 MG/DL (ref 1.6–2.4)
MAGNESIUM SERPL-MCNC: 2.4 MG/DL (ref 1.6–2.4)
MAGNESIUM SERPL-MCNC: 2.5 MG/DL (ref 1.6–2.4)
MAGNESIUM SERPL-MCNC: 2.5 MG/DL (ref 1.6–2.4)
MAGNESIUM SERPL-MCNC: 2.6 MG/DL (ref 1.6–2.4)
MAGNESIUM SERPL-MCNC: 2.6 MG/DL (ref 1.6–2.4)
MAGNESIUM SERPL-MCNC: 2.7 MG/DL (ref 1.6–2.4)
MAGNESIUM SERPL-MCNC: 2.8 MG/DL (ref 1.6–2.4)
MAGNESIUM SERPL-MCNC: 2.8 MG/DL (ref 1.6–2.4)
MAGNESIUM SERPL-MCNC: 2.9 MG/DL (ref 1.6–2.4)
MAGNESIUM SERPL-MCNC: 3 MG/DL (ref 1.6–2.4)
MCH RBC QN AUTO: 28.4 PG (ref 26–34)
MCH RBC QN AUTO: 28.7 PG (ref 26–34)
MCH RBC QN AUTO: 29.2 PG (ref 26–34)
MCH RBC QN AUTO: 29.2 PG (ref 26–34)
MCH RBC QN AUTO: 29.3 PG (ref 26–34)
MCH RBC QN AUTO: 29.5 PG (ref 26–34)
MCH RBC QN AUTO: 29.6 PG (ref 26–34)
MCH RBC QN AUTO: 29.7 PG (ref 26–34)
MCH RBC QN AUTO: 29.8 PG (ref 26–34)
MCH RBC QN AUTO: 29.9 PG (ref 26–34)
MCH RBC QN AUTO: 30.3 PG (ref 26–34)
MCH RBC QN AUTO: 30.5 PG (ref 26–34)
MCH RBC QN AUTO: 30.5 PG (ref 26–34)
MCH RBC QN AUTO: 30.6 PG (ref 26–34)
MCHC RBC AUTO-ENTMCNC: 29.5 G/DL (ref 30–36.5)
MCHC RBC AUTO-ENTMCNC: 30.3 G/DL (ref 30–36.5)
MCHC RBC AUTO-ENTMCNC: 30.3 G/DL (ref 30–36.5)
MCHC RBC AUTO-ENTMCNC: 30.4 G/DL (ref 30–36.5)
MCHC RBC AUTO-ENTMCNC: 30.6 G/DL (ref 30–36.5)
MCHC RBC AUTO-ENTMCNC: 30.7 G/DL (ref 30–36.5)
MCHC RBC AUTO-ENTMCNC: 30.7 G/DL (ref 30–36.5)
MCHC RBC AUTO-ENTMCNC: 30.8 G/DL (ref 30–36.5)
MCHC RBC AUTO-ENTMCNC: 30.8 G/DL (ref 30–36.5)
MCHC RBC AUTO-ENTMCNC: 31.1 G/DL (ref 30–36.5)
MCHC RBC AUTO-ENTMCNC: 31.2 G/DL (ref 30–36.5)
MCHC RBC AUTO-ENTMCNC: 31.2 G/DL (ref 30–36.5)
MCHC RBC AUTO-ENTMCNC: 31.3 G/DL (ref 30–36.5)
MCHC RBC AUTO-ENTMCNC: 31.4 G/DL (ref 30–36.5)
MCHC RBC AUTO-ENTMCNC: 31.8 G/DL (ref 30–36.5)
MCHC RBC AUTO-ENTMCNC: 31.8 G/DL (ref 30–36.5)
MCHC RBC AUTO-ENTMCNC: 31.9 G/DL (ref 30–36.5)
MCHC RBC AUTO-ENTMCNC: 32.2 G/DL (ref 30–36.5)
MCV RBC AUTO: 103.3 FL (ref 80–99)
MCV RBC AUTO: 91.7 FL (ref 80–99)
MCV RBC AUTO: 92 FL (ref 80–99)
MCV RBC AUTO: 92.2 FL (ref 80–99)
MCV RBC AUTO: 92.2 FL (ref 80–99)
MCV RBC AUTO: 92.3 FL (ref 80–99)
MCV RBC AUTO: 92.3 FL (ref 80–99)
MCV RBC AUTO: 92.6 FL (ref 80–99)
MCV RBC AUTO: 93.3 FL (ref 80–99)
MCV RBC AUTO: 95.5 FL (ref 80–99)
MCV RBC AUTO: 96.6 FL (ref 80–99)
MCV RBC AUTO: 96.6 FL (ref 80–99)
MCV RBC AUTO: 96.8 FL (ref 80–99)
MCV RBC AUTO: 97.3 FL (ref 80–99)
MCV RBC AUTO: 97.7 FL (ref 80–99)
MCV RBC AUTO: 97.9 FL (ref 80–99)
MCV RBC AUTO: 98.7 FL (ref 80–99)
MCV RBC AUTO: 98.7 FL (ref 80–99)
METAMYELOCYTES NFR BLD MANUAL: 1 %
METAMYELOCYTES NFR BLD MANUAL: 2 %
METAMYELOCYTES NFR BLD MANUAL: 2 %
METAMYELOCYTES NFR BLD MANUAL: 3 %
METAMYELOCYTES NFR BLD MANUAL: 5 %
METHGB MFR BLD: 0.1 % (ref 0–1.4)
METHGB MFR BLD: 0.3 % (ref 0–1.4)
MONOCYTES # BLD: 0 K/UL (ref 0–1)
MONOCYTES # BLD: 0.3 K/UL (ref 0–1)
MONOCYTES # BLD: 0.9 K/UL (ref 0–1)
MONOCYTES # BLD: 1 K/UL (ref 0–1)
MONOCYTES # BLD: 1.2 K/UL (ref 0–1)
MONOCYTES # BLD: 1.4 K/UL (ref 0–1)
MONOCYTES # BLD: 1.6 K/UL (ref 0–1)
MONOCYTES # BLD: 1.8 K/UL (ref 0–1)
MONOCYTES # BLD: 1.9 K/UL (ref 0–1)
MONOCYTES # BLD: 2 K/UL (ref 0–1)
MONOCYTES # BLD: 2.1 K/UL (ref 0–1)
MONOCYTES # BLD: 2.2 K/UL (ref 0–1)
MONOCYTES # BLD: 2.8 K/UL (ref 0–1)
MONOCYTES # BLD: 2.8 K/UL (ref 0–1)
MONOCYTES # BLD: 3.3 K/UL (ref 0–1)
MONOCYTES # BLD: 4.1 K/UL (ref 0–1)
MONOCYTES NFR BLD: 0 % (ref 5–13)
MONOCYTES NFR BLD: 1 % (ref 5–13)
MONOCYTES NFR BLD: 11 % (ref 5–13)
MONOCYTES NFR BLD: 11 % (ref 5–13)
MONOCYTES NFR BLD: 12 % (ref 5–13)
MONOCYTES NFR BLD: 12 % (ref 5–13)
MONOCYTES NFR BLD: 13 % (ref 5–13)
MONOCYTES NFR BLD: 3 % (ref 5–13)
MONOCYTES NFR BLD: 3 % (ref 5–13)
MONOCYTES NFR BLD: 6 % (ref 5–13)
MONOCYTES NFR BLD: 6 % (ref 5–13)
MONOCYTES NFR BLD: 7 % (ref 5–13)
MONOCYTES NFR BLD: 7 % (ref 5–13)
MONOCYTES NFR BLD: 8 % (ref 5–13)
MONOCYTES NFR BLD: 9 % (ref 5–13)
MYELOCYTES NFR BLD MANUAL: 1 %
MYELOCYTES NFR BLD MANUAL: 1 %
MYELOCYTES NFR BLD MANUAL: 2 %
MYELOCYTES NFR BLD MANUAL: 3 %
MYELOCYTES NFR BLD MANUAL: 8 %
NEUTS BAND NFR BLD MANUAL: 1 % (ref 0–6)
NEUTS BAND NFR BLD MANUAL: 1 % (ref 0–6)
NEUTS BAND NFR BLD MANUAL: 3 % (ref 0–6)
NEUTS BAND NFR BLD MANUAL: 4 % (ref 0–6)
NEUTS BAND NFR BLD MANUAL: 8 % (ref 0–6)
NEUTS BAND NFR BLD MANUAL: 9 % (ref 0–6)
NEUTS SEG # BLD: 0.8 K/UL (ref 1.8–8)
NEUTS SEG # BLD: 11.7 K/UL (ref 1.8–8)
NEUTS SEG # BLD: 13.2 K/UL (ref 1.8–8)
NEUTS SEG # BLD: 13.5 K/UL (ref 1.8–8)
NEUTS SEG # BLD: 13.8 K/UL (ref 1.8–8)
NEUTS SEG # BLD: 16 K/UL (ref 1.8–8)
NEUTS SEG # BLD: 19.9 K/UL (ref 1.8–8)
NEUTS SEG # BLD: 20.1 K/UL (ref 1.8–8)
NEUTS SEG # BLD: 20.7 K/UL (ref 1.8–8)
NEUTS SEG # BLD: 20.9 K/UL (ref 1.8–8)
NEUTS SEG # BLD: 21.2 K/UL (ref 1.8–8)
NEUTS SEG # BLD: 23.6 K/UL (ref 1.8–8)
NEUTS SEG # BLD: 25.4 K/UL (ref 1.8–8)
NEUTS SEG # BLD: 25.7 K/UL (ref 1.8–8)
NEUTS SEG # BLD: 26 K/UL (ref 1.8–8)
NEUTS SEG # BLD: 27.4 K/UL (ref 1.8–8)
NEUTS SEG # BLD: 30.5 K/UL (ref 1.8–8)
NEUTS SEG # BLD: 31.8 K/UL (ref 1.8–8)
NEUTS SEG NFR BLD: 3 % (ref 32–75)
NEUTS SEG NFR BLD: 70 % (ref 32–75)
NEUTS SEG NFR BLD: 70 % (ref 32–75)
NEUTS SEG NFR BLD: 73 % (ref 32–75)
NEUTS SEG NFR BLD: 75 % (ref 32–75)
NEUTS SEG NFR BLD: 76 % (ref 32–75)
NEUTS SEG NFR BLD: 77 % (ref 32–75)
NEUTS SEG NFR BLD: 78 % (ref 32–75)
NEUTS SEG NFR BLD: 78 % (ref 32–75)
NEUTS SEG NFR BLD: 79 % (ref 32–75)
NEUTS SEG NFR BLD: 80 % (ref 32–75)
NEUTS SEG NFR BLD: 80 % (ref 32–75)
NEUTS SEG NFR BLD: 82 % (ref 32–75)
NEUTS SEG NFR BLD: 83 % (ref 32–75)
NEUTS SEG NFR BLD: 85 % (ref 32–75)
NEUTS SEG NFR BLD: 87 % (ref 32–75)
NEUTS SEG NFR BLD: 88 % (ref 32–75)
NEUTS SEG NFR BLD: 92 % (ref 32–75)
NRBC # BLD: 0 K/UL (ref 0–0.01)
NRBC # BLD: 0 K/UL (ref 0–0.01)
NRBC # BLD: 0.02 K/UL (ref 0–0.01)
NRBC # BLD: 0.02 K/UL (ref 0–0.01)
NRBC # BLD: 0.03 K/UL (ref 0–0.01)
NRBC # BLD: 0.04 K/UL (ref 0–0.01)
NRBC # BLD: 0.04 K/UL (ref 0–0.01)
NRBC # BLD: 0.07 K/UL (ref 0–0.01)
NRBC # BLD: 0.1 K/UL (ref 0–0.01)
NRBC # BLD: 0.12 K/UL (ref 0–0.01)
NRBC # BLD: 0.18 K/UL (ref 0–0.01)
NRBC # BLD: 0.19 K/UL (ref 0–0.01)
NRBC # BLD: 0.36 K/UL (ref 0–0.01)
NRBC # BLD: 0.49 K/UL (ref 0–0.01)
NRBC # BLD: 0.65 K/UL (ref 0–0.01)
NRBC # BLD: 0.7 K/UL (ref 0–0.01)
NRBC # BLD: 0.9 K/UL (ref 0–0.01)
NRBC # BLD: 1.04 K/UL (ref 0–0.01)
NRBC BLD-RTO: 0 PER 100 WBC
NRBC BLD-RTO: 0 PER 100 WBC
NRBC BLD-RTO: 0.1 PER 100 WBC
NRBC BLD-RTO: 0.4 PER 100 WBC
NRBC BLD-RTO: 0.4 PER 100 WBC
NRBC BLD-RTO: 0.5 PER 100 WBC
NRBC BLD-RTO: 0.6 PER 100 WBC
NRBC BLD-RTO: 0.6 PER 100 WBC
NRBC BLD-RTO: 1.1 PER 100 WBC
NRBC BLD-RTO: 1.8 PER 100 WBC
NRBC BLD-RTO: 2.6 PER 100 WBC
NRBC BLD-RTO: 2.7 PER 100 WBC
NRBC BLD-RTO: 3.8 PER 100 WBC
NRBC BLD-RTO: 3.9 PER 100 WBC
O2/TOTAL GAS SETTING VFR VENT: 70 %
O2/TOTAL GAS SETTING VFR VENT: 75 %
O2/TOTAL GAS SETTING VFR VENT: 80 %
O2/TOTAL GAS SETTING VFR VENT: 80 %
OXYHGB MFR BLD: 75.7 % (ref 94–97)
OXYHGB MFR BLD: 88.3 % (ref 94–97)
OXYHGB MFR BLD: 90.8 % (ref 94–97)
OXYHGB MFR BLD: 94.3 % (ref 94–97)
OXYHGB MFR BLD: 96.9 % (ref 94–97)
OXYHGB MFR BLD: 98.5 % (ref 94–97)
PAW @ MEAN EXP FLOW ON VENT: 23 CMH2O
PAW @ MEAN EXP FLOW ON VENT: 24 CMH2O
PCO2 BLD: 37.4 MMHG (ref 35–45)
PCO2 BLD: 38.3 MMHG (ref 35–45)
PCO2 BLD: 41.3 MMHG (ref 35–45)
PCO2 BLD: 44.2 MMHG (ref 35–45)
PCO2 BLD: 45.3 MMHG (ref 35–45)
PCO2 BLD: 50.2 MMHG (ref 35–45)
PCO2 BLD: 51.4 MMHG (ref 35–45)
PCO2 BLD: 61 MMHG (ref 35–45)
PCO2 BLDA: 42 MMHG (ref 35–45)
PCO2 BLDA: 42 MMHG (ref 35–45)
PCO2 BLDA: 46 MMHG (ref 35–45)
PCO2 BLDA: 47 MMHG (ref 35–45)
PCO2 BLDA: 48 MMHG (ref 35–45)
PCO2 BLDA: 48 MMHG (ref 35–45)
PCO2 BLDA: 52 MMHG (ref 35–45)
PCO2 BLDA: 53 MMHG (ref 35–45)
PCO2 BLDA: 54 MMHG (ref 35–45)
PCO2 BLDA: 55 MMHG (ref 35–45)
PCO2 BLDA: 58 MMHG (ref 35–45)
PCO2 BLDA: 59 MMHG (ref 35–45)
PCO2 BLDA: 62 MMHG (ref 35–45)
PCO2 BLDA: 69 MMHG (ref 35–45)
PEEP RESPIRATORY: 14 CMH2O
PEEP RESPIRATORY: 14 CM[H2O]
PH BLD: 7.31 [PH] (ref 7.35–7.45)
PH BLD: 7.35 [PH] (ref 7.35–7.45)
PH BLD: 7.35 [PH] (ref 7.35–7.45)
PH BLD: 7.36 [PH] (ref 7.35–7.45)
PH BLD: 7.36 [PH] (ref 7.35–7.45)
PH BLD: 7.37 [PH] (ref 7.35–7.45)
PH BLD: 7.41 [PH] (ref 7.35–7.45)
PH BLD: 7.44 [PH] (ref 7.35–7.45)
PH BLDA: 7.29 [PH] (ref 7.35–7.45)
PH BLDA: 7.29 [PH] (ref 7.35–7.45)
PH BLDA: 7.32 [PH] (ref 7.35–7.45)
PH BLDA: 7.32 [PH] (ref 7.35–7.45)
PH BLDA: 7.34 [PH] (ref 7.35–7.45)
PH BLDA: 7.34 [PH] (ref 7.35–7.45)
PH BLDA: 7.37 [PH] (ref 7.35–7.45)
PH BLDA: 7.39 [PH] (ref 7.35–7.45)
PH BLDA: 7.43 [PH] (ref 7.35–7.45)
PH BLDA: 7.48 [PH] (ref 7.35–7.45)
PH BLDA: 7.49 [PH] (ref 7.35–7.45)
PH BLDA: 7.49 [PH] (ref 7.35–7.45)
PH BLDA: 7.51 [PH] (ref 7.35–7.45)
PH BLDA: 7.57 [PH] (ref 7.35–7.45)
PHOSPHATE SERPL-MCNC: 2.4 MG/DL (ref 2.6–4.7)
PHOSPHATE SERPL-MCNC: 2.6 MG/DL (ref 2.6–4.7)
PHOSPHATE SERPL-MCNC: 2.7 MG/DL (ref 2.6–4.7)
PHOSPHATE SERPL-MCNC: 2.8 MG/DL (ref 2.6–4.7)
PHOSPHATE SERPL-MCNC: 3 MG/DL (ref 2.6–4.7)
PHOSPHATE SERPL-MCNC: 3 MG/DL (ref 2.6–4.7)
PHOSPHATE SERPL-MCNC: 3.1 MG/DL (ref 2.6–4.7)
PHOSPHATE SERPL-MCNC: 3.4 MG/DL (ref 2.6–4.7)
PHOSPHATE SERPL-MCNC: 3.5 MG/DL (ref 2.6–4.7)
PHOSPHATE SERPL-MCNC: 3.7 MG/DL (ref 2.6–4.7)
PHOSPHATE SERPL-MCNC: 3.7 MG/DL (ref 2.6–4.7)
PHOSPHATE SERPL-MCNC: 3.8 MG/DL (ref 2.6–4.7)
PHOSPHATE SERPL-MCNC: 4 MG/DL (ref 2.6–4.7)
PHOSPHATE SERPL-MCNC: 4 MG/DL (ref 2.6–4.7)
PHOSPHATE SERPL-MCNC: 4.1 MG/DL (ref 2.6–4.7)
PHOSPHATE SERPL-MCNC: 4.5 MG/DL (ref 2.6–4.7)
PHOSPHATE SERPL-MCNC: 4.8 MG/DL (ref 2.6–4.7)
PHOSPHATE SERPL-MCNC: 4.8 MG/DL (ref 2.6–4.7)
PHOSPHATE SERPL-MCNC: 5.1 MG/DL (ref 2.6–4.7)
PHOSPHATE SERPL-MCNC: 5.3 MG/DL (ref 2.6–4.7)
PHOSPHATE SERPL-MCNC: 5.8 MG/DL (ref 2.6–4.7)
PIP ISTAT,IPIP: 24
PIP ISTAT,IPIP: 24
PIP ISTAT,IPIP: 26
PIP ISTAT,IPIP: 41
PLATELET # BLD AUTO: 280 K/UL (ref 150–400)
PLATELET # BLD AUTO: 299 K/UL (ref 150–400)
PLATELET # BLD AUTO: 316 K/UL (ref 150–400)
PLATELET # BLD AUTO: 319 K/UL (ref 150–400)
PLATELET # BLD AUTO: 326 K/UL (ref 150–400)
PLATELET # BLD AUTO: 342 K/UL (ref 150–400)
PLATELET # BLD AUTO: 347 K/UL (ref 150–400)
PLATELET # BLD AUTO: 386 K/UL (ref 150–400)
PLATELET # BLD AUTO: 389 K/UL (ref 150–400)
PLATELET # BLD AUTO: 391 K/UL (ref 150–400)
PLATELET # BLD AUTO: 421 K/UL (ref 150–400)
PLATELET # BLD AUTO: 486 K/UL (ref 150–400)
PLATELET # BLD AUTO: 509 K/UL (ref 150–400)
PLATELET # BLD AUTO: 513 K/UL (ref 150–400)
PLATELET # BLD AUTO: 545 K/UL (ref 150–400)
PLATELET # BLD AUTO: 551 K/UL (ref 150–400)
PLATELET # BLD AUTO: 621 K/UL (ref 150–400)
PLATELET # BLD AUTO: 674 K/UL (ref 150–400)
PLATELET COMMENTS,PCOM: ABNORMAL
PMV BLD AUTO: 8.2 FL (ref 8.9–12.9)
PMV BLD AUTO: 8.2 FL (ref 8.9–12.9)
PMV BLD AUTO: 8.5 FL (ref 8.9–12.9)
PMV BLD AUTO: 8.5 FL (ref 8.9–12.9)
PMV BLD AUTO: 8.7 FL (ref 8.9–12.9)
PMV BLD AUTO: 8.8 FL (ref 8.9–12.9)
PMV BLD AUTO: 9.2 FL (ref 8.9–12.9)
PMV BLD AUTO: 9.3 FL (ref 8.9–12.9)
PMV BLD AUTO: 9.4 FL (ref 8.9–12.9)
PMV BLD AUTO: 9.6 FL (ref 8.9–12.9)
PO2 BLD: 107 MMHG (ref 80–100)
PO2 BLD: 116 MMHG (ref 80–100)
PO2 BLD: 66 MMHG (ref 80–100)
PO2 BLD: 67 MMHG (ref 80–100)
PO2 BLD: 72 MMHG (ref 80–100)
PO2 BLD: 76 MMHG (ref 80–100)
PO2 BLD: 83 MMHG (ref 80–100)
PO2 BLD: 85 MMHG (ref 80–100)
PO2 BLDA: 104 MMHG (ref 80–100)
PO2 BLDA: 114 MMHG (ref 80–100)
PO2 BLDA: 131 MMHG (ref 80–100)
PO2 BLDA: 138 MMHG (ref 80–100)
PO2 BLDA: 158 MMHG (ref 80–100)
PO2 BLDA: 194 MMHG (ref 80–100)
PO2 BLDA: 264 MMHG (ref 80–100)
PO2 BLDA: 45 MMHG (ref 80–100)
PO2 BLDA: 52 MMHG (ref 80–100)
PO2 BLDA: 59 MMHG (ref 80–100)
PO2 BLDA: 60 MMHG (ref 80–100)
PO2 BLDA: 71 MMHG (ref 80–100)
PO2 BLDA: 79 MMHG (ref 80–100)
PO2 BLDA: 82 MMHG (ref 80–100)
POTASSIUM SERPL-SCNC: 2.7 MMOL/L (ref 3.5–5.1)
POTASSIUM SERPL-SCNC: 3.2 MMOL/L (ref 3.5–5.1)
POTASSIUM SERPL-SCNC: 3.3 MMOL/L (ref 3.5–5.1)
POTASSIUM SERPL-SCNC: 3.4 MMOL/L (ref 3.5–5.1)
POTASSIUM SERPL-SCNC: 3.5 MMOL/L (ref 3.5–5.1)
POTASSIUM SERPL-SCNC: 3.6 MMOL/L (ref 3.5–5.1)
POTASSIUM SERPL-SCNC: 3.8 MMOL/L (ref 3.5–5.1)
POTASSIUM SERPL-SCNC: 3.9 MMOL/L (ref 3.5–5.1)
POTASSIUM SERPL-SCNC: 4 MMOL/L (ref 3.5–5.1)
POTASSIUM SERPL-SCNC: 4.1 MMOL/L (ref 3.5–5.1)
POTASSIUM SERPL-SCNC: 4.2 MMOL/L (ref 3.5–5.1)
POTASSIUM SERPL-SCNC: 4.6 MMOL/L (ref 3.5–5.1)
POTASSIUM SERPL-SCNC: 4.6 MMOL/L (ref 3.5–5.1)
POTASSIUM SERPL-SCNC: 4.8 MMOL/L (ref 3.5–5.1)
POTASSIUM SERPL-SCNC: 6.1 MMOL/L (ref 3.5–5.1)
PROCALCITONIN SERPL-MCNC: 1 NG/ML
PROCALCITONIN SERPL-MCNC: 1.52 NG/ML
PROCALCITONIN SERPL-MCNC: 1.61 NG/ML
PROCALCITONIN SERPL-MCNC: 2.9 NG/ML
PROT SERPL-MCNC: 4.9 G/DL (ref 6.4–8.2)
PROT SERPL-MCNC: 5.7 G/DL (ref 6.4–8.2)
PROT SERPL-MCNC: 6 G/DL (ref 6.4–8.2)
PROT SERPL-MCNC: 6 G/DL (ref 6.4–8.2)
PROT SERPL-MCNC: 6.2 G/DL (ref 6.4–8.2)
PROT SERPL-MCNC: 6.3 G/DL (ref 6.4–8.2)
PROT SERPL-MCNC: 6.3 G/DL (ref 6.4–8.2)
PROT SERPL-MCNC: 6.4 G/DL (ref 6.4–8.2)
PROT SERPL-MCNC: 6.4 G/DL (ref 6.4–8.2)
PROT SERPL-MCNC: 6.5 G/DL (ref 6.4–8.2)
PROT SERPL-MCNC: 6.6 G/DL (ref 6.4–8.2)
PROT SERPL-MCNC: 6.7 G/DL (ref 6.4–8.2)
PROT SERPL-MCNC: 6.8 G/DL (ref 6.4–8.2)
PROT SERPL-MCNC: 6.9 G/DL (ref 6.4–8.2)
PROT SERPL-MCNC: 6.9 G/DL (ref 6.4–8.2)
PROT SERPL-MCNC: 7.1 G/DL (ref 6.4–8.2)
PROT SERPL-MCNC: 7.3 G/DL (ref 6.4–8.2)
PROT SERPL-MCNC: 7.4 G/DL (ref 6.4–8.2)
PROT SERPL-MCNC: 7.5 G/DL (ref 6.4–8.2)
Q-T INTERVAL, ECG07: 412 MS
QRS DURATION, ECG06: 84 MS
QTC CALCULATION (BEZET), ECG08: 504 MS
RBC # BLD AUTO: 2.24 M/UL (ref 4.1–5.7)
RBC # BLD AUTO: 2.35 M/UL (ref 4.1–5.7)
RBC # BLD AUTO: 2.36 M/UL (ref 4.1–5.7)
RBC # BLD AUTO: 2.42 M/UL (ref 4.1–5.7)
RBC # BLD AUTO: 2.42 M/UL (ref 4.1–5.7)
RBC # BLD AUTO: 2.5 M/UL (ref 4.1–5.7)
RBC # BLD AUTO: 2.54 M/UL (ref 4.1–5.7)
RBC # BLD AUTO: 2.62 M/UL (ref 4.1–5.7)
RBC # BLD AUTO: 2.65 M/UL (ref 4.1–5.7)
RBC # BLD AUTO: 2.72 M/UL (ref 4.1–5.7)
RBC # BLD AUTO: 2.85 M/UL (ref 4.1–5.7)
RBC # BLD AUTO: 2.86 M/UL (ref 4.1–5.7)
RBC # BLD AUTO: 2.96 M/UL (ref 4.1–5.7)
RBC # BLD AUTO: 2.96 M/UL (ref 4.1–5.7)
RBC # BLD AUTO: 2.97 M/UL (ref 4.1–5.7)
RBC # BLD AUTO: 2.97 M/UL (ref 4.1–5.7)
RBC # BLD AUTO: 3.12 M/UL (ref 4.1–5.7)
RBC # BLD AUTO: 3.39 M/UL (ref 4.1–5.7)
RBC MORPH BLD: ABNORMAL
RSV RNA SPEC QL NAA+PROBE: NOT DETECTED
RV+EV RNA SPEC QL NAA+PROBE: NOT DETECTED
SAO2 % BLD: 100 % (ref 92–97)
SAO2 % BLD: 100 % (ref 95–99)
SAO2 % BLD: 78 % (ref 95–99)
SAO2 % BLD: 90 % (ref 95–99)
SAO2 % BLD: 91 % (ref 92–97)
SAO2 % BLD: 92 % (ref 95–99)
SAO2 % BLD: 92.2 % (ref 92–97)
SAO2 % BLD: 93.2 % (ref 92–97)
SAO2 % BLD: 93.3 % (ref 92–97)
SAO2 % BLD: 94 % (ref 92–97)
SAO2 % BLD: 94.1 % (ref 92–97)
SAO2 % BLD: 95 % (ref 92–97)
SAO2 % BLD: 95.5 % (ref 92–97)
SAO2 % BLD: 96 % (ref 95–99)
SAO2 % BLD: 96.1 % (ref 92–97)
SAO2 % BLD: 97 % (ref 92–97)
SAO2 % BLD: 98 % (ref 92–97)
SAO2 % BLD: 98 % (ref 92–97)
SAO2 % BLD: 98.1 % (ref 92–97)
SAO2 % BLD: 99 % (ref 92–97)
SAO2 % BLD: 99 % (ref 92–97)
SAO2 % BLD: 99 % (ref 95–99)
SAO2% DEVICE SAO2% SENSOR NAME: ABNORMAL
SARS-COV-2 PCR, COVPCR: DETECTED
SARS-COV-2, COV2: NORMAL
SARS-COV-2, XPLCVT: DETECTED
SERVICE CMNT-IMP: ABNORMAL
SERVICE CMNT-IMP: NORMAL
SODIUM SERPL-SCNC: 133 MMOL/L (ref 136–145)
SODIUM SERPL-SCNC: 133 MMOL/L (ref 136–145)
SODIUM SERPL-SCNC: 134 MMOL/L (ref 136–145)
SODIUM SERPL-SCNC: 135 MMOL/L (ref 136–145)
SODIUM SERPL-SCNC: 135 MMOL/L (ref 136–145)
SODIUM SERPL-SCNC: 136 MMOL/L (ref 136–145)
SODIUM SERPL-SCNC: 137 MMOL/L (ref 136–145)
SODIUM SERPL-SCNC: 139 MMOL/L (ref 136–145)
SODIUM SERPL-SCNC: 139 MMOL/L (ref 136–145)
SODIUM SERPL-SCNC: 140 MMOL/L (ref 136–145)
SODIUM SERPL-SCNC: 140 MMOL/L (ref 136–145)
SODIUM SERPL-SCNC: 142 MMOL/L (ref 136–145)
SODIUM SERPL-SCNC: 142 MMOL/L (ref 136–145)
SODIUM SERPL-SCNC: 143 MMOL/L (ref 136–145)
SODIUM SERPL-SCNC: 144 MMOL/L (ref 136–145)
SOURCE, COVRS: ABNORMAL
SPECIMEN EXP DATE BLD: NORMAL
SPECIMEN SITE: ABNORMAL
SPECIMEN TYPE: ABNORMAL
SPECIMEN TYPE: NORMAL
STATUS OF UNIT,%ST: NORMAL
TIBC SERPL-MCNC: 165 UG/DL (ref 250–450)
TRIGL SERPL-MCNC: 241 MG/DL (ref ?–150)
TRIGL SERPL-MCNC: 256 MG/DL (ref ?–150)
TRIGL SERPL-MCNC: 271 MG/DL (ref ?–150)
TRIGL SERPL-MCNC: 281 MG/DL (ref ?–150)
TRIGL SERPL-MCNC: 378 MG/DL (ref ?–150)
TRIGL SERPL-MCNC: 876 MG/DL (ref ?–150)
UNIT DIVISION, %UDIV: 0
VANCOMYCIN SERPL-MCNC: 23.5 UG/ML
VANCOMYCIN SERPL-MCNC: 26.5 UG/ML
VENTILATION MODE VENT: ABNORMAL
VENTILATION MODE VENT: NORMAL
VENTRICULAR RATE, ECG03: 90 BPM
VIT B12 SERPL-MCNC: 1803 PG/ML (ref 193–986)
VT SETTING VENT: 497 ML
WBC # BLD AUTO: 15.9 K/UL (ref 4.1–11.1)
WBC # BLD AUTO: 16.6 K/UL (ref 4.1–11.1)
WBC # BLD AUTO: 17.7 K/UL (ref 4.1–11.1)
WBC # BLD AUTO: 18.5 K/UL (ref 4.1–11.1)
WBC # BLD AUTO: 20.2 K/UL (ref 4.1–11.1)
WBC # BLD AUTO: 21.8 K/UL (ref 4.1–11.1)
WBC # BLD AUTO: 23.9 K/UL (ref 4.1–11.1)
WBC # BLD AUTO: 24.8 K/UL (ref 4.1–11.1)
WBC # BLD AUTO: 25.6 K/UL (ref 4.1–11.1)
WBC # BLD AUTO: 26.8 K/UL (ref 4.1–11.1)
WBC # BLD AUTO: 26.8 K/UL (ref 4.1–11.1)
WBC # BLD AUTO: 29.6 K/UL (ref 4.1–11.1)
WBC # BLD AUTO: 29.9 K/UL (ref 4.1–11.1)
WBC # BLD AUTO: 31.3 K/UL (ref 4.1–11.1)
WBC # BLD AUTO: 31.4 K/UL (ref 4.1–11.1)
WBC # BLD AUTO: 31.4 K/UL (ref 4.1–11.1)
WBC # BLD AUTO: 33.5 K/UL (ref 4.1–11.1)
WBC # BLD AUTO: 40.8 K/UL (ref 4.1–11.1)
WBC MORPH BLD: ABNORMAL

## 2022-01-01 PROCEDURE — 74011250636 HC RX REV CODE- 250/636: Performed by: NURSE PRACTITIONER

## 2022-01-01 PROCEDURE — 74011000258 HC RX REV CODE- 258: Performed by: NURSE PRACTITIONER

## 2022-01-01 PROCEDURE — 74011000250 HC RX REV CODE- 250: Performed by: NURSE PRACTITIONER

## 2022-01-01 PROCEDURE — 80053 COMPREHEN METABOLIC PANEL: CPT

## 2022-01-01 PROCEDURE — 36430 TRANSFUSION BLD/BLD COMPNT: CPT

## 2022-01-01 PROCEDURE — 74011250637 HC RX REV CODE- 250/637: Performed by: NURSE PRACTITIONER

## 2022-01-01 PROCEDURE — 36415 COLL VENOUS BLD VENIPUNCTURE: CPT

## 2022-01-01 PROCEDURE — 36600 WITHDRAWAL OF ARTERIAL BLOOD: CPT

## 2022-01-01 PROCEDURE — 74011636637 HC RX REV CODE- 636/637: Performed by: NURSE PRACTITIONER

## 2022-01-01 PROCEDURE — 85025 COMPLETE CBC W/AUTO DIFF WBC: CPT

## 2022-01-01 PROCEDURE — 65610000006 HC RM INTENSIVE CARE

## 2022-01-01 PROCEDURE — 73610000026 HC INO THERAPY EACH HOUR

## 2022-01-01 PROCEDURE — 82803 BLOOD GASES ANY COMBINATION: CPT

## 2022-01-01 PROCEDURE — 94003 VENT MGMT INPAT SUBQ DAY: CPT

## 2022-01-01 PROCEDURE — 84145 PROCALCITONIN (PCT): CPT

## 2022-01-01 PROCEDURE — U0005 INFEC AGEN DETEC AMPLI PROBE: HCPCS

## 2022-01-01 PROCEDURE — 74018 RADEX ABDOMEN 1 VIEW: CPT

## 2022-01-01 PROCEDURE — 82962 GLUCOSE BLOOD TEST: CPT

## 2022-01-01 PROCEDURE — P9047 ALBUMIN (HUMAN), 25%, 50ML: HCPCS | Performed by: NURSE PRACTITIONER

## 2022-01-01 PROCEDURE — 83735 ASSAY OF MAGNESIUM: CPT

## 2022-01-01 PROCEDURE — 84100 ASSAY OF PHOSPHORUS: CPT

## 2022-01-01 PROCEDURE — 74011000250 HC RX REV CODE- 250

## 2022-01-01 PROCEDURE — 73610000005 HC INO THERAPY INITIAL

## 2022-01-01 PROCEDURE — 74011250636 HC RX REV CODE- 250/636: Performed by: ANESTHESIOLOGY

## 2022-01-01 PROCEDURE — 83605 ASSAY OF LACTIC ACID: CPT

## 2022-01-01 PROCEDURE — 87077 CULTURE AEROBIC IDENTIFY: CPT

## 2022-01-01 PROCEDURE — 74011250636 HC RX REV CODE- 250/636

## 2022-01-01 PROCEDURE — P9016 RBC LEUKOCYTES REDUCED: HCPCS

## 2022-01-01 PROCEDURE — 86923 COMPATIBILITY TEST ELECTRIC: CPT

## 2022-01-01 PROCEDURE — 87186 SC STD MICRODIL/AGAR DIL: CPT

## 2022-01-01 PROCEDURE — 87040 BLOOD CULTURE FOR BACTERIA: CPT

## 2022-01-01 PROCEDURE — 77030018798 HC PMP KT ENTRL FED COVD -A

## 2022-01-01 PROCEDURE — 84478 ASSAY OF TRIGLYCERIDES: CPT

## 2022-01-01 PROCEDURE — 71045 X-RAY EXAM CHEST 1 VIEW: CPT

## 2022-01-01 PROCEDURE — 74011250637 HC RX REV CODE- 250/637: Performed by: EMERGENCY MEDICINE

## 2022-01-01 PROCEDURE — 74011000250 HC RX REV CODE- 250: Performed by: EMERGENCY MEDICINE

## 2022-01-01 PROCEDURE — 80202 ASSAY OF VANCOMYCIN: CPT

## 2022-01-01 PROCEDURE — 74011250636 HC RX REV CODE- 250/636: Performed by: INTERNAL MEDICINE

## 2022-01-01 PROCEDURE — 0202U NFCT DS 22 TRGT SARS-COV-2: CPT

## 2022-01-01 PROCEDURE — 99223 1ST HOSP IP/OBS HIGH 75: CPT | Performed by: INTERNAL MEDICINE

## 2022-01-01 PROCEDURE — 82607 VITAMIN B-12: CPT

## 2022-01-01 PROCEDURE — 87070 CULTURE OTHR SPECIMN AEROBIC: CPT

## 2022-01-01 PROCEDURE — 83540 ASSAY OF IRON: CPT

## 2022-01-01 PROCEDURE — 94762 N-INVAS EAR/PLS OXIMTRY CONT: CPT

## 2022-01-01 PROCEDURE — 85018 HEMOGLOBIN: CPT

## 2022-01-01 PROCEDURE — 86900 BLOOD TYPING SEROLOGIC ABO: CPT

## 2022-01-01 PROCEDURE — 82785 ASSAY OF IGE: CPT

## 2022-01-01 PROCEDURE — 83520 IMMUNOASSAY QUANT NOS NONAB: CPT

## 2022-01-01 PROCEDURE — 93005 ELECTROCARDIOGRAM TRACING: CPT

## 2022-01-01 PROCEDURE — 82306 VITAMIN D 25 HYDROXY: CPT

## 2022-01-01 RX ORDER — HYDROMORPHONE HYDROCHLORIDE 2 MG/ML
5 INJECTION, SOLUTION INTRAMUSCULAR; INTRAVENOUS; SUBCUTANEOUS ONCE
Status: COMPLETED | OUTPATIENT
Start: 2022-01-01 | End: 2022-01-01

## 2022-01-01 RX ORDER — ATROPINE SULFATE 0.1 MG/ML
INJECTION INTRAVENOUS
Status: COMPLETED
Start: 2022-01-01 | End: 2022-01-01

## 2022-01-01 RX ORDER — ROCURONIUM BROMIDE 10 MG/ML
50 INJECTION, SOLUTION INTRAVENOUS ONCE
Status: COMPLETED | OUTPATIENT
Start: 2022-01-01 | End: 2022-01-01

## 2022-01-01 RX ORDER — FUROSEMIDE 10 MG/ML
40 INJECTION INTRAMUSCULAR; INTRAVENOUS ONCE
Status: COMPLETED | OUTPATIENT
Start: 2022-01-01 | End: 2022-01-01

## 2022-01-01 RX ORDER — NOREPINEPHRINE BITARTRATE/D5W 8 MG/250ML
.5-16 PLASTIC BAG, INJECTION (ML) INTRAVENOUS
Status: DISCONTINUED | OUTPATIENT
Start: 2022-01-01 | End: 2022-01-01

## 2022-01-01 RX ORDER — PHENYLEPHRINE HCL IN 0.9% NACL 0.4MG/10ML
400 SYRINGE (ML) INTRAVENOUS ONCE
Status: COMPLETED | OUTPATIENT
Start: 2022-01-01 | End: 2022-01-01

## 2022-01-01 RX ORDER — HYDROMORPHONE HYDROCHLORIDE 2 MG/ML
4 INJECTION, SOLUTION INTRAMUSCULAR; INTRAVENOUS; SUBCUTANEOUS ONCE
Status: COMPLETED | OUTPATIENT
Start: 2022-01-01 | End: 2022-01-01

## 2022-01-01 RX ORDER — ROCURONIUM BROMIDE 10 MG/ML
50 INJECTION, SOLUTION INTRAVENOUS
Status: COMPLETED | OUTPATIENT
Start: 2022-01-01 | End: 2022-01-01

## 2022-01-01 RX ORDER — FUROSEMIDE 10 MG/ML
80 INJECTION INTRAMUSCULAR; INTRAVENOUS ONCE
Status: COMPLETED | OUTPATIENT
Start: 2022-01-01 | End: 2022-01-01

## 2022-01-01 RX ORDER — METOPROLOL TARTRATE 5 MG/5ML
5 INJECTION INTRAVENOUS ONCE
Status: COMPLETED | OUTPATIENT
Start: 2022-01-01 | End: 2022-01-01

## 2022-01-01 RX ORDER — PHENYLEPHRINE HCL IN 0.9% NACL 0.4MG/10ML
SYRINGE (ML) INTRAVENOUS
Status: DISPENSED
Start: 2022-01-01 | End: 2022-01-01

## 2022-01-01 RX ORDER — METOPROLOL TARTRATE 5 MG/5ML
INJECTION INTRAVENOUS
Status: DISPENSED
Start: 2022-01-01 | End: 2022-01-01

## 2022-01-01 RX ORDER — INSULIN LISPRO 100 [IU]/ML
INJECTION, SOLUTION INTRAVENOUS; SUBCUTANEOUS EVERY 4 HOURS
Status: DISCONTINUED | OUTPATIENT
Start: 2022-01-01 | End: 2022-01-01 | Stop reason: HOSPADM

## 2022-01-01 RX ORDER — EPINEPHRINE 0.1 MG/ML
INJECTION INTRACARDIAC; INTRAVENOUS
Status: DISPENSED
Start: 2022-01-01 | End: 2022-01-01

## 2022-01-01 RX ORDER — DEXTROSE 50 % IN WATER (D50W) INTRAVENOUS SYRINGE
25 ONCE
Status: COMPLETED | OUTPATIENT
Start: 2022-01-01 | End: 2022-01-01

## 2022-01-01 RX ORDER — DOPAMINE HYDROCHLORIDE 320 MG/100ML
INJECTION, SOLUTION INTRAVENOUS
Status: DISPENSED
Start: 2022-01-01 | End: 2022-01-01

## 2022-01-01 RX ORDER — CALCIUM GLUCONATE 20 MG/ML
1 INJECTION, SOLUTION INTRAVENOUS ONCE
Status: COMPLETED | OUTPATIENT
Start: 2022-01-01 | End: 2022-01-01

## 2022-01-01 RX ORDER — EPINEPHRINE 0.1 MG/ML
1 INJECTION INTRACARDIAC; INTRAVENOUS ONCE
Status: COMPLETED | OUTPATIENT
Start: 2022-01-01 | End: 2022-01-01

## 2022-01-01 RX ORDER — MAGNESIUM SULFATE 1 G/100ML
1 INJECTION INTRAVENOUS ONCE
Status: COMPLETED | OUTPATIENT
Start: 2022-01-01 | End: 2022-01-01

## 2022-01-01 RX ORDER — PHENYLEPHRINE HCL IN 0.9% NACL 0.4MG/10ML
SYRINGE (ML) INTRAVENOUS
Status: COMPLETED
Start: 2022-01-01 | End: 2022-01-01

## 2022-01-01 RX ORDER — DIPHENHYDRAMINE HYDROCHLORIDE 50 MG/ML
50 INJECTION, SOLUTION INTRAMUSCULAR; INTRAVENOUS ONCE
Status: COMPLETED | OUTPATIENT
Start: 2022-01-01 | End: 2022-01-01

## 2022-01-01 RX ORDER — INSULIN LISPRO 100 [IU]/ML
15 INJECTION, SOLUTION INTRAVENOUS; SUBCUTANEOUS ONCE
Status: COMPLETED | OUTPATIENT
Start: 2022-01-01 | End: 2022-01-01

## 2022-01-01 RX ORDER — MIDAZOLAM HYDROCHLORIDE 1 MG/ML
10 INJECTION INTRAMUSCULAR; INTRAVENOUS ONCE
Status: DISCONTINUED | OUTPATIENT
Start: 2022-01-01 | End: 2022-01-01

## 2022-01-01 RX ORDER — METOLAZONE 5 MG/1
10 TABLET ORAL ONCE
Status: COMPLETED | OUTPATIENT
Start: 2022-01-01 | End: 2022-01-01

## 2022-01-01 RX ORDER — EPINEPHRINE 0.1 MG/ML
INJECTION INTRACARDIAC; INTRAVENOUS
Status: COMPLETED
Start: 2022-01-01 | End: 2022-01-01

## 2022-01-01 RX ORDER — HYDROMORPHONE HCL/0.9% NACL/PF 30 MG/30ML
0-10 PATIENT CONTROLLED ANALGESIA SYRINGE INTRAVENOUS
Status: DISCONTINUED | OUTPATIENT
Start: 2022-01-01 | End: 2022-01-01 | Stop reason: HOSPADM

## 2022-01-01 RX ORDER — POTASSIUM CHLORIDE 29.8 MG/ML
20 INJECTION INTRAVENOUS
Status: COMPLETED | OUTPATIENT
Start: 2022-01-01 | End: 2022-01-01

## 2022-01-01 RX ORDER — AMIODARONE HYDROCHLORIDE 150 MG/3ML
150 INJECTION, SOLUTION INTRAVENOUS ONCE
Status: DISCONTINUED | OUTPATIENT
Start: 2022-01-01 | End: 2022-01-01

## 2022-01-01 RX ORDER — ETOMIDATE 2 MG/ML
INJECTION INTRAVENOUS
Status: COMPLETED
Start: 2022-01-01 | End: 2022-01-01

## 2022-01-01 RX ORDER — DEXTROSE 50 % IN WATER (D50W) INTRAVENOUS SYRINGE
12.5-25 AS NEEDED
Status: DISCONTINUED | OUTPATIENT
Start: 2022-01-01 | End: 2022-01-01 | Stop reason: HOSPADM

## 2022-01-01 RX ORDER — DILTIAZEM HYDROCHLORIDE 5 MG/ML
5 INJECTION INTRAVENOUS ONCE
Status: DISCONTINUED | OUTPATIENT
Start: 2022-01-01 | End: 2022-01-01

## 2022-01-01 RX ORDER — PROPOFOL 10 MG/ML
INJECTION, EMULSION INTRAVENOUS
Status: COMPLETED
Start: 2022-01-01 | End: 2022-01-01

## 2022-01-01 RX ORDER — MIDAZOLAM HYDROCHLORIDE 1 MG/ML
10 INJECTION, SOLUTION INTRAMUSCULAR; INTRAVENOUS ONCE
Status: DISCONTINUED | OUTPATIENT
Start: 2022-01-01 | End: 2022-01-01 | Stop reason: SDUPTHER

## 2022-01-01 RX ORDER — ATROPINE SULFATE 0.4 MG/ML
0.4 INJECTION, SOLUTION ENDOTRACHEAL; INTRAMEDULLARY; INTRAMUSCULAR; INTRAVENOUS; SUBCUTANEOUS ONCE
Status: DISCONTINUED | OUTPATIENT
Start: 2022-01-01 | End: 2022-01-01 | Stop reason: SDUPTHER

## 2022-01-01 RX ORDER — GUAIFENESIN 100 MG/5ML
100 SOLUTION ORAL EVERY 8 HOURS
Status: DISCONTINUED | OUTPATIENT
Start: 2022-01-01 | End: 2022-01-01

## 2022-01-01 RX ORDER — ATROPINE SULFATE 0.1 MG/ML
0.4 INJECTION INTRAVENOUS ONCE
Status: COMPLETED | OUTPATIENT
Start: 2022-01-01 | End: 2022-01-01

## 2022-01-01 RX ORDER — PROPOFOL 10 MG/ML
100 INJECTION, EMULSION INTRAVENOUS
Status: COMPLETED | OUTPATIENT
Start: 2022-01-01 | End: 2022-01-01

## 2022-01-01 RX ORDER — BUMETANIDE 0.25 MG/ML
2 INJECTION INTRAMUSCULAR; INTRAVENOUS ONCE
Status: COMPLETED | OUTPATIENT
Start: 2022-01-01 | End: 2022-01-01

## 2022-01-01 RX ORDER — ALBUMIN HUMAN 250 G/1000ML
25 SOLUTION INTRAVENOUS EVERY 6 HOURS
Status: DISCONTINUED | OUTPATIENT
Start: 2022-01-01 | End: 2022-01-01

## 2022-01-01 RX ORDER — SODIUM CHLORIDE 9 MG/ML
250 INJECTION, SOLUTION INTRAVENOUS AS NEEDED
Status: DISCONTINUED | OUTPATIENT
Start: 2022-01-01 | End: 2022-01-01 | Stop reason: HOSPADM

## 2022-01-01 RX ORDER — SODIUM FERRIC GLUCONATE COMPLEX IN SUCROSE 12.5 MG/ML
125 INJECTION INTRAVENOUS ONCE
Status: COMPLETED | OUTPATIENT
Start: 2022-01-01 | End: 2022-01-01

## 2022-01-01 RX ORDER — ROCURONIUM BROMIDE 10 MG/ML
INJECTION, SOLUTION INTRAVENOUS
Status: COMPLETED
Start: 2022-01-01 | End: 2022-01-01

## 2022-01-01 RX ORDER — MELATONIN
6000 DAILY
Status: DISCONTINUED | OUTPATIENT
Start: 2022-01-01 | End: 2022-01-01 | Stop reason: HOSPADM

## 2022-01-01 RX ORDER — INSULIN LISPRO 100 [IU]/ML
20 INJECTION, SOLUTION INTRAVENOUS; SUBCUTANEOUS ONCE
Status: COMPLETED | OUTPATIENT
Start: 2022-01-01 | End: 2022-01-01

## 2022-01-01 RX ORDER — MELATONIN
4000 ONCE
Status: COMPLETED | OUTPATIENT
Start: 2022-01-01 | End: 2022-01-01

## 2022-01-01 RX ORDER — MIDAZOLAM HYDROCHLORIDE 1 MG/ML
10 INJECTION, SOLUTION INTRAMUSCULAR; INTRAVENOUS ONCE
Status: COMPLETED | OUTPATIENT
Start: 2022-01-01 | End: 2022-01-01

## 2022-01-01 RX ORDER — DEXMEDETOMIDINE HYDROCHLORIDE 4 UG/ML
.1-1.5 INJECTION, SOLUTION INTRAVENOUS
Status: DISCONTINUED | OUTPATIENT
Start: 2022-01-01 | End: 2022-01-01 | Stop reason: HOSPADM

## 2022-01-01 RX ORDER — HYDROCHLOROTHIAZIDE 25 MG/1
25 TABLET ORAL ONCE
Status: COMPLETED | OUTPATIENT
Start: 2022-01-01 | End: 2022-01-01

## 2022-01-01 RX ORDER — METOCLOPRAMIDE HYDROCHLORIDE 5 MG/ML
5 INJECTION INTRAMUSCULAR; INTRAVENOUS EVERY 6 HOURS
Status: DISCONTINUED | OUTPATIENT
Start: 2022-01-01 | End: 2022-01-01 | Stop reason: ALTCHOICE

## 2022-01-01 RX ORDER — SODIUM BICARBONATE 1 MEQ/ML
SYRINGE (ML) INTRAVENOUS
Status: DISCONTINUED
Start: 2022-01-01 | End: 2022-01-01 | Stop reason: WASHOUT

## 2022-01-01 RX ORDER — SODIUM BICARBONATE 1 MEQ/ML
SYRINGE (ML) INTRAVENOUS
Status: COMPLETED
Start: 2022-01-01 | End: 2022-01-01

## 2022-01-01 RX ORDER — LINEZOLID 2 MG/ML
600 INJECTION, SOLUTION INTRAVENOUS EVERY 12 HOURS
Status: DISCONTINUED | OUTPATIENT
Start: 2022-01-01 | End: 2022-01-01 | Stop reason: HOSPADM

## 2022-01-01 RX ORDER — ETOMIDATE 2 MG/ML
20 INJECTION INTRAVENOUS
Status: COMPLETED | OUTPATIENT
Start: 2022-01-01 | End: 2022-01-01

## 2022-01-01 RX ORDER — METOPROLOL TARTRATE 5 MG/5ML
2.5 INJECTION INTRAVENOUS ONCE
Status: COMPLETED | OUTPATIENT
Start: 2022-01-01 | End: 2022-01-01

## 2022-01-01 RX ORDER — ENOXAPARIN SODIUM 100 MG/ML
1 INJECTION SUBCUTANEOUS EVERY 12 HOURS
Status: DISCONTINUED | OUTPATIENT
Start: 2022-01-01 | End: 2022-01-01 | Stop reason: HOSPADM

## 2022-01-01 RX ADMIN — CHLORHEXIDINE GLUCONATE 15 ML: 1.2 RINSE ORAL at 08:52

## 2022-01-01 RX ADMIN — AMIODARONE HYDROCHLORIDE 0.5 MG/MIN: 50 INJECTION, SOLUTION INTRAVENOUS at 02:06

## 2022-01-01 RX ADMIN — Medication 4 MG/HR: at 14:00

## 2022-01-01 RX ADMIN — MINERAL OIL AND WHITE PETROLATUM: 150; 830 OINTMENT OPHTHALMIC at 20:42

## 2022-01-01 RX ADMIN — MIDODRINE HYDROCHLORIDE 20 MG: 5 TABLET ORAL at 00:41

## 2022-01-01 RX ADMIN — Medication 4 MG/HR: at 21:48

## 2022-01-01 RX ADMIN — DEXMEDETOMIDINE HYDROCHLORIDE 1 MCG/KG/HR: 400 INJECTION INTRAVENOUS at 15:12

## 2022-01-01 RX ADMIN — Medication 10 ML: at 13:51

## 2022-01-01 RX ADMIN — CHLORHEXIDINE GLUCONATE 15 ML: 1.2 RINSE ORAL at 20:32

## 2022-01-01 RX ADMIN — DIAZEPAM 10 MG: 5 TABLET ORAL at 23:32

## 2022-01-01 RX ADMIN — CISATRACURIUM BESYLATE 7.5 MCG/KG/MIN: 20 INJECTION INTRAVENOUS at 13:38

## 2022-01-01 RX ADMIN — AMIODARONE HYDROCHLORIDE 0.5 MG/MIN: 50 INJECTION, SOLUTION INTRAVENOUS at 04:22

## 2022-01-01 RX ADMIN — MINERAL OIL AND WHITE PETROLATUM: 150; 830 OINTMENT OPHTHALMIC at 08:24

## 2022-01-01 RX ADMIN — OXYCODONE HYDROCHLORIDE 10 MG: 5 TABLET ORAL at 00:26

## 2022-01-01 RX ADMIN — METOCLOPRAMIDE 5 MG: 5 INJECTION, SOLUTION INTRAMUSCULAR; INTRAVENOUS at 05:31

## 2022-01-01 RX ADMIN — MINERAL OIL AND WHITE PETROLATUM: 150; 830 OINTMENT OPHTHALMIC at 08:14

## 2022-01-01 RX ADMIN — ENOXAPARIN SODIUM 90 MG: 100 INJECTION SUBCUTANEOUS at 23:02

## 2022-01-01 RX ADMIN — Medication: at 18:44

## 2022-01-01 RX ADMIN — MEROPENEM 500 MG: 500 INJECTION, POWDER, FOR SOLUTION INTRAVENOUS at 08:44

## 2022-01-01 RX ADMIN — OXYCODONE HYDROCHLORIDE 10 MG: 5 TABLET ORAL at 23:14

## 2022-01-01 RX ADMIN — DEXMEDETOMIDINE HYDROCHLORIDE 1.5 MCG/KG/HR: 400 INJECTION INTRAVENOUS at 15:21

## 2022-01-01 RX ADMIN — CISATRACURIUM BESYLATE 5 MCG/KG/MIN: 20 INJECTION INTRAVENOUS at 11:50

## 2022-01-01 RX ADMIN — DIAZEPAM 10 MG: 5 TABLET ORAL at 07:02

## 2022-01-01 RX ADMIN — Medication 4 MG/HR: at 13:55

## 2022-01-01 RX ADMIN — Medication 30 ML: at 21:10

## 2022-01-01 RX ADMIN — Medication: at 17:02

## 2022-01-01 RX ADMIN — THERA TABS 1 TABLET: TAB at 08:30

## 2022-01-01 RX ADMIN — Medication 4 MG/HR: at 10:22

## 2022-01-01 RX ADMIN — Medication 4 MG/HR: at 06:12

## 2022-01-01 RX ADMIN — Medication 6 MG/HR: at 13:18

## 2022-01-01 RX ADMIN — Medication 10 ML: at 06:00

## 2022-01-01 RX ADMIN — Medication 20 MG/HR: at 09:05

## 2022-01-01 RX ADMIN — ALBUMIN (HUMAN) 25 G: 0.25 INJECTION, SOLUTION INTRAVENOUS at 03:28

## 2022-01-01 RX ADMIN — MINERAL OIL AND WHITE PETROLATUM: 150; 830 OINTMENT OPHTHALMIC at 21:42

## 2022-01-01 RX ADMIN — Medication 4 MG/HR: at 02:48

## 2022-01-01 RX ADMIN — SODIUM CHLORIDE 10 MCG/KG/MIN: 900 INJECTION, SOLUTION INTRAVENOUS at 11:20

## 2022-01-01 RX ADMIN — SODIUM CHLORIDE 10 MCG/KG/MIN: 900 INJECTION, SOLUTION INTRAVENOUS at 10:25

## 2022-01-01 RX ADMIN — Medication 20 MG/HR: at 08:00

## 2022-01-01 RX ADMIN — Medication 16 MG/HR: at 18:42

## 2022-01-01 RX ADMIN — MEROPENEM 500 MG: 500 INJECTION, POWDER, FOR SOLUTION INTRAVENOUS at 02:03

## 2022-01-01 RX ADMIN — OXYCODONE HYDROCHLORIDE 10 MG: 5 TABLET ORAL at 18:08

## 2022-01-01 RX ADMIN — DEXMEDETOMIDINE HYDROCHLORIDE 1.5 MCG/KG/HR: 400 INJECTION INTRAVENOUS at 17:51

## 2022-01-01 RX ADMIN — Medication 20 MG/HR: at 01:57

## 2022-01-01 RX ADMIN — Medication 12 UNITS: at 12:20

## 2022-01-01 RX ADMIN — MINERAL OIL AND WHITE PETROLATUM: 150; 830 OINTMENT OPHTHALMIC at 09:21

## 2022-01-01 RX ADMIN — MEROPENEM 500 MG: 500 INJECTION, POWDER, FOR SOLUTION INTRAVENOUS at 15:19

## 2022-01-01 RX ADMIN — Medication 20 MG: at 09:13

## 2022-01-01 RX ADMIN — THERA TABS 1 TABLET: TAB at 08:43

## 2022-01-01 RX ADMIN — Medication 6000 UNITS: at 08:29

## 2022-01-01 RX ADMIN — Medication 20 MG/HR: at 04:00

## 2022-01-01 RX ADMIN — Medication 10 ML: at 21:03

## 2022-01-01 RX ADMIN — MEROPENEM 500 MG: 500 INJECTION, POWDER, FOR SOLUTION INTRAVENOUS at 15:00

## 2022-01-01 RX ADMIN — Medication 20 MG: at 20:47

## 2022-01-01 RX ADMIN — MIDODRINE HYDROCHLORIDE 20 MG: 5 TABLET ORAL at 23:50

## 2022-01-01 RX ADMIN — Medication 3 UNITS: at 11:49

## 2022-01-01 RX ADMIN — AMIODARONE HYDROCHLORIDE 1 MG/MIN: 50 INJECTION, SOLUTION INTRAVENOUS at 09:38

## 2022-01-01 RX ADMIN — Medication 10 ML: at 13:02

## 2022-01-01 RX ADMIN — DEXMEDETOMIDINE HYDROCHLORIDE 0.8 MCG/KG/HR: 400 INJECTION INTRAVENOUS at 12:17

## 2022-01-01 RX ADMIN — DIAPER RASH SKIN PROTECTENT: at 04:13

## 2022-01-01 RX ADMIN — Medication 20 MG/HR: at 18:05

## 2022-01-01 RX ADMIN — SODIUM CHLORIDE 10 MCG/KG/MIN: 900 INJECTION, SOLUTION INTRAVENOUS at 22:24

## 2022-01-01 RX ADMIN — ENOXAPARIN SODIUM 80 MG: 100 INJECTION SUBCUTANEOUS at 11:43

## 2022-01-01 RX ADMIN — DEXMEDETOMIDINE HYDROCHLORIDE 1 MCG/KG/HR: 400 INJECTION INTRAVENOUS at 04:57

## 2022-01-01 RX ADMIN — Medication 4000 UNITS: at 11:32

## 2022-01-01 RX ADMIN — DIAPER RASH SKIN PROTECTENT: at 20:53

## 2022-01-01 RX ADMIN — SODIUM CHLORIDE 7.5 MG/HR: 9 INJECTION, SOLUTION INTRAVENOUS at 18:27

## 2022-01-01 RX ADMIN — PROPOFOL 50 MCG/KG/MIN: 10 INJECTION, EMULSION INTRAVENOUS at 11:39

## 2022-01-01 RX ADMIN — LINEZOLID 600 MG: 600 INJECTION, SOLUTION INTRAVENOUS at 21:46

## 2022-01-01 RX ADMIN — Medication: at 09:03

## 2022-01-01 RX ADMIN — Medication 10 ML: at 22:00

## 2022-01-01 RX ADMIN — DEXMEDETOMIDINE HYDROCHLORIDE 0.8 MCG/KG/HR: 400 INJECTION INTRAVENOUS at 03:31

## 2022-01-01 RX ADMIN — Medication 30 ML: at 05:53

## 2022-01-01 RX ADMIN — Medication 4 MG/HR: at 13:05

## 2022-01-01 RX ADMIN — Medication 6000 UNITS: at 08:42

## 2022-01-01 RX ADMIN — Medication 400 MCG: at 05:53

## 2022-01-01 RX ADMIN — Medication 20 MG/HR: at 20:15

## 2022-01-01 RX ADMIN — DEXMEDETOMIDINE HYDROCHLORIDE 1.5 MCG/KG/HR: 400 INJECTION INTRAVENOUS at 11:38

## 2022-01-01 RX ADMIN — MEROPENEM 500 MG: 500 INJECTION, POWDER, FOR SOLUTION INTRAVENOUS at 08:34

## 2022-01-01 RX ADMIN — PROPOFOL 50 MCG/KG/MIN: 10 INJECTION, EMULSION INTRAVENOUS at 05:29

## 2022-01-01 RX ADMIN — NOREPINEPHRINE BITARTRATE 2 MCG/MIN: 1 INJECTION, SOLUTION, CONCENTRATE INTRAVENOUS at 16:58

## 2022-01-01 RX ADMIN — Medication 7 UNITS: at 21:23

## 2022-01-01 RX ADMIN — Medication: at 09:04

## 2022-01-01 RX ADMIN — Medication 4 UNITS: at 23:14

## 2022-01-01 RX ADMIN — Medication 9 UNITS: at 16:49

## 2022-01-01 RX ADMIN — OXYCODONE HYDROCHLORIDE AND ACETAMINOPHEN 500 MG: 500 TABLET ORAL at 09:26

## 2022-01-01 RX ADMIN — MIDAZOLAM HYDROCHLORIDE 5 MG: 1 INJECTION, SOLUTION INTRAMUSCULAR; INTRAVENOUS at 10:28

## 2022-01-01 RX ADMIN — AMIODARONE HYDROCHLORIDE 0.5 MG/MIN: 50 INJECTION, SOLUTION INTRAVENOUS at 14:54

## 2022-01-01 RX ADMIN — DIAPER RASH SKIN PROTECTENT: at 20:36

## 2022-01-01 RX ADMIN — MEROPENEM 500 MG: 500 INJECTION, POWDER, FOR SOLUTION INTRAVENOUS at 03:28

## 2022-01-01 RX ADMIN — Medication: at 17:37

## 2022-01-01 RX ADMIN — SODIUM FERRIC GLUCONATE COMPLEX 125 MG: 12.5 INJECTION INTRAVENOUS at 15:00

## 2022-01-01 RX ADMIN — METHYLPREDNISOLONE SODIUM SUCCINATE 60 MG: 40 INJECTION, POWDER, FOR SOLUTION INTRAMUSCULAR; INTRAVENOUS at 08:59

## 2022-01-01 RX ADMIN — MIDODRINE HYDROCHLORIDE 20 MG: 5 TABLET ORAL at 17:15

## 2022-01-01 RX ADMIN — Medication 30 UNITS: at 00:31

## 2022-01-01 RX ADMIN — CHLORHEXIDINE GLUCONATE 15 ML: 1.2 RINSE ORAL at 09:21

## 2022-01-01 RX ADMIN — Medication 20 MG/HR: at 14:25

## 2022-01-01 RX ADMIN — ALBUMIN (HUMAN) 25 G: 0.25 INJECTION, SOLUTION INTRAVENOUS at 09:29

## 2022-01-01 RX ADMIN — DEXMEDETOMIDINE HYDROCHLORIDE 1.5 MCG/KG/HR: 400 INJECTION INTRAVENOUS at 22:06

## 2022-01-01 RX ADMIN — DEXMEDETOMIDINE HYDROCHLORIDE 1.5 MCG/KG/HR: 400 INJECTION INTRAVENOUS at 18:18

## 2022-01-01 RX ADMIN — OXYCODONE HYDROCHLORIDE AND ACETAMINOPHEN 500 MG: 500 TABLET ORAL at 08:42

## 2022-01-01 RX ADMIN — Medication 10 UNITS: at 20:46

## 2022-01-01 RX ADMIN — Medication 10 ML: at 05:14

## 2022-01-01 RX ADMIN — Medication 4 MG/HR: at 12:08

## 2022-01-01 RX ADMIN — MIDAZOLAM HYDROCHLORIDE 5 MG: 1 INJECTION, SOLUTION INTRAMUSCULAR; INTRAVENOUS at 13:02

## 2022-01-01 RX ADMIN — Medication 20 MG/HR: at 10:28

## 2022-01-01 RX ADMIN — MIDAZOLAM HYDROCHLORIDE 5 MG: 1 INJECTION, SOLUTION INTRAMUSCULAR; INTRAVENOUS at 12:08

## 2022-01-01 RX ADMIN — DIAPER RASH SKIN PROTECTENT: at 09:08

## 2022-01-01 RX ADMIN — OXYCODONE HYDROCHLORIDE AND ACETAMINOPHEN 500 MG: 500 TABLET ORAL at 09:07

## 2022-01-01 RX ADMIN — Medication: at 20:18

## 2022-01-01 RX ADMIN — Medication 20 MG: at 20:50

## 2022-01-01 RX ADMIN — LINEZOLID 600 MG: 600 INJECTION, SOLUTION INTRAVENOUS at 09:34

## 2022-01-01 RX ADMIN — DEXMEDETOMIDINE HYDROCHLORIDE 0.6 MCG/KG/HR: 400 INJECTION INTRAVENOUS at 05:06

## 2022-01-01 RX ADMIN — Medication 20 MG/HR: at 07:56

## 2022-01-01 RX ADMIN — DIAPER RASH SKIN PROTECTENT: at 08:45

## 2022-01-01 RX ADMIN — METOCLOPRAMIDE 5 MG: 5 INJECTION, SOLUTION INTRAMUSCULAR; INTRAVENOUS at 17:37

## 2022-01-01 RX ADMIN — Medication: at 18:00

## 2022-01-01 RX ADMIN — Medication 4 MG/HR: at 20:16

## 2022-01-01 RX ADMIN — MEROPENEM 500 MG: 500 INJECTION, POWDER, FOR SOLUTION INTRAVENOUS at 09:26

## 2022-01-01 RX ADMIN — LINEZOLID 600 MG: 600 INJECTION, SOLUTION INTRAVENOUS at 21:00

## 2022-01-01 RX ADMIN — Medication 10 UNITS: at 20:18

## 2022-01-01 RX ADMIN — Medication 6000 UNITS: at 09:06

## 2022-01-01 RX ADMIN — CHLORHEXIDINE GLUCONATE 15 ML: 1.2 RINSE ORAL at 08:24

## 2022-01-01 RX ADMIN — Medication 6 MG/HR: at 07:40

## 2022-01-01 RX ADMIN — LINEZOLID 600 MG: 600 INJECTION, SOLUTION INTRAVENOUS at 09:19

## 2022-01-01 RX ADMIN — POTASSIUM BICARBONATE 40 MEQ: 782 TABLET, EFFERVESCENT ORAL at 18:34

## 2022-01-01 RX ADMIN — Medication 20 MG/HR: at 18:18

## 2022-01-01 RX ADMIN — Medication 20 MG/HR: at 00:08

## 2022-01-01 RX ADMIN — Medication 4 MG/HR: at 04:00

## 2022-01-01 RX ADMIN — DEXMEDETOMIDINE HYDROCHLORIDE 1.5 MCG/KG/HR: 400 INJECTION INTRAVENOUS at 08:47

## 2022-01-01 RX ADMIN — Medication 20 MG: at 20:24

## 2022-01-01 RX ADMIN — Medication 6000 UNITS: at 08:22

## 2022-01-01 RX ADMIN — MEROPENEM 500 MG: 500 INJECTION, POWDER, FOR SOLUTION INTRAVENOUS at 20:27

## 2022-01-01 RX ADMIN — Medication 20 MG: at 08:45

## 2022-01-01 RX ADMIN — Medication: at 18:08

## 2022-01-01 RX ADMIN — AMIODARONE HYDROCHLORIDE 0.5 MG/MIN: 50 INJECTION, SOLUTION INTRAVENOUS at 03:34

## 2022-01-01 RX ADMIN — FUROSEMIDE 40 MG: 10 INJECTION, SOLUTION INTRAMUSCULAR; INTRAVENOUS at 08:43

## 2022-01-01 RX ADMIN — LINEZOLID 600 MG: 600 INJECTION, SOLUTION INTRAVENOUS at 20:45

## 2022-01-01 RX ADMIN — ACETAMINOPHEN 650 MG: 160 SOLUTION ORAL at 13:39

## 2022-01-01 RX ADMIN — DEXMEDETOMIDINE HYDROCHLORIDE 1.5 MCG/KG/HR: 400 INJECTION INTRAVENOUS at 06:23

## 2022-01-01 RX ADMIN — Medication 9 UNITS: at 20:23

## 2022-01-01 RX ADMIN — ATROPINE SULFATE 0.4 MG: 0.1 INJECTION INTRAVENOUS at 05:52

## 2022-01-01 RX ADMIN — NOREPINEPHRINE BITARTRATE 22 MCG/MIN: 1 INJECTION, SOLUTION, CONCENTRATE INTRAVENOUS at 09:13

## 2022-01-01 RX ADMIN — Medication 10 ML: at 21:20

## 2022-01-01 RX ADMIN — CHLORHEXIDINE GLUCONATE 15 ML: 1.2 RINSE ORAL at 09:06

## 2022-01-01 RX ADMIN — Medication 6 MG/HR: at 18:20

## 2022-01-01 RX ADMIN — DEXMEDETOMIDINE HYDROCHLORIDE 1.5 MCG/KG/HR: 400 INJECTION INTRAVENOUS at 18:56

## 2022-01-01 RX ADMIN — Medication 30 ML: at 06:49

## 2022-01-01 RX ADMIN — Medication 10 ML: at 05:43

## 2022-01-01 RX ADMIN — Medication 4 MG/HR: at 22:56

## 2022-01-01 RX ADMIN — Medication 20 ML: at 22:00

## 2022-01-01 RX ADMIN — AMIODARONE HYDROCHLORIDE 0.5 MG/MIN: 50 INJECTION, SOLUTION INTRAVENOUS at 15:53

## 2022-01-01 RX ADMIN — Medication 10 UNITS: at 20:24

## 2022-01-01 RX ADMIN — Medication: at 08:24

## 2022-01-01 RX ADMIN — Medication 20 MG/HR: at 15:34

## 2022-01-01 RX ADMIN — Medication 20 MG: at 20:55

## 2022-01-01 RX ADMIN — NOREPINEPHRINE BITARTRATE 20 MCG/MIN: 1 INJECTION, SOLUTION, CONCENTRATE INTRAVENOUS at 15:54

## 2022-01-01 RX ADMIN — MIDODRINE HYDROCHLORIDE 20 MG: 5 TABLET ORAL at 16:09

## 2022-01-01 RX ADMIN — Medication: at 08:44

## 2022-01-01 RX ADMIN — Medication 20 MG/HR: at 08:49

## 2022-01-01 RX ADMIN — Medication 20 MG/HR: at 04:48

## 2022-01-01 RX ADMIN — Medication 20 MG: at 20:18

## 2022-01-01 RX ADMIN — OXYCODONE HYDROCHLORIDE AND ACETAMINOPHEN 500 MG: 500 TABLET ORAL at 09:04

## 2022-01-01 RX ADMIN — DIAPER RASH SKIN PROTECTENT: at 08:11

## 2022-01-01 RX ADMIN — THERA TABS 1 TABLET: TAB at 09:00

## 2022-01-01 RX ADMIN — SODIUM CHLORIDE 10 MCG/KG/MIN: 900 INJECTION, SOLUTION INTRAVENOUS at 00:41

## 2022-01-01 RX ADMIN — MIDODRINE HYDROCHLORIDE 20 MG: 5 TABLET ORAL at 09:02

## 2022-01-01 RX ADMIN — MEROPENEM 500 MG: 500 INJECTION, POWDER, FOR SOLUTION INTRAVENOUS at 02:35

## 2022-01-01 RX ADMIN — OXYCODONE HYDROCHLORIDE 10 MG: 5 TABLET ORAL at 06:28

## 2022-01-01 RX ADMIN — DEXMEDETOMIDINE HYDROCHLORIDE 1 MCG/KG/HR: 400 INJECTION INTRAVENOUS at 07:39

## 2022-01-01 RX ADMIN — MIDAZOLAM HYDROCHLORIDE 5 MG: 1 INJECTION, SOLUTION INTRAMUSCULAR; INTRAVENOUS at 11:16

## 2022-01-01 RX ADMIN — Medication 4 MG/HR: at 00:23

## 2022-01-01 RX ADMIN — Medication 20 MG/HR: at 13:59

## 2022-01-01 RX ADMIN — Medication 20 MG: at 08:13

## 2022-01-01 RX ADMIN — METHYLPREDNISOLONE SODIUM SUCCINATE 60 MG: 40 INJECTION, POWDER, FOR SOLUTION INTRAMUSCULAR; INTRAVENOUS at 09:13

## 2022-01-01 RX ADMIN — Medication 6 MG/HR: at 02:27

## 2022-01-01 RX ADMIN — ETOMIDATE 20 MG: 2 INJECTION INTRAVENOUS at 14:13

## 2022-01-01 RX ADMIN — DEXMEDETOMIDINE HYDROCHLORIDE 0.8 MCG/KG/HR: 400 INJECTION INTRAVENOUS at 07:04

## 2022-01-01 RX ADMIN — SODIUM CHLORIDE 10 MCG/KG/MIN: 900 INJECTION, SOLUTION INTRAVENOUS at 19:05

## 2022-01-01 RX ADMIN — SODIUM CHLORIDE 10 MCG/KG/MIN: 900 INJECTION, SOLUTION INTRAVENOUS at 05:41

## 2022-01-01 RX ADMIN — POTASSIUM CHLORIDE 20 MEQ: 29.8 INJECTION, SOLUTION INTRAVENOUS at 00:36

## 2022-01-01 RX ADMIN — Medication: at 09:01

## 2022-01-01 RX ADMIN — Medication 6000 UNITS: at 09:20

## 2022-01-01 RX ADMIN — PROPOFOL 50 MCG/KG/MIN: 10 INJECTION, EMULSION INTRAVENOUS at 03:43

## 2022-01-01 RX ADMIN — DIAPER RASH SKIN PROTECTENT: at 20:12

## 2022-01-01 RX ADMIN — THERA TABS 1 TABLET: TAB at 09:04

## 2022-01-01 RX ADMIN — Medication 9 UNITS: at 03:53

## 2022-01-01 RX ADMIN — Medication 20 MG/HR: at 05:30

## 2022-01-01 RX ADMIN — Medication 18 MG/HR: at 09:16

## 2022-01-01 RX ADMIN — CALCIUM GLUCONATE 1000 MG: 20 INJECTION, SOLUTION INTRAVENOUS at 06:23

## 2022-01-01 RX ADMIN — Medication 2000 UNITS: at 08:42

## 2022-01-01 RX ADMIN — ROCURONIUM BROMIDE 50 MG: 10 INJECTION, SOLUTION INTRAVENOUS at 14:10

## 2022-01-01 RX ADMIN — Medication 20 MG/HR: at 05:48

## 2022-01-01 RX ADMIN — SODIUM CHLORIDE 2 MCG/KG/MIN: 900 INJECTION, SOLUTION INTRAVENOUS at 03:43

## 2022-01-01 RX ADMIN — SODIUM CHLORIDE 100 MG: 9 INJECTION, SOLUTION INTRAVENOUS at 07:48

## 2022-01-01 RX ADMIN — Medication 6000 UNITS: at 09:03

## 2022-01-01 RX ADMIN — OXYCODONE HYDROCHLORIDE 10 MG: 5 TABLET ORAL at 07:02

## 2022-01-01 RX ADMIN — MINERAL OIL AND WHITE PETROLATUM: 150; 830 OINTMENT OPHTHALMIC at 08:46

## 2022-01-01 RX ADMIN — ENOXAPARIN SODIUM 90 MG: 100 INJECTION SUBCUTANEOUS at 11:42

## 2022-01-01 RX ADMIN — Medication 2000 UNITS: at 09:02

## 2022-01-01 RX ADMIN — Medication 4 MG/HR: at 09:27

## 2022-01-01 RX ADMIN — MINERAL OIL AND WHITE PETROLATUM: 150; 830 OINTMENT OPHTHALMIC at 08:52

## 2022-01-01 RX ADMIN — DEXMEDETOMIDINE HYDROCHLORIDE 1.5 MCG/KG/HR: 400 INJECTION INTRAVENOUS at 04:32

## 2022-01-01 RX ADMIN — SODIUM CHLORIDE 10 MCG/KG/MIN: 900 INJECTION, SOLUTION INTRAVENOUS at 03:57

## 2022-01-01 RX ADMIN — Medication 20 MG/HR: at 18:50

## 2022-01-01 RX ADMIN — PROPOFOL 50 MCG/KG/MIN: 10 INJECTION, EMULSION INTRAVENOUS at 21:13

## 2022-01-01 RX ADMIN — Medication 20 MG: at 09:02

## 2022-01-01 RX ADMIN — DEXMEDETOMIDINE HYDROCHLORIDE 0.6 MCG/KG/HR: 400 INJECTION INTRAVENOUS at 21:18

## 2022-01-01 RX ADMIN — Medication 4 MG/HR: at 07:58

## 2022-01-01 RX ADMIN — Medication 20 MG/HR: at 08:47

## 2022-01-01 RX ADMIN — Medication 30 ML: at 05:40

## 2022-01-01 RX ADMIN — Medication 20 MG: at 20:45

## 2022-01-01 RX ADMIN — OXYCODONE HYDROCHLORIDE AND ACETAMINOPHEN 500 MG: 500 TABLET ORAL at 08:23

## 2022-01-01 RX ADMIN — ENOXAPARIN SODIUM 90 MG: 100 INJECTION SUBCUTANEOUS at 11:56

## 2022-01-01 RX ADMIN — DEXMEDETOMIDINE HYDROCHLORIDE 0.8 MCG/KG/HR: 400 INJECTION INTRAVENOUS at 18:09

## 2022-01-01 RX ADMIN — Medication 20 MG/HR: at 13:39

## 2022-01-01 RX ADMIN — LINEZOLID 600 MG: 600 INJECTION, SOLUTION INTRAVENOUS at 20:28

## 2022-01-01 RX ADMIN — CISATRACURIUM BESYLATE 3 MCG/KG/MIN: 2 INJECTION INTRAVENOUS at 16:00

## 2022-01-01 RX ADMIN — Medication 6 MG/HR: at 12:46

## 2022-01-01 RX ADMIN — Medication 4 MG/HR: at 17:52

## 2022-01-01 RX ADMIN — Medication 10 ML: at 13:43

## 2022-01-01 RX ADMIN — ROCURONIUM BROMIDE 50 MG: 10 INJECTION INTRAVENOUS at 14:10

## 2022-01-01 RX ADMIN — DEXMEDETOMIDINE HYDROCHLORIDE 0.6 MCG/KG/HR: 400 INJECTION INTRAVENOUS at 13:53

## 2022-01-01 RX ADMIN — Medication 3 UNITS: at 17:13

## 2022-01-01 RX ADMIN — PROPOFOL 50 MCG/KG/MIN: 10 INJECTION, EMULSION INTRAVENOUS at 19:13

## 2022-01-01 RX ADMIN — ALBUMIN (HUMAN) 25 G: 0.25 INJECTION, SOLUTION INTRAVENOUS at 02:21

## 2022-01-01 RX ADMIN — EPINEPHRINE 1 MG: 0.1 INJECTION INTRACARDIAC; INTRAVENOUS at 05:55

## 2022-01-01 RX ADMIN — ATORVASTATIN CALCIUM 20 MG: 20 TABLET, FILM COATED ORAL at 09:04

## 2022-01-01 RX ADMIN — Medication 20 MG/HR: at 04:46

## 2022-01-01 RX ADMIN — Medication 3 UNITS: at 17:44

## 2022-01-01 RX ADMIN — SODIUM CHLORIDE 5 MG/HR: 9 INJECTION, SOLUTION INTRAVENOUS at 10:34

## 2022-01-01 RX ADMIN — Medication 10 ML: at 14:01

## 2022-01-01 RX ADMIN — ENOXAPARIN SODIUM 90 MG: 100 INJECTION SUBCUTANEOUS at 23:12

## 2022-01-01 RX ADMIN — Medication 20 MG: at 08:23

## 2022-01-01 RX ADMIN — CHLORHEXIDINE GLUCONATE 15 ML: 1.2 RINSE ORAL at 20:53

## 2022-01-01 RX ADMIN — Medication 45 UNITS: at 09:12

## 2022-01-01 RX ADMIN — Medication 12 UNITS: at 20:04

## 2022-01-01 RX ADMIN — Medication 6 MG/HR: at 21:10

## 2022-01-01 RX ADMIN — MIDODRINE HYDROCHLORIDE 20 MG: 5 TABLET ORAL at 15:52

## 2022-01-01 RX ADMIN — ENOXAPARIN SODIUM 80 MG: 100 INJECTION SUBCUTANEOUS at 12:23

## 2022-01-01 RX ADMIN — Medication 9 UNITS: at 11:43

## 2022-01-01 RX ADMIN — DEXMEDETOMIDINE HYDROCHLORIDE 0.4 MCG/KG/HR: 400 INJECTION INTRAVENOUS at 02:34

## 2022-01-01 RX ADMIN — AMIODARONE HYDROCHLORIDE 0.5 MG/MIN: 50 INJECTION, SOLUTION INTRAVENOUS at 00:28

## 2022-01-01 RX ADMIN — Medication 9 UNITS: at 23:21

## 2022-01-01 RX ADMIN — PIPERACILLIN AND TAZOBACTAM 3.38 G: 3; .375 INJECTION, POWDER, LYOPHILIZED, FOR SOLUTION INTRAVENOUS at 04:30

## 2022-01-01 RX ADMIN — Medication 6000 UNITS: at 09:25

## 2022-01-01 RX ADMIN — Medication: at 09:31

## 2022-01-01 RX ADMIN — Medication 7 UNITS: at 12:38

## 2022-01-01 RX ADMIN — Medication 20 MG: at 20:34

## 2022-01-01 RX ADMIN — OXYCODONE HYDROCHLORIDE 10 MG: 5 TABLET ORAL at 11:53

## 2022-01-01 RX ADMIN — Medication 4 MG/HR: at 00:18

## 2022-01-01 RX ADMIN — METHYLPREDNISOLONE SODIUM SUCCINATE 60 MG: 40 INJECTION, POWDER, FOR SOLUTION INTRAMUSCULAR; INTRAVENOUS at 08:21

## 2022-01-01 RX ADMIN — ENOXAPARIN SODIUM 90 MG: 100 INJECTION SUBCUTANEOUS at 00:25

## 2022-01-01 RX ADMIN — CISATRACURIUM BESYLATE 7.5 MCG/KG/MIN: 20 INJECTION INTRAVENOUS at 23:13

## 2022-01-01 RX ADMIN — ALBUMIN (HUMAN) 25 G: 0.25 INJECTION, SOLUTION INTRAVENOUS at 02:12

## 2022-01-01 RX ADMIN — ENOXAPARIN SODIUM 90 MG: 100 INJECTION SUBCUTANEOUS at 23:30

## 2022-01-01 RX ADMIN — Medication 10 ML: at 06:24

## 2022-01-01 RX ADMIN — DEXMEDETOMIDINE HYDROCHLORIDE 1.5 MCG/KG/HR: 400 INJECTION INTRAVENOUS at 09:03

## 2022-01-01 RX ADMIN — DEXMEDETOMIDINE HYDROCHLORIDE 1 MCG/KG/HR: 400 INJECTION INTRAVENOUS at 20:03

## 2022-01-01 RX ADMIN — DEXMEDETOMIDINE HYDROCHLORIDE 0.8 MCG/KG/HR: 400 INJECTION INTRAVENOUS at 09:18

## 2022-01-01 RX ADMIN — DEXMEDETOMIDINE HYDROCHLORIDE 1 MCG/KG/HR: 400 INJECTION INTRAVENOUS at 13:50

## 2022-01-01 RX ADMIN — Medication 16 MG/HR: at 11:28

## 2022-01-01 RX ADMIN — DIAPER RASH SKIN PROTECTENT: at 08:59

## 2022-01-01 RX ADMIN — ENOXAPARIN SODIUM 80 MG: 100 INJECTION SUBCUTANEOUS at 11:42

## 2022-01-01 RX ADMIN — MIDAZOLAM HYDROCHLORIDE 5 MG: 1 INJECTION, SOLUTION INTRAMUSCULAR; INTRAVENOUS at 06:53

## 2022-01-01 RX ADMIN — MEROPENEM 500 MG: 500 INJECTION, POWDER, FOR SOLUTION INTRAVENOUS at 08:21

## 2022-01-01 RX ADMIN — NOREPINEPHRINE BITARTRATE 17 MCG/MIN: 1 INJECTION, SOLUTION, CONCENTRATE INTRAVENOUS at 04:19

## 2022-01-01 RX ADMIN — ALBUMIN (HUMAN) 25 G: 0.25 INJECTION, SOLUTION INTRAVENOUS at 14:36

## 2022-01-01 RX ADMIN — Medication 15 UNITS: at 16:12

## 2022-01-01 RX ADMIN — Medication 20 MG/HR: at 18:20

## 2022-01-01 RX ADMIN — Medication 16 MG/HR: at 05:16

## 2022-01-01 RX ADMIN — CHLORHEXIDINE GLUCONATE 15 ML: 1.2 RINSE ORAL at 09:04

## 2022-01-01 RX ADMIN — VANCOMYCIN HYDROCHLORIDE 1250 MG: 1.25 INJECTION, POWDER, LYOPHILIZED, FOR SOLUTION INTRAVENOUS at 02:18

## 2022-01-01 RX ADMIN — VANCOMYCIN HYDROCHLORIDE 1250 MG: 1.25 INJECTION, POWDER, LYOPHILIZED, FOR SOLUTION INTRAVENOUS at 14:33

## 2022-01-01 RX ADMIN — Medication 20 MG/HR: at 22:44

## 2022-01-01 RX ADMIN — Medication 3 UNITS: at 05:52

## 2022-01-01 RX ADMIN — MIDODRINE HYDROCHLORIDE 20 MG: 5 TABLET ORAL at 22:43

## 2022-01-01 RX ADMIN — POTASSIUM BICARBONATE 40 MEQ: 782 TABLET, EFFERVESCENT ORAL at 05:53

## 2022-01-01 RX ADMIN — ENOXAPARIN SODIUM 90 MG: 100 INJECTION SUBCUTANEOUS at 11:28

## 2022-01-01 RX ADMIN — PROPOFOL 100 MG: 10 INJECTION, EMULSION INTRAVENOUS at 15:00

## 2022-01-01 RX ADMIN — MEROPENEM 500 MG: 500 INJECTION, POWDER, FOR SOLUTION INTRAVENOUS at 15:07

## 2022-01-01 RX ADMIN — Medication 20 MG: at 08:29

## 2022-01-01 RX ADMIN — CHLORHEXIDINE GLUCONATE 15 ML: 1.2 RINSE ORAL at 21:23

## 2022-01-01 RX ADMIN — Medication: at 21:39

## 2022-01-01 RX ADMIN — Medication 4 MG/HR: at 01:08

## 2022-01-01 RX ADMIN — Medication 12 UNITS: at 12:00

## 2022-01-01 RX ADMIN — Medication 20 MG/HR: at 14:30

## 2022-01-01 RX ADMIN — Medication 7 UNITS: at 08:50

## 2022-01-01 RX ADMIN — MIDODRINE HYDROCHLORIDE 20 MG: 5 TABLET ORAL at 23:22

## 2022-01-01 RX ADMIN — SODIUM CHLORIDE 10 MCG/KG/MIN: 900 INJECTION, SOLUTION INTRAVENOUS at 15:18

## 2022-01-01 RX ADMIN — Medication 20 MG/HR: at 20:30

## 2022-01-01 RX ADMIN — Medication 20 MG/HR: at 01:34

## 2022-01-01 RX ADMIN — MEROPENEM 500 MG: 500 INJECTION, POWDER, FOR SOLUTION INTRAVENOUS at 16:04

## 2022-01-01 RX ADMIN — Medication 16 MG/HR: at 09:42

## 2022-01-01 RX ADMIN — ACETAMINOPHEN 650 MG: 160 SOLUTION ORAL at 04:06

## 2022-01-01 RX ADMIN — DEXTROSE 150 MG: 50 INJECTION, SOLUTION INTRAVENOUS at 18:30

## 2022-01-01 RX ADMIN — OXYCODONE HYDROCHLORIDE 10 MG: 5 TABLET ORAL at 06:23

## 2022-01-01 RX ADMIN — Medication 4 MG/HR: at 08:36

## 2022-01-01 RX ADMIN — CHLORHEXIDINE GLUCONATE 15 ML: 1.2 RINSE ORAL at 20:01

## 2022-01-01 RX ADMIN — MIDODRINE HYDROCHLORIDE 20 MG: 5 TABLET ORAL at 00:31

## 2022-01-01 RX ADMIN — MIDODRINE HYDROCHLORIDE 20 MG: 5 TABLET ORAL at 15:15

## 2022-01-01 RX ADMIN — Medication 20 MG/HR: at 11:20

## 2022-01-01 RX ADMIN — ATORVASTATIN CALCIUM 20 MG: 20 TABLET, FILM COATED ORAL at 08:30

## 2022-01-01 RX ADMIN — Medication 15 UNITS: at 00:00

## 2022-01-01 RX ADMIN — MIDAZOLAM HYDROCHLORIDE 5 MG: 1 INJECTION, SOLUTION INTRAMUSCULAR; INTRAVENOUS at 10:25

## 2022-01-01 RX ADMIN — Medication 45 UNITS: at 21:00

## 2022-01-01 RX ADMIN — SODIUM CHLORIDE 10 MCG/KG/MIN: 900 INJECTION, SOLUTION INTRAVENOUS at 13:13

## 2022-01-01 RX ADMIN — Medication 10 ML: at 15:01

## 2022-01-01 RX ADMIN — Medication 20 MG/HR: at 16:39

## 2022-01-01 RX ADMIN — CHLORHEXIDINE GLUCONATE 15 ML: 1.2 RINSE ORAL at 20:43

## 2022-01-01 RX ADMIN — ALBUMIN (HUMAN) 25 G: 0.25 INJECTION, SOLUTION INTRAVENOUS at 14:59

## 2022-01-01 RX ADMIN — VANCOMYCIN HYDROCHLORIDE 1250 MG: 1.25 INJECTION, POWDER, LYOPHILIZED, FOR SOLUTION INTRAVENOUS at 02:55

## 2022-01-01 RX ADMIN — PIPERACILLIN AND TAZOBACTAM 3.38 G: 3; .375 INJECTION, POWDER, LYOPHILIZED, FOR SOLUTION INTRAVENOUS at 20:25

## 2022-01-01 RX ADMIN — DIAZEPAM 10 MG: 5 TABLET ORAL at 23:14

## 2022-01-01 RX ADMIN — THERA TABS 1 TABLET: TAB at 08:11

## 2022-01-01 RX ADMIN — Medication 4 MG/HR: at 13:20

## 2022-01-01 RX ADMIN — DIAPER RASH SKIN PROTECTENT: at 21:09

## 2022-01-01 RX ADMIN — PIPERACILLIN AND TAZOBACTAM 3.38 G: 3; .375 INJECTION, POWDER, LYOPHILIZED, FOR SOLUTION INTRAVENOUS at 20:31

## 2022-01-01 RX ADMIN — MEROPENEM 500 MG: 500 INJECTION, POWDER, FOR SOLUTION INTRAVENOUS at 03:37

## 2022-01-01 RX ADMIN — CHLORHEXIDINE GLUCONATE 15 ML: 1.2 RINSE ORAL at 20:46

## 2022-01-01 RX ADMIN — Medication: at 09:25

## 2022-01-01 RX ADMIN — Medication 6 MG/HR: at 04:52

## 2022-01-01 RX ADMIN — ALBUMIN (HUMAN) 25 G: 0.25 INJECTION, SOLUTION INTRAVENOUS at 20:41

## 2022-01-01 RX ADMIN — Medication 9 UNITS: at 21:05

## 2022-01-01 RX ADMIN — NOREPINEPHRINE BITARTRATE 7 MCG/MIN: 1 INJECTION, SOLUTION, CONCENTRATE INTRAVENOUS at 09:45

## 2022-01-01 RX ADMIN — MINERAL OIL AND WHITE PETROLATUM: 150; 830 OINTMENT OPHTHALMIC at 08:12

## 2022-01-01 RX ADMIN — Medication 12 UNITS: at 15:52

## 2022-01-01 RX ADMIN — LINEZOLID 600 MG: 600 INJECTION, SOLUTION INTRAVENOUS at 20:51

## 2022-01-01 RX ADMIN — Medication 9 UNITS: at 03:44

## 2022-01-01 RX ADMIN — POTASSIUM CHLORIDE 20 MEQ: 29.8 INJECTION, SOLUTION INTRAVENOUS at 10:22

## 2022-01-01 RX ADMIN — PROPOFOL 50 MCG/KG/MIN: 10 INJECTION, EMULSION INTRAVENOUS at 12:22

## 2022-01-01 RX ADMIN — Medication 3 UNITS: at 11:35

## 2022-01-01 RX ADMIN — MIDODRINE HYDROCHLORIDE 20 MG: 5 TABLET ORAL at 09:13

## 2022-01-01 RX ADMIN — CISATRACURIUM BESYLATE 7.5 MCG/KG/MIN: 20 INJECTION INTRAVENOUS at 22:21

## 2022-01-01 RX ADMIN — OXYCODONE HYDROCHLORIDE 10 MG: 5 TABLET ORAL at 17:38

## 2022-01-01 RX ADMIN — CISATRACURIUM BESYLATE 7.5 MCG/KG/MIN: 20 INJECTION INTRAVENOUS at 03:31

## 2022-01-01 RX ADMIN — DIAZEPAM 10 MG: 5 TABLET ORAL at 11:52

## 2022-01-01 RX ADMIN — CHLORHEXIDINE GLUCONATE 15 ML: 1.2 RINSE ORAL at 08:14

## 2022-01-01 RX ADMIN — Medication 6 MG/HR: at 16:16

## 2022-01-01 RX ADMIN — MEROPENEM 500 MG: 500 INJECTION, POWDER, FOR SOLUTION INTRAVENOUS at 15:08

## 2022-01-01 RX ADMIN — ENOXAPARIN SODIUM 90 MG: 100 INJECTION SUBCUTANEOUS at 12:20

## 2022-01-01 RX ADMIN — CHLORHEXIDINE GLUCONATE 15 ML: 1.2 RINSE ORAL at 20:49

## 2022-01-01 RX ADMIN — DEXMEDETOMIDINE HYDROCHLORIDE 0.5 MCG/KG/HR: 400 INJECTION INTRAVENOUS at 05:28

## 2022-01-01 RX ADMIN — OXYCODONE HYDROCHLORIDE AND ACETAMINOPHEN 500 MG: 500 TABLET ORAL at 09:13

## 2022-01-01 RX ADMIN — Medication 4 MG/HR: at 10:58

## 2022-01-01 RX ADMIN — MINERAL OIL AND WHITE PETROLATUM: 150; 830 OINTMENT OPHTHALMIC at 20:55

## 2022-01-01 RX ADMIN — PROPOFOL 50 MCG/KG/MIN: 10 INJECTION, EMULSION INTRAVENOUS at 22:42

## 2022-01-01 RX ADMIN — OXYCODONE HYDROCHLORIDE 10 MG: 5 TABLET ORAL at 11:42

## 2022-01-01 RX ADMIN — METHYLPREDNISOLONE SODIUM SUCCINATE 60 MG: 40 INJECTION, POWDER, FOR SOLUTION INTRAMUSCULAR; INTRAVENOUS at 09:26

## 2022-01-01 RX ADMIN — CHLORHEXIDINE GLUCONATE 15 ML: 1.2 RINSE ORAL at 21:41

## 2022-01-01 RX ADMIN — Medication 45 UNITS: at 20:49

## 2022-01-01 RX ADMIN — AMIODARONE HYDROCHLORIDE 0.5 MG/MIN: 50 INJECTION, SOLUTION INTRAVENOUS at 12:10

## 2022-01-01 RX ADMIN — Medication 30 UNITS: at 12:53

## 2022-01-01 RX ADMIN — CISATRACURIUM BESYLATE 3 MCG/KG/MIN: 2 INJECTION INTRAVENOUS at 16:35

## 2022-01-01 RX ADMIN — Medication 20 MG: at 09:04

## 2022-01-01 RX ADMIN — METOLAZONE 10 MG: 5 TABLET ORAL at 09:04

## 2022-01-01 RX ADMIN — Medication 4 UNITS: at 06:00

## 2022-01-01 RX ADMIN — Medication: at 17:04

## 2022-01-01 RX ADMIN — Medication 4 MG/HR: at 20:48

## 2022-01-01 RX ADMIN — Medication: at 04:13

## 2022-01-01 RX ADMIN — Medication 20 MG/HR: at 00:29

## 2022-01-01 RX ADMIN — SODIUM CHLORIDE 2 MCG/KG/MIN: 900 INJECTION, SOLUTION INTRAVENOUS at 08:47

## 2022-01-01 RX ADMIN — MIDODRINE HYDROCHLORIDE 20 MG: 5 TABLET ORAL at 16:54

## 2022-01-01 RX ADMIN — MINERAL OIL AND WHITE PETROLATUM: 150; 830 OINTMENT OPHTHALMIC at 20:46

## 2022-01-01 RX ADMIN — GUAIFENESIN 400 MG: 200 SOLUTION ORAL at 14:33

## 2022-01-01 RX ADMIN — Medication 3 UNITS: at 00:00

## 2022-01-01 RX ADMIN — OXYCODONE HYDROCHLORIDE 10 MG: 5 TABLET ORAL at 05:31

## 2022-01-01 RX ADMIN — Medication 20 ML: at 06:00

## 2022-01-01 RX ADMIN — Medication 20 MG: at 08:59

## 2022-01-01 RX ADMIN — Medication 3 UNITS: at 12:25

## 2022-01-01 RX ADMIN — MEROPENEM 500 MG: 500 INJECTION, POWDER, FOR SOLUTION INTRAVENOUS at 04:06

## 2022-01-01 RX ADMIN — MIDODRINE HYDROCHLORIDE 20 MG: 5 TABLET ORAL at 15:17

## 2022-01-01 RX ADMIN — METOCLOPRAMIDE 5 MG: 5 INJECTION, SOLUTION INTRAMUSCULAR; INTRAVENOUS at 12:23

## 2022-01-01 RX ADMIN — Medication 20 ML: at 00:55

## 2022-01-01 RX ADMIN — Medication 9 UNITS: at 12:08

## 2022-01-01 RX ADMIN — Medication 7 UNITS: at 21:56

## 2022-01-01 RX ADMIN — Medication 20 MG: at 20:20

## 2022-01-01 RX ADMIN — PROPOFOL 50 MCG/KG/MIN: 10 INJECTION, EMULSION INTRAVENOUS at 12:49

## 2022-01-01 RX ADMIN — CALCIUM GLUCONATE 1000 MG: 20 INJECTION, SOLUTION INTRAVENOUS at 23:00

## 2022-01-01 RX ADMIN — MIDODRINE HYDROCHLORIDE 20 MG: 5 TABLET ORAL at 23:02

## 2022-01-01 RX ADMIN — Medication 16 MG/HR: at 16:35

## 2022-01-01 RX ADMIN — DIAZEPAM 10 MG: 5 TABLET ORAL at 11:41

## 2022-01-01 RX ADMIN — NOREPINEPHRINE BITARTRATE 4 MCG/MIN: 1 INJECTION, SOLUTION, CONCENTRATE INTRAVENOUS at 06:42

## 2022-01-01 RX ADMIN — METOCLOPRAMIDE 5 MG: 5 INJECTION, SOLUTION INTRAMUSCULAR; INTRAVENOUS at 05:43

## 2022-01-01 RX ADMIN — Medication 15 MG/HR: at 15:09

## 2022-01-01 RX ADMIN — PROPOFOL 50 MCG/KG/MIN: 10 INJECTION, EMULSION INTRAVENOUS at 07:48

## 2022-01-01 RX ADMIN — Medication 9 UNITS: at 11:44

## 2022-01-01 RX ADMIN — MIDODRINE HYDROCHLORIDE 20 MG: 5 TABLET ORAL at 08:29

## 2022-01-01 RX ADMIN — PIPERACILLIN AND TAZOBACTAM 3.38 G: 3; .375 INJECTION, POWDER, LYOPHILIZED, FOR SOLUTION INTRAVENOUS at 12:23

## 2022-01-01 RX ADMIN — Medication 3 UNITS: at 17:37

## 2022-01-01 RX ADMIN — Medication 10 ML: at 21:24

## 2022-01-01 RX ADMIN — DIAPER RASH SKIN PROTECTENT: at 21:24

## 2022-01-01 RX ADMIN — Medication 20 MG/HR: at 12:15

## 2022-01-01 RX ADMIN — Medication 10 UNITS: at 09:02

## 2022-01-01 RX ADMIN — OXYCODONE HYDROCHLORIDE 10 MG: 5 TABLET ORAL at 00:10

## 2022-01-01 RX ADMIN — Medication 6000 UNITS: at 08:44

## 2022-01-01 RX ADMIN — ACETAMINOPHEN 650 MG: 160 SOLUTION ORAL at 18:51

## 2022-01-01 RX ADMIN — Medication 6 MG/HR: at 07:38

## 2022-01-01 RX ADMIN — PIPERACILLIN AND TAZOBACTAM 3.38 G: 3; .375 INJECTION, POWDER, LYOPHILIZED, FOR SOLUTION INTRAVENOUS at 11:42

## 2022-01-01 RX ADMIN — CISATRACURIUM BESYLATE 10 MCG/KG/MIN: 20 INJECTION INTRAVENOUS at 21:13

## 2022-01-01 RX ADMIN — Medication 4 MG/HR: at 07:47

## 2022-01-01 RX ADMIN — DEXMEDETOMIDINE HYDROCHLORIDE 0.8 MCG/KG/HR: 400 INJECTION INTRAVENOUS at 06:27

## 2022-01-01 RX ADMIN — Medication 4 MG/HR: at 12:38

## 2022-01-01 RX ADMIN — DEXTROSE MONOHYDRATE 12.5 G: 500 INJECTION PARENTERAL at 06:37

## 2022-01-01 RX ADMIN — ROCURONIUM BROMIDE 50 MG: 50 INJECTION INTRAVENOUS at 12:08

## 2022-01-01 RX ADMIN — Medication 4 MG/HR: at 18:18

## 2022-01-01 RX ADMIN — BUMETANIDE 2 MG: 0.25 INJECTION INTRAMUSCULAR; INTRAVENOUS at 18:03

## 2022-01-01 RX ADMIN — Medication 10 ML: at 13:40

## 2022-01-01 RX ADMIN — METHYLPREDNISOLONE SODIUM SUCCINATE 60 MG: 40 INJECTION, POWDER, FOR SOLUTION INTRAMUSCULAR; INTRAVENOUS at 09:04

## 2022-01-01 RX ADMIN — DIAZEPAM 10 MG: 5 TABLET ORAL at 06:22

## 2022-01-01 RX ADMIN — DIAPER RASH SKIN PROTECTENT: at 08:25

## 2022-01-01 RX ADMIN — Medication 12 UNITS: at 08:00

## 2022-01-01 RX ADMIN — Medication 4 MG/HR: at 11:39

## 2022-01-01 RX ADMIN — MEROPENEM 500 MG: 500 INJECTION, POWDER, FOR SOLUTION INTRAVENOUS at 02:14

## 2022-01-01 RX ADMIN — DIAZEPAM 10 MG: 5 TABLET ORAL at 05:31

## 2022-01-01 RX ADMIN — MINERAL OIL AND WHITE PETROLATUM: 150; 830 OINTMENT OPHTHALMIC at 09:07

## 2022-01-01 RX ADMIN — Medication 20 MG: at 20:46

## 2022-01-01 RX ADMIN — DEXMEDETOMIDINE HYDROCHLORIDE 0.6 MCG/KG/HR: 400 INJECTION INTRAVENOUS at 21:55

## 2022-01-01 RX ADMIN — MIDODRINE HYDROCHLORIDE 20 MG: 5 TABLET ORAL at 09:33

## 2022-01-01 RX ADMIN — Medication 20 MG: at 20:29

## 2022-01-01 RX ADMIN — ENOXAPARIN SODIUM 90 MG: 100 INJECTION SUBCUTANEOUS at 23:14

## 2022-01-01 RX ADMIN — MEROPENEM 500 MG: 500 INJECTION, POWDER, FOR SOLUTION INTRAVENOUS at 14:50

## 2022-01-01 RX ADMIN — OXYCODONE HYDROCHLORIDE 10 MG: 5 TABLET ORAL at 06:22

## 2022-01-01 RX ADMIN — Medication 10 UNITS: at 08:24

## 2022-01-01 RX ADMIN — PROPOFOL 50 MCG/KG/MIN: 10 INJECTION, EMULSION INTRAVENOUS at 15:28

## 2022-01-01 RX ADMIN — Medication 4 MG/HR: at 09:55

## 2022-01-01 RX ADMIN — PROPOFOL 50 MCG/KG/MIN: 10 INJECTION, EMULSION INTRAVENOUS at 01:44

## 2022-01-01 RX ADMIN — VANCOMYCIN HYDROCHLORIDE 1000 MG: 1 INJECTION, POWDER, LYOPHILIZED, FOR SOLUTION INTRAVENOUS at 20:28

## 2022-01-01 RX ADMIN — Medication 20 MG: at 21:42

## 2022-01-01 RX ADMIN — MINERAL OIL AND WHITE PETROLATUM: 150; 830 OINTMENT OPHTHALMIC at 09:19

## 2022-01-01 RX ADMIN — DEXMEDETOMIDINE HYDROCHLORIDE 1 MCG/KG/HR: 400 INJECTION INTRAVENOUS at 18:39

## 2022-01-01 RX ADMIN — Medication 4 MG/HR: at 18:01

## 2022-01-01 RX ADMIN — Medication 10 ML: at 13:47

## 2022-01-01 RX ADMIN — DIAZEPAM 10 MG: 5 TABLET ORAL at 00:10

## 2022-01-01 RX ADMIN — Medication 7 UNITS: at 20:50

## 2022-01-01 RX ADMIN — GUAIFENESIN 400 MG: 200 SOLUTION ORAL at 22:44

## 2022-01-01 RX ADMIN — LINEZOLID 600 MG: 600 INJECTION, SOLUTION INTRAVENOUS at 08:09

## 2022-01-01 RX ADMIN — PROPOFOL 50 MCG/KG/MIN: 10 INJECTION, EMULSION INTRAVENOUS at 21:48

## 2022-01-01 RX ADMIN — MIDAZOLAM HYDROCHLORIDE 5 MG: 1 INJECTION, SOLUTION INTRAMUSCULAR; INTRAVENOUS at 14:02

## 2022-01-01 RX ADMIN — Medication 12 UNITS: at 09:34

## 2022-01-01 RX ADMIN — PROPOFOL 50 MCG/KG/MIN: 10 INJECTION, EMULSION INTRAVENOUS at 16:37

## 2022-01-01 RX ADMIN — HYDROMORPHONE HYDROCHLORIDE 4 MG: 2 INJECTION INTRAMUSCULAR; INTRAVENOUS; SUBCUTANEOUS at 13:01

## 2022-01-01 RX ADMIN — Medication 30 ML: at 20:35

## 2022-01-01 RX ADMIN — ENOXAPARIN SODIUM 90 MG: 100 INJECTION SUBCUTANEOUS at 23:58

## 2022-01-01 RX ADMIN — ROCURONIUM BROMIDE 50 MG: 10 INJECTION INTRAVENOUS at 13:45

## 2022-01-01 RX ADMIN — Medication 6000 UNITS: at 08:13

## 2022-01-01 RX ADMIN — NOREPINEPHRINE BITARTRATE 17 MCG/MIN: 1 INJECTION, SOLUTION, CONCENTRATE INTRAVENOUS at 05:50

## 2022-01-01 RX ADMIN — Medication 7 UNITS: at 12:16

## 2022-01-01 RX ADMIN — Medication 7 UNITS: at 23:20

## 2022-01-01 RX ADMIN — MEROPENEM 500 MG: 500 INJECTION, POWDER, FOR SOLUTION INTRAVENOUS at 02:39

## 2022-01-01 RX ADMIN — Medication: at 09:19

## 2022-01-01 RX ADMIN — CHLORHEXIDINE GLUCONATE 15 ML: 1.2 RINSE ORAL at 21:09

## 2022-01-01 RX ADMIN — Medication 12 UNITS: at 16:12

## 2022-01-01 RX ADMIN — MEROPENEM 500 MG: 500 INJECTION, POWDER, FOR SOLUTION INTRAVENOUS at 04:20

## 2022-01-01 RX ADMIN — Medication 4 MG/HR: at 08:30

## 2022-01-01 RX ADMIN — Medication: at 17:08

## 2022-01-01 RX ADMIN — NOREPINEPHRINE BITARTRATE 14 MCG/MIN: 1 INJECTION, SOLUTION, CONCENTRATE INTRAVENOUS at 07:10

## 2022-01-01 RX ADMIN — Medication 10 ML: at 05:29

## 2022-01-01 RX ADMIN — CHLORHEXIDINE GLUCONATE 15 ML: 1.2 RINSE ORAL at 09:07

## 2022-01-01 RX ADMIN — DEXMEDETOMIDINE HYDROCHLORIDE 0.4 MCG/KG/HR: 400 INJECTION INTRAVENOUS at 04:54

## 2022-01-01 RX ADMIN — Medication 30 UNITS: at 08:44

## 2022-01-01 RX ADMIN — Medication 6 MG/HR: at 10:25

## 2022-01-01 RX ADMIN — DIAPER RASH SKIN PROTECTENT: at 09:19

## 2022-01-01 RX ADMIN — Medication 20 MG/HR: at 16:03

## 2022-01-01 RX ADMIN — OXYCODONE HYDROCHLORIDE AND ACETAMINOPHEN 500 MG: 500 TABLET ORAL at 09:02

## 2022-01-01 RX ADMIN — DIAZEPAM 10 MG: 5 TABLET ORAL at 17:04

## 2022-01-01 RX ADMIN — OXYCODONE HYDROCHLORIDE AND ACETAMINOPHEN 500 MG: 500 TABLET ORAL at 08:43

## 2022-01-01 RX ADMIN — MEROPENEM 500 MG: 500 INJECTION, POWDER, FOR SOLUTION INTRAVENOUS at 20:51

## 2022-01-01 RX ADMIN — DIAPER RASH SKIN PROTECTENT: at 09:03

## 2022-01-01 RX ADMIN — Medication 4 MG/HR: at 06:46

## 2022-01-01 RX ADMIN — ATORVASTATIN CALCIUM 20 MG: 20 TABLET, FILM COATED ORAL at 08:43

## 2022-01-01 RX ADMIN — PROPOFOL 50 MCG/KG/MIN: 10 INJECTION, EMULSION INTRAVENOUS at 00:37

## 2022-01-01 RX ADMIN — DIAZEPAM 10 MG: 5 TABLET ORAL at 11:42

## 2022-01-01 RX ADMIN — Medication: at 18:03

## 2022-01-01 RX ADMIN — POTASSIUM BICARBONATE 20 MEQ: 782 TABLET, EFFERVESCENT ORAL at 08:42

## 2022-01-01 RX ADMIN — MIDODRINE HYDROCHLORIDE 20 MG: 5 TABLET ORAL at 17:51

## 2022-01-01 RX ADMIN — Medication: at 20:10

## 2022-01-01 RX ADMIN — METOPROLOL TARTRATE 5 MG: 5 INJECTION INTRAVENOUS at 16:23

## 2022-01-01 RX ADMIN — Medication 20 MG/HR: at 11:39

## 2022-01-01 RX ADMIN — HYDROMORPHONE HYDROCHLORIDE 5 MG: 2 INJECTION INTRAMUSCULAR; INTRAVENOUS; SUBCUTANEOUS at 14:00

## 2022-01-01 RX ADMIN — Medication 10 UNITS: at 06:23

## 2022-01-01 RX ADMIN — MEROPENEM 500 MG: 500 INJECTION, POWDER, FOR SOLUTION INTRAVENOUS at 09:07

## 2022-01-01 RX ADMIN — CISATRACURIUM BESYLATE 5 MCG/KG/MIN: 20 INJECTION INTRAVENOUS at 02:20

## 2022-01-01 RX ADMIN — Medication 30 ML: at 20:15

## 2022-01-01 RX ADMIN — DEXMEDETOMIDINE HYDROCHLORIDE 1.5 MCG/KG/HR: 400 INJECTION INTRAVENOUS at 14:46

## 2022-01-01 RX ADMIN — DEXMEDETOMIDINE HYDROCHLORIDE 1.5 MCG/KG/HR: 400 INJECTION INTRAVENOUS at 09:07

## 2022-01-01 RX ADMIN — MINERAL OIL AND WHITE PETROLATUM: 150; 830 OINTMENT OPHTHALMIC at 09:49

## 2022-01-01 RX ADMIN — MINERAL OIL AND WHITE PETROLATUM: 150; 830 OINTMENT OPHTHALMIC at 09:30

## 2022-01-01 RX ADMIN — Medication 16 MG/HR: at 21:35

## 2022-01-01 RX ADMIN — LINEZOLID 600 MG: 600 INJECTION, SOLUTION INTRAVENOUS at 09:05

## 2022-01-01 RX ADMIN — ALBUMIN (HUMAN) 25 G: 0.25 INJECTION, SOLUTION INTRAVENOUS at 20:15

## 2022-01-01 RX ADMIN — DIAPER RASH SKIN PROTECTENT: at 09:02

## 2022-01-01 RX ADMIN — Medication 20 MG: at 20:01

## 2022-01-01 RX ADMIN — Medication 4 MG/HR: at 07:45

## 2022-01-01 RX ADMIN — AMIODARONE HYDROCHLORIDE 0.5 MG/MIN: 50 INJECTION, SOLUTION INTRAVENOUS at 11:36

## 2022-01-01 RX ADMIN — ATORVASTATIN CALCIUM 20 MG: 20 TABLET, FILM COATED ORAL at 09:02

## 2022-01-01 RX ADMIN — ALBUMIN (HUMAN) 25 G: 0.25 INJECTION, SOLUTION INTRAVENOUS at 04:06

## 2022-01-01 RX ADMIN — Medication 10 UNITS: at 08:27

## 2022-01-01 RX ADMIN — CISATRACURIUM BESYLATE 5 MCG/KG/MIN: 20 INJECTION INTRAVENOUS at 04:30

## 2022-01-01 RX ADMIN — DEXMEDETOMIDINE HYDROCHLORIDE 1 MCG/KG/HR: 400 INJECTION INTRAVENOUS at 03:39

## 2022-01-01 RX ADMIN — Medication 4 MG/HR: at 15:30

## 2022-01-01 RX ADMIN — DIAPER RASH SKIN PROTECTENT: at 21:59

## 2022-01-01 RX ADMIN — Medication 20 MG: at 09:35

## 2022-01-01 RX ADMIN — LINEZOLID 600 MG: 600 INJECTION, SOLUTION INTRAVENOUS at 08:22

## 2022-01-01 RX ADMIN — Medication 10 ML: at 05:31

## 2022-01-01 RX ADMIN — ACETAMINOPHEN 650 MG: 160 SOLUTION ORAL at 18:56

## 2022-01-01 RX ADMIN — PIPERACILLIN AND TAZOBACTAM 3.38 G: 3; .375 INJECTION, POWDER, LYOPHILIZED, FOR SOLUTION INTRAVENOUS at 11:25

## 2022-01-01 RX ADMIN — Medication 3 UNITS: at 00:51

## 2022-01-01 RX ADMIN — MEROPENEM 500 MG: 500 INJECTION, POWDER, FOR SOLUTION INTRAVENOUS at 15:05

## 2022-01-01 RX ADMIN — DIAPER RASH SKIN PROTECTENT: at 09:05

## 2022-01-01 RX ADMIN — DEXMEDETOMIDINE HYDROCHLORIDE 0.4 MCG/KG/HR: 400 INJECTION INTRAVENOUS at 17:12

## 2022-01-01 RX ADMIN — SODIUM CHLORIDE 100 MG: 9 INJECTION, SOLUTION INTRAVENOUS at 08:59

## 2022-01-01 RX ADMIN — DIAZEPAM 10 MG: 5 TABLET ORAL at 00:19

## 2022-01-01 RX ADMIN — CHLORHEXIDINE GLUCONATE 15 ML: 1.2 RINSE ORAL at 08:46

## 2022-01-01 RX ADMIN — DIAPER RASH SKIN PROTECTENT: at 20:30

## 2022-01-01 RX ADMIN — DIAZEPAM 10 MG: 5 TABLET ORAL at 00:26

## 2022-01-01 RX ADMIN — CISATRACURIUM BESYLATE 3 MCG/KG/MIN: 20 INJECTION INTRAVENOUS at 16:00

## 2022-01-01 RX ADMIN — Medication 12 UNITS: at 17:02

## 2022-01-01 RX ADMIN — LINEZOLID 600 MG: 600 INJECTION, SOLUTION INTRAVENOUS at 08:18

## 2022-01-01 RX ADMIN — Medication: at 17:55

## 2022-01-01 RX ADMIN — DEXMEDETOMIDINE HYDROCHLORIDE 0.5 MCG/KG/HR: 400 INJECTION INTRAVENOUS at 21:33

## 2022-01-01 RX ADMIN — LINEZOLID 600 MG: 600 INJECTION, SOLUTION INTRAVENOUS at 08:36

## 2022-01-01 RX ADMIN — Medication: at 17:42

## 2022-01-01 RX ADMIN — DIAPER RASH SKIN PROTECTENT: at 21:42

## 2022-01-01 RX ADMIN — POTASSIUM PHOSPHATE, MONOBASIC POTASSIUM PHOSPHATE, DIBASIC: 224; 236 INJECTION, SOLUTION, CONCENTRATE INTRAVENOUS at 09:36

## 2022-01-01 RX ADMIN — LINEZOLID 600 MG: 600 INJECTION, SOLUTION INTRAVENOUS at 21:53

## 2022-01-01 RX ADMIN — PIPERACILLIN AND TAZOBACTAM 3.38 G: 3; .375 INJECTION, POWDER, LYOPHILIZED, FOR SOLUTION INTRAVENOUS at 03:43

## 2022-01-01 RX ADMIN — Medication 9 UNITS: at 23:04

## 2022-01-01 RX ADMIN — Medication 4 MG/HR: at 23:35

## 2022-01-01 RX ADMIN — Medication 4 MG/HR: at 18:54

## 2022-01-01 RX ADMIN — MEROPENEM 500 MG: 500 INJECTION, POWDER, FOR SOLUTION INTRAVENOUS at 21:00

## 2022-01-01 RX ADMIN — OXYCODONE HYDROCHLORIDE 10 MG: 5 TABLET ORAL at 06:00

## 2022-01-01 RX ADMIN — MEROPENEM 500 MG: 500 INJECTION, POWDER, FOR SOLUTION INTRAVENOUS at 09:21

## 2022-01-01 RX ADMIN — CHLORHEXIDINE GLUCONATE 15 ML: 1.2 RINSE ORAL at 08:22

## 2022-01-01 RX ADMIN — Medication: at 17:46

## 2022-01-01 RX ADMIN — NOREPINEPHRINE BITARTRATE 17 MCG/MIN: 1 INJECTION, SOLUTION, CONCENTRATE INTRAVENOUS at 21:52

## 2022-01-01 RX ADMIN — CHLORHEXIDINE GLUCONATE 15 ML: 1.2 RINSE ORAL at 09:01

## 2022-01-01 RX ADMIN — WATER 1 MG: 1 INJECTION INTRAMUSCULAR; INTRAVENOUS; SUBCUTANEOUS at 23:13

## 2022-01-01 RX ADMIN — METOCLOPRAMIDE 5 MG: 5 INJECTION, SOLUTION INTRAMUSCULAR; INTRAVENOUS at 17:14

## 2022-01-01 RX ADMIN — Medication 4 MG/HR: at 06:52

## 2022-01-01 RX ADMIN — POTASSIUM CHLORIDE 20 MEQ: 29.8 INJECTION, SOLUTION INTRAVENOUS at 11:29

## 2022-01-01 RX ADMIN — ENOXAPARIN SODIUM 90 MG: 100 INJECTION SUBCUTANEOUS at 23:49

## 2022-01-01 RX ADMIN — Medication: at 08:59

## 2022-01-01 RX ADMIN — METHYLPREDNISOLONE SODIUM SUCCINATE 60 MG: 40 INJECTION, POWDER, FOR SOLUTION INTRAMUSCULAR; INTRAVENOUS at 11:26

## 2022-01-01 RX ADMIN — PROPOFOL 50 MCG/KG/MIN: 10 INJECTION, EMULSION INTRAVENOUS at 18:53

## 2022-01-01 RX ADMIN — CISATRACURIUM BESYLATE 3 MCG/KG/MIN: 2 INJECTION INTRAVENOUS at 04:38

## 2022-01-01 RX ADMIN — SODIUM BICARBONATE 50 MEQ: 84 INJECTION, SOLUTION INTRAVENOUS at 05:58

## 2022-01-01 RX ADMIN — DIAZEPAM 10 MG: 5 TABLET ORAL at 17:38

## 2022-01-01 RX ADMIN — THERA TABS 1 TABLET: TAB at 09:34

## 2022-01-01 RX ADMIN — CISATRACURIUM BESYLATE 7 MCG/KG/MIN: 20 INJECTION INTRAVENOUS at 14:00

## 2022-01-01 RX ADMIN — IBUPROFEN 400 MG: 100 SUSPENSION ORAL at 08:29

## 2022-01-01 RX ADMIN — NOREPINEPHRINE BITARTRATE 17 MCG/MIN: 1 INJECTION, SOLUTION, CONCENTRATE INTRAVENOUS at 13:31

## 2022-01-01 RX ADMIN — CHLORHEXIDINE GLUCONATE 15 ML: 1.2 RINSE ORAL at 08:44

## 2022-01-01 RX ADMIN — METOLAZONE 10 MG: 5 TABLET ORAL at 08:43

## 2022-01-01 RX ADMIN — Medication 10 ML: at 14:00

## 2022-01-01 RX ADMIN — Medication 20 MG: at 08:42

## 2022-01-01 RX ADMIN — Medication 45 UNITS: at 20:03

## 2022-01-01 RX ADMIN — Medication 6000 UNITS: at 08:11

## 2022-01-01 RX ADMIN — MINERAL OIL AND WHITE PETROLATUM: 150; 830 OINTMENT OPHTHALMIC at 22:00

## 2022-01-01 RX ADMIN — MEROPENEM 500 MG: 500 INJECTION, POWDER, FOR SOLUTION INTRAVENOUS at 20:45

## 2022-01-01 RX ADMIN — DEXMEDETOMIDINE HYDROCHLORIDE 1.5 MCG/KG/HR: 400 INJECTION INTRAVENOUS at 07:02

## 2022-01-01 RX ADMIN — CHLORHEXIDINE GLUCONATE 15 ML: 1.2 RINSE ORAL at 20:52

## 2022-01-01 RX ADMIN — DIAPER RASH SKIN PROTECTENT: at 20:50

## 2022-01-01 RX ADMIN — WATER 1 MG: 1 INJECTION INTRAMUSCULAR; INTRAVENOUS; SUBCUTANEOUS at 21:47

## 2022-01-01 RX ADMIN — MIDAZOLAM HYDROCHLORIDE 5 MG: 1 INJECTION, SOLUTION INTRAMUSCULAR; INTRAVENOUS at 16:16

## 2022-01-01 RX ADMIN — FUROSEMIDE 40 MG: 10 INJECTION, SOLUTION INTRAMUSCULAR; INTRAVENOUS at 16:27

## 2022-01-01 RX ADMIN — OXYCODONE HYDROCHLORIDE AND ACETAMINOPHEN 500 MG: 500 TABLET ORAL at 09:34

## 2022-01-01 RX ADMIN — MEROPENEM 500 MG: 500 INJECTION, POWDER, FOR SOLUTION INTRAVENOUS at 09:04

## 2022-01-01 RX ADMIN — MEROPENEM 500 MG: 500 INJECTION, POWDER, FOR SOLUTION INTRAVENOUS at 16:12

## 2022-01-01 RX ADMIN — ETOMIDATE 20 MG: 2 INJECTION, SOLUTION INTRAVENOUS at 14:13

## 2022-01-01 RX ADMIN — Medication 20 MG/HR: at 06:24

## 2022-01-01 RX ADMIN — Medication 4 MG/HR: at 21:17

## 2022-01-01 RX ADMIN — Medication 20 MG/HR: at 23:53

## 2022-01-01 RX ADMIN — DIAPER RASH SKIN PROTECTENT: at 09:25

## 2022-01-01 RX ADMIN — MIDODRINE HYDROCHLORIDE 20 MG: 5 TABLET ORAL at 23:59

## 2022-01-01 RX ADMIN — Medication 30 UNITS: at 00:13

## 2022-01-01 RX ADMIN — Medication 2000 UNITS: at 08:59

## 2022-01-01 RX ADMIN — Medication 4 MG/HR: at 01:34

## 2022-01-01 RX ADMIN — CHLORHEXIDINE GLUCONATE 15 ML: 1.2 RINSE ORAL at 08:28

## 2022-01-01 RX ADMIN — MIDODRINE HYDROCHLORIDE 20 MG: 5 TABLET ORAL at 15:08

## 2022-01-01 RX ADMIN — Medication 10 UNITS: at 08:59

## 2022-01-01 RX ADMIN — DEXMEDETOMIDINE HYDROCHLORIDE 1.5 MCG/KG/HR: 400 INJECTION INTRAVENOUS at 02:39

## 2022-01-01 RX ADMIN — DIAPER RASH SKIN PROTECTENT: at 20:46

## 2022-01-01 RX ADMIN — DIAZEPAM 10 MG: 5 TABLET ORAL at 23:50

## 2022-01-01 RX ADMIN — Medication 4 MG/HR: at 02:00

## 2022-01-01 RX ADMIN — PIPERACILLIN AND TAZOBACTAM 3.38 G: 3; .375 INJECTION, POWDER, LYOPHILIZED, FOR SOLUTION INTRAVENOUS at 20:33

## 2022-01-01 RX ADMIN — Medication 20 MG/HR: at 00:45

## 2022-01-01 RX ADMIN — LINEZOLID 600 MG: 600 INJECTION, SOLUTION INTRAVENOUS at 20:58

## 2022-01-01 RX ADMIN — DIAPER RASH SKIN PROTECTENT: at 09:04

## 2022-01-01 RX ADMIN — OXYCODONE HYDROCHLORIDE AND ACETAMINOPHEN 500 MG: 500 TABLET ORAL at 08:11

## 2022-01-01 RX ADMIN — DEXMEDETOMIDINE HYDROCHLORIDE 0.6 MCG/KG/HR: 400 INJECTION INTRAVENOUS at 14:16

## 2022-01-01 RX ADMIN — DIAPER RASH SKIN PROTECTENT: at 20:52

## 2022-01-01 RX ADMIN — Medication 6000 UNITS: at 09:34

## 2022-01-01 RX ADMIN — MIDODRINE HYDROCHLORIDE 20 MG: 5 TABLET ORAL at 23:48

## 2022-01-01 RX ADMIN — ALBUMIN (HUMAN) 25 G: 0.25 INJECTION, SOLUTION INTRAVENOUS at 16:03

## 2022-01-01 RX ADMIN — CHLORHEXIDINE GLUCONATE 15 ML: 1.2 RINSE ORAL at 20:18

## 2022-01-01 RX ADMIN — AMIODARONE HYDROCHLORIDE 0.5 MG/MIN: 50 INJECTION, SOLUTION INTRAVENOUS at 13:48

## 2022-01-01 RX ADMIN — DIAPER RASH SKIN PROTECTENT: at 08:13

## 2022-01-01 RX ADMIN — CISATRACURIUM BESYLATE 5 MCG/KG/MIN: 20 INJECTION INTRAVENOUS at 23:00

## 2022-01-01 RX ADMIN — Medication: at 08:45

## 2022-01-01 RX ADMIN — Medication 30 UNITS: at 01:00

## 2022-01-01 RX ADMIN — Medication 7 UNITS: at 00:12

## 2022-01-01 RX ADMIN — OXYCODONE HYDROCHLORIDE 10 MG: 5 TABLET ORAL at 23:32

## 2022-01-01 RX ADMIN — Medication 30 UNITS: at 00:22

## 2022-01-01 RX ADMIN — Medication 9 UNITS: at 11:50

## 2022-01-01 RX ADMIN — MINERAL OIL AND WHITE PETROLATUM: 150; 830 OINTMENT OPHTHALMIC at 21:58

## 2022-01-01 RX ADMIN — Medication 20 MG/HR: at 02:30

## 2022-01-01 RX ADMIN — MIDODRINE HYDROCHLORIDE 20 MG: 5 TABLET ORAL at 08:59

## 2022-01-01 RX ADMIN — Medication 9 UNITS: at 00:30

## 2022-01-01 RX ADMIN — Medication 4 MG/HR: at 07:04

## 2022-01-01 RX ADMIN — LACTULOSE 15 ML: 20 SOLUTION ORAL at 22:43

## 2022-01-01 RX ADMIN — VANCOMYCIN HYDROCHLORIDE 1000 MG: 1 INJECTION, POWDER, LYOPHILIZED, FOR SOLUTION INTRAVENOUS at 18:13

## 2022-01-01 RX ADMIN — Medication 10 UNITS: at 20:33

## 2022-01-01 RX ADMIN — Medication 20 MG/HR: at 15:50

## 2022-01-01 RX ADMIN — AMIODARONE HYDROCHLORIDE 0.5 MG/MIN: 50 INJECTION, SOLUTION INTRAVENOUS at 01:58

## 2022-01-01 RX ADMIN — FUROSEMIDE 40 MG: 10 INJECTION, SOLUTION INTRAMUSCULAR; INTRAVENOUS at 12:55

## 2022-01-01 RX ADMIN — METOCLOPRAMIDE 5 MG: 5 INJECTION, SOLUTION INTRAMUSCULAR; INTRAVENOUS at 22:44

## 2022-01-01 RX ADMIN — Medication 20 MG: at 21:07

## 2022-01-01 RX ADMIN — DIAZEPAM 10 MG: 5 TABLET ORAL at 00:17

## 2022-01-01 RX ADMIN — SODIUM CHLORIDE 5 MG/HR: 9 INJECTION, SOLUTION INTRAVENOUS at 00:27

## 2022-01-01 RX ADMIN — DIAZEPAM 10 MG: 5 TABLET ORAL at 12:09

## 2022-01-01 RX ADMIN — Medication 4 MG/HR: at 00:01

## 2022-01-01 RX ADMIN — THERA TABS 1 TABLET: TAB at 08:13

## 2022-01-01 RX ADMIN — MEROPENEM 500 MG: 500 INJECTION, POWDER, FOR SOLUTION INTRAVENOUS at 02:27

## 2022-01-01 RX ADMIN — CISATRACURIUM BESYLATE 7.5 MCG/KG/MIN: 20 INJECTION INTRAVENOUS at 09:01

## 2022-01-01 RX ADMIN — DIAZEPAM 10 MG: 5 TABLET ORAL at 06:00

## 2022-01-01 RX ADMIN — Medication 20 MG/HR: at 10:40

## 2022-01-01 RX ADMIN — Medication: at 08:27

## 2022-01-01 RX ADMIN — MEROPENEM 500 MG: 500 INJECTION, POWDER, FOR SOLUTION INTRAVENOUS at 08:59

## 2022-01-01 RX ADMIN — METHYLPREDNISOLONE SODIUM SUCCINATE 60 MG: 40 INJECTION, POWDER, FOR SOLUTION INTRAMUSCULAR; INTRAVENOUS at 08:43

## 2022-01-01 RX ADMIN — SODIUM CHLORIDE 10 MG/HR: 9 INJECTION, SOLUTION INTRAVENOUS at 15:46

## 2022-01-01 RX ADMIN — Medication 6 MG/HR: at 02:28

## 2022-01-01 RX ADMIN — LINEZOLID 600 MG: 600 INJECTION, SOLUTION INTRAVENOUS at 20:54

## 2022-01-01 RX ADMIN — Medication 7 UNITS: at 03:45

## 2022-01-01 RX ADMIN — ENOXAPARIN SODIUM 90 MG: 100 INJECTION SUBCUTANEOUS at 11:52

## 2022-01-01 RX ADMIN — Medication 20 MG/HR: at 09:10

## 2022-01-01 RX ADMIN — MEROPENEM 500 MG: 500 INJECTION, POWDER, FOR SOLUTION INTRAVENOUS at 20:46

## 2022-01-01 RX ADMIN — Medication 10 UNITS: at 20:34

## 2022-01-01 RX ADMIN — CISATRACURIUM BESYLATE 7.5 MCG/KG/MIN: 20 INJECTION INTRAVENOUS at 18:28

## 2022-01-01 RX ADMIN — Medication 20 MG/HR: at 20:51

## 2022-01-01 RX ADMIN — Medication 20 MG/HR: at 20:06

## 2022-01-01 RX ADMIN — ENOXAPARIN SODIUM 90 MG: 100 INJECTION SUBCUTANEOUS at 12:09

## 2022-01-01 RX ADMIN — Medication 30 UNITS: at 14:59

## 2022-01-01 RX ADMIN — Medication 30 ML: at 20:30

## 2022-01-01 RX ADMIN — Medication 20 MG/HR: at 18:15

## 2022-01-01 RX ADMIN — DEXMEDETOMIDINE HYDROCHLORIDE 1.5 MCG/KG/HR: 400 INJECTION INTRAVENOUS at 21:00

## 2022-01-01 RX ADMIN — CHLORHEXIDINE GLUCONATE 15 ML: 1.2 RINSE ORAL at 09:05

## 2022-01-01 RX ADMIN — Medication 30 ML: at 05:17

## 2022-01-01 RX ADMIN — OXYCODONE HYDROCHLORIDE 10 MG: 5 TABLET ORAL at 00:17

## 2022-01-01 RX ADMIN — FUROSEMIDE 80 MG: 10 INJECTION, SOLUTION INTRAMUSCULAR; INTRAVENOUS at 09:03

## 2022-01-01 RX ADMIN — ENOXAPARIN SODIUM 90 MG: 100 INJECTION SUBCUTANEOUS at 00:19

## 2022-01-01 RX ADMIN — LINEZOLID 600 MG: 600 INJECTION, SOLUTION INTRAVENOUS at 20:06

## 2022-01-01 RX ADMIN — Medication 7 UNITS: at 20:54

## 2022-01-01 RX ADMIN — Medication 20 MG/HR: at 03:36

## 2022-01-01 RX ADMIN — HYDROCHLOROTHIAZIDE 25 MG: 25 TABLET ORAL at 06:22

## 2022-01-01 RX ADMIN — MEROPENEM 500 MG: 500 INJECTION, POWDER, FOR SOLUTION INTRAVENOUS at 14:33

## 2022-01-01 RX ADMIN — MINERAL OIL AND WHITE PETROLATUM: 150; 830 OINTMENT OPHTHALMIC at 21:24

## 2022-01-01 RX ADMIN — ALBUMIN (HUMAN) 25 G: 0.25 INJECTION, SOLUTION INTRAVENOUS at 21:30

## 2022-01-01 RX ADMIN — DEXMEDETOMIDINE HYDROCHLORIDE 1 MCG/KG/HR: 400 INJECTION INTRAVENOUS at 17:47

## 2022-01-01 RX ADMIN — DIAPER RASH SKIN PROTECTENT: at 09:20

## 2022-01-01 RX ADMIN — ALBUMIN (HUMAN) 25 G: 0.25 INJECTION, SOLUTION INTRAVENOUS at 09:06

## 2022-01-01 RX ADMIN — NOREPINEPHRINE BITARTRATE 17 MCG/MIN: 1 INJECTION, SOLUTION, CONCENTRATE INTRAVENOUS at 22:42

## 2022-01-01 RX ADMIN — DIAZEPAM 10 MG: 5 TABLET ORAL at 11:37

## 2022-01-01 RX ADMIN — NOREPINEPHRINE BITARTRATE 17 MCG/MIN: 1 INJECTION, SOLUTION, CONCENTRATE INTRAVENOUS at 13:09

## 2022-01-01 RX ADMIN — ATORVASTATIN CALCIUM 20 MG: 20 TABLET, FILM COATED ORAL at 09:00

## 2022-01-01 RX ADMIN — DIAZEPAM 10 MG: 5 TABLET ORAL at 06:28

## 2022-01-01 RX ADMIN — Medication 20 MG: at 08:43

## 2022-01-01 RX ADMIN — ENOXAPARIN SODIUM 90 MG: 100 INJECTION SUBCUTANEOUS at 12:17

## 2022-01-01 RX ADMIN — Medication 10 ML: at 22:26

## 2022-01-01 RX ADMIN — Medication 9 UNITS: at 16:28

## 2022-01-01 RX ADMIN — MEROPENEM 500 MG: 500 INJECTION, POWDER, FOR SOLUTION INTRAVENOUS at 08:11

## 2022-01-01 RX ADMIN — DEXMEDETOMIDINE HYDROCHLORIDE 1.5 MCG/KG/HR: 400 INJECTION INTRAVENOUS at 15:36

## 2022-01-01 RX ADMIN — ENOXAPARIN SODIUM 80 MG: 100 INJECTION SUBCUTANEOUS at 11:32

## 2022-01-01 RX ADMIN — Medication 20 MG/HR: at 21:55

## 2022-01-01 RX ADMIN — Medication 20 MG/HR: at 17:00

## 2022-01-01 RX ADMIN — DEXMEDETOMIDINE HYDROCHLORIDE 0.8 MCG/KG/HR: 400 INJECTION INTRAVENOUS at 12:08

## 2022-01-01 RX ADMIN — Medication 16 MG/HR: at 03:42

## 2022-01-01 RX ADMIN — Medication 15 UNITS: at 20:27

## 2022-01-01 RX ADMIN — DIAPER RASH SKIN PROTECTENT: at 09:35

## 2022-01-01 RX ADMIN — Medication 20 MG: at 20:33

## 2022-01-01 RX ADMIN — Medication 3 UNITS: at 05:48

## 2022-01-01 RX ADMIN — Medication 16 MG/HR: at 04:55

## 2022-01-01 RX ADMIN — METOPROLOL TARTRATE 2.5 MG: 5 INJECTION INTRAVENOUS at 08:40

## 2022-01-01 RX ADMIN — Medication 3 UNITS: at 17:54

## 2022-01-01 RX ADMIN — ENOXAPARIN SODIUM 80 MG: 100 INJECTION SUBCUTANEOUS at 22:44

## 2022-01-01 RX ADMIN — Medication 12 UNITS: at 16:23

## 2022-01-01 RX ADMIN — DEXTROSE MONOHYDRATE 25 G: 25 INJECTION, SOLUTION INTRAVENOUS at 06:22

## 2022-01-01 RX ADMIN — OXYCODONE HYDROCHLORIDE 10 MG: 5 TABLET ORAL at 11:37

## 2022-01-01 RX ADMIN — OXYCODONE HYDROCHLORIDE 10 MG: 5 TABLET ORAL at 17:46

## 2022-01-01 RX ADMIN — LINEZOLID 600 MG: 600 INJECTION, SOLUTION INTRAVENOUS at 09:24

## 2022-01-01 RX ADMIN — Medication 20 MG/HR: at 21:30

## 2022-01-01 RX ADMIN — ALBUMIN (HUMAN) 25 G: 0.25 INJECTION, SOLUTION INTRAVENOUS at 09:02

## 2022-01-01 RX ADMIN — Medication 9 UNITS: at 00:00

## 2022-01-01 RX ADMIN — DIAZEPAM 10 MG: 5 TABLET ORAL at 11:53

## 2022-01-01 RX ADMIN — ALBUMIN (HUMAN) 25 G: 0.25 INJECTION, SOLUTION INTRAVENOUS at 03:34

## 2022-01-01 RX ADMIN — Medication 20 MG/HR: at 06:40

## 2022-01-01 RX ADMIN — DEXMEDETOMIDINE HYDROCHLORIDE 1 MCG/KG/HR: 400 INJECTION INTRAVENOUS at 10:30

## 2022-01-01 RX ADMIN — Medication 20 UNITS: at 09:03

## 2022-01-01 RX ADMIN — MIDAZOLAM HYDROCHLORIDE 5 MG: 1 INJECTION, SOLUTION INTRAMUSCULAR; INTRAVENOUS at 12:56

## 2022-01-01 RX ADMIN — Medication 4 MG/HR: at 17:33

## 2022-01-01 RX ADMIN — POTASSIUM CHLORIDE 20 MEQ: 29.8 INJECTION, SOLUTION INTRAVENOUS at 09:01

## 2022-01-01 RX ADMIN — DEXMEDETOMIDINE HYDROCHLORIDE 1.5 MCG/KG/HR: 400 INJECTION INTRAVENOUS at 20:17

## 2022-01-01 RX ADMIN — OXYCODONE HYDROCHLORIDE AND ACETAMINOPHEN 500 MG: 500 TABLET ORAL at 09:20

## 2022-01-01 RX ADMIN — DEXMEDETOMIDINE HYDROCHLORIDE 1 MCG/KG/HR: 400 INJECTION INTRAVENOUS at 00:26

## 2022-01-01 RX ADMIN — Medication 20 MG: at 21:20

## 2022-01-01 RX ADMIN — CISATRACURIUM BESYLATE 7.5 MCG/KG/MIN: 20 INJECTION INTRAVENOUS at 18:10

## 2022-01-01 RX ADMIN — DEXMEDETOMIDINE HYDROCHLORIDE 1.5 MCG/KG/HR: 400 INJECTION INTRAVENOUS at 10:43

## 2022-01-01 RX ADMIN — MEROPENEM 500 MG: 500 INJECTION, POWDER, FOR SOLUTION INTRAVENOUS at 20:54

## 2022-01-01 RX ADMIN — OXYCODONE HYDROCHLORIDE 10 MG: 5 TABLET ORAL at 11:52

## 2022-01-01 RX ADMIN — Medication 16 MG/HR: at 21:45

## 2022-01-01 RX ADMIN — Medication: at 17:39

## 2022-01-01 RX ADMIN — Medication 20 MG/HR: at 03:45

## 2022-01-01 RX ADMIN — Medication 20 UNITS: at 06:00

## 2022-01-01 RX ADMIN — Medication 45 UNITS: at 21:56

## 2022-01-01 RX ADMIN — Medication 4 MG/HR: at 06:36

## 2022-01-01 RX ADMIN — VANCOMYCIN HYDROCHLORIDE 1250 MG: 1.25 INJECTION, POWDER, LYOPHILIZED, FOR SOLUTION INTRAVENOUS at 08:19

## 2022-01-01 RX ADMIN — Medication 6 MG/HR: at 21:17

## 2022-01-01 RX ADMIN — CISATRACURIUM BESYLATE 7.5 MCG/KG/MIN: 20 INJECTION INTRAVENOUS at 04:25

## 2022-01-01 RX ADMIN — THERA TABS 1 TABLET: TAB at 09:06

## 2022-01-01 RX ADMIN — LINEZOLID 600 MG: 600 INJECTION, SOLUTION INTRAVENOUS at 21:08

## 2022-01-01 RX ADMIN — FUROSEMIDE 40 MG: 10 INJECTION, SOLUTION INTRAVENOUS at 09:16

## 2022-01-01 RX ADMIN — OXYCODONE HYDROCHLORIDE AND ACETAMINOPHEN 500 MG: 500 TABLET ORAL at 08:13

## 2022-01-01 RX ADMIN — IBUPROFEN 400 MG: 100 SUSPENSION ORAL at 00:33

## 2022-01-01 RX ADMIN — Medication: at 17:30

## 2022-01-01 RX ADMIN — CISATRACURIUM BESYLATE 9.5 MCG/KG/MIN: 20 INJECTION INTRAVENOUS at 17:48

## 2022-01-01 RX ADMIN — Medication 10 ML: at 13:19

## 2022-01-01 RX ADMIN — CHLORHEXIDINE GLUCONATE 15 ML: 1.2 RINSE ORAL at 22:00

## 2022-01-01 RX ADMIN — LINEZOLID 600 MG: 600 INJECTION, SOLUTION INTRAVENOUS at 08:43

## 2022-01-01 RX ADMIN — MINERAL OIL AND WHITE PETROLATUM: 150; 830 OINTMENT OPHTHALMIC at 20:53

## 2022-01-01 RX ADMIN — DEXMEDETOMIDINE HYDROCHLORIDE 0.8 MCG/KG/HR: 400 INJECTION INTRAVENOUS at 23:19

## 2022-01-01 RX ADMIN — PROPOFOL 50 MCG/KG/MIN: 10 INJECTION, EMULSION INTRAVENOUS at 08:56

## 2022-01-01 RX ADMIN — METOCLOPRAMIDE 5 MG: 5 INJECTION, SOLUTION INTRAMUSCULAR; INTRAVENOUS at 11:31

## 2022-01-01 RX ADMIN — Medication 10 ML: at 13:30

## 2022-01-01 RX ADMIN — ENOXAPARIN SODIUM 80 MG: 100 INJECTION SUBCUTANEOUS at 00:39

## 2022-01-01 RX ADMIN — OXYCODONE HYDROCHLORIDE 10 MG: 5 TABLET ORAL at 23:50

## 2022-01-01 RX ADMIN — SODIUM CHLORIDE 10 MCG/KG/MIN: 900 INJECTION, SOLUTION INTRAVENOUS at 02:00

## 2022-01-01 RX ADMIN — Medication 20 MG/HR: at 14:01

## 2022-01-01 RX ADMIN — THERA TABS 1 TABLET: TAB at 09:20

## 2022-01-01 RX ADMIN — ENOXAPARIN SODIUM 80 MG: 100 INJECTION SUBCUTANEOUS at 00:31

## 2022-01-01 RX ADMIN — DIAPER RASH SKIN PROTECTENT: at 08:31

## 2022-01-01 RX ADMIN — Medication 20 MG/HR: at 00:15

## 2022-01-01 RX ADMIN — MIDAZOLAM HYDROCHLORIDE 5 MG: 1 INJECTION, SOLUTION INTRAMUSCULAR; INTRAVENOUS at 11:29

## 2022-01-01 RX ADMIN — METHYLPREDNISOLONE SODIUM SUCCINATE 60 MG: 40 INJECTION, POWDER, FOR SOLUTION INTRAMUSCULAR; INTRAVENOUS at 09:44

## 2022-01-01 RX ADMIN — Medication 3 UNITS: at 05:38

## 2022-01-01 RX ADMIN — Medication 7 UNITS: at 20:46

## 2022-01-01 RX ADMIN — ENOXAPARIN SODIUM 90 MG: 100 INJECTION SUBCUTANEOUS at 11:36

## 2022-01-01 RX ADMIN — DIAPER RASH SKIN PROTECTENT: at 06:49

## 2022-01-01 RX ADMIN — DIAZEPAM 10 MG: 5 TABLET ORAL at 17:29

## 2022-01-01 RX ADMIN — Medication 4 MG/HR: at 01:16

## 2022-01-01 RX ADMIN — Medication 2000 UNITS: at 09:04

## 2022-01-01 RX ADMIN — DEXMEDETOMIDINE HYDROCHLORIDE 1.5 MCG/KG/HR: 400 INJECTION INTRAVENOUS at 12:59

## 2022-01-01 RX ADMIN — Medication 3 UNITS: at 05:55

## 2022-01-01 RX ADMIN — MEROPENEM 500 MG: 500 INJECTION, POWDER, FOR SOLUTION INTRAVENOUS at 14:36

## 2022-01-01 RX ADMIN — MAGNESIUM SULFATE HEPTAHYDRATE 1 G: 1 INJECTION, SOLUTION INTRAVENOUS at 21:55

## 2022-01-01 RX ADMIN — Medication 20 MG: at 08:11

## 2022-01-01 RX ADMIN — Medication 6000 UNITS: at 08:23

## 2022-01-01 RX ADMIN — METOCLOPRAMIDE 5 MG: 5 INJECTION, SOLUTION INTRAMUSCULAR; INTRAVENOUS at 05:51

## 2022-01-01 RX ADMIN — Medication 4 MG/HR: at 01:57

## 2022-01-01 RX ADMIN — NOREPINEPHRINE BITARTRATE 9 MCG/MIN: 1 INJECTION, SOLUTION, CONCENTRATE INTRAVENOUS at 04:11

## 2022-01-01 RX ADMIN — OXYCODONE HYDROCHLORIDE 10 MG: 5 TABLET ORAL at 00:19

## 2022-01-01 RX ADMIN — Medication 10 ML: at 06:23

## 2022-01-01 RX ADMIN — MEROPENEM 500 MG: 500 INJECTION, POWDER, FOR SOLUTION INTRAVENOUS at 09:32

## 2022-01-01 RX ADMIN — PIPERACILLIN AND TAZOBACTAM 3.38 G: 3; .375 INJECTION, POWDER, LYOPHILIZED, FOR SOLUTION INTRAVENOUS at 20:47

## 2022-01-01 RX ADMIN — DIAZEPAM 10 MG: 5 TABLET ORAL at 17:45

## 2022-01-01 RX ADMIN — CISATRACURIUM BESYLATE 7.5 MCG/KG/MIN: 20 INJECTION INTRAVENOUS at 14:01

## 2022-01-01 RX ADMIN — MEROPENEM 500 MG: 500 INJECTION, POWDER, FOR SOLUTION INTRAVENOUS at 20:08

## 2022-01-01 RX ADMIN — CHLORHEXIDINE GLUCONATE 15 ML: 1.2 RINSE ORAL at 20:19

## 2022-01-01 RX ADMIN — PIPERACILLIN AND TAZOBACTAM 3.38 G: 3; .375 INJECTION, POWDER, LYOPHILIZED, FOR SOLUTION INTRAVENOUS at 04:06

## 2022-01-01 RX ADMIN — AMIODARONE HYDROCHLORIDE 0.5 MG/MIN: 50 INJECTION, SOLUTION INTRAVENOUS at 05:07

## 2022-01-01 RX ADMIN — Medication 6 MG/HR: at 23:30

## 2022-01-01 RX ADMIN — SODIUM CHLORIDE 100 MG: 9 INJECTION, SOLUTION INTRAVENOUS at 07:12

## 2022-01-01 RX ADMIN — DEXMEDETOMIDINE HYDROCHLORIDE 0.8 MCG/KG/HR: 400 INJECTION INTRAVENOUS at 00:21

## 2022-01-01 RX ADMIN — ALBUMIN (HUMAN) 25 G: 0.25 INJECTION, SOLUTION INTRAVENOUS at 21:51

## 2022-01-01 RX ADMIN — Medication 20 MG: at 09:20

## 2022-01-01 RX ADMIN — DEXMEDETOMIDINE HYDROCHLORIDE 1 MCG/KG/HR: 400 INJECTION INTRAVENOUS at 23:07

## 2022-01-01 RX ADMIN — OXYCODONE HYDROCHLORIDE 10 MG: 5 TABLET ORAL at 12:09

## 2022-01-01 RX ADMIN — PROPOFOL 50 MCG/KG/MIN: 10 INJECTION, EMULSION INTRAVENOUS at 00:47

## 2022-01-01 RX ADMIN — Medication 20 MG: at 09:33

## 2022-01-01 RX ADMIN — PIPERACILLIN AND TAZOBACTAM 3.38 G: 3; .375 INJECTION, POWDER, LYOPHILIZED, FOR SOLUTION INTRAVENOUS at 03:52

## 2022-01-01 RX ADMIN — Medication 20 MG/HR: at 16:00

## 2022-01-01 RX ADMIN — Medication 4 MG/HR: at 19:22

## 2022-01-01 RX ADMIN — MIDAZOLAM 10 MG: 1 INJECTION INTRAMUSCULAR; INTRAVENOUS at 14:09

## 2022-01-01 RX ADMIN — Medication 20 MG/HR: at 11:00

## 2022-01-01 RX ADMIN — SODIUM CHLORIDE 10 MCG/KG/MIN: 900 INJECTION, SOLUTION INTRAVENOUS at 07:39

## 2022-01-01 RX ADMIN — MINERAL OIL AND WHITE PETROLATUM: 150; 830 OINTMENT OPHTHALMIC at 21:07

## 2022-01-01 RX ADMIN — Medication 10 ML: at 13:09

## 2022-01-01 RX ADMIN — LINEZOLID 600 MG: 600 INJECTION, SOLUTION INTRAVENOUS at 21:19

## 2022-01-01 RX ADMIN — LINEZOLID 600 MG: 600 INJECTION, SOLUTION INTRAVENOUS at 08:50

## 2022-01-01 RX ADMIN — DIAPER RASH SKIN PROTECTENT: at 20:41

## 2022-01-01 RX ADMIN — Medication 4 MG/HR: at 18:56

## 2022-01-01 RX ADMIN — AMIODARONE HYDROCHLORIDE 0.5 MG/MIN: 50 INJECTION, SOLUTION INTRAVENOUS at 02:33

## 2022-01-01 RX ADMIN — DIPHENHYDRAMINE HYDROCHLORIDE 50 MG: 50 INJECTION, SOLUTION INTRAMUSCULAR; INTRAVENOUS at 10:27

## 2022-01-01 RX ADMIN — Medication 3 UNITS: at 11:53

## 2022-01-01 RX ADMIN — MEROPENEM 500 MG: 500 INJECTION, POWDER, FOR SOLUTION INTRAVENOUS at 09:16

## 2022-01-01 RX ADMIN — CISATRACURIUM BESYLATE 5 MCG/KG/MIN: 20 INJECTION INTRAVENOUS at 09:00

## 2022-01-01 RX ADMIN — ALBUMIN (HUMAN) 25 G: 0.25 INJECTION, SOLUTION INTRAVENOUS at 08:43

## 2022-01-01 RX ADMIN — Medication 9 UNITS: at 17:00

## 2022-01-01 RX ADMIN — Medication 20 MG/HR: at 03:43

## 2022-01-01 RX ADMIN — DIAZEPAM 10 MG: 5 TABLET ORAL at 18:08

## 2022-01-01 RX ADMIN — POTASSIUM CHLORIDE 20 MEQ: 29.8 INJECTION, SOLUTION INTRAVENOUS at 10:14

## 2022-01-01 RX ADMIN — Medication: at 09:02

## 2022-01-01 RX ADMIN — Medication 10 UNITS: at 08:46

## 2022-01-01 RX ADMIN — ENOXAPARIN SODIUM 90 MG: 100 INJECTION SUBCUTANEOUS at 11:37

## 2022-01-01 RX ADMIN — DEXMEDETOMIDINE HYDROCHLORIDE 1.5 MCG/KG/HR: 400 INJECTION INTRAVENOUS at 00:07

## 2022-01-01 RX ADMIN — METHYLPREDNISOLONE SODIUM SUCCINATE 60 MG: 40 INJECTION, POWDER, FOR SOLUTION INTRAMUSCULAR; INTRAVENOUS at 09:06

## 2022-01-01 RX ADMIN — MEROPENEM 500 MG: 500 INJECTION, POWDER, FOR SOLUTION INTRAVENOUS at 20:29

## 2022-01-01 RX ADMIN — GUAIFENESIN 400 MG: 200 SOLUTION ORAL at 13:30

## 2022-01-01 RX ADMIN — ENOXAPARIN SODIUM 90 MG: 100 INJECTION SUBCUTANEOUS at 00:08

## 2022-01-01 RX ADMIN — THERA TABS 1 TABLET: TAB at 09:02

## 2022-01-01 RX ADMIN — PROPOFOL 100 MG: 10 INJECTION, EMULSION INTRAVENOUS at 14:15

## 2022-01-01 RX ADMIN — NOREPINEPHRINE BITARTRATE 15 MCG/MIN: 1 INJECTION, SOLUTION, CONCENTRATE INTRAVENOUS at 16:03

## 2022-01-01 RX ADMIN — Medication: at 17:13

## 2022-01-01 RX ADMIN — Medication 3 UNITS: at 22:51

## 2022-01-01 RX ADMIN — Medication 20 MG: at 21:58

## 2022-01-01 RX ADMIN — Medication: at 08:13

## 2022-01-01 RX ADMIN — DEXMEDETOMIDINE HYDROCHLORIDE 1.5 MCG/KG/HR: 400 INJECTION INTRAVENOUS at 05:46

## 2022-01-01 RX ADMIN — MINERAL OIL AND WHITE PETROLATUM: 150; 830 OINTMENT OPHTHALMIC at 09:04

## 2022-01-01 RX ADMIN — Medication 7 UNITS: at 21:09

## 2022-01-01 RX ADMIN — MINERAL OIL AND WHITE PETROLATUM: 150; 830 OINTMENT OPHTHALMIC at 20:11

## 2022-01-01 RX ADMIN — Medication 10 ML: at 21:27

## 2022-01-01 RX ADMIN — DEXMEDETOMIDINE HYDROCHLORIDE 0.2 MCG/KG/HR: 400 INJECTION INTRAVENOUS at 02:29

## 2022-01-01 RX ADMIN — DEXMEDETOMIDINE HYDROCHLORIDE 0.8 MCG/KG/HR: 400 INJECTION INTRAVENOUS at 17:50

## 2022-01-01 RX ADMIN — OXYCODONE HYDROCHLORIDE AND ACETAMINOPHEN 500 MG: 500 TABLET ORAL at 09:00

## 2022-01-01 RX ADMIN — METOCLOPRAMIDE 5 MG: 5 INJECTION, SOLUTION INTRAMUSCULAR; INTRAVENOUS at 17:51

## 2022-01-01 RX ADMIN — Medication 7 UNITS: at 20:00

## 2022-01-01 RX ADMIN — DEXMEDETOMIDINE HYDROCHLORIDE 0.6 MCG/KG/HR: 400 INJECTION INTRAVENOUS at 06:29

## 2022-01-01 RX ADMIN — ENOXAPARIN SODIUM 90 MG: 100 INJECTION SUBCUTANEOUS at 23:22

## 2022-01-01 RX ADMIN — DIAPER RASH SKIN PROTECTENT: at 05:15

## 2022-01-01 RX ADMIN — DIAZEPAM 10 MG: 5 TABLET ORAL at 06:24

## 2022-01-01 RX ADMIN — ALBUMIN (HUMAN) 25 G: 0.25 INJECTION, SOLUTION INTRAVENOUS at 09:42

## 2022-01-01 RX ADMIN — Medication 30 UNITS: at 13:02

## 2022-01-01 RX ADMIN — NOREPINEPHRINE BITARTRATE 17 MCG/MIN: 1 INJECTION, SOLUTION, CONCENTRATE INTRAVENOUS at 21:09

## 2022-01-01 RX ADMIN — Medication 10 ML: at 06:29

## 2022-01-01 RX ADMIN — PIPERACILLIN AND TAZOBACTAM 3.38 G: 3; .375 INJECTION, POWDER, LYOPHILIZED, FOR SOLUTION INTRAVENOUS at 11:32

## 2022-01-01 RX ADMIN — Medication 45 UNITS: at 09:56

## 2022-01-01 RX ADMIN — Medication 10 UNITS: at 10:13

## 2022-01-01 RX ADMIN — METHYLPREDNISOLONE SODIUM SUCCINATE 60 MG: 40 INJECTION, POWDER, FOR SOLUTION INTRAMUSCULAR; INTRAVENOUS at 08:23

## 2022-01-01 RX ADMIN — ACETAMINOPHEN 650 MG: 160 SOLUTION ORAL at 20:56

## 2022-01-01 RX ADMIN — SODIUM CHLORIDE 10 MCG/KG/MIN: 900 INJECTION, SOLUTION INTRAVENOUS at 04:00

## 2022-01-01 RX ADMIN — DIAPER RASH SKIN PROTECTENT: at 21:00

## 2022-01-01 RX ADMIN — CHLORHEXIDINE GLUCONATE 15 ML: 1.2 RINSE ORAL at 09:25

## 2022-01-01 RX ADMIN — MIDODRINE HYDROCHLORIDE 20 MG: 5 TABLET ORAL at 15:27

## 2022-01-01 RX ADMIN — Medication 4 MG/HR: at 16:37

## 2022-01-01 RX ADMIN — MEROPENEM 500 MG: 500 INJECTION, POWDER, FOR SOLUTION INTRAVENOUS at 15:57

## 2022-01-01 RX ADMIN — IBUPROFEN 400 MG: 100 SUSPENSION ORAL at 17:44

## 2022-01-01 RX ADMIN — METOCLOPRAMIDE 5 MG: 5 INJECTION, SOLUTION INTRAMUSCULAR; INTRAVENOUS at 00:31

## 2022-01-01 RX ADMIN — MIDODRINE HYDROCHLORIDE 20 MG: 5 TABLET ORAL at 08:11

## 2022-01-01 RX ADMIN — DIAZEPAM 10 MG: 5 TABLET ORAL at 05:44

## 2022-01-01 RX ADMIN — Medication 20 MG: at 09:07

## 2022-01-01 RX ADMIN — Medication: at 09:05

## 2022-01-01 RX ADMIN — AMIODARONE HYDROCHLORIDE 0.5 MG/MIN: 50 INJECTION, SOLUTION INTRAVENOUS at 15:21

## 2022-01-01 RX ADMIN — PROPOFOL 50 MCG/KG/MIN: 10 INJECTION, EMULSION INTRAVENOUS at 08:50

## 2022-01-01 RX ADMIN — Medication 20 MG/HR: at 08:13

## 2022-01-01 RX ADMIN — DIAPER RASH SKIN PROTECTENT: at 20:25

## 2022-01-01 RX ADMIN — Medication 30 ML: at 23:51

## 2022-01-01 RX ADMIN — Medication 4 MG/HR: at 19:42

## 2022-01-01 RX ADMIN — Medication 7 UNITS: at 17:03

## 2022-01-01 RX ADMIN — SODIUM CHLORIDE 5 MG/HR: 9 INJECTION, SOLUTION INTRAVENOUS at 12:58

## 2022-01-01 RX ADMIN — VANCOMYCIN HYDROCHLORIDE 1250 MG: 1.25 INJECTION, POWDER, LYOPHILIZED, FOR SOLUTION INTRAVENOUS at 13:39

## 2022-01-01 RX ADMIN — MIDODRINE HYDROCHLORIDE 20 MG: 5 TABLET ORAL at 07:48

## 2022-01-01 RX ADMIN — CHLORHEXIDINE GLUCONATE 15 ML: 1.2 RINSE ORAL at 20:24

## 2022-01-01 RX ADMIN — METOPROLOL TARTRATE 12.5 MG: 25 TABLET, FILM COATED ORAL at 20:36

## 2022-01-01 RX ADMIN — Medication 20 MG/HR: at 02:36

## 2022-01-01 RX ADMIN — ATORVASTATIN CALCIUM 20 MG: 20 TABLET, FILM COATED ORAL at 08:23

## 2022-01-01 RX ADMIN — ALBUMIN (HUMAN) 25 G: 0.25 INJECTION, SOLUTION INTRAVENOUS at 19:58

## 2022-01-01 RX ADMIN — THERA TABS 1 TABLET: TAB at 08:23

## 2022-01-01 RX ADMIN — VANCOMYCIN HYDROCHLORIDE 1000 MG: 1 INJECTION, POWDER, LYOPHILIZED, FOR SOLUTION INTRAVENOUS at 09:00

## 2022-01-01 RX ADMIN — Medication 45 UNITS: at 09:30

## 2022-01-01 RX ADMIN — MIDODRINE HYDROCHLORIDE 20 MG: 5 TABLET ORAL at 08:42

## 2022-01-01 RX ADMIN — SODIUM CHLORIDE 100 MG: 9 INJECTION, SOLUTION INTRAVENOUS at 08:37

## 2022-01-01 RX ADMIN — Medication 12 UNITS: at 15:06

## 2022-01-01 RX ADMIN — POTASSIUM CHLORIDE 20 MEQ: 29.8 INJECTION, SOLUTION INTRAVENOUS at 23:53

## 2022-01-01 RX ADMIN — CISATRACURIUM BESYLATE 5 MCG/KG/MIN: 20 INJECTION INTRAVENOUS at 18:33

## 2022-01-01 RX ADMIN — PIPERACILLIN AND TAZOBACTAM 3.38 G: 3; .375 INJECTION, POWDER, LYOPHILIZED, FOR SOLUTION INTRAVENOUS at 20:16

## 2022-01-01 RX ADMIN — OXYCODONE HYDROCHLORIDE 10 MG: 5 TABLET ORAL at 17:04

## 2022-01-01 RX ADMIN — Medication 10 ML: at 22:18

## 2022-01-01 RX ADMIN — Medication 20 MG/HR: at 22:23

## 2022-01-01 RX ADMIN — AMIODARONE HYDROCHLORIDE 0.5 MG/MIN: 50 INJECTION, SOLUTION INTRAVENOUS at 16:45

## 2022-01-01 RX ADMIN — MIDODRINE HYDROCHLORIDE 20 MG: 5 TABLET ORAL at 23:37

## 2022-01-01 RX ADMIN — ACETAMINOPHEN 650 MG: 160 SOLUTION ORAL at 12:22

## 2022-01-01 RX ADMIN — METHYLPREDNISOLONE SODIUM SUCCINATE 60 MG: 40 INJECTION, POWDER, FOR SOLUTION INTRAMUSCULAR; INTRAVENOUS at 09:32

## 2022-01-01 RX ADMIN — PROPOFOL 50 MCG/KG/MIN: 10 INJECTION, EMULSION INTRAVENOUS at 03:35

## 2022-01-01 RX ADMIN — MIDODRINE HYDROCHLORIDE 20 MG: 5 TABLET ORAL at 08:44

## 2022-01-01 RX ADMIN — Medication: at 09:20

## 2022-01-01 RX ADMIN — Medication 7 UNITS: at 04:06

## 2022-01-01 RX ADMIN — PROPOFOL 50 MCG/KG/MIN: 10 INJECTION, EMULSION INTRAVENOUS at 15:17

## 2022-01-01 RX ADMIN — WATER 1 MG: 1 INJECTION INTRAMUSCULAR; INTRAVENOUS; SUBCUTANEOUS at 00:55

## 2022-01-01 RX ADMIN — LINEZOLID 600 MG: 600 INJECTION, SOLUTION INTRAVENOUS at 22:16

## 2022-01-01 RX ADMIN — OXYCODONE HYDROCHLORIDE 10 MG: 5 TABLET ORAL at 17:29

## 2022-01-01 RX ADMIN — METOCLOPRAMIDE 5 MG: 5 INJECTION, SOLUTION INTRAMUSCULAR; INTRAVENOUS at 17:38

## 2022-01-01 RX ADMIN — MEROPENEM 500 MG: 500 INJECTION, POWDER, FOR SOLUTION INTRAVENOUS at 21:20

## 2022-01-01 RX ADMIN — DIAPER RASH SKIN PROTECTENT: at 09:01

## 2022-01-01 RX ADMIN — Medication 20 UNITS: at 20:24

## 2022-01-01 RX ADMIN — Medication 4 MG/HR: at 03:05

## 2022-01-01 RX ADMIN — FUROSEMIDE 40 MG: 10 INJECTION, SOLUTION INTRAMUSCULAR; INTRAVENOUS at 14:48

## 2022-01-01 RX ADMIN — Medication 10 ML: at 05:44

## 2022-01-01 RX ADMIN — OXYCODONE HYDROCHLORIDE AND ACETAMINOPHEN 500 MG: 500 TABLET ORAL at 08:29

## 2022-01-01 RX ADMIN — MEROPENEM 500 MG: 500 INJECTION, POWDER, FOR SOLUTION INTRAVENOUS at 10:42

## 2022-01-01 RX ADMIN — Medication 10 ML: at 14:17

## 2022-01-01 RX ADMIN — Medication 3 UNITS: at 00:10

## 2022-01-01 RX ADMIN — Medication 10 UNITS: at 17:02

## 2022-01-01 RX ADMIN — METOCLOPRAMIDE 5 MG: 5 INJECTION, SOLUTION INTRAMUSCULAR; INTRAVENOUS at 00:41

## 2022-01-01 RX ADMIN — Medication 10 ML: at 13:07

## 2022-01-01 RX ADMIN — DEXMEDETOMIDINE HYDROCHLORIDE 1 MCG/KG/HR: 400 INJECTION INTRAVENOUS at 23:03

## 2022-01-01 RX ADMIN — POTASSIUM CHLORIDE 20 MEQ: 29.8 INJECTION, SOLUTION INTRAVENOUS at 21:52

## 2022-01-01 RX ADMIN — ENOXAPARIN SODIUM 90 MG: 100 INJECTION SUBCUTANEOUS at 23:32

## 2022-01-01 RX ADMIN — Medication 16 MG/HR: at 11:32

## 2022-01-01 RX ADMIN — ACETAMINOPHEN 650 MG: 160 SOLUTION ORAL at 04:11

## 2022-01-01 RX ADMIN — Medication 6 MG/HR: at 15:56

## 2022-01-01 RX ADMIN — SODIUM CHLORIDE 10 MCG/KG/MIN: 900 INJECTION, SOLUTION INTRAVENOUS at 17:36

## 2022-01-01 RX ADMIN — Medication 7 UNITS: at 00:00

## 2022-01-01 RX ADMIN — METOPROLOL TARTRATE 5 MG: 5 INJECTION INTRAVENOUS at 20:32

## 2022-01-01 RX ADMIN — CHLORHEXIDINE GLUCONATE 15 ML: 1.2 RINSE ORAL at 20:55

## 2022-01-01 RX ADMIN — DEXMEDETOMIDINE HYDROCHLORIDE 1.5 MCG/KG/HR: 400 INJECTION INTRAVENOUS at 01:05

## 2022-01-01 RX ADMIN — ACETAMINOPHEN 650 MG: 160 SOLUTION ORAL at 11:36

## 2022-01-01 RX ADMIN — Medication 4 MG/HR: at 02:30

## 2022-01-01 RX ADMIN — GUAIFENESIN 400 MG: 200 SOLUTION ORAL at 05:30

## 2022-01-01 RX ADMIN — ENOXAPARIN SODIUM 90 MG: 100 INJECTION SUBCUTANEOUS at 11:53

## 2022-01-01 RX ADMIN — Medication 4 MG/HR: at 14:30

## 2022-01-01 RX ADMIN — SODIUM CHLORIDE 10 MCG/KG/MIN: 900 INJECTION, SOLUTION INTRAVENOUS at 07:21

## 2022-01-01 RX ADMIN — LINEZOLID 600 MG: 600 INJECTION, SOLUTION INTRAVENOUS at 09:04

## 2022-01-01 RX ADMIN — DEXMEDETOMIDINE HYDROCHLORIDE 1.5 MCG/KG/HR: 400 INJECTION INTRAVENOUS at 02:49

## 2022-01-01 RX ADMIN — SODIUM CHLORIDE 10 MCG/KG/MIN: 900 INJECTION, SOLUTION INTRAVENOUS at 01:00

## 2022-01-01 RX ADMIN — THERA TABS 1 TABLET: TAB at 09:13

## 2022-01-01 RX ADMIN — MIDODRINE HYDROCHLORIDE 20 MG: 5 TABLET ORAL at 23:30

## 2022-01-01 RX ADMIN — DEXMEDETOMIDINE HYDROCHLORIDE 0.4 MCG/KG/HR: 400 INJECTION INTRAVENOUS at 11:28

## 2022-01-01 RX ADMIN — Medication 7 UNITS: at 11:53

## 2022-01-01 RX ADMIN — MEROPENEM 500 MG: 500 INJECTION, POWDER, FOR SOLUTION INTRAVENOUS at 21:07

## 2022-01-01 RX ADMIN — MEROPENEM 500 MG: 500 INJECTION, POWDER, FOR SOLUTION INTRAVENOUS at 03:31

## 2022-01-01 RX ADMIN — DEXMEDETOMIDINE HYDROCHLORIDE 1.5 MCG/KG/HR: 400 INJECTION INTRAVENOUS at 23:57

## 2022-01-01 RX ADMIN — MEROPENEM 500 MG: 500 INJECTION, POWDER, FOR SOLUTION INTRAVENOUS at 02:15

## 2022-01-01 RX ADMIN — CHLORHEXIDINE GLUCONATE 15 ML: 1.2 RINSE ORAL at 09:32

## 2022-01-01 RX ADMIN — MIDAZOLAM HYDROCHLORIDE 5 MG: 1 INJECTION, SOLUTION INTRAMUSCULAR; INTRAVENOUS at 12:02

## 2022-01-01 RX ADMIN — Medication 20 MG/HR: at 18:54

## 2022-01-01 RX ADMIN — Medication 12 UNITS: at 00:37

## 2022-01-01 RX ADMIN — HYDROMORPHONE HYDROCHLORIDE 4 MG: 2 INJECTION INTRAMUSCULAR; INTRAVENOUS; SUBCUTANEOUS at 11:52

## 2022-01-01 RX ADMIN — POTASSIUM BICARBONATE 40 MEQ: 782 TABLET, EFFERVESCENT ORAL at 14:57

## 2022-01-01 RX ADMIN — SODIUM CHLORIDE 10 MCG/KG/MIN: 900 INJECTION, SOLUTION INTRAVENOUS at 21:12

## 2022-01-01 RX ADMIN — Medication 10 ML: at 14:39

## 2022-01-01 RX ADMIN — HYDROMORPHONE HYDROCHLORIDE 4 MG: 2 INJECTION INTRAMUSCULAR; INTRAVENOUS; SUBCUTANEOUS at 12:02

## 2022-01-01 RX ADMIN — DEXMEDETOMIDINE HYDROCHLORIDE 1.5 MCG/KG/HR: 400 INJECTION INTRAVENOUS at 11:57

## 2022-01-01 RX ADMIN — MINERAL OIL AND WHITE PETROLATUM: 150; 830 OINTMENT OPHTHALMIC at 21:09

## 2022-01-01 RX ADMIN — Medication 20 MG/HR: at 23:14

## 2022-01-01 RX ADMIN — METOCLOPRAMIDE 5 MG: 5 INJECTION, SOLUTION INTRAMUSCULAR; INTRAVENOUS at 11:37

## 2022-01-01 RX ADMIN — Medication: at 17:29

## 2022-01-01 RX ADMIN — ROCURONIUM BROMIDE 50 MG: 10 INJECTION, SOLUTION INTRAVENOUS at 12:08

## 2022-01-01 RX ADMIN — DEXMEDETOMIDINE HYDROCHLORIDE 0.4 MCG/KG/HR: 400 INJECTION INTRAVENOUS at 18:55

## 2022-01-01 RX ADMIN — CHLORHEXIDINE GLUCONATE 15 ML: 1.2 RINSE ORAL at 20:29

## 2022-01-01 RX ADMIN — THERA TABS 1 TABLET: TAB at 09:26

## 2022-01-01 RX ADMIN — METOCLOPRAMIDE 5 MG: 5 INJECTION, SOLUTION INTRAMUSCULAR; INTRAVENOUS at 11:41

## 2022-01-01 RX ADMIN — Medication 15 UNITS: at 16:46

## 2022-01-01 RX ADMIN — Medication: at 08:11

## 2022-01-01 RX ADMIN — ENOXAPARIN SODIUM 90 MG: 100 INJECTION SUBCUTANEOUS at 12:38

## 2022-01-01 RX ADMIN — Medication 19 MG/HR: at 12:26

## 2022-01-01 RX ADMIN — MEROPENEM 500 MG: 500 INJECTION, POWDER, FOR SOLUTION INTRAVENOUS at 15:27

## 2022-01-01 RX ADMIN — Medication 4 MG/HR: at 18:11

## 2022-01-01 RX ADMIN — LINEZOLID 600 MG: 600 INJECTION, SOLUTION INTRAVENOUS at 20:48

## 2022-01-01 RX ADMIN — OXYCODONE HYDROCHLORIDE 10 MG: 5 TABLET ORAL at 05:44

## 2022-01-01 RX ADMIN — MEROPENEM 500 MG: 500 INJECTION, POWDER, FOR SOLUTION INTRAVENOUS at 09:03

## 2022-01-01 RX ADMIN — ALBUMIN (HUMAN) 25 G: 0.25 INJECTION, SOLUTION INTRAVENOUS at 14:49

## 2022-01-01 RX ADMIN — MEROPENEM 500 MG: 500 INJECTION, POWDER, FOR SOLUTION INTRAVENOUS at 21:56

## 2022-01-01 RX ADMIN — Medication 30 UNITS: at 12:08

## 2022-01-01 RX ADMIN — DIAPER RASH SKIN PROTECTENT: at 20:55

## 2022-01-01 RX ADMIN — METHYLPREDNISOLONE SODIUM SUCCINATE 60 MG: 40 INJECTION, POWDER, FOR SOLUTION INTRAMUSCULAR; INTRAVENOUS at 09:03

## 2022-01-01 RX ADMIN — PIPERACILLIN AND TAZOBACTAM 3.38 G: 3; .375 INJECTION, POWDER, LYOPHILIZED, FOR SOLUTION INTRAVENOUS at 12:25

## 2022-01-01 RX ADMIN — PIPERACILLIN AND TAZOBACTAM 3.38 G: 3; .375 INJECTION, POWDER, LYOPHILIZED, FOR SOLUTION INTRAVENOUS at 02:53

## 2022-01-01 RX ADMIN — Medication 4 MG/HR: at 12:29

## 2022-01-01 RX ADMIN — Medication: at 09:07

## 2022-01-01 RX ADMIN — Medication 16 MG/HR: at 00:00

## 2022-01-01 RX ADMIN — Medication 4 MG/HR: at 05:07

## 2022-01-01 RX ADMIN — Medication 6000 UNITS: at 11:53

## 2022-01-01 RX ADMIN — Medication 10 UNITS: at 20:44

## 2022-01-01 RX ADMIN — ENOXAPARIN SODIUM 90 MG: 100 INJECTION SUBCUTANEOUS at 23:37

## 2022-01-01 RX ADMIN — Medication 20 MG/HR: at 05:46

## 2022-01-01 RX ADMIN — METHYLPREDNISOLONE SODIUM SUCCINATE 60 MG: 40 INJECTION, POWDER, FOR SOLUTION INTRAMUSCULAR; INTRAVENOUS at 09:20

## 2022-01-01 RX ADMIN — DIAPER RASH SKIN PROTECTENT: at 21:08

## 2022-01-01 RX ADMIN — MIDODRINE HYDROCHLORIDE 20 MG: 5 TABLET ORAL at 08:24

## 2022-01-01 RX ADMIN — Medication: at 17:11

## 2022-01-01 RX ADMIN — CISATRACURIUM BESYLATE 7.5 MCG/KG/MIN: 20 INJECTION INTRAVENOUS at 09:05

## 2022-01-01 RX ADMIN — Medication 20 MG/HR: at 23:13

## 2022-01-01 RX ADMIN — AMIODARONE HYDROCHLORIDE 0.5 MG/MIN: 50 INJECTION, SOLUTION INTRAVENOUS at 01:36

## 2022-01-01 RX ADMIN — DEXMEDETOMIDINE HYDROCHLORIDE 0.6 MCG/KG/HR: 400 INJECTION INTRAVENOUS at 14:28

## 2022-01-01 RX ADMIN — CHLORHEXIDINE GLUCONATE 15 ML: 1.2 RINSE ORAL at 09:18

## 2022-01-01 RX ADMIN — MIDODRINE HYDROCHLORIDE 20 MG: 5 TABLET ORAL at 16:03

## 2022-01-01 RX ADMIN — Medication 30 ML: at 05:31

## 2022-01-01 RX ADMIN — MIDODRINE HYDROCHLORIDE 20 MG: 5 TABLET ORAL at 23:13

## 2022-01-01 NOTE — PROGRESS NOTES
0730: Verbal shift change report given to Kayli French RN (oncoming nurse) by Andrei Lamb RN (offgoing nurse). Report included the following information SBAR, Intake/Output, Cardiac Rhythm ST and Alarm Parameters  . 1140: Patient proned with 2 RNs, PCT, and RT. No issues during pronation. Vitals stable. 1930: Verbal shift change report given to KATHRINE Alberts (oncoming nurse) by Kayli French RN (offgoing nurse). Report included the following information SBAR, Intake/Output, Cardiac Rhythm ST and Alarm Parameters .

## 2022-01-01 NOTE — PROGRESS NOTES
1930: Verbal shift change report given to KATHRINE Alberts (oncoming nurse) by Vy Farley RN (offgoing nurse). Report included the following information SBAR, Intake/Output, Cardiac Rhythm ST and Alarm Parameters . art line and cuff correlate-using art line to monitor and treat BP/levo titrated to effect  2230-flipped onto back . TOF electrodes removed from rt temple and placed new electrodes on left temple  2315-febrile  Advil/tylenol given  0000-ice packs placed under each arm and to bilat neck.  TF stopped d/t inc residual  0230-gastric residual 500cc(wasted)   0300-inc pressor requirements  ABG ordered and BETTINA Garces updated  0319-Results for Arcadio Marquez (MRN [id number]) as of 1/1/2022 1/1/2022 03:19   pH 7.29 (L)   PCO2 62 (H)   PO2 104 (H)   BICARBONATE 29 (H)   O2 SAT 97   BASE EXCESS 1.0   Sample source ARTERIAL   SITE DRAWN FROM ARTERIAL LINE   MARISELA'S TEST NOT APPLICABLE   O2 METHOD VENT   NP Dez updated/ABG's reviewed no new orders at this time    0730-bedside report given to RN using sbar format

## 2022-01-01 NOTE — PROGRESS NOTES
[facility]    ICU TEAM Progress Note    Name: Salty Bull   : 1975   MRN: [id number]   Date: 2021      Subjective:   Progress Note: 2021      Mr Hugh Alston is a 54 yo male with a PMH of DM2, HTN and obesity admitted on  to [facility] for COVID-19 pneumonia. He was treated with tocilizumab and steroids. He was not vaccinated. He decompensated and was intubated and . He was paralyzed, proned/supinated. He was transferred to [facility] on  to be evaluated for possible ECMO. CVC and arterial line placed at [facility] on . Nimbex was turned off . He was weaned to 60% Fio2 as of 12/3. Proning/supinating therapy continued. He continued to demonstrated fever. He was started on cefepime/fluconazole on 12/3 with vanc added .      his FIo2 increased to 70% and decreased back to 60% on . CVC changed to PICC on  as blood culture had been positive for 4/4 S epi on 12/3 (cleared in culture on ). He was on vanc for 2 weeks, until . Changed to cefazolin on 12/15. He has been responsive to diuresis over the past few days and has been weaned to 60% FiO2. Attempts were made to decrease sedation on , but this was not successful due to increased agitation. Due to this, seroquel was added on . Enteral oxycodone and valium were also added on . His versed was weaned from 19mg/h to 17/mg/h on  pm.  His versed was further weaned on  as his seroquel was increased. : Versed is at 9. He continues on the fentanyl infusion. He has been afebrile overnight. : Versed gtt 7 and fentanyl gtt at 150. Vent weaned to 65% and continued PEEP of 12. Opens eyes and moves ext spontaneously. But does not follow commands, track with eyes, or WD to pain. NGT clogged, will replace today. Completed cefazolin. Chest xray tomorrow am.    : No acute events overnight. Did not require pronation overnight.  Plan for today is to decrease FiO2 to 60% and wean Fentanyl. Mobilize as tolerated. Not following commands. SORIANO spontaneously. 12/24: No acute events overnight, remains on 55% FiO2, PEEP 10. Will continue to wean FiO2 for goal of 50% today, will wean PEEP to 8 if tolerated. SORIANO spontaneously. Continue to wean IV sedation and liberalize PO opiates and benzos to avoid withdraw without oversedating. 12/25: Early this AM about 0600 patient spiked fever 103.3, elevated RR and HR. Given PRN pushes of versed and fentanyl as well as one time dose of rocuronium with some recovery. Chest x-ray obtained does not show obvious pneumothorax, formal read is pending. Will get BLE dopplers to asses for DVT, once stable will send to CT for head and chest CT to rule out neurological causes of fever such as stroke and also PE. Will likely need to resume sedation. Will place ENT consult for tracheostomy. Per respiratory note, patients ETT is having air leaks and suction catheter is difficult to pass, will likely require tube exchange today. Cooling blanket for fever. Levo as needed for support of blood pressure. 12/26: No acute events over the night. Unable to obtain CT scans due to labile BP, risk of travel outweighed benefit of scan overnight. Will attempt to get CT scans today, will add CT abdomen for further evaluation of fevers. Received motrin over the night for T-max 100.9. Remains on levophed infusion at 2 mcg/min for BP support. Labs remain stable, no acute abnormality. Monitor tube feed residuals, they were held for a few hours yesterday for high gastric residual, likely secondary to sedative medications decreasing gastric motility, resume feeds today. Increase rate of feeds as tolerated. Plan to re-test for COVID on 12/29. No issues with ET tube over the night, will hold off on tube exchange. ENT consultation for trach. Discussed with wife yesterday. Continue to prone for PF ratio less than 150, aggressive pulmonary hygiene.      12/27: Patient seen. NAD. Fevers noted. MRSA in sputum, antibiotics adjusted. Continue current management. 12/28: Patient fevers persisted. Fentanyl gtt discontinued and transitioned to dilaudid gtt. ENT evaluated patient, current settings are too high, will have to be re consulted when settings improve. 12/29: patient seen. NAD. ID Following: continue current antimicrobial regimen. Wound care RN management recommendations noted. Tube feedings on hold as gastric residuals were too high. Will continue to trend    12/30: Patient seen. NAD. Patient noted to have 2 bowel movements after previous bm was on 12/17. ABG results reviewed with decision made to prone patient. Further orders per clinical course. 12/31: Early this morning patient placed in prone position. Had episode of tachycardia and desaturation. Given rocuronium 50 mg x1, Nimbex resumed. HR remained in the 150's, was given metoprolol now with better rate control. Resume propofol to ensure sedation with paralytic. Stop PO benzos and opiates while on IV. Still with elevated temp, up to 103, continue micafungin, vancomycin. ID following. ABG 1-2 hours after pronation. May need to add Whit. Net positive 3.5 L fluid volume since admit, net negative over past 24 hours. 1/1/2022: Patient continues with intermittent fevers. Paired blood and sputum cultures ordered to be drawn today. No obvious bacterial infection source, rule out drug fevers as well, could also be viral fever related to ongoing COVID-19 infection. He has required increase in his pressor support, currently on 22 mcg/min levophed for BP management. ABG also slightly worse this morning with partially compensated metabolic acidosis. Lactic acid increased to 3.5. Renal function remains okay. We will keep tight control of BP and glucose to preserve renal function. Increase in WBC to 21.8 today, will trend cultures, could be reactive. Liver enzymes stable. Continue Vanc for MRSA in sputum.  Continue to closely monitor, course is concerning as he was going down on oxygen requirements prior, and now seems to have hit a plateau. We will continue aggressive measures, pronation, and monitor labs closely. Start reglan for tube feed residuals. Replete potassium. Active Problem List:     Problem List  Date Reviewed: 11/28/2021          Codes Class    COVID ICD-10-CM: U07.1  ICD-9-CM: 079.89         Acute hypoxemic respiratory failure due to COVID-19 [facility]) ICD-10-CM: U07.1, J96.01  ICD-9-CM: 518.81, 079.89, 799.02         Pneumonia due to COVID-19 virus ICD-10-CM: U07.1, J12.82  ICD-9-CM: 480.8, 079.89         Hyperglycemia due to type 2 diabetes mellitus (HCC) ICD-10-CM: E11.65  ICD-9-CM: 250.00         Primary hypertension ICD-10-CM: I10  ICD-9-CM: 401.9         Hyponatremia ICD-10-CM: E87.1  ICD-9-CM: 276.1         Lactic acidosis ICD-10-CM: E87.2  ICD-9-CM: 276.2         Hyperlipidemia ICD-10-CM: E78.5  ICD-9-CM: 272.4         Obesity (BMI 30-39. 9) ICD-10-CM: E66.9  ICD-9-CM: 278.00         COVID-19 vaccination not done ICD-10-CM: Z28.9  ICD-9-CM: V64.00               Past Medical History:      has a past medical history of COVID-19, Diabetes ([address] Hyperlipidemia, and Hypertension. Past Surgical History:      has a past surgical history that includes hx orthopaedic (Right). Home Medications:     Prior to Admission medications    Medication Sig Start Date End Date Taking? Authorizing Provider   cisatracurium (NIMBEX) IV infusion 0-0.869 mg/min by IntraVENous route TITRATE. 12/1/21  Yes Shanika Lewis MD   enoxaparin (LOVENOX) 100 mg/mL 90 mg by SubCUTAneous route every twelve (12) hours every twelve (12) hours. 12/1/21  Yes Shanika Lewis MD   insulin glargine (LANTUS) 100 unit/mL injection 24 Units by SubCUTAneous route nightly. Indications: type 2 diabetes mellitus 12/1/21  Yes Shanika Lewis MD   midazolam in normal saline (VERSED) 1 mg/mL soln 0-10 mg/hr by IntraVENous route TITRATE.  Max Daily Amount: 240 mg. 12/1/21 Yes Nicole Cobian MD   Norepinephrine Bitartrate (LEVOPHED) 8 mg/250 mL (32 mcg/mL) soln 0.5-16 mcg/min by IntraVENous route TITRATE. 21  Yes Nicole Cobian MD   ascorbic acid, vitamin C, (VITAMIN C) 500 mg tablet Take 1 Tablet by mouth two (2) times a day. 21   Nicole Cobian MD   cholecalciferol (VITAMIN D3) (1000 Units /25 mcg) tablet Take 2 Tablets by mouth daily. 21   Nicole Cobian MD   melatonin, rapid dissolve, 5 mg TbDi tablet Take 1 Tablet by mouth nightly. 21   Nicole Cobian MD   atorvastatin (LIPITOR) 20 mg tablet Take 20 mg by mouth daily. Provider, Historical   telmisartan (MICARDIS) 80 mg tablet Take 80 mg by mouth daily. Indications: high blood pressure    Provider, Historical       Allergies/Social/Family History:      Allergies   Allergen Reactions    Alupent [Metaproterenol] Swelling      Social History     Tobacco Use    Smoking status: Never Smoker    Smokeless tobacco: Not on file   Substance Use Topics    Alcohol use: Yes     Comment: drinksonce a week      Family History   Problem Relation Age of Onset    Diabetes Mother     Hypertension Mother     Stroke Mother     Hodgkin's lymphoma Mother     Diabetes Father     Hypertension Father     Cystic Fibrosis Father     Diabetes Maternal Aunt     Diabetes Maternal Uncle        Review of Systems:     Unable    Objective:   Vital Signs:  Visit Vitals  /60   Pulse (!) 105   Temp 98.4 °F (36.9 °C)   Resp 18   Ht 5' 6\" (1.676 m)   Wt 85.7 kg (188 lb 15 oz)   SpO2 100%   BMI 30.49 kg/m²    O2 Flow Rate (L/min): 60 l/min O2 Device: Endotracheal tube,Ventilator Temp (24hrs), Av.1 °F (37.3 °C), Min:96.8 °F (36 °C), Max:101.6 °F (38.7 °C)           Intake/Output:     Intake/Output Summary (Last 24 hours) at 2022 0950  Last data filed at 2022 0800  Gross per 24 hour   Intake 2766.87 ml   Output 2790 ml   Net -23.13 ml       Physical Exam:    General:  Sedated/intubated/RASS -1 to -2  Eye: Pupils are round and reactive bilaterally  Neurologic: Furrowed brow; withdraws to noxious stimuli in all extremities; does not follow commands, SORIANO spontaneously; looks around room sponteanously  Neck: Supple, no JVD  Lungs: On mechanical ventilation; CTAB  Heart: RRR, 2+ pulses, 1+ edema of BLE   Abdomen:Soft, nontender, nondistended  Skin:  No rash or lesion  Verified 12/22    LABS AND  DATA: Personally reviewed  Recent Labs     01/01/22 0236 12/31/21  0317   WBC 21.8* 9.2   HGB 10.0* 9.8*   HCT 31.3* 31.3*    324     Recent Labs     01/01/22 0236 12/31/21  1947    141   K 3.5 3.6    106   CO2 30 31   BUN 9 7   CREA 0.80 0.59*   * 151*   CA 8.7 8.9   MG 2.1 2.0   PHOS 5.1* 4.4     Recent Labs     01/01/22 0236 12/31/21  0317   * 95   TP 6.4 6.3*   ALB 1.9* 2.0*   GLOB 4.5* 4.3*     No results for input(s): INR, PTP, APTT, INREXT, INREXT in the last 72 hours. No results for input(s): PHI, PCO2I, PO2I, FIO2I in the last 72 hours. No results for input(s): CPK, CKMB, TROIQ, BNPP in the last 72 hours. Ventilator Settings:  Mode Rate Tidal Volume Pressure FiO2 PEEP   Assist control,Pressure control   535 ml  0 cm H2O 100 % 14 cm H20     Peak airway pressure: 30 cm H2O    Minue ventilation: 8.98 l/min      MEDS: Reviewed    Chest X-Ray: personally reviewed and report checked    · TTE: 11/24: LV: Estimated LVEF is 50 - 55%. Normal cavity size, wall thickness and diastolic function. Low normal systolic function. Assessment and Plan     [id number] Pneumonia complicated by Severe ARDS c/b pneumococcal pneumonia: Received tocilizumab. Continue RASS -4. Continue fentanyl versed. S pneumonia in sputum 12/3. Off nimbex since 12/2.  - Continue steroids  - Completed cefazolin  - Procal negative  - CXR unrevealing  - FiO2 increased to 100, contine to wean O2 as tolerated.  Maintain sats greater than 92%   - Goal for trach/PEG when patient medically able  - Diuresis PRN    HLD: Continue statin    DM2 c/b hyperglycemia: - Continue NPH 10 q 12hrs   - Continue SSI q 6hrs     Hypotension: Possibly related to sedation. 8 mcg levophed. - Continue midodrine    Agitation:   - Continue enteral sedation/opioids  - Wean sedation as tolerated, on versed 20 mg/h and dilaudid 4 mg/h,  - Seroquel at 50mg TID, currently on hold given resumption of propofol     Deconditioning: Unable to participate in PT/OT at this time    Multidisciplinary Rounds Completed: Y    ABCDEF Bundle/Checklist  Pain Medications: Fentanyl  Target RASS: 0  Sedation Medications:Versed  CAM-ICU: SILVIA  Mobility:Poor  PT/OT: Unable to participate: PROM  Restraints:Y  Discussed Plan of Care (goals of care): Y  Addressed Code Status: F    CARDIOVASCULAR  Cardiac Gtts: N  SBP Goal of: > 90 mmHg  MAP Goal of: > 65 mmHg  Transfusion Trigger (Hgb): <7 g/dL    RESPIRATORY  Vent Goals:   Optimize PEEP/Ventilation/Oxygenation  DVT Prophylaxis (if no, list reason): Lovenox   SPO2 Goal: > 92%  Pulmonary toilet: Duo-Nebs     GI/  Gold Catheter Present: Yes  GI Prophylaxis: Pepcid (famotidine)   Nutrition: Yes TF  IVFs:N  Bowel Movement:Y  Bowel Regimen:Y  Insulin: Y    ANTIBIOTICS  Antibiotics:  None    T/L/D  Tubes: ETT  Lines: PICC  Drains: Gold    SPECIAL EQUIPMENT  Vent    DISPOSITION  ICU    CRITICAL CARE CONSULTANT NOTE  I had a face to face encounter with the patient, reviewed and interpreted patient data including clinical events, labs, images, vital signs, I/O's, and examined patient. I have discussed the case and the plan and management of the patient's care with the consulting services, the bedside nurses and the respiratory therapist.      NOTE OF PERSONAL INVOLVEMENT IN CARE   This patient has a high probability of imminent, clinically significant deterioration, which requires the highest level of preparedness to intervene urgently.  I participated in the decision-making and personally managed or directed the management of the following life and organ supporting interventions that required my frequent assessment to treat or prevent imminent deterioration. I personally spent 60 minutes of critical care time. This is time spent at this critically ill patient's bedside actively involved in patient care as well as the coordination of care and discussions with the patient's family. This does not include any procedural time which has been billed separately.           Juan Pleitez [facility]     Critical Care Medicine  [facility]

## 2022-01-02 NOTE — PROGRESS NOTES
Day #6 of Vancomycin  Indication:  MRSA pneumonia  Current regimen:  1250 mg IV Q 12 hr  Abx regimen:  Anidulafungin + Vancomycin + pip-tazo  ID Following ?: YES  Concomitant nephrotoxic drugs (requires more frequent monitoring): Vasopressors   Frequency of BMP: Daily    Recent Labs     22  0300 22  0236 21  1947 21  0317 21  0317   WBC 40.8* 21.8*  --   --  9.2   CREA 0.97 0.80 0.59*   < > 0.54*   BUN 13 9 7   < > 6    < > = values in this interval not displayed. Est CrCl:  ml/min; UO: 0.8 ml/kg/hr  Temp (24hrs), Av °F (37.2 °C), Min:98.1 °F (36.7 °C), Max:99.7 °F (37.6 °C)    Cultures:   12/3 Blood: MSSE in 4/4 bottles, final  12/3 Sputum: moderate Strep pneumo, final   Blood: NG - final   Sputum: light normal taryn, final   Blood: NG - final   Sputum: moderate MRSA - final   MRSA screen: Neg - final   Blood: NGTD    Sputum: heavy MRSA - final   blood: NG x1 day; prelim   sputum: heavy possible Staph aureus    Goal target range AUC/-600    Recent level history:  Date/Time Dose & Interval Measured Level (mcg/mL) Associated AUC/TIM Dose Adjustment     1017 1250 mg q12h 14.5 ~ 7 hrs p dose 430 none    @ 0857 Vanc 1.25g q12h 26.5 ~6h post-dose 626 Vanc 1g q12h                                  Plan: Change to vanc 1g q12h for estimated AUC/TIM within goal range . Will assess with random level 24-48h after initiation of new dosing strategy.

## 2022-01-02 NOTE — PROGRESS NOTES
[facility]    ICU TEAM Progress Note    Name: Rula Fuchs   : 1975   MRN: [id number]   Date: 2021      Subjective:   Progress Note: 2021      Mr Kacey Mcdaniel is a 56 yo male with a PMH of DM2, HTN and obesity admitted on  to [facility] for COVID-19 pneumonia. He was treated with tocilizumab and steroids. He was not vaccinated. He decompensated and was intubated and . He was paralyzed, proned/supinated. He was transferred to [facility] on  to be evaluated for possible ECMO. CVC and arterial line placed at [facility] on . Nimbex was turned off . He was weaned to 60% Fio2 as of 12/3. Proning/supinating therapy continued. He continued to demonstrated fever. He was started on cefepime/fluconazole on 12/3 with vanc added .      his FIo2 increased to 70% and decreased back to 60% on . CVC changed to PICC on  as blood culture had been positive for 4/4 S epi on 12/3 (cleared in culture on ). He was on vanc for 2 weeks, until . Changed to cefazolin on 12/15. He has been responsive to diuresis over the past few days and has been weaned to 60% FiO2. Attempts were made to decrease sedation on , but this was not successful due to increased agitation. Due to this, seroquel was added on . Enteral oxycodone and valium were also added on . His versed was weaned from 19mg/h to 17/mg/h on  pm.  His versed was further weaned on  as his seroquel was increased. : Versed is at 9. He continues on the fentanyl infusion. He has been afebrile overnight. : Versed gtt 7 and fentanyl gtt at 150. Vent weaned to 65% and continued PEEP of 12. Opens eyes and moves ext spontaneously. But does not follow commands, track with eyes, or WD to pain. NGT clogged, will replace today. Completed cefazolin. Chest xray tomorrow am.    : No acute events overnight. Did not require pronation overnight.  Plan for today is to decrease FiO2 to 60% and wean Fentanyl. Mobilize as tolerated. Not following commands. SORIANO spontaneously. 12/24: No acute events overnight, remains on 55% FiO2, PEEP 10. Will continue to wean FiO2 for goal of 50% today, will wean PEEP to 8 if tolerated. SORIANO spontaneously. Continue to wean IV sedation and liberalize PO opiates and benzos to avoid withdraw without oversedating. 12/25: Early this AM about 0600 patient spiked fever 103.3, elevated RR and HR. Given PRN pushes of versed and fentanyl as well as one time dose of rocuronium with some recovery. Chest x-ray obtained does not show obvious pneumothorax, formal read is pending. Will get BLE dopplers to asses for DVT, once stable will send to CT for head and chest CT to rule out neurological causes of fever such as stroke and also PE. Will likely need to resume sedation. Will place ENT consult for tracheostomy. Per respiratory note, patients ETT is having air leaks and suction catheter is difficult to pass, will likely require tube exchange today. Cooling blanket for fever. Levo as needed for support of blood pressure. 12/26: No acute events over the night. Unable to obtain CT scans due to labile BP, risk of travel outweighed benefit of scan overnight. Will attempt to get CT scans today, will add CT abdomen for further evaluation of fevers. Received motrin over the night for T-max 100.9. Remains on levophed infusion at 2 mcg/min for BP support. Labs remain stable, no acute abnormality. Monitor tube feed residuals, they were held for a few hours yesterday for high gastric residual, likely secondary to sedative medications decreasing gastric motility, resume feeds today. Increase rate of feeds as tolerated. Plan to re-test for COVID on 12/29. No issues with ET tube over the night, will hold off on tube exchange. ENT consultation for trach. Discussed with wife yesterday. Continue to prone for PF ratio less than 150, aggressive pulmonary hygiene.      12/27: Patient seen. NAD. Fevers noted. MRSA in sputum, antibiotics adjusted. Continue current management. 12/28: Patient fevers persisted. Fentanyl gtt discontinued and transitioned to dilaudid gtt. ENT evaluated patient, current settings are too high, will have to be re consulted when settings improve. 12/29: patient seen. NAD. ID Following: continue current antimicrobial regimen. Wound care RN management recommendations noted. Tube feedings on hold as gastric residuals were too high. Will continue to trend    12/30: Patient seen. NAD. Patient noted to have 2 bowel movements after previous bm was on 12/17. ABG results reviewed with decision made to prone patient. Further orders per clinical course. 12/31: Early this morning patient placed in prone position. Had episode of tachycardia and desaturation. Given rocuronium 50 mg x1, Nimbex resumed. HR remained in the 150's, was given metoprolol now with better rate control. Resume propofol to ensure sedation with paralytic. Stop PO benzos and opiates while on IV. Still with elevated temp, up to 103, continue micafungin, vancomycin. ID following. ABG 1-2 hours after pronation. May need to add Whit. Net positive 3.5 L fluid volume since admit, net negative over past 24 hours. 1/1/2022: Patient continues with intermittent fevers. Paired blood and sputum cultures ordered to be drawn today. No obvious bacterial infection source, rule out drug fevers as well, could also be viral fever related to ongoing COVID-19 infection. He has required increase in his pressor support, currently on 22 mcg/min levophed for BP management. ABG also slightly worse this morning with partially compensated metabolic acidosis. Lactic acid increased to 3.5. Renal function remains okay. We will keep tight control of BP and glucose to preserve renal function. Increase in WBC to 21.8 today, will trend cultures, could be reactive. Liver enzymes stable. Continue Vanc for MRSA in sputum.  Continue to closely monitor, course is concerning as he was going down on oxygen requirements prior, and now seems to have hit a plateau. We will continue aggressive measures, pronation, and monitor labs closely. Start reglan for tube feed residuals. Replete potassium. 1/2/2022: No acute events overnight. Placed in supine position at 0515, tolerated well. Currently on 80% FiO2 and PEEP 15. Levophed at 17. Goal is to wean vent today to 70% if tolerated. Continue aggressive pulmonary hygiene. Fevers improving, T-max 99.7. Continue to maintain blood glucose 140-180. Replete albumin. Procalcitonin 2.9 likely secondary to MRSA pneumonia, optimize antimicrobial therapy. Blood cultures with no growth at 17 hours, sputum with GPC in clusters, likely still MRSA. Active Problem List:     Problem List  Date Reviewed: 11/28/2021          Codes Class    COVID ICD-10-CM: U07.1  ICD-9-CM: 079.89         Acute hypoxemic respiratory failure due to COVID-19 [facility]) ICD-10-CM: U07.1, J96.01  ICD-9-CM: 518.81, 079.89, 799.02         Pneumonia due to COVID-19 virus ICD-10-CM: U07.1, J12.82  ICD-9-CM: 480.8, 079.89         Hyperglycemia due to type 2 diabetes mellitus (HCC) ICD-10-CM: E11.65  ICD-9-CM: 250.00         Primary hypertension ICD-10-CM: I10  ICD-9-CM: 401.9         Hyponatremia ICD-10-CM: E87.1  ICD-9-CM: 276.1         Lactic acidosis ICD-10-CM: E87.2  ICD-9-CM: 276.2         Hyperlipidemia ICD-10-CM: E78.5  ICD-9-CM: 272.4         Obesity (BMI 30-39. 9) ICD-10-CM: E66.9  ICD-9-CM: 278.00         COVID-19 vaccination not done ICD-10-CM: Z28.9  ICD-9-CM: V64.00               Past Medical History:      has a past medical history of COVID-19, Diabetes ([address] Hyperlipidemia, and Hypertension. Past Surgical History:      has a past surgical history that includes hx orthopaedic (Right). Home Medications:     Prior to Admission medications    Medication Sig Start Date End Date Taking?  Authorizing Provider   cisatracurium (NIMBEX) IV infusion 0-0.869 mg/min by IntraVENous route TITRATE. 12/1/21  Yes Jann Monet MD   enoxaparin (LOVENOX) 100 mg/mL 90 mg by SubCUTAneous route every twelve (12) hours every twelve (12) hours. 12/1/21  Yes Jann Monet MD   insulin glargine (LANTUS) 100 unit/mL injection 24 Units by SubCUTAneous route nightly. Indications: type 2 diabetes mellitus 12/1/21  Yes Jann Monet MD   midazolam in normal saline (VERSED) 1 mg/mL soln 0-10 mg/hr by IntraVENous route TITRATE. Max Daily Amount: 240 mg. 12/1/21  Yes Jann Monet MD   Norepinephrine Bitartrate (LEVOPHED) 8 mg/250 mL (32 mcg/mL) soln 0.5-16 mcg/min by IntraVENous route TITRATE. 12/1/21  Yes Jann Monet MD   ascorbic acid, vitamin C, (VITAMIN C) 500 mg tablet Take 1 Tablet by mouth two (2) times a day. 12/1/21   Jann Monet MD   cholecalciferol (VITAMIN D3) (1000 Units /25 mcg) tablet Take 2 Tablets by mouth daily. 12/1/21   Jann Monet MD   melatonin, rapid dissolve, 5 mg TbDi tablet Take 1 Tablet by mouth nightly. 12/1/21   Jann Monet MD   atorvastatin (LIPITOR) 20 mg tablet Take 20 mg by mouth daily. Provider, Historical   telmisartan (MICARDIS) 80 mg tablet Take 80 mg by mouth daily. Indications: high blood pressure    Provider, Historical       Allergies/Social/Family History:      Allergies   Allergen Reactions    Alupent [Metaproterenol] Swelling      Social History     Tobacco Use    Smoking status: Never Smoker    Smokeless tobacco: Not on file   Substance Use Topics    Alcohol use: Yes     Comment: drinksonce a week      Family History   Problem Relation Age of Onset    Diabetes Mother     Hypertension Mother     Stroke Mother     Hodgkin's lymphoma Mother     Diabetes Father     Hypertension Father     Cystic Fibrosis Father     Diabetes Maternal Aunt     Diabetes Maternal Uncle        Review of Systems:     Unable    Objective:   Vital Signs:  Visit Vitals  /77   Pulse (!) 105   Temp 98.7 °F (37.1 °C)   Resp 20   Ht 5' 6\" (1.676 m)   Wt 85.7 kg (188 lb 15 oz)   SpO2 95%   BMI 30.49 kg/m²    O2 Flow Rate (L/min): 60 l/min O2 Device: Endotracheal tube,Ventilator Temp (24hrs), Av.1 °F (37.3 °C), Min:98.4 °F (36.9 °C), Max:99.7 °F (37.6 °C)           Intake/Output:     Intake/Output Summary (Last 24 hours) at 2022 0804  Last data filed at 2022 0700  Gross per 24 hour   Intake 3318.78 ml   Output 1760 ml   Net 1558.78 ml       Physical Exam:    General:  Sedated/intubated/RASS -1 to -2  Eye: Pupils are round and reactive bilaterally  Neurologic: Furrowed brow; withdraws to noxious stimuli in all extremities; does not follow commands, SORIANO spontaneously; looks around room sponteanously  Neck: Supple, no JVD  Lungs: On mechanical ventilation; CTAB  Heart: RRR, 2+ pulses, 1+ edema of BLE   Abdomen:Soft, nontender, nondistended  Skin:  No rash or lesion  Verified     LABS AND  DATA: Personally reviewed  Recent Labs     22  0236   WBC 40.8* 21.8*   HGB 9.1* 10.0*   HCT 28.6* 31.3*   * 389     Recent Labs     22  0300 22  0236    142   K 3.8 3.5    106   CO2 28 30   BUN 13 9   CREA 0.97 0.80   * 155*   CA 8.4* 8.7   MG 2.5* 2.1   PHOS 4.5 5.1*     Recent Labs     22  03022  0236   * 126*   TP 6.2* 6.4   ALB 1.6* 1.9*   GLOB 4.6* 4.5*     No results for input(s): INR, PTP, APTT, INREXT, INREXT in the last 72 hours. Recent Labs     22  0409   PHI 7.31*   PCO2I 61.0*   PO2I 116*   FIO2I 80     No results for input(s): CPK, CKMB, TROIQ, BNPP in the last 72 hours. Ventilator Settings:  Mode Rate Tidal Volume Pressure FiO2 PEEP   Assist control,Pressure control   535 ml  0 cm H2O 80 % 14 cm H20     Peak airway pressure: 39 cm H2O    Minue ventilation: 7.17 l/min      MEDS: Reviewed    Chest X-Ray: personally reviewed and report checked    · TTE: : LV: Estimated LVEF is 50 - 55%. Normal cavity size, wall thickness and diastolic function.  Low normal systolic function. Assessment and Plan     [id number] Pneumonia complicated by Severe ARDS c/b pneumococcal pneumonia: Received tocilizumab. Continue RASS -4. Continue fentanyl versed. S pneumonia in sputum 12/3. Off nimbex since 12/2.  - Continue steroids  - Completed cefazolin  - Procal negative  - CXR unrevealing  - FiO2 decreased to 80, contine to wean O2 as tolerated. Maintain sats greater than 92%   - Goal for trach/PEG when patient medically able  - Diuresis PRN    HLD: Continue statin    DM2 c/b hyperglycemia:   - Continue NPH 10 q 12hrs   - Continue SSI q 6hrs     Hypotension: Possibly related to sedation. 8 mcg levophed. - Continue midodrine    Agitation:   - Continue enteral sedation/opioids  - Wean sedation as tolerated, on versed 20 mg/h and dilaudid 4 mg/h,  - Seroquel at 50mg TID, currently on hold given resumption of propofol     Deconditioning: Unable to participate in PT/OT at this time    Multidisciplinary Rounds Completed: Y    ABCDEF Bundle/Checklist  Pain Medications: Fentanyl  Target RASS: 0  Sedation Medications:Versed  CAM-ICU: SILVIA  Mobility:Poor  PT/OT: Unable to participate: PROM  Restraints:Y  Discussed Plan of Care (goals of care):  Y  Addressed Code Status: F    CARDIOVASCULAR  Cardiac Gtts: N  SBP Goal of: > 90 mmHg  MAP Goal of: > 65 mmHg  Transfusion Trigger (Hgb): <7 g/dL    RESPIRATORY  Vent Goals:   Optimize PEEP/Ventilation/Oxygenation  DVT Prophylaxis (if no, list reason): Lovenox   SPO2 Goal: > 92%  Pulmonary toilet: Duo-Nebs     GI/  Gold Catheter Present: Yes  GI Prophylaxis: Pepcid (famotidine)   Nutrition: Yes TF  IVFs:N  Bowel Movement:Y  Bowel Regimen:Y  Insulin: Y    ANTIBIOTICS  Antibiotics:  None    T/L/D  Tubes: ETT  Lines: PICC  Drains: Gold    SPECIAL EQUIPMENT  Vent    DISPOSITION  ICU    CRITICAL CARE CONSULTANT NOTE  I had a face to face encounter with the patient, reviewed and interpreted patient data including clinical events, labs, images, vital signs, I/O's, and examined patient. I have discussed the case and the plan and management of the patient's care with the consulting services, the bedside nurses and the respiratory therapist.      NOTE OF PERSONAL INVOLVEMENT IN CARE   This patient has a high probability of imminent, clinically significant deterioration, which requires the highest level of preparedness to intervene urgently. I participated in the decision-making and personally managed or directed the management of the following life and organ supporting interventions that required my frequent assessment to treat or prevent imminent deterioration. I personally spent 60 minutes of critical care time. This is time spent at this critically ill patient's bedside actively involved in patient care as well as the coordination of care and discussions with the patient's family. This does not include any procedural time which has been billed separately.           Gerardo Vyas [facility]     Critical Care Medicine  [facility]

## 2022-01-02 NOTE — PROGRESS NOTES
1930: Verbal shift change report given to Lexus RN (oncoming nurse) by Sher Worthy RN (offgoing nurse).  Report included the following information SBAR, Intake/Output, Cardiac Rhythm ST and Alarm Parameters art line and cuff correlate-using art line to monitor and treat BP/levo titrated to effect  0515-flipped onto back . TOF electrodes removed from rt temple and placed new electrodes on left temple    0730-bedside report given to RN using sbar format

## 2022-01-02 NOTE — PROGRESS NOTES
730-Bedside and Verbal shift change report given to Ailyn Trotter (oncoming nurse) by Yoselyn Breen (offgoing nurse). Report included the following information SBAR, Kardex, ED Summary, Intake/Output, MAR, Accordion, Recent Results, Med Rec Status, Cardiac Rhythm ST, Alarm Parameters  and Dual Neuro Assessment. 1930-Bedside and Verbal shift change report given to Yoselyn Breen (oncoming nurse) by Ailyn Trotter (offgoing nurse). Report included the following information SBAR, Kardex, ED Summary, Procedure Summary, Intake/Output, MAR, Accordion, Recent Results, Med Rec Status, Cardiac Rhythm SR, Alarm Parameters , Quality Measures and Dual Neuro Assessment.

## 2022-01-03 NOTE — PROGRESS NOTES
Transition of Care Plan   RUR- Medium    DISPOSITION: pending medical progression   F/U with PCP/Specialist     Transport: [facility]   Patient remains on isolation on the ICU, Covid positive. Patient intubated FIO2 80 % with a PEEP of  15, on IV Levophed, Dilaudid, Versed, Diprivan and Nimbex. Per notes patient withdraws to noxious stimuli , not following commands, SORIANO spontaneously. Care management is continuing to follow.    Polly Henry RN,Care Management

## 2022-01-03 NOTE — PROGRESS NOTES
[facility]    ICU TEAM Progress Note    Name: Daysi Vaughn   : 1975   MRN: [id number]   Date: 2021      Subjective:   Progress Note: 2021      Mr Marco Cota is a 56 yo male with a PMH of DM2, HTN and obesity admitted on  to [facility] for COVID-19 pneumonia. He was treated with tocilizumab and steroids. He was not vaccinated. He decompensated and was intubated and . He was paralyzed, proned/supinated. He was transferred to [facility] on  to be evaluated for possible ECMO. CVC and arterial line placed at [facility] on . Nimbex was turned off . He was weaned to 60% Fio2 as of 12/3. Proning/supinating therapy continued. He continued to demonstrated fever. He was started on cefepime/fluconazole on 12/3 with vanc added .      his FIo2 increased to 70% and decreased back to 60% on . CVC changed to PICC on  as blood culture had been positive for 4/4 S epi on 12/3 (cleared in culture on ). He was on vanc for 2 weeks, until . Changed to cefazolin on 12/15. He has been responsive to diuresis over the past few days and has been weaned to 60% FiO2. Attempts were made to decrease sedation on , but this was not successful due to increased agitation. Due to this, seroquel was added on . Enteral oxycodone and valium were also added on . His versed was weaned from 19mg/h to 17/mg/h on  pm.  His versed was further weaned on  as his seroquel was increased. : Versed is at 9. He continues on the fentanyl infusion. He has been afebrile overnight. : Versed gtt 7 and fentanyl gtt at 150. Vent weaned to 65% and continued PEEP of 12. Opens eyes and moves ext spontaneously. But does not follow commands, track with eyes, or WD to pain. NGT clogged, will replace today. Completed cefazolin. Chest xray tomorrow am.    : No acute events overnight. Did not require pronation overnight.  Plan for today is to decrease FiO2 to 60% and wean Fentanyl. Mobilize as tolerated. Not following commands. SORIANO spontaneously. 12/24: No acute events overnight, remains on 55% FiO2, PEEP 10. Will continue to wean FiO2 for goal of 50% today, will wean PEEP to 8 if tolerated. SROIANO spontaneously. Continue to wean IV sedation and liberalize PO opiates and benzos to avoid withdraw without oversedating. 12/25: Early this AM about 0600 patient spiked fever 103.3, elevated RR and HR. Given PRN pushes of versed and fentanyl as well as one time dose of rocuronium with some recovery. Chest x-ray obtained does not show obvious pneumothorax, formal read is pending. Will get BLE dopplers to asses for DVT, once stable will send to CT for head and chest CT to rule out neurological causes of fever such as stroke and also PE. Will likely need to resume sedation. Will place ENT consult for tracheostomy. Per respiratory note, patients ETT is having air leaks and suction catheter is difficult to pass, will likely require tube exchange today. Cooling blanket for fever. Levo as needed for support of blood pressure. 12/26: No acute events over the night. Unable to obtain CT scans due to labile BP, risk of travel outweighed benefit of scan overnight. Will attempt to get CT scans today, will add CT abdomen for further evaluation of fevers. Received motrin over the night for T-max 100.9. Remains on levophed infusion at 2 mcg/min for BP support. Labs remain stable, no acute abnormality. Monitor tube feed residuals, they were held for a few hours yesterday for high gastric residual, likely secondary to sedative medications decreasing gastric motility, resume feeds today. Increase rate of feeds as tolerated. Plan to re-test for COVID on 12/29. No issues with ET tube over the night, will hold off on tube exchange. ENT consultation for trach. Discussed with wife yesterday. Continue to prone for PF ratio less than 150, aggressive pulmonary hygiene.      12/27: Patient seen. NAD. Fevers noted. MRSA in sputum, antibiotics adjusted. Continue current management. 12/28: Patient fevers persisted. Fentanyl gtt discontinued and transitioned to dilaudid gtt. ENT evaluated patient, current settings are too high, will have to be re consulted when settings improve. 12/29: patient seen. NAD. ID Following: continue current antimicrobial regimen. Wound care RN management recommendations noted. Tube feedings on hold as gastric residuals were too high. Will continue to trend    12/30: Patient seen. NAD. Patient noted to have 2 bowel movements after previous bm was on 12/17. ABG results reviewed with decision made to prone patient. Further orders per clinical course. 12/31: Early this morning patient placed in prone position. Had episode of tachycardia and desaturation. Given rocuronium 50 mg x1, Nimbex resumed. HR remained in the 150's, was given metoprolol now with better rate control. Resume propofol to ensure sedation with paralytic. Stop PO benzos and opiates while on IV. Still with elevated temp, up to 103, continue micafungin, vancomycin. ID following. ABG 1-2 hours after pronation. May need to add Whit. Net positive 3.5 L fluid volume since admit, net negative over past 24 hours. 1/1/2022: Patient continues with intermittent fevers. Paired blood and sputum cultures ordered to be drawn today. No obvious bacterial infection source, rule out drug fevers as well, could also be viral fever related to ongoing COVID-19 infection. He has required increase in his pressor support, currently on 22 mcg/min levophed for BP management. ABG also slightly worse this morning with partially compensated metabolic acidosis. Lactic acid increased to 3.5. Renal function remains okay. We will keep tight control of BP and glucose to preserve renal function. Increase in WBC to 21.8 today, will trend cultures, could be reactive. Liver enzymes stable. Continue Vanc for MRSA in sputum.  Continue to closely monitor, course is concerning as he was going down on oxygen requirements prior, and now seems to have hit a plateau. We will continue aggressive measures, pronation, and monitor labs closely. Start reglan for tube feed residuals. Replete potassium. 1/2/2022: No acute events overnight. Placed in supine position at 0515, tolerated well. Currently on 80% FiO2 and PEEP 15. Levophed at 17. Goal is to wean vent today to 70% if tolerated. Continue aggressive pulmonary hygiene. Fevers improving, T-max 99.7. Continue to maintain blood glucose 140-180. Replete albumin. Procalcitonin 2.9 likely secondary to MRSA pneumonia, optimize antimicrobial therapy. Blood cultures with no growth at 17 hours, sputum with GPC in clusters, likely still MRSA. 1/3/2022: No acute events over the night. We were able to decrease FiO2 to 75% yesterday with good ABG following. Continue to wean oxygen with goal of 70% today. PEEP 14. Continue with aggressive pulmonary hygiene, HOB greater than 30 degrees. T-max 99.2, showing some improvement. MRSA still growing from sputum cultures drawn 1/1/22. Active Problem List:     Problem List  Date Reviewed: 11/28/2021          Codes Class    COVID ICD-10-CM: U07.1  ICD-9-CM: 079.89         Acute hypoxemic respiratory failure due to COVID-19 [facility]) ICD-10-CM: U07.1, J96.01  ICD-9-CM: 518.81, 079.89, 799.02         Pneumonia due to COVID-19 virus ICD-10-CM: U07.1, J12.82  ICD-9-CM: 480.8, 079.89         Hyperglycemia due to type 2 diabetes mellitus (HCC) ICD-10-CM: E11.65  ICD-9-CM: 250.00         Primary hypertension ICD-10-CM: I10  ICD-9-CM: 401.9         Hyponatremia ICD-10-CM: E87.1  ICD-9-CM: 276.1         Lactic acidosis ICD-10-CM: E87.2  ICD-9-CM: 276.2         Hyperlipidemia ICD-10-CM: E78.5  ICD-9-CM: 272.4         Obesity (BMI 30-39. 9) ICD-10-CM: E66.9  ICD-9-CM: 278.00         COVID-19 vaccination not done ICD-10-CM: Z28.9  ICD-9-CM: V64.00               Past Medical History: has a past medical history of COVID-19, Diabetes ([address] Hyperlipidemia, and Hypertension. Past Surgical History:      has a past surgical history that includes hx orthopaedic (Right). Home Medications:     Prior to Admission medications    Medication Sig Start Date End Date Taking? Authorizing Provider   cisatracurium (NIMBEX) IV infusion 0-0.869 mg/min by IntraVENous route TITRATE. 12/1/21  Yes Renae Marshall MD   enoxaparin (LOVENOX) 100 mg/mL 90 mg by SubCUTAneous route every twelve (12) hours every twelve (12) hours. 12/1/21  Yes Renae Marshall MD   insulin glargine (LANTUS) 100 unit/mL injection 24 Units by SubCUTAneous route nightly. Indications: type 2 diabetes mellitus 12/1/21  Yes Renae Marshall MD   midazolam in normal saline (VERSED) 1 mg/mL soln 0-10 mg/hr by IntraVENous route TITRATE. Max Daily Amount: 240 mg. 12/1/21  Yes Renae Marshall MD   Norepinephrine Bitartrate (LEVOPHED) 8 mg/250 mL (32 mcg/mL) soln 0.5-16 mcg/min by IntraVENous route TITRATE. 12/1/21  Yes Renae Marshall MD   ascorbic acid, vitamin C, (VITAMIN C) 500 mg tablet Take 1 Tablet by mouth two (2) times a day. 12/1/21   Renae Marshall MD   cholecalciferol (VITAMIN D3) (1000 Units /25 mcg) tablet Take 2 Tablets by mouth daily. 12/1/21   Renae Marshall MD   melatonin, rapid dissolve, 5 mg TbDi tablet Take 1 Tablet by mouth nightly. 12/1/21   Renae Marshall MD   atorvastatin (LIPITOR) 20 mg tablet Take 20 mg by mouth daily. Provider, Historical   telmisartan (MICARDIS) 80 mg tablet Take 80 mg by mouth daily. Indications: high blood pressure    Provider, Historical       Allergies/Social/Family History:      Allergies   Allergen Reactions    Alupent [Metaproterenol] Swelling      Social History     Tobacco Use    Smoking status: Never Smoker    Smokeless tobacco: Not on file   Substance Use Topics    Alcohol use: Yes     Comment: drinksonce a week      Family History   Problem Relation Age of Onset    Diabetes Mother     Hypertension Mother     Stroke Mother    [facility] Hodgkin's lymphoma Mother     Diabetes Father     Hypertension Father     Cystic Fibrosis Father     Diabetes Maternal Aunt     Diabetes Maternal Uncle        Review of Systems:     Unable    Objective:   Vital Signs:  Visit Vitals  BP (!) 115/90   Pulse 82   Temp 99.2 °F (37.3 °C)   Resp 20   Ht 5' 6\" (1.676 m)   Wt 86.8 kg (191 lb 5.8 oz)   SpO2 92%   BMI 30.89 kg/m²    O2 Flow Rate (L/min): 60 l/min O2 Device: Endotracheal tube,Ventilator Temp (24hrs), Av.6 °F (37 °C), Min:97.3 °F (36.3 °C), Max:99.2 °F (37.3 °C)           Intake/Output:     Intake/Output Summary (Last 24 hours) at 1/3/2022 09  Last data filed at 1/3/2022 0700  Gross per 24 hour   Intake 3718.41 ml   Output 2620 ml   Net 1098.41 ml       Physical Exam:    General:  Sedated/intubated/RASS -1 to -2  Eye: Pupils are round and reactive bilaterally  Neurologic: Furrowed brow; withdraws to noxious stimuli in all extremities; does not follow commands, SORIANO spontaneously; looks around room sponteanously  Neck: Supple, no JVD  Lungs: On mechanical ventilation; CTAB  Heart: RRR, 2+ pulses, 1+ edema of BLE   Abdomen:Soft, nontender, nondistended  Skin:  No rash or lesion  Verified     LABS AND  DATA: Personally reviewed  Recent Labs     22  0300   WBC 33.5* 40.8*   HGB 8.7* 9.1*   HCT 27.4* 28.6*   * 486*     Recent Labs     22  0426 22  0300    140   K 3.2* 3.8    105   CO2 28 28   BUN 15 13   CREA 0.98 0.97   * 166*   CA 8.2* 8.4*   MG 2.7* 2.5*   PHOS 3.4 4.5     Recent Labs     22  0426 22  0300   * 221*   TP 6.0* 6.2*   ALB 1.5* 1.6*   GLOB 4.5* 4.6*     No results for input(s): INR, PTP, APTT, INREXT, INREXT in the last 72 hours. Recent Labs     22  0316 22  0409   PHI 7.36 7.31*   PCO2I 51.4* 61.0*   PO2I 76* 116*   FIO2I 75 80     No results for input(s): CPK, CKMB, TROIQ, BNPP in the last 72 hours.     Ventilator Settings:  Mode Rate Tidal Volume Pressure FiO2 PEEP   Pressure control   535 ml  0 cm H2O 75 % 14 cm H20     Peak airway pressure: 41 cm H2O    Minue ventilation: 9.9 l/min      MEDS: Reviewed    Chest X-Ray: personally reviewed and report checked    · TTE: 11/24: LV: Estimated LVEF is 50 - 55%. Normal cavity size, wall thickness and diastolic function. Low normal systolic function. Assessment and Plan     [id number] Pneumonia complicated by Severe ARDS c/b pneumococcal pneumonia: Received tocilizumab. Continue RASS -4. Continue fentanyl versed. S pneumonia in sputum 12/3. Off nimbex since 12/2.  - Continue steroids  - Completed cefazolin  - Procal negative  - CXR unrevealing  - FiO2 decreased to 70, contine to wean O2 as tolerated. Maintain sats greater than 92%   - Goal for trach/PEG when patient medically able  - Diuresis PRN  -Continue vancomycin, may need to consider linezolid if respiratory status worsens, will hold off for now since he is showing some improvement. HLD: Continue statin    DM2 c/b hyperglycemia:   - Continue NPH 10 q 12hrs   - Continue SSI q 6hrs     Hypotension: Possibly related to sedation. 8 mcg levophed. - Continue midodrine    Agitation:   - Continue enteral sedation/opioids  - Wean sedation as tolerated, on versed 20 mg/h and dilaudid 4 mg/h,  - Seroquel at 50mg TID, currently on hold given resumption of propofol     Deconditioning: Unable to participate in PT/OT at this time    Multidisciplinary Rounds Completed: Y    ABCDEF Bundle/Checklist  Pain Medications: Fentanyl  Target RASS: 0  Sedation Medications:Versed  CAM-ICU: SILVIA  Mobility:Poor  PT/OT: Unable to participate: PROM  Restraints:Y  Discussed Plan of Care (goals of care):  Y  Addressed Code Status: F    CARDIOVASCULAR  Cardiac Gtts: N  SBP Goal of: > 90 mmHg  MAP Goal of: > 65 mmHg  Transfusion Trigger (Hgb): <7 g/dL    RESPIRATORY  Vent Goals:   Optimize PEEP/Ventilation/Oxygenation  DVT Prophylaxis (if no, list reason): Lovenox   SPO2 Goal: > 92%  Pulmonary toilet: Duo-Nebs     GI/  Gold Catheter Present: Yes  GI Prophylaxis: Pepcid (famotidine)   Nutrition: Yes TF  IVFs:N  Bowel Movement:Y  Bowel Regimen:Y  Insulin: Y    ANTIBIOTICS  Antibiotics:  None    T/L/D  Tubes: ETT  Lines: PICC  Drains: Gold    SPECIAL EQUIPMENT  Vent    DISPOSITION  ICU    CRITICAL CARE CONSULTANT NOTE  I had a face to face encounter with the patient, reviewed and interpreted patient data including clinical events, labs, images, vital signs, I/O's, and examined patient. I have discussed the case and the plan and management of the patient's care with the consulting services, the bedside nurses and the respiratory therapist.      NOTE OF PERSONAL INVOLVEMENT IN CARE   This patient has a high probability of imminent, clinically significant deterioration, which requires the highest level of preparedness to intervene urgently. I participated in the decision-making and personally managed or directed the management of the following life and organ supporting interventions that required my frequent assessment to treat or prevent imminent deterioration. I personally spent 60 minutes of critical care time. This is time spent at this critically ill patient's bedside actively involved in patient care as well as the coordination of care and discussions with the patient's family. This does not include any procedural time which has been billed separately.           Cyndi Negron [facility]-BC     Critical Care Medicine  [facility]

## 2022-01-03 NOTE — PROGRESS NOTES
1930-Bedside and Verbal shift change report given to Elizabeth Diamond (oncoming nurse) by Viv Jonas (offgoing nurse).  Report included the following information SBAR, Kardex, ED Summary, Procedure Summary, Intake/Output, MAR, Accordion, Recent Results, Med Rec Status, Cardiac Rhythm SR, Alarm Parameters , Quality Measures and Dual Neuro Assessment/art line and cuff correlate-using art line to monitor and treat BP/levo titrated to effect  0420-TOF electrodes removed from left temple and placed new electrodes on right temple    0730-bedside report given to RN using sbar format

## 2022-01-03 NOTE — PROGRESS NOTES
ID Progress Note  1/3/2022    Subjective:     afebrile  Intubated it    Objective:     Antibiotics:  1. Vancomycin --, 2021-  2. Cefepime 12/3-2021  3. Ceftriaxone --2021  4. Zosyn 2022-  5. Eraxis 2021-    Vitals:   Visit Vitals  BP (!) 115/90   Pulse 82   Temp 99.2 °F (37.3 °C)   Resp 20   Ht 5' 6\" (1.676 m)   Wt 86.8 kg (191 lb 5.8 oz)   SpO2 92%   BMI 30.89 kg/m²        Tmax:  Temp (24hrs), Av.6 °F (37 °C), Min:97.3 °F (36.3 °C), Max:99.2 °F (37.3 °C)      Exam:  Observational only    Labs:      Recent Labs     22  0426 22  0300 22  0236 21  1947   WBC 33.5* 40.8* 21.8*  --    HGB 8.7* 9.1* 10.0*  --    * 486* 389  --    BUN 15 13 9 7   CREA 0.98 0.97 0.80 0.59*   * 221* 126*  --    TBILI 0.4 0.3 0.3  --        Cultures:     No results found for: SDES  Lab Results   Component Value Date/Time    Culture result: NO GROWTH 2 DAYS 2022 10:55 AM    Culture result: (A) 2022 10:55 AM     HEAVY * METHICILLIN RESISTANT STAPHYLOCOCCUS AUREUS * (KNOWN MRSA PATIENT)    Culture result: FEW NORMAL RESPIRATORY TAMARA 2022 10:55 AM        FIO2 60%, prone position, suctioned bloody drainage through nostril earlier, no active bleeding noted it during visit  Assessment:     1. Severe COVID pneumonia--Pneumococcus from respiratory culture; completed the therapy   2. MRSA pneumonia (sputum cx 2022 - MRSA)  3. VDRF  4. Hypoxic respiratory failure  5. Bacteremia--S epi--line has been changed, completed the ABX therapy on  with ancef    Objective:     1. Afebrile, WBC 33.5; continue with IV zosyn, eraxis, and vancomycin; will deescalate the ABX when apporpriate  2.  May need to further image chest, abdomen and pelvis when able      Above plan of care discussed and agreed with Dr. Sky Curtis, NP

## 2022-01-04 NOTE — PROGRESS NOTES
Day #9 of Vancomycin - Dosing Update  Indication:  MRSA pneumonia  Current regimen:  1000 mg IV Q 12 hr  Abx regimen:  Anidulafungin + Vancomycin + Zosyn  ID Following ?: YES  Concomitant nephrotoxic drugs (requires more frequent monitoring): Vasopressors   Frequency of BMP: Daily    Recent Labs     22  0415 22  0426 22  0300   WBC 29.6* 33.5* 40.8*   CREA 1.09 0.98 0.97   BUN 17 15 13     Est CrCl: ~85-90 ml/min; UO: >1 ml/kg/hr  Temp (24hrs), Av.5 °F (37.5 °C), Min:99 °F (37.2 °C), Max:100 °F (37.8 °C)    Cultures:   12/3 Blood: MSSE in 4/4 bottles, final  12/3 Sputum: moderate Strep pneumo, final   Blood: NG - final   Sputum: light normal taryn, final   Blood: NG - final   Sputum: moderate MRSA - final   MRSA screen: Neg - final   Blood: NG, final   Sputum: heavy MRSA - final   Blood: NGTD   Sputum: heavy MRSA - final    Goal target range AUC/-600    Recent level history:  Date/Time Dose & Interval Measured Level (mcg/mL) Associated AUC/TIM Dose Adjustment     1017 1250 mg q12h 14.5 ~ 7 hrs p dose 430 none    @ 0857 Vanc 1.25g q12h 26.5 ~6 h post-dose 626 Vanc 1g q12h    @ 0415 1 gm IV q12h 23.5 ~7.75 hrs p dose 595                            Plan: The vancomycin random level drawn this morning correlates with an AUC/TIM of 595, which is at the very high end of the target range. Will adjust the dose to 1250 mg IV Q 18 hr for a predicted AUC/TIM of 506. Pharmacy will continue to monitor patient daily and will make dosage adjustments based upon changing renal function.

## 2022-01-04 NOTE — PROGRESS NOTES
[facility]    ICU TEAM Progress Note    Name: Alexa Burch   : 1975   MRN: [id number]   Date: 2021      Subjective:   Progress Note: 2021      Mr Marcell Mock is a 54 yo male with a PMH of DM2, HTN and obesity admitted on  to [facility] for COVID-19 pneumonia. He was treated with tocilizumab and steroids. He was not vaccinated. He decompensated and was intubated and . He was paralyzed, proned/supinated. He was transferred to [facility] on  to be evaluated for possible ECMO. CVC and arterial line placed at [facility] on . Nimbex was turned off . He was weaned to 60% Fio2 as of 12/3. Proning/supinating therapy continued. He continued to demonstrated fever. He was started on cefepime/fluconazole on 12/3 with vanc added .      his FIo2 increased to 70% and decreased back to 60% on . CVC changed to PICC on  as blood culture had been positive for 4/4 S epi on 12/3 (cleared in culture on ). He was on vanc for 2 weeks, until . Changed to cefazolin on 12/15. He has been responsive to diuresis over the past few days and has been weaned to 60% FiO2. Attempts were made to decrease sedation on , but this was not successful due to increased agitation. Due to this, seroquel was added on . Enteral oxycodone and valium were also added on . His versed was weaned from 19mg/h to 17/mg/h on  pm.  His versed was further weaned on  as his seroquel was increased. : Versed is at 9. He continues on the fentanyl infusion. He has been afebrile overnight. : Versed gtt 7 and fentanyl gtt at 150. Vent weaned to 65% and continued PEEP of 12. Opens eyes and moves ext spontaneously. But does not follow commands, track with eyes, or WD to pain. NGT clogged, will replace today. Completed cefazolin. Chest xray tomorrow am.    : No acute events overnight. Did not require pronation overnight.  Plan for today is to decrease FiO2 to 60% and wean Fentanyl. Mobilize as tolerated. Not following commands. SORIANO spontaneously. 12/24: No acute events overnight, remains on 55% FiO2, PEEP 10. Will continue to wean FiO2 for goal of 50% today, will wean PEEP to 8 if tolerated. SORIANO spontaneously. Continue to wean IV sedation and liberalize PO opiates and benzos to avoid withdraw without oversedating. 12/25: Early this AM about 0600 patient spiked fever 103.3, elevated RR and HR. Given PRN pushes of versed and fentanyl as well as one time dose of rocuronium with some recovery. Chest x-ray obtained does not show obvious pneumothorax, formal read is pending. Will get BLE dopplers to asses for DVT, once stable will send to CT for head and chest CT to rule out neurological causes of fever such as stroke and also PE. Will likely need to resume sedation. Will place ENT consult for tracheostomy. Per respiratory note, patients ETT is having air leaks and suction catheter is difficult to pass, will likely require tube exchange today. Cooling blanket for fever. Levo as needed for support of blood pressure. 12/26: No acute events over the night. Unable to obtain CT scans due to labile BP, risk of travel outweighed benefit of scan overnight. Will attempt to get CT scans today, will add CT abdomen for further evaluation of fevers. Received motrin over the night for T-max 100.9. Remains on levophed infusion at 2 mcg/min for BP support. Labs remain stable, no acute abnormality. Monitor tube feed residuals, they were held for a few hours yesterday for high gastric residual, likely secondary to sedative medications decreasing gastric motility, resume feeds today. Increase rate of feeds as tolerated. Plan to re-test for COVID on 12/29. No issues with ET tube over the night, will hold off on tube exchange. ENT consultation for trach. Discussed with wife yesterday. Continue to prone for PF ratio less than 150, aggressive pulmonary hygiene.      12/27: Patient seen. NAD. Fevers noted. MRSA in sputum, antibiotics adjusted. Continue current management. 12/28: Patient fevers persisted. Fentanyl gtt discontinued and transitioned to dilaudid gtt. ENT evaluated patient, current settings are too high, will have to be re consulted when settings improve. 12/29: patient seen. NAD. ID Following: continue current antimicrobial regimen. Wound care RN management recommendations noted. Tube feedings on hold as gastric residuals were too high. Will continue to trend    12/30: Patient seen. NAD. Patient noted to have 2 bowel movements after previous bm was on 12/17. ABG results reviewed with decision made to prone patient. Further orders per clinical course. 12/31: Early this morning patient placed in prone position. Had episode of tachycardia and desaturation. Given rocuronium 50 mg x1, Nimbex resumed. HR remained in the 150's, was given metoprolol now with better rate control. Resume propofol to ensure sedation with paralytic. Stop PO benzos and opiates while on IV. Still with elevated temp, up to 103, continue micafungin, vancomycin. ID following. ABG 1-2 hours after pronation. May need to add Whit. Net positive 3.5 L fluid volume since admit, net negative over past 24 hours. 1/1/2022: Patient continues with intermittent fevers. Paired blood and sputum cultures ordered to be drawn today. No obvious bacterial infection source, rule out drug fevers as well, could also be viral fever related to ongoing COVID-19 infection. He has required increase in his pressor support, currently on 22 mcg/min levophed for BP management. ABG also slightly worse this morning with partially compensated metabolic acidosis. Lactic acid increased to 3.5. Renal function remains okay. We will keep tight control of BP and glucose to preserve renal function. Increase in WBC to 21.8 today, will trend cultures, could be reactive. Liver enzymes stable. Continue Vanc for MRSA in sputum.  Continue to closely monitor, course is concerning as he was going down on oxygen requirements prior, and now seems to have hit a plateau. We will continue aggressive measures, pronation, and monitor labs closely. Start reglan for tube feed residuals. Replete potassium. 1/2/2022: No acute events overnight. Placed in supine position at 0515, tolerated well. Currently on 80% FiO2 and PEEP 15. Levophed at 17. Goal is to wean vent today to 70% if tolerated. Continue aggressive pulmonary hygiene. Fevers improving, T-max 99.7. Continue to maintain blood glucose 140-180. Replete albumin. Procalcitonin 2.9 likely secondary to MRSA pneumonia, optimize antimicrobial therapy. Blood cultures with no growth at 17 hours, sputum with GPC in clusters, likely still MRSA. 1/3/2022: No acute events over the night. We were able to decrease FiO2 to 75% yesterday with good ABG following. Continue to wean oxygen with goal of 70% today. PEEP 14. Continue with aggressive pulmonary hygiene, HOB greater than 30 degrees. T-max 99.2, showing some improvement. MRSA still growing from sputum cultures drawn 1/1/22.     1/4/2022: No acute events over the night. Remains on 70% FiO2 PEEP 14, sats 93%, will likely not titrate down today. WBCs downtrending. Potassium repleted. Triglycerides 876, transition off propofol and change his sedation regimen today. Continue to prone 16 hours for PF ratio less than 150. If unable to wean O2 today, consider addition of linezolid for MRSA pneumonia. Continue with PROM, mobilization, aggressive pulmonary hygiene, elevate HOB, oral care. Will hold nimbex for now until sedation optimized.      Active Problem List:     Problem List  Date Reviewed: 11/28/2021          Codes Class    COVID ICD-10-CM: U07.1  ICD-9-CM: 079.89         Acute hypoxemic respiratory failure due to COVID-19 [facility]) ICD-10-CM: U07.1, J96.01  ICD-9-CM: 518.81, 079.89, 799.02         Pneumonia due to COVID-19 virus ICD-10-CM: U07.1, J12.82  ICD-9-CM: 480.8, 079.89         Hyperglycemia due to type 2 diabetes mellitus (HCC) ICD-10-CM: E11.65  ICD-9-CM: 250.00         Primary hypertension ICD-10-CM: I10  ICD-9-CM: 401.9         Hyponatremia ICD-10-CM: E87.1  ICD-9-CM: 276.1         Lactic acidosis ICD-10-CM: E87.2  ICD-9-CM: 276.2         Hyperlipidemia ICD-10-CM: E78.5  ICD-9-CM: 272.4         Obesity (BMI 30-39. 9) ICD-10-CM: E66.9  ICD-9-CM: 278.00         COVID-19 vaccination not done ICD-10-CM: Z28.9  ICD-9-CM: V64.00               Past Medical History:      has a past medical history of COVID-19, Diabetes ([address] Hyperlipidemia, and Hypertension. Past Surgical History:      has a past surgical history that includes hx orthopaedic (Right). Home Medications:     Prior to Admission medications    Medication Sig Start Date End Date Taking? Authorizing Provider   cisatracurium (NIMBEX) IV infusion 0-0.869 mg/min by IntraVENous route TITRATE. 12/1/21  Yes Jessica Aguero MD   enoxaparin (LOVENOX) 100 mg/mL 90 mg by SubCUTAneous route every twelve (12) hours every twelve (12) hours. 12/1/21  Yes Jessica Aguero MD   insulin glargine (LANTUS) 100 unit/mL injection 24 Units by SubCUTAneous route nightly. Indications: type 2 diabetes mellitus 12/1/21  Yes Jessica Aguero MD   midazolam in normal saline (VERSED) 1 mg/mL soln 0-10 mg/hr by IntraVENous route TITRATE. Max Daily Amount: 240 mg. 12/1/21  Yes Jessica Aguero MD   Norepinephrine Bitartrate (LEVOPHED) 8 mg/250 mL (32 mcg/mL) soln 0.5-16 mcg/min by IntraVENous route TITRATE. 12/1/21  Yes Jessica Aguero MD   ascorbic acid, vitamin C, (VITAMIN C) 500 mg tablet Take 1 Tablet by mouth two (2) times a day. 12/1/21   Jessica Aguero MD   cholecalciferol (VITAMIN D3) (1000 Units /25 mcg) tablet Take 2 Tablets by mouth daily. 12/1/21   Jessica Aguero MD   melatonin, rapid dissolve, 5 mg TbDi tablet Take 1 Tablet by mouth nightly. 12/1/21   Jessica Aguero MD   atorvastatin (LIPITOR) 20 mg tablet Take 20 mg by mouth daily.     Provider, Historical   telmisartan (MICARDIS) 80 mg tablet Take 80 mg by mouth daily. Indications: high blood pressure    Provider, Historical       Allergies/Social/Family History: Allergies   Allergen Reactions    Alupent [Metaproterenol] Swelling      Social History     Tobacco Use    Smoking status: Never Smoker    Smokeless tobacco: Not on file   Substance Use Topics    Alcohol use: Yes     Comment: drinksonce a week      Family History   Problem Relation Age of Onset    Diabetes Mother     Hypertension Mother     Stroke Mother     Hodgkin's lymphoma Mother     Diabetes Father     Hypertension Father     Cystic Fibrosis Father     Diabetes Maternal Aunt     Diabetes Maternal Uncle        Review of Systems:     Unable    Objective:   Vital Signs:  Visit Vitals  /73 (BP 1 Location: Left upper arm, BP Patient Position: At rest)   Pulse 91   Temp 99.8 °F (37.7 °C)   Resp 22   Ht 5' 6\" (1.676 m)   Wt 86.8 kg (191 lb 5.8 oz)   SpO2 92%   BMI 30.89 kg/m²    O2 Flow Rate (L/min): 60 l/min O2 Device: Endotracheal tube,Ventilator Temp (24hrs), Av.4 °F (37.4 °C), Min:98.5 °F (36.9 °C), Max:100 °F (37.8 °C)           Intake/Output:     Intake/Output Summary (Last 24 hours) at 2022 0810  Last data filed at 2022 0700  Gross per 24 hour   Intake 4282.23 ml   Output 3310 ml   Net 972.23 ml       Physical Exam:    General:  Sedated/intubated/RASS -1 to -2  Eye: Pupils are round and reactive bilaterally  Neurologic: Furrowed brow; withdraws to noxious stimuli in all extremities; does not follow commands, SORIANO spontaneously  Neck: Supple, no JVD  Lungs:   On mechanical ventilation; CTAB  Heart: RRR, 2+ pulses, 1+ edema of BLE   Abdomen:Soft, nontender, nondistended  Skin:  No rash or lesion      LABS AND  DATA: Personally reviewed  Recent Labs     22   WBC 29.6* 33.5*   HGB 8.8* 8.7*   HCT 27.3* 27.4*   * 513*     Recent Labs     22    140   K 3.3* 3.2*    106 CO2 26 28   BUN 17 15   CREA 1.09 0.98   * 172*   CA 8.0* 8.2*   MG 2.7* 2.7*   PHOS 3.8 3.4     Recent Labs     01/04/22  0415 01/03/22  0426   * 223*   TP 6.3* 6.0*   ALB 1.3* 1.5*   GLOB 5.0* 4.5*     No results for input(s): INR, PTP, APTT, INREXT, INREXT in the last 72 hours. Recent Labs     01/03/22  0316 01/02/22  0409   PHI 7.36 7.31*   PCO2I 51.4* 61.0*   PO2I 76* 116*   FIO2I 75 80     No results for input(s): CPK, CKMB, TROIQ, BNPP in the last 72 hours. Ventilator Settings:  Mode Rate Tidal Volume Pressure FiO2 PEEP   Assist control,Pressure control   535 ml  0 cm H2O 80 % (Per ABG) 14 cm H20     Peak airway pressure: 41 cm H2O    Minue ventilation: 10.7 l/min      MEDS: Reviewed    Chest X-Ray: personally reviewed and report checked    · TTE: 11/24: LV: Estimated LVEF is 50 - 55%. Normal cavity size, wall thickness and diastolic function. Low normal systolic function. Assessment and Plan     [facility] Pneumonia complicated by Severe ARDS c/b pneumococcal pneumonia: Received tocilizumab. Continue RASS -1 to -2. [place] MRSA pneumonia in sputum  - Continue steroids  - Completed cefazolin  - Procal negative  - CXR unrevealing  - FiO2  70, contine to wean O2 as tolerated. Maintain sats greater than 92%   - Goal for trach/PEG when patient medically able  - Diuresis PRN  -Continue vancomycin, may need to consider linezolid if respiratory status worsens  HLD: Continue statin    DM2 c/b hyperglycemia:   - Continue NPH 10 q 12hrs   - Continue SSI q 6hrs     Hypotension: Possibly related to sedation. 14 mcg levophed.    - Continue midodrine    Agitation:   - Continue enteral sedation/opioids  - Wean sedation as tolerated, on versed at 20, propofol 50  - Seroquel at 50mg TID    Deconditioning: Unable to participate in PT/OT at this time    Multidisciplinary Rounds Completed: Y    ABCDEF Bundle/Checklist  Pain Medications: Fentanyl  Target RASS: 0  Sedation Medications:Versed  CAM-ICU: SILVIA  Mobility:Poor  PT/OT: Unable to participate: PROM  Restraints:Y  Discussed Plan of Care (goals of care): Y  Addressed Code Status: F    CARDIOVASCULAR  Cardiac Gtts: N  SBP Goal of: > 90 mmHg  MAP Goal of: > 65 mmHg  Transfusion Trigger (Hgb): <7 g/dL    RESPIRATORY  Vent Goals:   Optimize PEEP/Ventilation/Oxygenation  DVT Prophylaxis (if no, list reason): Lovenox   SPO2 Goal: > 92%  Pulmonary toilet: Duo-Nebs     GI/  Gold Catheter Present: Yes  GI Prophylaxis: Pepcid (famotidine)   Nutrition: Yes TF  IVFs:N  Bowel Movement:Y  Bowel Regimen:Y  Insulin: Y    ANTIBIOTICS  Antibiotics:  None    T/L/D  Tubes: ETT  Lines: PICC  Drains: Gold    SPECIAL EQUIPMENT  Vent    DISPOSITION  ICU    CRITICAL CARE CONSULTANT NOTE  I had a face to face encounter with the patient, reviewed and interpreted patient data including clinical events, labs, images, vital signs, I/O's, and examined patient. I have discussed the case and the plan and management of the patient's care with the consulting services, the bedside nurses and the respiratory therapist.      NOTE OF PERSONAL INVOLVEMENT IN CARE   This patient has a high probability of imminent, clinically significant deterioration, which requires the highest level of preparedness to intervene urgently. I participated in the decision-making and personally managed or directed the management of the following life and organ supporting interventions that required my frequent assessment to treat or prevent imminent deterioration. I personally spent 55 minutes of critical care time. This is time spent at this critically ill patient's bedside actively involved in patient care as well as the coordination of care and discussions with the patient's family. This does not include any procedural time which has been billed separately.           Malheur Peer [facility]     Critical Care Medicine  [facility]

## 2022-01-04 NOTE — PROGRESS NOTES
ID Progress Note  2022    Subjective:     afebrile  Intubated it    Objective:     Antibiotics:  1. Vancomycin --, 2021-  2. Cefepime 12/3-2021  3. Ceftriaxone --2021  4. Zosyn 2022-  5. Eraxis 2021-1/3/2022    Vitals:   Visit Vitals  /73 (BP 1 Location: Left upper arm, BP Patient Position: At rest)   Pulse 91   Temp 99.8 °F (37.7 °C)   Resp 22   Ht 5' 6\" (1.676 m)   Wt 86.8 kg (191 lb 5.8 oz)   SpO2 92%   BMI 30.89 kg/m²        Tmax:  Temp (24hrs), Av.4 °F (37.4 °C), Min:98.5 °F (36.9 °C), Max:100 °F (37.8 °C)      Exam:    Intubated it  RRR without RMG  Clear breath sounds in apex with decreased breath sounds at bases  abd soft with (+) bowel sounds  Gold cath in place; draining straw color urine with sediment  Unable to assess insight  Upper extremity edematous      Labs:      Recent Labs     22  0415 22  0426 22  0300   WBC 29.6* 33.5* 40.8*   HGB 8.8* 8.7* 9.1*   * 513* 486*   BUN 17 15 13   CREA 1.09 0.98 0.97   * 223* 221*   TBILI 0.4 0.4 0.3       Cultures:     No results found for: SDES  Lab Results   Component Value Date/Time    Culture result: NO GROWTH 2 DAYS 2022 10:55 AM    Culture result: (A) 2022 10:55 AM     HEAVY * METHICILLIN RESISTANT STAPHYLOCOCCUS AUREUS * (KNOWN MRSA PATIENT)    Culture result: FEW NORMAL RESPIRATORY TAMARA 2022 10:55 AM     CT of abd/pel () Tiny gallstones. Small nonobstructing right renal stone  CTA chest () Significant bilateral lower lobe consolidation and and pulmonary infiltrates throughout both lung fields. Assessment:     1. Severe COVID pneumonia--Pneumococcus from respiratory culture; completed the therapy   2. MRSA pneumonia (sputum cx 2022 - MRSA)  3. VDRF  4. Hypoxic respiratory failure  5.  Bacteremia--S epi--line has been changed, completed the ABX therapy on  with ancef    Objective:     Afebrile, WBC 33.5->29.6  continue with IV zosyn and vancomycin; deescalate the ABX as WBC trending down to normal      Above plan of care discussed and agreed with Dr. Lizbeth Moon, NP

## 2022-01-04 NOTE — PROGRESS NOTES
Lovenox Monitoring  Indication:  RLE DVT  Recent Labs     01/04/22  0415 01/03/22  0426 01/02/22  0300   HGB 8.8* 8.7* 9.1*   * 513* 486*   CREA 1.09 0.98 0.97     Current Weight: 86.8 kg  Est. CrCl = ~85-90 ml/min  Current Dose: 80 mg subcutaneously every 12 hours. Plan: Change to 90 mg every 12 hours per the [facility]-approved Lovenox dose-rounding protocol.

## 2022-01-04 NOTE — PROGRESS NOTES
1930: Bedside, Verbal and Written shift change report given to Nadeen Machado RN (oncoming nurse) by Mg Andres RN (offgoing nurse). Report included the following information SBAR, Kardex, ED Summary, Intake/Output, MAR, Recent Results, Cardiac Rhythm NSR and Quality Measures. 2000: TOF @ 40 4/4 twitches. Nimbex gtt @ 2 mcg/kg/hr. Complaint with vent. 0730: Bedside, Verbal and Written shift change report given to Mg Andres RN (oncoming nurse) by Nadeen Machado RN (offgoing nurse). Report included the following information SBAR, Kardex, ED Summary, Intake/Output, MAR, Recent Results, Cardiac Rhythm NSR and Quality Measures.

## 2022-01-05 NOTE — WOUND CARE
WOCN Note:      Follow-up visit for knees      Chart shows:  Admitted for Covid-19+     Assessment:   Intubated & sedated. Gold  Tube feeds   Surface: ISIDRO mattress     Bilateral heels intact and without erythema. Heels offloaded with pillows. Deflated serous bullae to right knee = 1.5 x 1.5 cm & venelex in use.     Left cheek with medical adhesive related skin injury due to EET tube being held in place with tape due to being proned 12 hours a day. Measures 2 x 1.8 x <0.1 cm. Partial thickness, no exudate, periwound intact with no erythema. Hydrocolloid applied    Left side of head behind eye with partial thickness skin tear. Measures 0.8 x 0.5 x <0.1 cm. Red in color. Scant sanguineous exudate, periwound intact with no erythema. Hydrocolloid applied       Wound Recommendations:    Continue venelex to knee    Cleanse wounds to left cheek and left side of head behind eye with saline, apply hydrocolloid, change every 3 days and prn     PI Prevention:  Turn/reposition approximately every 2 hours  Offload heels with heels hanging off end of pillow at all times while in bed. Sacral Foam dressing: lift to assess regularly; change as needed. Discontinue if incontinence is frequently soiling dressing. Air mattress: Use only flat sheet and one incontinence pad.      Orders received from Dr. Hola Small  Discussed with RN.      Transition of Care: Plan to follow weekly and as needed while admitted to hospital.    Sharol Mcburney MSN, RN, [facility], [address] [place] [address]  Certified Wound and Ostomy Nurse  office [phone]  Can be reached through [address] [place] [address]  pager [phone] or call  to page

## 2022-01-05 NOTE — PROGRESS NOTES
[facility]    ICU TEAM Progress Note    Name: Harlan Veronica   : 1975   MRN: [id number]   Date: 2021      Subjective:   Progress Note: 2021      Mr Sarah Espinal is a 54 yo male with a PMH of DM2, HTN and obesity admitted on  to [facility] for COVID-19 pneumonia. He was treated with tocilizumab and steroids. He was not vaccinated. He decompensated and was intubated and . He was paralyzed, proned/supinated. He was transferred to [facility] on  to be evaluated for possible ECMO. CVC and arterial line placed at [facility] on . Nimbex was turned off . He was weaned to 60% Fio2 as of 12/3. Proning/supinating therapy continued. He continued to demonstrated fever. He was started on cefepime/fluconazole on 12/3 with vanc added .      his FIo2 increased to 70% and decreased back to 60% on . CVC changed to PICC on  as blood culture had been positive for 4/4 S epi on 12/3 (cleared in culture on ). He was on vanc for 2 weeks, until . Changed to cefazolin on 12/15. Attempts were made to decrease sedation on , but this was not successful due to increased agitation. Due to this, seroquel was added on . Enteral oxycodone and valium were also added on . His versed was weaned from 19mg/h to 17/mg/h on  pm.  His versed was further weaned on  as his seroquel was increased. : Versed is at 9. He continues on the fentanyl infusion. He has been afebrile overnight. : Versed gtt 7 and fentanyl gtt at 150. Vent weaned to 65% and continued PEEP of 12. Opens eyes and moves ext spontaneously. But does not follow commands, track with eyes, or WD to pain. NGT clogged, will replace today. Completed cefazolin. Chest xray tomorrow am.    : No acute events overnight. Did not require pronation overnight. Plan for today is to decrease FiO2 to 60% and wean Fentanyl. Mobilize as tolerated. Not following commands.  CHRISTIE spontaneously. 12/24: No acute events overnight, remains on 55% FiO2, PEEP 10. Will continue to wean FiO2 for goal of 50% today, will wean PEEP to 8 if tolerated. SORIANO spontaneously. Continue to wean IV sedation and liberalize PO opiates and benzos to avoid withdraw without oversedating. 12/25: Early this AM about 0600 patient spiked fever 103.3, elevated RR and HR. Given PRN pushes of versed and fentanyl as well as one time dose of rocuronium with some recovery. Chest x-ray obtained does not show obvious pneumothorax, formal read is pending. Will get BLE dopplers to asses for DVT, once stable will send to CT for head and chest CT to rule out neurological causes of fever such as stroke and also PE. Will likely need to resume sedation. Will place ENT consult for tracheostomy. Per respiratory note, patients ETT is having air leaks and suction catheter is difficult to pass, will likely require tube exchange today. Cooling blanket for fever. Levo as needed for support of blood pressure. 12/26: No acute events over the night. Unable to obtain CT scans due to labile BP, risk of travel outweighed benefit of scan overnight. Will attempt to get CT scans today, will add CT abdomen for further evaluation of fevers. Received motrin over the night for T-max 100.9. Remains on levophed infusion at 2 mcg/min for BP support. Labs remain stable, no acute abnormality. Monitor tube feed residuals, they were held for a few hours yesterday for high gastric residual, likely secondary to sedative medications decreasing gastric motility, resume feeds today. Increase rate of feeds as tolerated. Plan to re-test for COVID on 12/29. No issues with ET tube over the night, will hold off on tube exchange. ENT consultation for trach. Discussed with wife yesterday. Continue to prone for PF ratio less than 150, aggressive pulmonary hygiene. 12/27: Patient seen. NAD. Fevers noted. MRSA in sputum, antibiotics adjusted.  Continue current management. 12/28: Patient fevers persisted. Fentanyl gtt discontinued and transitioned to dilaudid gtt. ENT evaluated patient, current settings are too high, will have to be re consulted when settings improve. 12/29: patient seen. NAD. ID Following: continue current antimicrobial regimen. Wound care RN management recommendations noted. Tube feedings on hold as gastric residuals were too high. Will continue to trend    12/30: Patient seen. NAD. Patient noted to have 2 bowel movements after previous bm was on 12/17. ABG results reviewed with decision made to prone patient. Further orders per clinical course. 12/31: Early this morning patient placed in prone position. Had episode of tachycardia and desaturation. Given rocuronium 50 mg x1, Nimbex resumed. HR remained in the 150's, was given metoprolol now with better rate control. Resume propofol to ensure sedation with paralytic. Stop PO benzos and opiates while on IV. Still with elevated temp, up to 103, continue micafungin, vancomycin. ID following. Net positive 3.5 L fluid volume since admit, net negative over past 24 hours. 1/1/2022: Patient continues with intermittent fevers. Paired blood and sputum cultures ordered to be drawn today. No obvious bacterial infection source, rule out drug fevers as well, could also be viral fever related to ongoing COVID-19 infection. He has required increase in his pressor support, currently on 22 mcg/min levophed for BP management. ABG also slightly worse this morning with partially compensated metabolic acidosis. Lactic acid increased to 3.5. Renal function remains okay. We will keep tight control of BP and glucose to preserve renal function. Increase in WBC to 21.8 today, will trend cultures, could be reactive. Liver enzymes stable. Continue Vanc for MRSA in sputum. Continue to closely monitor, course is concerning as he was going down on oxygen requirements prior, and now seems to have hit a plateau.  We will continue aggressive measures, pronation, and monitor labs closely. Start reglan for tube feed residuals. Replete potassium. 1/2/2022: No acute events overnight. Placed in supine position at 0515, tolerated well. Currently on 80% FiO2 and PEEP 15. Levophed at 17. Goal is to wean vent today to 70% if tolerated. Continue aggressive pulmonary hygiene. Fevers improving, T-max 99.7. Continue to maintain blood glucose 140-180. Replete albumin. Procalcitonin 2.9 likely secondary to MRSA pneumonia, optimize antimicrobial therapy. Blood cultures with no growth at 17 hours, sputum with GPC in clusters, likely still MRSA. 1/3/2022: No acute events over the night. We were able to decrease FiO2 to 75% yesterday with good ABG following. Continue to wean oxygen with goal of 70% today. PEEP 14. Continue with aggressive pulmonary hygiene, HOB greater than 30 degrees. T-max 99.2, showing some improvement. MRSA still growing from sputum cultures drawn 1/1/22.     1/4/2022: No acute events over the night. Remains on 70% FiO2 PEEP 14, sats 93%, will likely not titrate down today. WBCs downtrending. Potassium repleted. Triglycerides 876, transition off propofol and change his sedation regimen today. Continue to prone 16 hours for PF ratio less than 150. If unable to wean O2 today, linezolid added for MRSA pneumonia. Continue with PROM, mobilization, aggressive pulmonary hygiene, elevate HOB, oral care. Will hold nimbex for now until sedation optimized. 1/5/2022: No acute events overnight, remained proned overnight. Vent settings FiO2 80%, peep 14, PaO2/FiO2 ratio 89. Plan to supinate today at 10am.  Will check ABG at 5pm and re-prone if PaO2/FiO2 ratio still less then 150.     Active Problem List:     Problem List  Date Reviewed: 11/28/2021          Codes Class    COVID ICD-10-CM: U07.1  ICD-9-CM: 079.89         Acute hypoxemic respiratory failure due to COVID-19 [facility]) ICD-10-CM: U07.1, J96.01  ICD-9-CM: 518.81, 079.89, 799.02 Pneumonia due to COVID-19 virus ICD-10-CM: U07.1, J12.82  ICD-9-CM: 480.8, 079.89         Hyperglycemia due to type 2 diabetes mellitus (HCC) ICD-10-CM: E11.65  ICD-9-CM: 250.00         Primary hypertension ICD-10-CM: I10  ICD-9-CM: 401.9         Hyponatremia ICD-10-CM: E87.1  ICD-9-CM: 276.1         Lactic acidosis ICD-10-CM: E87.2  ICD-9-CM: 276.2         Hyperlipidemia ICD-10-CM: E78.5  ICD-9-CM: 272.4         Obesity (BMI 30-39. 9) ICD-10-CM: E66.9  ICD-9-CM: 278.00         COVID-19 vaccination not done ICD-10-CM: Z28.9  ICD-9-CM: V64.00               Past Medical History:      has a past medical history of COVID-19, Diabetes ([address] Hyperlipidemia, and Hypertension. Past Surgical History:      has a past surgical history that includes hx orthopaedic (Right). Home Medications:     Prior to Admission medications    Medication Sig Start Date End Date Taking? Authorizing Provider   cisatracurium (NIMBEX) IV infusion 0-0.869 mg/min by IntraVENous route TITRATE. 12/1/21  Yes John Kapoor MD   enoxaparin (LOVENOX) 100 mg/mL 90 mg by SubCUTAneous route every twelve (12) hours every twelve (12) hours. 12/1/21  Yes John Kapoor MD   insulin glargine (LANTUS) 100 unit/mL injection 24 Units by SubCUTAneous route nightly. Indications: type 2 diabetes mellitus 12/1/21  Yes John Kapoor MD   midazolam in normal saline (VERSED) 1 mg/mL soln 0-10 mg/hr by IntraVENous route TITRATE. Max Daily Amount: 240 mg. 12/1/21  Yes John Kapoor MD   Norepinephrine Bitartrate (LEVOPHED) 8 mg/250 mL (32 mcg/mL) soln 0.5-16 mcg/min by IntraVENous route TITRATE. 12/1/21  Yes John Kapoor MD   ascorbic acid, vitamin C, (VITAMIN C) 500 mg tablet Take 1 Tablet by mouth two (2) times a day. 12/1/21   John Kapoor MD   cholecalciferol (VITAMIN D3) (1000 Units /25 mcg) tablet Take 2 Tablets by mouth daily. 12/1/21   John Kapoor MD   melatonin, rapid dissolve, 5 mg TbDi tablet Take 1 Tablet by mouth nightly.  12/1/21   John Kapoor MD   atorvastatin (LIPITOR) 20 mg tablet Take 20 mg by mouth daily. Provider, Historical   telmisartan (MICARDIS) 80 mg tablet Take 80 mg by mouth daily. Indications: high blood pressure    Provider, Historical       Allergies/Social/Family History: Allergies   Allergen Reactions    Alupent [Metaproterenol] Swelling      Social History     Tobacco Use    Smoking status: Never Smoker    Smokeless tobacco: Not on file   Substance Use Topics    Alcohol use: Yes     Comment: drinksonce a week      Family History   Problem Relation Age of Onset   Iris Vazquez Diabetes Mother     Hypertension Mother     Stroke Mother     Hodgkin's lymphoma Mother     Diabetes Father     Hypertension Father     Cystic Fibrosis Father     Diabetes Maternal Aunt     Diabetes Maternal Uncle        Review of Systems:     Unable to assess due to pt condition    Objective:   Vital Signs:  Visit Vitals  BP (!) 84/61   Pulse 79   Temp (!) 103 °F (39.4 °C)   Resp 22   Ht 5' 6\" (1.676 m)   Wt 86.8 kg (191 lb 5.8 oz)   SpO2 97%   BMI 30.89 kg/m²    O2 Flow Rate (L/min): 60 l/min O2 Device: Ventilator,Endotracheal tube Temp (24hrs), Av.2 °F (37.9 °C), Min:96.1 °F (35.6 °C), Max:103 °F (39.4 °C)           Intake/Output:     Intake/Output Summary (Last 24 hours) at 2022 0819  Last data filed at 2022 0700  Gross per 24 hour   Intake 3200.91 ml   Output 1325 ml   Net 1875.91 ml       Physical Exam:    General:  Sedated/intubated/RASS -3   Eye: Pupils are round and reactive bilaterally  Neurologic: Furrowed brow; withdraws to noxious stimuli in all extremities; does not follow commands, SORIANO spontaneously  Neck: Supple, no JVD  Lungs:   On mechanical ventilation; bilateral rhonchi  Heart: RRR, 2+ pulses, 1+ edema of BLE   Abdomen:Soft, nontender, nondistended  Skin:  No rash or lesion      LABS AND  DATA: Personally reviewed  Recent Labs     22  0241 22  0415   WBC 29.9* 29.6*   HGB 8.5* 8.8*   HCT 27.3* 27.3*   * 545*     Recent Labs 01/05/22  0241 01/04/22  0415    142   K 3.8 3.3*   * 108   CO2 24 26   BUN 19 17   CREA 1.03 1.09   * 192*   CA 7.7* 8.0*   MG 2.7* 2.7*   PHOS 4.8* 3.8     Recent Labs     01/05/22  0241 01/04/22  0415   * 277*   TP 6.4 6.3*   ALB 1.3* 1.3*   GLOB 5.1* 5.0*     No results for input(s): INR, PTP, APTT, INREXT, INREXT in the last 72 hours. Recent Labs     01/04/22  1505 01/03/22  0316   PHI 7.35 7.36   PCO2I 44.2 51.4*   PO2I 72* 76*   FIO2I 80 75     No results for input(s): CPK, CKMB, TROIQ, BNPP in the last 72 hours. Ventilator Settings:  Mode Rate Tidal Volume Pressure FiO2 PEEP   Assist control   535 ml  0 cm H2O 80 % 14 cm H20     Peak airway pressure: 42 cm H2O    Minue ventilation: 11.7 l/min      MEDS: Reviewed    Chest X-Ray: personally reviewed and report checked    · TTE: 11/24: LV: Estimated LVEF is 50 - 55%. Normal cavity size, wall thickness and diastolic function. Low normal systolic function. Assessment and Plan     [id number] Pneumonia complicated by Severe ARDS c/b MRSA pneumonia: Received tocilizumab. - Continue steroids  - Completed cefazolin  - Procal negative  - CXR unrevealing  - FiO2  80, contine to wean O2 as tolerated.  Maintain sats greater than 92%   - Goal for trach/PEG when patient medically able  - Diuresis PRN  -on vanc/linezoid/zosyn, being followed by ID  -d/c vanc since on linezoild now  -CXR    Oliguria  -pt with low urine output, now 10-20cc/hr.    -Cr WNL, give 40 lasix and reasses    HLD: Continue statin    DM2 c/b hyperglycemia:   - Continue NPH 10 q 12hrs   - Continue SSI q 6hrs     Hypotension: Possibly related to sedation vs septic shock.   -11-->3 mcg levophed, titrate goal map>65.   - Continue midodrine    Agitation:   - Continue enteral sedation/opioids  - Wean sedation as tolerated, on versed at 20, propofol 50  - Seroquel at 50mg TID    Deconditioning: Unable to participate in PT/OT at this time    Multidisciplinary Rounds Completed: Y    ABCDEF Bundle/Checklist  Pain Medications: Fentanyl  Target RASS: 0  Sedation Medications:Versed  CAM-ICU: SILVIA  Mobility:Poor  PT/OT: Unable to participate: PROM  Restraints:Y  Discussed Plan of Care (goals of care): Y  Addressed Code Status: F    CARDIOVASCULAR  Cardiac Gtts: N  SBP Goal of: > 90 mmHg  MAP Goal of: > 65 mmHg  Transfusion Trigger (Hgb): <7 g/dL    RESPIRATORY  Vent Goals:   Optimize PEEP/Ventilation/Oxygenation  DVT Prophylaxis (if no, list reason): Lovenox therapeutic  SPO2 Goal: > 92%  Pulmonary toilet: Duo-Nebs     GI/  Gold Catheter Present: Yes  GI Prophylaxis: Pepcid (famotidine)   Nutrition: Yes TF  IVFs:N  Bowel Movement:Y  Bowel Regimen:Y  Insulin: Y    ANTIBIOTICS  Antibiotics:  None    T/L/D  Tubes: ETT  Lines: PICC  Drains: Gold    SPECIAL EQUIPMENT  Vent    DISPOSITION  ICU    CRITICAL CARE CONSULTANT NOTE  I had a face to face encounter with the patient, reviewed and interpreted patient data including clinical events, labs, images, vital signs, I/O's, and examined patient. I have discussed the case and the plan and management of the patient's care with the consulting services, the bedside nurses and the respiratory therapist.      NOTE OF PERSONAL INVOLVEMENT IN CARE   This patient has a high probability of imminent, clinically significant deterioration, which requires the highest level of preparedness to intervene urgently. I participated in the decision-making and personally managed or directed the management of the following life and organ supporting interventions that required my frequent assessment to treat or prevent imminent deterioration. I personally spent 45 minutes of critical care time. This is time spent at this critically ill patient's bedside actively involved in patient care as well as the coordination of care and discussions with the patient's family. This does not include any procedural time which has been billed separately.           Luis Ulloa NP     Critical Care Medicine  [facility]  1/5/22

## 2022-01-05 NOTE — PROGRESS NOTES
ID Progress Note  2022    Subjective:     T-max 100.3  Intubated it    Objective:     Antibiotics:  1. Vancomycin --, 2021-2022  2. zyvox -  3. Cefepime 12/3-2021  4. Ceftriaxone --2021  5. Zosyn 2022-  6. Eraxis 2021-1/3/2022    Vitals:   Visit Vitals  /73 (BP 1 Location: Left arm, BP Patient Position: At rest)   Pulse 77   Temp 99.7 °F (37.6 °C) Comment: pt on cooling blanket auto temp set to 98 F   Resp 22   Ht 5' 6\" (1.676 m)   Wt 86.8 kg (191 lb 5.8 oz)   SpO2 97%   BMI 30.89 kg/m²        Tmax:  Temp (24hrs), Av.2 °F (37.9 °C), Min:96.1 °F (35.6 °C), Max:103 °F (39.4 °C)      Exam:    Intubated it  RRR without RMG  Clear breath sounds in apex with decreased breath sounds at bases  abd soft with (+) bowel sounds  Gold cath in place; draining straw color urine with sediment  Unable to assess insight  All extremity edematous      Labs:      Recent Labs     22  0241 22  0415 22  0426   WBC 29.9* 29.6* 33.5*   HGB 8.5* 8.8* 8.7*   * 545* 513*   BUN 19 17 15   CREA 1.03 1.09 0.98   * 277* 223*   TBILI 0.5 0.4 0.4       Cultures:     No results found for: SDES  Lab Results   Component Value Date/Time    Culture result: NO GROWTH 4 DAYS 2022 10:55 AM    Culture result: (A) 2022 10:55 AM     HEAVY * METHICILLIN RESISTANT STAPHYLOCOCCUS AUREUS * (KNOWN MRSA PATIENT)    Culture result: FEW NORMAL RESPIRATORY TAMARA 2022 10:55 AM     : Spoke with the pt's spouse for 27 minutes, updated clinical information and current medication therapy. Mrs. Keanu Ferreira asked whether he could get Ivermectin, replied as that will not be one of our recommended therapy. She asked what other things that we can offer if all current therapy fails, I asked her to think about goals of care. I addressed and answered all the questions that raised by pt's spouse. Assessment:     1.  Severe COVID pneumonia--Pneumococcus from respiratory culture; completed the therapy   2. MRSA pneumonia (sputum cx 1/1/2022 - MRSA)  3. VDRF  4. Hypoxic respiratory failure  5.  Bacteremia--S epi--line has been changed, completed the ABX therapy on 12/20 with ancef            Objective:     T-max 100.3; blood cx (1/5) pending   WBC 33.5->29.6->29.9  procal 2.9->1.5 (1/5)  Lactic acid 2.6  continue with IV zosyn and zyvox (chenaged from vancomycin); deescalate the ABX as WBC trending down to normal       Above plan of care discussed and agreed with Dr. Ganga Mims, NP

## 2022-01-05 NOTE — PROGRESS NOTES
1930: Bedside and Verbal shift change report given to Elvia Velazco (oncoming nurse) by Annelise Chamorro (offgoing nurse). Report included the following information SBAR, Kardex, ED Summary, Intake/Output, MAR, Recent Results, Cardiac Rhythm NSR and Alarm Parameters .     -Pt temperature ranged from 95.5 to 103 overnight. Cooling blanket, tylenol and emy hugger were all intermittently used. When bladder probe was reading a temp of 103, axillary probe read 97.2, flushed blanc- temp still reading 102,  inserted esophageal probe per NP and read 102.3 - notified NP.    0600: Temp 103.2, applied cooling blanket. Trickle feeds started at 10 mL/hr. Shift Summary: Labile temperature throughout shift, Drips currently infusing include- precedex 1.5 mcg, dilaudid 4 mg, versed 20 mg, levophed 11 mg. Pt unresponsive in all ext, cough present. 0730: Bedside and Verbal shift change report given to Blayne (oncoming nurse) by Jay Waggoner RN (offgoing nurse). Report included the following information SBAR, Kardex, ED Summary, Intake/Output, MAR, Recent Results, Cardiac Rhythm NSR and Alarm Parameters .

## 2022-01-05 NOTE — PROGRESS NOTES
Bedside shift change report given to Mariaelena Hernández RN (oncoming nurse) by Prudencio Miles RN (offgoing nurse). Report included the following information SBAR, Kardex, ED Summary, Procedure Summary, Intake/Output, MAR, Recent Results, Med Rec Status, Cardiac Rhythm NSR, Alarm Parameters , Quality Measures and Dual Neuro Assessment. 1000: Pt supinated per NP following policy with RT, RN, Nolberto PCT, Lula PCT and Jaelyn RN. PT tolerated well. ICU multidisciplinary rounds lead by Lorena Lutz NP (Intensivist): The following were reviewed and discussed: current labs,  recent imaging, lines/drains, review of body systems, nutrition, cultures, mobility, length of ICU stay. The plan of care for the day is as follows: repeat blood cultures and lactic, hold TF's today, recheck ABG at 1700, NP to do bedside echo and then potentially order diuretic. Plan to decrease versed today. 1615: Pt stacking breaths on the ventilator, Lily NP notified, orders received to increase versed back up to 20 and give pt prn versed. Bedside shift change report given to Pavan Caruso (oncoming nurse) by Mariaelena Hernández RN (offgoing nurse). Report included the following information SBAR, Kardex, ED Summary, Procedure Summary, Intake/Output, MAR, Recent Results, Med Rec Status, Cardiac Rhythm NSR, Alarm Parameters , Quality Measures and Dual Neuro Assessment.

## 2022-01-06 NOTE — PROGRESS NOTES
Day #1 of Meropenem - Renal Dosing Update  Indication:  Worsening sepsis 2/2 unknown etiology  - Severe COVID-19 PNA  - Ongoing MRSA pneumonia  Current regimen:  500 mg IV Q 8 hr  Abx regimen: Linezolid + Meropenem  Recent Labs     22  0437 22  0241 22  0415   WBC 31.4* 29.9* 29.6*   CREA 1.49* 1.03 1.09   BUN 29* 19 17     Est CrCl: ~65-70 ml/min; UO: 0.4 ml/kg/hr  Temp (24hrs), Av.3 °F (37.9 °C), Min:98.2 °F (36.8 °C), Max:102.3 °F (39.1 °C)    Recent cultures:    Blood: NG, final   Sputum: heavy MRSA, final   Blood: NGTD   Sputum: heavy MRSA, final   Blood: NGTD    Plan: Change to 500 mg IV Q 6 hr per [facility] P&T Committee Protocol with respect to renal function. Pharmacy will continue to monitor patient daily and will make dosage adjustments based upon changing renal function.

## 2022-01-06 NOTE — PROGRESS NOTES
Transition of Care Plan   RUR-   Medium    Covid positive   DISPOSITION:  pending medical progression   F/U with PCP/Specialist     Transport: [facility]  Patient remains in the ICU on isolation. Patient remains intubated on Versed, Precedex, Dilaudid and Versed infusions. CM received a VM message from Girish Moran with [facility]. Per Girish Moran should patient need a LTACH at [facility] in [facility] are the only 2 in network with Artem Aden, if patient needs an inpatient rehab [facility] is the only in network facility. Any other facility would be considered out of network and would need to be approved on a case by case situation.    Gissel Adams RN,Care Management

## 2022-01-06 NOTE — PROGRESS NOTES
[facility]    ICU TEAM Progress Note    Name: Lizette Jenkins   : 1975   MRN: [id number]   Date: 2021      Subjective:   Progress Note: 2021      Mr Alfred Neville is a 56 yo male with a PMH of DM2, HTN and obesity admitted on  to [facility] for COVID-19 pneumonia. He was treated with tocilizumab and steroids. He was not vaccinated. He decompensated and was intubated and . He was paralyzed, proned/supinated. He was transferred to [address] [place] [address] on  to be evaluated for possible ECMO. CVC and arterial line placed at [address] [place] [address] on . Nimbex was turned off . He was weaned to 60% Fio2 as of 12/3. Proning/supinating therapy continued. He continued to demonstrated fever. He was started on cefepime/fluconazole on 12/3 with vanc added .      his FIo2 increased to 70% and decreased back to 60% on . CVC changed to PICC on  as blood culture had been positive for 4/4 S epi on 12/3 (cleared in culture on ). He was on vanc for 2 weeks, until . Changed to cefazolin on 12/15. Attempts were made to decrease sedation on , but this was not successful due to increased agitation. Due to this, seroquel was added on . Enteral oxycodone and valium were also added on . His versed was weaned from 19mg/h to 17/mg/h on  pm.  His versed was further weaned on  as his seroquel was increased. : Versed is at 9. He continues on the fentanyl infusion. He has been afebrile overnight. : Versed gtt 7 and fentanyl gtt at 150. Vent weaned to 65% and continued PEEP of 12. Opens eyes and moves ext spontaneously. But does not follow commands, track with eyes, or WD to pain. NGT clogged, will replace today. Completed cefazolin. Chest xray tomorrow am.    : No acute events overnight. Did not require pronation overnight. Plan for today is to decrease FiO2 to 60% and wean Fentanyl. Mobilize as tolerated. Not following commands.  CHRISTIE spontaneously. 12/24: No acute events overnight, remains on 55% FiO2, PEEP 10. Will continue to wean FiO2 for goal of 50% today, will wean PEEP to 8 if tolerated. SORIANO spontaneously. Continue to wean IV sedation and liberalize PO opiates and benzos to avoid withdraw without oversedating. 12/25: Early this AM about 0600 patient spiked fever 103.3, elevated RR and HR. Given PRN pushes of versed and fentanyl as well as one time dose of rocuronium with some recovery. Chest x-ray obtained does not show obvious pneumothorax, formal read is pending. Will get BLE dopplers to asses for DVT, once stable will send to CT for head and chest CT to rule out neurological causes of fever such as stroke and also PE. Will likely need to resume sedation. Will place ENT consult for tracheostomy. Per respiratory note, patients ETT is having air leaks and suction catheter is difficult to pass, will likely require tube exchange today. Cooling blanket for fever. Levo as needed for support of blood pressure. 12/26: No acute events over the night. Unable to obtain CT scans due to labile BP, risk of travel outweighed benefit of scan overnight. Will attempt to get CT scans today, will add CT abdomen for further evaluation of fevers. Received motrin over the night for T-max 100.9. Remains on levophed infusion at 2 mcg/min for BP support. Labs remain stable, no acute abnormality. Monitor tube feed residuals, they were held for a few hours yesterday for high gastric residual, likely secondary to sedative medications decreasing gastric motility, resume feeds today. Increase rate of feeds as tolerated. Plan to re-test for COVID on 12/29. No issues with ET tube over the night, will hold off on tube exchange. ENT consultation for trach. Discussed with wife yesterday. Continue to prone for PF ratio less than 150, aggressive pulmonary hygiene. 12/27: Patient seen. NAD. Fevers noted. MRSA in sputum, antibiotics adjusted.  Continue current management. 12/28: Patient fevers persisted. Fentanyl gtt discontinued and transitioned to dilaudid gtt. ENT evaluated patient, current settings are too high, will have to be re consulted when settings improve. 12/29: patient seen. NAD. ID Following: continue current antimicrobial regimen. Wound care RN management recommendations noted. Tube feedings on hold as gastric residuals were too high. Will continue to trend    12/30: Patient seen. NAD. Patient noted to have 2 bowel movements after previous bm was on 12/17. ABG results reviewed with decision made to prone patient. Further orders per clinical course. 12/31: Early this morning patient placed in prone position. Had episode of tachycardia and desaturation. Given rocuronium 50 mg x1, Nimbex resumed. HR remained in the 150's, was given metoprolol now with better rate control. Resume propofol to ensure sedation with paralytic. Stop PO benzos and opiates while on IV. Still with elevated temp, up to 103, continue micafungin, vancomycin. ID following. Net positive 3.5 L fluid volume since admit, net negative over past 24 hours. 1/1/2022: Patient continues with intermittent fevers. Paired blood and sputum cultures ordered to be drawn today. No obvious bacterial infection source, rule out drug fevers as well, could also be viral fever related to ongoing COVID-19 infection. He has required increase in his pressor support, currently on 22 mcg/min levophed for BP management. ABG also slightly worse this morning with partially compensated metabolic acidosis. Lactic acid increased to 3.5. Renal function remains okay. We will keep tight control of BP and glucose to preserve renal function. Increase in WBC to 21.8 today, will trend cultures, could be reactive. Liver enzymes stable. Continue Vanc for MRSA in sputum. Continue to closely monitor, course is concerning as he was going down on oxygen requirements prior, and now seems to have hit a plateau.  We will continue aggressive measures, pronation, and monitor labs closely. Start reglan for tube feed residuals. Replete potassium. 1/2/2022: No acute events overnight. Placed in supine position at 0515, tolerated well. Currently on 80% FiO2 and PEEP 15. Levophed at 17. Goal is to wean vent today to 70% if tolerated. Continue aggressive pulmonary hygiene. Fevers improving, T-max 99.7. Continue to maintain blood glucose 140-180. Replete albumin. Procalcitonin 2.9 likely secondary to MRSA pneumonia, optimize antimicrobial therapy. Blood cultures with no growth at 17 hours, sputum with GPC in clusters, likely still MRSA. 1/3/2022: No acute events over the night. We were able to decrease FiO2 to 75% yesterday with good ABG following. Continue to wean oxygen with goal of 70% today. PEEP 14. Continue with aggressive pulmonary hygiene, HOB greater than 30 degrees. T-max 99.2, showing some improvement. MRSA still growing from sputum cultures drawn 1/1/22.     1/4/2022: No acute events over the night. Remains on 70% FiO2 PEEP 14, sats 93%, will likely not titrate down today. WBCs downtrending. Potassium repleted. Triglycerides 876, transition off propofol and change his sedation regimen today. Continue to prone 16 hours for PF ratio less than 150. If unable to wean O2 today, linezolid added for MRSA pneumonia. Continue with PROM, mobilization, aggressive pulmonary hygiene, elevate HOB, oral care. Will hold nimbex for now until sedation optimized. 1/5/2022: No acute events overnight, remained proned overnight. Vent settings FiO2 80%, peep 14, PaO2/FiO2 ratio 89. Plan to supinate today at 10am.  Will check ABG at 5pm and re-prone if PaO2/FiO2 ratio still less then 150.    1/6/21 No acute events overnight. Tmax 102. 3. PaO2/FiO2 106, plan to prone today.     Active Problem List:     Problem List  Date Reviewed: 11/28/2021          Codes Class    COVID ICD-10-CM: U07.1  ICD-9-CM: 079.89         Acute hypoxemic respiratory failure due to COVID-19 [facility]) ICD-10-CM: U07.1, J96.01  ICD-9-CM: 518.81, 079.89, 799.02         Pneumonia due to COVID-19 virus ICD-10-CM: U07.1, J12.82  ICD-9-CM: 480.8, 079.89         Hyperglycemia due to type 2 diabetes mellitus (HCC) ICD-10-CM: E11.65  ICD-9-CM: 250.00         Primary hypertension ICD-10-CM: I10  ICD-9-CM: 401.9         Hyponatremia ICD-10-CM: E87.1  ICD-9-CM: 276.1         Lactic acidosis ICD-10-CM: E87.2  ICD-9-CM: 276.2         Hyperlipidemia ICD-10-CM: E78.5  ICD-9-CM: 272.4         Obesity (BMI 30-39. 9) ICD-10-CM: E66.9  ICD-9-CM: 278.00         COVID-19 vaccination not done ICD-10-CM: Z28.9  ICD-9-CM: V64.00               Past Medical History:      has a past medical history of COVID-19, Diabetes ([address] Hyperlipidemia, and Hypertension. Past Surgical History:      has a past surgical history that includes hx orthopaedic (Right). Home Medications:     Prior to Admission medications    Medication Sig Start Date End Date Taking? Authorizing Provider   cisatracurium (NIMBEX) IV infusion 0-0.869 mg/min by IntraVENous route TITRATE. 12/1/21  Yes Margarita Lazaro MD   enoxaparin (LOVENOX) 100 mg/mL 90 mg by SubCUTAneous route every twelve (12) hours every twelve (12) hours. 12/1/21  Yes Margarita Lazaro MD   insulin glargine (LANTUS) 100 unit/mL injection 24 Units by SubCUTAneous route nightly. Indications: type 2 diabetes mellitus 12/1/21  Yes Margarita Lazaro MD   midazolam in normal saline (VERSED) 1 mg/mL soln 0-10 mg/hr by IntraVENous route TITRATE. Max Daily Amount: 240 mg. 12/1/21  Yes Margarita Lazaro MD   Norepinephrine Bitartrate (LEVOPHED) 8 mg/250 mL (32 mcg/mL) soln 0.5-16 mcg/min by IntraVENous route TITRATE. 12/1/21  Yes Margarita Lazaro MD   ascorbic acid, vitamin C, (VITAMIN C) 500 mg tablet Take 1 Tablet by mouth two (2) times a day. 12/1/21   Margarita Lazaro MD   cholecalciferol (VITAMIN D3) (1000 Units /25 mcg) tablet Take 2 Tablets by mouth daily.  12/1/21   Margarita Lazaro MD   melatonin, rapid dissolve, 5 mg TbDi tablet Take 1 Tablet by mouth nightly. 21   Eda Kim MD   atorvastatin (LIPITOR) 20 mg tablet Take 20 mg by mouth daily. Provider, Historical   telmisartan (MICARDIS) 80 mg tablet Take 80 mg by mouth daily. Indications: high blood pressure    Provider, Historical       Allergies/Social/Family History: Allergies   Allergen Reactions    Alupent [Metaproterenol] Swelling      Social History     Tobacco Use    Smoking status: Never Smoker    Smokeless tobacco: Not on file   Substance Use Topics    Alcohol use: Yes     Comment: drinksonce a week      Family History   Problem Relation Age of Onset    Diabetes Mother     Hypertension Mother     Stroke Mother     Hodgkin's lymphoma Mother     Diabetes Father     Hypertension Father     Cystic Fibrosis Father     Diabetes Maternal Aunt     Diabetes Maternal Uncle        Review of Systems:     Unable to assess due to pt condition    Objective:   Vital Signs:  Visit Vitals  /61 (BP 1 Location: Left arm, BP Patient Position: At rest)   Pulse 65   Temp (!) 102.3 °F (39.1 °C) Comment: cooling blanket on pt   Resp 16   Ht 5' 6\" (1.676 m)   Wt 99.8 kg (220 lb 0.3 oz) Comment: cooling blanket on bed. possibly inaccurate   SpO2 98%   BMI 35.51 kg/m²    O2 Flow Rate (L/min): 60 l/min O2 Device: Ventilator,Endotracheal tube,Heated,Humidifier Temp (24hrs), Av °F (37.8 °C), Min:97 °F (36.1 °C), Max:102.3 °F (39.1 °C)           Intake/Output:     Intake/Output Summary (Last 24 hours) at 2022 0813  Last data filed at 2022 0700  Gross per 24 hour   Intake 3071.93 ml   Output 925 ml   Net 2146.93 ml       Physical Exam:    General:  Sedated/intubated/RASS -3   Eye: Pupils are round and reactive bilaterally  Neurologic: Furrowed brow; withdraws to noxious stimuli in all extremities; does not follow commands, SORIANO spontaneously  Neck: Supple, no JVD  Lungs:   On mechanical ventilation; bilateral rhonchi  Heart: RRR, 2+ pulses, 1+ edema of BLE   Abdomen:Soft, nontender, nondistended  Skin:  No rash or lesion      LABS AND  DATA: Personally reviewed  Recent Labs     01/06/22 0437 01/05/22  0241   WBC 31.4* 29.9*   HGB 8.2* 8.5*   HCT 26.3* 27.3*   * 551*     Recent Labs     01/06/22 0437 01/05/22  0241    143   K 4.2 3.8   * 112*   CO2 22 24   BUN 29* 19   CREA 1.49* 1.03   * 133*   CA 7.7* 7.7*   MG 2.6* 2.7*   PHOS 5.8* 4.8*     Recent Labs     01/06/22 0437 01/05/22  0241   * 254*   TP 6.6 6.4   ALB 1.3* 1.3*   GLOB 5.3* 5.1*     No results for input(s): INR, PTP, APTT, INREXT, INREXT in the last 72 hours. Recent Labs     01/06/22  0551 01/04/22  1505   PHI 7.36 7.35   PCO2I 37.4 44.2   PO2I 85 72*   FIO2I 75 80     No results for input(s): CPK, CKMB, TROIQ, BNPP in the last 72 hours. Ventilator Settings:  Mode Rate Tidal Volume Pressure FiO2 PEEP   Assist control,Pressure control   0 ml  0 cm H2O 75 % 14 cm H20     Peak airway pressure: 41 cm H2O    Minue ventilation: 11.4 l/min      MEDS: Reviewed    Chest X-Ray: personally reviewed and report checked    · TTE: 11/24: LV: Estimated LVEF is 50 - 55%. Normal cavity size, wall thickness and diastolic function. Low normal systolic function. Assessment and Plan     ZLHOD-20 Pneumonia complicated by Severe ARDS + MRSA pneumonia: Received tocilizumab. - Continue steroids  - Completed cefazolin  - Procal negative  - CXR unrevealing  - FiO2  80, contine to wean O2 as tolerated.  Maintain sats greater than 92%   - Goal for trach/PEG when patient medically able  - Diuresis PRN  -linezoid/meropenum, being followed by ID  -PaO2/FiO2 106 this am, plan to prone today      Oliguria  -pt with low urine output, now 10-20cc/hr.    -Cr WNL, give 40 lasix and reasses    HLD: Continue statin    DM2 c/b hyperglycemia:   - Continue NPH 10 q 12hrs   - Continue SSI q 6hrs     Hypotension: Possibly related to sedation vs septic shock.   -2 mcg levophed, titrate goal map>65.   - Continue midodrine    Agitation:   - Continue enteral sedation/opioids  - Wean sedation as tolerated, on versed at 20, propofol 50  - Seroquel at 50mg TID    Deconditioning: Unable to participate in PT/OT at this time    Multidisciplinary Rounds Completed: Y    ABCDEF Bundle/Checklist  Pain Medications: Fentanyl  Target RASS: 0  Sedation Medications:Versed  CAM-ICU: SILVIA  Mobility:Poor  PT/OT: Unable to participate: PROM  Restraints:Y  Discussed Plan of Care (goals of care): Y  Addressed Code Status: F    CARDIOVASCULAR  Cardiac Gtts: N  SBP Goal of: > 90 mmHg  MAP Goal of: > 65 mmHg  Transfusion Trigger (Hgb): <7 g/dL    RESPIRATORY  Vent Goals:   Optimize PEEP/Ventilation/Oxygenation  DVT Prophylaxis (if no, list reason): Lovenox therapeutic  SPO2 Goal: > 92%  Pulmonary toilet: Duo-Nebs     GI/  Gold Catheter Present: Yes  GI Prophylaxis: Pepcid (famotidine)   Nutrition: Yes restart TF today  IVFs:N  Bowel Movement:Y  Bowel Regimen:Y  Insulin: Y    ANTIBIOTICS  Antibiotics:  None    T/L/D  Tubes: ETT  Lines: PICC  Drains: Gold    SPECIAL EQUIPMENT  Vent    DISPOSITION  ICU    CRITICAL CARE CONSULTANT NOTE  I had a face to face encounter with the patient, reviewed and interpreted patient data including clinical events, labs, images, vital signs, I/O's, and examined patient. I have discussed the case and the plan and management of the patient's care with the consulting services, the bedside nurses and the respiratory therapist.      NOTE OF PERSONAL INVOLVEMENT IN CARE   This patient has a high probability of imminent, clinically significant deterioration, which requires the highest level of preparedness to intervene urgently. I participated in the decision-making and personally managed or directed the management of the following life and organ supporting interventions that required my frequent assessment to treat or prevent imminent deterioration. I personally spent 45 minutes of critical care time.   This is time spent at this critically ill patient's bedside actively involved in patient care as well as the coordination of care and discussions with the patient's family. This does not include any procedural time which has been billed separately.           Tono Matthew NP     Critical Care Medicine  [facility]  1/6/22

## 2022-01-06 NOTE — PROGRESS NOTES
1930: Bedside shift change report given to Breana Bell RN (oncoming nurse) by Grace Ro RN (offgoing nurse). Report included the following information SBAR, Kardex, Intake/Output, MAR, Recent Results, Cardiac Rhythm NSR and Alarm Parameters . 0200: Temp 102.5. Cooling blanket applied. Ice packs also on patient. 0730: Bedside shift change report given to Alice Hawley RN (oncoming nurse) by Breana Bell RN (offgoing nurse). Report included the following information SBAR, Kardex, Intake/Output, MAR, Recent Results, Cardiac Rhythm NSR and Alarm Parameters .

## 2022-01-06 NOTE — PROGRESS NOTES
ID Progress Note  2022    Subjective:     T-max 102  Intubated it    Objective:     Antibiotics:  1. Vancomycin --, 2021-2022  2. zyvox -  3. Cefepime 12/3-2021  4. Ceftriaxone --2021  5. Zosyn 2022-2022  6. Merrem 2022-  7. Eraxis 2021-1/3/2022    Vitals:   Visit Vitals  /61 (BP 1 Location: Left arm, BP Patient Position: At rest)   Pulse 65   Temp (!) 102.3 °F (39.1 °C) Comment: cooling blanket on pt   Resp 16   Ht 5' 6\" (1.676 m)   Wt 99.8 kg (220 lb 0.3 oz) Comment: cooling blanket on bed. possibly inaccurate   SpO2 98%   BMI 35.51 kg/m²        Tmax:  Temp (24hrs), Av °F (37.8 °C), Min:97 °F (36.1 °C), Max:102.3 °F (39.1 °C)      Exam:    Intubated it  RRR without RMG  Clear breath sounds in apex with decreased breath sounds at bases  abd soft with (+) bowel sounds  Gold cath in place; draining straw color urine with sediment  Unable to assess insight  All extremity edematous      Labs:      Recent Labs     22  0437 22  0241 22  0415   WBC 31.4* 29.9* 29.6*   HGB 8.2* 8.5* 8.8*   * 551* 545*   BUN 29* 19 17   CREA 1.49* 1.03 1.09   * 254* 277*   TBILI 0.4 0.5 0.4       Cultures:     No results found for: ROLDAN  Lab Results   Component Value Date/Time    Culture result: NO GROWTH AFTER 16 HOURS 2022 01:59 PM    Culture result: NO GROWTH 5 DAYS 2022 10:55 AM    Culture result: (A) 2022 10:55 AM     HEAVY * METHICILLIN RESISTANT STAPHYLOCOCCUS AUREUS * (KNOWN MRSA PATIENT)    Culture result: FEW NORMAL RESPIRATORY TAMARA 2022 10:55 AM     : Spoke with the pt's spouse for 27 minutes, updated clinical information and current medication therapy. Mrs. Heidi Cockayne asked whether he could get Ivermectin, replied as that will not be one of our recommended therapy. She asked what other things that we can offer if all current therapy fails, I asked her to think about goals of care.  I addressed and answered all the questions that raised by pt's spouse. Assessment:     1. Severe COVID pneumonia--Pneumococcus from respiratory culture; completed the therapy   2. MRSA pneumonia (sputum cx 1/1/2022 - MRSA)  3. VDRF  4. Hypoxic respiratory failure  5. Bacteremia--S epi--line has been changed, completed the ABX therapy on 12/20 with ancef  6.  [place] COVID 19            Objective:     T-max 100.3; blood cx (1/5) no growth so far   WBC 33.5->29.6->29.9->31.4  procal 2.9->1.5 (1/5)  Lactic acid 2.6  continue with IV Merrem (changed from zosyn) and zyvox (chenaged from vancomycin)     Above plan of care discussed and agreed with Dr. Loreto Sepulveda, NP

## 2022-01-06 NOTE — PROGRESS NOTES
0730: Bedside and Verbal shift change report given to BRITTANY Mckeon RN (oncoming nurse) by Paulino Toure. KATHRINE Humphrey (offgoing nurse). Report included the following information SBAR, Kardex, Intake/Output, MAR, Recent Results, Med Rec Status, Cardiac Rhythm NSR, Alarm Parameters  and Dual Neuro Assessment. Drips verified upon the start of the shift:   -Versed @ 20 mg/hr  -Dilaudid @ 4 mg/hr  -Levo @ 2 mcg/hr  -Precedex @ 1.5 mcg/kg/hr    1100: ICU multidisciplinary rounds lead by [address] [place] [address], NP (Intensivist): The following were reviewed and discussed: current labs,  recent imaging, lines/drains, review of body systems, nutrition, cultures, mobility, length of ICU stay. The plan of care for the day is as follows: re-start tube feedings, keep sedation on current levels, prone, remove patient's flexiseal (written note by RN)     Primary Nurse Dillon Campos and TAMI Smalls RN performed a dual skin assessment on this patient Impairment noted- see wound doc flow sheet  Jeffrey score is 9.    1145: Patient proned by this RN, two other RNs and RT. No complications. Patient tolerated well. 1245: TF restarted per order. Flexiseal removed. PCR COVID test sent to the lab at this time. 1930: Bedside and Verbal shift change report given to VIN Humphrey RN (oncoming nurse) by [employer]. Lex Mckeon RN (offgoing nurse). Report included the following information SBAR, Kardex, Intake/Output, MAR, Recent Results, Med Rec Status, Cardiac Rhythm NSR/SB and Alarm Parameters .

## 2022-01-07 NOTE — PROGRESS NOTES
Transition of Care Plan   RUR-  Medium    Covid positive   DISPOSITION: pending medical progression   F/U with PCP/Specialist     Transport: [facility]/family   Patient remains in the ICU on isolation for Covid virus. Patient remains on Versed, Dilaudid, Precedex and Levophed gtts. T max overnight was 102.5. Care management is continuing to follow.   Trudy Nelson RN, Care Management

## 2022-01-07 NOTE — PROGRESS NOTES
1930: Bedside shift change report given to Abby Villavicencio RN (oncoming nurse) by Jose Vazquez RN (offgoing nurse). Report included the following information SBAR, Kardex, Intake/Output, MAR, Recent Results, Cardiac Rhythm NSR and Alarm Parameters . Pt febrile since start of shift. PRN tylenol given, ice packs, and cooling blanket on with no success. NP aware. No new orders received. Will continue to monitor. 2330: . Order received for 15 units of lispro. Will hold Q6 lispro order. 0000: Temp 102.5 and climbing. 0115: Pt alarming vent for low tidal volumes. RN suctioned pt and tried to reposition head with no success. RT to bedside. 0400: Pt made supine with RN x 3 and RT. Tolerated well. Blood gas drawn before flipping. 0515: . Order received for 20 units of lispro. Will hold Q6 lispro order. 0730: Bedside shift change report given to Jose Vazquez RN (oncoming nurse) by Abby Villavicencio RN (offgoing nurse). Report included the following information SBAR, Kardex, Intake/Output, MAR, Recent Results, Cardiac Rhythm NSR and Alarm Parameters .

## 2022-01-07 NOTE — PROGRESS NOTES
0730: Bedside and Verbal shift change report given to BRITTANY Vickers RN (oncoming nurse) by Kwadwo Humphrey RN (offgoing nurse). Report included the following information SBAR, Kardex, Intake/Output, MAR, Recent Results, Med Rec Status, Cardiac Rhythm NSR/SB with PACs and Alarm Parameters . Drips verified at the start of the shift:   -Versed @ 20 mg/hr  -Dilaudid @ 4 mg/hr  -Precedex @ 1.5 mcg/kg/hr  -Levophed on standby    0800: Spoke with Juan Francisco Beasley NP regarding patient's plan of care. No plans to prone today as it did not previously improve ABG. Juan Francisco Beasley NP to address minimal urinary output with diuretics and albumin. Juan Francisco Beasley NP also made aware of labile temperatures. 1000: Patient found to have urinated around his blanc catheter. RN replaced blanc catheter. Patient put out 1.5+ liters upon replacement of blanc    1030: Patient persistently bradycardic and hypothermic. Esau hugger and multiple extra blankets applied. Precedex turned off and NP made aware. No new orders received at this time. 1110: Patient's wife updated on patient's status. Questions answered to the best of this RN's ability. 1303: Sudden drop in BPs. Levophed restarted. Heart rate elevated. 1320: Rhythm changes noted on the monitor. EKG completed. NP made aware. Patient rate controlled and anticoagulated at this time. 1415: Patient frequently tachying up to the upper 190s with large drops in BPs for about 15-20 second increments. NP made aware. No new orders received. 1610: Tachycardic episodes continue to occur. No new orders. 1622: Patient still tachying up, but occurring more frequently. NP made aware. Orders received for 5mg of metoprolol. [id number]: Patient continues to have episodes of tachycardia/SVT, although less frequently and for shorter durations. NP to call wife to give an update. 1800: NP updating patient's wife on patient's condition. 1910: Restart precedex per NP at low dose.      1930: Bedside and Verbal shift change report given to BRITTANY Diamond RN (oncoming nurse) by Erika Diamond. Dorie Good RN (offgoing nurse). Report included the following information SBAR, Kardex, Intake/Output, MAR, Recent Results, Med Rec Status, Cardiac Rhythm NSR/SB with PACs and Alarm Parameters .

## 2022-01-07 NOTE — PROGRESS NOTES
[facility]    ICU TEAM Progress Note    Name: Vijaya Marrufo   : 1975   MRN: [id number]   Date: 2021      Subjective:   Progress Note: 2021      Mr Chung [place] is a 54 yo male with a PMH of DM2, HTN and obesity admitted on  to [facility] for COVID-19 pneumonia. He was treated with tocilizumab and steroids. He was not vaccinated. He decompensated and was intubated and . He was paralyzed, proned/supinated. He was transferred to [facility] on  to be evaluated for possible ECMO. CVC and arterial line placed at [facility] on . Nimbex was turned off . He was weaned to 60% Fio2 as of 12/3. Proning/supinating therapy continued. He continued to demonstrated fever. He was started on cefepime/fluconazole on 12/3 with vanc added .      his FIo2 increased to 70% and decreased back to 60% on . CVC changed to PICC on  as blood culture had been positive for 4/4 S epi on 12/3 (cleared in culture on ). He was on vanc for 2 weeks, until . Changed to cefazolin on 12/15. Attempts were made to decrease sedation on , but this was not successful due to increased agitation. Due to this, seroquel was added on . Enteral oxycodone and valium were also added on . His versed was weaned from 19mg/h to 17/mg/h on  pm.  His versed was further weaned on  as his seroquel was increased. : Versed is at 9. He continues on the fentanyl infusion. He has been afebrile overnight. : Versed gtt 7 and fentanyl gtt at 150. Vent weaned to 65% and continued PEEP of 12. Opens eyes and moves ext spontaneously. But does not follow commands, track with eyes, or WD to pain. NGT clogged, will replace today. Completed cefazolin. Chest xray tomorrow am.    : No acute events overnight. Did not require pronation overnight. Plan for today is to decrease FiO2 to 60% and wean Fentanyl. Mobilize as tolerated. Not following commands.  CHRISTIE spontaneously. 12/24: No acute events overnight, remains on 55% FiO2, PEEP 10. Will continue to wean FiO2 for goal of 50% today, will wean PEEP to 8 if tolerated. SORIANO spontaneously. Continue to wean IV sedation and liberalize PO opiates and benzos to avoid withdraw without oversedating. 12/25: Early this AM about 0600 patient spiked fever 103.3, elevated RR and HR. Given PRN pushes of versed and fentanyl as well as one time dose of rocuronium with some recovery. Chest x-ray obtained does not show obvious pneumothorax, formal read is pending. Will get BLE dopplers to asses for DVT, once stable will send to CT for head and chest CT to rule out neurological causes of fever such as stroke and also PE. Will likely need to resume sedation. Will place ENT consult for tracheostomy. Per respiratory note, patients ETT is having air leaks and suction catheter is difficult to pass, will likely require tube exchange today. Cooling blanket for fever. Levo as needed for support of blood pressure. 12/26: No acute events over the night. Unable to obtain CT scans due to labile BP, risk of travel outweighed benefit of scan overnight. Will attempt to get CT scans today, will add CT abdomen for further evaluation of fevers. Received motrin over the night for T-max 100.9. Remains on levophed infusion at 2 mcg/min for BP support. Labs remain stable, no acute abnormality. Monitor tube feed residuals, they were held for a few hours yesterday for high gastric residual, likely secondary to sedative medications decreasing gastric motility, resume feeds today. Increase rate of feeds as tolerated. Plan to re-test for COVID on 12/29. No issues with ET tube over the night, will hold off on tube exchange. ENT consultation for trach. Discussed with wife yesterday. Continue to prone for PF ratio less than 150, aggressive pulmonary hygiene. 12/27: Patient seen. NAD. Fevers noted. MRSA in sputum, antibiotics adjusted.  Continue current management. 12/28: Patient fevers persisted. Fentanyl gtt discontinued and transitioned to dilaudid gtt. ENT evaluated patient, current settings are too high, will have to be re consulted when settings improve. 12/29: patient seen. NAD. ID Following: continue current antimicrobial regimen. Wound care RN management recommendations noted. Tube feedings on hold as gastric residuals were too high. Will continue to trend    12/30: Patient seen. NAD. Patient noted to have 2 bowel movements after previous bm was on 12/17. ABG results reviewed with decision made to prone patient. Further orders per clinical course. 12/31: Early this morning patient placed in prone position. Had episode of tachycardia and desaturation. Given rocuronium 50 mg x1, Nimbex resumed. HR remained in the 150's, was given metoprolol now with better rate control. Resume propofol to ensure sedation with paralytic. Stop PO benzos and opiates while on IV. Still with elevated temp, up to 103, continue micafungin, vancomycin. ID following. Net positive 3.5 L fluid volume since admit, net negative over past 24 hours. 1/1/2022: Patient continues with intermittent fevers. Paired blood and sputum cultures ordered to be drawn today. No obvious bacterial infection source, rule out drug fevers as well, could also be viral fever related to ongoing COVID-19 infection. He has required increase in his pressor support, currently on 22 mcg/min levophed for BP management. ABG also slightly worse this morning with partially compensated metabolic acidosis. Lactic acid increased to 3.5. Renal function remains okay. We will keep tight control of BP and glucose to preserve renal function. Increase in WBC to 21.8 today, will trend cultures, could be reactive. Liver enzymes stable. Continue Vanc for MRSA in sputum. Continue to closely monitor, course is concerning as he was going down on oxygen requirements prior, and now seems to have hit a plateau.  We will continue aggressive measures, pronation, and monitor labs closely. Start reglan for tube feed residuals. Replete potassium. 1/2/2022: No acute events overnight. Placed in supine position at 0515, tolerated well. Currently on 80% FiO2 and PEEP 15. Levophed at 17. Goal is to wean vent today to 70% if tolerated. Continue aggressive pulmonary hygiene. Fevers improving, T-max 99.7. Continue to maintain blood glucose 140-180. Replete albumin. Procalcitonin 2.9 likely secondary to MRSA pneumonia, optimize antimicrobial therapy. Blood cultures with no growth at 17 hours, sputum with GPC in clusters, likely still MRSA. 1/3/2022: No acute events over the night. We were able to decrease FiO2 to 75% yesterday with good ABG following. Continue to wean oxygen with goal of 70% today. PEEP 14. Continue with aggressive pulmonary hygiene, HOB greater than 30 degrees. T-max 99.2, showing some improvement. MRSA still growing from sputum cultures drawn 1/1/22.     1/4/2022: No acute events over the night. Remains on 70% FiO2 PEEP 14, sats 93%, will likely not titrate down today. WBCs downtrending. Potassium repleted. Triglycerides 876, transition off propofol and change his sedation regimen today. Continue to prone 16 hours for PF ratio less than 150. If unable to wean O2 today, linezolid added for MRSA pneumonia. Continue with PROM, mobilization, aggressive pulmonary hygiene, elevate HOB, oral care. Will hold nimbex for now until sedation optimized. 1/5/2022: No acute events overnight, remained proned overnight. Vent settings FiO2 80%, peep 14, PaO2/FiO2 ratio 89. Plan to supinate today at 10am.  Will check ABG at 5pm and re-prone if PaO2/FiO2 ratio still less then 150.    1/6/21 No acute events overnight. Tmax 102. 3. PaO2/FiO2 106, plan to prone today. 1/17/22  Supinated at 0400. Pplat 39 on 75/14/22/500. tmax 102.9 refractory.      Active Problem List:     Problem List  Date Reviewed: 11/28/2021          Codes Class COVID ICD-10-CM: U07.1  ICD-9-CM: 079.89         Acute hypoxemic respiratory failure due to COVID-19 [facility]) ICD-10-CM: U07.1, J96.01  ICD-9-CM: 518.81, 079.89, 799.02         Pneumonia due to COVID-19 virus ICD-10-CM: U07.1, J12.82  ICD-9-CM: 480.8, 079.89         Hyperglycemia due to type 2 diabetes mellitus (HCC) ICD-10-CM: E11.65  ICD-9-CM: 250.00         Primary hypertension ICD-10-CM: I10  ICD-9-CM: 401.9         Hyponatremia ICD-10-CM: E87.1  ICD-9-CM: 276.1         Lactic acidosis ICD-10-CM: E87.2  ICD-9-CM: 276.2         Hyperlipidemia ICD-10-CM: E78.5  ICD-9-CM: 272.4         Obesity (BMI 30-39. 9) ICD-10-CM: E66.9  ICD-9-CM: 278.00         COVID-19 vaccination not done ICD-10-CM: Z28.9  ICD-9-CM: V64.00               Past Medical History:      has a past medical history of COVID-19, Diabetes ([address] Hyperlipidemia, and Hypertension. Past Surgical History:      has a past surgical history that includes hx orthopaedic (Right). Home Medications:     Prior to Admission medications    Medication Sig Start Date End Date Taking? Authorizing Provider   cisatracurium (NIMBEX) IV infusion 0-0.869 mg/min by IntraVENous route TITRATE. 12/1/21  Yes Danie Mccarty MD   enoxaparin (LOVENOX) 100 mg/mL 90 mg by SubCUTAneous route every twelve (12) hours every twelve (12) hours. 12/1/21  Yes Danie Mccarty MD   insulin glargine (LANTUS) 100 unit/mL injection 24 Units by SubCUTAneous route nightly. Indications: type 2 diabetes mellitus 12/1/21  Yes Danie Mccarty MD   midazolam in normal saline (VERSED) 1 mg/mL soln 0-10 mg/hr by IntraVENous route TITRATE. Max Daily Amount: 240 mg. 12/1/21  Yes Danie Mccarty MD   Norepinephrine Bitartrate (LEVOPHED) 8 mg/250 mL (32 mcg/mL) soln 0.5-16 mcg/min by IntraVENous route TITRATE. 12/1/21  Yes Danie Mccarty MD   ascorbic acid, vitamin C, (VITAMIN C) 500 mg tablet Take 1 Tablet by mouth two (2) times a day.  12/1/21   Danie Mccarty MD   cholecalciferol (VITAMIN D3) (1000 Units /25 mcg) tablet Take 2 Tablets by mouth daily. 21   Izzy Grijalva MD   melatonin, rapid dissolve, 5 mg TbDi tablet Take 1 Tablet by mouth nightly. 21   Izzy Grijalva MD   atorvastatin (LIPITOR) 20 mg tablet Take 20 mg by mouth daily. Provider, Historical   telmisartan (MICARDIS) 80 mg tablet Take 80 mg by mouth daily. Indications: high blood pressure    Provider, Historical       Allergies/Social/Family History: Allergies   Allergen Reactions    Alupent [Metaproterenol] Swelling      Social History     Tobacco Use    Smoking status: Never Smoker    Smokeless tobacco: Not on file   Substance Use Topics    Alcohol use: Yes     Comment: drinksonce a week      Family History   Problem Relation Age of Onset    Diabetes Mother     Hypertension Mother     Stroke Mother     Hodgkin's lymphoma Mother     Diabetes Father     Hypertension Father     Cystic Fibrosis Father     Diabetes Maternal Aunt     Diabetes Maternal Uncle        Review of Systems:     Unable to assess due to pt condition    Objective:   Vital Signs:  Visit Vitals  /69 (BP 1 Location: Left arm, BP Patient Position: At rest)   Pulse 70   Temp (!) 101.9 °F (38.8 °C)   Resp 22   Ht 5' 6\" (1.676 m)   Wt 101.7 kg (224 lb 3.3 oz)   SpO2 98%   BMI 36.19 kg/m²    O2 Flow Rate (L/min): 60 l/min O2 Device: Endotracheal tube,Ventilator Temp (24hrs), Av.9 °F (37.7 °C), Min:96.9 °F (36.1 °C), Max:102.5 °F (39.2 °C)           Intake/Output:     Intake/Output Summary (Last 24 hours) at 2022 0736  Last data filed at 2022 0700  Gross per 24 hour   Intake 3510.86 ml   Output 425 ml   Net 3085.86 ml       Limited Physical Exam:    General:  Sedated/intubated/RASS -3   Eye: Pupils are round and reactive bilaterally  Neurologic: no neuro response illicited   Neck: Supple, no JVD  Lungs:   On mechanical ventilation; bilateral rhonchi  Heart: RRR, 2+ pulses, 1+ edema of BLE   Abdomen:Soft, nontender, nondistended  Skin:  No rash or lesion      LABS AND  DATA: Personally reviewed  Recent Labs     01/07/22 0247 01/06/22 0437   WBC 25.6* 31.4*   HGB 8.1* 8.2*   HCT 26.4* 26.3*   * 621*     Recent Labs     01/07/22 0247 01/06/22 0437    139   K 4.6 4.2   * 109*   CO2 23 22   BUN 42* 29*   CREA 1.64* 1.49*   * 222*   CA 7.0* 7.7*   MG 2.8* 2.6*   PHOS 5.3* 5.8*     Recent Labs     01/07/22 0247 01/06/22 0437   * 484*   TP 5.7* 6.6   ALB 1.4* 1.3*   GLOB 4.3* 5.3*     No results for input(s): INR, PTP, APTT, INREXT, INREXT in the last 72 hours. Recent Labs     01/07/22 0621 01/07/22  0352   PHI 7.35 7.37   PCO2I 41.3 38.3   PO2I 83 66*   FIO2I 75 75     No results for input(s): CPK, CKMB, TROIQ, BNPP in the last 72 hours. Ventilator Settings:  Mode Rate Tidal Volume Pressure FiO2 PEEP   Pressure control,Assist control   0 ml  0 cm H2O 75 % 14 cm H20     Peak airway pressure: 42 cm H2O    Minue ventilation: 11 l/min      MEDS: Reviewed    Chest X-Ray: personally reviewed and report checked    · TTE: 11/24: LV: Estimated LVEF is 50 - 55%. Normal cavity size, wall thickness and diastolic function. Low normal systolic function. Assessment and Plan     NOACF-03 Pneumonia complicated by Severe ARDS + MRSA pneumonia: Received tocilizumab. - Continue steroids  - Continue mechanical ventilation   - continue dex/versed/dilaudid for sedation  - Would not continue proning due to futility   - Goal for trach/PEG when patient medically able  - Diuresis PRN  - linezoid/meropenum, being followed by ID    Progressive Oliguria  -pt with low urine output, now 10-20cc/hr.     -metolazone 10 mg + 80 mg IV lasix     HLD: Continue statin    DM2 c/b hyperglycemia:   - increase NPH to 20 q 12hrs   - increase SSI q4h and scale adjusted      Hypotension: Possibly related to sedation vs septic shock.   -2 mcg levophed, titrate goal map>65.   - Continue midodrine    Deconditioning: Unable to participate in PT/OT at this time    Multidisciplinary Rounds Completed: Y    ABCDEF Bundle/Checklist  Pain Medications: Dilaudid   Target RASS: 0  Sedation Medications:Versed  CAM-ICU: SILVIA  Mobility:Poor  PT/OT: Unable to participate: PROM  Restraints:Y  Discussed Plan of Care (goals of care): Y  Addressed Code Status: F    CARDIOVASCULAR  Cardiac Gtts: N  SBP Goal of: > 90 mmHg  MAP Goal of: > 65 mmHg  Transfusion Trigger (Hgb): <7 g/dL    RESPIRATORY  Vent Goals:   Optimize PEEP/Ventilation/Oxygenation  DVT Prophylaxis (if no, list reason): Lovenox therapeutic  SPO2 Goal: > 92%  Pulmonary toilet: Duo-Nebs     GI/  Gold Catheter Present: Yes  GI Prophylaxis: Pepcid (famotidine)   Nutrition: Yes restart TF today  IVFs:N  Bowel Movement:Y  Bowel Regimen:Y  Insulin: Y    ANTIBIOTICS  Antibiotics:  None    T/L/D  Tubes: ETT  Lines: PICC  Drains: Gold    SPECIAL EQUIPMENT  Vent    DISPOSITION  ICU    CRITICAL CARE CONSULTANT NOTE  I had a face to face encounter with the patient, reviewed and interpreted patient data including clinical events, labs, images, vital signs, I/O's, and examined patient. I have discussed the case and the plan and management of the patient's care with the consulting services, the bedside nurses and the respiratory therapist.      NOTE OF PERSONAL INVOLVEMENT IN CARE   This patient has a high probability of imminent, clinically significant deterioration, which requires the highest level of preparedness to intervene urgently. I participated in the decision-making and personally managed or directed the management of the following life and organ supporting interventions that required my frequent assessment to treat or prevent imminent deterioration. I personally spent 45 minutes of critical care time. This is time spent at this critically ill patient's bedside actively involved in patient care as well as the coordination of care and discussions with the patient's family. This does not include any procedural time which has been billed separately.           Seema Cuba Tabitha Chase, NP     Critical Care Medicine  [facility]  1/6/22

## 2022-01-07 NOTE — PROGRESS NOTES
ID Progress Note  2022    Subjective:     T-max 102  Intubated it    Objective:     Antibiotics:  1. Vancomycin --, 2021-2022  2. zyvox -  3. Cefepime 12/3-2021  4. Ceftriaxone --2021  5. Zosyn 2022-2022  6. Merrem 2022-  7. Eraxis 2021-1/3/2022    Vitals:   Visit Vitals  /69 (BP 1 Location: Left arm, BP Patient Position: At rest)   Pulse 70   Temp (!) 101.9 °F (38.8 °C)   Resp 22   Ht 5' 6\" (1.676 m)   Wt 101.7 kg (224 lb 3.3 oz)   SpO2 98%   BMI 36.19 kg/m²        Tmax:  Temp (24hrs), Av.1 °F (37.8 °C), Min:96.9 °F (36.1 °C), Max:102.5 °F (39.2 °C)      Exam:    Intubated it  RRR without RMG  Clear breath sounds in apex with decreased breath sounds at bases  abd soft with (+) bowel sounds  Gold cath in place; draining straw color urine with sediment  Unable to assess insight  All extremity edematous      Labs:      Recent Labs     22  0247 22  0437 22  0241   WBC 25.6* 31.4* 29.9*   HGB 8.1* 8.2* 8.5*   * 621* 551*   BUN 42* 29* 19   CREA 1.64* 1.49* 1.03   * 484* 254*   TBILI 0.3 0.4 0.5       Cultures:     No results found for: SDES  Lab Results   Component Value Date/Time    Culture result: NO GROWTH 2 DAYS 2022 01:59 PM    Culture result: NO GROWTH 5 DAYS 2022 10:55 AM    Culture result: (A) 2022 10:55 AM     HEAVY * METHICILLIN RESISTANT STAPHYLOCOCCUS AUREUS * (KNOWN MRSA PATIENT)    Culture result: FEW NORMAL RESPIRATORY TAMARA 2022 10:55 AM     : Spoke with the pt's spouse for 27 minutes, updated clinical information and current medication therapy. Mrs. Mauro Washington asked whether he could get Ivermectin, replied as that will not be one of our recommended therapy. She asked what other things that we can offer if all current therapy fails, I asked her to think about goals of care. I addressed and answered all the questions that raised by pt's spouse. Assessment:     1.  Severe COVID pneumonia--Pneumococcus from respiratory culture; completed the therapy   2. MRSA pneumonia (sputum cx 1/1/2022 - MRSA)  3. VDRF  4. Hypoxic respiratory failure  5. Bacteremia--S epi--line has been changed, completed the ABX therapy on 12/20 with ancef  6. [place] COVID 19            Objective:     T-max 102.5; blood cx (1/5) no growth so far . CXR (1/7) decreased bilateral PNA  WBC 33.5->29.6->25.6  procal 2.9->1.5 (1/5)  Lactic acid 2.6  continue with IV Merrem (changed from zosyn) and zyvox (chenaged from vancomycin)     Above plan of care discussed and agreed with Dr. Oneil Risk will see prn this weekend, call if issues arise.     Lisa Ortiz, NP

## 2022-01-08 NOTE — PROGRESS NOTES
[facility]    ICU TEAM Progress Note    Name: Brennon Kinney   : 1975   MRN: [id number]   Date: 2021      Subjective:   Progress Note: 2021      Mr Sheryle Stalls is a 54 yo male with a PMH of DM2, HTN and obesity admitted on  to [facility] for COVID-19 pneumonia. He was treated with tocilizumab and steroids. He was not vaccinated. He decompensated and was intubated and . He was paralyzed, proned/supinated. He was transferred to [facility] on  to be evaluated for possible ECMO. CVC and arterial line placed at [facility] on . Nimbex was turned off . He was weaned to 60% Fio2 as of 12/3. Proning/supinating therapy continued. He continued to demonstrated fever. He was started on cefepime/fluconazole on 12/3 with vanc added .      his FIo2 increased to 70% and decreased back to 60% on . CVC changed to PICC on  as blood culture had been positive for 4/4 S epi on 12/3 (cleared in culture on ). He was on vanc for 2 weeks, until . Changed to cefazolin on 12/15. Attempts were made to decrease sedation on , but this was not successful due to increased agitation. Due to this, seroquel was added on . Enteral oxycodone and valium were also added on . His versed was weaned from 19mg/h to 17/mg/h on  pm.  His versed was further weaned on  as his seroquel was increased. : Versed is at 9. He continues on the fentanyl infusion. He has been afebrile overnight. : Versed gtt 7 and fentanyl gtt at 150. Vent weaned to 65% and continued PEEP of 12. Opens eyes and moves ext spontaneously. But does not follow commands, track with eyes, or WD to pain. NGT clogged, will replace today. Completed cefazolin. Chest xray tomorrow am.    : No acute events overnight. Did not require pronation overnight. Plan for today is to decrease FiO2 to 60% and wean Fentanyl. Mobilize as tolerated. Not following commands.  CHRISTIE spontaneously. 12/24: No acute events overnight, remains on 55% FiO2, PEEP 10. Will continue to wean FiO2 for goal of 50% today, will wean PEEP to 8 if tolerated. SORIANO spontaneously. Continue to wean IV sedation and liberalize PO opiates and benzos to avoid withdraw without oversedating. 12/25: Early this AM about 0600 patient spiked fever 103.3, elevated RR and HR. Given PRN pushes of versed and fentanyl as well as one time dose of rocuronium with some recovery. Chest x-ray obtained does not show obvious pneumothorax, formal read is pending. Will get BLE dopplers to asses for DVT, once stable will send to CT for head and chest CT to rule out neurological causes of fever such as stroke and also PE. Will likely need to resume sedation. Will place ENT consult for tracheostomy. Per respiratory note, patients ETT is having air leaks and suction catheter is difficult to pass, will likely require tube exchange today. Cooling blanket for fever. Levo as needed for support of blood pressure. 12/26: No acute events over the night. Unable to obtain CT scans due to labile BP, risk of travel outweighed benefit of scan overnight. Will attempt to get CT scans today, will add CT abdomen for further evaluation of fevers. Received motrin over the night for T-max 100.9. Remains on levophed infusion at 2 mcg/min for BP support. Labs remain stable, no acute abnormality. Monitor tube feed residuals, they were held for a few hours yesterday for high gastric residual, likely secondary to sedative medications decreasing gastric motility, resume feeds today. Increase rate of feeds as tolerated. Plan to re-test for COVID on 12/29. No issues with ET tube over the night, will hold off on tube exchange. ENT consultation for trach. Discussed with wife yesterday. Continue to prone for PF ratio less than 150, aggressive pulmonary hygiene. 12/27: Patient seen. NAD. Fevers noted. MRSA in sputum, antibiotics adjusted.  Continue current management. 12/28: Patient fevers persisted. Fentanyl gtt discontinued and transitioned to dilaudid gtt. ENT evaluated patient, current settings are too high, will have to be re consulted when settings improve. 12/29: patient seen. NAD. ID Following: continue current antimicrobial regimen. Wound care RN management recommendations noted. Tube feedings on hold as gastric residuals were too high. Will continue to trend    12/30: Patient seen. NAD. Patient noted to have 2 bowel movements after previous bm was on 12/17. ABG results reviewed with decision made to prone patient. Further orders per clinical course. 12/31: Early this morning patient placed in prone position. Had episode of tachycardia and desaturation. Given rocuronium 50 mg x1, Nimbex resumed. HR remained in the 150's, was given metoprolol now with better rate control. Resume propofol to ensure sedation with paralytic. Stop PO benzos and opiates while on IV. Still with elevated temp, up to 103, continue micafungin, vancomycin. ID following. Net positive 3.5 L fluid volume since admit, net negative over past 24 hours. 1/1/2022: Patient continues with intermittent fevers. Paired blood and sputum cultures ordered to be drawn today. No obvious bacterial infection source, rule out drug fevers as well, could also be viral fever related to ongoing COVID-19 infection. He has required increase in his pressor support, currently on 22 mcg/min levophed for BP management. ABG also slightly worse this morning with partially compensated metabolic acidosis. Lactic acid increased to 3.5. Renal function remains okay. We will keep tight control of BP and glucose to preserve renal function. Increase in WBC to 21.8 today, will trend cultures, could be reactive. Liver enzymes stable. Continue Vanc for MRSA in sputum. Continue to closely monitor, course is concerning as he was going down on oxygen requirements prior, and now seems to have hit a plateau.  We will continue aggressive measures, pronation, and monitor labs closely. Start reglan for tube feed residuals. Replete potassium. 1/2/2022: No acute events overnight. Placed in supine position at 0515, tolerated well. Currently on 80% FiO2 and PEEP 15. Levophed at 17. Goal is to wean vent today to 70% if tolerated. Continue aggressive pulmonary hygiene. Fevers improving, T-max 99.7. Continue to maintain blood glucose 140-180. Replete albumin. Procalcitonin 2.9 likely secondary to MRSA pneumonia, optimize antimicrobial therapy. Blood cultures with no growth at 17 hours, sputum with GPC in clusters, likely still MRSA. 1/3/2022: No acute events over the night. We were able to decrease FiO2 to 75% yesterday with good ABG following. Continue to wean oxygen with goal of 70% today. PEEP 14. Continue with aggressive pulmonary hygiene, HOB greater than 30 degrees. T-max 99.2, showing some improvement. MRSA still growing from sputum cultures drawn 1/1/22.     1/4/2022: No acute events over the night. Remains on 70% FiO2 PEEP 14, sats 93%, will likely not titrate down today. WBCs downtrending. Potassium repleted. Triglycerides 876, transition off propofol and change his sedation regimen today. Continue to prone 16 hours for PF ratio less than 150. If unable to wean O2 today, linezolid added for MRSA pneumonia. Continue with PROM, mobilization, aggressive pulmonary hygiene, elevate HOB, oral care. Will hold nimbex for now until sedation optimized. 1/5/2022: No acute events overnight, remained proned overnight. Vent settings FiO2 80%, peep 14, PaO2/FiO2 ratio 89. Plan to supinate today at 10am.  Will check ABG at 5pm and re-prone if PaO2/FiO2 ratio still less then 150.    1/6/21 No acute events overnight. Tmax 102. 3. PaO2/FiO2 106, plan to prone today. 1/7/22  Supinated at 0400. Pplat 39 on 75/14/22/500. tmax 102.9 refractory.      1/8/22 Diuresed - 3.7 L with improved Cr, LFTs, and oxygenation    Active Problem List: Problem List  Date Reviewed: 11/28/2021          Codes Class    COVID ICD-10-CM: U07.1  ICD-9-CM: 079.89         Acute hypoxemic respiratory failure due to COVID-19 [facility]) ICD-10-CM: U07.1, J96.01  ICD-9-CM: 518.81, 079.89, 799.02         Pneumonia due to COVID-19 virus ICD-10-CM: U07.1, J12.82  ICD-9-CM: 480.8, 079.89         Hyperglycemia due to type 2 diabetes mellitus (HCC) ICD-10-CM: E11.65  ICD-9-CM: 250.00         Primary hypertension ICD-10-CM: I10  ICD-9-CM: 401.9         Hyponatremia ICD-10-CM: E87.1  ICD-9-CM: 276.1         Lactic acidosis ICD-10-CM: E87.2  ICD-9-CM: 276.2         Hyperlipidemia ICD-10-CM: E78.5  ICD-9-CM: 272.4         Obesity (BMI 30-39. 9) ICD-10-CM: E66.9  ICD-9-CM: 278.00         COVID-19 vaccination not done ICD-10-CM: Z28.9  ICD-9-CM: V64.00               Past Medical History:      has a past medical history of COVID-19, Diabetes ([address] Hyperlipidemia, and Hypertension. Past Surgical History:      has a past surgical history that includes hx orthopaedic (Right). Home Medications:     Prior to Admission medications    Medication Sig Start Date End Date Taking? Authorizing Provider   cisatracurium (NIMBEX) IV infusion 0-0.869 mg/min by IntraVENous route TITRATE. 12/1/21  Yes Lottie Billingsley MD   enoxaparin (LOVENOX) 100 mg/mL 90 mg by SubCUTAneous route every twelve (12) hours every twelve (12) hours. 12/1/21  Yes Lottie Billingsley MD   insulin glargine (LANTUS) 100 unit/mL injection 24 Units by SubCUTAneous route nightly. Indications: type 2 diabetes mellitus 12/1/21  Yes Lottie Billingsley MD   midazolam in normal saline (VERSED) 1 mg/mL soln 0-10 mg/hr by IntraVENous route TITRATE. Max Daily Amount: 240 mg. 12/1/21  Yes Lottie Billingsley MD   Norepinephrine Bitartrate (LEVOPHED) 8 mg/250 mL (32 mcg/mL) soln 0.5-16 mcg/min by IntraVENous route TITRATE. 12/1/21  Yes Lottie Billingsley MD   ascorbic acid, vitamin C, (VITAMIN C) 500 mg tablet Take 1 Tablet by mouth two (2) times a day.  12/1/21   Lottie Billingsley MD cholecalciferol (VITAMIN D3) (1000 Units /25 mcg) tablet Take 2 Tablets by mouth daily. 21   Robbie Whitmore MD   melatonin, rapid dissolve, 5 mg TbDi tablet Take 1 Tablet by mouth nightly. 21   Robbie Whitmore MD   atorvastatin (LIPITOR) 20 mg tablet Take 20 mg by mouth daily. Provider, Historical   telmisartan (MICARDIS) 80 mg tablet Take 80 mg by mouth daily. Indications: high blood pressure    Provider, Historical       Allergies/Social/Family History: Allergies   Allergen Reactions    Alupent [Metaproterenol] Swelling      Social History     Tobacco Use    Smoking status: Never Smoker    Smokeless tobacco: Not on file   Substance Use Topics    Alcohol use: Yes     Comment: drinksonce a week      Family History   Problem Relation Age of Onset    Diabetes Mother     Hypertension Mother     Stroke Mother     Hodgkin's lymphoma Mother     Diabetes Father     Hypertension Father     Cystic Fibrosis Father     Diabetes Maternal Aunt     Diabetes Maternal Uncle        Review of Systems:     Unable to assess due to pt condition    Objective:   Vital Signs:  Visit Vitals  /65   Pulse 79   Temp 98.2 °F (36.8 °C)   Resp 22   Ht 5' 6\" (1.676 m)   Wt 93 kg (205 lb 0.4 oz)   SpO2 96%   BMI 33.09 kg/m²    O2 Flow Rate (L/min): 60 l/min O2 Device: Endotracheal tube,Ventilator Temp (24hrs), Av.8 °F (36.6 °C), Min:94.7 °F (34.8 °C), Max:98.8 °F (37.1 °C)           Intake/Output:     Intake/Output Summary (Last 24 hours) at 2022 0807  Last data filed at 2022 0700  Gross per 24 hour   Intake 4271.74 ml   Output 7175 ml   Net -2903.26 ml       Limited Physical Exam:    General:  Sedated/intubated/RASS -3   Eye: Pupils are round and reactive bilaterally  Neurologic: no neuro response illicited   Neck: Supple, no JVD  Lungs:   On mechanical ventilation; bilateral rhonchi  Heart: RRR, 2+ pulses, 1+ edema of BLE   Abdomen:Soft, nontender, nondistended  Skin:  No rash or lesion      LABS AND  DATA: Personally reviewed  Recent Labs     01/08/22 0226 01/07/22  0247   WBC 24.8* 25.6*   HGB 7.3* 8.1*   HCT 23.7* 26.4*   * 674*     Recent Labs     01/08/22 0226 01/07/22 2042    144   K 4.1 2.7*    115*   CO2 28 22   BUN 39* 32*   CREA 1.24 0.95   * 219*   CA 7.9* 5.4*   MG 2.8* 1.8   PHOS 4.1 3.5     Recent Labs     01/08/22 0226 01/07/22 2042   * 178*   TP 6.7 4.9*   ALB 3.1* 2.1*   GLOB 3.6 2.8     No results for input(s): INR, PTP, APTT, INREXT, INREXT in the last 72 hours. Recent Labs     01/08/22  0502 01/07/22  0621   PHI 7.41 7.35   PCO2I 45.3* 41.3   PO2I 107* 83   FIO2I 70 75     No results for input(s): CPK, CKMB, TROIQ, BNPP in the last 72 hours. Ventilator Settings:  Mode Rate Tidal Volume Pressure FiO2 PEEP   Assist control,Pressure control   0 ml  0 cm H2O 75 % 14 cm H20     Peak airway pressure: 42 cm H2O    Minue ventilation: 10.2 l/min      MEDS: Reviewed    Chest X-Ray: personally reviewed and report checked    · TTE: 11/24: LV: Estimated LVEF is 50 - 55%. Normal cavity size, wall thickness and diastolic function. Low normal systolic function. Assessment and Plan     [id number] Pneumonia complicated by Severe ARDS + MRSA pneumonia: Received tocilizumab. - Continue steroids  - Continue mechanical ventilation   - continue dex/versed/dilaudid for sedation   Versed 20 - plan to decrease to 16 by 1900   Dex 0.2   Dilaudid 4  - Would not continue proning due to futility   - Diuresis today - metolazone and lasix   - linezoid/meropenum, being followed by ID    Progressive Oliguria  - Bun and Cr improved    -metolazone 10 mg + 40 mg IV lasix     HLD: Continue statin    DM2 c/b hyperglycemia:   - increase NPH to 20 q 12hrs   - increase SSI q4h and scale adjusted      Hypotension: Possibly related to sedation vs septic shock.    - 2 mcg levophed, titrate goal map>65.   - Continue midodrine    Deconditioning: Unable to participate in PT/OT at this time    Multidisciplinary Rounds Completed: Y    ABCDEF Bundle/Checklist  Pain Medications: Dilaudid   Target RASS: 0  Sedation Medications:Versed  CAM-ICU: SILVIA  Mobility:Poor  PT/OT: Unable to participate: PROM  Restraints:Y  Discussed Plan of Care (goals of care): Y  Addressed Code Status: F    CARDIOVASCULAR  Cardiac Gtts: N  SBP Goal of: > 90 mmHg  MAP Goal of: > 65 mmHg  Transfusion Trigger (Hgb): <7 g/dL    RESPIRATORY  Vent Goals:   Optimize PEEP/Ventilation/Oxygenation  DVT Prophylaxis (if no, list reason): Lovenox therapeutic  SPO2 Goal: > 92%  Pulmonary toilet: Duo-Nebs     GI/  Gold Catheter Present: Yes  GI Prophylaxis: Pepcid (famotidine)   Nutrition: Yes restart TF today  IVFs:N  Bowel Movement:Y  Bowel Regimen:Y  Insulin: Y    ANTIBIOTICS  Antibiotics:  None    T/L/D  Tubes: ETT  Lines: PICC  Drains: Gold exchanged on 1/7 for retention     SPECIAL EQUIPMENT  Vent    DISPOSITION  ICU    CRITICAL CARE CONSULTANT NOTE  I had a face to face encounter with the patient, reviewed and interpreted patient data including clinical events, labs, images, vital signs, I/O's, and examined patient. I have discussed the case and the plan and management of the patient's care with the consulting services, the bedside nurses and the respiratory therapist.      NOTE OF PERSONAL INVOLVEMENT IN CARE   This patient has a high probability of imminent, clinically significant deterioration, which requires the highest level of preparedness to intervene urgently. I participated in the decision-making and personally managed or directed the management of the following life and organ supporting interventions that required my frequent assessment to treat or prevent imminent deterioration. I personally spent 45 minutes of critical care time. This is time spent at this critically ill patient's bedside actively involved in patient care as well as the coordination of care and discussions with the patient's family.   This does not include any procedural time which has been billed separately.           Arlette Trent NP     Critical Care Medicine  [facility]  1/6/22

## 2022-01-08 NOTE — PROGRESS NOTES
1930: Bedside and Verbal shift change report given to BRITTANY Jenkins RN (oncoming nurse) by Luke Bartlett. Aurelio Nguyen, RN (offgoing nurse). Report included the following information SBAR, Kardex, Intake/Output, MAR, Recent Results, Med Rec Status, Cardiac Rhythm NSR/SB with PACs and Alarm Parameters . art line and cuff correlate-using art line to monitor and treat BP/levo titrated to effect  2000-incont of lg loose brown stool/went into rapid afib/flutter when turned to give incont care/FMS placement  2010-having bursts of RVR  2042-cmp/mg+ sent to lab to check electrolytes- now in a flutter rate 99 will update NP Kiersten Pass when available   2105-in/out  NSR/afib  NP updated  2141-abnormal  Lab values given to NP and the following orders received and placed in comp: 60meq KCL IV, 1gm Mg+ IV, 1 gm Calcium IV.  Phos added onto labwork  0015-continues to go in/out afib/flutter- SR w/ PAC  Rate controlled-  NP updated dilt on hold   for now      0730-bedside report given to RN using sbar format

## 2022-01-08 NOTE — PROGRESS NOTES
0730: Bedside and Verbal shift change report given to BRITTANY Castillo RN (oncoming nurse) by Berenice Napoles. Romario Larry RN (offgoing nurse). Report included the following information SBAR, Kardex, Intake/Output, MAR, Recent Results, Med Rec Status, Cardiac Rhythm Afib/sinus arrythmia/ST/PACs and Alarm Parameters . Drips verified upon the start of the shift:   -Versed @ 20 mg/hr  -Dilaudid @ 4 mg/hr  -Levo @ 2 mcg/hr  -Precedex @ 0.2 mcg/kg/hr    1800: Patient persistently in atrial fib. Amio bolus given and electrolytes repleated at this time. 1930: Bedside and Verbal shift change report given to KAROLINA Landry (oncoming nurse) by [employer]. Dimas Rios (offgoing nurse).  Report included the following information SBAR, Kardex, Intake/Output, MAR, Recent Results, Med Rec Status, Cardiac Rhythm Afib/sinus arrythmia/ST/PACs and Alarm Parameters .

## 2022-01-08 NOTE — PROGRESS NOTES
Brief critical care follow-up note:  I was asked by primary intensivist team to provide second opinion to this gentleman wife based on her request.  Anand Stiles called Ms. Caden Lizett, I introduced myself and asked her if I can answer any question or concern. She expressed appreciation to my phone call however she requested if I can call her again tomorrow evening so she can digest the information she received and try to ask relevant question. I informed her that I will attempt to call her again tomorrow evening.

## 2022-01-09 NOTE — PROGRESS NOTES
1930: Bedside and Verbal shift change report given to H. Frankey Hales, RN (oncoming nurse) by Marilu Case. Radha Fenton RN (offgoing nurse).  Report included the following information SBAR, Kardex, Intake/Output, MAR, Recent Results, Med Rec Status, Cardiac Rhythm NSR/SB with PACs and Alarm Parameters . art line and cuff correlate-using art line to monitor and treat BP/levo titrated prn to effect    0730-bedside report given to RN using sbar format

## 2022-01-09 NOTE — PROGRESS NOTES
[facility]    ICU TEAM Progress Note    Name: Maureen Box   : 1975   MRN: [id number]   Date: 2021      Subjective:   Progress Note: 2021      Mr Gage Kennedy is a 54 yo male with a PMH of DM2, HTN and obesity admitted on  to [facility] for COVID-19 pneumonia. He was treated with tocilizumab and steroids. He was not vaccinated. He decompensated and was intubated and . He was paralyzed, proned/supinated. He was transferred to [facility] on  to be evaluated for possible ECMO. CVC and arterial line placed at [facility] on . Nimbex was turned off . He was weaned to 60% Fio2 as of 12/3. Proning/supinating therapy continued. He continued to demonstrated fever. He was started on cefepime/fluconazole on 12/3 with vanc added .      his FIo2 increased to 70% and decreased back to 60% on . CVC changed to PICC on  as blood culture had been positive for 4/4 S epi on 12/3 (cleared in culture on ). He was on vanc for 2 weeks, until . Changed to cefazolin on 12/15. Attempts were made to decrease sedation on , but this was not successful due to increased agitation. Due to this, seroquel was added on . Enteral oxycodone and valium were also added on . His versed was weaned from 19mg/h to 17/mg/h on  pm.  His versed was further weaned on  as his seroquel was increased. : Versed is at 9. He continues on the fentanyl infusion. He has been afebrile overnight. : Versed gtt 7 and fentanyl gtt at 150. Vent weaned to 65% and continued PEEP of 12. Opens eyes and moves ext spontaneously. But does not follow commands, track with eyes, or WD to pain. NGT clogged, will replace today. Completed cefazolin. Chest xray tomorrow am.    : No acute events overnight. Did not require pronation overnight. Plan for today is to decrease FiO2 to 60% and wean Fentanyl. Mobilize as tolerated. Not following commands.  CHRISTIE spontaneously. 12/24: No acute events overnight, remains on 55% FiO2, PEEP 10. Will continue to wean FiO2 for goal of 50% today, will wean PEEP to 8 if tolerated. SORIANO spontaneously. Continue to wean IV sedation and liberalize PO opiates and benzos to avoid withdraw without oversedating. 12/25: Early this AM about 0600 patient spiked fever 103.3, elevated RR and HR. Given PRN pushes of versed and fentanyl as well as one time dose of rocuronium with some recovery. Chest x-ray obtained does not show obvious pneumothorax, formal read is pending. Will get BLE dopplers to asses for DVT, once stable will send to CT for head and chest CT to rule out neurological causes of fever such as stroke and also PE. Will likely need to resume sedation. Will place ENT consult for tracheostomy. Per respiratory note, patients ETT is having air leaks and suction catheter is difficult to pass, will likely require tube exchange today. Cooling blanket for fever. Levo as needed for support of blood pressure. 12/26: No acute events over the night. Unable to obtain CT scans due to labile BP, risk of travel outweighed benefit of scan overnight. Will attempt to get CT scans today, will add CT abdomen for further evaluation of fevers. Received motrin over the night for T-max 100.9. Remains on levophed infusion at 2 mcg/min for BP support. Labs remain stable, no acute abnormality. Monitor tube feed residuals, they were held for a few hours yesterday for high gastric residual, likely secondary to sedative medications decreasing gastric motility, resume feeds today. Increase rate of feeds as tolerated. Plan to re-test for COVID on 12/29. No issues with ET tube over the night, will hold off on tube exchange. ENT consultation for trach. Discussed with wife yesterday. Continue to prone for PF ratio less than 150, aggressive pulmonary hygiene. 12/27: Patient seen. NAD. Fevers noted. MRSA in sputum, antibiotics adjusted.  Continue current management. 12/28: Patient fevers persisted. Fentanyl gtt discontinued and transitioned to dilaudid gtt. ENT evaluated patient, current settings are too high, will have to be re consulted when settings improve. 12/29: patient seen. NAD. ID Following: continue current antimicrobial regimen. Wound care RN management recommendations noted. Tube feedings on hold as gastric residuals were too high. Will continue to trend    12/30: Patient seen. NAD. Patient noted to have 2 bowel movements after previous bm was on 12/17. ABG results reviewed with decision made to prone patient. Further orders per clinical course. 12/31: Early this morning patient placed in prone position. Had episode of tachycardia and desaturation. Given rocuronium 50 mg x1, Nimbex resumed. HR remained in the 150's, was given metoprolol now with better rate control. Resume propofol to ensure sedation with paralytic. Stop PO benzos and opiates while on IV. Still with elevated temp, up to 103, continue micafungin, vancomycin. ID following. Net positive 3.5 L fluid volume since admit, net negative over past 24 hours. 1/1/2022: Patient continues with intermittent fevers. Paired blood and sputum cultures ordered to be drawn today. No obvious bacterial infection source, rule out drug fevers as well, could also be viral fever related to ongoing COVID-19 infection. He has required increase in his pressor support, currently on 22 mcg/min levophed for BP management. ABG also slightly worse this morning with partially compensated metabolic acidosis. Lactic acid increased to 3.5. Renal function remains okay. We will keep tight control of BP and glucose to preserve renal function. Increase in WBC to 21.8 today, will trend cultures, could be reactive. Liver enzymes stable. Continue Vanc for MRSA in sputum. Continue to closely monitor, course is concerning as he was going down on oxygen requirements prior, and now seems to have hit a plateau.  We will continue aggressive measures, pronation, and monitor labs closely. Start reglan for tube feed residuals. Replete potassium. 1/2/2022: No acute events overnight. Placed in supine position at 0515, tolerated well. Currently on 80% FiO2 and PEEP 15. Levophed at 17. Goal is to wean vent today to 70% if tolerated. Continue aggressive pulmonary hygiene. Fevers improving, T-max 99.7. Continue to maintain blood glucose 140-180. Replete albumin. Procalcitonin 2.9 likely secondary to MRSA pneumonia, optimize antimicrobial therapy. Blood cultures with no growth at 17 hours, sputum with GPC in clusters, likely still MRSA. 1/3/2022: No acute events over the night. We were able to decrease FiO2 to 75% yesterday with good ABG following. Continue to wean oxygen with goal of 70% today. PEEP 14. Continue with aggressive pulmonary hygiene, HOB greater than 30 degrees. T-max 99.2, showing some improvement. MRSA still growing from sputum cultures drawn 1/1/22.     1/4/2022: No acute events over the night. Remains on 70% FiO2 PEEP 14, sats 93%, will likely not titrate down today. WBCs downtrending. Potassium repleted. Triglycerides 876, transition off propofol and change his sedation regimen today. Continue to prone 16 hours for PF ratio less than 150. If unable to wean O2 today, linezolid added for MRSA pneumonia. Continue with PROM, mobilization, aggressive pulmonary hygiene, elevate HOB, oral care. Will hold nimbex for now until sedation optimized. 1/5/2022: No acute events overnight, remained proned overnight. Vent settings FiO2 80%, peep 14, PaO2/FiO2 ratio 89. Plan to supinate today at 10am.  Will check ABG at 5pm and re-prone if PaO2/FiO2 ratio still less then 150.    1/6/21 No acute events overnight. Tmax 102. 3. PaO2/FiO2 106, plan to prone today. 1/7/22  Supinated at 0400. Pplat 39 on 75/14/22/500. tmax 102.9 refractory. 1/8/22 Diuresed - 3.7 L with improved Cr, LFTs, and oxygenation    1/9/22:  This AM patient having bouts of Atrial Fib/flutter with rates into the 170s, given 2.5mg IV metoprolol with some decrease in rate initially, the rate began to increase again and patient was initiated on Amiodarone IV infusion for rate control, his rate remained high and he was given 150 mg IV bolus. He has since converted back to normall sinus rhythm and is currently in the 70s. BP remained stable. WBC stable. Potassium and phos repleted with re-check this afternoon. Fluid balance is net positive 2.7 L since admission, negative 750 over the past 24 hours. Remains on precedex, dilaudid, versed. Norepi off since [phone] 1/8/22. Tmax 100.7. He was briefly on dopamine overnight for bradycardia, likely related to precedex, his heart rate recovered and this was discontinued. Remains on linezolid for MRSA in sputum. Will attempt to call wife today at some point if time permits for updates and to answer any questions she may have. Active Problem List:     Problem List  Date Reviewed: 11/28/2021          Codes Class    COVID ICD-10-CM: U07.1  ICD-9-CM: 079.89         Acute hypoxemic respiratory failure due to COVID-19 [facility]) ICD-10-CM: U07.1, J96.01  ICD-9-CM: 518.81, 079.89, 799.02         Pneumonia due to COVID-19 virus ICD-10-CM: U07.1, J12.82  ICD-9-CM: 480.8, 079.89         Hyperglycemia due to type 2 diabetes mellitus (HCC) ICD-10-CM: E11.65  ICD-9-CM: 250.00         Primary hypertension ICD-10-CM: I10  ICD-9-CM: 401.9         Hyponatremia ICD-10-CM: E87.1  ICD-9-CM: 276.1         Lactic acidosis ICD-10-CM: E87.2  ICD-9-CM: 276.2         Hyperlipidemia ICD-10-CM: E78.5  ICD-9-CM: 272.4         Obesity (BMI 30-39. 9) ICD-10-CM: E66.9  ICD-9-CM: 278.00         COVID-19 vaccination not done ICD-10-CM: Z28.9  ICD-9-CM: V64.00               Past Medical History:      has a past medical history of COVID-19, Diabetes ([facility] [address] Hyperlipidemia, and Hypertension.     Past Surgical History:      has a past surgical history that includes hx orthopaedic (Right). Home Medications:     Prior to Admission medications    Medication Sig Start Date End Date Taking? Authorizing Provider   cisatracurium (NIMBEX) IV infusion 0-0.869 mg/min by IntraVENous route TITRATE. 12/1/21  Yes Mel Espinoza MD   enoxaparin (LOVENOX) 100 mg/mL 90 mg by SubCUTAneous route every twelve (12) hours every twelve (12) hours. 12/1/21  Yes Mel Espinoza MD   insulin glargine (LANTUS) 100 unit/mL injection 24 Units by SubCUTAneous route nightly. Indications: type 2 diabetes mellitus 12/1/21  Yes Mel Espinoza MD   midazolam in normal saline (VERSED) 1 mg/mL soln 0-10 mg/hr by IntraVENous route TITRATE. Max Daily Amount: 240 mg. 12/1/21  Yes Mel Espinoza MD   Norepinephrine Bitartrate (LEVOPHED) 8 mg/250 mL (32 mcg/mL) soln 0.5-16 mcg/min by IntraVENous route TITRATE. 12/1/21  Yes Mel Espinoza MD   ascorbic acid, vitamin C, (VITAMIN C) 500 mg tablet Take 1 Tablet by mouth two (2) times a day. 12/1/21   Mel Espinoza MD   cholecalciferol (VITAMIN D3) (1000 Units /25 mcg) tablet Take 2 Tablets by mouth daily. 12/1/21   Mel Espinoza MD   melatonin, rapid dissolve, 5 mg TbDi tablet Take 1 Tablet by mouth nightly. 12/1/21   Mel Espinoza MD   atorvastatin (LIPITOR) 20 mg tablet Take 20 mg by mouth daily. Provider, Historical   telmisartan (MICARDIS) 80 mg tablet Take 80 mg by mouth daily. Indications: high blood pressure    Provider, Historical       Allergies/Social/Family History:      Allergies   Allergen Reactions    Alupent [Metaproterenol] Swelling      Social History     Tobacco Use    Smoking status: Never Smoker    Smokeless tobacco: Not on file   Substance Use Topics    Alcohol use: Yes     Comment: drinksonce a week      Family History   Problem Relation Age of Onset    Diabetes Mother     Hypertension Mother     Stroke Mother     Hodgkin's lymphoma Mother     Diabetes Father     Hypertension Father     Cystic Fibrosis Father     Diabetes Maternal Aunt     Diabetes Maternal Uncle        Review of Systems:     Unable to assess due to pt condition    Objective:   Vital Signs:  Visit Vitals  BP 94/71   Pulse (!) 116   Temp 98.9 °F (37.2 °C)   Resp 22   Ht 5' 6\" (1.676 m)   Wt 91.7 kg (202 lb 2.6 oz)   SpO2 99%   BMI 32.63 kg/m²    O2 Flow Rate (L/min): 60 l/min O2 Device: Endotracheal tube,Ventilator Temp (24hrs), Av °F (37.2 °C), Min:98.2 °F (36.8 °C), Max:99.6 °F (37.6 °C)           Intake/Output:     Intake/Output Summary (Last 24 hours) at 2022 0736  Last data filed at 2022 0600  Gross per 24 hour   Intake 4051.52 ml   Output 5175 ml   Net -1123.48 ml       Limited Physical Exam:    General:  Sedated/intubated/RASS -3   Eye: Pupils are round and reactive bilaterally  Neurologic: no neuro response illicited   Neck: Supple, no JVD  Lungs: On mechanical ventilation; bilateral rhonchi  Heart: RRR, 2+ pulses, 1+ edema of BLE   Abdomen:Soft, nontender, nondistended  Skin:  No rash or lesion      LABS AND  DATA: Personally reviewed  Recent Labs     22   WBC 26.8* 24.8*   HGB 7.2* 7.3*   HCT 23.1* 23.7*   * 509*     Recent Labs     22  0402 22  1723    136   K 3.4* 3.5   CL 95* 98   CO2 34* 35*   BUN 35* 37*   CREA 1.05 1.14   * 249*   CA 8.5 8.6   MG 2.6* 2.4   PHOS 2.4* 2.7     Recent Labs     22  0402 22  022   * 230*   TP 6.9 6.7   ALB 3.7 3.1*   GLOB 3.2 3.6     No results for input(s): INR, PTP, APTT, INREXT, INREXT in the last 72 hours. Recent Labs     22  0603 22  0502   PHI 7.44 7.41   PCO2I 50.2* 45.3*   PO2I 67* 107*   FIO2I 75 70     No results for input(s): CPK, CKMB, TROIQ, BNPP in the last 72 hours.     Ventilator Settings:  Mode Rate Tidal Volume Pressure FiO2 PEEP   Assist control,Pressure control   0 ml  0 cm H2O 75 % 14 cm H20     Peak airway pressure: 41 cm H2O    Minue ventilation: 10.1 l/min      MEDS: Reviewed    Chest X-Ray: personally reviewed and report checked    · TTE: : LV: Estimated LVEF is 50 - 55%. Normal cavity size, wall thickness and diastolic function. Low normal systolic function. Assessment and Plan     FRVAH-99 Pneumonia complicated by Severe ARDS + MRSA pneumonia: Received tocilizumab. - Continue steroids  - Continue mechanical ventilation   - continue dex/versed/dilaudid for sedation   Versed 16 - wean as able, would not wean more than 2mg/day   Dex 0.2   Dilaudid 4  - Would not continue proning due to futility   - Diuresis today - metolazone and lasix   - linezoid/meropenum, being followed by ID    Progressive Oliguria  - Bun and Cr improved    -metolazone 10 mg + 40 mg IV lasix     HLD: Continue statin    DM2 c/b hyperglycemia:   - increase NPH to 20 q 12hrs   - increase SSI q4h and scale adjusted      Hypotension: Possibly related to sedation vs septic shock.   - Resolved- off pressors   - Continue midodrine    Deconditioning: Unable to participate in PT/OT at this time    Multidisciplinary Rounds Completed: Y    ABCDEF Bundle/Checklist  Pain Medications: Dilaudid   Target RASS: 0  Sedation Medications:Versed  CAM-ICU: SILVIA  Mobility:Poor  PT/OT: Unable to participate: PROM  Restraints:Y  Discussed Plan of Care (goals of care):  Y  Addressed Code Status: F    CARDIOVASCULAR  Cardiac Gtts: N  SBP Goal of: > 90 mmHg  MAP Goal of: > 65 mmHg  Transfusion Trigger (Hgb): <7 g/dL    RESPIRATORY  Vent Goals:   Optimize PEEP/Ventilation/Oxygenation  DVT Prophylaxis (if no, list reason): Lovenox therapeutic  SPO2 Goal: > 92%  Pulmonary toilet: Duo-Nebs     GI/  Gold Catheter Present: Yes  GI Prophylaxis: Pepcid (famotidine)   Nutrition: Yes restart TF today  IVFs:N  Bowel Movement:Y  Bowel Regimen:Y  Insulin: Y    ANTIBIOTICS  Antibiotics:  None    T/L/D  Tubes: ETT  Lines: PICC  Drains: Gold exchanged on 1/7 for retention     SPECIAL EQUIPMENT  Vent    DISPOSITION  ICU    CRITICAL CARE CONSULTANT NOTE  I had a face to face encounter with the patient, reviewed and interpreted patient data including clinical events, labs, images, vital signs, I/O's, and examined patient. I have discussed the case and the plan and management of the patient's care with the consulting services, the bedside nurses and the respiratory therapist.      NOTE OF PERSONAL INVOLVEMENT IN CARE   This patient has a high probability of imminent, clinically significant deterioration, which requires the highest level of preparedness to intervene urgently. I participated in the decision-making and personally managed or directed the management of the following life and organ supporting interventions that required my frequent assessment to treat or prevent imminent deterioration. I personally spent 45 minutes of critical care time. This is time spent at this critically ill patient's bedside actively involved in patient care as well as the coordination of care and discussions with the patient's family. This does not include any procedural time which has been billed separately.             Pamela Diggs [facility]     Critical Care Medicine  [facility]

## 2022-01-09 NOTE — PROGRESS NOTES
0745: Verbal shift change report given to Stuart Joseph RN (oncoming nurse) by Osvaldo Vick RN (offgoing nurse). Report included the following information SBAR, Intake/Output, Cardiac Rhythm ST and afib/flutter and Alarm Parameters . 0840: Patient in rapid afib/a flutter with HR 130s-160s. NP at bedside. 2.5 mg IV metoprolol administered with improvement noted in HR. BP stable. Amiodarone drip and potassium phosphate infusion ordered at this time. 1140: Spoke with NP regarding ongoing rapid a flutter with HR 120s-160s. Order received for 150 mg amiodarone bolus. 1230: Patient noted to be in normal sinus rhythm on monitor. NP notified. Amiodarone bolus not administered, order discontinued. 1930: Verbal shift change report given to KATHRINE Alberts (oncoming nurse) by Stuart Joseph RN (offgoing nurse). Report included the following information SBAR, Intake/Output, Cardiac Rhythm NSR and Alarm Parameters .

## 2022-01-10 NOTE — PROGRESS NOTES
[facility]    ICU TEAM Progress Note    Name: Cecilia Cervantes   : 1975   MRN: [id number]   Date: 2021      Subjective:   Progress Note: 2021      Mr Marian Abad is a 56 yo male with a PMH of DM2, HTN and obesity admitted on  to [facility] for COVID-19 pneumonia. He was treated with tocilizumab and steroids. He was not vaccinated. He decompensated and was intubated and . He was paralyzed, proned/supinated. He was transferred to [facility] on  to be evaluated for possible ECMO. CVC and arterial line placed at [facility] on . Nimbex was turned off . He was weaned to 60% Fio2 as of 12/3. Proning/supinating therapy continued. He continued to demonstrated fever. He was started on cefepime/fluconazole on 12/3 with vanc added .      his FIo2 increased to 70% and decreased back to 60% on . CVC changed to PICC on  as blood culture had been positive for 4/4 S epi on 12/3 (cleared in culture on ). He was on vanc for 2 weeks, until . Changed to cefazolin on 12/15. Attempts were made to decrease sedation on , but this was not successful due to increased agitation. Due to this, seroquel was added on . Enteral oxycodone and valium were also added on . His versed was weaned from 19mg/h to 17/mg/h on  pm.  His versed was further weaned on  as his seroquel was increased. : Versed is at 9. He continues on the fentanyl infusion. He has been afebrile overnight. : Versed gtt 7 and fentanyl gtt at 150. Vent weaned to 65% and continued PEEP of 12. Opens eyes and moves ext spontaneously. But does not follow commands, track with eyes, or WD to pain. NGT clogged, will replace today. Completed cefazolin. Chest xray tomorrow am.    12/23: No acute events overnight. Did not require pronation overnight. Plan for today is to decrease FiO2 to 60% and wean Fentanyl. Mobilize as tolerated. Not following commands.  CHRISTIE spontaneously. 12/24: No acute events overnight, remains on 55% FiO2, PEEP 10. Will continue to wean FiO2 for goal of 50% today, will wean PEEP to 8 if tolerated. SORIANO spontaneously. Continue to wean IV sedation and liberalize PO opiates and benzos to avoid withdraw without oversedating. 12/25: Early this AM about 0600 patient spiked fever 103.3, elevated RR and HR. Given PRN pushes of versed and fentanyl as well as one time dose of rocuronium with some recovery. Chest x-ray obtained does not show obvious pneumothorax, formal read is pending. Will get BLE dopplers to asses for DVT, once stable will send to CT for head and chest CT to rule out neurological causes of fever such as stroke and also PE. Will likely need to resume sedation. Will place ENT consult for tracheostomy. Per respiratory note, patients ETT is having air leaks and suction catheter is difficult to pass, will likely require tube exchange today. Cooling blanket for fever. Levo as needed for support of blood pressure. 12/26: No acute events over the night. Unable to obtain CT scans due to labile BP, risk of travel outweighed benefit of scan overnight. Will attempt to get CT scans today, will add CT abdomen for further evaluation of fevers. Received motrin over the night for T-max 100.9. Remains on levophed infusion at 2 mcg/min for BP support. Labs remain stable, no acute abnormality. Monitor tube feed residuals, they were held for a few hours yesterday for high gastric residual, likely secondary to sedative medications decreasing gastric motility, resume feeds today. Increase rate of feeds as tolerated. Plan to re-test for COVID on 12/29. No issues with ET tube over the night, will hold off on tube exchange. ENT consultation for trach. Discussed with wife yesterday. Continue to prone for PF ratio less than 150, aggressive pulmonary hygiene. 12/27: Patient seen. NAD. Fevers noted. MRSA in sputum, antibiotics adjusted.  Continue current management. 12/28: Patient fevers persisted. Fentanyl gtt discontinued and transitioned to dilaudid gtt. ENT evaluated patient, current settings are too high, will have to be re consulted when settings improve. 12/29: patient seen. NAD. ID Following: continue current antimicrobial regimen. Wound care RN management recommendations noted. Tube feedings on hold as gastric residuals were too high. Will continue to trend    12/30: Patient seen. NAD. Patient noted to have 2 bowel movements after previous bm was on 12/17. ABG results reviewed with decision made to prone patient. Further orders per clinical course. 12/31: Early this morning patient placed in prone position. Had episode of tachycardia and desaturation. Given rocuronium 50 mg x1, Nimbex resumed. HR remained in the 150's, was given metoprolol now with better rate control. Resume propofol to ensure sedation with paralytic. Stop PO benzos and opiates while on IV. Still with elevated temp, up to 103, continue micafungin, vancomycin. ID following. Net positive 3.5 L fluid volume since admit, net negative over past 24 hours. 1/1/2022: Patient continues with intermittent fevers. Paired blood and sputum cultures ordered to be drawn today. No obvious bacterial infection source, rule out drug fevers as well, could also be viral fever related to ongoing COVID-19 infection. He has required increase in his pressor support, currently on 22 mcg/min levophed for BP management. ABG also slightly worse this morning with partially compensated metabolic acidosis. Lactic acid increased to 3.5. Renal function remains okay. We will keep tight control of BP and glucose to preserve renal function. Increase in WBC to 21.8 today, will trend cultures, could be reactive. Liver enzymes stable. Continue Vanc for MRSA in sputum. Continue to closely monitor, course is concerning as he was going down on oxygen requirements prior, and now seems to have hit a plateau.  We will continue aggressive measures, pronation, and monitor labs closely. Start reglan for tube feed residuals. Replete potassium. 1/2/2022: No acute events overnight. Placed in supine position at 0515, tolerated well. Currently on 80% FiO2 and PEEP 15. Levophed at 17. Goal is to wean vent today to 70% if tolerated. Continue aggressive pulmonary hygiene. Fevers improving, T-max 99.7. Continue to maintain blood glucose 140-180. Replete albumin. Procalcitonin 2.9 likely secondary to MRSA pneumonia, optimize antimicrobial therapy. Blood cultures with no growth at 17 hours, sputum with GPC in clusters, likely still MRSA. 1/3/2022: No acute events over the night. We were able to decrease FiO2 to 75% yesterday with good ABG following. Continue to wean oxygen with goal of 70% today. PEEP 14. Continue with aggressive pulmonary hygiene, HOB greater than 30 degrees. T-max 99.2, showing some improvement. MRSA still growing from sputum cultures drawn 1/1/22.     1/4/2022: No acute events over the night. Remains on 70% FiO2 PEEP 14, sats 93%, will likely not titrate down today. WBCs downtrending. Potassium repleted. Triglycerides 876, transition off propofol and change his sedation regimen today. Continue to prone 16 hours for PF ratio less than 150. If unable to wean O2 today, linezolid added for MRSA pneumonia. Continue with PROM, mobilization, aggressive pulmonary hygiene, elevate HOB, oral care. Will hold nimbex for now until sedation optimized. 1/5/2022: No acute events overnight, remained proned overnight. Vent settings FiO2 80%, peep 14, PaO2/FiO2 ratio 89. Plan to supinate today at 10am.  Will check ABG at 5pm and re-prone if PaO2/FiO2 ratio still less then 150.    1/6/21 No acute events overnight. Tmax 102. 3. PaO2/FiO2 106, plan to prone today. 1/7/22  Supinated at 0400. Pplat 39 on 75/14/22/500. tmax 102.9 refractory. 1/8/22 Diuresed - 3.7 L with improved Cr, LFTs, and oxygenation    1/9/22:  This AM patient having bouts of Atrial Fib/flutter with rates into the 170s, given 2.5mg IV metoprolol with some decrease in rate initially, the rate began to increase again and patient was initiated on Amiodarone IV infusion for rate control, his rate remained high and he was given 150 mg IV bolus. He has since converted back to normall sinus rhythm and is currently in the 70s. BP remained stable. WBC stable. Potassium and phos repleted with re-check this afternoon. Fluid balance is net positive 2.7 L since admission, negative 750 over the past 24 hours. Remains on precedex, dilaudid, versed. Norepi off since [phone] 1/8/22. Tmax 100.7. Remains on linezolid for MRSA in sputum. Will attempt to call wife today at some point if time permits for updates and to answer any questions she may have. 1/10/22: No acute events overnight. Patient remains in normal sinus rhythm at this time. Current rate 94 bpm.  Current blood pressure 146/79. Remains intubated and sedated on mechanical ventilation. Current ventilator settings are respiratory rate of 24, pressure control 22, 80% FiO2, PEEP of 14. Remains on amiodarone drip at 0.5 mg/min. Dexmedetomidine at 0.4 mcg/kg/h, hydromorphone at 4 mg/h, midazolam at 16 mg/h, currently on no pressors. Slight addendum to yesterday's update, the patient was not on dopamine at any point. It appears as though this order was placed on the wrong patient. Was never started for Mr. Terri Carty. Potassium today is 3.5. Will replete. Hemoglobin 6.9. We will continue to trend this. Will not initiate PRBC transfusion at this time due to risk of fluid overload and increasing respiratory difficulty. Blood pressure remains stable. WBCs down slightly since yesterday. Today they are 23.9. As mentioned hemoglobin 6.9. Chemistry with stable renal function. BUN 33, creatinine 0.82. Sodium 136.   Patient fluid volume status, +2722 mL since admission, patient was net -1.1 L over the last 24 hours. Active Problem List:     Problem List  Date Reviewed: 11/28/2021          Codes Class    COVID ICD-10-CM: U07.1  ICD-9-CM: 079.89         Acute hypoxemic respiratory failure due to COVID-19 [facility]) ICD-10-CM: U07.1, J96.01  ICD-9-CM: 518.81, 079.89, 799.02         Pneumonia due to COVID-19 virus ICD-10-CM: U07.1, J12.82  ICD-9-CM: 480.8, 079.89         Hyperglycemia due to type 2 diabetes mellitus (HCC) ICD-10-CM: E11.65  ICD-9-CM: 250.00         Primary hypertension ICD-10-CM: I10  ICD-9-CM: 401.9         Hyponatremia ICD-10-CM: E87.1  ICD-9-CM: 276.1         Lactic acidosis ICD-10-CM: E87.2  ICD-9-CM: 276.2         Hyperlipidemia ICD-10-CM: E78.5  ICD-9-CM: 272.4         Obesity (BMI 30-39. 9) ICD-10-CM: E66.9  ICD-9-CM: 278.00         COVID-19 vaccination not done ICD-10-CM: Z28.9  ICD-9-CM: V64.00               Past Medical History:      has a past medical history of COVID-19, Diabetes ([address] Hyperlipidemia, and Hypertension. Past Surgical History:      has a past surgical history that includes hx orthopaedic (Right). Home Medications:     Prior to Admission medications    Medication Sig Start Date End Date Taking? Authorizing Provider   cisatracurium (NIMBEX) IV infusion 0-0.869 mg/min by IntraVENous route TITRATE. 12/1/21  Yes Zahira Cote MD   enoxaparin (LOVENOX) 100 mg/mL 90 mg by SubCUTAneous route every twelve (12) hours every twelve (12) hours. 12/1/21  Yes Zahira Cote MD   insulin glargine (LANTUS) 100 unit/mL injection 24 Units by SubCUTAneous route nightly. Indications: type 2 diabetes mellitus 12/1/21  Yes Zahira Cote MD   midazolam in normal saline (VERSED) 1 mg/mL soln 0-10 mg/hr by IntraVENous route TITRATE. Max Daily Amount: 240 mg. 12/1/21  Yes Zahira Cote MD   Norepinephrine Bitartrate (LEVOPHED) 8 mg/250 mL (32 mcg/mL) soln 0.5-16 mcg/min by IntraVENous route TITRATE. 12/1/21  Yes Zahira Cote MD   ascorbic acid, vitamin C, (VITAMIN C) 500 mg tablet Take 1 Tablet by mouth two (2) times a day. 21   Tonya Li MD   cholecalciferol (VITAMIN D3) (1000 Units /25 mcg) tablet Take 2 Tablets by mouth daily. 21   Tonya Li MD   melatonin, rapid dissolve, 5 mg TbDi tablet Take 1 Tablet by mouth nightly. 21   Tonya Li MD   atorvastatin (LIPITOR) 20 mg tablet Take 20 mg by mouth daily. Provider, Historical   telmisartan (MICARDIS) 80 mg tablet Take 80 mg by mouth daily. Indications: high blood pressure    Provider, Historical       Allergies/Social/Family History: Allergies   Allergen Reactions    Alupent [Metaproterenol] Swelling      Social History     Tobacco Use    Smoking status: Never Smoker    Smokeless tobacco: Not on file   Substance Use Topics    Alcohol use: Yes     Comment: drinksonce a week      Family History   Problem Relation Age of Onset    Diabetes Mother     Hypertension Mother     Stroke Mother     Hodgkin's lymphoma Mother     Diabetes Father     Hypertension Father     Cystic Fibrosis Father     Diabetes Maternal Aunt     Diabetes Maternal Uncle        Review of Systems:     Unable to assess due to pt condition    Objective:   Vital Signs:  Visit Vitals  /81   Pulse 76   Temp 98.7 °F (37.1 °C)   Resp 24   Ht 5' 6\" (1.676 m)   Wt 95 kg (209 lb 7 oz)   SpO2 90%   BMI 33.80 kg/m²    O2 Flow Rate (L/min): 60 l/min O2 Device: Endotracheal tube,Ventilator Temp (24hrs), Av.1 °F (37.3 °C), Min:98.1 °F (36.7 °C), Max:100.7 °F (38.2 °C)           Intake/Output:     Intake/Output Summary (Last 24 hours) at 1/10/2022 1775  Last data filed at 1/10/2022 0700  Gross per 24 hour   Intake 3337.5 ml   Output 2915 ml   Net 422.5 ml       Limited Physical Exam:    General:  Sedated/intubated/RASS -3   Eye: Pupils are round and reactive bilaterally  Neurologic: no neuro response illicited   Neck: Supple, no JVD  Lungs:   On mechanical ventilation; bilateral rhonchi  Heart: RRR, 2+ pulses, 1+ edema of BLE   Abdomen:Soft, nontender, nondistended  Skin:  No rash or lesion      LABS AND  DATA: Personally reviewed  Recent Labs     01/10/22  0236 01/09/22  0402   WBC 23.9* 26.8*   HGB 6.9* 7.2*   HCT 22.8* 23.1*    421*     Recent Labs     01/10/22  0236 01/09/22  1500 01/09/22  0402 01/09/22  0402    134*   < > 136   K 3.5 4.6   < > 3.4*   CL 94* 94*   < > 95*   CO2 37* 35*   < > 34*   BUN 33* 36*   < > 35*   CREA 0.82 0.97   < > 1.05   * 250*   < > 195*   CA 8.7 8.4*   < > 8.5   MG 2.7*  --   --  2.6*   PHOS 3.0 4.0   < > 2.4*    < > = values in this interval not displayed. Recent Labs     01/10/22  0236 01/09/22  0402   * 228*   TP 7.5 6.9   ALB 4.1 3.7   GLOB 3.4 3.2     No results for input(s): INR, PTP, APTT, INREXT, INREXT in the last 72 hours. Recent Labs     01/09/22  0603 01/08/22  0502   PHI 7.44 7.41   PCO2I 50.2* 45.3*   PO2I 67* 107*   FIO2I 75 70     No results for input(s): CPK, CKMB, TROIQ, BNPP in the last 72 hours. Ventilator Settings:  Mode Rate Tidal Volume Pressure FiO2 PEEP   Assist control,Pressure control   0 ml  0 cm H2O 80 % 14 cm H20     Peak airway pressure: 37 cm H2O    Minue ventilation: 10.7 l/min      MEDS: Reviewed    Chest X-Ray: personally reviewed and report checked    · TTE: 11/24: LV: Estimated LVEF is 50 - 55%. Normal cavity size, wall thickness and diastolic function. Low normal systolic function. Assessment and Plan     SDGED-47 Pneumonia complicated by Severe ARDS + MRSA pneumonia: Received tocilizumab. - Continue steroids  - Continue mechanical ventilation   - continue dex/versed/dilaudid for sedation   Versed 16 - wean as able, would not wean more than 2mg/day   Dex 0.2   Dilaudid 4  - Would not continue proning due to futility   -May consider diuresis later today.   - linezoid/meropenum, being followed by ID    Progressive Oliguria  - Bun and Cr improved        HLD: Continue statin    DM2 c/b hyperglycemia:   - increase NPH to 20 q 12hrs   - increase SSI q4h and scale adjusted      Hypotension: Possibly related to sedation vs septic shock.   - Resolved- off pressors   - Continue midodrine    Deconditioning: Unable to participate in PT/OT at this time    Multidisciplinary Rounds Completed: Y    ABCDEF Bundle/Checklist  Pain Medications: Dilaudid   Target RASS: 0  Sedation Medications:Versed  CAM-ICU: SILVIA  Mobility:Poor  PT/OT: Unable to participate: PROM  Restraints:Y  Discussed Plan of Care (goals of care): Y  Addressed Code Status: F    CARDIOVASCULAR  Cardiac Gtts: N  SBP Goal of: > 90 mmHg  MAP Goal of: > 65 mmHg  Transfusion Trigger (Hgb): <7 g/dL    RESPIRATORY  Vent Goals:   Optimize PEEP/Ventilation/Oxygenation  DVT Prophylaxis (if no, list reason): Lovenox therapeutic  SPO2 Goal: > 92%  Pulmonary toilet: Duo-Nebs     GI/  Gold Catheter Present: Yes  GI Prophylaxis: Pepcid (famotidine)   Nutrition: Yes restart TF today  IVFs:N  Bowel Movement:Y  Bowel Regimen:Y  Insulin: Y    ANTIBIOTICS  Antibiotics:  None    T/L/D  Tubes: ETT  Lines: PICC  Drains: Gold exchanged on 1/7 for retention     SPECIAL EQUIPMENT  Vent    DISPOSITION  ICU    CRITICAL CARE CONSULTANT NOTE  I had a face to face encounter with the patient, reviewed and interpreted patient data including clinical events, labs, images, vital signs, I/O's, and examined patient. I have discussed the case and the plan and management of the patient's care with the consulting services, the bedside nurses and the respiratory therapist.      NOTE OF PERSONAL INVOLVEMENT IN CARE   This patient has a high probability of imminent, clinically significant deterioration, which requires the highest level of preparedness to intervene urgently. I participated in the decision-making and personally managed or directed the management of the following life and organ supporting interventions that required my frequent assessment to treat or prevent imminent deterioration. I personally spent 55 minutes of critical care time.   This is time spent at this critically ill patient's bedside actively involved in patient care as well as the coordination of care and discussions with the patient's family. This does not include any procedural time which has been billed separately.             Santiago Crabecka [facility]     Critical Care Medicine  [facility]

## 2022-01-10 NOTE — PROGRESS NOTES
Transition of Care Plan   RUR-  Medium    DISPOSITION: pending medical progression   F/U with PCP/Specialist     Transport: [facility]  Patient remains on isolation for Covid, vented on Amiodarone, Precedex, dilaudid and Versed gtts, on IV abx and steriods. CM updated Radha Joshua with Branden Spence ( patient's ) [phone]. Care mangement is continuing to follow.    Polly Henry RN,Care Management

## 2022-01-10 NOTE — PROGRESS NOTES
0730: Verbal shift change report given to Juanita Rogers RN (oncoming nurse) by Aiden Dyer RN (offgoing nurse). Report included the following information SBAR, Intake/Output, Cardiac Rhythm NSR and Alarm Parameters . 1945: Verbal shift change report given to KATHRINE Burger (oncoming nurse) by Juanita Rogers RN (offgoing nurse). Report included the following information SBAR, Intake/Output, Cardiac Rhythm NSR to ST and Alarm Parameters .

## 2022-01-10 NOTE — CONSULTS
Palliative Medicine    Case discussed with ICU team.  Will be able to see patient/ call family on 1/11/2022

## 2022-01-10 NOTE — PROGRESS NOTES
1930: Verbal shift change report given to KATHRINE Alberts (oncoming nurse) by Apryl Frankel RN (offgoing nurse).  Report included the following information SBAR, Intake/Output, Cardiac Rhythm NSR and Alarm Parameters art line and cuff correlate-using art line to monitor and treat BP    0730-bedside report given to RN using sbar format

## 2022-01-11 NOTE — PROGRESS NOTES
Called to [facility] transfer center to get information about possible transfer for lung transplant workup. Wife notified. Wife also given update on patient condition. Sats currently 99% on nitric oxide. Will also get help from case management with talking with [facility] or other transplant centers for workup.        Arminda Jaramillo [facility]     Critical Care Medicine  [facility]

## 2022-01-11 NOTE — PROGRESS NOTES
Comprehensive Nutrition Assessment    Type and Reason for Visit: Reassess    Nutrition Recommendations/Plan:      Remove FMS if output remains low    Reduce water flush to 100 ml q 4 hr    Check Vit D    Nutrition Assessment:    PMHx includes DM, HTN, HLD. Admitted to [facility] on 11/23 for acute respiratory failure due to Covid-19 PNA. He was admitted directly to the ICU on HFNC, S/P Tociluzumab and Decadron. His condition progressively worsened - intubated on 11/28. Transferred to [facility] for possible VV - ECMO (did not meet criteria). Severe ARDS, COVID PNA and MRSA PNA. Remains intubated and sedated; no longer being proned. Off propofol 2/2 hypertriglyceridemia. Palliative Care consulted. Mr Johnnie Garcia has tolerated enteral feeding since being resumed 1/4. Was previously having elevated GRV-mostly likely 2/2 constipation. This has resolved. FMS reinserted 1/7 but noted little output in the past 24 hr. May wish to remove if output remains low. Tube feeding as ordered provides: 1320 ml, 2100 calories, 139 gm protein, 176 gm CHO and 2020 ml free water (tube feeding/flush) per day to meet estimated needs. Sodium trending down-will reduce water flush to 100 ml q 4 hr and monitor lab. This will reduce total water to 1720 ml per day. Patient more edematous; unclear actual dry weight. Suspect 3rd spacing from hypoalbuminemia (down to 1.3). Albumin now WNL d/t IV infusion. Recommend checking Vit D to see if able to reduce supplementation. Last lab drawn 12/1. Malnutrition Assessment:  Malnutrition Status:   Moderate malnutrition    Context:  Acute illness     Findings of the 6 clinical characteristics of malnutrition:   Energy Intake:  No significant decrease in energy intake  Weight Loss:  7.0 - Greater than 5% over 1 month     Body Fat Loss:  Unable to assess,     Muscle Mass Loss:  Unable to assess,    Fluid Accumulation:  1 - Mild, Generalized   Strength:  Not performed     Nutritionally Significant Medications:   Vit C, Albumin, Pepcid, Humalog, NPH, Solumedrol, MVI, Vit D3    Estimated Daily Nutrient Needs:  Energy (kcal): 9068-2490 (25-28 kcal/kg); Weight Used for Energy Requirements: Current (87 kg)  Protein (g): 122-138 (1.5-1.7g/kg); Weight Used for Protein Requirements:    Fluid (ml/day): 1 mL/kcal; Method Used for Fluid Requirements: 1 ml/kcal    Nutrition Related Findings:       BM: 1/11 (FMS)  Edema: Generalized anasarca, 2+ BUE, BLE  Wounds:  None       Current Nutrition Therapies:   Diet: NPO  Tube Feeding: Glucerna 1.5 @ 60 ml/hr with 2 packets Prosource daily and 150 ml water flush q 4 hr  Additional Caloric Sources:  None    Anthropometric Measures:  · Height:  5' 6\" (167.6 cm)  · Current Body Wt:  95.1 kg (209 lb 10.5 oz)   · Admission Body Wt:  193 lb 2 oz     · Ideal Body Wt:  142:  147.6 %    · BMI Categories:  Obese class 1 (BMI 30.0-34. 9)     Wt Readings from Last 10 Encounters:   01/11/22 95.1 kg (209 lb 10.5 oz)   11/30/21 86.9 kg (191 lb 9.3 oz)       Nutrition Diagnosis:   · Inadequate protein-energy intake related to altered GI function as evidenced by  (inadequate enteral nutrition infusion. )-resolved. · Altered GI function related to catabolic illness as evidenced by constipation resolved. Nutrition Interventions:   Food and/or Nutrient Delivery: Modify tube feeding  Nutrition Education and Counseling: No recommendations at this time  Coordination of Nutrition Care: Continue to monitor while inpatient,Interdisciplinary rounds    Goals:  EN to meet at least 85% estimated needs x 5-7 days. Nutrition Monitoring and Evaluation:   Behavioral-Environmental Outcomes: None identified  Food/Nutrient Intake Outcomes: Enteral nutrition intake/tolerance  Physical Signs/Symptoms Outcomes: Fluid status or edema,Weight,GI status,Biochemical data    Discharge Planning:     Too soon to determine     Citlali Walker RD [facility]  Contact: [employer]

## 2022-01-11 NOTE — PROGRESS NOTES
[facility]    ICU TEAM Progress Note    Name: Shireen Santana   : 1975   MRN: [id number]   Date: 2021      Subjective:   Progress Note: 2021      Mr Izabela Cyr is a 54 yo male with a PMH of DM2, HTN and obesity admitted on  to [facility] for COVID-19 pneumonia. He was treated with tocilizumab and steroids. He was not vaccinated. He decompensated and was intubated and . He was paralyzed, proned/supinated. He was transferred to [facility] on  to be evaluated for possible ECMO. CVC and arterial line placed at [facility] on . Nimbex was turned off . He was weaned to 60% Fio2 as of 12/3. Proning/supinating therapy continued. He continued to demonstrated fever. He was started on cefepime/fluconazole on 12/3 with vanc added .      his FIo2 increased to 70% and decreased back to 60% on . CVC changed to PICC on  as blood culture had been positive for 4/4 S epi on 12/3 (cleared in culture on ). He was on vanc for 2 weeks, until . Changed to cefazolin on 12/15. Attempts were made to decrease sedation on , but this was not successful due to increased agitation. Due to this, seroquel was added on . Enteral oxycodone and valium were also added on . His versed was weaned from 19mg/h to 17/mg/h on  pm.  His versed was further weaned on  as his seroquel was increased. : Versed is at 9. He continues on the fentanyl infusion. He has been afebrile overnight. : Versed gtt 7 and fentanyl gtt at 150. Vent weaned to 65% and continued PEEP of 12. Opens eyes and moves ext spontaneously. But does not follow commands, track with eyes, or WD to pain. NGT clogged, will replace today. Completed cefazolin. Chest xray tomorrow am.    : No acute events overnight. Did not require pronation overnight. Plan for today is to decrease FiO2 to 60% and wean Fentanyl. Mobilize as tolerated. Not following commands.  CHRISTIE spontaneously. 12/24: No acute events overnight, remains on 55% FiO2, PEEP 10. Will continue to wean FiO2 for goal of 50% today, will wean PEEP to 8 if tolerated. SORIANO spontaneously. Continue to wean IV sedation and liberalize PO opiates and benzos to avoid withdraw without oversedating. 12/25: Early this AM about 0600 patient spiked fever 103.3, elevated RR and HR. Given PRN pushes of versed and fentanyl as well as one time dose of rocuronium with some recovery. Chest x-ray obtained does not show obvious pneumothorax, formal read is pending. Will get BLE dopplers to asses for DVT, once stable will send to CT for head and chest CT to rule out neurological causes of fever such as stroke and also PE. Will likely need to resume sedation. Will place ENT consult for tracheostomy. Per respiratory note, patients ETT is having air leaks and suction catheter is difficult to pass, will likely require tube exchange today. Cooling blanket for fever. Levo as needed for support of blood pressure. 12/26: No acute events over the night. Unable to obtain CT scans due to labile BP, risk of travel outweighed benefit of scan overnight. Will attempt to get CT scans today, will add CT abdomen for further evaluation of fevers. Received motrin over the night for T-max 100.9. Remains on levophed infusion at 2 mcg/min for BP support. Labs remain stable, no acute abnormality. Monitor tube feed residuals, they were held for a few hours yesterday for high gastric residual, likely secondary to sedative medications decreasing gastric motility, resume feeds today. Increase rate of feeds as tolerated. Plan to re-test for COVID on 12/29. No issues with ET tube over the night, will hold off on tube exchange. ENT consultation for trach. Discussed with wife yesterday. Continue to prone for PF ratio less than 150, aggressive pulmonary hygiene. 12/27: Patient seen. NAD. Fevers noted. MRSA in sputum, antibiotics adjusted.  Continue current management. 12/28: Patient fevers persisted. Fentanyl gtt discontinued and transitioned to dilaudid gtt. ENT evaluated patient, current settings are too high, will have to be re consulted when settings improve. 12/29: patient seen. NAD. ID Following: continue current antimicrobial regimen. Wound care RN management recommendations noted. Tube feedings on hold as gastric residuals were too high. Will continue to trend    12/30: Patient seen. NAD. Patient noted to have 2 bowel movements after previous bm was on 12/17. ABG results reviewed with decision made to prone patient. Further orders per clinical course. 12/31: Early this morning patient placed in prone position. Had episode of tachycardia and desaturation. Given rocuronium 50 mg x1, Nimbex resumed. HR remained in the 150's, was given metoprolol now with better rate control. Resume propofol to ensure sedation with paralytic. Stop PO benzos and opiates while on IV. Still with elevated temp, up to 103, continue micafungin, vancomycin. ID following. Net positive 3.5 L fluid volume since admit, net negative over past 24 hours. 1/1/2022: Patient continues with intermittent fevers. Paired blood and sputum cultures ordered to be drawn today. No obvious bacterial infection source, rule out drug fevers as well, could also be viral fever related to ongoing COVID-19 infection. He has required increase in his pressor support, currently on 22 mcg/min levophed for BP management. ABG also slightly worse this morning with partially compensated metabolic acidosis. Lactic acid increased to 3.5. Renal function remains okay. We will keep tight control of BP and glucose to preserve renal function. Increase in WBC to 21.8 today, will trend cultures, could be reactive. Liver enzymes stable. Continue Vanc for MRSA in sputum. Continue to closely monitor, course is concerning as he was going down on oxygen requirements prior, and now seems to have hit a plateau.  We will continue aggressive measures, pronation, and monitor labs closely. Start reglan for tube feed residuals. Replete potassium. 1/2/2022: No acute events overnight. Placed in supine position at 0515, tolerated well. Currently on 80% FiO2 and PEEP 15. Levophed at 17. Goal is to wean vent today to 70% if tolerated. Continue aggressive pulmonary hygiene. Fevers improving, T-max 99.7. Continue to maintain blood glucose 140-180. Replete albumin. Procalcitonin 2.9 likely secondary to MRSA pneumonia, optimize antimicrobial therapy. Blood cultures with no growth at 17 hours, sputum with GPC in clusters, likely still MRSA. 1/3/2022: No acute events over the night. We were able to decrease FiO2 to 75% yesterday with good ABG following. Continue to wean oxygen with goal of 70% today. PEEP 14. Continue with aggressive pulmonary hygiene, HOB greater than 30 degrees. T-max 99.2, showing some improvement. MRSA still growing from sputum cultures drawn 1/1/22.     1/4/2022: No acute events over the night. Remains on 70% FiO2 PEEP 14, sats 93%, will likely not titrate down today. WBCs downtrending. Potassium repleted. Triglycerides 876, transition off propofol and change his sedation regimen today. Continue to prone 16 hours for PF ratio less than 150. If unable to wean O2 today, linezolid added for MRSA pneumonia. Continue with PROM, mobilization, aggressive pulmonary hygiene, elevate HOB, oral care. Will hold nimbex for now until sedation optimized. 1/5/2022: No acute events overnight, remained proned overnight. Vent settings FiO2 80%, peep 14, PaO2/FiO2 ratio 89. Plan to supinate today at 10am.  Will check ABG at 5pm and re-prone if PaO2/FiO2 ratio still less then 150.    1/6/21 No acute events overnight. Tmax 102. 3. PaO2/FiO2 106, plan to prone today. 1/7/22  Supinated at 0400. Pplat 39 on 75/14/22/500. tmax 102.9 refractory. 1/8/22 Diuresed - 3.7 L with improved Cr, LFTs, and oxygenation    1/9/22:  This AM patient having bouts of Atrial Fib/flutter with rates into the 170s, given 2.5mg IV metoprolol with some decrease in rate initially, the rate began to increase again and patient was initiated on Amiodarone IV infusion for rate control, his rate remained high and he was given 150 mg IV bolus. He has since converted back to normall sinus rhythm and is currently in the 70s. BP remained stable. WBC stable. Potassium and phos repleted with re-check this afternoon. Fluid balance is net positive 2.7 L since admission, negative 750 over the past 24 hours. Remains on precedex, dilaudid, versed. Norepi off since [phone] 1/8/22. Tmax 100.7. Remains on linezolid for MRSA in sputum. Will attempt to call wife today at some point if time permits for updates and to answer any questions she may have. 1/10/22: No acute events overnight. Patient remains in normal sinus rhythm at this time. Current rate 94 bpm.  Current blood pressure 146/79. Remains intubated and sedated on mechanical ventilation. Current ventilator settings are respiratory rate of 24, pressure control 22, 80% FiO2, PEEP of 14. Remains on amiodarone drip at 0.5 mg/min. Dexmedetomidine at 0.4 mcg/kg/h, hydromorphone at 4 mg/h, midazolam at 16 mg/h, currently on no pressors. Slight addendum to yesterday's update, the patient was not on dopamine at any point. It appears as though this order was placed on the wrong patient. Was never started for Mr. Heidi Cockayne. Potassium today is 3.5. Will replete. Hemoglobin 6.9. We will continue to trend this. Will not initiate PRBC transfusion at this time due to risk of fluid overload and increasing respiratory difficulty. Blood pressure remains stable. WBCs down slightly since yesterday. Today they are 23.9. As mentioned hemoglobin 6.9. Chemistry with stable renal function. BUN 33, creatinine 0.82. Sodium 136.   Patient fluid volume status, +2722 mL since admission, patient was net -1.1 L over the last 24 hours.    1/11/2022: No acute events overnight. Patient received iron infusion yesterday and Hgb showed increase this morning. Will check iron levels today and replete again if needed. Renal function has also slightly improved this AM with creatinine of 0.77 and BUN 29. ABG shows PaO2 60 on 85% FiO2. P/F ratio 70, still consistent with severe ARDS. Fluid balance is negative 978.7 mL over the past 24 hours. T-max 99.7. Will resend sputum culture today since we are one week into Linezolid therapy. Will also repeat COVID PCR today, results will likely take 48 hours or so to come back with new surge causing some back up in lab. Active Problem List:     Problem List  Date Reviewed: 11/28/2021          Codes Class    COVID ICD-10-CM: U07.1  ICD-9-CM: 079.89         Acute hypoxemic respiratory failure due to COVID-19 [facility]) ICD-10-CM: U07.1, J96.01  ICD-9-CM: 518.81, 079.89, 799.02         Pneumonia due to COVID-19 virus ICD-10-CM: U07.1, J12.82  ICD-9-CM: 480.8, 079.89         Hyperglycemia due to type 2 diabetes mellitus (HCC) ICD-10-CM: E11.65  ICD-9-CM: 250.00         Primary hypertension ICD-10-CM: I10  ICD-9-CM: 401.9         Hyponatremia ICD-10-CM: E87.1  ICD-9-CM: 276.1         Lactic acidosis ICD-10-CM: E87.2  ICD-9-CM: 276.2         Hyperlipidemia ICD-10-CM: E78.5  ICD-9-CM: 272.4         Obesity (BMI 30-39. 9) ICD-10-CM: E66.9  ICD-9-CM: 278.00         COVID-19 vaccination not done ICD-10-CM: Z28.9  ICD-9-CM: V64.00               Past Medical History:      has a past medical history of COVID-19, Diabetes ([address] Hyperlipidemia, and Hypertension. Past Surgical History:      has a past surgical history that includes hx orthopaedic (Right). Home Medications:     Prior to Admission medications    Medication Sig Start Date End Date Taking?  Authorizing Provider   cisatracurium (NIMBEX) IV infusion 0-0.869 mg/min by IntraVENous route TITRATE. 12/1/21  Yes John Kapoor MD   enoxaparin (LOVENOX) 100 mg/mL 90 mg by SubCUTAneous route every twelve (12) hours every twelve (12) hours. 12/1/21  Yes Tonya Li MD   insulin glargine (LANTUS) 100 unit/mL injection 24 Units by SubCUTAneous route nightly. Indications: type 2 diabetes mellitus 12/1/21  Yes Tonya Li MD   midazolam in normal saline (VERSED) 1 mg/mL soln 0-10 mg/hr by IntraVENous route TITRATE. Max Daily Amount: 240 mg. 12/1/21  Yes Tonya Li MD   Norepinephrine Bitartrate (LEVOPHED) 8 mg/250 mL (32 mcg/mL) soln 0.5-16 mcg/min by IntraVENous route TITRATE. 12/1/21  Yes Tonya Li MD   ascorbic acid, vitamin C, (VITAMIN C) 500 mg tablet Take 1 Tablet by mouth two (2) times a day. 12/1/21   Tonya Li MD   cholecalciferol (VITAMIN D3) (1000 Units /25 mcg) tablet Take 2 Tablets by mouth daily. 12/1/21   Tonya Li MD   melatonin, rapid dissolve, 5 mg TbDi tablet Take 1 Tablet by mouth nightly. 12/1/21   Tonya Li MD   atorvastatin (LIPITOR) 20 mg tablet Take 20 mg by mouth daily. Provider, Historical   telmisartan (MICARDIS) 80 mg tablet Take 80 mg by mouth daily. Indications: high blood pressure    Provider, Historical       Allergies/Social/Family History:      Allergies   Allergen Reactions    Alupent [Metaproterenol] Swelling      Social History     Tobacco Use    Smoking status: Never Smoker    Smokeless tobacco: Not on file   Substance Use Topics    Alcohol use: Yes     Comment: drinksonce a week      Family History   Problem Relation Age of Onset   Marie Diabetes Mother     Hypertension Mother     Stroke Mother     Hodgkin's lymphoma Mother     Diabetes Father     Hypertension Father     Cystic Fibrosis Father     Diabetes Maternal Aunt     Diabetes Maternal Uncle        Review of Systems:     Unable to assess due to pt condition    Objective:   Vital Signs:  Visit Vitals  BP (!) 134/98   Pulse 77   Temp 99.6 °F (37.6 °C)   Resp 24   Ht 5' 6\" (1.676 m)   Wt 95.1 kg (209 lb 10.5 oz)   SpO2 92%   BMI 33.84 kg/m²    O2 Flow Rate (L/min): 60 l/min O2 Device: Endotracheal tube,Ventilator Temp (24hrs), Av.7 °F (37.6 °C), Min:99.4 °F (37.4 °C), Max:99.8 °F (37.7 °C)           Intake/Output:     Intake/Output Summary (Last 24 hours) at 2022 0811  Last data filed at 2022 9972  Gross per 24 hour   Intake 2476.64 ml   Output 3275 ml   Net -798.36 ml       Limited Physical Exam:    General:  Sedated/intubated/RASS -3   Eye: Pupils are round and reactive bilaterally  Neurologic: no neuro response illicited   Neck: Supple, no JVD  Lungs: On mechanical ventilation; bilateral rhonchi  Heart: RRR, 2+ pulses, 1+ edema of BLE   Abdomen:Soft, nontender, nondistended  Skin:  No rash or lesion      LABS AND  DATA: Personally reviewed  Recent Labs     01/11/22  0337 01/10/22  1508 01/10/22  0236 01/10/22  0236   WBC 26.8*  --   --  23.9*   HGB 7.2* 7.3*   < > 6.9*   HCT 23.7* 23.6*   < > 22.8*     --   --  386    < > = values in this interval not displayed. Recent Labs     01/11/22  0337 01/10/22  0236   * 136   K 3.9 3.5   CL 91* 94*   CO2 36* 37*   BUN 29* 33*   CREA 0.77 0.82   * 109*   CA 9.5 8.7   MG 2.9* 2.7*   PHOS 3.0 3.0     Recent Labs     01/11/22  0337 01/10/22  0236   * 198*   TP 7.4 7.5   ALB 4.1 4.1   GLOB 3.3 3.4     No results for input(s): INR, PTP, APTT, INREXT, INREXT in the last 72 hours. Recent Labs     22  0603   PHI 7.44   PCO2I 50.2*   PO2I 67*   FIO2I 75     No results for input(s): CPK, CKMB, TROIQ, BNPP in the last 72 hours. Ventilator Settings:  Mode Rate Tidal Volume Pressure FiO2 PEEP   Pressure control,Assist control   0 ml  0 cm H2O 85 % 14 cm H20     Peak airway pressure: 37 cm H2O    Minue ventilation: 9.63 l/min      MEDS: Reviewed    Chest X-Ray: personally reviewed and report checked    · TTE: : LV: Estimated LVEF is 50 - 55%. Normal cavity size, wall thickness and diastolic function. Low normal systolic function.     Assessment and Plan     [facility] by Severe ARDS + MRSA pneumonia: Received tocilizumab. - Continue steroids  - Continue mechanical ventilation   - continue dex/versed/dilaudid for sedation   Versed 16 - wean as able, would not wean more than 2mg/day   Dex 0.5   Dilaudid 4  - Would not continue proning due to futility   -May consider diuresis later today. - linezoid/meropenum, being followed by ID    Progressive Oliguria  - Bun and Cr improved        HLD: Continue statin    DM2 c/b hyperglycemia:   - increase NPH to 20 q 12hrs   - increase SSI q4h and scale adjusted      Hypotension: Possibly related to sedation vs septic shock.   - Resolved- off pressors   - Continue midodrine    Deconditioning: Unable to participate in PT/OT at this time    Multidisciplinary Rounds Completed: Y    ABCDEF Bundle/Checklist  Pain Medications: Dilaudid   Target RASS: 0  Sedation Medications:Versed  CAM-ICU: SILVIA  Mobility:Poor  PT/OT: Unable to participate: PROM  Restraints:Y  Discussed Plan of Care (goals of care): Y  Addressed Code Status: F    CARDIOVASCULAR  Cardiac Gtts: N  SBP Goal of: > 90 mmHg  MAP Goal of: > 65 mmHg  Transfusion Trigger (Hgb): <7 g/dL    RESPIRATORY  Vent Goals:   Optimize PEEP/Ventilation/Oxygenation  DVT Prophylaxis (if no, list reason): Lovenox therapeutic  SPO2 Goal: > 92%  Pulmonary toilet: Duo-Nebs     GI/  Gold Catheter Present: Yes  GI Prophylaxis: Pepcid (famotidine)   Nutrition: Yes restart TF today  IVFs:N  Bowel Movement:Y  Bowel Regimen:Y  Insulin: Y    ANTIBIOTICS  Antibiotics:  None    T/L/D  Tubes: ETT  Lines: PICC  Drains: Gold exchanged on 1/7 for retention     SPECIAL EQUIPMENT  Vent    DISPOSITION  ICU    CRITICAL CARE CONSULTANT NOTE  I had a face to face encounter with the patient, reviewed and interpreted patient data including clinical events, labs, images, vital signs, I/O's, and examined patient.   I have discussed the case and the plan and management of the patient's care with the consulting services, the bedside nurses and the respiratory therapist.      NOTE OF PERSONAL INVOLVEMENT IN CARE   This patient has a high probability of imminent, clinically significant deterioration, which requires the highest level of preparedness to intervene urgently. I participated in the decision-making and personally managed or directed the management of the following life and organ supporting interventions that required my frequent assessment to treat or prevent imminent deterioration. I personally spent 55 minutes of critical care time. This is time spent at this critically ill patient's bedside actively involved in patient care as well as the coordination of care and discussions with the patient's family. This does not include any procedural time which has been billed separately.             Fatoumata Leal [facility]     Critical Care Medicine  [facility]

## 2022-01-11 NOTE — PROGRESS NOTES
ID Progress Note  2022    Subjective: Intubated it  O2 Sat around 80% with full ventilator support    Objective:     Antibiotics:  1. Vancomycin --, 2021-2022  2. zyvox -  3. Cefepime 12/3-2021  4. Ceftriaxone --2021  5. Zosyn 2022-2022  6. Merrem 2022-  7. Eraxis 2021-1/3/2022    Vitals:   Visit Vitals  BP (!) 134/98   Pulse 77   Temp 99.6 °F (37.6 °C)   Resp 24   Ht 5' 6\" (1.676 m)   Wt 95.1 kg (209 lb 10.5 oz)   SpO2 92%   BMI 33.84 kg/m²        Tmax:  Temp (24hrs), Av.7 °F (37.6 °C), Min:99.4 °F (37.4 °C), Max:99.8 °F (37.7 °C)      Exam:    Intubated it  tachycardia without RMG  Clear breath sounds in apex with decreased breath sounds at bases  abd soft with (+) bowel sounds  Gold cath in place; draining straw color urine with sediment  Unable to assess insight  All extremity edematous      Labs:      Recent Labs     22  0337 01/10/22  1508 01/10/22  0236 22  1500 22  0402 22  1723   WBC 26.8*  --  23.9*  --  26.8*  --    HGB 7.2* 7.3* 6.9*  --  7.2*  --      --  386  --  421*  --    BUN 29*  --  33* 36* 35* 37*   CREA 0.77  --  0.82 0.97 1.05 1.14   *  --  198*  --  228*  --    TBILI 0.5  --  0.5  --  0.5  --        Cultures:     No results found for: SD  Lab Results   Component Value Date/Time    Culture result: NO GROWTH 5 DAYS 2022 01:59 PM    Culture result: NO GROWTH 5 DAYS 2022 10:55 AM    Culture result: (A) 2022 10:55 AM     HEAVY * METHICILLIN RESISTANT STAPHYLOCOCCUS AUREUS * (KNOWN MRSA PATIENT)    Culture result: FEW NORMAL RESPIRATORY TAMARA 2022 10:55 AM     : Spoke with the pt's spouse for 27 minutes, updated clinical information and current medication therapy. Mrs. Stafford Dragna asked whether he could get Ivermectin, replied as that will not be one of our recommended therapy.  She asked what other things that we can offer if all current therapy fails, I asked her to think about goals of care. I addressed and answered all the questions that raised by pt's spouse. Assessment:     1. ARDS secondary to COVID 19 PNA, prolonged intubation 11/28/2021  2. Severe COVID pneumonia--Pneumococcus from respiratory culture; completed the therapy   3. MRSA pneumonia (sputum cx 1/1/2022 - MRSA)  4. VDRF  5. Hypoxic respiratory failure  6.  Bacteremia--S epi--line has been changed, completed the ABX therapy on 12/20 with ancef            Objective:     Afebrile, blood cx (1/5) no growth   WBC 26.8  procal 2.9->1.5 (1/5)  Lactic acid 2.6  continue with IV zosyn and zyvox   Overall poor prognosis     Above plan of care discussed and agreed with Dr. Loreto Sepulveda, NP

## 2022-01-11 NOTE — PROGRESS NOTES
01/11/22 1500   Nitric Oxide   PPM NO Set 40   PPM NO Observed 39       Nitric initiated per MD order

## 2022-01-11 NOTE — CONSULTS
Palliative Medicine Consult  [place]: [phone] ([phone]    Patient Name: Luisana Ellsworth  YOB: 1975    Date of Initial Consult: 1/11/2022  Reason for Consult: care decisions/ support  Requesting Provider: Zuleima Lung  Primary Care Physician: Javi Klein DO     SUMMARY:   Luisana Ellsworth is a 55 y.o. with a past history of Type 2 DM, HTN,obesity,unvaccinated COVID+ symptoms  on 11/14/21 , who was admitted on 12/1/2021 from [facility] hospital transfer with a diagnosis of COVID19 pneumonia/ ARDS s/p intubation. Current medical issues leading to Palliative Medicine involvement include: Prolonged hospital course from ARDS/ COVID19 pneumonia, remains in ICU intubated on hi vent settings (85% FIO2, P/F ratio 70 c/w severe ARDS). See ICU summary of ICU events. Currently on linezolid for MRSA, still COVID+ on 1/6/22. Patient is  about 13 years to wife, Steven De La Cruz [phone]. 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 Griselda Hernandez also works at [employer]. They have two young children at home, ages 11 and 9. Patient has two younger brothers that live in [facility] also. PALLIATIVE DIAGNOSES:   1. ARDS due to COVID19 pneumonia  2. MRSA in sputum  3. DM type 2  4. High BMI  5. anemia  6. Prolonged intubation since 11/28/21.   7. High risk of death, poor prognosis for recovery. 8. Palliative medicine encounter       PLAN:   1. Discussed with ICU team.  2. Call placed to wife, Steven De La Cruz. 1. Introduced palliative medicine services as added layer of support in chronic illness. 2. Hear a bit about long journey of illness since he first got symptoms 11/14/21. 3. She has accurate understanding of his current situation and has been getting daily updates from ICU nurses. 4. Medical update provided (iron given yesterday, COVID test pending, still on high level of vent support)  5.  We understand his goals of fighting this, as she reports he stated when he agreed to get intubated. 6. Also, discuss concerns of team at how long he's been intubated/ ventilated without good gains, some setbacks with secondary infections. Not stable enough for trach/ transfer to rehab at this time. 7. Our team is resource to gather family and sit discuss \"what ifs\" if this is helpful. We look to her for timing of this, sometimes this is helpful/ sometimes family not wanting to hear \"what ifs\". Indicate that if he has more setbacks like renal failure or ongoing infections, then may be critical to discuss how best to care for him going forward. At any time, she has ability to consider comfort focused care, we know she doesn't want that right now and team continues to do everything to support his recovery. 8. Our contact information given, please call if we can assist with family gathering information/ discussing next steps, or other needs. Let her know our team will follow peripherally, but she can reach out to our team anytime. GOALS OF CARE / TREATMENT PREFERENCES:     GOALS OF CARE:  Patient/Health Care Proxy Stated Goals: Rehabilitation    TREATMENT PREFERENCES:   Code Status: Full Code    Patient and family's personal goals include: recovery from acute illness    Important upcoming milestones or family events: The patient identifies the following as important for living well: unable to answer      Advance Care Planning:  [] The [facility] Interdisciplinary Team has updated the ACP Navigator with [address] and Patient Capacity      Advance Care Planning 12/1/2021   Confirm Advance Directive None   Patient Would Like to Complete Advance Directive Unable       Medical Interventions: Full interventions       Other:    As far as possible, the palliative care team has discussed with patient / health care proxy about goals of care / treatment preferences for patient.      HISTORY:     History obtained from: chart, wife  CHIEF COMPLAINT: transferred for ECMO eval    HPI/SUBJECTIVE:    The patient is:   [] Verbal and participatory  [x] Non-participatory due to:     55year old male transferred for [facility]    Clinical Pain Assessment (nonverbal scale for severity on nonverbal patients):   Clinical Pain Assessment  Severity: 0     Activity (Movement): Lying quietly, normal position    Duration: for how long has pt been experiencing pain (e.g., 2 days, 1 month, years)  Frequency: how often pain is an issue (e.g., several times per day, once every few days, constant)     FUNCTIONAL ASSESSMENT:     Palliative Performance Scale (PPS):  PPS: 10       PSYCHOSOCIAL/SPIRITUAL SCREENING:     Palliative IDT has assessed this patient for cultural preferences / practices and a referral made as appropriate to needs (Cultural Services, Patient Advocacy, Ethics, etc.)    Any spiritual / Congregation concerns:  [] Yes /  [x] No   If \"Yes\" to discuss with pastoral care during IDT     Does caregiver feel burdened by caring for their loved one:   [] Yes /  [x] No /  [] No Caregiver Present    If \"Yes\" to discuss with social work during IDT    Anticipatory grief assessment:   [x] Normal  / [] Maladaptive     If \"Maladaptive\" to discuss with social work during IDT    ESAS Anxiety:      ESAS Depression:          REVIEW OF SYSTEMS:     Positive and pertinent negative findings in ROS are noted above in HPI. The following systems were [x] reviewed / [] unable to be reviewed as noted in HPI  Other findings are noted below. Systems: constitutional, ears/nose/mouth/throat, respiratory, gastrointestinal, genitourinary, musculoskeletal, integumentary, neurologic, psychiatric, endocrine. Positive findings noted below.   Modified ESAS Completed by: provider           Pain: 0           Dyspnea: 0           Stool Occurrence(s): 1        PHYSICAL EXAM:     From RN flowsheet:  Wt Readings from Last 3 Encounters:   01/11/22 95.1 kg (209 lb 10.5 oz)   11/30/21 86.9 kg (191 lb 9.3 oz)     Blood pressure (!) 133/99, pulse (!) 103, temperature 99.6 °F (37.6 °C), resp. rate 24, height 5' 6\" (1.676 m), weight 95.1 kg (209 lb 10.5 oz), SpO2 (!) 87 %. Pain Scale 1: Adult Nonverbal Pain Scale  Pain Intensity 1: 0     Pain Location 1:  (gege)        Pain Intervention(s) 1: Medication (see MAR)  Last bowel movement, if known:     Constitutional: seen through ICU window. Intubated, sedated on vent      HISTORY:     Active Problems:    COVID (12/1/2021)      Past Medical History:   Diagnosis Date    COVID-19     Diabetes ([address]     Hyperlipidemia     Hypertension       Past Surgical History:   Procedure Laterality Date    HX ORTHOPAEDIC Right     wrist surgery      Family History   Problem Relation Age of Onset    Diabetes Mother     Hypertension Mother     Stroke Mother     Hodgkin's lymphoma Mother     Diabetes Father     Hypertension Father     Cystic Fibrosis Father     Diabetes Maternal Aunt     Diabetes Maternal Uncle       History reviewed, no pertinent family history.   Social History     Tobacco Use    Smoking status: Never Smoker    Smokeless tobacco: Not on file   Substance Use Topics    Alcohol use: Yes     Comment: drinksonce a week     Allergies   Allergen Reactions    Alupent [Metaproterenol] Swelling      Current Facility-Administered Medications   Medication Dose Route Frequency    amiodarone (CORDARONE) 375 mg/250 mL D5W infusion  0.5-1 mg/min IntraVENous TITRATE    insulin NPH (NOVOLIN N, HUMULIN N) injection 45 Units  45 Units SubCUTAneous Q12H    insulin lispro (HUMALOG) injection   SubCUTAneous Q4H    albumin human 25% (BUMINATE) solution 25 g  25 g IntraVENous Q6H    white petrolatum-mineral oiL (AKWA TEARS) 83-15 % ophthalmic ointment   Both Eyes Q12H    meropenem (MERREM) 500 mg in sterile water (preservative free) 10 mL IV syringe  0.5 g IntraVENous Q6H    methylPREDNISolone (PF) (SOLU-MEDROL) injection 60 mg  60 mg IntraVENous DAILY    dexmedeTOMidine in 0.9 % NaCl (PRECEDEX) 400 mcg/100 mL (4 mcg/mL) infusion soln  0.1-1.5 mcg/kg/hr IntraVENous TITRATE    cholecalciferol (VITAMIN D3) (1000 Units /25 mcg) tablet 6,000 Units  6,000 Units Per NG tube DAILY    enoxaparin (LOVENOX) injection 90 mg  1 mg/kg SubCUTAneous Q12H    linezolid in dextrose 5% (ZYVOX) IVPB premix in D5W 600 mg  600 mg IntraVENous Q12H    HYDROmorphone (PF) 25 mg/50 mL (0.5 mg/mL) infusion  0-4 mg/hr IntraVENous TITRATE    NOREPINephrine (LEVOPHED) 8 mg in 5% dextrose 250mL (32 mcg/mL) infusion  0.5-30 mcg/min IntraVENous TITRATE    midodrine (PROAMATINE) tablet 20 mg  20 mg Oral Q8H    [Held by provider] diazePAM (VALIUM) tablet 10 mg  10 mg Per NG tube Q6H    [Held by provider] oxyCODONE IR (ROXICODONE) tablet 10 mg  10 mg Per NG tube Q6H    famotidine (PEPCID) 40 mg/5 mL (8 mg/mL) oral suspension 20 mg  20 mg Per NG tube Q12H    polyethylene glycol (MIRALAX) packet 17 g  17 g Oral DAILY PRN    zinc oxide-cod liver oil (DESITIN) 40 % paste   Topical Q12H    midazolam (VERSED) injection 5 mg  5 mg IntraVENous Q1H PRN    alteplase (CATHFLO) 1 mg in sterile water (preservative free) 1 mL injection  1 mg InterCATHeter PRN    bacitracin 500 unit/gram packet 1 Packet  1 Packet Topical PRN    therapeutic multivitamin (THERAGRAN) tablet 1 Tablet  1 Tablet Oral DAILY    dextrose (D50W) injection syrg 12.5-25 g  12.5-25 g IntraVENous PRN    ascorbic acid (vitamin C) (VITAMIN C) tablet 500 mg  500 mg Oral DAILY    [Held by provider] atorvastatin (LIPITOR) tablet 20 mg  20 mg Oral DAILY    SALINE PERIPHERAL FLUSH Q8H soln 5-40 mL  5-40 mL InterCATHeter Q8H    saline peripheral flush soln 5-40 mL  5-40 mL InterCATHeter PRN    acetaminophen (TYLENOL) solution 650 mg  650 mg Per NG tube Q6H PRN    midazolam in normal saline (VERSED) 1 mg/mL infusion  0-20 mg/hr IntraVENous TITRATE    balsam peru-castor oiL (VENELEX) ointment   Topical BID    glucose chewable tablet 16 g  4 Tablet Oral PRN    glucagon (GLUCAGEN) injection 1 mg  1 mg IntraMUSCular PRN    chlorhexidine (PERIDEX) 0.12 % mouthwash 15 mL  15 mL Oral Q12H     Facility-Administered Medications Ordered in Other Encounters   Medication Dose Route Frequency    lidocaine (PF) (XYLOCAINE) 20 mg/mL (2 %) injection   IntraVENous PRN    propofoL (DIPRIVAN) 10 mg/mL injection   IntraVENous PRN    succinylcholine (ANECTINE) 20 mg/mL syringe   IntraVENous PRN    rocuronium injection   IntraVENous PRN          LAB AND IMAGING FINDINGS:     Lab Results   Component Value Date/Time    WBC 26.8 (H) 01/11/2022 03:37 AM    HGB 7.2 (L) 01/11/2022 03:37 AM    PLATELET 369 27/38/0745 03:37 AM     Lab Results   Component Value Date/Time    Sodium 134 (L) 01/11/2022 03:37 AM    Potassium 3.9 01/11/2022 03:37 AM    Chloride 91 (L) 01/11/2022 03:37 AM    CO2 36 (H) 01/11/2022 03:37 AM    BUN 29 (H) 01/11/2022 03:37 AM    Creatinine 0.77 01/11/2022 03:37 AM    Calcium 9.5 01/11/2022 03:37 AM    Magnesium 2.9 (H) 01/11/2022 03:37 AM    Phosphorus 3.0 01/11/2022 03:37 AM      Lab Results   Component Value Date/Time    Alk.  phosphatase 193 (H) 01/11/2022 03:37 AM    Protein, total 7.4 01/11/2022 03:37 AM    Albumin 4.1 01/11/2022 03:37 AM    Globulin 3.3 01/11/2022 03:37 AM     Lab Results   Component Value Date/Time    INR 1.1 12/25/2021 04:20 AM    Prothrombin time 11.8 (H) 12/25/2021 04:20 AM    aPTT 48.7 (H) 12/27/2021 06:50 AM    aPTT 27.9 12/01/2021 04:10 AM      Lab Results   Component Value Date/Time    Ferritin 1,831 (H) 12/01/2021 04:10 AM      Lab Results   Component Value Date/Time    pH 7.43 01/10/2022 02:23 PM    PCO2 59 (H) 01/10/2022 02:23 PM    PO2 60 (L) 01/10/2022 02:23 PM     No components found for: [place]   Lab Results   Component Value Date/Time    CK 99 11/29/2021 04:46 AM    CK - MB <1.0 11/29/2021 04:46 AM                Total time: 70min  Counseling / coordination time, spent as noted above: 45  > 50% counseling / coordination?: yes    Prolonged service was provided for  []30 min   []75 min in face to face time in the presence of the patient, spent as noted above. Time Start:   Time End:   Note: this can only be billed with 60846 (initial) or 74838 (follow up). If multiple start / stop times, list each separately.

## 2022-01-11 NOTE — PROGRESS NOTES
1930 Bedside, Verbal and Written shift change report given to Sherrie Essex, RN (oncoming nurse) by Fazal Pitts RN (offgoing nurse). Report included the following information SBAR, Kardex, ED Summary, Intake/Output, MAR, Recent Results, Cardiac Rhythm NSR/ST, Alarm Parameters  and Dual Neuro Assessment. 2000 pt remains intubated on precedex, dilaudid, Amio, and versed gtts. No acute distress. 0700 No changes or acute issues during this shift     0730 Bedside, Verbal and Written shift change report given to Ana Trinidad RN  (oncoming nurse) by Sherrie Essex, RN (offgoing nurse). Report included the following information SBAR, Kardex, Intake/Output, MAR, Recent Results, Cardiac Rhythm NSR/ST, Alarm Parameters , Quality Measures and Dual Neuro Assessment.

## 2022-01-11 NOTE — PROGRESS NOTES
talked with staff for update. Please contact [phone] [address] [facility] for further support.      [address] Apolinar Holley, MACE   [phone]

## 2022-01-11 NOTE — PROGRESS NOTES
Palliative Medicine      Code Status: Full Code    Advance Care Planning:  Advance Care Planning 12/1/2021   Confirm Advance Directive None, patient's spouse is LNOK   Patient Would Like to Complete Advance Directive Unable     Patient / Family Encounter Documentation    Participants (names): Dr. Haydee Sanchez (spouse)    Narrative: The Palliative Medicine SW along with Dr. Eula Gonzalez called and spoke with the patient's spouse/LNBRO Kraus in order to introduce the role of Palliative Medicine and discuss the patient's care. Palliative Medicine provided update on the patient's condition- Shandra Trotter is also well-informed, she shared that she calls usually 2x/day to get updates from nursing staff, etc., and she also tries to ZOOM the patient frequently. Shandra Been provided history on the patient, Palliative Medicine discussed his critical illness and prolonged hospital stay- discussed the option to talk about \"what ifs\" and concern that patient has not made significant improvement and prolonged intubation on ventilator and inability to receive tracheostomy at this time. The patient's spouse is not ready to discuss \"what ifs,\" she shares that it is \"scary to think about what ifs\" and that the last conversation she had with the patient at [facility] was that he was \"going to come out of this\" and wanted to [place]. \" Shandra Trotter shares that she and family are trying to remain positive, and that her and family are hoping he will continue to fight and recover from his current illness. Palliative Medicine discussed how critically ill the patient is, and overall he has not made improvements- Benld Been shared that she thought the patient was making improvements up until recently due to infection- SW explained that he has had periods of making gains, but overall big picture has not made the progress that we would hope to see.  Palliative Medicine also discussed that if he were to show multi-system organ failure, I.e. needing dialysis, would recommend discussing the next steps and \"what ifs\" further. Palliative Medicine provided support, goals are clear for restorative measures- Shandra Been shared that if she did make any decisions that it would \"not be in a bubble\" and she would involve the patient's siblings in discussion, but not ready to make any changes or decisions- she verbalizes staying positive that patient will recover from his illness. Palliative Medicine will follow peripherally, Shandra Trotter has contact information for Palliative Medicine if she would like to discuss further or if any concerns or questions arise. Please call our team if we can be further support or assistance in the patient's care. 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 And IT work. The patient has been  to his wife for almost 13 years. The patient's wife reports that she has two children from previous marriage, but all are very close with the patient- they have a 24year old (lives outside of the home), a 11year-old, and a 9year-old at home. The patient also has a 11 month old grandchild. The patient's wife reports that the patient was active and independent- working full time, was taking walks at least 2x/day prior to admission. The patient is the oldest of his siblings- he has two brothers that live in [facility] and are close with the patient. The patient's spouse shares that she has gotten strength and support from family- Shandra Been shares that her parents live close-by and help her with the kids when she needs it, her older sister also has flown into town for ongoing support. She reports having a strong support system from family and friends.      Caregiver [place]: The patient was independent [id number], however, anticipate prolonged  hospitalization has been taxing on the patient's wife- she has been calling 2x daily for updates, also works full-time (for the same company the patient works for), and managing two children at home.      Goals of Care / Plan: Full Code, full restorative measures     Thank you for including Palliative Medicine in the care of [facility]. [place], [place]  [phone]

## 2022-01-12 NOTE — PROGRESS NOTES
TRANSITION OF CARE  RUR--18%  Disposition--TBD. Covid positive. In ICU. FOI2 100. ID following  Transport--TBD. Patient's primary family contact: wife Wenceslao Hart    Per intensivist, remains on NO, sats remained in the upper 90s. WBC decreased to 20.2 today. Hgb stable at 7, iron 59, TIBC 165. No need for additional iron infusion today, will continue to trend. Send sputum for culture. Fluid balance negative 1.7 L over the past 24 hours. Negative 2.2L since admission.

## 2022-01-12 NOTE — PROGRESS NOTES
talked with pt's RN for update and called pt's wife just to check in and support. Rodriguez Corbett did not answer at this time and I left a voicemail. Please contact [phone] Delfin Godinez for further support.      [address] Apolinar Holley, MACE   [phone]

## 2022-01-12 NOTE — WOUND CARE
WOCN Note:     Follow-up visit to re-assess left cheek and left head wounds  Assessed in room 7102    Chart shows:  Patient admitted on 12/1/22 with Covid-19  Past Medical History:   Diagnosis Date    COVID-19     [address] [address]     Hyperlipidemia     Hypertension       1/12/22  WBC = 20.2  Hgb = 7  Glucose = 82    Assessment:   Patient intubated, on paralytic, sedated,   Mobility: total assistance with repositioning  Continence: Gold cath in place, flexiseal placed 1/7/22  Last Jeffrey Score: 10  Surface: Nani isoflex mattress    Bilateral heel, buttocks, and sacral skin intact and without erythema   Heels offloaded with pillows     1. Left cheek medical adhesive related skin injury  Partial thickness   1 x 1.8 x <0.1 cm  Wound bed pink   scant amount serosang exudate  no odor   flat edges  Periwound intact & without erythema   Treatment: Cleansed with saline, hydrocolloid     2. Left side of head behind eye skin tear   Partial thickness   0.5 x 0.2 x 0 cm  Wound bed pink   scant amount serosang exudate  no odor   flat edges  Periwound intact & without erythema   Treatment: petroleum jelly    3. Right distal knee DTI   Blood filled intact blister  1.9 x 2.2 x 0cm  Periwound intact & without erythema  Treatment: Venelex    4. Right proximal knee intact serous filled blister  1 x 0.5 x 0 cm  Periwound intact & without erythema  Treatment: Open to air    5. left knee intact serous filled blister  2.2 x 2 x 0 cm  Periwound intact & without erythema  Treatment: Open to air    Wound Recommendations:      Continue hydrocolloid dressing to left cheek wound    Venelex ointment to knees, elbows, sacrum, heels every 8 hours    Petroleum jelly to left head behind eye every 12 hours    PI Prevention:  Turn/reposition approximately every 2 hours  Offload heels with pillows at all times in bed. Sacral Foam dressing: lift to assess regularly; change as needed. Discontinue if incontinent. Pad bony prominences   Keep HOB 30 degrees or less to decrease shearing and pressure unless medically contraindicated.  If HOB is to be over 30 degrees, raise knees first then [facility] to prevent sliding   Minimize layers of linen/pads under patient to optimize support surface to one flat sheet and one incontinence pad     Orders received from Dr. Elaine Martinez  Discussed with RN, Milan Orona     Transition of Care: Plan to follow weekly and as needed while admitted to hospital    Nazario Wong MSN, RN, [facility]  Certified Wound and Ostomy Nurse  office [phone]  Can be reached through [address] [place] [address]  pager [phone] or call  to page

## 2022-01-12 NOTE — PROGRESS NOTES
[facility]    ICU TEAM Progress Note    Name: Carmen Finn   : 1975   MRN: [id number]   Date: 2021      Subjective:   Progress Note: 2021      Mr Trevin Meléndez is a 56 yo male with a PMH of DM2, HTN and obesity admitted on  to [facility] for COVID-19 pneumonia. He was treated with tocilizumab and steroids. He was not vaccinated. He decompensated and was intubated and . He was paralyzed, proned/supinated. He was transferred to [facility] on  to be evaluated for possible ECMO. CVC and arterial line placed at [facility] on . Nimbex was turned off . He was weaned to 60% Fio2 as of 12/3. Proning/supinating therapy continued. He continued to demonstrated fever. He was started on cefepime/fluconazole on 12/3 with vanc added .      his FIo2 increased to 70% and decreased back to 60% on . CVC changed to PICC on  as blood culture had been positive for 4/4 S epi on 12/3 (cleared in culture on ). He was on vanc for 2 weeks, until . Changed to cefazolin on 12/15. Attempts were made to decrease sedation on , but this was not successful due to increased agitation. Due to this, seroquel was added on . Enteral oxycodone and valium were also added on . His versed was weaned from 19mg/h to 17/mg/h on  pm.  His versed was further weaned on  as his seroquel was increased. : Versed is at 9. He continues on the fentanyl infusion. He has been afebrile overnight. : Versed gtt 7 and fentanyl gtt at 150. Vent weaned to 65% and continued PEEP of 12. Opens eyes and moves ext spontaneously. But does not follow commands, track with eyes, or WD to pain. NGT clogged, will replace today. Completed cefazolin. Chest xray tomorrow am.    : No acute events overnight. Did not require pronation overnight. Plan for today is to decrease FiO2 to 60% and wean Fentanyl. Mobilize as tolerated. Not following commands.  CHRISTIE spontaneously. 12/24: No acute events overnight, remains on 55% FiO2, PEEP 10. Will continue to wean FiO2 for goal of 50% today, will wean PEEP to 8 if tolerated. SORIANO spontaneously. Continue to wean IV sedation and liberalize PO opiates and benzos to avoid withdraw without oversedating. 12/25: Early this AM about 0600 patient spiked fever 103.3, elevated RR and HR. Given PRN pushes of versed and fentanyl as well as one time dose of rocuronium with some recovery. Chest x-ray obtained does not show obvious pneumothorax, formal read is pending. Will get BLE dopplers to asses for DVT, once stable will send to CT for head and chest CT to rule out neurological causes of fever such as stroke and also PE. Will likely need to resume sedation. Will place ENT consult for tracheostomy. Per respiratory note, patients ETT is having air leaks and suction catheter is difficult to pass, will likely require tube exchange today. Cooling blanket for fever. Levo as needed for support of blood pressure. 12/26: No acute events over the night. Unable to obtain CT scans due to labile BP, risk of travel outweighed benefit of scan overnight. Will attempt to get CT scans today, will add CT abdomen for further evaluation of fevers. Received motrin over the night for T-max 100.9. Remains on levophed infusion at 2 mcg/min for BP support. Labs remain stable, no acute abnormality. Monitor tube feed residuals, they were held for a few hours yesterday for high gastric residual, likely secondary to sedative medications decreasing gastric motility, resume feeds today. Increase rate of feeds as tolerated. Plan to re-test for COVID on 12/29. No issues with ET tube over the night, will hold off on tube exchange. ENT consultation for trach. Discussed with wife yesterday. Continue to prone for PF ratio less than 150, aggressive pulmonary hygiene. 12/27: Patient seen. NAD. Fevers noted. MRSA in sputum, antibiotics adjusted.  Continue current management. 12/28: Patient fevers persisted. Fentanyl gtt discontinued and transitioned to dilaudid gtt. ENT evaluated patient, current settings are too high, will have to be re consulted when settings improve. 12/29: patient seen. NAD. ID Following: continue current antimicrobial regimen. Wound care RN management recommendations noted. Tube feedings on hold as gastric residuals were too high. Will continue to trend    12/30: Patient seen. NAD. Patient noted to have 2 bowel movements after previous bm was on 12/17. ABG results reviewed with decision made to prone patient. Further orders per clinical course. 12/31: Early this morning patient placed in prone position. Had episode of tachycardia and desaturation. Given rocuronium 50 mg x1, Nimbex resumed. HR remained in the 150's, was given metoprolol now with better rate control. Resume propofol to ensure sedation with paralytic. Stop PO benzos and opiates while on IV. Still with elevated temp, up to 103, continue micafungin, vancomycin. ID following. Net positive 3.5 L fluid volume since admit, net negative over past 24 hours. 1/1/2022: Patient continues with intermittent fevers. Paired blood and sputum cultures ordered to be drawn today. No obvious bacterial infection source, rule out drug fevers as well, could also be viral fever related to ongoing COVID-19 infection. He has required increase in his pressor support, currently on 22 mcg/min levophed for BP management. ABG also slightly worse this morning with partially compensated metabolic acidosis. Lactic acid increased to 3.5. Renal function remains okay. We will keep tight control of BP and glucose to preserve renal function. Increase in WBC to 21.8 today, will trend cultures, could be reactive. Liver enzymes stable. Continue Vanc for MRSA in sputum. Continue to closely monitor, course is concerning as he was going down on oxygen requirements prior, and now seems to have hit a plateau.  We will continue aggressive measures, pronation, and monitor labs closely. Start reglan for tube feed residuals. Replete potassium. 1/2/2022: No acute events overnight. Placed in supine position at 0515, tolerated well. Currently on 80% FiO2 and PEEP 15. Levophed at 17. Goal is to wean vent today to 70% if tolerated. Continue aggressive pulmonary hygiene. Fevers improving, T-max 99.7. Continue to maintain blood glucose 140-180. Replete albumin. Procalcitonin 2.9 likely secondary to MRSA pneumonia, optimize antimicrobial therapy. Blood cultures with no growth at 17 hours, sputum with GPC in clusters, likely still MRSA. 1/3/2022: No acute events over the night. We were able to decrease FiO2 to 75% yesterday with good ABG following. Continue to wean oxygen with goal of 70% today. PEEP 14. Continue with aggressive pulmonary hygiene, HOB greater than 30 degrees. T-max 99.2, showing some improvement. MRSA still growing from sputum cultures drawn 1/1/22.     1/4/2022: No acute events over the night. Remains on 70% FiO2 PEEP 14, sats 93%, will likely not titrate down today. WBCs downtrending. Potassium repleted. Triglycerides 876, transition off propofol and change his sedation regimen today. Continue to prone 16 hours for PF ratio less than 150. If unable to wean O2 today, linezolid added for MRSA pneumonia. Continue with PROM, mobilization, aggressive pulmonary hygiene, elevate HOB, oral care. Will hold nimbex for now until sedation optimized. 1/5/2022: No acute events overnight, remained proned overnight. Vent settings FiO2 80%, peep 14, PaO2/FiO2 ratio 89. Plan to supinate today at 10am.  Will check ABG at 5pm and re-prone if PaO2/FiO2 ratio still less then 150.    1/6/21 No acute events overnight. Tmax 102. 3. PaO2/FiO2 106, plan to prone today. 1/7/22  Supinated at 0400. Pplat 39 on 75/14/22/500. tmax 102.9 refractory. 1/8/22 Diuresed - 3.7 L with improved Cr, LFTs, and oxygenation    1/9/22:  This AM patient having bouts of Atrial Fib/flutter with rates into the 170s, given 2.5mg IV metoprolol with some decrease in rate initially, the rate began to increase again and patient was initiated on Amiodarone IV infusion for rate control, his rate remained high and he was given 150 mg IV bolus. He has since converted back to normall sinus rhythm and is currently in the 70s. BP remained stable. WBC stable. Potassium and phos repleted with re-check this afternoon. Fluid balance is net positive 2.7 L since admission, negative 750 over the past 24 hours. Remains on precedex, dilaudid, versed. Norepi off since [phone] 1/8/22. Tmax 100.7. Remains on linezolid for MRSA in sputum. Will attempt to call wife today at some point if time permits for updates and to answer any questions she may have. 1/10/22: No acute events overnight. Patient remains in normal sinus rhythm at this time. Current rate 94 bpm.  Current blood pressure 146/79. Remains intubated and sedated on mechanical ventilation. Current ventilator settings are respiratory rate of 24, pressure control 22, 80% FiO2, PEEP of 14. Remains on amiodarone drip at 0.5 mg/min. Dexmedetomidine at 0.4 mcg/kg/h, hydromorphone at 4 mg/h, midazolam at 16 mg/h, currently on no pressors. Slight addendum to yesterday's update, the patient was not on dopamine at any point. It appears as though this order was placed on the wrong patient. Was never started for Mr. Johnnie Garcia. Potassium today is 3.5. Will replete. Hemoglobin 6.9. We will continue to trend this. Will not initiate PRBC transfusion at this time due to risk of fluid overload and increasing respiratory difficulty. Blood pressure remains stable. WBCs down slightly since yesterday. Today they are 23.9. As mentioned hemoglobin 6.9. Chemistry with stable renal function. BUN 33, creatinine 0.82. Sodium 136.   Patient fluid volume status, +2722 mL since admission, patient was net -1.1 L over the last 24 hours.    1/11/2022: No acute events overnight. Patient received iron infusion yesterday and Hgb showed increase this morning. Will check iron levels today and replete again if needed. Renal function has also slightly improved this AM with creatinine of 0.77 and BUN 29. ABG shows PaO2 60 on 85% FiO2. P/F ratio 70, still consistent with severe ARDS. Fluid balance is negative 978.7 mL over the past 24 hours. T-max 99.7. Will resend sputum culture today since we are one week into Linezolid therapy. Will also repeat COVID PCR today, results will likely take 48 hours or so to come back with new surge causing some back up in lab.    1/12/22: No acute events overnight. Remains on NO, sats remained in the upper 90s. WBC decreased to 20.2 today. Hgb stable at 7, iron 59, TIBC 165. No need for additional iron infusion today, will continue to trend. Send sputum for culture. Fluid balance negative 1.7 L over the past 24 hours. Negative 2.2L since admission. Active Problem List:     Problem List  Date Reviewed: 11/28/2021          Codes Class    COVID ICD-10-CM: U07.1  ICD-9-CM: 079.89         Acute hypoxemic respiratory failure due to COVID-19 [facility]) ICD-10-CM: U07.1, J96.01  ICD-9-CM: 518.81, 079.89, 799.02         Pneumonia due to COVID-19 virus ICD-10-CM: U07.1, J12.82  ICD-9-CM: 480.8, 079.89         Hyperglycemia due to type 2 diabetes mellitus (HCC) ICD-10-CM: E11.65  ICD-9-CM: 250.00         Primary hypertension ICD-10-CM: I10  ICD-9-CM: 401.9         Hyponatremia ICD-10-CM: E87.1  ICD-9-CM: 276.1         Lactic acidosis ICD-10-CM: E87.2  ICD-9-CM: 276.2         Hyperlipidemia ICD-10-CM: E78.5  ICD-9-CM: 272.4         Obesity (BMI 30-39. 9) ICD-10-CM: E66.9  ICD-9-CM: 278.00         COVID-19 vaccination not done ICD-10-CM: Z28.9  ICD-9-CM: V64.00               Past Medical History:      has a past medical history of COVID-19, Diabetes ([facility] [address] Hyperlipidemia, and Hypertension.     Past Surgical History:      has a past surgical history that includes hx orthopaedic (Right). Home Medications:     Prior to Admission medications    Medication Sig Start Date End Date Taking? Authorizing Provider   cisatracurium (NIMBEX) IV infusion 0-0.869 mg/min by IntraVENous route TITRATE. 12/1/21  Yes France Morris MD   enoxaparin (LOVENOX) 100 mg/mL 90 mg by SubCUTAneous route every twelve (12) hours every twelve (12) hours. 12/1/21  Yes France Morris MD   insulin glargine (LANTUS) 100 unit/mL injection 24 Units by SubCUTAneous route nightly. Indications: type 2 diabetes mellitus 12/1/21  Yes France Morris MD   midazolam in normal saline (VERSED) 1 mg/mL soln 0-10 mg/hr by IntraVENous route TITRATE. Max Daily Amount: 240 mg. 12/1/21  Yes France Morris MD   Norepinephrine Bitartrate (LEVOPHED) 8 mg/250 mL (32 mcg/mL) soln 0.5-16 mcg/min by IntraVENous route TITRATE. 12/1/21  Yes France Morris MD   ascorbic acid, vitamin C, (VITAMIN C) 500 mg tablet Take 1 Tablet by mouth two (2) times a day. 12/1/21   France Morris MD   cholecalciferol (VITAMIN D3) (1000 Units /25 mcg) tablet Take 2 Tablets by mouth daily. 12/1/21   France Morris MD   melatonin, rapid dissolve, 5 mg TbDi tablet Take 1 Tablet by mouth nightly. 12/1/21   France Morris MD   atorvastatin (LIPITOR) 20 mg tablet Take 20 mg by mouth daily. Provider, Historical   telmisartan (MICARDIS) 80 mg tablet Take 80 mg by mouth daily. Indications: high blood pressure    Provider, Historical       Allergies/Social/Family History:      Allergies   Allergen Reactions    Alupent [Metaproterenol] Swelling      Social History     Tobacco Use    Smoking status: Never Smoker    Smokeless tobacco: Not on file   Substance Use Topics    Alcohol use: Yes     Comment: drinksonce a week      Family History   Problem Relation Age of Onset    Diabetes Mother     Hypertension Mother     Stroke Mother     Hodgkin's lymphoma Mother     Diabetes Father     Hypertension Father     Cystic Fibrosis Father     Diabetes Maternal Aunt     Diabetes Maternal Uncle        Review of Systems:     Unable to assess due to pt condition    Objective:   Vital Signs:  Visit Vitals  BP (!) 130/91   Pulse 74   Temp 98.1 °F (36.7 °C)   Resp 26   Ht 5' 6\" (1.676 m)   Wt 95.1 kg (209 lb 10.5 oz)   SpO2 96%   BMI 33.84 kg/m²    O2 Flow Rate (L/min): 60 l/min O2 Device: Endotracheal tube,Ventilator Temp (24hrs), Av.1 °F (37.3 °C), Min:98.1 °F (36.7 °C), Max:100 °F (37.8 °C)           Intake/Output:     Intake/Output Summary (Last 24 hours) at 2022 08  Last data filed at 2022 0700  Gross per 24 hour   Intake 2970.09 ml   Output 5060 ml   Net -2089.91 ml       Limited Physical Exam:    General:  Sedated/intubated/RASS -3   Eye: Pupils are round and reactive bilaterally  Neurologic: no neuro response illicited   Neck: Supple, no JVD  Lungs: On mechanical ventilation; bilateral rhonchi  Heart: RRR, 2+ pulses, 1+ edema of BLE   Abdomen:Soft, nontender, nondistended  Skin:  No rash or lesion      LABS AND  DATA: Personally reviewed  Recent Labs     22   WBC 20.2* 26.8*   HGB 7.0* 7.2*   HCT 22.7* 23.7*    391     Recent Labs     22   * 134*   K 3.6 3.9   CL 90* 91*   CO2 38* 36*   BUN 30* 29*   CREA 0.69* 0.77   GLU 82 121*   CA 9.7 9.5   MG 2.5* 2.9*   PHOS 3.1 3.0     Recent Labs     22   * 193*   TP 7.1 7.4   ALB 3.9 4.1   GLOB 3.2 3.3     No results for input(s): INR, PTP, APTT, INREXT, INREXT in the last 72 hours. No results for input(s): PHI, PCO2I, PO2I, FIO2I in the last 72 hours. No results for input(s): CPK, CKMB, TROIQ, BNPP in the last 72 hours.     Ventilator Settings:  Mode Rate Tidal Volume Pressure FiO2 PEEP   Assist control,Pressure control   0 ml  0 cm H2O 100 % 14 cm H20     Peak airway pressure: 36 cm H2O    Minue ventilation: 10.1 l/min      MEDS: Reviewed    Chest X-Ray: personally reviewed and report checked    · TTE: : LV: Estimated LVEF is 50 - 55%. Normal cavity size, wall thickness and diastolic function. Low normal systolic function. Assessment and Plan     NDEMD-66 Pneumonia complicated by Severe ARDS + MRSA pneumonia: Received tocilizumab. - Continue steroids  - Continue mechanical ventilation   - continue dex/versed/dilaudid for sedation   Nimbex- 5   Dex 0.6   Dilaudid 4   Versed- currently on hold   -Continue NO, wean as tolerated to maintain sats greater than 90%   - May need to consider proning again  -Hold off on diuretic today, he is net fluid negative, hold albumin  - linezoid/meropenum, being followed by ID    Progressive Oliguria  - Bun and Cr improved        HLD: Continue statin    DM2 c/b hyperglycemia:   - increase NPH to 20 q 12hrs   - increase SSI q4h and scale adjusted      Hypotension: Possibly related to sedation vs septic shock.   - Resolved- off pressors   - Continue midodrine    Deconditioning: Unable to participate in PT/OT at this time    Multidisciplinary Rounds Completed: Y    ABCDEF Bundle/Checklist  Pain Medications: Dilaudid   Target RASS: 0  Sedation Medications:Versed  CAM-ICU: SILVIA  Mobility:Poor  PT/OT: Unable to participate: PROM  Restraints:Y  Discussed Plan of Care (goals of care):  Y  Addressed Code Status: F    CARDIOVASCULAR  Cardiac Gtts: N  SBP Goal of: > 90 mmHg  MAP Goal of: > 65 mmHg  Transfusion Trigger (Hgb): <7 g/dL    RESPIRATORY  Vent Goals:   Optimize PEEP/Ventilation/Oxygenation  DVT Prophylaxis (if no, list reason): Lovenox therapeutic  SPO2 Goal: > 92%  Pulmonary toilet: Duo-Nebs     GI/  Gold Catheter Present: Yes  GI Prophylaxis: Pepcid (famotidine)   Nutrition: Yes restart TF today  IVFs:N  Bowel Movement:Y  Bowel Regimen:Y  Insulin: Y    ANTIBIOTICS  Antibiotics:  None    T/L/D  Tubes: ETT  Lines: PICC  Drains: Gold exchanged on 1/7 for retention     SPECIAL EQUIPMENT  Vent    DISPOSITION  ICU    CRITICAL CARE CONSULTANT NOTE  I had a face to face encounter with the patient, reviewed and interpreted patient data including clinical events, labs, images, vital signs, I/O's, and examined patient. I have discussed the case and the plan and management of the patient's care with the consulting services, the bedside nurses and the respiratory therapist.      NOTE OF PERSONAL INVOLVEMENT IN CARE   This patient has a high probability of imminent, clinically significant deterioration, which requires the highest level of preparedness to intervene urgently. I participated in the decision-making and personally managed or directed the management of the following life and organ supporting interventions that required my frequent assessment to treat or prevent imminent deterioration. I personally spent 75 minutes of critical care time. This is time spent at this critically ill patient's bedside actively involved in patient care as well as the coordination of care and discussions with the patient's family. This does not include any procedural time which has been billed separately.             Thomas Bergeron [facility]     Critical Care Medicine  [facility]

## 2022-01-12 NOTE — PROGRESS NOTES
ID Progress Note  2022    Subjective: Intubated it    Objective:     Antibiotics:  1. Vancomycin --, 2021-2022  2. zyvox -  3. Cefepime 12/3-2021  4. Ceftriaxone --2021  5. Zosyn 2022-2022  6. Merrem 2022-  7. Eraxis 2021-1/3/2022    Vitals:   Visit Vitals  BP (!) 130/91   Pulse 74   Temp 98.1 °F (36.7 °C)   Resp 26   Ht 5' 6\" (1.676 m)   Wt 95.1 kg (209 lb 10.5 oz)   SpO2 96%   BMI 33.84 kg/m²        Tmax:  Temp (24hrs), Av.1 °F (37.3 °C), Min:98.1 °F (36.7 °C), Max:100 °F (37.8 °C)      Exam:    Intubated it  tachycardia without RMG  Clear breath sounds in apex with decreased breath sounds at bases  abd soft with (+) bowel sounds  Gold cath in place; draining straw color urine with sediment  Unable to assess insight  All extremity edematous  nickel size unopened blister left patella, opened blister on right patella      Labs:      Recent Labs     22  0419 22  0337 01/10/22  1508 01/10/22  0236 22  1500   WBC 20.2* 26.8*  --  23.9*  --    HGB 7.0* 7.2* 7.3* 6.9*  --     391  --  386  --    BUN 30* 29*  --  33* 36*   CREA 0.69* 0.77  --  0.82 0.97   * 193*  --  198*  --    TBILI 0.6 0.5  --  0.5  --        Cultures:     No results found for: ROLDAN  Lab Results   Component Value Date/Time    Culture result: NO GROWTH 5 DAYS 2022 01:59 PM    Culture result: NO GROWTH 5 DAYS 2022 10:55 AM    Culture result: (A) 2022 10:55 AM     HEAVY * METHICILLIN RESISTANT STAPHYLOCOCCUS AUREUS * (KNOWN MRSA PATIENT)    Culture result: FEW NORMAL RESPIRATORY TAMARA 2022 10:55 AM     : Spoke with the pt's spouse for 27 minutes, updated clinical information and current medication therapy. Mrs. Magallanes Poster asked whether he could get Ivermectin, replied as that will not be one of our recommended therapy.  She asked what other things that we can offer if all current therapy fails, I asked her to think about goals of care. I addressed and answered all the questions that raised by pt's spouse. Assessment:     1. ARDS secondary to COVID 19 PNA, prolonged intubation 11/28/2021  2. Severe COVID pneumonia--Pneumococcus from respiratory culture; completed the therapy   3. MRSA pneumonia (sputum cx 1/1/2022 - MRSA)  4. VDRF  5. Hypoxic respiratory failure  6.  Bacteremia--S epi--line has been changed, completed the ABX therapy on 12/20 with ancef            Objective:     Afebrile, blood cx (1/5) no growth   WBC 26.8->20.2  procal 2.9->1.5 (1/5)  continue with IV merrem and zyvox   Overall poor prognosis     Above plan of care discussed and agreed with Dr. Ralph Spring, NP

## 2022-01-12 NOTE — PROGRESS NOTES
1930 Bedside and Written shift change report given to Sherrie Essex, RN (oncoming nurse) by [facility] JAYLEEN CALVIN, RN (offgoing nurse). Report included the following information SBAR, Kardex, ED Summary, Intake/Output, MAR, Recent Results, Cardiac Rhythm NSR, Alarm Parameters , Quality Measures and Dual Neuro Assessment. 2000 pt remains intubated, now on nitric at 36. On Dilaudid, precedex, versed, nimbex, and amio gtts. 2167 no versed gtt available in either pixis. Pharmacy notified. They are working on delivering more bags. Drip paused. 0653 's via arterial line. 5 mg versed IV push given to hold over until versed gtt bags arrive to unit. 0730 Bedside, Verbal and Written shift change report given to [facility] JAYLEEN CALVIN, RN (oncoming nurse) by Sherrie Essex, RN (offgoing nurse). Report included the following information SBAR, Kardex, ED Summary, Intake/Output, MAR, Recent Results, Cardiac Rhythm NSR, Alarm Parameters , Quality Measures and Dual Neuro Assessment.

## 2022-01-13 NOTE — PROGRESS NOTES
TRANSITIONS OF CARE:  RUR;18%  DISPOSITON; TBD  CRM notes that patient remains intubated and Infectious disease is following patient. Wife has been updated on spouse's condition at this time. Care management will follow for transitions of care needs.

## 2022-01-13 NOTE — PROGRESS NOTES
[facility]    ICU TEAM Progress Note    Name: Luisana Ellsworth   : 1975   MRN: [id number]   Date: 2022      Subjective:   Progress Note: 2022      Mr Nancy Krishnan is a 54 yo male with a PMH of DM2, HTN and obesity admitted on  to [facility] for COVID-19 pneumonia. He was treated with tocilizumab and steroids. He was not vaccinated. He decompensated and was intubated and . He was paralyzed, proned/supinated.        He was transferred to [facility] on  to be evaluated for possible ECMO. CVC and arterial line placed at [facility] on . Nimbex was turned off . He was weaned to 60% Fio2 as of 12/3. Proning/supinating therapy continued. He continued to demonstrated fever. He was started on cefepime/fluconazole on 12/3 with vanc added .       his FIo2 increased to 70% and decreased back to 60% on . CVC changed to PICC on  as blood culture had been positive for 4/4 S epi on 12/3 (cleared in culture on ). He was on vanc for 2 weeks, until . Changed to cefazolin on 12/15.     He has been responsive to diuresis over the past few days and has been weaned to 60% FiO2. Attempts were made to decrease sedation on , but this was not successful due to increased agitation. Due to this, seroquel was added on . Enteral oxycodone and valium were also added on . His versed was weaned from 19mg/h to 17/mg/h on pm.  His versed was further weaned on  as his seroquel was increased.     On , the patient became febrile to 103.3 and demonstrated increased RR and HR. Increase in FiO2 to 80%. Proning continued. Transitioned back to IV opiates and benzos on . As of , increased in pressors support.  procal 2.9, MRSA in sputum, on vanc. He was eventually changed to linezolid. He continued to exhibit fever over the next several days. On , he exhibited afib/flutter and converted back to NSR.     He currently remains intubated, sedated, and paralyzed. He is on nitric oxide. He was initially weaned down to 75% and plans were to jennifer NO, but prior to doing this, he experienced tachycardia, hypertension, hypoxia requiring bolus sedation and increased FIo2 to 100%. NO was left at the same dose. Antibiotics:  1. Vancomycin 12/4--12/12, 12/27/2021-1/5/2022  2. zyvox 1/4-  3. Cefepime 12/3-12/7/2021  4. Ceftriaxone 12/7--12/20/2021  5. Zosyn 1/1/2022-1/6/2022  6. Merrem 1/6/2022-  7. Eraxis 12/30/2021-1/3/2022    Active Problem List:     Problem List  Date Reviewed: 11/28/2021          Codes Class    COVID ICD-10-CM: U07.1  ICD-9-CM: 079.89         Acute hypoxemic respiratory failure due to COVID-19 [facility]) ICD-10-CM: U07.1, J96.01  ICD-9-CM: 518.81, 079.89, 799.02         Pneumonia due to COVID-19 virus ICD-10-CM: U07.1, J12.82  ICD-9-CM: 480.8, 079.89         Hyperglycemia due to type 2 diabetes mellitus (HCC) ICD-10-CM: E11.65  ICD-9-CM: 250.00         Primary hypertension ICD-10-CM: I10  ICD-9-CM: 401.9         Hyponatremia ICD-10-CM: E87.1  ICD-9-CM: 276.1         Lactic acidosis ICD-10-CM: E87.2  ICD-9-CM: 276.2         Hyperlipidemia ICD-10-CM: E78.5  ICD-9-CM: 272.4         Obesity (BMI 30-39. 9) ICD-10-CM: E66.9  ICD-9-CM: 278.00         COVID-19 vaccination not done ICD-10-CM: Z28.9  ICD-9-CM: V64.00               Past Medical History:      has a past medical history of COVID-19, Diabetes ([address] Hyperlipidemia, and Hypertension. Past Surgical History:      has a past surgical history that includes hx orthopaedic (Right). Home Medications:     Prior to Admission medications    Medication Sig Start Date End Date Taking? Authorizing Provider   cisatracurium (NIMBEX) IV infusion 0-0.869 mg/min by IntraVENous route TITRATE. 12/1/21  Yes Liliana Cameron MD   enoxaparin (LOVENOX) 100 mg/mL 90 mg by SubCUTAneous route every twelve (12) hours every twelve (12) hours.  12/1/21  Yes Liliana Cameron MD   insulin glargine (LANTUS) 100 unit/mL injection 24 Units by SubCUTAneous route nightly. Indications: type 2 diabetes mellitus 21  Yes Jorge Solis MD   midazolam in normal saline (VERSED) 1 mg/mL soln 0-10 mg/hr by IntraVENous route TITRATE. Max Daily Amount: 240 mg. 21  Yes Jorge Solis MD   Norepinephrine Bitartrate (LEVOPHED) 8 mg/250 mL (32 mcg/mL) soln 0.5-16 mcg/min by IntraVENous route TITRATE. 21  Yes Jorge Solis MD   ascorbic acid, vitamin C, (VITAMIN C) 500 mg tablet Take 1 Tablet by mouth two (2) times a day. 21   Jorge Solis MD   cholecalciferol (VITAMIN D3) (1000 Units /25 mcg) tablet Take 2 Tablets by mouth daily. 21   Jorge Solis MD   melatonin, rapid dissolve, 5 mg TbDi tablet Take 1 Tablet by mouth nightly. 21   Jorge Solis MD   atorvastatin (LIPITOR) 20 mg tablet Take 20 mg by mouth daily. Provider, Historical   telmisartan (MICARDIS) 80 mg tablet Take 80 mg by mouth daily. Indications: high blood pressure    Provider, Historical       Allergies/Social/Family History:      Allergies   Allergen Reactions    Alupent [Metaproterenol] Swelling      Social History     Tobacco Use    Smoking status: Never Smoker    Smokeless tobacco: Not on file   Substance Use Topics    Alcohol use: Yes     Comment: drinksonce a week      Family History   Problem Relation Age of Onset    Diabetes Mother     Hypertension Mother     Stroke Mother     Hodgkin's lymphoma Mother     Diabetes Father     Hypertension Father     Cystic Fibrosis Father     Diabetes Maternal Aunt     Diabetes Maternal Uncle        Review of Systems:     Unable to perform    Objective:   Vital Signs:  Visit Vitals  BP (!) 191/115   Pulse (!) 140   Temp 98.8 °F (37.1 °C)   Resp 23   Ht 5' 6\" (1.676 m)   Wt 95.1 kg (209 lb 10.5 oz)   SpO2 (!) 87%   BMI 33.84 kg/m²    O2 Flow Rate (L/min): 60 l/min O2 Device: Endotracheal tube,Ventilator Temp (24hrs), Av.2 °F (36.8 °C), Min:97.6 °F (36.4 °C), Max:98.9 °F (37.2 °C)           Intake/Output:     Intake/Output Summary (Last 24 hours) at 1/13/2022 1146  Last data filed at 1/13/2022 1100  Gross per 24 hour   Intake 5092.2 ml   Output 4190 ml   Net 902.2 ml     Physical Exam:    General:  Sedated, paralyzed  Eye: PERRLA  Neurologic:Sedated, paralyzed  Neck: Supple, no JVD  Lungs: CTAB, on mechanical ventilation  Heart:  RRR, 2+ pulses, no edema  Abdomen:Soft, nontender, nondistended  Skin: c/d/i    LABS AND  DATA: Personally reviewed  Recent Labs     01/13/22 0415 01/12/22 0419   WBC 16.6* 20.2*   HGB 7.4* 7.0*   HCT 24.2* 22.7*    342     Recent Labs     01/13/22 0415 01/12/22 0419   * 134*   K 3.8 3.6   CL 91* 90*   CO2 37* 38*   BUN 29* 30*   CREA 0.69* 0.69*   GLU 96 82   CA 9.8 9.7   MG 2.3 2.5*   PHOS 3.7 3.1     Recent Labs     01/13/22 0415 01/12/22 0419   * 169*   TP 6.9 7.1   ALB 3.5 3.9   GLOB 3.4 3.2     No results for input(s): INR, PTP, APTT, INREXT in the last 72 hours. No results for input(s): PHI, PCO2I, PO2I, FIO2I in the last 72 hours. No results for input(s): CPK, CKMB, TROIQ, BNPP in the last 72 hours. Vent settings:  100% PEEP 14 R 26     MEDS: Reviewed    Chest X-Ray: personally reviewed and report checked    Assessment and Plan:     Severe ARDS secondary to COVID-19 Pneumonia c/b superimposed MRSA pneumonia: Currently sedated, paralyzed, on nitric oxide.   - Continue paralysis  - Continue sedation at current level with PRNs  - Continue NO  - Diuresis with goal -1L  - Continue steroids  - Continue linezolid/meropenem    DM2 c/b hyperglycemia: Continue NPH and SSI    Hypotension: Continue midodrine    Deconditioning: PT/OT    Multidisciplinary Rounds Completed:  N    ABCDEF Bundle/Checklist  Pain Medications: Dilaudid  Target RASS: -5  Sedation Medications: Versed  CAM-ICU: SILVIA  Mobility:Poor  PT/OT:Unable  Restraints: N  Discussed Plan of Care (goals of care):  Y  Addressed Code Status: F    CARDIOVASCULAR  Cardiac Gtts: None  SBP Goal of: > 90 mmHg  MAP Goal of: > 65 mmHg  Transfusion Trigger (Hgb): <7 g/dL    RESPIRATORY  Vent Goals:   Chlorhexidine   DVT Prophylaxis (if no, list reason): Lovenox   SPO2 Goal: > 92%  Pulmonary toilet: Duo-Nebs     GI/  Gold Catheter Present: Yes  GI Prophylaxis: Pepcid (famotidine)   Nutrition: Yes TF  IVFs: N  Bowel Movement:Y  Bowel Regimen:Y  Insulin: Y    ANTIBIOTICS  Antibiotics:  Linezolid/Meropenem    T/L/D  Tubes: Y  Lines: Y  Drains: Y    SPECIAL EQUIPMENT  Vent    DISPOSITION  Y    CRITICAL CARE CONSULTANT NOTE  I had a face to face encounter with the patient, reviewed and interpreted patient data including clinical events, labs, images, vital signs, I/O's, and examined patient. I have discussed the case and the plan and management of the patient's care with the consulting services, the bedside nurses and the respiratory therapist.      NOTE OF PERSONAL INVOLVEMENT IN CARE   This patient has a high probability of imminent, clinically significant deterioration, which requires the highest level of preparedness to intervene urgently. I participated in the decision-making and personally managed or directed the management of the following life and organ supporting interventions that required my frequent assessment to treat or prevent imminent deterioration. I personally spent 60 minutes of critical care time. This is time spent at this critically ill patient's bedside actively involved in patient care as well as the coordination of care and discussions with the patient's family. This does not include any procedural time which has been billed separately.     BETTINA Garcia Critical Care  1/13/2022

## 2022-01-13 NOTE — PROGRESS NOTES
1930 Bedside, Verbal and Written shift change report given to Agus Flowers RN (oncoming nurse) by Kimber Rick RN (offgoing nurse). Report included the following information SBAR, Kardex, ED Summary, Intake/Output, MAR, Recent Results, Cardiac Rhythm NSR, Alarm Parameters , Quality Measures and Dual Neuro Assessment. 0730 Bedside, Verbal and Written shift change report given to KATHRINE Marinelli (oncoming nurse) by Agus Flowers RN (offgoing nurse). Report included the following information SBAR, Kardex, ED Summary, Intake/Output, MAR, Recent Results, Cardiac Rhythm NSR, Alarm Parameters  and Dual Neuro Assessment.

## 2022-01-13 NOTE — PROGRESS NOTES
Physician Progress Note      PATIENT:               Bambi Lewis  CSN #:                  [id number]  :                       1975  ADMIT DATE:       2021 12:26 PM  DISCH DATE:  RESPONDING  PROVIDER #:        ANALIA FERNANDEZ DO          QUERY TEXT:    Patient admitted with COVID PNA, noted to have atrial fibrillation. If possible, please document in progress notes and discharge summary further specificity regarding the type of atrial fibrillation: The medical record reflects the following:  Risk Factors: 54 yo male with a PMH of DM2, HTN and obesity admitted on  to [facility] for COVID-19 pneumonia. He was treated with tocilizumab and steroids. He was not vaccinated. He decompensated and was intubated and . He was paralyzed, proned/supinated. Clinical Indicators: HR trend 22 141, 104, 139, per  PN \"patient having bouts of Atrial Fib/flutter with rates into the 170s,\" per  NN \"NP notified of patient's Hypoxia (O2 SATs 80s), Tachycardia (HR 140s), HTN( SBP 200s), and diaphoretic, new orders received for sedation. \"  Treatment: IV metoprolol, Amiodarone IV gtt, remains intubated/vent, ICU admit, on paralytic, sedated,      Chronic: nonspecific term that could be referring to paroxysmal, persistent, or permanent  Longstanding persistent: persistent and continuous, lasting > 1 year. Paroxysmal - self-terminating or intermittent; resolves with or without intervention within 7 days of onset; may recur with various frequency. Persistent - Fails to terminate within 7 days; Often requires meds or cardioversion to restore to NSR. Permanent - longstanding & persistent; Medication has been ineffective in restoring NSR &/or cardioversion is contraindicated    Definitions per MS-DR[employer] Training Guide and Quick Reference Guide, Horacio Duffy 112 5 Diseases and Disorders of the Circulatory System; 2019; [employer]. Software content from the [employer]?  [employer].  Options provided:  -- Paroxysmal Atrial Fibrillation  -- Longstanding Persistent Atrial Fibrillation  -- Permanent Atrial Fibrillation  -- Persistent Atrial Fibrillation  -- Chronic Atrial Fibrillation, unspecified  -- Other - I will add my own diagnosis  -- Disagree - Not applicable / Not valid  -- Disagree - Clinically unable to determine / Unknown  -- Refer to Clinical Documentation Reviewer    PROVIDER RESPONSE TEXT:    This patient has paroxysmal atrial fibrillation. Query created by:  Gina Bailey on 1/13/2022 12:52 PM      Electronically signed by:  Prabha Seay DO 1/13/2022 3:25 PM

## 2022-01-13 NOTE — ROUTINE PROCESS
Jasper Todd, NP notified of patient's Hypoxia (O2 SATs 80s), Tachycardia (HR 140s), HTN( SBP 200s), and diaphoretic, new orders received for sedation.

## 2022-01-14 NOTE — PROGRESS NOTES
[facility]    ICU TEAM Progress Note    Name: Kaitlynn Luis   : 1975   MRN: [id number]   Date: 2022      Subjective:   Progress Note: 2022      Mr Mainor Rivera is a 54 yo male with a PMH of DM2, HTN and obesity admitted on  to [facility] for COVID-19 pneumonia. He was treated with tocilizumab and steroids. He was not vaccinated. He decompensated and was intubated and . He was paralyzed, proned/supinated.        He was transferred to [facility] on  to be evaluated for possible ECMO. CVC and arterial line placed at [facility] on . Nimbex was turned off . He was weaned to 60% Fio2 as of 12/3. Proning/supinating therapy continued. He continued to demonstrated fever. He was started on cefepime/fluconazole on 12/3 with vanc added .       his FIo2 increased to 70% and decreased back to 60% on . CVC changed to PICC on  as blood culture had been positive for 4/4 S epi on 12/3 (cleared in culture on ). He was on vanc for 2 weeks, until . Changed to cefazolin on 12/15.     He has been responsive to diuresis over the past few days and has been weaned to 60% FiO2. Attempts were made to decrease sedation on , but this was not successful due to increased agitation. Due to this, seroquel was added on . Enteral oxycodone and valium were also added on . His versed was weaned from 19mg/h to 17/mg/h on pm.  His versed was further weaned on  as his seroquel was increased.     On , the patient became febrile to 103.3 and demonstrated increased RR and HR. Increase in FiO2 to 80%. Proning continued. Transitioned back to IV opiates and benzos on . As of , increased in pressors support.  procal 2.9, MRSA in sputum, on vanc. He was eventually changed to linezolid. He continued to exhibit fever over the next several days. On , he exhibited afib/flutter and converted back to NSR.     He currently remains intubated, sedated, and paralyzed. He is on nitric oxide. His Fio2 has been weaned to 80%. Today, we will try to wean NO. Antibiotics:  1. Vancomycin 12/4--12/12, 12/27/2021-1/5/2022  2. zyvox 1/4-  3. Cefepime 12/3-12/7/2021  4. Ceftriaxone 12/7--12/20/2021  5. Zosyn 1/1/2022-1/6/2022  6. Merrem 1/6/2022-  7. Eraxis 12/30/2021-1/3/2022    Active Problem List:     Problem List  Date Reviewed: 11/28/2021          Codes Class    COVID ICD-10-CM: U07.1  ICD-9-CM: 079.89         Acute hypoxemic respiratory failure due to COVID-19 [facility]) ICD-10-CM: U07.1, J96.01  ICD-9-CM: 518.81, 079.89, 799.02         Pneumonia due to COVID-19 virus ICD-10-CM: U07.1, J12.82  ICD-9-CM: 480.8, 079.89         Hyperglycemia due to type 2 diabetes mellitus (HCC) ICD-10-CM: E11.65  ICD-9-CM: 250.00         Primary hypertension ICD-10-CM: I10  ICD-9-CM: 401.9         Hyponatremia ICD-10-CM: E87.1  ICD-9-CM: 276.1         Lactic acidosis ICD-10-CM: E87.2  ICD-9-CM: 276.2         Hyperlipidemia ICD-10-CM: E78.5  ICD-9-CM: 272.4         Obesity (BMI 30-39. 9) ICD-10-CM: E66.9  ICD-9-CM: 278.00         COVID-19 vaccination not done ICD-10-CM: Z28.9  ICD-9-CM: V64.00               Past Medical History:      has a past medical history of COVID-19, Diabetes ([facility] [address] Hyperlipidemia, and Hypertension. Past Surgical History:      has a past surgical history that includes hx orthopaedic (Right). Home Medications:     Prior to Admission medications    Medication Sig Start Date End Date Taking? Authorizing Provider   cisatracurium (NIMBEX) IV infusion 0-0.869 mg/min by IntraVENous route TITRATE. 12/1/21  Yes Selam Talley MD   enoxaparin (LOVENOX) 100 mg/mL 90 mg by SubCUTAneous route every twelve (12) hours every twelve (12) hours. 12/1/21  Yes Selam Talley MD   insulin glargine (LANTUS) 100 unit/mL injection 24 Units by SubCUTAneous route nightly.  Indications: type 2 diabetes mellitus 12/1/21  Yes Selam Talley MD   midazolam in normal saline (VERSED) 1 mg/mL soln 0-10 mg/hr by IntraVENous route TITRATE. Max Daily Amount: 240 mg. 21  Yes Yeni Martínez MD   Norepinephrine Bitartrate (LEVOPHED) 8 mg/250 mL (32 mcg/mL) soln 0.5-16 mcg/min by IntraVENous route TITRATE. 21  Yes Yeni Martínez MD   ascorbic acid, vitamin C, (VITAMIN C) 500 mg tablet Take 1 Tablet by mouth two (2) times a day. 21   Yein Martínez MD   cholecalciferol (VITAMIN D3) (1000 Units /25 mcg) tablet Take 2 Tablets by mouth daily. 21   Yeni Martínez MD   melatonin, rapid dissolve, 5 mg TbDi tablet Take 1 Tablet by mouth nightly. 21   Yeni Martínez MD   atorvastatin (LIPITOR) 20 mg tablet Take 20 mg by mouth daily. Provider, Historical   telmisartan (MICARDIS) 80 mg tablet Take 80 mg by mouth daily. Indications: high blood pressure    Provider, Historical       Allergies/Social/Family History:      Allergies   Allergen Reactions    Alupent [Metaproterenol] Swelling      Social History     Tobacco Use    Smoking status: Never Smoker    Smokeless tobacco: Not on file   Substance Use Topics    Alcohol use: Yes     Comment: drinksonce a week      Family History   Problem Relation Age of Onset    Diabetes Mother     Hypertension Mother     Stroke Mother     Hodgkin's lymphoma Mother     Diabetes Father     Hypertension Father     Cystic Fibrosis Father     Diabetes Maternal Aunt     Diabetes Maternal Uncle        Review of Systems:     Unable to perform    Objective:   Vital Signs:  Visit Vitals  BP (!) 86/62   Pulse 77   Temp 99 °F (37.2 °C)   Resp 26   Ht 5' 6\" (1.676 m)   Wt 95.1 kg (209 lb 10.5 oz)   SpO2 100%   BMI 33.84 kg/m²    O2 Flow Rate (L/min): 60 l/min O2 Device: Ventilator,Endotracheal tube,Heated,Humidifier Temp (24hrs), Av.4 °F (36.9 °C), Min:97.2 °F (36.2 °C), Max:99 °F (37.2 °C)           Intake/Output:     Intake/Output Summary (Last 24 hours) at 2022 0809  Last data filed at 2022 0700  Gross per 24 hour   Intake 4702.21 ml   Output 3245 ml   Net 1457.21 ml     Physical Exam:    General:  Sedated, paralyzed  Eye: PERRLA  Neurologic:Sedated, paralyzed  Neck: Supple, no JVD  Lungs: CTAB, on mechanical ventilation  Heart:  RRR, 2+ pulses, no edema  Abdomen:Soft, nontender, nondistended  Skin: c/d/i    LABS AND  DATA: Personally reviewed  Recent Labs     01/14/22  0630 01/13/22  0415   WBC 18.5* 16.6*   HGB 6.7* 7.4*   HCT 22.1* 24.2*    347     Recent Labs     01/14/22  0630 01/13/22  0415   * 135*   K 4.0 3.8   CL 93* 91*   CO2 38* 37*   BUN 40* 29*   CREA 0.65* 0.69*   GLU 60* 96   CA 9.0 9.8   MG 2.1 2.3   PHOS 3.7 3.7     Recent Labs     01/14/22  0630 01/13/22  0415   * 150*   TP 6.0* 6.9   ALB 3.0* 3.5   GLOB 3.0 3.4     No results for input(s): INR, PTP, APTT, INREXT, INREXT in the last 72 hours. No results for input(s): PHI, PCO2I, PO2I, FIO2I in the last 72 hours. No results for input(s): CPK, CKMB, TROIQ, BNPP in the last 72 hours. Vent settings:  80% PEEP 14 R 26     MEDS: Reviewed    Chest X-Ray: personally reviewed and report checked    Assessment and Plan:     Severe ARDS secondary to COVID-19 Pneumonia c/b superimposed MRSA pneumonia: Currently sedated, paralyzed, on nitric oxide.   - Continue paralysis  - Continue sedation at current level with PRNs  - Attempt to wean NO today as FiO2 decreased to 80%  - Diuresis with goal -1L  - Continue steroids  - Continue linezolid/meropenem    DM2 c/b hyperglycemia: Continue NPH and SSI    Hypotension: Continue midodrine    Paroxysmal AF: Amiodarone    Deconditioning: PT/OT    Multidisciplinary Rounds Completed:  N    ABCDEF Bundle/Checklist  Pain Medications: Dilaudid  Target RASS: -5  Sedation Medications: Versed  CAM-ICU: SILVIA  Mobility:Poor  PT/OT:Unable  Restraints: N  Discussed Plan of Care (goals of care):  Y  Addressed Code Status: F    CARDIOVASCULAR  Cardiac Gtts: None  SBP Goal of: > 90 mmHg  MAP Goal of: > 65 mmHg  Transfusion Trigger (Hgb): <7 g/dL    RESPIRATORY  Vent Goals: Chlorhexidine   DVT Prophylaxis (if no, list reason): Lovenox   SPO2 Goal: > 92%  Pulmonary toilet: Duo-Nebs     GI/  Gold Catheter Present: Yes  GI Prophylaxis: Pepcid (famotidine)   Nutrition: Yes TF  IVFs: N  Bowel Movement:Y  Bowel Regimen:Y  Insulin: Y    ANTIBIOTICS  Antibiotics:  Linezolid/Meropenem    T/L/D  Tubes: Y  Lines: Y  Drains: Y    SPECIAL EQUIPMENT  Vent    DISPOSITION  Y    CRITICAL CARE CONSULTANT NOTE  I had a face to face encounter with the patient, reviewed and interpreted patient data including clinical events, labs, images, vital signs, I/O's, and examined patient. I have discussed the case and the plan and management of the patient's care with the consulting services, the bedside nurses and the respiratory therapist.      NOTE OF PERSONAL INVOLVEMENT IN CARE   This patient has a high probability of imminent, clinically significant deterioration, which requires the highest level of preparedness to intervene urgently. I participated in the decision-making and personally managed or directed the management of the following life and organ supporting interventions that required my frequent assessment to treat or prevent imminent deterioration. I personally spent 60 minutes of critical care time. This is time spent at this critically ill patient's bedside actively involved in patient care as well as the coordination of care and discussions with the patient's family. This does not include any procedural time which has been billed separately.     Ayla Ferrara NP  [facility] Critical Care  1/14/2022

## 2022-01-14 NOTE — PROGRESS NOTES
1930: Bedside shift change report given to Bere Forbes  (oncoming nurse) by Jyoti Lucas (offgoing nurse). Report included the following information SBAR, Kardex, Intake/Output and MAR.     0730: Bedside shift change report given to scarlett CHISHOLM  (oncoming nurse) by Bere Forbes  (offgoing nurse). Report included the following information SBAR, Kardex, Intake/Output and MAR.

## 2022-01-14 NOTE — PROGRESS NOTES
Noon BG not covered with sliding scale coverage, held 1300 NPH per Raquel Prieto NP due to hypoglycemia this AM needing treatment.

## 2022-01-14 NOTE — PROGRESS NOTES
ID Progress Note  2022    Subjective: Intubated it    Objective:     Antibiotics:  1. Vancomycin --, 2021-2022  2. zyvox -  3. Cefepime 12/3-2021  4. Ceftriaxone --2021  5. Zosyn 2022-2022  6. Merrem 2022-  7. Eraxis 2021-1/3/2022    Vitals:   Visit Vitals  BP (!) 86/62   Pulse 77   Temp 99 °F (37.2 °C)   Resp 26   Ht 5' 6\" (1.676 m)   Wt 95.1 kg (209 lb 10.5 oz)   SpO2 100%   BMI 33.84 kg/m²        Tmax:  Temp (24hrs), Av.4 °F (36.9 °C), Min:97.2 °F (36.2 °C), Max:99 °F (37.2 °C)      Exam:    Intubated it  tachycardia without RMG  Clear breath sounds in apex with decreased breath sounds at bases  abd soft with (+) bowel sounds  Gold cath in place; draining straw color urine with sediment  Unable to assess insight  All extremity edematous  nickel size unopened blister left patella, opened blister on right patella, dime size unopened blister above right patella      Labs:      Recent Labs     22  0630 22  0415 22  0419   WBC 18.5* 16.6* 20.2*   HGB 6.7* 7.4* 7.0*    347 342   BUN 40* 29* 30*   CREA 0.65* 0.69* 0.69*   * 150* 169*   TBILI 0.3 0.5 0.6       Cultures:     No results found for: SDES  Lab Results   Component Value Date/Time    Culture result: NO GROWTH 5 DAYS 2022 01:59 PM    Culture result: NO GROWTH 5 DAYS 2022 10:55 AM    Culture result: (A) 2022 10:55 AM     HEAVY * METHICILLIN RESISTANT STAPHYLOCOCCUS AUREUS * (KNOWN MRSA PATIENT)    Culture result: FEW NORMAL RESPIRATORY TAMARA 2022 10:55 AM     : Spoke with the pt's spouse for 27 minutes, updated clinical information and current medication therapy. Mrs. Keanu Ferreira asked whether he could get Ivermectin, replied as that will not be one of our recommended therapy. She asked what other things that we can offer if all current therapy fails, I asked her to think about goals of care.  I addressed and answered all the questions that raised by pt's spouse. Assessment:     1. ARDS secondary to COVID 19 PNA, prolonged intubation 11/28/2021  2. Severe COVID pneumonia--Pneumococcus from respiratory culture; completed the therapy   3. MRSA pneumonia (sputum cx 1/1/2022 - MRSA)  4. VDRF  5. Hypoxic respiratory failure  6.  Bacteremia--S epi--line has been changed, completed the ABX therapy on 12/20 with ancef            Objective:     Afebrile, blood cx (1/5) no growth   WBC 18.5  procal 2.9->1.5 (1/5)  continue with IV merrem and zyvox   Overall poor prognosis     Above plan of care discussed and agreed with Dr. Connor Marlow, NP

## 2022-01-14 NOTE — WOUND CARE
WOCN Note:      Follow-up visit to re-assess bilateral knee wounds wounds  Assessed in room 7102    1. Right distal knee DTI   Blood filled intact blister, starting to deflate   2 x 2 x 0cm  Periwound intact & without erythema  Treatment: Venelex     2. Right proximal knee intact serous filled blister  1 x 1.5 x 0 cm  Periwound intact & without erythema  Treatment: Open to air     5.  left knee intact serous filled blister  2 x 2 x 0 cm  Periwound intact & without erythema  Treatment: Open to air    Wound Recommendations:      Continue:   Venelex ointment to knees, elbows, sacrum, heels every 8 hours    Transition of Care: Plan to follow weekly and as needed while admitted to hospital     Esme De La Torre MSN, RN, [facility]  Certified Wound and Ostomy Nurse  office [phone]  Can be reached through [address] [place] [address]  pager [phone] or call  to page

## 2022-01-14 NOTE — PROGRESS NOTES
I placed a call to [facility] to have Mr Johnnie Garcia evaluated for possible lung transplant. I was able to speak with their transplant physician. Mr Johnnie Garcia was declined for transplant at their facility. I informed his wife.       Kristine Kim NP

## 2022-01-14 NOTE — PROGRESS NOTES
Eden Alcala, NP notified for provider to obtain consent for blood transfusion, NP to call patient's [employer] and update nursing staff on the status.

## 2022-01-15 NOTE — PROGRESS NOTES
I spoke with Mr Lepe's wife today over the phone. I provided her with updates. We discussed his sedation, plans for diuresis and most recent CXR. She denied unanswered questions. I will plan to call her tomorrow.     Franki Richard NP

## 2022-01-15 NOTE — PROGRESS NOTES
1930: Bedside shift change report given to Jeff Martinez  (oncoming nurse) by Gaye Barnes (offgoing nurse). Report included the following information SBAR, ED Summary, Intake/Output and MAR.   0730: Bedside shift change report given to Yves    (oncoming nurse) by Jeff Martinez  (offgoing nurse). Report included the following information SBAR, Kardex, Intake/Output and MAR.

## 2022-01-15 NOTE — PROGRESS NOTES
Verbal orders received from Adriana Riley NP to continue to titrate NIMBEX up until the goal of therapy: TOF-1-2 is met.

## 2022-01-15 NOTE — PROGRESS NOTES
[facility]    ICU TEAM Progress Note    Name: Chon James   : 1975   MRN: [id number]   Date: 1/15/2022      Subjective:   Progress Note: 1/15/2022      Mr Mayi Graham is a 56 yo male with a PMH of DM2, HTN and obesity admitted on  to [facility] for COVID-19 pneumonia. He was treated with tocilizumab and steroids. He was not vaccinated. He decompensated and was intubated and . He was paralyzed, proned/supinated.        He was transferred to [facility] on  to be evaluated for possible ECMO. CVC and arterial line placed at [facility] on . Nimbex was turned off . He was weaned to 60% Fio2 as of 12/3. Proning/supinating therapy continued. He continued to demonstrated fever. He was started on cefepime/fluconazole on 12/3 with vanc added .       his FIo2 increased to 70% and decreased back to 60% on . CVC changed to PICC on  as blood culture had been positive for 4/4 S epi on 12/3 (cleared in culture on ). He was on vanc for 2 weeks, until . Changed to cefazolin on 12/15.     He has been responsive to diuresis over the past few days and has been weaned to 60% FiO2. Attempts were made to decrease sedation on , but this was not successful due to increased agitation. Due to this, seroquel was added on . Enteral oxycodone and valium were also added on . His versed was weaned from 19mg/h to 17/mg/h on pm.  His versed was further weaned on  as his seroquel was increased.     On , the patient became febrile to 103.3 and demonstrated increased RR and HR. Increase in FiO2 to 80%. Proning continued. Transitioned back to IV opiates and benzos on . As of , increased in pressors support.  procal 2.9, MRSA in sputum, on vanc. He was eventually changed to linezolid. He continued to exhibit fever over the next several days. On , he exhibited afib/flutter and converted back to NSR.     He currently remains intubated, sedated, and paralyzed. He is on nitric oxide. On 1/14, his FiO2 was able to be weaned to 80%. We attempted to wean NO. Upon doing this, he became hypertensive and hypoxic. We increased NO back to 40 and Fio2 back to 100% and his hypertension and hypoxia resolved. I also reached out to [facility] Dr Aime Rowell on 1/14 and he was declined for transfer to that facility for transplant. We have reached out to [facility] as well. Antibiotics:  1. Vancomycin 12/4--12/12, 12/27/2021-1/5/2022  2. zyvox 1/4-  3. Cefepime 12/3-12/7/2021  4. Ceftriaxone 12/7--12/20/2021  5. Zosyn 1/1/2022-1/6/2022  6. Merrem 1/6/2022-  7. Eraxis 12/30/2021-1/3/2022    Active Problem List:     Problem List  Date Reviewed: 11/28/2021          Codes Class    COVID ICD-10-CM: U07.1  ICD-9-CM: 079.89         Acute hypoxemic respiratory failure due to COVID-19 [facility]) ICD-10-CM: U07.1, J96.01  ICD-9-CM: 518.81, 079.89, 799.02         Pneumonia due to COVID-19 virus ICD-10-CM: U07.1, J12.82  ICD-9-CM: 480.8, 079.89         Hyperglycemia due to type 2 diabetes mellitus (HCC) ICD-10-CM: E11.65  ICD-9-CM: 250.00         Primary hypertension ICD-10-CM: I10  ICD-9-CM: 401.9         Hyponatremia ICD-10-CM: E87.1  ICD-9-CM: 276.1         Lactic acidosis ICD-10-CM: E87.2  ICD-9-CM: 276.2         Hyperlipidemia ICD-10-CM: E78.5  ICD-9-CM: 272.4         Obesity (BMI 30-39. 9) ICD-10-CM: E66.9  ICD-9-CM: 278.00         COVID-19 vaccination not done ICD-10-CM: Z28.9  ICD-9-CM: V64.00               Past Medical History:      has a past medical history of COVID-19, Diabetes ([address] Hyperlipidemia, and Hypertension. Past Surgical History:      has a past surgical history that includes hx orthopaedic (Right). Home Medications:     Prior to Admission medications    Medication Sig Start Date End Date Taking?  Authorizing Provider   cisatracurium (NIMBEX) IV infusion 0-0.869 mg/min by IntraVENous route TITRATE. 12/1/21  Yes Patrick Acuña MD   enoxaparin (LOVENOX) 100 mg/mL 90 mg by SubCUTAneous route every twelve (12) hours every twelve (12) hours. 12/1/21  Yes Jessica Aguero MD   insulin glargine (LANTUS) 100 unit/mL injection 24 Units by SubCUTAneous route nightly. Indications: type 2 diabetes mellitus 12/1/21  Yes Jessica Aguero MD   midazolam in normal saline (VERSED) 1 mg/mL soln 0-10 mg/hr by IntraVENous route TITRATE. Max Daily Amount: 240 mg. 12/1/21  Yes Jessica Aguero MD   Norepinephrine Bitartrate (LEVOPHED) 8 mg/250 mL (32 mcg/mL) soln 0.5-16 mcg/min by IntraVENous route TITRATE. 12/1/21  Yes Jessica Aguero MD   ascorbic acid, vitamin C, (VITAMIN C) 500 mg tablet Take 1 Tablet by mouth two (2) times a day. 12/1/21   Jessica Aguero MD   cholecalciferol (VITAMIN D3) (1000 Units /25 mcg) tablet Take 2 Tablets by mouth daily. 12/1/21   Jessica Aguero MD   melatonin, rapid dissolve, 5 mg TbDi tablet Take 1 Tablet by mouth nightly. 12/1/21   Jessica Aguero MD   atorvastatin (LIPITOR) 20 mg tablet Take 20 mg by mouth daily. Provider, Historical   telmisartan (MICARDIS) 80 mg tablet Take 80 mg by mouth daily. Indications: high blood pressure    Provider, Historical       Allergies/Social/Family History:      Allergies   Allergen Reactions    Alupent [Metaproterenol] Swelling      Social History     Tobacco Use    Smoking status: Never Smoker    Smokeless tobacco: Not on file   Substance Use Topics    Alcohol use: Yes     Comment: drinksonce a week      Family History   Problem Relation Age of Onset    Diabetes Mother     Hypertension Mother     Stroke Mother     Hodgkin's lymphoma Mother     Diabetes Father     Hypertension Father     Cystic Fibrosis Father     Diabetes Maternal Aunt     Diabetes Maternal Uncle        Review of Systems:     Unable to perform    Objective:   Vital Signs:  Visit Vitals  BP 94/75 (BP 1 Location: Left arm, BP Patient Position: At rest)   Pulse 65   Temp 98.1 °F (36.7 °C)   Resp 26   Ht 5' 6\" (1.676 m)   Wt 95.1 kg (209 lb 10.5 oz)   SpO2 98%   BMI 33.84 kg/m²    O2 Flow Rate (L/min): 60 l/min O2 Device: Endotracheal tube,Ventilator Temp (24hrs), Av.8 °F (37.1 °C), Min:98.1 °F (36.7 °C), Max:99.1 °F (37.3 °C)           Intake/Output:     Intake/Output Summary (Last 24 hours) at 1/15/2022 0846  Last data filed at 1/15/2022 0700  Gross per 24 hour   Intake 4342.9 ml   Output 3020 ml   Net 1322.9 ml     Physical Exam:    General:  Sedated, paralyzed  Eye: PERRLA  Neurologic:Sedated, paralyzed  Neck: Supple, no JVD  Lungs: CTAB, on mechanical ventilation  Heart:  RRR, 2+ pulses, no edema  Abdomen:Soft, nontender, nondistended  Skin: c/d/i    LABS AND  DATA: Personally reviewed  Recent Labs     01/15/22  0429 01/14/22  0630   WBC 17.7* 18.5*   HGB 8.1* 6.7*   HCT 25.9* 22.1*    319     Recent Labs     01/15/22  0429 01/14/22  0630   * 135*   K 3.9 4.0   CL 93* 93*   CO2 36* 38*   BUN 33* 40*   CREA 0.66* 0.65*   * 60*   CA 9.4 9.0   MG 2.1 2.1   PHOS 2.8 3.7     Recent Labs     01/15/22  0429 01/14/22  0630   * 144*   TP 6.5 6.0*   ALB 3.0* 3.0*   GLOB 3.5 3.0     No results for input(s): INR, PTP, APTT, INREXT, INREXT in the last 72 hours. No results for input(s): PHI, PCO2I, PO2I, FIO2I in the last 72 hours. No results for input(s): CPK, CKMB, TROIQ, BNPP in the last 72 hours.     Vent settings:  100% PEEP 14 R 26     MEDS: Reviewed    Chest X-Ray: personally reviewed and report checked    Assessment and Plan:     Severe ARDS secondary to COVID-19 Pneumonia c/b superimposed MRSA pneumonia: Currently sedated, paralyzed, on nitric oxide.   - Continue paralysis  - Continue sedation at current level with PRNs  - Hold off on repeat attempts to wean NO  - Diuresis with goal -1L  - Continue steroids  - Continue linezolid/meropenem    DM2 c/b hyperglycemia: Continue NPH and SSI    Hypotension: Continue midodrine    Paroxysmal AF: Amiodarone    Deconditioning: Unable to participate with PT/OT    Multidisciplinary Rounds Completed:  N    ABCDEF Bundle/Checklist  Pain Medications: Dilaudid  Target RASS: -5  Sedation Medications: Versed  CAM-ICU: SILVIA  Mobility:Poor  PT/OT:Unable  Restraints: N  Discussed Plan of Care (goals of care): Y  Addressed Code Status: F    CARDIOVASCULAR  Cardiac Gtts: None  SBP Goal of: > 90 mmHg  MAP Goal of: > 65 mmHg  Transfusion Trigger (Hgb): <7 g/dL    RESPIRATORY  Vent Goals:   Chlorhexidine   DVT Prophylaxis (if no, list reason): Lovenox   SPO2 Goal: > 92%  Pulmonary toilet: Duo-Nebs     GI/  Gold Catheter Present: Yes  GI Prophylaxis: Pepcid (famotidine)   Nutrition: Yes TF  IVFs: N  Bowel Movement:Y  Bowel Regimen:Y  Insulin: Y    ANTIBIOTICS  Antibiotics:  Linezolid/Meropenem    T/L/D  Tubes: Y  Lines: Y  Drains: Y    SPECIAL EQUIPMENT  Vent    DISPOSITION  Y    CRITICAL CARE CONSULTANT NOTE  I had a face to face encounter with the patient, reviewed and interpreted patient data including clinical events, labs, images, vital signs, I/O's, and examined patient. I have discussed the case and the plan and management of the patient's care with the consulting services, the bedside nurses and the respiratory therapist.      NOTE OF PERSONAL INVOLVEMENT IN CARE   This patient has a high probability of imminent, clinically significant deterioration, which requires the highest level of preparedness to intervene urgently. I participated in the decision-making and personally managed or directed the management of the following life and organ supporting interventions that required my frequent assessment to treat or prevent imminent deterioration. I personally spent 60 minutes of critical care time. This is time spent at this critically ill patient's bedside actively involved in patient care as well as the coordination of care and discussions with the patient's family. This does not include any procedural time which has been billed separately.     Christelle Osorio NP  [facility] [place] [facility]  1/15/2022

## 2022-01-15 NOTE — PROGRESS NOTES
NIMBEX infusing at maximum ordered dose per verbal order from Cande Salas 2, NP. TOF[de-identified] 4/4 twitches.  See [facility]

## 2022-01-15 NOTE — PROGRESS NOTES
Prabha Gannon, NP notified of HTN with SBP in the  200s, Tachycardia with HR in 130s, decrease O2 saturation, orders received to administer PRN versed for sedation and increase dilaudid continuous dose, NP to modify orders.

## 2022-01-16 NOTE — PROGRESS NOTES
Shift report received from Ching, [address] [facility] using [employer]
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
## 2022-01-16 NOTE — PROGRESS NOTES
Sena, NP notified of elevated HR, BP, hypoxia, diaphoresis, flushed face, and raised concern that this could potentially be reaction to medication? given this happens around this time of day, an hour or so after he receives scheduled morning medications, see MAR. New orders received to give BENADRYL.

## 2022-01-16 NOTE — PROGRESS NOTES
[facility]    ICU TEAM Progress Note    Name: Sweetie Mcmillan   : 1975   MRN: [id number]   Date: 2022      Subjective:   Progress Note: 2022      Mr Gloria Posadas is a 54 yo male with a PMH of DM2, HTN and obesity admitted on  to [facility] for COVID-19 pneumonia. He was treated with tocilizumab and steroids. He was not vaccinated. He decompensated and was intubated and . He was paralyzed, proned/supinated.        He was transferred to [facility] on  to be evaluated for possible ECMO. CVC and arterial line placed at [facility] on . Nimbex was turned off . He was weaned to 60% Fio2 as of 12/3. Proning/supinating therapy continued. He continued to demonstrated fever. He was started on cefepime/fluconazole on 12/3 with vanc added .       his FIo2 increased to 70% and decreased back to 60% on . CVC changed to PICC on  as blood culture had been positive for 4/4 S epi on 12/3 (cleared in culture on ). He was on vanc for 2 weeks, until . Changed to cefazolin on 12/15.     He has been responsive to diuresis over the past few days and has been weaned to 60% FiO2. Attempts were made to decrease sedation on , but this was not successful due to increased agitation. Due to this, seroquel was added on . Enteral oxycodone and valium were also added on . His versed was weaned from 19mg/h to 17/mg/h on pm.  His versed was further weaned on  as his seroquel was increased.     On , the patient became febrile to 103.3 and demonstrated increased RR and HR. Increase in FiO2 to 80%. Proning continued. Transitioned back to IV opiates and benzos on . As of , increased in pressors support.  procal 2.9, MRSA in sputum, on vanc. He was eventually changed to linezolid. He continued to exhibit fever over the next several days. On , he exhibited afib/flutter and converted back to NSR.     He currently remains intubated, sedated, and paralyzed. He is on nitric oxide. On 1/14, his FiO2 was able to be weaned to 80%. We attempted to wean NO. Upon doing this, he became hypertensive and hypoxic. We increased NO back to 40 and Fio2 back to 100% and his hypertension and hypoxia resolved. I also reached out to [facility] on 1/14 and he was declined for transfer to that facility for transplant. We have reached out to [facility] as well. This morning, patient suddenly became more hypertensive and tachycardic. Treated with benadryl (given concern for possible allergic reaction- extremely red in face/whole body) and sedation without improvement. Continued to sedate patient. Patient on paralytic with 4/4 twitches- gave push of rocuronium. Patient with persistent desatuations, hypertension, and tachycardia. Started on cardene and obtaining CXR. Antibiotics:  1. Vancomycin 12/4--12/12, 12/27/2021-1/5/2022  2. zyvox 1/4-  3. Cefepime 12/3-12/7/2021  4. Ceftriaxone 12/7--12/20/2021  5. Zosyn 1/1/2022-1/6/2022  6. Merrem 1/6/2022-  7. Eraxis 12/30/2021-1/3/2022    Active Problem List:     Problem List  Date Reviewed: 11/28/2021          Codes Class    COVID ICD-10-CM: U07.1  ICD-9-CM: 079.89         Acute hypoxemic respiratory failure due to COVID-19 [facility]) ICD-10-CM: U07.1, J96.01  ICD-9-CM: 518.81, 079.89, 799.02         Pneumonia due to COVID-19 virus ICD-10-CM: U07.1, J12.82  ICD-9-CM: 480.8, 079.89         Hyperglycemia due to type 2 diabetes mellitus (HCC) ICD-10-CM: E11.65  ICD-9-CM: 250.00         Primary hypertension ICD-10-CM: I10  ICD-9-CM: 401.9         Hyponatremia ICD-10-CM: E87.1  ICD-9-CM: 276.1         Lactic acidosis ICD-10-CM: E87.2  ICD-9-CM: 276.2         Hyperlipidemia ICD-10-CM: E78.5  ICD-9-CM: 272.4         Obesity (BMI 30-39. 9) ICD-10-CM: E66.9  ICD-9-CM: 278.00         COVID-19 vaccination not done ICD-10-CM: Z28.9  ICD-9-CM: V64.00               Past Medical History:      has a past medical history of COVID-19, Diabetes ([facility] [address] Hyperlipidemia, and Hypertension. Past Surgical History:      has a past surgical history that includes hx orthopaedic (Right). Home Medications:     Prior to Admission medications    Medication Sig Start Date End Date Taking? Authorizing Provider   cisatracurium (NIMBEX) IV infusion 0-0.869 mg/min by IntraVENous route TITRATE. 12/1/21  Yes Agustina Cline MD   enoxaparin (LOVENOX) 100 mg/mL 90 mg by SubCUTAneous route every twelve (12) hours every twelve (12) hours. 12/1/21  Yes Agustina Cline MD   insulin glargine (LANTUS) 100 unit/mL injection 24 Units by SubCUTAneous route nightly. Indications: type 2 diabetes mellitus 12/1/21  Yes Agustina Cline MD   midazolam in normal saline (VERSED) 1 mg/mL soln 0-10 mg/hr by IntraVENous route TITRATE. Max Daily Amount: 240 mg. 12/1/21  Yes Agustina Cline MD   Norepinephrine Bitartrate (LEVOPHED) 8 mg/250 mL (32 mcg/mL) soln 0.5-16 mcg/min by IntraVENous route TITRATE. 12/1/21  Yes Agustina Cline MD   ascorbic acid, vitamin C, (VITAMIN C) 500 mg tablet Take 1 Tablet by mouth two (2) times a day. 12/1/21   Agustina Cline MD   cholecalciferol (VITAMIN D3) (1000 Units /25 mcg) tablet Take 2 Tablets by mouth daily. 12/1/21   Agustina Cline MD   melatonin, rapid dissolve, 5 mg TbDi tablet Take 1 Tablet by mouth nightly. 12/1/21   Agustina Cline MD   atorvastatin (LIPITOR) 20 mg tablet Take 20 mg by mouth daily. Provider, Historical   telmisartan (MICARDIS) 80 mg tablet Take 80 mg by mouth daily. Indications: high blood pressure    Provider, Historical       Allergies/Social/Family History:      Allergies   Allergen Reactions    Alupent [Metaproterenol] Swelling      Social History     Tobacco Use    Smoking status: Never Smoker    Smokeless tobacco: Not on file   Substance Use Topics    Alcohol use: Yes     Comment: drinksonce a week      Family History   Problem Relation Age of Onset    Diabetes Mother     Hypertension Mother     Stroke Mother     Hodgkin's lymphoma Mother     Diabetes Father     Hypertension Father     Cystic Fibrosis Father     Diabetes Maternal Aunt     Diabetes Maternal Uncle        Review of Systems:     Unable to perform    Objective:   Vital Signs:  Visit Vitals  BP (!) 88/67   Pulse 67   Temp 98.9 °F (37.2 °C)   Resp 26   Ht 5' 6\" (1.676 m)   Wt 92.6 kg (204 lb 2.3 oz)   SpO2 96%   BMI 32.95 kg/m²    O2 Flow Rate (L/min): 60 l/min O2 Device: Endotracheal tube,Ventilator Temp (24hrs), Av.8 °F (37.1 °C), Min:98.1 °F (36.7 °C), Max:99.5 °F (37.5 °C)           Intake/Output:     Intake/Output Summary (Last 24 hours) at 2022 0742  Last data filed at 2022 0600  Gross per 24 hour   Intake 5188.61 ml   Output 4725 ml   Net 463.61 ml     Physical Exam:    General:  Sedated, paralyzed  Eye: PERRLA  Neurologic:Sedated, paralyzed  Neck: Supple, no JVD  Lungs: CTAB, on mechanical ventilation  Heart:  RRR, 2+ pulses, no edema  Abdomen:Soft, nontender, nondistended  Skin: c/d/i    LABS AND  DATA: Personally reviewed  Recent Labs     01/16/22  0348 01/15/22  0429   WBC 15.9* 17.7*   HGB 8.0* 8.1*   HCT 25.5* 25.9*    299     Recent Labs     01/16/22  0348 01/15/22  0429   * 133*   K 3.9 3.9   CL 95* 93*   CO2 37* 36*   BUN 30* 33*   CREA 0.61* 0.66*   GLU 96 133*   CA 9.0 9.4   MG 2.0 2.1   PHOS 2.6 2.8     Recent Labs     01/16/22  0348 01/15/22  0429   * 144*   TP 6.3* 6.5   ALB 2.8* 3.0*   GLOB 3.5 3.5     No results for input(s): INR, PTP, APTT, INREXT, INREXT in the last 72 hours. No results for input(s): PHI, PCO2I, PO2I, FIO2I in the last 72 hours. No results for input(s): CPK, CKMB, TROIQ, BNPP in the last 72 hours.     Vent settings:  100% PEEP 14 R 26     MEDS: Reviewed    Chest X-Ray: personally reviewed and report checked    Assessment and Plan:     Severe ARDS secondary to COVID-19 Pneumonia c/b superimposed MRSA pneumonia: Currently sedated, paralyzed, on nitric oxide.   - Continue paralysis  - Continue sedation at current level with PRNs  - Hold off on repeat attempts to wean NO  - Diuresis with goal -1L  - Continue steroids  - Continue linezolid/meropenem  - Obtain CXR given desaturations  - Continue lovenox    DM2 c/b hyperglycemia: Continue NPH and SSI    Labile blood pressure  - previously on midodrine for hypotension  - Now hypertensive requiring cardene gtt    Paroxysmal AF: Amiodarone    Deconditioning: Unable to participate with PT/OT    Multidisciplinary Rounds Completed:  N    ABCDEF Bundle/Checklist  Pain Medications: Dilaudid  Target RASS: -5  Sedation Medications: Versed  CAM-ICU: SILVIA  Mobility:Poor  PT/OT:Unable  Restraints: N  Discussed Plan of Care (goals of care): Y  Addressed Code Status: F    CARDIOVASCULAR  Cardiac Gtts: None  SBP Goal of: > 90 mmHg  MAP Goal of: > 65 mmHg  Transfusion Trigger (Hgb): <7 g/dL    RESPIRATORY  Vent Goals:   Chlorhexidine   DVT Prophylaxis (if no, list reason): Lovenox   SPO2 Goal: > 92%  Pulmonary toilet: Duo-Nebs     GI/  Gold Catheter Present: Yes  GI Prophylaxis: Pepcid (famotidine)   Nutrition: Yes TF  IVFs: N  Bowel Movement:Y  Bowel Regimen:Y  Insulin: Y    ANTIBIOTICS  Antibiotics:  Linezolid/Meropenem    T/L/D  Tubes: Y  Lines: Y  Drains: Y    SPECIAL EQUIPMENT  Vent    DISPOSITION  Y    CRITICAL CARE CONSULTANT NOTE  I had a face to face encounter with the patient, reviewed and interpreted patient data including clinical events, labs, images, vital signs, I/O's, and examined patient. I have discussed the case and the plan and management of the patient's care with the consulting services, the bedside nurses and the respiratory therapist.      NOTE OF PERSONAL INVOLVEMENT IN CARE   This patient has a high probability of imminent, clinically significant deterioration, which requires the highest level of preparedness to intervene urgently.  I participated in the decision-making and personally managed or directed the management of the following life and organ supporting interventions that required my frequent assessment to treat or prevent imminent deterioration. I personally spent 60 minutes of critical care time. This is time spent at this critically ill patient's bedside actively involved in patient care as well as the coordination of care and discussions with the patient's family. This does not include any procedural time which has been billed separately.     Chava Jacobo, [facility]  1/16/2022

## 2022-01-17 NOTE — PROGRESS NOTES
0730: Verbal shift change report given to Deon Li RN (oncoming nurse) by Chantel Tolentino RN (offgoing nurse). Report included the following information SBAR, Intake/Output, Cardiac Rhythm ST and Alarm Parameters . 7361: Patient's wife, Griselda Hernandez, at bedside. Discussed patient status with her. She expresses that she would like to make patient a DNR. NP notified. 1930: Verbal shift change report given to KATHRINE Luo (oncoming nurse) by Deon Li RN (offgoing nurse). Report included the following information SBAR, Intake/Output, Cardiac Rhythm ST and Alarm Parameters .

## 2022-01-17 NOTE — PROGRESS NOTES
Family request for prayer in Room 7106. Patient unable to communicate. Spouse Isabela at bedside. Patient is declining,  poor prognosis given, made DNR today. Patient has 3 & 10 yo children. Raul Ogden requested prayer for affirmation, comfort, peace of mind. Provided ministry of nonjudgmental presence and prayer. Advised of  availability. Visited by: Arabella Mcgee.  Esme Quiroga, [address] [place] [address] Road paging Service [phone]

## 2022-01-17 NOTE — PROGRESS NOTES
Transition of Care Plan  RUR 18%    COVID 19 Positive  DNR    Disposition  TBD-- progressive hypoxia--02 saturations are now persistently in the 70's  Unresponsive to boluses of sedation--intubated and sedated   Prior to admission, patient lived with his wife, Marie Guzman [phone]. and 2 small children. He was independent prior to admission     Prognosis poor. Cm will follow and offer support and assistance to wife.

## 2022-01-17 NOTE — PROGRESS NOTES
ID Progress Note  2022    Subjective: Intubated it  O2 sat less than 80% with full ventilator support    Objective:     Antibiotics:  1. Vancomycin --, 2021-2022  2. zyvox -  3. Cefepime 12/3-2021  4. Ceftriaxone --2021  5. Zosyn 2022-2022  6. Merrem 2022-  7. Eraxis 2021-1/3/2022    Vitals:   Visit Vitals  /84   Pulse (!) 116   Temp 98.3 °F (36.8 °C)   Resp 26   Ht 5' 6\" (1.676 m)   Wt 92.6 kg (204 lb 2.3 oz)   SpO2 (!) 74%   BMI 32.95 kg/m²        Tmax:  Temp (24hrs), Av.6 °F (37 °C), Min:98.2 °F (36.8 °C), Max:99.7 °F (37.6 °C)    Labs:      Recent Labs     22  0354 22  0348 01/15/22  0429   WBC 31.4* 15.9* 17.7*   HGB 9.0* 8.0* 8.1*    280 299   BUN 38* 30* 33*   CREA 0.71 0.61* 0.66*   * 150* 144*   TBILI 0.4 0.5 0.4       Cultures:     No results found for: [facility]  Lab Results   Component Value Date/Time    Culture result: MODERATE STAPHYLOCOCCUS AUREUS (A) 2022 09:08 PM    Culture result: LIGHT GRAM NEGATIVE RODS (A) 2022 09:08 PM    Culture result: SCANT NORMAL RESPIRATORY TAMARA 2022 09:08 PM     : Spoke with the pt's spouse for 27 minutes, updated clinical information and current medication therapy. Mrs. Talha Rowan asked whether he could get Ivermectin, replied as that will not be one of our recommended therapy. She asked what other things that we can offer if all current therapy fails, I asked her to think about goals of care. I addressed and answered all the questions that raised by pt's spouse. Assessment:     1. ARDS secondary to COVID 19 PNA, prolonged intubation 2021  2. Severe COVID pneumonia--Pneumococcus from respiratory culture; completed the therapy   3. MRSA pneumonia (sputum cx 2022 - MRSA, - MRSA, GNR)  4. VDRF  5. Hypoxic respiratory failure  6.  Bacteremia--S epi; completed the therapy            Objective:     Code status changed to DNR  continue with IV merrem and zyvox   Overall poor prognosis     Above plan of care discussed and agreed with Dr. Roma Montiel, NP

## 2022-01-17 NOTE — PROGRESS NOTES
Interim ICU Progress Note:    I had a goals of care discussion with the patient's wife at the beside. She had considered the discussions from the previous night and decided to make Mr Keanu Ferreira DNR. She said that she would not want him to undergo resuscitation should his heart stop, but want to allow him to die peacefully. She also said this to the bedside RN. Code status changed to DNR.     Mel Levine NP

## 2022-01-17 NOTE — PROGRESS NOTES
Spoke to patients wife who is at the bedside, I explained to her that the patient is having difficulty in maintaining his oxygen saturation and has been hypoxic most of the day. I explained to her this can be detrimental to his overall recovery, particularly with his brain function, and there is no clear way to know at this point if his brain is being damaged by lack of oxygen. I also explained he is at risk of sudden cardiac events such as cardiac arrest, as his heart muscle is not receiving adequate oxygen. She states she is reluctant to make a decision on code status as the patients brothers \" don't want to give up\" and they want to continue with all measures including CPR. The wife is staying nearby at a relatives house so she can be close, as she lives in [facility]. At this time the patient remains a full code, we will continue to update the wife on the patients condition. She is aware his prognosis is poor and he is unlikely to survive this hospital stay.         Palmira St[facility]     Critical Care Medicine  [facility]

## 2022-01-17 NOTE — PROGRESS NOTES
[facility]    ICU TEAM Progress Note    Name: Francisca Ahn   : 1975   MRN: [id number]   Date: 2022      Subjective:   Progress Note: 2022      Mr Luciano Galeas is a 56 yo male with a PMH of DM2, HTN and obesity admitted on  to [facility] for COVID-19 pneumonia. He was treated with tocilizumab and steroids. He was not vaccinated. He decompensated and was intubated and . He was paralyzed, proned/supinated.        He was transferred to [facility] on  to be evaluated for possible ECMO. CVC and arterial line placed at [facility] on . Nimbex was turned off . He was weaned to 60% Fio2 as of 12/3. Proning/supinating therapy continued. He continued to demonstrated fever. He was started on cefepime/fluconazole on 12/3 with vanc added .       his FIo2 increased to 70% and decreased back to 60% on . CVC changed to PICC on  as blood culture had been positive for 4/4 S epi on 12/3 (cleared in culture on ). He was on vanc for 2 weeks, until . Changed to cefazolin on 12/15.     He has been responsive to diuresis over the past few days and has been weaned to 60% FiO2. Attempts were made to decrease sedation on , but this was not successful due to increased agitation. Due to this, seroquel was added on . Enteral oxycodone and valium were also added on . His versed was weaned from 19mg/h to 17/mg/h on pm.  His versed was further weaned on  as his seroquel was increased.     On , the patient became febrile to 103.3 and demonstrated increased RR and HR. Increase in FiO2 to 80%. Proning continued. Transitioned back to IV opiates and benzos on . As of , increased in pressors support.  procal 2.9, MRSA in sputum, on vanc. He was eventually changed to linezolid. He continued to exhibit fever over the next several days. On , he exhibited afib/flutter and converted back to NSR.     He currently remains intubated, sedated, and paralyzed. He is on nitric oxide. On 1/14, his FiO2 was able to be weaned to 80%. We attempted to wean NO. Upon doing this, he became hypertensive and hypoxic. We increased NO back to 40 and Fio2 back to 100% and his hypertension and hypoxia resolved. I also reached out to [facility] on 1/14 and he was declined for transfer to that facility for transplant. We have reached out to [facility] as well. Over the last 24 hours, Mr Gabi Yee has demonstrated persistent and progressive hypoxia. His oxygen saturations are now persistently in the 70s. We have had multiple discussions with his wife explaining that by all clinical accounts, he appears to be actively dying. She is considering DNR. Antibiotics:  1. Vancomycin 12/4--12/12, 12/27/2021-1/5/2022  2. zyvox 1/4-  3. Cefepime 12/3-12/7/2021  4. Ceftriaxone 12/7--12/20/2021  5. Zosyn 1/1/2022-1/6/2022  6. Merrem 1/6/2022-  7. Eraxis 12/30/2021-1/3/2022    Active Problem List:     Problem List  Date Reviewed: 11/28/2021          Codes Class    COVID ICD-10-CM: U07.1  ICD-9-CM: 079.89         Acute hypoxemic respiratory failure due to COVID-19 [facility]) ICD-10-CM: U07.1, J96.01  ICD-9-CM: 518.81, 079.89, 799.02         Pneumonia due to COVID-19 virus ICD-10-CM: U07.1, J12.82  ICD-9-CM: 480.8, 079.89         Hyperglycemia due to type 2 diabetes mellitus (HCC) ICD-10-CM: E11.65  ICD-9-CM: 250.00         Primary hypertension ICD-10-CM: I10  ICD-9-CM: 401.9         Hyponatremia ICD-10-CM: E87.1  ICD-9-CM: 276.1         Lactic acidosis ICD-10-CM: E87.2  ICD-9-CM: 276.2         Hyperlipidemia ICD-10-CM: E78.5  ICD-9-CM: 272.4         Obesity (BMI 30-39. 9) ICD-10-CM: E66.9  ICD-9-CM: 278.00         COVID-19 vaccination not done ICD-10-CM: Z28.9  ICD-9-CM: V64.00               Past Medical History:      has a past medical history of COVID-19, Diabetes ([facility] [address] Hyperlipidemia, and Hypertension.     Past Surgical History:      has a past surgical history that includes hx orthopaedic (Right). Home Medications:     Prior to Admission medications    Medication Sig Start Date End Date Taking? Authorizing Provider   cisatracurium (NIMBEX) IV infusion 0-0.869 mg/min by IntraVENous route TITRATE. 12/1/21  Yes Katie Melton MD   enoxaparin (LOVENOX) 100 mg/mL 90 mg by SubCUTAneous route every twelve (12) hours every twelve (12) hours. 12/1/21  Yes Katie Melton MD   insulin glargine (LANTUS) 100 unit/mL injection 24 Units by SubCUTAneous route nightly. Indications: type 2 diabetes mellitus 12/1/21  Yes Katie Melton MD   midazolam in normal saline (VERSED) 1 mg/mL soln 0-10 mg/hr by IntraVENous route TITRATE. Max Daily Amount: 240 mg. 12/1/21  Yes Katie Melton MD   Norepinephrine Bitartrate (LEVOPHED) 8 mg/250 mL (32 mcg/mL) soln 0.5-16 mcg/min by IntraVENous route TITRATE. 12/1/21  Yes Katie Melton MD   ascorbic acid, vitamin C, (VITAMIN C) 500 mg tablet Take 1 Tablet by mouth two (2) times a day. 12/1/21   Katie Melton MD   cholecalciferol (VITAMIN D3) (1000 Units /25 mcg) tablet Take 2 Tablets by mouth daily. 12/1/21   Katie Melton MD   melatonin, rapid dissolve, 5 mg TbDi tablet Take 1 Tablet by mouth nightly. 12/1/21   Katie Melton MD   atorvastatin (LIPITOR) 20 mg tablet Take 20 mg by mouth daily. Provider, Historical   telmisartan (MICARDIS) 80 mg tablet Take 80 mg by mouth daily. Indications: high blood pressure    Provider, Historical       Allergies/Social/Family History:      Allergies   Allergen Reactions    Alupent [Metaproterenol] Swelling      Social History     Tobacco Use    Smoking status: Never Smoker    Smokeless tobacco: Not on file   Substance Use Topics    Alcohol use: Yes     Comment: drinksonce a week      Family History   Problem Relation Age of Onset    Diabetes Mother     Hypertension Mother     Stroke Mother     Hodgkin's lymphoma Mother     Diabetes Father     Hypertension Father     Cystic Fibrosis Father     Diabetes Maternal Aunt     Diabetes Maternal Uncle        Review of Systems:     Unable to perform    Objective:   Vital Signs:  Visit Vitals  /84   Pulse (!) 116   Temp 98.3 °F (36.8 °C)   Resp 26   Ht 5' 6\" (1.676 m)   Wt 92.6 kg (204 lb 2.3 oz)   SpO2 (!) 74%   BMI 32.95 kg/m²    O2 Flow Rate (L/min): 60 l/min O2 Device: Endotracheal tube,Ventilator Temp (24hrs), Av.6 °F (37 °C), Min:98.2 °F (36.8 °C), Max:99.7 °F (37.6 °C)           Intake/Output:     Intake/Output Summary (Last 24 hours) at 2022 0741  Last data filed at 2022 0700  Gross per 24 hour   Intake 5821.85 ml   Output 2480 ml   Net 3341. 85 ml     Physical Exam:    General:  Sedated, paralyzed  Eye: PERRLA  Neurologic:Sedated, paralyzed  Neck: Supple, no JVD  Lungs: CTAB, on mechanical ventilation  Heart:  RRR, 2+ pulses, no edema  Abdomen:Soft, nontender, nondistended  Skin: c/d/i    LABS AND  DATA: Personally reviewed  Recent Labs     22   WBC 31.4* 15.9*   HGB 9.0* 8.0*   HCT 29.3* 25.5*    280     Recent Labs     22   * 134*   K 4.8 3.9   CL 94* 95*   CO2 36* 37*   BUN 38* 30*   CREA 0.71 0.61*   * 96   CA 8.9 9.0   MG 2.3 2.0   PHOS 4.0 2.6     Recent Labs     22   * 150*   TP 7.3 6.3*   ALB 3.4* 2.8*   GLOB 3.9 3.5     No results for input(s): INR, PTP, APTT, INREXT, INREXT in the last 72 hours. No results for input(s): PHI, PCO2I, PO2I, FIO2I in the last 72 hours. No results for input(s): CPK, CKMB, TROIQ, BNPP in the last 72 hours. Vent settings:  100% PEEP 14 R 26     ABG on above settings at 0306 7.29/69/45/32    MEDS: Reviewed    Chest X-Ray: personally reviewed and report checked    Assessment and Plan:     Severe ARDS secondary to COVID-19 Pneumonia c/b superimposed MRSA pneumonia c/b refractory hypoxemia: Chest xray without acute changes.  Currently sedated, paralyzed, on nitric oxide.   - Continue paralysis  - Continue sedation at current level with PRNs  - Hold off on repeat attempts to wean NO  - Continue steroids  - Continue linezolid/meropenem  - Continue lovenox    DM2 c/b hyperglycemia: Continue NPH and SSI    Labile blood pressure: Unresponsive to boluses of sedation. There was concern he was hyper metabolizing sedation as a reason for HTN. Most likely cause of HTN is hypoxemia. - previously on midodrine for hypotension  - Now hypertensive requiring cardene gtt    Paroxysmal AF: Amiodarone    Deconditioning: Unable to participate with PT/OT    Multidisciplinary Rounds Completed:  Y    ABCDEF Bundle/Checklist  Pain Medications: Dilaudid  Target RASS: -5  Sedation Medications: Versed  CAM-ICU: SILVIA  Mobility:Poor  PT/OT:Unable  Restraints: N  Discussed Plan of Care (goals of care): Y  Addressed Code Status: F    CARDIOVASCULAR  Cardiac Gtts: None  SBP Goal of: > 90 mmHg  MAP Goal of: > 65 mmHg  Transfusion Trigger (Hgb): <7 g/dL    RESPIRATORY  Vent Goals:   Chlorhexidine   DVT Prophylaxis (if no, list reason): Lovenox   SPO2 Goal: > 92%  Pulmonary toilet: Duo-Nebs     GI/  Gold Catheter Present: Yes  GI Prophylaxis: Pepcid (famotidine)   Nutrition: Yes TF  IVFs: N  Bowel Movement:Y  Bowel Regimen:Y  Insulin: Y    ANTIBIOTICS  Antibiotics:  Linezolid/Meropenem    T/L/D  Tubes: Y  Lines: Y  Drains: Y    SPECIAL EQUIPMENT  Vent    DISPOSITION  Y    CRITICAL CARE CONSULTANT NOTE  I had a face to face encounter with the patient, reviewed and interpreted patient data including clinical events, labs, images, vital signs, I/O's, and examined patient. I have discussed the case and the plan and management of the patient's care with the consulting services, the bedside nurses and the respiratory therapist.      NOTE OF PERSONAL INVOLVEMENT IN CARE   This patient has a high probability of imminent, clinically significant deterioration, which requires the highest level of preparedness to intervene urgently.  I participated in the decision-making and personally managed or directed the management of the following life and organ supporting interventions that required my frequent assessment to treat or prevent imminent deterioration. I personally spent 60 minutes of critical care time. This is time spent at this critically ill patient's bedside actively involved in patient care as well as the coordination of care and discussions with the patient's family. This does not include any procedural time which has been billed separately.     Iesha Nieto, NP  [facility]  1/17/2022

## 2022-01-17 NOTE — PROGRESS NOTES
1930: Bedside and Verbal shift change report given to [address] (oncoming nurse) by Gavino Poe RN (offgoing nurse). Report included the following information SBAR, Kardex, Procedure Summary, Intake/Output, MAR, Recent Results, Cardiac Rhythm ST and Alarm Parameters . 2000: Pt's wife at bedside, Edwin Kam NP at bedside discussing plan of care with wife. See NP note. 2027: Pt tachycardic, HR in 130s. Orders received from Edwin Kam NP for 5 mg IV metoprolol. 0730: Bedside and Verbal shift change report given to [address] (oncoming nurse) by Heidi Curiel RN (offgoing nurse). Report included the following information SBAR, Kardex, Intake/Output, MAR, Recent Results, Cardiac Rhythm ST and Alarm Parameters .

## 2022-01-18 NOTE — PROGRESS NOTES
Spoke with the wife of Mr Elpidio Rodriguez. Reiterated that he is actively dying. Wife expressed understanding. Wife was in agreement with discontinuation of levophed and allowing him to pass away without vasopressors. Sats have been in 50s persistently overnight. No escalation of care.     Jael Vega, NP

## 2022-01-18 NOTE — PROGRESS NOTES
[facility]    ICU TEAM Progress Note    Name: Clarence Juarez   : 1975   MRN: [id number]   Date: 2022      Subjective:   Progress Note: 2022      Mr Daniella Vines is a 54 yo male with a PMH of DM2, HTN and obesity admitted on  to [facility] for COVID-19 pneumonia. He was treated with tocilizumab and steroids. He was not vaccinated. He decompensated and was intubated and . He was paralyzed, proned/supinated.        He was transferred to [facility] on  to be evaluated for possible ECMO. CVC and arterial line placed at [facility] on . Nimbex was turned off . He was weaned to 60% Fio2 as of 12/3. Proning/supinating therapy continued. He continued to demonstrated fever. He was started on cefepime/fluconazole on 12/3 with vanc added .       his FIo2 increased to 70% and decreased back to 60% on . CVC changed to PICC on  as blood culture had been positive for 4/4 S epi on 12/3 (cleared in culture on ). He was on vanc for 2 weeks, until . Changed to cefazolin on 12/15.     He has been responsive to diuresis over the past few days and has been weaned to 60% FiO2. Attempts were made to decrease sedation on , but this was not successful due to increased agitation. Due to this, seroquel was added on . Enteral oxycodone and valium were also added on . His versed was weaned from 19mg/h to 17/mg/h on pm.  His versed was further weaned on  as his seroquel was increased.     On , the patient became febrile to 103.3 and demonstrated increased RR and HR. Increase in FiO2 to 80%. Proning continued. Transitioned back to IV opiates and benzos on . As of , increased in pressors support.  procal 2.9, MRSA in sputum, on vanc. He was eventually changed to linezolid. He continued to exhibit fever over the next several days. On , he exhibited afib/flutter and converted back to NSR.     He currently remains intubated, sedated, and paralyzed. He is on nitric oxide. On 1/14, his FiO2 was able to be weaned to 80%. We attempted to wean NO. Upon doing this, he became hypertensive and hypoxic. We increased NO back to 40 and Fio2 back to 100% and his hypertension and hypoxia resolved. I also reached out to [facility] on 1/14 and he was declined for transfer to that facility for transplant. We reached out to [facility] as well. Mr Tish Chaudhary was changed to DNR on 1/18. He is actively dying. His sats are in the 50s. His wife is at his bedside. Current medical management continues. Antibiotics:  1. Vancomycin 12/4--12/12, 12/27/2021-1/5/2022  2. zyvox 1/4-  3. Cefepime 12/3-12/7/2021  4. Ceftriaxone 12/7--12/20/2021  5. Zosyn 1/1/2022-1/6/2022  6. Merrem 1/6/2022-  7. Eraxis 12/30/2021-1/3/2022    Active Problem List:     Problem List  Date Reviewed: 11/28/2021          Codes Class    COVID ICD-10-CM: U07.1  ICD-9-CM: 079.89         Acute hypoxemic respiratory failure due to COVID-19 [facility]) ICD-10-CM: U07.1, J96.01  ICD-9-CM: 518.81, 079.89, 799.02         Pneumonia due to COVID-19 virus ICD-10-CM: U07.1, J12.82  ICD-9-CM: 480.8, 079.89         Hyperglycemia due to type 2 diabetes mellitus (HCC) ICD-10-CM: E11.65  ICD-9-CM: 250.00         Primary hypertension ICD-10-CM: I10  ICD-9-CM: 401.9         Hyponatremia ICD-10-CM: E87.1  ICD-9-CM: 276.1         Lactic acidosis ICD-10-CM: E87.2  ICD-9-CM: 276.2         Hyperlipidemia ICD-10-CM: E78.5  ICD-9-CM: 272.4         Obesity (BMI 30-39. 9) ICD-10-CM: E66.9  ICD-9-CM: 278.00         COVID-19 vaccination not done ICD-10-CM: Z28.9  ICD-9-CM: V64.00               Past Medical History:      has a past medical history of COVID-19, Diabetes ([facility] [address] Hyperlipidemia, and Hypertension. Past Surgical History:      has a past surgical history that includes hx orthopaedic (Right). Home Medications:     Prior to Admission medications    Medication Sig Start Date End Date Taking?  Authorizing Provider cisatracurium (NIMBEX) IV infusion 0-0.869 mg/min by IntraVENous route TITRATE. 12/1/21  Yes Krystal Alvarez MD   enoxaparin (LOVENOX) 100 mg/mL 90 mg by SubCUTAneous route every twelve (12) hours every twelve (12) hours. 12/1/21  Yes Krystal Alvarez MD   insulin glargine (LANTUS) 100 unit/mL injection 24 Units by SubCUTAneous route nightly. Indications: type 2 diabetes mellitus 12/1/21  Yes Krystal Alvarez MD   midazolam in normal saline (VERSED) 1 mg/mL soln 0-10 mg/hr by IntraVENous route TITRATE. Max Daily Amount: 240 mg. 12/1/21  Yes Krystal Alvarez MD   Norepinephrine Bitartrate (LEVOPHED) 8 mg/250 mL (32 mcg/mL) soln 0.5-16 mcg/min by IntraVENous route TITRATE. 12/1/21  Yes Krystal Alvarez MD   ascorbic acid, vitamin C, (VITAMIN C) 500 mg tablet Take 1 Tablet by mouth two (2) times a day. 12/1/21   Krystal Alvarez MD   cholecalciferol (VITAMIN D3) (1000 Units /25 mcg) tablet Take 2 Tablets by mouth daily. 12/1/21   Krystal Alvarez MD   melatonin, rapid dissolve, 5 mg TbDi tablet Take 1 Tablet by mouth nightly. 12/1/21   Krystal Alvarez MD   atorvastatin (LIPITOR) 20 mg tablet Take 20 mg by mouth daily. Provider, Historical   telmisartan (MICARDIS) 80 mg tablet Take 80 mg by mouth daily. Indications: high blood pressure    Provider, Historical       Allergies/Social/Family History:      Allergies   Allergen Reactions    Alupent [Metaproterenol] Swelling      Social History     Tobacco Use    Smoking status: Never Smoker    Smokeless tobacco: Not on file   Substance Use Topics    Alcohol use: Yes     Comment: drinksonce a week      Family History   Problem Relation Age of Onset    Diabetes Mother     Hypertension Mother     Stroke Mother     Hodgkin's lymphoma Mother     Diabetes Father     Hypertension Father     Cystic Fibrosis Father     Diabetes Maternal Aunt     Diabetes Maternal Uncle        Review of Systems:     Unable to perform    Objective:   Vital Signs:  Visit Vitals  /83   Pulse 98   Temp 98.7 °F (37.1 °C)   Resp 20   Ht 5' 6\" (1.676 m)   Wt 92.6 kg (204 lb 2.3 oz)   SpO2 (!) 55%   BMI 32.95 kg/m²    O2 Flow Rate (L/min): 60 l/min O2 Device: Ventilator,Endotracheal tube,Heated,Humidifier Temp (24hrs), Av.5 °F (37.5 °C), Min:98.4 °F (36.9 °C), Max:100.7 °F (38.2 °C)           Intake/Output:     Intake/Output Summary (Last 24 hours) at 2022 0803  Last data filed at 2022 0600  Gross per 24 hour   Intake 3745.62 ml   Output 1340 ml   Net 2405.62 ml     Physical Exam:    General:  Sedated, paralyzed  Eye: PERRLA  Neurologic:Sedated, paralyzed  Neck: Supple, no JVD  Lungs: CTAB, on mechanical ventilation  Heart:  RRR, 2+ pulses, no edema  Abdomen:Soft, nontender, nondistended  Skin: c/d/i    LABS AND  DATA: Personally reviewed  Recent Labs     22   WBC 31.3* 31.4*   HGB 8.3* 9.0*   HCT 28.1* 29.3*    326     Recent Labs     22   * 133*   K 6.1* 4.8   CL 96* 94*   CO2 35* 36*   BUN 51* 38*   CREA 0.80 0.71   * 210*   CA 8.9 8.9   MG 3.0* 2.3   PHOS 4.8* 4.0     Recent Labs     22   * 196*   TP 6.8 7.3   ALB 3.1* 3.4*   GLOB 3.7 3.9     No results for input(s): INR, PTP, APTT, INREXT, INREXT in the last 72 hours. No results for input(s): PHI, PCO2I, PO2I, FIO2I in the last 72 hours. No results for input(s): CPK, CKMB, TROIQ, BNPP in the last 72 hours. Vent settings:  100% PEEP 14 R 26     ABG on above settings at 0306 7.29/69/45/32  0306    MEDS: Reviewed    Chest X-Ray: personally reviewed and report checked    Assessment and Plan:     Severe ARDS secondary to COVID-19 Pneumonia c/b superimposed MRSA pneumonia c/b refractory hypoxemia: Chest xray without acute changes. Currently sedated, paralyzed, on nitric oxide.  He is actively dying.  - Continue paralysis  - Continue sedation at current level with PRNs  - Hold off on repeat attempts to wean NO  - Continue steroids  - Continue linezolid/meropenem  - Continue lovenox    DM2 c/b hyperglycemia: Continue NPH and SSI    Labile blood pressure: Unresponsive to boluses of sedation. There was concern he was hyper metabolizing sedation as a reason for HTN. Most likely cause of HTN is hypoxemia. - Previously on midodrine for hypotension  - Now hypertensive requiring cardene gtt    Paroxysmal AF: Amiodarone    Deconditioning: Unable to participate with PT/OT    Multidisciplinary Rounds Completed:  Y    ABCDEF Bundle/Checklist  Pain Medications: Dilaudid  Target RASS: -5  Sedation Medications: Versed  CAM-ICU: SILVIA  Mobility:Poor  PT/OT:Unable  Restraints: N  Discussed Plan of Care (goals of care): Y  Addressed Code Status: F    CARDIOVASCULAR  Cardiac Gtts: None  SBP Goal of: > 90 mmHg  MAP Goal of: > 65 mmHg  Transfusion Trigger (Hgb): <7 g/dL    RESPIRATORY  Vent Goals:   Chlorhexidine   DVT Prophylaxis (if no, list reason): Lovenox   SPO2 Goal: > 92%  Pulmonary toilet: Duo-Nebs     GI/  Gold Catheter Present: Yes  GI Prophylaxis: Pepcid (famotidine)   Nutrition: Yes TF  IVFs: N  Bowel Movement:Y  Bowel Regimen:Y  Insulin: Y    ANTIBIOTICS  Antibiotics:  Linezolid/Meropenem    T/L/D  Tubes: Y  Lines: Y  Drains: Y    SPECIAL EQUIPMENT  Vent    DISPOSITION  Y    CRITICAL CARE CONSULTANT NOTE  I had a face to face encounter with the patient, reviewed and interpreted patient data including clinical events, labs, images, vital signs, I/O's, and examined patient. I have discussed the case and the plan and management of the patient's care with the consulting services, the bedside nurses and the respiratory therapist.      NOTE OF PERSONAL INVOLVEMENT IN CARE   This patient has a high probability of imminent, clinically significant deterioration, which requires the highest level of preparedness to intervene urgently.  I participated in the decision-making and personally managed or directed the management of the following life and organ supporting interventions that required my frequent assessment to treat or prevent imminent deterioration. I personally spent 45 minutes of critical care time. This is time spent at this critically ill patient's bedside actively involved in patient care as well as the coordination of care and discussions with the patient's family. This does not include any procedural time which has been billed separately.     [facility]  1/18/2022

## 2022-01-18 NOTE — WOUND CARE
WOCN Note:      Follow-up visit to re-assess left cheek, left head wounds, knees  Assessed in room 7109     Chart shows:  Patient admitted on 12/1/22 with Covid-19    Past Medical History:   Diagnosis Date    COVID-19     Diabetes ([address]     Hyperlipidemia     Hypertension      Per chart review, prognosis very poor. Wife at bedside    On brigette in-touch mattress    Patient too hemodynamically unstable to turn to assess buttocks    1. Left cheek medical adhesive related skin injury  Partial thickness   0.5 x 0.5 x <0.1 cm  Wound bed pink   scant amount serosang exudate  no odor   flat edges  Periwound intact & without erythema   Treatment: Cleansed with saline, hydrocolloid     2. Left side of head behind eye skin tear   Resolved     3. Right distal knee DTI   Deflated blood filled intact blister  2 x 2 x 0cm  Periwound intact & without erythema  Treatment: Venelex ointment    4. Right proximal knee intact serous filled blister   Resolved     5. left knee intact serous filled blister  Resolved    6. DTI to left ear  Maroon in color, non-blanching, skin intact. No bogginess or induration. Periwound intact with no erythema  Treatment: Venelex ointment    Wound Recommendations:       Continue hydrocolloid dressing to left cheek wound     Venelex ointment to right knee, elbows, sacrum, left ear heels every 8 hours      PI Prevention:  Turn/reposition approximately every 2 hours  Offload heels with pillows at all times in bed. Sacral Foam dressing: lift to assess regularly; change as needed. Discontinue if incontinent. Pad bony prominences   Keep HOB 30 degrees or less to decrease shearing and pressure unless medically contraindicated.  If HOB is to be over 30 degrees, raise knees first then [facility] to prevent sliding   Minimize layers of linen/pads under patient to optimize support surface to one flat sheet and one incontinence pad   Z-Pascual Fluidized Positioner under ears     Discussed with RN      Transition of Care: Plan to follow weekly and as needed while admitted to hospital     Tacos Perez MSN, RN, [address] [place] [address],[address] [facility] Wound and Ostomy Nurse  office [phone]  Can be reached through [employer]  pager [phone] or call  to page

## 2022-01-18 NOTE — DISCHARGE SUMMARY
Discharge Summary     Patient: Susan Reyes MRN: [id number]  SSN: [id number]    YOB: 1975  Age: 55 y.o. Sex: male       Admit Date: 2021    Discharge Date: 2022      Admission Diagnoses: COVID [U07.1]   Admission Diagnosis After Further Review: Severe ARDS Secondary to COVID-19 Pneumonia  Additional Diagnoses Treated During this Hospitalization:  MRSA pneumonia  Pneumococcal pneumonia  Hypotension  Agitation  Acute toxic metabolic encephalopathy  Deconditioning  DM2 c/b hyperglycemia  HTN  Lactic acidosis  HLD    Discharge Condition:     Hospital Course:   Mr Rhina Keen is a 56 yo male with a PMH of DM2, HTN and obesity admitted on  to [facility] for COVID-19 pneumonia. He was treated with tocilizumab and steroids. He was not vaccinated. Analia Aceves decompensated and was intubated and . He was paralyzed, proned/supinated.        He was transferred to [facility] on  to be evaluated for possible ECMO. CVC and arterial line placed at [facility] on . Nimbex was turned off .  He was weaned to 60% Fio2 as of 12/3. Proning/supinating therapy continued. He continued to demonstrated fever.  He was started on cefepime/fluconazole on 12/3 with vanc added .       his FIo2 increased to 70% and decreased back to 60% on . CVC changed to PICC on  as blood culture had been positive for 4/4 S epi on 12/3 (cleared in culture on ).  He was on vanc for 2 weeks, until . Changed to cefazolin on 12/15.     He has been responsive to diuresis over the past few days and has been weaned to 60% FiO2.  Attempts were made to decrease sedation on , but this was not successful due to increased agitation.  Due to this, seroquel was added on .  Enteral oxycodone and valium were also added on .  His versed was weaned from 19mg/h to 17/mg/h on pm.  His versed was further weaned on  as his seroquel was increased.     On , the patient became febrile to 103.3 and demonstrated increased RR and HR. Increase in FiO2 to 80%. Proning continued. Transitioned back to IV opiates and benzos on . As of , increased in pressors support.  procal 2.9, MRSA in sputum, on vanc. He was eventually changed to linezolid. He continued to exhibit fever over the next several days. On , he exhibited afib/flutter and converted back to NSR.     He remained intubated, sedated, and paralyzed. He also remained on nitric oxide. On , his FiO2 was able to be weaned to 80%. We attempted to wean NO. Upon doing this, he became hypertensive and hypoxic. We increased NO back to 40 and Fio2 back to 100% and his hypertension and hypoxia resolved. I also reached out to [facility] on  and he was declined for transfer to that facility for transplant. We reached out to [facility] as well. Over the course of the next few days, he again decompensated, demonstrating refractory hypoxemia despite being on 100% Fio2 and 40 NO. CXR unrevealing. He was afebrile. On , he was changed to DNR. His oxygen saturations continued to decline. He  on .     Disposition:     Signed By: Mel Levine NP     2022

## 2022-01-18 NOTE — PROGRESS NOTES
0730: Verbal shift change report given to Sher Worthy RN (oncoming nurse) by Hamida Campos RN (offgoing nurse). Report included the following information SBAR, Intake/Output, Cardiac Rhythm NSR to ST and Alarm Parameters . 1100: Asystole on monitor. Patient's wife present at bedside. NP at bedside to declare time of death.

## 2022-01-18 NOTE — DEATH NOTE
Death Declaration          [address]    Pt Name  Maureen Parents   Admit date:  12/1/2021   Date and time of death:  1/18/2021 at 80   Room Number  7102/01    Medical Record Number  [id number] @ [facility]   Age  55 y.o.    Date of Birth 1975   PCP Víctor Baker DO   Attending physician Renea Khan MD      Code Status  DNR     Patient seen and examined  Yes   Mental status   Unresponsive    Pupils Dilated and Fixed     Respiration Nil     Pulse  Absent     Heart Sounds  Absent     Rhythm  Flat line    Family  Notified by Freddie Suh   [facility]  Notified by Nursing staff      Death certificate and discharge summary completion remain  Freddie Suh NP's responsibility

## 2022-01-18 NOTE — PROGRESS NOTES
Family request for prayer in Room 7108. Patient unable to communicate. Spouse Isabela at bedside. Patient  Declining. Patient  @ 6 am. Provided ministry of presence and silent prayer.      Visited by: Jelly Bagley.  Linnea Su, [address] [place] [address] paging Service [phone]

## 2022-01-18 NOTE — ADT AUTH CERT NOTES
1/13 and 1/16      Utilization Reviews         [facility], Acute - Care Day 47 (1/16/2022) by Quinton Sandhoff, RN       Review Status Review Entered   Completed 1/17/2022 10:44      Criteria Review      Care Day: 47 Care Date: 1/16/2022 Level of Care: ICU    Guideline Day 3    Level Of Care    ( ) Floor to discharge    1/17/2022 10:44:09 EST by Monie Sanderson      ICU on ventilator. Started on cardene and obtaining CXR given desaturations. Patient is on IV Amiodarone, IV Nimbex, IV Precedex, IV Dilaudid, IV Versed drips. Clinical Status    ( ) * Hemodynamic stability    1/17/2022 10:44:09 EST by Monie Sanderson      This morning, patient suddenly became more hypertensive and tachycardic, -142. (X) * Afebrile or temperature acceptable for next level of care    ( ) * Tachypnea absent    1/17/2022 10:44:09 EST by Monie Sanderson      rr 21-27    ( ) * Hypoxemia absent    1/17/2022 10:44:09 EST by Monie Sanderson      O2 Flow Rate (L/min): 60 l/min O2 Device: Endotracheal tube,Ventilator.     Oxygen saturations: 78-87% on ventilator    ( ) * Mental status at baseline    (X) * Renal function at baseline, or stable and acceptable for next level of care    1/17/2022 10:44:09 EST by Monie Sanderson      1/16/2022 03:48  BUN: 30 (H)  Creatinine: 0.61 (L)    ( ) * Discharge plans and education understood    Activity    ( ) * Ambulatory or acceptable for next level of care    Routes    ( ) * Oral hydration    ( ) * Oral medications or regimen acceptable for next level of care    1/17/2022 10:44:09 EST by Monie Sanderson      IV medications: IV methylPREDNISolone (PF) (SOLU-MEDROL) injection 60 mg daily/ IV diphenhydrAMINE (BENADRYL) injection 50 mg x 1/ IV rocuronium injection 50 mg x 1/ IV Metoprolol 50mg x1    ( ) * Oral diet or acceptable for next level of care    1/17/2022 10:44:09 EST by Monie Sanderson      orogastric tube with tube feedings in place    Interventions    ( ) * Isolation not indicated, or is performable at next level of care    1/17/2022 10:44:09 EST by Carolynn Whipple      droplet plus/contact isolation for COVID 19, MRSA, Step Pneumonia    (X) Pulse oximetry    Medications    ( ) * Antimicrobial medication absent or regimen established for next level of care    1/17/2022 10:44:09 EST by Carolynn Whipple      IV Merrem 0.5g every 6 hours/ IV linezolid in dextrose 5% (ZYVOX) IVPB premix in D5W 600 mg EVERY 12 HOURS    (X) Possible DVT prophylaxis    1/17/2022 10:44:09 EST by Carolynn Whipple      enoxaparin (LOVENOX) injection 90 mg  Dose: 1 mg/kg  Weight Dosing Info: 86.8 kg  Freq: EVERY 12 HOURS Route: SC    * Milestone   Additional Notes   1/16 ICU         LABS:      1/16/2022 00:07   GLUCOSE,FAST - POC: 150 (H)      1/16/2022 03:37   GLUCOSE,FAST - POC: 105      1/16/2022 03:48   WBC: 15.9 (H)   NRBC: 0.0   RBC: 2.62 (L)   HGB: 8.0 (L)   HCT: 25.5 (L)   MCV: 97.3   MCH: 30.5   MCHC: 31.4   RDW: 19.4 (H)   PLATELET: 894   MPV: 9.4   NEUTROPHILS: 73   LYMPHOCYTES: 13   MONOCYTES: 12   EOSINOPHILS: 1   BASOPHILS: 0   IMMATURE GRANULOCYTES: 1 (H)   DF: AUTOMATED   ABSOLUTE NRBC: 0.00   ABS. NEUTROPHILS: 11.7 (H)   ABS. IMM. GRANS.: 0.2 (H)   ABS. LYMPHOCYTES: 2.0   ABS. MONOCYTES: 1.9 (H)   ABS. EOSINOPHILS: 0.1   ABS. BASOPHILS: 0.0   Sodium: 134 (L)   Potassium: 3.9   Chloride: 95 (L)   CO2: 37 (H)   Anion gap: 2 (L)   Glucose: 96   BUN: 30 (H)   Creatinine: 0.61 (L)   BUN/Creatinine ratio: 49 (H)   Calcium: 9.0   Phosphorus: 2.6   Magnesium: 2.0   GFR est non-AA: >60   GFR est AA: >60   Bilirubin, total: 0.5   Protein, total: 6.3 (L)   Albumin: 2.8 (L)   Globulin: 3.5   A-G Ratio: 0.8 (L)   ALT: 57   AST: 42 (H)   Alk.  phosphatase: 150 (H)   Triglyceride: 241 (H)      1/16/2022 06:17   pH: 7.48 (H)   PCO2: 55 (H)   PO2: 82   BICARBONATE: 40 (H)   BASE EXCESS: 14.3   tHb: 8.9 (L)   Oxyhemoglobin: 94.3   O2 SATURATION: 96   Sample source: ARTERIAL   SITE: DRAWN FROM ARTERIAL LINE   MARISELA'S TEST: NOT APPLICABLE   MODE: PRESSURE CONTROL   FIO2: 100   PEEP/CPAP: 14.0   O2 METHOD: VENT   SET RATE: 26   Carboxy-Hgb: 1.4   Methemoglobin: 0.3      2022 07:32   GLUCOSE,FAST - POC: 78      2022 11:57   GLUCOSE,FAST - POC: 154 (H)      2022 12:21   IMMUNOGLOBULIN E, QT: Rpt      2022 16:41   GLUCOSE,FAST - POC: 329 (H)      2022 20:01   GLUCOSE,FAST - POC: 334 (H)               -CRITICAL CARE:         This morning, patient suddenly became more hypertensive and tachycardic. Treated with benadryl (given concern for possible allergic reaction- extremely red in face/whole body) and sedation without improvement. Continued to sedate patient. Patient on paralytic with 4/4 twitches- gave push of rocuronium. Patient with persistent desatuations, hypertension, and tachycardia. Started on cardene and obtaining CXR.       Antibiotics:   1. Vancomycin --, 2021-2022   2. zyvox -   3. Cefepime 12/3-2021   4. Ceftriaxone --2021   5. Zosyn 2022-2022   6. Merrem 2022-   7.  Eraxis 2021-1/3/2022            Visit Vitals   BP (!) 88/67   Pulse 67   Temp 98.9 °F (37.2 °C)   Resp 26   Ht 5' 6\" (1.676 m)   SpO2 96%       O2 Flow Rate (L/min): 60 l/min O2 Device: Endotracheal tube,Ventilator Temp (24hrs), Av.8 °F (37.1 °C), Min:98.1 °F (36.7 °C), Max:99.5 °F (37.5 °C)                 Intake/Output:        Intake/Output Summary (Last 24 hours) at 2022 0742   Last data filed at 2022 0600   Gross per 24 hour   Intake 5188.61 ml   Output 4725 ml   Net 463.61 ml       Physical Exam:       General:  Sedated, paralyzed   Eye: PERRLA   Neurologic:Sedated, paralyzed   Neck: Supple, no JVD   Lungs: CTAB, on mechanical ventilation   Heart:  RRR, 2+ pulses, no edema   Abdomen:Soft, nontender, nondistended   Skin: c/d/i            Vent settings:   100% PEEP 14 R 26        MEDS: Reviewed       Chest X-Ray: personally reviewed and report checked       Assessment and Plan:       Severe ARDS secondary to COVID-19 Pneumonia c/b superimposed MRSA pneumonia: Currently sedated, paralyzed, on nitric oxide.    - Continue paralysis   - Continue sedation at current level with PRNs   - Hold off on repeat attempts to wean NO   - Diuresis with goal -1L   - Continue steroids   - Continue linezolid/meropenem   - Obtain CXR given desaturations   - Continue lovenox       DM2 c/b hyperglycemia: Continue NPH and SSI       Labile blood pressure   - previously on midodrine for hypotension   - Now hypertensive requiring cardene gtt       Paroxysmal AF: Amiodarone       Deconditioning: Unable to participate with PT/OT       Multidisciplinary Rounds Completed:  N       ABCDEF Bundle/Checklist   Pain Medications: Dilaudid   Target RASS: -5   Sedation Medications: Versed   CAM-ICU: SILVIA   Mobility:Poor   PT/OT:Unable   Restraints: N   Discussed Plan of Care (goals of care): Y   Addressed Code Status: F       CARDIOVASCULAR   Cardiac Gtts: None   SBP Goal of: > 90 mmHg   MAP Goal of: > 65 mmHg   Transfusion Trigger (Hgb): <7 g/dL       RESPIRATORY   Vent Goals:    Chlorhexidine    DVT Prophylaxis (if no, list reason): Lovenox    SPO2 Goal: > 92%   Pulmonary toilet: Duo-Nebs        GI/   Gold Catheter Present: Yes   GI Prophylaxis: Pepcid (famotidine)    Nutrition: Yes TF   IVFs: N   Bowel Movement:Y   Bowel Regimen:Y   Insulin: Y       ANTIBIOTICS   Antibiotics:   Linezolid/Meropenem       T/L/D   Tubes: Y   Lines: Y   Drains:  Y       SPECIAL EQUIPMENT   Vent               -MEDICATIONS:   01/16/22 2100    metoprolol (LOPRESSOR) injection 5 mg        01/16 2032 Completed IV ONCE 01/16/22 2027 01/16/22 1800    niCARdipine (CARDENE) 50 mg in 0.9% sodium chloride 100 mL infusion     Discontinue     -- Dispensed IV TITRATE 01/16/22 1743   01/16/22 1500    midazolam (VERSED) injection 10 mg        01/16 1409 Completed IV ONCE 01/16/22 1409   01/16/22 1400 midazolam (VERSED) injection 10 mg  Status:  Discontinued        01/16 1349 Discontinued IV ONCE 01/16/22 1345   01/16/22 1400    HYDROmorphone (PF) (DILAUDID) injection 5 mg        01/16 1400 Completed IV ONCE 01/16/22 1345   01/16/22 1400    midazolam (PF) (VERSED) 1 mg/mL injection 10 mg  Status:  Discontinued        01/16 1409 Discontinued IV ONCE 01/16/22 1349   01/16/22 1300    rocuronium injection 50 mg        01/16 1208 Completed IV NOW 01/16/22 1200   01/16/22 1300    niCARdipine (CARDENE) 25 mg in 0.9% sodium chloride 250 mL infusion  Status:  Discontinued        01/16 1743 Discontinued IV TITRATE 01/16/22 1241   01/16/22 1200    HYDROmorphone (PF) (DILAUDID) injection 4 mg        01/16 1152 Completed IV ONCE 01/16/22 1143   01/16/22 0100    cisatracurium (NIMBEX) 2 mg/mL in NS IV infusion (DILUTED)     Discontinue     -- Dispensed IV TITRATE 01/16/22 0024   01/15/22 1300    HYDROmorphone 30 mg/30 mL (1 mg/mL) infusion     Discontinue     -- Dispensed IV TITRATE 01/15/22 1246   01/14/22 0807    0.9% sodium chloride infusion 250 mL     Discontinue     -- Dispensed IV AS NEEDED 01/14/22 0807   01/12/22 1300    insulin NPH (NOVOLIN N, HUMULIN N) injection 30 Units     Discontinue     -- Dispensed SC EVERY 12 HOURS 01/12/22 1223   01/09/22 0900    amiodarone (CORDARONE) 375 mg/250 mL D5W infusion     Discontinue     -- Dispensed IV TITRATE 01/09/22 0845   01/07/22 2200    white petrolatum-mineral oiL (AKWA TEARS) 83-15 % ophthalmic ointment     Discontinue     -- Dispensed BOTH EYES EVERY 12 HOURS 01/07/22 2115   01/07/22 1200    insulin lispro (HUMALOG) injection  (GUSTAVO CORRECTIONAL SCALE LISPRO PANEL)     Discontinue     -- Dispensed SC EVERY 4 HOURS 01/07/22 0736   01/06/22 0900    meropenem (MERREM) 500 mg in sterile water (preservative free) 10 mL IV syringe     Discontinue     -- Dispensed IV EVERY 6 HOURS 01/06/22 0832   01/05/22 1200    methylPREDNISolone (PF) (SOLU-MEDROL) injection 60 mg     Discontinue     -- Dispensed IV DAILY 01/05/22 1040   01/05/22 0900    cholecalciferol (VITAMIN D3) (1000 Units /25 mcg) tablet 6,000 Units     Discontinue     -- Dispensed PER NG TUBE DAILY 01/04/22 1037   01/05/22 0000    enoxaparin (LOVENOX) injection 90 mg     Discontinue     -- Dispensed SC EVERY 12 HOURS 01/04/22 1425   01/04/22 2100    linezolid in dextrose 5% (ZYVOX) IVPB premix in D5W 600 mg     Discontinue     -- Dispensed IV EVERY 12 HOURS 01/04/22 1858   01/04/22 0900    dexmedeTOMidine in 0.9 % NaCl (PRECEDEX) 400 mcg/100 mL (4 mcg/mL) infusion soln     Discontinue     -- Dispensed IV TITRATE 01/04/22 0825   12/20/21 0600    diazePAM (VALIUM) tablet 10 mg     Discontinue     -- Dispensed PER NG TUBE EVERY 6 HOURS 12/20/21 0521   12/17/21 1200    oxyCODONE IR (ROXICODONE) tablet 10 mg     Discontinue     -- Dispensed PER NG TUBE EVERY 6 HOURS 12/17/21 1047   12/14/21 2100    famotidine (PEPCID) 40 mg/5 mL (8 mg/mL) oral suspension 20 mg     Discontinue     -- Dispensed PER NG TUBE EVERY 12 HOURS 12/14/21 1417   12/10/21 0900    polyethylene glycol (MIRALAX) packet 17 g     Discontinue     -- Dispensed PO DAILY PRN 12/10/21 0848   12/10/21 0900    zinc oxide-cod liver oil (DESITIN) 40 % paste     Discontinue     -- Dispensed TP EVERY 12 HOURS 12/10/21 0859   12/08/21 0303    midazolam (VERSED) injection 5 mg     Discontinue     -- Dispensed IV EVERY 1 HOUR AS NEEDED 12/08/21 0310   12/07/21 1750    bacitracin 500 unit/gram packet 1 Packet  (CVAD (CENTRAL VENOUS ACCESS DEVICE) ADULT PROTOCOL)     Discontinue     -- Verified TP AS NEEDED 12/07/21 1800   12/07/21 1750    alteplase (CATHFLO) 1 mg in sterile water (preservative free) 1 mL injection  (CVAD (CENTRAL VENOUS ACCESS DEVICE) ADULT PROTOCOL)     Discontinue     -- Dispensed IK AS NEEDED 12/07/21 1800   12/07/21 0900    therapeutic multivitamin (THERAGRAN) tablet 1 Tablet     Discontinue     -- Dispensed PO DAILY 12/06/21 1134   12/05/21 0919 dextrose (D50W) injection syrg 12.5-25 g  (GUSTAVO CORRECTIONAL SCALE LISPRO PANEL)     Discontinue     -- Dispensed IV AS NEEDED 12/05/21 0919   12/04/21 0900    [Held by provider]  atorvastatin (LIPITOR) tablet 20 mg     Discontinue    Note to Pharmacy:  OP SIG:Take 20 mg by mouth kavita. ..    -- Dispensed PO DAILY 12/03/21 1117   12/03/21 2200    SALINE PERIPHERAL FLUSH Q8H soln 5-40 mL     Discontinue     -- Dispensed IK EVERY 8 HOURS 12/03/21 2043 12/03/21 2043    saline peripheral flush soln 5-40 mL     Discontinue     -- Dispensed IK AS NEEDED 12/03/21 2044 12/03/21 1200    ascorbic acid (vitamin C) (VITAMIN C) tablet 500 mg     Discontinue     -- Dispensed PO DAILY 12/03/21 1059   12/02/21 1228    acetaminophen (TYLENOL) solution 650 mg     Discontinue     -- Dispensed PER NG TUBE EVERY 6 HOURS AS NEEDED 12/02/21 1228   12/01/21 2100    chlorhexidine (PERIDEX) 0.12 % mouthwash 15 mL     Discontinue     -- Dispensed PO EVERY 12 HOURS 12/01/21 1957   12/01/21 1800    balsam peru-castor oiL (VENELEX) ointment     Discontinue     -- Dispensed TP 2 TIMES DAILY 12/01/21 1253   12/01/21 1400    glucose chewable tablet 16 g  (GUSTAVO CORRECTIONAL SCALE LISPRO PANEL)     Discontinue     -- Verified PO AS NEEDED 12/01/21 1401   12/01/21 1400    glucagon (GLUCAGEN) injection 1 mg  (GUSTAVO CORRECTIONAL SCALE LISPRO PANEL)     Discontinue     -- Verified IM AS NEEDED 12/01/21 1401   12/01/21 1300    midazolam in normal saline (VERSED) 1 mg/mL infusion     Discontinue     -- Dispensed IV TITRATE 12/01/21 1236   11/28/21 0101    rocuronium injection     Discontinue     -- Sent IV AS NEEDED 11/28/21 0108   11/28/21 0055    lidocaine (PF) (XYLOCAINE) 20 mg/mL (2 %) injection     Discontinue     -- Sent IV AS NEEDED 11/28/21 0108   11/28/21 0055    propofoL (DIPRIVAN) 10 mg/mL injection     Discontinue     -- Sent IV AS NEEDED 11/28/21 0108   11/28/21 0055    succinylcholine (ANECTINE) 20 mg/mL syringe     Discontinue     -- Sent IV AS NEEDED 11/28/21 0108              [facility], Acute - Care Day 44 (1/13/2022) by Shaina Delgado RN       Review Status Review Entered   Completed 1/17/2022 10:24      Criteria Review      Care Day: 44 Care Date: 1/13/2022 Level of Care: ICU    Guideline Day 3    Level Of Care    ( ) Floor to discharge    1/17/2022 10:24:26 EST by Yara Cano      ICU on IV Versed, IV Nimbex, IV Dilaudid, IV Precedex, and IV Amiodarone drips    Clinical Status    ( ) * Hemodynamic stability    1/17/2022 10:24:26 EST by Yara Cano      -141    (X) * Afebrile or temperature acceptable for next level of care    1/17/2022 10:24:26 EST by Yara Cano      afebrile    ( ) * Tachypnea absent    1/17/2022 10:24:26 EST by Yara Cano      RR 23-26    ( ) * Hypoxemia absent    1/17/2022 10:24:26 EST by Graciela Sparrow He currently remains intubated, sedated, and paralyzed.  He is on nitric oxide. Vent settings:  100% PEEP 14 R 26     ( ) * Mental status at baseline    (X) * Renal function at baseline, or stable and acceptable for next level of care    1/17/2022 10:24:26 EST by Yara Cano      1/13/2022 04:15  BUN: 29 (H)  Creatinine: 0.69 (L)    ( ) * Discharge plans and education understood    Activity    ( ) * Ambulatory or acceptable for next level of care    Routes    ( ) * Oral hydration    ( ) * Oral medications or regimen acceptable for next level of care    1/17/2022 10:24:26 EST by Yara Cano      IV medications: IV methylPREDNISolone (PF) (SOLU-MEDROL) injection 60 mg daily/ IV Dilaudid 4mg x 1/ IV Lasix 40mg x 1    ( ) * Oral diet or acceptable for next level of care    1/17/2022 10:24:26 EST by Yara Cano      Orogastric tube in place.  Tube Feeding: Glucerna 1.5 @ 40 ml/hr; increase to goal 60 ml/hr with 100 ml water flush q 4 hr. Give 2 packets Prosource daily    Interventions    ( ) * Isolation not indicated, or is performable at next level of care    1/17/2022 10:24:26 EST by Art Diggs      droplet plus isolation    (X) Pulse oximetry    Medications    ( ) * Antimicrobial medication absent or regimen established for next level of care    1/17/2022 10:24:26 EST by Art Diggs      IV linezolid in dextrose 5% (ZYVOX) IVPB premix in D5W 600 mg  EVERY 12 HOURS / IV Merrem 0.5g every 6 hours    (X) Possible DVT prophylaxis    1/17/2022 10:24:26 EST by Art Diggs      enoxaparin (LOVENOX) injection 90 mg  Dose: 1 mg/kg  Weight Dosing Info: 86.8 kg  Freq: EVERY 12 HOURS Route: SC    * Milestone   Additional Notes   1/13  ICU         LABS:      1/13/2022 00:21   GLUCOSE,FAST - POC: 132 (H)      1/13/2022 04:15   WBC: 16.6 (H)   NRBC: 0.1 (H)   RBC: 2.50 (L)   HGB: 7.4 (L)   HCT: 24.2 (L)   MCV: 96.8   MCH: 29.6   MCHC: 30.6   RDW: 19.9 (H)   PLATELET: 210   MPV: 9.4   NEUTROPHILS: 80 (H)   LYMPHOCYTES: 12   MONOCYTES: 7   EOSINOPHILS: 1   BASOPHILS: 0   IMMATURE GRANULOCYTES: 0   DF: MANUAL   ABSOLUTE NRBC: 0.02 (H)   ABS. NEUTROPHILS: 13.2 (H)   ABS. IMM. GRANS.: 0.0   ABS. LYMPHOCYTES: 2.0   ABS. MONOCYTES: 1.2 (H)   ABS. EOSINOPHILS: 0.2   ABS. BASOPHILS: 0.0   RBC COMMENTS: POLYCHROMASIA. .. Sodium: 135 (L)   Potassium: 3.8   Chloride: 91 (L)   CO2: 37 (H)   Anion gap: 7   Glucose: 96   BUN: 29 (H)   Creatinine: 0.69 (L)   BUN/Creatinine ratio: 42 (H)   Calcium: 9.8   Phosphorus: 3.7   Magnesium: 2.3   GFR est non-AA: >60   GFR est AA: >60   Bilirubin, total: 0.5   Protein, total: 6.9   Albumin: 3.5   Globulin: 3.4   A-G Ratio: 1.0 (L)   ALT: 41   AST: 20   Alk. phosphatase: 150 (H)   Triglyceride: 256 (H)   Vitamin B12: 1,803 (H)   Vitamin D 25-Hydroxy: 20.5 (L)   VITAMIN D, 25 HYDROXY: Rpt (A)      1/13/2022 04:15   RBC COMMENTS: MACROCYTOSIS. .. 1/13/2022 04:15   RBC COMMENTS: ANISOCYTOSIS. ..       1/13/2022 04:16   GLUCOSE,FAST - POC: 100      1/13/2022 04:19   pH: 7.51 (H)   PCO2: 54 (H)   PO2: 194 (H)   BICARBONATE: 42 (H)   BASE EXCESS: 16.6   tHb: 8.0 (L)   Oxyhemoglobin: 98.5 (H)   O2 SATURATION: 100 (H)   Sample source: ARTERIAL   SITE: DRAWN FROM ARTERIAL LINE   MARISELA'S TEST: NOT APPLICABLE   O2 METHOD: VENT   Carboxy-Hgb: 1.3   Methemoglobin: 0.1      1/13/2022 09:17   GLUCOSE,FAST - POC: 80      1/13/2022 11:19   GLUCOSE,FAST - POC: 211 (H)      1/13/2022 15:19   GLUCOSE,FAST - POC: 274 (H)      1/13/2022 20:50   GLUCOSE,FAST - POC: 202 (H)      1/13/2022 23:15   GLUCOSE,FAST - POC: 186 (H)      1/14/2022 06:30   WBC: 18.5 (H)   NRBC: 0.4 (H)   RBC: 2.24 (L)   HGB: 6.7 (L)   HCT: 22.1 (L)   MCV: 98.7   MCH: 29.9   MCHC: 30.3   RDW: 20.2 (H)   PLATELET: 790   MPV: 9.6   NEUTROPHILS: 75   LYMPHOCYTES: 10 (L)   MONOCYTES: 12   EOSINOPHILS: 1   BASOPHILS: 0   IMMATURE GRANULOCYTES: 2 (H)   DF: SMEAR SCANNED   ABSOLUTE NRBC: 0.07 (H)   ABS. NEUTROPHILS: 13.8 (H)   ABS. IMM. GRANS.: 0.4 (H)   ABS. LYMPHOCYTES: 1.9   ABS. MONOCYTES: 2.2 (H)   ABS. EOSINOPHILS: 0.2   ABS. BASOPHILS: 0.0   RBC COMMENTS: HYPOCHROMIA. .. Sodium: 135 (L)   Potassium: 4.0   Chloride: 93 (L)   CO2: 38 (H)   Anion gap: 4 (L)   Glucose: 60 (L)   BUN: 40 (H)   Creatinine: 0.65 (L)   BUN/Creatinine ratio: 62 (H)   Calcium: 9.0   Phosphorus: 3.7   Magnesium: 2.1   GFR est non-AA: >60   GFR est AA: >60   Bilirubin, total: 0.3   Protein, total: 6.0 (L)   Albumin: 3.0 (L)   Globulin: 3.0   A-G Ratio: 1.0 (L)   ALT: 44   AST: 31   Alk. phosphatase: 144 (H)               -CRITICAL CARE :         On 12/25, the patient became febrile to 103.3 and demonstrated increased RR and HR. Increase in FiO2 to 80%. Proning continued. Transitioned back to IV opiates and benzos on 12/31.  As of 1/1, increased in pressors support.  1/2 procal 2.9, MRSA in sputum, on vanc. He was eventually changed to linezolid.  He continued to exhibit fever over the next several days.  On 1/9, he exhibited afib/flutter and converted back to NSR.       He currently remains intubated, sedated, and paralyzed. Analia Aceves is on nitric oxide.  He was initially weaned down to 75% and plans were to jennifer NO, but prior to doing this, he experienced tachycardia, hypertension, hypoxia requiring bolus sedation and increased FIo2 to 100%. NO was left at the same dose.       Antibiotics:   1. Vancomycin --, 2021-2022   2. zyvox -   3. Cefepime 12/3-2021   4. Ceftriaxone --2021   5. Zosyn 2022-2022   6. Merrem 2022-   7.  Eraxis 2021-1/3/2022             Visit Vitals   BP (!) 191/115   Pulse (!) 140   Temp 98.8 °F (37.1 °C)   Resp 23   Ht 5' 6\" (1.676 m)   Wt 95.1 kg (209 lb 10.5 oz)   SpO2 (!) 87%   BMI 33.84 kg/m²    O2 Flow Rate (L/min): 60 l/min O2 Device: Endotracheal tube,Ventilator Temp (24hrs), Av.2 °F (36.8 °C), Min:97.6 °F (36.4 °C), Max:98.9 °F (37.2 °C)                 Intake/Output:        Intake/Output Summary (Last 24 hours) at 2022 1146   Last data filed at 2022 1100   Gross per 24 hour   Intake 5092.2 ml   Output 4190 ml   Net 902.2 ml       Physical Exam:       General:  Sedated, paralyzed   Eye: PERRLA   Neurologic:Sedated, paralyzed   Neck: Supple, no JVD   Lungs: CTAB, on mechanical ventilation   Heart:  RRR, 2+ pulses, no edema   Abdomen:Soft, nontender, nondistended   Skin: c/d/i          Vent settings:   100% PEEP 14 R 26        MEDS: Reviewed       Chest X-Ray: personally reviewed and report checked       Assessment and Plan:       Severe ARDS secondary to COVID-19 Pneumonia c/b superimposed MRSA pneumonia: Currently sedated, paralyzed, on nitric oxide.    - Continue paralysis   - Continue sedation at current level with PRNs   - Continue NO   - Diuresis with goal -1L   - Continue steroids   - Continue linezolid/meropenem       DM2 c/b hyperglycemia: Continue NPH and SSI       Hypotension: Continue midodrine       Deconditioning: PT/OT       Multidisciplinary Rounds Completed:  N       ABCDEF Bundle/Checklist   Pain Medications: Dilaudid   Target RASS: -5   Sedation Medications: Versed   CAM-ICU: SILVIA   Mobility:Poor   PT/OT:Unable   Restraints: N   Discussed Plan of Care (goals of care): Y   Addressed Code Status: F       CARDIOVASCULAR   Cardiac Gtts: None   SBP Goal of: > 90 mmHg   MAP Goal of: > 65 mmHg   Transfusion Trigger (Hgb): <7 g/dL       RESPIRATORY   Vent Goals:    Chlorhexidine    DVT Prophylaxis (if no, list reason): Lovenox    SPO2 Goal: > 92%   Pulmonary toilet: Duo-Nebs        GI/   Gold Catheter Present: Yes   GI Prophylaxis: Pepcid (famotidine)    Nutrition: Yes TF   IVFs: N   Bowel Movement:Y   Bowel Regimen:Y   Insulin: Y       ANTIBIOTICS   Antibiotics:   Linezolid/Meropenem       T/L/D   Tubes: Y   Lines: Y   Drains:  Y       SPECIAL EQUIPMENT   Vent            -MEDICATIONS:   01/12/22 1300    insulin NPH (NOVOLIN N, HUMULIN N) injection 30 Units     Discontinue     -- Dispensed SC EVERY 12 HOURS 01/12/22 1223   01/09/22 0900    amiodarone (CORDARONE) 375 mg/250 mL D5W infusion     Discontinue     -- Dispensed IV TITRATE 01/09/22 0845   01/07/22 2200    white petrolatum-mineral oiL (AKWA TEARS) 83-15 % ophthalmic ointment     Discontinue     -- Dispensed BOTH EYES EVERY 12 HOURS 01/07/22 2115   01/07/22 1200    insulin lispro (HUMALOG) injection  (GUSTAVO CORRECTIONAL SCALE LISPRO PANEL)     Discontinue     -- Dispensed SC EVERY 4 HOURS 01/07/22 0736   01/06/22 0900    meropenem (MERREM) 500 mg in sterile water (preservative free) 10 mL IV syringe     Discontinue     -- Dispensed IV EVERY 6 HOURS 01/06/22 0832   01/05/22 1200    methylPREDNISolone (PF) (SOLU-MEDROL) injection 60 mg     Discontinue     -- Dispensed IV DAILY 01/05/22 1040   01/05/22 0900    cholecalciferol (VITAMIN D3) (1000 Units /25 mcg) tablet 6,000 Units     Discontinue     -- Dispensed PER NG TUBE DAILY 01/04/22 1037   01/05/22 0000    enoxaparin (LOVENOX) injection 90 mg     Discontinue     -- Dispensed SC EVERY 12 HOURS 01/04/22 1425   01/04/22 2100    linezolid in dextrose 5% (ZYVOX) IVPB premix in D5W 600 mg     Discontinue     -- Dispensed IV EVERY 12 HOURS 01/04/22 1858   01/04/22 0900    dexmedeTOMidine in 0.9 % NaCl (PRECEDEX) 400 mcg/100 mL (4 mcg/mL) infusion soln     Discontinue     -- Dispensed IV TITRATE 01/04/22 0825   12/20/21 0600    diazePAM (VALIUM) tablet 10 mg     Discontinue     -- Dispensed PER NG TUBE EVERY 6 HOURS 12/20/21 0521   12/17/21 1200    oxyCODONE IR (ROXICODONE) tablet 10 mg     Discontinue     -- Dispensed PER NG TUBE EVERY 6 HOURS 12/17/21 1047   12/14/21 2100    famotidine (PEPCID) 40 mg/5 mL (8 mg/mL) oral suspension 20 mg     Discontinue     -- Dispensed PER NG TUBE EVERY 12 HOURS 12/14/21 1417   12/10/21 0900    polyethylene glycol (MIRALAX) packet 17 g     Discontinue     -- Dispensed PO DAILY PRN 12/10/21 0848   12/10/21 0900    zinc oxide-cod liver oil (DESITIN) 40 % paste     Discontinue     -- Dispensed TP EVERY 12 HOURS 12/10/21 0859   12/08/21 0303    midazolam (VERSED) injection 5 mg     Discontinue     -- Dispensed IV EVERY 1 HOUR AS NEEDED 12/08/21 0310   12/07/21 1750    bacitracin 500 unit/gram packet 1 Packet  (CVAD (CENTRAL VENOUS ACCESS DEVICE) ADULT PROTOCOL)     Discontinue     -- Verified TP AS NEEDED 12/07/21 1800   12/07/21 1750    alteplase (CATHFLO) 1 mg in sterile water (preservative free) 1 mL injection  (CVAD (CENTRAL VENOUS ACCESS DEVICE) ADULT PROTOCOL)     Discontinue     -- Dispensed IK AS NEEDED 12/07/21 1800   12/07/21 0900    therapeutic multivitamin (THERAGRAN) tablet 1 Tablet     Discontinue     -- Dispensed PO DAILY 12/06/21 1134   12/05/21 0919    dextrose (D50W) injection syrg 12.5-25 g  (GUSTAVO CORRECTIONAL SCALE LISPRO PANEL)     Discontinue     -- Dispensed IV AS NEEDED 12/05/21 0919   12/04/21 0900    [Held by provider]  atorvastatin (LIPITOR) tablet 20 mg     Discontinue    Note to Pharmacy:  OP SIG:Take 20 mg by mouth kavita. ..    -- Dispensed PO DAILY 12/03/21 1117   12/03/21 2208 SALINE PERIPHERAL FLUSH Q8H soln 5-40 mL     Discontinue     -- Dispensed IK EVERY 8 HOURS 12/03/21 2043 12/03/21 2043    saline peripheral flush soln 5-40 mL     Discontinue     -- Dispensed IK AS NEEDED 12/03/21 2044 12/03/21 1200    ascorbic acid (vitamin C) (VITAMIN C) tablet 500 mg     Discontinue     -- Dispensed PO DAILY 12/03/21 1059   12/02/21 1228    acetaminophen (TYLENOL) solution 650 mg     Discontinue     -- Dispensed PER NG TUBE EVERY 6 HOURS AS NEEDED 12/02/21 1228   12/01/21 2100    chlorhexidine (PERIDEX) 0.12 % mouthwash 15 mL     Discontinue     -- Dispensed PO EVERY 12 HOURS 12/01/21 1957 12/01/21 1800    balsam peru-castor oiL (VENELEX) ointment     Discontinue     -- Dispensed TP 2 TIMES DAILY 12/01/21 1253   12/01/21 1400    glucose chewable tablet 16 g  (GUSTAVO CORRECTIONAL SCALE LISPRO PANEL)     Discontinue     -- Verified PO AS NEEDED 12/01/21 1401   12/01/21 1400    glucagon (GLUCAGEN) injection 1 mg  (GUSTAVO CORRECTIONAL SCALE LISPRO PANEL)     Discontinue     -- Verified IM AS NEEDED 12/01/21 1401   12/01/21 1300    midazolam in normal saline (VERSED) 1 mg/mL infusion     Discontinue     -- Dispensed IV TITRATE 12/01/21 1236   11/28/21 0101    rocuronium injection     Discontinue     -- Sent IV AS NEEDED 11/28/21 0108   11/28/21 0055    lidocaine (PF) (XYLOCAINE) 20 mg/mL (2 %) injection     Discontinue     -- Sent IV AS NEEDED 11/28/21 0108 11/28/21 0055    propofoL (DIPRIVAN) 10 mg/mL injection     Discontinue     -- Sent IV AS NEEDED 11/28/21 0108 11/28/21 0055    succinylcholine (ANECTINE) 20 mg/mL syringe     Discontinue     -- Sent IV AS NEEDED 11/28/21 0108

## 2022-01-19 LAB
BACTERIA SPEC CULT: ABNORMAL
GRAM STN SPEC: ABNORMAL
SERVICE CMNT-IMP: ABNORMAL
TRYPTASE SERPL-MCNC: 1.6 UG/L (ref 2.2–13.2)

## 2022-01-21 LAB
ARTERIAL PATENCY WRIST A: YES
BASE EXCESS BLDA CALC-SCNC: 8 MMOL/L
BDY SITE: ABNORMAL
HCO3 BLDA-SCNC: 39 MMOL/L (ref 22–26)
IGE SERPL-ACNC: 11 IU/ML (ref 6–495)
PCO2 BLDA: 87 MMHG (ref 35–45)
PH BLDA: 7.27 [PH] (ref 7.35–7.45)
PO2 BLDA: 43 MMHG (ref 80–100)
SAO2 % BLD: 69 % (ref 92–97)
SAO2% DEVICE SAO2% SENSOR NAME: ABNORMAL
SERVICE CMNT-IMP: ABNORMAL
SPECIMEN SITE: ABNORMAL

## 2022-01-24 LAB
ARTERIAL PATENCY WRIST A: YES
BASE EXCESS BLDA CALC-SCNC: 8 MMOL/L
BDY SITE: ABNORMAL
HCO3 BLDA-SCNC: 39 MMOL/L (ref 22–26)
PCO2 BLDA: 87 MMHG (ref 35–45)
PH BLDA: 7.27 [PH] (ref 7.35–7.45)
PO2 BLDA: 43 MMHG (ref 80–100)
SAO2 % BLD: 69 % (ref 92–97)
SAO2% DEVICE SAO2% SENSOR NAME: ABNORMAL
SERVICE CMNT-IMP: ABNORMAL
SPECIMEN SITE: ABNORMAL

## 2022-09-09 NOTE — PROGRESS NOTES
0730 Bedside and Verbal shift change report given to KATHRINE Bloom (oncoming nurse) by Zohreh Bermudez (offgoing nurse). Report included the following information SBAR, Kardex, Intake/Output, MAR and Cardiac Rhythm SB;NSR.    0830 During assessment, ETT suctioned, patient O2 sats dropped to 72%. BP 70s/30s, MAP 40s. Levo restarted, 100% given on vent. Took patient 15 min to recover O2 sats to 90%     0945 Update given to wife via phone    1000 rounds completed with Dr. Dinora Cruz, will not continue to trend lactic acids. 1300 Per RT, vent FiO2 decreased to 70%    1520 Vent FiO2 decreased to 60%. O2 sats maintaining at 93%, RT notified. 1800 On entry to room patient was rasing arms and opening eyes. Patient not following commands at this time. During turn and repositioning, desat to 67%, suctioned thick yellow secretions from ETT. 100% boost given, patient slow to recover to 90%. Remaining on vent FiO2 60%    1930 Bedside and Verbal shift change report given to Zohreh Bermudez (oncoming nurse) by Narendra Jimenez (offgoing nurse). Report included the following information SBAR, Kardex, Intake/Output, MAR and Cardiac Rhythm NSR. 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 drug daily for penile rehabilitation  Prior authorization will not be approved for penile rehabilitation as daily dosing is only approved to BPH  Sending the script to [facility] only make the problem worse  Simplest fix is to purchase the drug for cash using [employer] pharmacy discount program   A 90 day supply of Tadalafil 5mg at [facility] is only $15 39  I attempted to reach the patient's wife by phone but needed to leave a message on her mobile/home phone voice mail  I left my direct dial number for ease in communication  I await a return phone call before taking further action